# Patient Record
Sex: MALE | Race: WHITE | NOT HISPANIC OR LATINO | Employment: OTHER | URBAN - METROPOLITAN AREA
[De-identification: names, ages, dates, MRNs, and addresses within clinical notes are randomized per-mention and may not be internally consistent; named-entity substitution may affect disease eponyms.]

---

## 2018-01-01 ENCOUNTER — APPOINTMENT (OUTPATIENT)
Dept: RADIOLOGY | Facility: MEDICAL CENTER | Age: 73
End: 2018-01-01
Attending: NEUROLOGICAL SURGERY
Payer: OTHER MISCELLANEOUS

## 2018-01-01 ENCOUNTER — HOSPITAL ENCOUNTER (OUTPATIENT)
Facility: MEDICAL CENTER | Age: 73
End: 2018-11-27
Attending: NEUROLOGICAL SURGERY | Admitting: NEUROLOGICAL SURGERY
Payer: OTHER MISCELLANEOUS

## 2018-01-01 VITALS
DIASTOLIC BLOOD PRESSURE: 66 MMHG | RESPIRATION RATE: 17 BRPM | HEIGHT: 74 IN | TEMPERATURE: 97.6 F | WEIGHT: 223.33 LBS | SYSTOLIC BLOOD PRESSURE: 122 MMHG | OXYGEN SATURATION: 94 % | BODY MASS INDEX: 28.66 KG/M2 | HEART RATE: 81 BPM

## 2018-01-01 LAB
APTT PPP: 30.6 SEC (ref 24.7–36)
INR PPP: 1.02 (ref 0.87–1.13)
PROTHROMBIN TIME: 13.5 SEC (ref 12–14.6)

## 2018-01-01 PROCEDURE — 97165 OT EVAL LOW COMPLEX 30 MIN: CPT

## 2018-01-01 PROCEDURE — 700101 HCHG RX REV CODE 250: Performed by: NURSE PRACTITIONER

## 2018-01-01 PROCEDURE — 72020 X-RAY EXAM OF SPINE 1 VIEW: CPT

## 2018-01-01 PROCEDURE — A9270 NON-COVERED ITEM OR SERVICE: HCPCS | Performed by: NURSE PRACTITIONER

## 2018-01-01 PROCEDURE — G8978 MOBILITY CURRENT STATUS: HCPCS | Mod: CJ

## 2018-01-01 PROCEDURE — 160035 HCHG PACU - 1ST 60 MINS PHASE I: Performed by: NEUROLOGICAL SURGERY

## 2018-01-01 PROCEDURE — 85610 PROTHROMBIN TIME: CPT

## 2018-01-01 PROCEDURE — 160036 HCHG PACU - EA ADDL 30 MINS PHASE I: Performed by: NEUROLOGICAL SURGERY

## 2018-01-01 PROCEDURE — 160041 HCHG SURGERY MINUTES - EA ADDL 1 MIN LEVEL 4: Performed by: NEUROLOGICAL SURGERY

## 2018-01-01 PROCEDURE — G0378 HOSPITAL OBSERVATION PER HR: HCPCS

## 2018-01-01 PROCEDURE — 700102 HCHG RX REV CODE 250 W/ 637 OVERRIDE(OP): Performed by: NURSE PRACTITIONER

## 2018-01-01 PROCEDURE — 500002 HCHG ADHESIVE, DERMABOND: Performed by: NEUROLOGICAL SURGERY

## 2018-01-01 PROCEDURE — G8980 MOBILITY D/C STATUS: HCPCS | Mod: CJ

## 2018-01-01 PROCEDURE — 700112 HCHG RX REV CODE 229: Performed by: NURSE PRACTITIONER

## 2018-01-01 PROCEDURE — G8979 MOBILITY GOAL STATUS: HCPCS | Mod: CI

## 2018-01-01 PROCEDURE — G8988 SELF CARE GOAL STATUS: HCPCS | Mod: CI

## 2018-01-01 PROCEDURE — G8987 SELF CARE CURRENT STATUS: HCPCS | Mod: CI

## 2018-01-01 PROCEDURE — 700111 HCHG RX REV CODE 636 W/ 250 OVERRIDE (IP)

## 2018-01-01 PROCEDURE — A6402 STERILE GAUZE <= 16 SQ IN: HCPCS | Performed by: NEUROLOGICAL SURGERY

## 2018-01-01 PROCEDURE — 85730 THROMBOPLASTIN TIME PARTIAL: CPT

## 2018-01-01 PROCEDURE — 96366 THER/PROPH/DIAG IV INF ADDON: CPT

## 2018-01-01 PROCEDURE — G8989 SELF CARE D/C STATUS: HCPCS | Mod: CI

## 2018-01-01 PROCEDURE — 110454 HCHG SHELL REV 250: Performed by: NEUROLOGICAL SURGERY

## 2018-01-01 PROCEDURE — 95861 NEEDLE EMG 2 EXTREMITIES: CPT | Performed by: NEUROLOGICAL SURGERY

## 2018-01-01 PROCEDURE — 160002 HCHG RECOVERY MINUTES (STAT): Performed by: NEUROLOGICAL SURGERY

## 2018-01-01 PROCEDURE — 95937 NEUROMUSCULAR JUNCTION TEST: CPT | Performed by: NEUROLOGICAL SURGERY

## 2018-01-01 PROCEDURE — 501838 HCHG SUTURE GENERAL: Performed by: NEUROLOGICAL SURGERY

## 2018-01-01 PROCEDURE — 700101 HCHG RX REV CODE 250

## 2018-01-01 PROCEDURE — 160009 HCHG ANES TIME/MIN: Performed by: NEUROLOGICAL SURGERY

## 2018-01-01 PROCEDURE — 160048 HCHG OR STATISTICAL LEVEL 1-5: Performed by: NEUROLOGICAL SURGERY

## 2018-01-01 PROCEDURE — 500367 HCHG DRAIN KIT, HEMOVAC: Performed by: NEUROLOGICAL SURGERY

## 2018-01-01 PROCEDURE — 97161 PT EVAL LOW COMPLEX 20 MIN: CPT

## 2018-01-01 PROCEDURE — 95940 IONM IN OPERATNG ROOM 15 MIN: CPT | Performed by: NEUROLOGICAL SURGERY

## 2018-01-01 PROCEDURE — 96365 THER/PROPH/DIAG IV INF INIT: CPT

## 2018-01-01 PROCEDURE — 700111 HCHG RX REV CODE 636 W/ 250 OVERRIDE (IP): Performed by: NURSE PRACTITIONER

## 2018-01-01 PROCEDURE — 95938 SOMATOSENSORY TESTING: CPT | Performed by: NEUROLOGICAL SURGERY

## 2018-01-01 PROCEDURE — 95939 C MOTOR EVOKED UPR&LWR LIMBS: CPT | Performed by: NEUROLOGICAL SURGERY

## 2018-01-01 PROCEDURE — 160029 HCHG SURGERY MINUTES - 1ST 30 MINS LEVEL 4: Performed by: NEUROLOGICAL SURGERY

## 2018-01-01 RX ORDER — OXYCODONE HCL 5 MG/5 ML
5 SOLUTION, ORAL ORAL
Status: DISCONTINUED | OUTPATIENT
Start: 2018-01-01 | End: 2018-01-01 | Stop reason: HOSPADM

## 2018-01-01 RX ORDER — ONDANSETRON 4 MG/1
4 TABLET, ORALLY DISINTEGRATING ORAL EVERY 4 HOURS PRN
Status: DISCONTINUED | OUTPATIENT
Start: 2018-01-01 | End: 2018-01-01 | Stop reason: HOSPADM

## 2018-01-01 RX ORDER — CEFAZOLIN SODIUM 2 G/100ML
2 INJECTION, SOLUTION INTRAVENOUS EVERY 8 HOURS
Status: COMPLETED | OUTPATIENT
Start: 2018-01-01 | End: 2018-01-01

## 2018-01-01 RX ORDER — HYDROCODONE BITARTRATE AND ACETAMINOPHEN 5; 325 MG/1; MG/1
1-2 TABLET ORAL EVERY 6 HOURS PRN
Status: DISCONTINUED | OUTPATIENT
Start: 2018-01-01 | End: 2018-01-01 | Stop reason: HOSPADM

## 2018-01-01 RX ORDER — DIPHENHYDRAMINE HYDROCHLORIDE 50 MG/ML
25 INJECTION INTRAMUSCULAR; INTRAVENOUS EVERY 6 HOURS PRN
Status: DISCONTINUED | OUTPATIENT
Start: 2018-01-01 | End: 2018-01-01 | Stop reason: HOSPADM

## 2018-01-01 RX ORDER — METHYLPREDNISOLONE SODIUM SUCCINATE 125 MG/2ML
INJECTION, POWDER, LYOPHILIZED, FOR SOLUTION INTRAMUSCULAR; INTRAVENOUS
Status: DISCONTINUED | OUTPATIENT
Start: 2018-01-01 | End: 2018-01-01 | Stop reason: HOSPADM

## 2018-01-01 RX ORDER — SODIUM CHLORIDE, SODIUM LACTATE, POTASSIUM CHLORIDE, CALCIUM CHLORIDE 600; 310; 30; 20 MG/100ML; MG/100ML; MG/100ML; MG/100ML
INJECTION, SOLUTION INTRAVENOUS CONTINUOUS
Status: DISCONTINUED | OUTPATIENT
Start: 2018-01-01 | End: 2018-01-01 | Stop reason: HOSPADM

## 2018-01-01 RX ORDER — HYDRALAZINE HYDROCHLORIDE 20 MG/ML
10 INJECTION INTRAMUSCULAR; INTRAVENOUS
Status: DISCONTINUED | OUTPATIENT
Start: 2018-01-01 | End: 2018-01-01 | Stop reason: HOSPADM

## 2018-01-01 RX ORDER — DIPHENHYDRAMINE HYDROCHLORIDE 50 MG/ML
12.5 INJECTION INTRAMUSCULAR; INTRAVENOUS
Status: DISCONTINUED | OUTPATIENT
Start: 2018-01-01 | End: 2018-01-01 | Stop reason: HOSPADM

## 2018-01-01 RX ORDER — SODIUM CHLORIDE AND POTASSIUM CHLORIDE 150; 900 MG/100ML; MG/100ML
INJECTION, SOLUTION INTRAVENOUS CONTINUOUS
Status: DISCONTINUED | OUTPATIENT
Start: 2018-01-01 | End: 2018-01-01 | Stop reason: HOSPADM

## 2018-01-01 RX ORDER — OXYCODONE HCL 5 MG/5 ML
10 SOLUTION, ORAL ORAL
Status: DISCONTINUED | OUTPATIENT
Start: 2018-01-01 | End: 2018-01-01 | Stop reason: HOSPADM

## 2018-01-01 RX ORDER — MORPHINE SULFATE 10 MG/ML
4 INJECTION, SOLUTION INTRAMUSCULAR; INTRAVENOUS
Status: DISCONTINUED | OUTPATIENT
Start: 2018-01-01 | End: 2018-01-01 | Stop reason: HOSPADM

## 2018-01-01 RX ORDER — PSEUDOEPHEDRINE HCL 30 MG
100 TABLET ORAL 2 TIMES DAILY
Qty: 60 CAP | Status: ON HOLD | COMMUNITY
Start: 2018-01-01 | End: 2019-01-01

## 2018-01-01 RX ORDER — ENEMA 19; 7 G/133ML; G/133ML
1 ENEMA RECTAL
Status: DISCONTINUED | OUTPATIENT
Start: 2018-01-01 | End: 2018-01-01 | Stop reason: HOSPADM

## 2018-01-01 RX ORDER — LABETALOL HYDROCHLORIDE 5 MG/ML
10 INJECTION, SOLUTION INTRAVENOUS
Status: DISCONTINUED | OUTPATIENT
Start: 2018-01-01 | End: 2018-01-01 | Stop reason: HOSPADM

## 2018-01-01 RX ORDER — ACETAMINOPHEN 325 MG/1
650 TABLET ORAL EVERY 6 HOURS PRN
Status: DISCONTINUED | OUTPATIENT
Start: 2018-01-01 | End: 2018-01-01 | Stop reason: HOSPADM

## 2018-01-01 RX ORDER — POLYETHYLENE GLYCOL 3350 17 G/17G
1 POWDER, FOR SOLUTION ORAL 2 TIMES DAILY PRN
Status: DISCONTINUED | OUTPATIENT
Start: 2018-01-01 | End: 2018-01-01 | Stop reason: HOSPADM

## 2018-01-01 RX ORDER — BISACODYL 10 MG
10 SUPPOSITORY, RECTAL RECTAL
Status: DISCONTINUED | OUTPATIENT
Start: 2018-01-01 | End: 2018-01-01 | Stop reason: HOSPADM

## 2018-01-01 RX ORDER — AMOXICILLIN 250 MG
1 CAPSULE ORAL
Status: DISCONTINUED | OUTPATIENT
Start: 2018-01-01 | End: 2018-01-01 | Stop reason: HOSPADM

## 2018-01-01 RX ORDER — ATORVASTATIN CALCIUM 20 MG/1
20 TABLET, FILM COATED ORAL
Status: DISCONTINUED | OUTPATIENT
Start: 2018-01-01 | End: 2018-01-01 | Stop reason: HOSPADM

## 2018-01-01 RX ORDER — M-VIT,TX,IRON,MINS/CALC/FOLIC 27MG-0.4MG
1 TABLET ORAL DAILY
Status: DISCONTINUED | OUTPATIENT
Start: 2018-01-01 | End: 2018-01-01 | Stop reason: HOSPADM

## 2018-01-01 RX ORDER — DIPHENHYDRAMINE HCL 25 MG
25 TABLET ORAL EVERY 6 HOURS PRN
Status: DISCONTINUED | OUTPATIENT
Start: 2018-01-01 | End: 2018-01-01 | Stop reason: HOSPADM

## 2018-01-01 RX ORDER — ONDANSETRON 2 MG/ML
4 INJECTION INTRAMUSCULAR; INTRAVENOUS
Status: DISCONTINUED | OUTPATIENT
Start: 2018-01-01 | End: 2018-01-01 | Stop reason: HOSPADM

## 2018-01-01 RX ORDER — HYDROMORPHONE HYDROCHLORIDE 1 MG/ML
0.1 INJECTION, SOLUTION INTRAMUSCULAR; INTRAVENOUS; SUBCUTANEOUS
Status: DISCONTINUED | OUTPATIENT
Start: 2018-01-01 | End: 2018-01-01 | Stop reason: HOSPADM

## 2018-01-01 RX ORDER — HYDRALAZINE HYDROCHLORIDE 20 MG/ML
5 INJECTION INTRAMUSCULAR; INTRAVENOUS
Status: DISCONTINUED | OUTPATIENT
Start: 2018-01-01 | End: 2018-01-01 | Stop reason: HOSPADM

## 2018-01-01 RX ORDER — HYDROMORPHONE HYDROCHLORIDE 1 MG/ML
0.4 INJECTION, SOLUTION INTRAMUSCULAR; INTRAVENOUS; SUBCUTANEOUS
Status: DISCONTINUED | OUTPATIENT
Start: 2018-01-01 | End: 2018-01-01 | Stop reason: HOSPADM

## 2018-01-01 RX ORDER — LABETALOL HYDROCHLORIDE 5 MG/ML
5 INJECTION, SOLUTION INTRAVENOUS
Status: DISCONTINUED | OUTPATIENT
Start: 2018-01-01 | End: 2018-01-01 | Stop reason: HOSPADM

## 2018-01-01 RX ORDER — AMOXICILLIN 250 MG
1 CAPSULE ORAL NIGHTLY
Status: DISCONTINUED | OUTPATIENT
Start: 2018-01-01 | End: 2018-01-01 | Stop reason: HOSPADM

## 2018-01-01 RX ORDER — OXYCODONE HYDROCHLORIDE 5 MG/1
5 TABLET ORAL
Status: DISCONTINUED | OUTPATIENT
Start: 2018-01-01 | End: 2018-01-01 | Stop reason: HOSPADM

## 2018-01-01 RX ORDER — TIZANIDINE 4 MG/1
4 TABLET ORAL 3 TIMES DAILY PRN
Status: DISCONTINUED | OUTPATIENT
Start: 2018-01-01 | End: 2018-01-01 | Stop reason: HOSPADM

## 2018-01-01 RX ORDER — OXYCODONE HYDROCHLORIDE 5 MG/1
10 TABLET ORAL
Status: DISCONTINUED | OUTPATIENT
Start: 2018-01-01 | End: 2018-01-01 | Stop reason: HOSPADM

## 2018-01-01 RX ORDER — HALOPERIDOL 5 MG/ML
1 INJECTION INTRAMUSCULAR
Status: DISCONTINUED | OUTPATIENT
Start: 2018-01-01 | End: 2018-01-01 | Stop reason: HOSPADM

## 2018-01-01 RX ORDER — BACITRACIN 50000 [IU]/1
INJECTION, POWDER, FOR SOLUTION INTRAMUSCULAR
Status: DISCONTINUED | OUTPATIENT
Start: 2018-01-01 | End: 2018-01-01 | Stop reason: HOSPADM

## 2018-01-01 RX ORDER — SODIUM CHLORIDE, SODIUM LACTATE, POTASSIUM CHLORIDE, CALCIUM CHLORIDE 600; 310; 30; 20 MG/100ML; MG/100ML; MG/100ML; MG/100ML
INJECTION, SOLUTION INTRAVENOUS ONCE
Status: COMPLETED | OUTPATIENT
Start: 2018-01-01 | End: 2018-01-01

## 2018-01-01 RX ORDER — MIDAZOLAM HYDROCHLORIDE 1 MG/ML
1 INJECTION INTRAMUSCULAR; INTRAVENOUS
Status: DISCONTINUED | OUTPATIENT
Start: 2018-01-01 | End: 2018-01-01 | Stop reason: HOSPADM

## 2018-01-01 RX ORDER — ONDANSETRON 2 MG/ML
4 INJECTION INTRAMUSCULAR; INTRAVENOUS EVERY 4 HOURS PRN
Status: DISCONTINUED | OUTPATIENT
Start: 2018-01-01 | End: 2018-01-01 | Stop reason: HOSPADM

## 2018-01-01 RX ORDER — BUPIVACAINE HYDROCHLORIDE AND EPINEPHRINE 5; 5 MG/ML; UG/ML
INJECTION, SOLUTION EPIDURAL; INTRACAUDAL; PERINEURAL
Status: DISCONTINUED | OUTPATIENT
Start: 2018-01-01 | End: 2018-01-01 | Stop reason: HOSPADM

## 2018-01-01 RX ORDER — DOCUSATE SODIUM 100 MG/1
100 CAPSULE, LIQUID FILLED ORAL 2 TIMES DAILY
Status: DISCONTINUED | OUTPATIENT
Start: 2018-01-01 | End: 2018-01-01 | Stop reason: HOSPADM

## 2018-01-01 RX ORDER — HYDROMORPHONE HYDROCHLORIDE 1 MG/ML
0.2 INJECTION, SOLUTION INTRAMUSCULAR; INTRAVENOUS; SUBCUTANEOUS
Status: DISCONTINUED | OUTPATIENT
Start: 2018-01-01 | End: 2018-01-01 | Stop reason: HOSPADM

## 2018-01-01 RX ORDER — METOPROLOL TARTRATE 1 MG/ML
1 INJECTION, SOLUTION INTRAVENOUS
Status: DISCONTINUED | OUTPATIENT
Start: 2018-01-01 | End: 2018-01-01 | Stop reason: HOSPADM

## 2018-01-01 RX ADMIN — CEFAZOLIN SODIUM 2 G: 2 INJECTION, SOLUTION INTRAVENOUS at 01:05

## 2018-01-01 RX ADMIN — SODIUM CHLORIDE, SODIUM LACTATE, POTASSIUM CHLORIDE, CALCIUM CHLORIDE: 600; 310; 30; 20 INJECTION, SOLUTION INTRAVENOUS at 06:36

## 2018-01-01 RX ADMIN — MULTIPLE VITAMINS W/ MINERALS TAB 1 TABLET: TAB at 15:44

## 2018-01-01 RX ADMIN — ACETAMINOPHEN 650 MG: 325 TABLET, FILM COATED ORAL at 20:22

## 2018-01-01 RX ADMIN — METOPROLOL TARTRATE 25 MG: 25 TABLET, FILM COATED ORAL at 04:33

## 2018-01-01 RX ADMIN — DOCUSATE SODIUM 100 MG: 100 CAPSULE, LIQUID FILLED ORAL at 04:33

## 2018-01-01 RX ADMIN — CEFAZOLIN SODIUM 2 G: 2 INJECTION, SOLUTION INTRAVENOUS at 15:36

## 2018-01-01 RX ADMIN — MULTIPLE VITAMINS W/ MINERALS TAB 1 TABLET: TAB at 04:33

## 2018-01-01 RX ADMIN — ACETAMINOPHEN 650 MG: 325 TABLET, FILM COATED ORAL at 01:04

## 2018-01-01 RX ADMIN — STANDARDIZED SENNA CONCENTRATE AND DOCUSATE SODIUM 1 TABLET: 8.6; 5 TABLET, FILM COATED ORAL at 20:22

## 2018-01-01 RX ADMIN — POTASSIUM CHLORIDE AND SODIUM CHLORIDE: 900; 150 INJECTION, SOLUTION INTRAVENOUS at 01:57

## 2018-01-01 RX ADMIN — DOCUSATE SODIUM 100 MG: 100 CAPSULE, LIQUID FILLED ORAL at 16:36

## 2018-01-01 RX ADMIN — POTASSIUM CHLORIDE AND SODIUM CHLORIDE: 900; 150 INJECTION, SOLUTION INTRAVENOUS at 15:36

## 2018-01-01 RX ADMIN — ATORVASTATIN CALCIUM 20 MG: 20 TABLET, FILM COATED ORAL at 20:22

## 2018-01-01 RX ADMIN — ACETAMINOPHEN 650 MG: 325 TABLET, FILM COATED ORAL at 07:25

## 2018-01-01 ASSESSMENT — LIFESTYLE VARIABLES
EVER HAD A DRINK FIRST THING IN THE MORNING TO STEADY YOUR NERVES TO GET RID OF A HANGOVER: NO
CONSUMPTION TOTAL: NEGATIVE
TOTAL SCORE: 0
HOW MANY TIMES IN THE PAST YEAR HAVE YOU HAD 5 OR MORE DRINKS IN A DAY: 0
TOTAL SCORE: 0
AVERAGE NUMBER OF DAYS PER WEEK YOU HAVE A DRINK CONTAINING ALCOHOL: 2
TOTAL SCORE: 0
ALCOHOL_USE: YES
EVER FELT BAD OR GUILTY ABOUT YOUR DRINKING: NO
HAVE PEOPLE ANNOYED YOU BY CRITICIZING YOUR DRINKING: NO
HAVE PEOPLE ANNOYED YOU BY CRITICIZING YOUR DRINKING: NO
TOTAL SCORE: 0
TOTAL SCORE: 0
EVER_SMOKED: NEVER
HAVE YOU EVER FELT YOU SHOULD CUT DOWN ON YOUR DRINKING: NO
EVER_SMOKED: NEVER
EVER_SMOKED: NEVER
EVER FELT BAD OR GUILTY ABOUT YOUR DRINKING: NO
HAVE YOU EVER FELT YOU SHOULD CUT DOWN ON YOUR DRINKING: NO
ON A TYPICAL DAY WHEN YOU DRINK ALCOHOL HOW MANY DRINKS DO YOU HAVE: 1
CONSUMPTION TOTAL: INCOMPLETE
EVER HAD A DRINK FIRST THING IN THE MORNING TO STEADY YOUR NERVES TO GET RID OF A HANGOVER: NO
ALCOHOL_USE: YES
TOTAL SCORE: 0

## 2018-01-01 ASSESSMENT — COGNITIVE AND FUNCTIONAL STATUS - GENERAL
CLIMB 3 TO 5 STEPS WITH RAILING: A LITTLE
TURNING FROM BACK TO SIDE WHILE IN FLAT BAD: A LITTLE
SUGGESTED CMS G CODE MODIFIER MOBILITY: CJ
DAILY ACTIVITIY SCORE: 21
SUGGESTED CMS G CODE MODIFIER DAILY ACTIVITY: CI
MOBILITY SCORE: 21
HELP NEEDED FOR BATHING: A LITTLE
MOVING TO AND FROM BED TO CHAIR: A LITTLE
DAILY ACTIVITIY SCORE: 23
CLIMB 3 TO 5 STEPS WITH RAILING: A LITTLE
DRESSING REGULAR UPPER BODY CLOTHING: A LITTLE
SUGGESTED CMS G CODE MODIFIER DAILY ACTIVITY: CJ
MOBILITY SCORE: 23
SUGGESTED CMS G CODE MODIFIER MOBILITY: CI
HELP NEEDED FOR BATHING: A LITTLE
DRESSING REGULAR LOWER BODY CLOTHING: A LITTLE

## 2018-01-01 ASSESSMENT — ACTIVITIES OF DAILY LIVING (ADL): TOILETING: INDEPENDENT

## 2018-01-01 ASSESSMENT — COPD QUESTIONNAIRES
IN THE PAST 12 MONTHS DO YOU DO LESS THAN YOU USED TO BECAUSE OF YOUR BREATHING PROBLEMS: DISAGREE/UNSURE
DURING THE PAST 4 WEEKS HOW MUCH DID YOU FEEL SHORT OF BREATH: SOME OF THE TIME
DO YOU EVER COUGH UP ANY MUCUS OR PHLEGM?: YES, A FEW DAYS A WEEK OR MONTH
COPD SCREENING SCORE: 4
HAVE YOU SMOKED AT LEAST 100 CIGARETTES IN YOUR ENTIRE LIFE: NO/DON'T KNOW

## 2018-01-01 ASSESSMENT — PAIN SCALES - GENERAL
PAINLEVEL_OUTOF10: 3
PAINLEVEL_OUTOF10: 4
PAINLEVEL_OUTOF10: 3
PAINLEVEL_OUTOF10: 4
PAINLEVEL_OUTOF10: 3
PAINLEVEL_OUTOF10: 4
PAINLEVEL_OUTOF10: 3
PAINLEVEL_OUTOF10: 5
PAINLEVEL_OUTOF10: 3

## 2018-01-01 ASSESSMENT — PATIENT HEALTH QUESTIONNAIRE - PHQ9
1. LITTLE INTEREST OR PLEASURE IN DOING THINGS: NOT AT ALL
SUM OF ALL RESPONSES TO PHQ9 QUESTIONS 1 AND 2: 0
SUM OF ALL RESPONSES TO PHQ9 QUESTIONS 1 AND 2: 0
1. LITTLE INTEREST OR PLEASURE IN DOING THINGS: NOT AT ALL
2. FEELING DOWN, DEPRESSED, IRRITABLE, OR HOPELESS: NOT AT ALL
2. FEELING DOWN, DEPRESSED, IRRITABLE, OR HOPELESS: NOT AT ALL

## 2018-01-01 ASSESSMENT — GAIT ASSESSMENTS
GAIT LEVEL OF ASSIST: SUPERVISED
DEVIATION: TRENDELENBERG;DECREASED HEEL STRIKE;DECREASED TOE OFF
ASSISTIVE DEVICE: FRONT WHEEL WALKER;SINGLE POINT CANE
DISTANCE (FEET): 300

## 2018-02-12 ENCOUNTER — HOSPITAL ENCOUNTER (OUTPATIENT)
Dept: RADIOLOGY | Facility: REHABILITATION | Age: 73
End: 2018-02-12
Attending: ANESTHESIOLOGY
Payer: OTHER MISCELLANEOUS

## 2018-02-12 ENCOUNTER — HOSPITAL ENCOUNTER (OUTPATIENT)
Dept: PAIN MANAGEMENT | Facility: REHABILITATION | Age: 73
End: 2018-02-12
Attending: ANESTHESIOLOGY
Payer: OTHER MISCELLANEOUS

## 2018-02-12 VITALS
DIASTOLIC BLOOD PRESSURE: 75 MMHG | HEIGHT: 74 IN | RESPIRATION RATE: 16 BRPM | SYSTOLIC BLOOD PRESSURE: 128 MMHG | OXYGEN SATURATION: 96 % | WEIGHT: 225.75 LBS | BODY MASS INDEX: 28.97 KG/M2 | TEMPERATURE: 98.7 F | HEART RATE: 82 BPM

## 2018-02-12 PROCEDURE — 62323 NJX INTERLAMINAR LMBR/SAC: CPT

## 2018-02-12 PROCEDURE — 700111 HCHG RX REV CODE 636 W/ 250 OVERRIDE (IP)

## 2018-02-12 PROCEDURE — 99152 MOD SED SAME PHYS/QHP 5/>YRS: CPT

## 2018-02-12 PROCEDURE — 700117 HCHG RX CONTRAST REV CODE 255

## 2018-02-12 RX ORDER — BETAMETHASONE SODIUM PHOSPHATE AND BETAMETHASONE ACETATE 3; 3 MG/ML; MG/ML
INJECTION, SUSPENSION INTRA-ARTICULAR; INTRALESIONAL; INTRAMUSCULAR; SOFT TISSUE
Status: COMPLETED
Start: 2018-02-12 | End: 2018-02-12

## 2018-02-12 RX ORDER — MIDAZOLAM HYDROCHLORIDE 1 MG/ML
INJECTION INTRAMUSCULAR; INTRAVENOUS
Status: COMPLETED
Start: 2018-02-12 | End: 2018-02-12

## 2018-02-12 RX ADMIN — FENTANYL CITRATE 50 MCG: 50 INJECTION, SOLUTION INTRAMUSCULAR; INTRAVENOUS at 11:35

## 2018-02-12 RX ADMIN — IOHEXOL 8 ML: 240 INJECTION, SOLUTION INTRATHECAL; INTRAVASCULAR; INTRAVENOUS; ORAL at 11:32

## 2018-02-12 RX ADMIN — BETAMETHASONE SODIUM PHOSPHATE AND BETAMETHASONE ACETATE 6 MG: 3; 3 INJECTION, SUSPENSION INTRA-ARTICULAR; INTRALESIONAL; INTRAMUSCULAR at 11:33

## 2018-02-12 RX ADMIN — MIDAZOLAM HYDROCHLORIDE 1 MG: 1 INJECTION, SOLUTION INTRAMUSCULAR; INTRAVENOUS at 11:33

## 2018-02-12 ASSESSMENT — PAIN SCALES - GENERAL
PAINLEVEL_OUTOF10: 0
PAINLEVEL_OUTOF10: 2
PAINLEVEL_OUTOF10: 0

## 2018-02-12 NOTE — PROGRESS NOTES
Time Out completed including pertinent patient history, correct procedure, allergies and STOP BANG score.

## 2018-02-12 NOTE — PROGRESS NOTES
Current medications reviewed with pt, see medications reconciliation form. Pt karina taking ASA or other blood thinners or anti-inflammatories.  Pt has a ride post-procedure(wife to drive).  Printed and verbal discharge instructions given to pt who verbalized understanding.

## 2018-02-12 NOTE — PROGRESS NOTES
After STOP BANG assessment, pt score >5, education sheet on WALTER protocol given pre procedure.  Pt's responsible adult was given WALTER information and instructed pt not to be left along of 24 hours, due to sedation and WALTER assessment.Pt tolerated fluids, no nausea, able to ambulate. Discharged per MD orders.at 1202

## 2018-04-23 ENCOUNTER — HOSPITAL ENCOUNTER (OUTPATIENT)
Dept: RADIOLOGY | Facility: REHABILITATION | Age: 73
End: 2018-04-23
Attending: ANESTHESIOLOGY
Payer: OTHER MISCELLANEOUS

## 2018-04-23 ENCOUNTER — HOSPITAL ENCOUNTER (OUTPATIENT)
Dept: PAIN MANAGEMENT | Facility: REHABILITATION | Age: 73
End: 2018-04-23
Attending: ANESTHESIOLOGY
Payer: OTHER MISCELLANEOUS

## 2018-04-23 VITALS
HEIGHT: 74 IN | BODY MASS INDEX: 28.8 KG/M2 | DIASTOLIC BLOOD PRESSURE: 82 MMHG | OXYGEN SATURATION: 94 % | HEART RATE: 75 BPM | TEMPERATURE: 97.9 F | WEIGHT: 224.43 LBS | RESPIRATION RATE: 24 BRPM | SYSTOLIC BLOOD PRESSURE: 131 MMHG

## 2018-04-23 PROCEDURE — 700111 HCHG RX REV CODE 636 W/ 250 OVERRIDE (IP)

## 2018-04-23 PROCEDURE — 700117 HCHG RX CONTRAST REV CODE 255

## 2018-04-23 PROCEDURE — 62323 NJX INTERLAMINAR LMBR/SAC: CPT

## 2018-04-23 RX ORDER — MIDAZOLAM HYDROCHLORIDE 1 MG/ML
INJECTION INTRAMUSCULAR; INTRAVENOUS
Status: COMPLETED
Start: 2018-04-23 | End: 2018-04-23

## 2018-04-23 RX ORDER — BETAMETHASONE SODIUM PHOSPHATE AND BETAMETHASONE ACETATE 3; 3 MG/ML; MG/ML
INJECTION, SUSPENSION INTRA-ARTICULAR; INTRALESIONAL; INTRAMUSCULAR; SOFT TISSUE
Status: COMPLETED
Start: 2018-04-23 | End: 2018-04-23

## 2018-04-23 RX ADMIN — MIDAZOLAM HYDROCHLORIDE 1 MG: 1 INJECTION, SOLUTION INTRAMUSCULAR; INTRAVENOUS at 11:54

## 2018-04-23 RX ADMIN — IOHEXOL 1 ML: 240 INJECTION, SOLUTION INTRATHECAL; INTRAVASCULAR; INTRAVENOUS; ORAL at 11:58

## 2018-04-23 RX ADMIN — BETAMETHASONE SODIUM PHOSPHATE AND BETAMETHASONE ACETATE 12 MG: 3; 3 INJECTION, SUSPENSION INTRA-ARTICULAR; INTRALESIONAL; INTRAMUSCULAR at 11:58

## 2018-04-23 RX ADMIN — FENTANYL CITRATE 50 MCG: 50 INJECTION, SOLUTION INTRAMUSCULAR; INTRAVENOUS at 11:54

## 2018-04-23 ASSESSMENT — PAIN SCALES - GENERAL
PAINLEVEL_OUTOF10: 1
PAINLEVEL_OUTOF10: 1
PAINLEVEL_OUTOF10: 2

## 2018-04-23 NOTE — PROGRESS NOTES
Med reconciliation completed. Pt has . Patient denies taking any  antibiotics, &/or non steroidal anti inflammatories. Discharge instructions reviewed & copy given to patient. Pt on daily Pradaxa- last dose 5 days ago.

## 2018-11-16 ENCOUNTER — APPOINTMENT (OUTPATIENT)
Dept: ADMISSIONS | Facility: MEDICAL CENTER | Age: 73
End: 2018-11-16
Attending: NEUROLOGICAL SURGERY
Payer: OTHER MISCELLANEOUS

## 2018-11-16 RX ORDER — M-VIT,TX,IRON,MINS/CALC/FOLIC 27MG-0.4MG
1 TABLET ORAL
COMMUNITY

## 2018-11-16 RX ORDER — CHLORAL HYDRATE 500 MG
1000 CAPSULE ORAL 2 TIMES DAILY
Status: ON HOLD | COMMUNITY
End: 2018-01-01

## 2018-11-26 NOTE — OR NURSING
Patient A+Ox4. VSS. Hx of A_Fib with pvc's which continues in PACU.  Dr Samano aware. Denies pain or nausea.  ISAAC strong bilat exts equal strength.  Dressing to lower back with dermabond. Hemovac x1 intact with scant drainage. Declines meds. Po clarita well. Family able to come to bedside and see patient.  Updated with patient plan.

## 2018-11-26 NOTE — OR SURGEON
Immediate Post OP Note    PreOp Diagnosis: lumbar stenosis/ radiculopathy     PostOp Diagnosis: lumbar stenosis/ radiculopathy     Procedure(s):  LUMBAR LAMINECTOMY DISKECTOMY-  POSTERIOR L1-2 LAMI - Wound Class: Clean  LAMINOTOMY-  L4-S1 - Wound Class: Clean  FORAMINOTOMY - Wound Class: Clean    Surgeon(s):  Son Ruffin M.D.    Anesthesiologist/Type of Anesthesia:  Anesthesiologist: Jamie Samano M.D./General    Surgical Staff:  Assistant: MARGARITA Forte  Circulator: Angie Bradley R.N.; Tory Smallwood R.N.  Scrub Person: Christine Sandoval; Anny Shields  Radiology Technologist: Francisco London    Specimens removed if any:  * No specimens in log *    Estimated Blood Loss: min    Findings: good decompression     Complications: none         11/26/2018 9:56 AM MARGARITA Forte

## 2018-11-26 NOTE — OR NURSING
POC reviewed with pt and ride. Call light within reach, educated to call for assistance if needed.

## 2018-11-26 NOTE — OR NURSING
"Patient states the sensation in his hands and legs are greatly improved from pre-op. States\"I havent been able to feel this much in my hands and upper legs in years\"  VSS.  Back incision clean and dry. Awaiting room clean  "

## 2018-11-26 NOTE — OR NURSING
Patient A+OX4. Denies pain or nausea. Able to tolerate po well.  States he feels fine.  Decreased numbness bilateral hands.  Dressing to lower back clean and dry.  PONCHO strong.  Patient still has multifocal ectopy which is not sustained but frequent.  Dr Samano at bedside to reassess and states patient ectopy is as pre-op. No s/s of chest discomfort/ hypotension or dyspnea. Cont to monitor.  Family updated  With patient room assignment room s173. Room still needs clean.  Will update them when patient ready for transfer

## 2018-11-27 NOTE — CARE PLAN
Problem: Infection  Goal: Will remain free from infection  Outcome: PROGRESSING AS EXPECTED      Problem: Venous Thromboembolism (VTW)/Deep Vein Thrombosis (DVT) Prevention:  Goal: Patient will participate in Venous Thrombosis (VTE)/Deep Vein Thrombosis (DVT)Prevention Measures  Outcome: PROGRESSING AS EXPECTED      Problem: Bowel/Gastric:  Goal: Normal bowel function is maintained or improved  Outcome: PROGRESSING AS EXPECTED

## 2018-11-27 NOTE — PROGRESS NOTES
[facility], Pt ambulatory with steady gait, refused wheelchair, no scripts prescribed.

## 2018-11-27 NOTE — DISCHARGE INSTRUCTIONS
Discharge Instructions    Discharged to home by car with relative. Discharged via wheelchair, hospital escort: Yes.  Special equipment needed: Not Applicable    Be sure to schedule a follow-up appointment with your primary care doctor or any specialists as instructed.     Discharge Plan:   Pneumococcal Vaccine Administered/Refused: Not given - Patient refused pneumococcal vaccine  Influenza Vaccine Indication: Patient Refuses  Influenza Vaccine Given - only chart on this line when given: Influenza Vaccine Given (See MAR)    I understand that a diet low in cholesterol, fat, and sodium is recommended for good health. Unless I have been given specific instructions below for another diet, I accept this instruction as my diet prescription.   Other diet: Regular    Special Instructions: None    · Is patient discharged on Warfarin / Coumadin?   No     Follow up with APN at [facility] in 2 weeks [phone]  Follow up with Dr. Ruffin in 6 weeks  No pushing, pulling, lifting greater than 10 pounds  No repetitive bending, no twisting  Ok to shower, pat incision dry - 24 hours after surgery. No bath tubs, hot tubs, etc.   No non-steroidal anti-inflammatory medications or aspirin until cleared by Dr. Ruffin  Ambulate as much is comfortable  No driving for at least 2 weeks following surgery or until cleared  Obtain over the counter senekot take 1-2 tablets daily while taking narcotic pain medication  Do not return to work until cleared by physician      Depression / Suicide Risk    As you are discharged from this [facility] facility, it is important to learn how to keep safe from harming yourself.    Recognize the warning signs:  · Abrupt changes in personality, positive or negative- including increase in energy   · Giving away possessions  · Change in eating patterns- significant weight changes-  positive or negative  · Change in sleeping patterns- unable to sleep or sleeping all the time   · Unwillingness or  inability to communicate  · Depression  · Unusual sadness, discouragement and loneliness  · Talk of wanting to die  · Neglect of personal appearance   · Rebelliousness- reckless behavior  · Withdrawal from people/activities they love  · Confusion- inability to concentrate     If you or a loved one observes any of these behaviors or has concerns about self-harm, here's what you can do:  · Talk about it- your feelings and reasons for harming yourself  · Remove any means that you might use to hurt yourself (examples: pills, rope, extension cords, firearm)  · Get professional help from the community (Mental Health, Substance Abuse, psychological counseling)  · Do not be alone:Call your Safe Contact- someone whom you trust who will be there for you.  · Call your local CRISIS HOTLINE 706-5139 or 947-477-0994  · Call your local Children's Mobile Crisis Response Team [place] (329) 750-2253 or www.Kontest  · Call the toll free National Suicide Prevention Hotlines   · National Suicide Prevention Lifeline 364-035-BJNE (4966)  · National Hope Line Network 800-SUICIDE (718-3555)

## 2018-11-27 NOTE — PROGRESS NOTES
Two RN skin check. No concerns. Two lower back incisions approximated with dermabond, open to air. No drainage.

## 2018-11-27 NOTE — THERAPY
"Occupational Therapy Evaluation completed.   Functional Status:  Up in room up self w/use of SPC, demonstrated mod I LB dressing, txf to chair w/spv, reviewed spinal precautions needed frequent reinforcement to avoid bending. Remained up in chair w/no alarm RN aware   Plan of Care: Patient with no further skilled OT needs in the acute care setting at this time  Discharge Recommendations:  Equipment: No Equipment Needed. Post-acute therapy Currently anticipate no further skilled therapy needs once patient is discharged from the inpatient setting.    See \"Rehab Therapy-Acute\" Patient Summary Report for complete documentation.    "

## 2018-11-27 NOTE — PROGRESS NOTES
Neurosurgery Progress Note    Subjective:  Pt seen earlier by Dr. Ruffin  Ambulating in hallways   Pain controlled on Tylenol - does not want narcs on dc  Voiding    Exam:    A&O x3, GCS 15  PERRL  Incisions c/d/i flat        BP  Min: 116/75  Max: 154/76  Pulse  Av  Min: 57  Max: 98  Resp  Av.6  Min: 5  Max: 22  Temp  Av.8 °C (98.2 °F)  Min: 36.4 °C (97.5 °F)  Max: 37 °C (98.6 °F)  SpO2  Av %  Min: 91 %  Max: 100 %    No Data Recorded            Recent Labs      18   APTT  30.6   INR  1.02           Intake/Output       18 0700 - 18 0659 18 07 - 18 0659      [id number] 0092-8842 Total 9619-5426 1209-0165 Total       Intake    P.O.  350  -- 350  --  -- --    P.O. 350 -- 350 -- -- --    I.V.  1900  -- 1900  --  -- --    Crystalloid Intake 1900 -- 1900 -- -- --    Total Intake 2250 -- 2250 -- -- --       Output    Urine  1300  575 1875  --  -- --    Urine Void (mL) 5447 427 5692 -- -- --    Blood  75  -- 75  --  -- --    Est. Blood Loss (mL) 75 -- 75 -- -- --    Total Output 7496 610 2995 -- -- --       Net I/O     875 -575 300 -- -- --            Intake/Output Summary (Last 24 hours) at 18 0856  Last data filed at 18 0343   Gross per 24 hour   Intake             2250 ml   Output             1950 ml   Net              300 ml            • acetaminophen  650 mg Q6HRS PRN   • atorvastatin  20 mg QHS   • metoprolol  25 mg BID   • therapeutic multivitamin-minerals  1 Tab DAILY   • Pharmacy Consult Request  1 Each PRN   • MD ALERT...DO NOT ADMINISTER NSAIDS or ASPIRIN unless ORDERED By Neurosurgery  1 Each PRN   • docusate sodium  100 mg BID   • senna-docusate  1 Tab Nightly   • senna-docusate  1 Tab Q24HRS PRN   • polyethylene glycol/lytes  1 Packet BID PRN   • magnesium hydroxide  30 mL QDAY PRN   • bisacodyl  10 mg Q24HRS PRN   • fleet  1 Each Once PRN   • 0.9 % NaCl with KCl 20 mEq 1,000 mL   Continuous   • morphine injection  4 mg Q3HRS PRN   •  diphenhydrAMINE  25 mg Q6HRS PRN    Or   • diphenhydrAMINE  25 mg Q6HRS PRN   • ondansetron  4 mg Q4HRS PRN   • ondansetron  4 mg Q4HRS PRN   • tizanidine  4 mg TID PRN   • labetalol  10 mg Q HOUR PRN   • hydrALAZINE  10 mg Q HOUR PRN   • HYDROcodone-acetaminophen  1-2 Tab Q6HRS PRN       Assessment and Plan:  POD # 1 L1-2 lami, left L4-S1 hemilam/foraminotomy   Prophylactic anticoagulation: no           Dc home today

## 2018-11-27 NOTE — CARE PLAN
Problem: Venous Thromboembolism (VTW)/Deep Vein Thrombosis (DVT) Prevention:  Goal: Patient will participate in Venous Thrombosis (VTE)/Deep Vein Thrombosis (DVT)Prevention Measures  Outcome: PROGRESSING AS EXPECTED  Pt ambulating adequately.    Problem: Pain Management  Goal: Pain level will decrease to patient's comfort goal  Outcome: PROGRESSING AS EXPECTED  Pt's pain managed on Tylenol.

## 2018-11-27 NOTE — DISCHARGE SUMMARY
DATE OF ADMISSION:  11/26/2018    DATE OF DISCHARGE:  11/27/2018    ADMITTING DIAGNOSIS:  Lumbar stenosis and radiculopathy.    COURSE OF HOSPITALIZATION:  The patient was admitted to [facility].  On date of admission, he was brought to the operating room   where he underwent a posterior L1-L2 laminectomy with a left L4-S1   hemilaminectomy and foraminotomy with Dr. Son Ruffin.    Postoperatively, patient is doing very well.  He is ambulating in the   hallways.  His leg symptoms have subsided.  He is voiding.  His incisions are   clean, dry, and flat.  His vital signs are stable.  He has been having his   pain controlled with Tylenol only.  He does not want any narcotics or muscle   relaxers upon discharge.  He will be discharged home today.    DISCHARGE INSTRUCTIONS:  Patient will follow up with our office at 2 and 6   weeks postoperatively.  He was given standard postsurgical instructions.  If   he needs anything in the meantime, he was instructed not to hesitate to   contact our office.  He will be discharged home today in stable medical   condition.       ____________________________________     CAT ROSAS / KATIE    DD:  11/27/2018 09:09:09  DT:  11/27/2018 11:23:36    D#:  [id number]  Job#:  [id number]

## 2018-11-27 NOTE — THERAPY
"Physical Therapy Evaluation completed.   Bed Mobility:  Supine to Sit: Stand by Assist (HOB flat, no rail, verbal cueing for log roll)  Transfers: Sit to Stand: Supervised  Gait: Level Of Assist: Supervised with Front-Wheel Walker and Single Point Cane       Plan of Care: Patient with no further skilled PT needs in the acute care setting at this time  Discharge Recommendations: Equipment: No Equipment Needed. Post-acute therapy: Discharge to home with outpatient or home health for additional skilled therapy services.    Patient is a 73 YO male s/p L1-2 laminectomy and discectomy and L4-S1 laminotomy and foraminotomy with Dr. Ruffin on 11/26. Patient presented to PT with apparent impaired cognition, impaired safety awareness, and poor insight into deficits. Patient ambulated in unit with FWW then SPC with supervision. Provided patient education regarding use of AD and benefits of FWW over SPC but patient reported inability to use FWW in home environment. Provided education regarding spinal precautions, log roll, pain management techniques. Patient hyperverbose and tangential with no learning evidence. Patient will benefit from outpatient PT following medical DC. No further acute PT needs.    See \"Rehab Therapy-Acute\" Patient Summary Report for complete documentation.     "

## 2018-11-27 NOTE — OP REPORT
DATE OF SERVICE:  11/26/2018    PREOPERATIVE DIAGNOSES:  1.  Severe lumbar 1-2 central canal stenosis with bilateral lower extremity   weakness, numbness and tingling and bowel incontinence.  2.  Left-sided foraminal stenosis with radiculopathy at L4-L5 and L5-S1.    POSTOPERATIVE DIAGNOSES:  1.  Severe lumbar 1-2 central canal stenosis with bilateral lower extremity   weakness, numbness and tingling and bowel incontinence.  2.  Left-sided foraminal stenosis with radiculopathy at L4-L5 and L5-S1.    PROCEDURES:  1.  Lumbar 1-2 laminectomy and partial medial facetectomy.  2.  Use of operative microscope.  3.  Use of intraoperative neuro monitoring.  4.  Via separate incision, left-sided lumbar 4 laminectomy with decompression   of lumbar 4 root.  5.  Left-sided lumbar 5 laminectomy with decompression of lumbar 5 root.  6.  Left-sided lumbar 5 laminectomy with decompression of S1 root.    SURGEON:  Son Ruffin MD    ASSISTANT:  CAT Forte    ANESTHESIA:  General.    COMPLICATIONS:  None.    ESTIMATED BLOOD LOSS:  Minimal.    DESCRIPTION OF PROCEDURE:  Patient was brought to the operating room,   identified in the usual fashion.  General endotracheal anesthesia was induced   by the anesthesia team.  Patient was then placed prone on the Wilmer table   with bolsters.  All pressure points were meticulously padded.  We had   excellent bilateral lower extremity signals and good rectal sphincter signals   as well.  Fluoroscopic guidance was then used to cody out 2 separate   incisions.  Patient was then prepped and draped in usual sterile fashion.    Local anesthesia was infiltrated in the subcutaneous tissue for both   incisions.  We first started with lumbar 1-2.  Incision was made with a 10   blade.  We then performed a subperiosteal dissection bilaterally.  Retractors   were put in place.  Kocher was placed on the lamina of L1 at the level of   L1-L2 disk space.  Film was taken confirming that we  were at the correct   level.  This was then marked with a Leksell rongeur.  We then brought in the   operative microscope and performed a standard laminectomy of lumbar 1-2 and   partial medial facetectomy using a combination of Leksell rongeur, high-speed   air drill, Kerrison 2 and 3 punches.  Patient had very severe central and   lateral recess stenosis here.  We were able to relieve it completely.  At the   end, we could clearly see dura throughout.  Bilateral neural foramina were   free and clear using double ball probe.  Lateral recesses were free of   compression as well.  The laminectomy defect was taken laterally all the way   to the medial border of the pedicle.  Copious amounts of antibiotic irrigation   were used to wash out the wound.  FloSeal with gentle tamponade was used for   hemostasis.  Neuro monitoring, motor and sensory signals actually improved at   the end of the decompression.  After achieving meticulous hemostasis with   bipolar electrocautery on the muscle, we closed the incision in layers and   topped with Dermabond.    We then moved on to our second incision.  Midline incision was made with a 10   blade.  We then carried down the subperiosteal dissection on the left side   only using Bovie electrocautery.  Retractors were put in place.  An upgoing   curette was placed underneath the lamina of L5 at the level of the L5-S1 disk   space.  Film was taken confirming that we were at L5-S1.  This was then marked   with a marking pen.  We then adjusted the retractors and again brought in the   operative microscope.  We performed a standard hemilaminectomy of L5 and S1   using a combination of high speed air drill, upgoing curette to lift off the   ligamentum flavum, then Kerrison 2 and 3 punches to perform decompression.  We   performed foraminotomies of the L5 and S1 roots generously using Kerrison 2   and 3 punches.  Double ball probe confirmed we had excellent decompression.    FloSeal with  gentle tamponade was used for hemostasis.  We then adjusted the   retractors and performed the same procedure at L4-L5, decompressing the L4 and   L5 roots even more completely.  Copious amounts of antibiotic irrigation were   used to wash out the wound.  FloSeal with gentle tamponade was used for   hemostasis.  We left epidural Solu-Medrol and fentanyl.  After confirming   meticulous hemostasis, I then closed the incision in layers and topped with   Dermabond.  All sponge and needle counts were correct x2 at the end the case.    I was present and scrubbed for the entire procedure.    Patient awakened and was extubated and transferred to the recovery room in   stable condition where he was found to have 5/5 strength in all muscle groups   in the lower extremities.       ____________________________________     DIAN JANE MD    CPD / NTS    DD:  11/27/2018 10:10:42  DT:  11/27/2018 10:23:38    D#:  [id number]  Job#:  [id number]

## 2019-01-01 ENCOUNTER — HOSPITAL ENCOUNTER (OUTPATIENT)
Facility: MEDICAL CENTER | Age: 74
DRG: 820 | End: 2019-07-05
Admitting: HOSPITALIST
Payer: MEDICARE

## 2019-01-01 ENCOUNTER — HOSPITAL ENCOUNTER (OUTPATIENT)
Dept: RADIOLOGY | Facility: MEDICAL CENTER | Age: 74
End: 2019-07-06

## 2019-01-01 ENCOUNTER — HOME CARE VISIT (OUTPATIENT)
Dept: HOSPICE | Facility: HOSPICE | Age: 74
End: 2019-01-01
Payer: MEDICARE

## 2019-01-01 ENCOUNTER — HOSPITAL ENCOUNTER (INPATIENT)
Facility: MEDICAL CENTER | Age: 74
LOS: 11 days | DRG: 820 | End: 2019-07-16
Attending: HOSPITALIST | Admitting: HOSPITALIST
Payer: MEDICARE

## 2019-01-01 ENCOUNTER — APPOINTMENT (OUTPATIENT)
Dept: RADIOLOGY | Facility: MEDICAL CENTER | Age: 74
DRG: 100 | End: 2019-01-01
Attending: EMERGENCY MEDICINE
Payer: MEDICARE

## 2019-01-01 ENCOUNTER — PATIENT OUTREACH (OUTPATIENT)
Dept: HEALTH INFORMATION MANAGEMENT | Facility: OTHER | Age: 74
End: 2019-01-01

## 2019-01-01 ENCOUNTER — HOSPITAL ENCOUNTER (OUTPATIENT)
Dept: RADIOLOGY | Facility: MEDICAL CENTER | Age: 74
End: 2019-08-09
Attending: INTERNAL MEDICINE
Payer: MEDICARE

## 2019-01-01 ENCOUNTER — APPOINTMENT (OUTPATIENT)
Dept: RADIOLOGY | Facility: MEDICAL CENTER | Age: 74
DRG: 841 | End: 2019-01-01
Attending: PHYSICAL MEDICINE & REHABILITATION
Payer: MEDICARE

## 2019-01-01 ENCOUNTER — HOSPITAL ENCOUNTER (INPATIENT)
Facility: MEDICAL CENTER | Age: 74
LOS: 15 days | DRG: 840 | End: 2019-11-26
Attending: INTERNAL MEDICINE | Admitting: INTERNAL MEDICINE
Payer: COMMERCIAL

## 2019-01-01 ENCOUNTER — HOSPITAL ENCOUNTER (OUTPATIENT)
Dept: LAB | Facility: MEDICAL CENTER | Age: 74
DRG: 841 | End: 2019-10-01
Attending: INTERNAL MEDICINE
Payer: MEDICARE

## 2019-01-01 ENCOUNTER — APPOINTMENT (OUTPATIENT)
Dept: RADIOLOGY | Facility: MEDICAL CENTER | Age: 74
DRG: 820 | End: 2019-01-01
Attending: INTERNAL MEDICINE
Payer: MEDICARE

## 2019-01-01 ENCOUNTER — APPOINTMENT (OUTPATIENT)
Dept: RADIOLOGY | Facility: MEDICAL CENTER | Age: 74
DRG: 841 | End: 2019-01-01
Attending: EMERGENCY MEDICINE
Payer: MEDICARE

## 2019-01-01 ENCOUNTER — HOSPICE ADMISSION (OUTPATIENT)
Dept: HOSPICE | Facility: HOSPICE | Age: 74
End: 2019-01-01
Payer: MEDICARE

## 2019-01-01 ENCOUNTER — APPOINTMENT (OUTPATIENT)
Dept: RADIOLOGY | Facility: MEDICAL CENTER | Age: 74
DRG: 820 | End: 2019-01-01
Attending: ANESTHESIOLOGY
Payer: MEDICARE

## 2019-01-01 ENCOUNTER — APPOINTMENT (OUTPATIENT)
Dept: RADIOLOGY | Facility: MEDICAL CENTER | Age: 74
End: 2019-01-01
Attending: INTERNAL MEDICINE
Payer: MEDICARE

## 2019-01-01 ENCOUNTER — HOSPITAL ENCOUNTER (OUTPATIENT)
Facility: MEDICAL CENTER | Age: 74
End: 2019-09-11
Attending: INTERNAL MEDICINE | Admitting: INTERNAL MEDICINE
Payer: MEDICARE

## 2019-01-01 ENCOUNTER — APPOINTMENT (OUTPATIENT)
Dept: RADIOLOGY | Facility: MEDICAL CENTER | Age: 74
DRG: 841 | End: 2019-01-01
Attending: HOSPITALIST
Payer: MEDICARE

## 2019-01-01 ENCOUNTER — ANESTHESIA (OUTPATIENT)
Dept: SURGERY | Facility: MEDICAL CENTER | Age: 74
DRG: 820 | End: 2019-01-01
Payer: MEDICARE

## 2019-01-01 ENCOUNTER — HOSPITAL ENCOUNTER (INPATIENT)
Facility: MEDICAL CENTER | Age: 74
LOS: 9 days | DRG: 841 | End: 2019-10-10
Attending: EMERGENCY MEDICINE | Admitting: HOSPITALIST
Payer: MEDICARE

## 2019-01-01 ENCOUNTER — TELEPHONE (OUTPATIENT)
Dept: HOSPICE | Facility: HOSPICE | Age: 74
End: 2019-01-01
Payer: MEDICARE

## 2019-01-01 ENCOUNTER — APPOINTMENT (OUTPATIENT)
Dept: RADIOLOGY | Facility: MEDICAL CENTER | Age: 74
DRG: 820 | End: 2019-01-01
Attending: NURSE PRACTITIONER
Payer: MEDICARE

## 2019-01-01 ENCOUNTER — TELEPHONE (OUTPATIENT)
Dept: MEDICAL GROUP | Facility: MEDICAL CENTER | Age: 74
End: 2019-01-01

## 2019-01-01 ENCOUNTER — APPOINTMENT (OUTPATIENT)
Dept: RADIOLOGY | Facility: MEDICAL CENTER | Age: 74
DRG: 100 | End: 2019-01-01
Attending: INTERNAL MEDICINE
Payer: MEDICARE

## 2019-01-01 ENCOUNTER — OFFICE VISIT (OUTPATIENT)
Dept: MEDICAL GROUP | Facility: MEDICAL CENTER | Age: 74
End: 2019-01-01
Payer: MEDICARE

## 2019-01-01 ENCOUNTER — APPOINTMENT (OUTPATIENT)
Dept: RADIOLOGY | Facility: REHABILITATION | Age: 74
DRG: 071 | End: 2019-01-01
Attending: PHYSICAL MEDICINE & REHABILITATION
Payer: MEDICARE

## 2019-01-01 ENCOUNTER — HOSPITAL ENCOUNTER (OUTPATIENT)
Dept: LAB | Facility: MEDICAL CENTER | Age: 74
End: 2019-08-07
Attending: FAMILY MEDICINE
Payer: MEDICARE

## 2019-01-01 ENCOUNTER — APPOINTMENT (OUTPATIENT)
Dept: PAIN MANAGEMENT | Facility: REHABILITATION | Age: 74
DRG: 071 | End: 2019-01-01
Attending: PHYSICAL MEDICINE & REHABILITATION
Payer: MEDICARE

## 2019-01-01 ENCOUNTER — HOSPITAL ENCOUNTER (INPATIENT)
Facility: REHABILITATION | Age: 74
LOS: 11 days | DRG: 841 | End: 2019-07-27
Attending: PHYSICAL MEDICINE & REHABILITATION | Admitting: PHYSICAL MEDICINE & REHABILITATION
Payer: MEDICARE

## 2019-01-01 ENCOUNTER — APPOINTMENT (OUTPATIENT)
Dept: RADIOLOGY | Facility: MEDICAL CENTER | Age: 74
DRG: 820 | End: 2019-01-01
Attending: HOSPITALIST
Payer: MEDICARE

## 2019-01-01 ENCOUNTER — HOSPITAL ENCOUNTER (OUTPATIENT)
Dept: LAB | Facility: MEDICAL CENTER | Age: 74
End: 2019-08-07
Attending: PHYSICIAN ASSISTANT
Payer: MEDICARE

## 2019-01-01 ENCOUNTER — HOSPITAL ENCOUNTER (INPATIENT)
Facility: REHABILITATION | Age: 74
LOS: 22 days | DRG: 071 | End: 2019-11-01
Attending: PHYSICAL MEDICINE & REHABILITATION | Admitting: PHYSICAL MEDICINE & REHABILITATION
Payer: MEDICARE

## 2019-01-01 ENCOUNTER — APPOINTMENT (OUTPATIENT)
Dept: RADIOLOGY | Facility: REHABILITATION | Age: 74
DRG: 841 | End: 2019-01-01
Attending: PHYSICAL MEDICINE & REHABILITATION
Payer: MEDICARE

## 2019-01-01 ENCOUNTER — APPOINTMENT (OUTPATIENT)
Dept: CARDIOLOGY | Facility: MEDICAL CENTER | Age: 74
DRG: 820 | End: 2019-01-01
Attending: INTERNAL MEDICINE
Payer: MEDICARE

## 2019-01-01 ENCOUNTER — HOSPITAL ENCOUNTER (OUTPATIENT)
Dept: RADIOLOGY | Facility: MEDICAL CENTER | Age: 74
End: 2019-08-22
Attending: INTERNAL MEDICINE
Payer: MEDICARE

## 2019-01-01 ENCOUNTER — HOSPITAL ENCOUNTER (INPATIENT)
Facility: MEDICAL CENTER | Age: 74
LOS: 7 days | DRG: 100 | End: 2019-11-11
Attending: EMERGENCY MEDICINE | Admitting: HOSPITALIST
Payer: MEDICARE

## 2019-01-01 ENCOUNTER — APPOINTMENT (OUTPATIENT)
Dept: MEDICAL GROUP | Facility: MEDICAL CENTER | Age: 74
End: 2019-01-01
Payer: MEDICARE

## 2019-01-01 ENCOUNTER — ANESTHESIA EVENT (OUTPATIENT)
Dept: SURGERY | Facility: MEDICAL CENTER | Age: 74
DRG: 820 | End: 2019-01-01
Payer: MEDICARE

## 2019-01-01 ENCOUNTER — APPOINTMENT (OUTPATIENT)
Dept: RADIOLOGY | Facility: MEDICAL CENTER | Age: 74
DRG: 820 | End: 2019-01-01
Attending: NEUROLOGICAL SURGERY
Payer: MEDICARE

## 2019-01-01 ENCOUNTER — APPOINTMENT (OUTPATIENT)
Dept: PAIN MANAGEMENT | Facility: REHABILITATION | Age: 74
DRG: 841 | End: 2019-01-01
Attending: PHYSICAL MEDICINE & REHABILITATION
Payer: MEDICARE

## 2019-01-01 ENCOUNTER — DOCUMENTATION (OUTPATIENT)
Dept: MEDICAL GROUP | Facility: MEDICAL CENTER | Age: 74
End: 2019-01-01

## 2019-01-01 ENCOUNTER — HOSPITAL ENCOUNTER (OUTPATIENT)
Facility: MEDICAL CENTER | Age: 74
End: 2019-09-19
Attending: INTERNAL MEDICINE | Admitting: INTERNAL MEDICINE
Payer: MEDICARE

## 2019-01-01 ENCOUNTER — HOSPITAL ENCOUNTER (OUTPATIENT)
Facility: MEDICAL CENTER | Age: 74
End: 2019-08-01
Attending: INTERNAL MEDICINE | Admitting: INTERNAL MEDICINE
Payer: MEDICARE

## 2019-01-01 ENCOUNTER — HOSPITAL ENCOUNTER (OUTPATIENT)
Dept: RADIOLOGY | Facility: MEDICAL CENTER | Age: 74
End: 2019-08-06
Attending: INTERNAL MEDICINE
Payer: MEDICARE

## 2019-01-01 ENCOUNTER — HOSPITAL ENCOUNTER (OUTPATIENT)
Dept: LAB | Facility: MEDICAL CENTER | Age: 74
End: 2019-09-16
Attending: INTERNAL MEDICINE
Payer: MEDICARE

## 2019-01-01 ENCOUNTER — HOSPITAL ENCOUNTER (OUTPATIENT)
Facility: MEDICAL CENTER | Age: 74
End: 2019-09-09
Attending: INTERNAL MEDICINE | Admitting: INTERNAL MEDICINE
Payer: MEDICARE

## 2019-01-01 VITALS — RESPIRATION RATE: 17 BRPM | DIASTOLIC BLOOD PRESSURE: 103 MMHG | SYSTOLIC BLOOD PRESSURE: 147 MMHG | HEART RATE: 111 BPM

## 2019-01-01 VITALS
HEART RATE: 76 BPM | DIASTOLIC BLOOD PRESSURE: 69 MMHG | OXYGEN SATURATION: 93 % | HEIGHT: 72 IN | WEIGHT: 196.87 LBS | SYSTOLIC BLOOD PRESSURE: 113 MMHG | BODY MASS INDEX: 26.67 KG/M2 | RESPIRATION RATE: 16 BRPM | TEMPERATURE: 97.4 F

## 2019-01-01 VITALS
RESPIRATION RATE: 20 BRPM | OXYGEN SATURATION: 96 % | HEART RATE: 69 BPM | HEIGHT: 73 IN | TEMPERATURE: 98 F | BODY MASS INDEX: 26.49 KG/M2 | WEIGHT: 199.9 LBS | SYSTOLIC BLOOD PRESSURE: 120 MMHG | DIASTOLIC BLOOD PRESSURE: 79 MMHG

## 2019-01-01 VITALS
WEIGHT: 186.51 LBS | RESPIRATION RATE: 17 BRPM | DIASTOLIC BLOOD PRESSURE: 78 MMHG | TEMPERATURE: 97.6 F | HEIGHT: 72 IN | BODY MASS INDEX: 25.26 KG/M2 | SYSTOLIC BLOOD PRESSURE: 137 MMHG | OXYGEN SATURATION: 90 % | HEART RATE: 64 BPM

## 2019-01-01 VITALS
BODY MASS INDEX: 23.89 KG/M2 | RESPIRATION RATE: 18 BRPM | HEART RATE: 112 BPM | OXYGEN SATURATION: 92 % | SYSTOLIC BLOOD PRESSURE: 147 MMHG | HEIGHT: 72 IN | DIASTOLIC BLOOD PRESSURE: 104 MMHG | TEMPERATURE: 98.2 F | WEIGHT: 176.37 LBS

## 2019-01-01 VITALS
RESPIRATION RATE: 17 BRPM | SYSTOLIC BLOOD PRESSURE: 134 MMHG | HEIGHT: 74 IN | OXYGEN SATURATION: 95 % | HEART RATE: 88 BPM | DIASTOLIC BLOOD PRESSURE: 77 MMHG | BODY MASS INDEX: 27.59 KG/M2 | WEIGHT: 215 LBS | TEMPERATURE: 98.1 F

## 2019-01-01 VITALS
HEART RATE: 95 BPM | SYSTOLIC BLOOD PRESSURE: 131 MMHG | RESPIRATION RATE: 18 BRPM | DIASTOLIC BLOOD PRESSURE: 86 MMHG | HEIGHT: 71 IN | WEIGHT: 198.63 LBS | BODY MASS INDEX: 27.81 KG/M2 | OXYGEN SATURATION: 97 % | TEMPERATURE: 98.8 F

## 2019-01-01 VITALS
SYSTOLIC BLOOD PRESSURE: 100 MMHG | HEIGHT: 74 IN | HEART RATE: 73 BPM | RESPIRATION RATE: 18 BRPM | DIASTOLIC BLOOD PRESSURE: 52 MMHG | OXYGEN SATURATION: 96 % | BODY MASS INDEX: 24.33 KG/M2 | TEMPERATURE: 98.1 F | WEIGHT: 189.6 LBS

## 2019-01-01 VITALS
HEIGHT: 74 IN | DIASTOLIC BLOOD PRESSURE: 83 MMHG | TEMPERATURE: 98.2 F | HEART RATE: 111 BPM | BODY MASS INDEX: 25.46 KG/M2 | OXYGEN SATURATION: 94 % | WEIGHT: 198.41 LBS | SYSTOLIC BLOOD PRESSURE: 130 MMHG | RESPIRATION RATE: 8 BRPM

## 2019-01-01 VITALS — RESPIRATION RATE: 19 BRPM | HEART RATE: 85 BPM | SYSTOLIC BLOOD PRESSURE: 148 MMHG | DIASTOLIC BLOOD PRESSURE: 92 MMHG

## 2019-01-01 VITALS
TEMPERATURE: 97.2 F | DIASTOLIC BLOOD PRESSURE: 74 MMHG | SYSTOLIC BLOOD PRESSURE: 128 MMHG | WEIGHT: 196 LBS | HEART RATE: 78 BPM | RESPIRATION RATE: 16 BRPM | BODY MASS INDEX: 25.16 KG/M2 | OXYGEN SATURATION: 95 %

## 2019-01-01 VITALS
HEART RATE: 68 BPM | SYSTOLIC BLOOD PRESSURE: 137 MMHG | RESPIRATION RATE: 16 BRPM | DIASTOLIC BLOOD PRESSURE: 78 MMHG | OXYGEN SATURATION: 90 %

## 2019-01-01 VITALS — DIASTOLIC BLOOD PRESSURE: 97 MMHG | HEART RATE: 103 BPM | RESPIRATION RATE: 14 BRPM | SYSTOLIC BLOOD PRESSURE: 158 MMHG

## 2019-01-01 VITALS
OXYGEN SATURATION: 97 % | HEART RATE: 106 BPM | HEIGHT: 72 IN | RESPIRATION RATE: 15 BRPM | WEIGHT: 196 LBS | DIASTOLIC BLOOD PRESSURE: 72 MMHG | SYSTOLIC BLOOD PRESSURE: 125 MMHG | BODY MASS INDEX: 26.55 KG/M2

## 2019-01-01 VITALS
HEART RATE: 89 BPM | TEMPERATURE: 97.4 F | WEIGHT: 196.43 LBS | SYSTOLIC BLOOD PRESSURE: 151 MMHG | OXYGEN SATURATION: 95 % | HEIGHT: 72 IN | BODY MASS INDEX: 26.61 KG/M2 | RESPIRATION RATE: 17 BRPM | DIASTOLIC BLOOD PRESSURE: 90 MMHG

## 2019-01-01 DIAGNOSIS — C85.90 NON-HODGKIN'S LYMPHOMA, UNSPECIFIED BODY REGION, UNSPECIFIED NON-HODGKIN LYMPHOMA TYPE (HCC): ICD-10-CM

## 2019-01-01 DIAGNOSIS — R53.1 LEFT-SIDED WEAKNESS: ICD-10-CM

## 2019-01-01 DIAGNOSIS — E87.20 LACTIC ACIDOSIS: ICD-10-CM

## 2019-01-01 DIAGNOSIS — E11.9 TYPE 2 DIABETES MELLITUS WITHOUT COMPLICATION, WITHOUT LONG-TERM CURRENT USE OF INSULIN (HCC): ICD-10-CM

## 2019-01-01 DIAGNOSIS — E86.0 DEHYDRATION: ICD-10-CM

## 2019-01-01 DIAGNOSIS — C79.31 METASTASIS TO BRAIN (HCC): ICD-10-CM

## 2019-01-01 DIAGNOSIS — C85.89 PRIMARY CENTRAL NERVOUS SYSTEM LYMPHOMA (HCC): ICD-10-CM

## 2019-01-01 DIAGNOSIS — C85.99 PRIMARY CEREBRAL LYMPHOMA (HCC): ICD-10-CM

## 2019-01-01 DIAGNOSIS — Z85.46 HISTORY OF PROSTATE CANCER: ICD-10-CM

## 2019-01-01 DIAGNOSIS — Z09 HOSPITAL DISCHARGE FOLLOW-UP: ICD-10-CM

## 2019-01-01 DIAGNOSIS — C85.10 B-CELL LYMPHOMA, UNSPECIFIED B-CELL LYMPHOMA TYPE, UNSPECIFIED BODY REGION (HCC): ICD-10-CM

## 2019-01-01 DIAGNOSIS — R90.0 INTRACRANIAL MASS: ICD-10-CM

## 2019-01-01 DIAGNOSIS — R13.12 OROPHARYNGEAL DYSPHAGIA: ICD-10-CM

## 2019-01-01 DIAGNOSIS — R53.1 WEAKNESS: ICD-10-CM

## 2019-01-01 DIAGNOSIS — I48.20 CHRONIC ATRIAL FIBRILLATION (HCC): ICD-10-CM

## 2019-01-01 DIAGNOSIS — C85.81 VARIANTS LYMPHOSARCOMA/RETICULOSARCOMA, LYMPH NODES HEAD/FACE/NECK (HCC): ICD-10-CM

## 2019-01-01 DIAGNOSIS — C83.33 RETICULOSARCOMA OF INTRA-ABDOMINAL LYMPH NODES (HCC): ICD-10-CM

## 2019-01-01 DIAGNOSIS — C85.90 LEUCOSARCOMA (HCC): ICD-10-CM

## 2019-01-01 DIAGNOSIS — G40.901 STATUS EPILEPTICUS (HCC): ICD-10-CM

## 2019-01-01 DIAGNOSIS — J96.01 ACUTE RESPIRATORY FAILURE WITH HYPOXIA (HCC): ICD-10-CM

## 2019-01-01 DIAGNOSIS — R53.83 OTHER FATIGUE: ICD-10-CM

## 2019-01-01 DIAGNOSIS — C85.90 LYMPHOMA, UNSPECIFIED BODY REGION, UNSPECIFIED LYMPHOMA TYPE (HCC): ICD-10-CM

## 2019-01-01 DIAGNOSIS — G95.9 MYELOPATHY (HCC): ICD-10-CM

## 2019-01-01 DIAGNOSIS — E11.65 TYPE 2 DIABETES MELLITUS WITH HYPERGLYCEMIA, WITHOUT LONG-TERM CURRENT USE OF INSULIN (HCC): ICD-10-CM

## 2019-01-01 DIAGNOSIS — C72.9 MALIGNANT NEOPLASM OF NERVOUS SYSTEM (HCC): ICD-10-CM

## 2019-01-01 DIAGNOSIS — G93.40 ENCEPHALOPATHY ACUTE: ICD-10-CM

## 2019-01-01 DIAGNOSIS — I25.10 CORONARY ARTERY DISEASE INVOLVING NATIVE HEART WITHOUT ANGINA PECTORIS, UNSPECIFIED VESSEL OR LESION TYPE: ICD-10-CM

## 2019-01-01 DIAGNOSIS — I10 ESSENTIAL HYPERTENSION: ICD-10-CM

## 2019-01-01 LAB
25(OH)D3 SERPL-MCNC: 24 NG/ML (ref 30–100)
25(OH)D3 SERPL-MCNC: 27 NG/ML (ref 30–100)
ABO + RH BLD: NORMAL
ABO GROUP BLD: NORMAL
ACTION RANGE TRIGGERED IACRT: NO
ALBUMIN SERPL BCP-MCNC: 2.9 G/DL (ref 3.2–4.9)
ALBUMIN SERPL BCP-MCNC: 3.6 G/DL (ref 3.2–4.9)
ALBUMIN SERPL BCP-MCNC: 3.7 G/DL (ref 3.2–4.9)
ALBUMIN SERPL BCP-MCNC: 3.8 G/DL (ref 3.2–4.9)
ALBUMIN SERPL BCP-MCNC: 3.9 G/DL (ref 3.2–4.9)
ALBUMIN SERPL BCP-MCNC: 4.3 G/DL (ref 3.2–4.9)
ALBUMIN SERPL BCP-MCNC: 4.3 G/DL (ref 3.2–4.9)
ALBUMIN SERPL BCP-MCNC: 4.4 G/DL (ref 3.2–4.9)
ALBUMIN SERPL BCP-MCNC: 4.5 G/DL (ref 3.2–4.9)
ALBUMIN/GLOB SERPL: 1.1 G/DL
ALBUMIN/GLOB SERPL: 1.2 G/DL
ALBUMIN/GLOB SERPL: 1.3 G/DL
ALBUMIN/GLOB SERPL: 1.4 G/DL
ALBUMIN/GLOB SERPL: 1.5 G/DL
ALBUMIN/GLOB SERPL: 1.6 G/DL
ALBUMIN/GLOB SERPL: 1.6 G/DL
ALBUMIN/GLOB SERPL: 1.7 G/DL
ALBUMIN/GLOB SERPL: 1.8 G/DL
ALBUMIN/GLOB SERPL: 1.9 G/DL
ALP SERPL-CCNC: 102 U/L (ref 30–99)
ALP SERPL-CCNC: 108 U/L (ref 30–99)
ALP SERPL-CCNC: 68 U/L (ref 30–99)
ALP SERPL-CCNC: 73 U/L (ref 30–99)
ALP SERPL-CCNC: 74 U/L (ref 30–99)
ALP SERPL-CCNC: 84 U/L (ref 30–99)
ALP SERPL-CCNC: 85 U/L (ref 30–99)
ALP SERPL-CCNC: 88 U/L (ref 30–99)
ALP SERPL-CCNC: 93 U/L (ref 30–99)
ALP SERPL-CCNC: 93 U/L (ref 30–99)
ALP SERPL-CCNC: 94 U/L (ref 30–99)
ALP SERPL-CCNC: 95 U/L (ref 30–99)
ALP SERPL-CCNC: 97 U/L (ref 30–99)
ALP SERPL-CCNC: 97 U/L (ref 30–99)
ALP SERPL-CCNC: 98 U/L (ref 30–99)
ALT SERPL-CCNC: 14 U/L (ref 2–50)
ALT SERPL-CCNC: 19 U/L (ref 2–50)
ALT SERPL-CCNC: 21 U/L (ref 2–50)
ALT SERPL-CCNC: 21 U/L (ref 2–50)
ALT SERPL-CCNC: 22 U/L (ref 2–50)
ALT SERPL-CCNC: 24 U/L (ref 2–50)
ALT SERPL-CCNC: 28 U/L (ref 2–50)
ALT SERPL-CCNC: 28 U/L (ref 2–50)
ALT SERPL-CCNC: 30 U/L (ref 2–50)
ALT SERPL-CCNC: 31 U/L (ref 2–50)
ALT SERPL-CCNC: 36 U/L (ref 2–50)
ALT SERPL-CCNC: 36 U/L (ref 2–50)
ALT SERPL-CCNC: 39 U/L (ref 2–50)
ALT SERPL-CCNC: 39 U/L (ref 2–50)
ALT SERPL-CCNC: 41 U/L (ref 2–50)
AMORPH CRY #/AREA URNS HPF: PRESENT /HPF
ANION GAP SERPL CALC-SCNC: 10 MMOL/L (ref 0–11.9)
ANION GAP SERPL CALC-SCNC: 11 MMOL/L (ref 0–11.9)
ANION GAP SERPL CALC-SCNC: 12 MMOL/L (ref 0–11.9)
ANION GAP SERPL CALC-SCNC: 12 MMOL/L (ref 0–11.9)
ANION GAP SERPL CALC-SCNC: 15 MMOL/L (ref 0–11.9)
ANION GAP SERPL CALC-SCNC: 5 MMOL/L (ref 0–11.9)
ANION GAP SERPL CALC-SCNC: 6 MMOL/L (ref 0–11.9)
ANION GAP SERPL CALC-SCNC: 7 MMOL/L (ref 0–11.9)
ANION GAP SERPL CALC-SCNC: 8 MMOL/L (ref 0–11.9)
ANION GAP SERPL CALC-SCNC: 9 MMOL/L (ref 0–11.9)
ANISOCYTOSIS BLD QL SMEAR: ABNORMAL
ANISOCYTOSIS BLD QL SMEAR: ABNORMAL
APPEARANCE FLD: NORMAL
APPEARANCE UR: ABNORMAL
APPEARANCE UR: CLEAR
APTT PPP: 25.6 SEC (ref 24.7–36)
APTT PPP: 27.7 SEC (ref 24.7–36)
APTT PPP: 27.7 SEC (ref 24.7–36)
AST SERPL-CCNC: 15 U/L (ref 12–45)
AST SERPL-CCNC: 16 U/L (ref 12–45)
AST SERPL-CCNC: 16 U/L (ref 12–45)
AST SERPL-CCNC: 17 U/L (ref 12–45)
AST SERPL-CCNC: 19 U/L (ref 12–45)
AST SERPL-CCNC: 20 U/L (ref 12–45)
AST SERPL-CCNC: 21 U/L (ref 12–45)
AST SERPL-CCNC: 22 U/L (ref 12–45)
AST SERPL-CCNC: 22 U/L (ref 12–45)
AST SERPL-CCNC: 24 U/L (ref 12–45)
BACTERIA #/AREA URNS HPF: ABNORMAL /HPF
BACTERIA #/AREA URNS HPF: NEGATIVE /HPF
BACTERIA #/AREA URNS HPF: NEGATIVE /HPF
BACTERIA BLD CULT: NORMAL
BACTERIA BLD CULT: NORMAL
BACTERIA FLD AEROBE CULT: NORMAL
BACTERIA UR CULT: ABNORMAL
BACTERIA UR CULT: ABNORMAL
BASE EXCESS BLDA CALC-SCNC: 1 MMOL/L (ref -4–3)
BASOPHILS # BLD AUTO: 0 % (ref 0–1.8)
BASOPHILS # BLD AUTO: 0.1 % (ref 0–1.8)
BASOPHILS # BLD AUTO: 0.2 % (ref 0–1.8)
BASOPHILS # BLD AUTO: 0.2 % (ref 0–1.8)
BASOPHILS # BLD AUTO: 0.3 % (ref 0–1.8)
BASOPHILS # BLD AUTO: 0.6 % (ref 0–1.8)
BASOPHILS # BLD AUTO: 0.7 % (ref 0–1.8)
BASOPHILS # BLD AUTO: 0.8 % (ref 0–1.8)
BASOPHILS # BLD AUTO: 0.9 % (ref 0–1.8)
BASOPHILS # BLD AUTO: 1.1 % (ref 0–1.8)
BASOPHILS # BLD AUTO: 1.1 % (ref 0–1.8)
BASOPHILS # BLD AUTO: 1.2 % (ref 0–1.8)
BASOPHILS # BLD AUTO: 1.3 % (ref 0–1.8)
BASOPHILS # BLD AUTO: 1.4 % (ref 0–1.8)
BASOPHILS # BLD AUTO: 3.5 % (ref 0–1.8)
BASOPHILS # BLD AUTO: 3.5 % (ref 0–1.8)
BASOPHILS # BLD AUTO: 4.3 % (ref 0–1.8)
BASOPHILS # BLD: 0 K/UL (ref 0–0.12)
BASOPHILS # BLD: 0.02 K/UL (ref 0–0.12)
BASOPHILS # BLD: 0.04 K/UL (ref 0–0.12)
BASOPHILS # BLD: 0.04 K/UL (ref 0–0.12)
BASOPHILS # BLD: 0.05 K/UL (ref 0–0.12)
BASOPHILS # BLD: 0.05 K/UL (ref 0–0.12)
BASOPHILS # BLD: 0.06 K/UL (ref 0–0.12)
BASOPHILS # BLD: 0.09 K/UL (ref 0–0.12)
BASOPHILS # BLD: 0.1 K/UL (ref 0–0.12)
BASOPHILS # BLD: 0.13 K/UL (ref 0–0.12)
BASOPHILS # BLD: 0.15 K/UL (ref 0–0.12)
BASOPHILS # BLD: 0.15 K/UL (ref 0–0.12)
BASOPHILS # BLD: 0.18 K/UL (ref 0–0.12)
BASOPHILS # BLD: 0.18 K/UL (ref 0–0.12)
BASOPHILS # BLD: 0.69 K/UL (ref 0–0.12)
BILIRUB SERPL-MCNC: 0.4 MG/DL (ref 0.1–1.5)
BILIRUB SERPL-MCNC: 0.4 MG/DL (ref 0.1–1.5)
BILIRUB SERPL-MCNC: 0.5 MG/DL (ref 0.1–1.5)
BILIRUB SERPL-MCNC: 0.6 MG/DL (ref 0.1–1.5)
BILIRUB SERPL-MCNC: 0.6 MG/DL (ref 0.1–1.5)
BILIRUB SERPL-MCNC: 0.8 MG/DL (ref 0.1–1.5)
BILIRUB SERPL-MCNC: 0.9 MG/DL (ref 0.1–1.5)
BILIRUB SERPL-MCNC: 1 MG/DL (ref 0.1–1.5)
BILIRUB UR QL STRIP.AUTO: NEGATIVE
BLD GP AB SCN SERPL QL: NORMAL
BODY FLD TYPE: NORMAL
BODY TEMPERATURE: ABNORMAL DEGREES
BUN SERPL-MCNC: 10 MG/DL (ref 8–22)
BUN SERPL-MCNC: 11 MG/DL (ref 8–22)
BUN SERPL-MCNC: 11 MG/DL (ref 8–22)
BUN SERPL-MCNC: 12 MG/DL (ref 8–22)
BUN SERPL-MCNC: 13 MG/DL (ref 8–22)
BUN SERPL-MCNC: 15 MG/DL (ref 8–22)
BUN SERPL-MCNC: 15 MG/DL (ref 8–22)
BUN SERPL-MCNC: 16 MG/DL (ref 8–22)
BUN SERPL-MCNC: 18 MG/DL (ref 8–22)
BUN SERPL-MCNC: 18 MG/DL (ref 8–22)
BUN SERPL-MCNC: 19 MG/DL (ref 8–22)
BUN SERPL-MCNC: 20 MG/DL (ref 8–22)
BUN SERPL-MCNC: 20 MG/DL (ref 8–22)
BUN SERPL-MCNC: 21 MG/DL (ref 8–22)
BUN SERPL-MCNC: 21 MG/DL (ref 8–22)
BUN SERPL-MCNC: 23 MG/DL (ref 8–22)
BUN SERPL-MCNC: 23 MG/DL (ref 8–22)
BUN SERPL-MCNC: 25 MG/DL (ref 8–22)
BUN SERPL-MCNC: 25 MG/DL (ref 8–22)
BUN SERPL-MCNC: 26 MG/DL (ref 8–22)
BUN SERPL-MCNC: 29 MG/DL (ref 8–22)
BUN SERPL-MCNC: 31 MG/DL (ref 8–22)
BUN SERPL-MCNC: 33 MG/DL (ref 8–22)
BUN SERPL-MCNC: 35 MG/DL (ref 8–22)
BUN SERPL-MCNC: 35 MG/DL (ref 8–22)
BUN SERPL-MCNC: 37 MG/DL (ref 8–22)
BUN SERPL-MCNC: 39 MG/DL (ref 8–22)
CALCIUM SERPL-MCNC: 8.3 MG/DL (ref 8.5–10.5)
CALCIUM SERPL-MCNC: 8.4 MG/DL (ref 8.5–10.5)
CALCIUM SERPL-MCNC: 8.5 MG/DL (ref 8.5–10.5)
CALCIUM SERPL-MCNC: 8.6 MG/DL (ref 8.5–10.5)
CALCIUM SERPL-MCNC: 8.7 MG/DL (ref 8.5–10.5)
CALCIUM SERPL-MCNC: 8.7 MG/DL (ref 8.5–10.5)
CALCIUM SERPL-MCNC: 8.8 MG/DL (ref 8.5–10.5)
CALCIUM SERPL-MCNC: 9 MG/DL (ref 8.5–10.5)
CALCIUM SERPL-MCNC: 9 MG/DL (ref 8.5–10.5)
CALCIUM SERPL-MCNC: 9.1 MG/DL (ref 8.5–10.5)
CALCIUM SERPL-MCNC: 9.2 MG/DL (ref 8.5–10.5)
CALCIUM SERPL-MCNC: 9.3 MG/DL (ref 8.5–10.5)
CALCIUM SERPL-MCNC: 9.3 MG/DL (ref 8.5–10.5)
CALCIUM SERPL-MCNC: 9.4 MG/DL (ref 8.5–10.5)
CALCIUM SERPL-MCNC: 9.4 MG/DL (ref 8.5–10.5)
CALCIUM SERPL-MCNC: 9.5 MG/DL (ref 8.5–10.5)
CALCIUM SERPL-MCNC: 9.7 MG/DL (ref 8.5–10.5)
CALCIUM SERPL-MCNC: 9.8 MG/DL (ref 8.5–10.5)
CALCIUM SERPL-MCNC: 9.9 MG/DL (ref 8.5–10.5)
CALCIUM SERPL-MCNC: 9.9 MG/DL (ref 8.5–10.5)
CAOX CRY #/AREA URNS HPF: ABNORMAL /HPF
CHLORIDE SERPL-SCNC: 100 MMOL/L (ref 96–112)
CHLORIDE SERPL-SCNC: 101 MMOL/L (ref 96–112)
CHLORIDE SERPL-SCNC: 102 MMOL/L (ref 96–112)
CHLORIDE SERPL-SCNC: 103 MMOL/L (ref 96–112)
CHLORIDE SERPL-SCNC: 104 MMOL/L (ref 96–112)
CHLORIDE SERPL-SCNC: 105 MMOL/L (ref 96–112)
CHLORIDE SERPL-SCNC: 106 MMOL/L (ref 96–112)
CHLORIDE SERPL-SCNC: 107 MMOL/L (ref 96–112)
CHLORIDE SERPL-SCNC: 108 MMOL/L (ref 96–112)
CHLORIDE SERPL-SCNC: 109 MMOL/L (ref 96–112)
CHLORIDE SERPL-SCNC: 97 MMOL/L (ref 96–112)
CHLORIDE SERPL-SCNC: 97 MMOL/L (ref 96–112)
CHLORIDE SERPL-SCNC: 98 MMOL/L (ref 96–112)
CHLORIDE SERPL-SCNC: 99 MMOL/L (ref 96–112)
CO2 BLDA-SCNC: 25 MMOL/L (ref 20–33)
CO2 SERPL-SCNC: 19 MMOL/L (ref 20–33)
CO2 SERPL-SCNC: 22 MMOL/L (ref 20–33)
CO2 SERPL-SCNC: 22 MMOL/L (ref 20–33)
CO2 SERPL-SCNC: 23 MMOL/L (ref 20–33)
CO2 SERPL-SCNC: 23 MMOL/L (ref 20–33)
CO2 SERPL-SCNC: 24 MMOL/L (ref 20–33)
CO2 SERPL-SCNC: 25 MMOL/L (ref 20–33)
CO2 SERPL-SCNC: 26 MMOL/L (ref 20–33)
CO2 SERPL-SCNC: 27 MMOL/L (ref 20–33)
CO2 SERPL-SCNC: 28 MMOL/L (ref 20–33)
CO2 SERPL-SCNC: 28 MMOL/L (ref 20–33)
CO2 SERPL-SCNC: 29 MMOL/L (ref 20–33)
CO2 SERPL-SCNC: 30 MMOL/L (ref 20–33)
COLOR FLD: YELLOW
COLOR UR: YELLOW
CREAT SERPL-MCNC: 0.7 MG/DL (ref 0.5–1.4)
CREAT SERPL-MCNC: 0.73 MG/DL (ref 0.5–1.4)
CREAT SERPL-MCNC: 0.78 MG/DL (ref 0.5–1.4)
CREAT SERPL-MCNC: 0.81 MG/DL (ref 0.5–1.4)
CREAT SERPL-MCNC: 0.82 MG/DL (ref 0.5–1.4)
CREAT SERPL-MCNC: 0.82 MG/DL (ref 0.5–1.4)
CREAT SERPL-MCNC: 0.83 MG/DL (ref 0.5–1.4)
CREAT SERPL-MCNC: 0.83 MG/DL (ref 0.5–1.4)
CREAT SERPL-MCNC: 0.84 MG/DL (ref 0.5–1.4)
CREAT SERPL-MCNC: 0.85 MG/DL (ref 0.5–1.4)
CREAT SERPL-MCNC: 0.85 MG/DL (ref 0.5–1.4)
CREAT SERPL-MCNC: 0.86 MG/DL (ref 0.5–1.4)
CREAT SERPL-MCNC: 0.87 MG/DL (ref 0.5–1.4)
CREAT SERPL-MCNC: 0.87 MG/DL (ref 0.5–1.4)
CREAT SERPL-MCNC: 0.88 MG/DL (ref 0.5–1.4)
CREAT SERPL-MCNC: 0.88 MG/DL (ref 0.5–1.4)
CREAT SERPL-MCNC: 0.89 MG/DL (ref 0.5–1.4)
CREAT SERPL-MCNC: 0.9 MG/DL (ref 0.5–1.4)
CREAT SERPL-MCNC: 0.9 MG/DL (ref 0.5–1.4)
CREAT SERPL-MCNC: 0.91 MG/DL (ref 0.5–1.4)
CREAT SERPL-MCNC: 0.92 MG/DL (ref 0.5–1.4)
CREAT SERPL-MCNC: 0.92 MG/DL (ref 0.5–1.4)
CREAT SERPL-MCNC: 0.97 MG/DL (ref 0.5–1.4)
CREAT SERPL-MCNC: 0.98 MG/DL (ref 0.5–1.4)
CREAT SERPL-MCNC: 0.99 MG/DL (ref 0.5–1.4)
CREAT SERPL-MCNC: 1.04 MG/DL (ref 0.5–1.4)
CREAT SERPL-MCNC: 1.19 MG/DL (ref 0.5–1.4)
CREAT SERPL-MCNC: 1.21 MG/DL (ref 0.5–1.4)
CREAT SERPL-MCNC: 1.24 MG/DL (ref 0.5–1.4)
CSF COMMENTS 1658: NORMAL
EKG IMPRESSION: NORMAL
EOSINOPHIL # BLD AUTO: 0 K/UL (ref 0–0.51)
EOSINOPHIL # BLD AUTO: 0.01 K/UL (ref 0–0.51)
EOSINOPHIL # BLD AUTO: 0.02 K/UL (ref 0–0.51)
EOSINOPHIL # BLD AUTO: 0.03 K/UL (ref 0–0.51)
EOSINOPHIL # BLD AUTO: 0.04 K/UL (ref 0–0.51)
EOSINOPHIL # BLD AUTO: 0.1 K/UL (ref 0–0.51)
EOSINOPHIL # BLD AUTO: 0.13 K/UL (ref 0–0.51)
EOSINOPHIL # BLD AUTO: 0.13 K/UL (ref 0–0.51)
EOSINOPHIL # BLD AUTO: 0.15 K/UL (ref 0–0.51)
EOSINOPHIL # BLD AUTO: 0.22 K/UL (ref 0–0.51)
EOSINOPHIL # BLD AUTO: 0.26 K/UL (ref 0–0.51)
EOSINOPHIL # BLD AUTO: 0.26 K/UL (ref 0–0.51)
EOSINOPHIL # BLD AUTO: 0.29 K/UL (ref 0–0.51)
EOSINOPHIL # BLD AUTO: 0.36 K/UL (ref 0–0.51)
EOSINOPHIL # BLD AUTO: 0.43 K/UL (ref 0–0.51)
EOSINOPHIL NFR BLD: 0 % (ref 0–6.9)
EOSINOPHIL NFR BLD: 0.1 % (ref 0–6.9)
EOSINOPHIL NFR BLD: 0.2 % (ref 0–6.9)
EOSINOPHIL NFR BLD: 0.2 % (ref 0–6.9)
EOSINOPHIL NFR BLD: 0.3 % (ref 0–6.9)
EOSINOPHIL NFR BLD: 0.9 % (ref 0–6.9)
EOSINOPHIL NFR BLD: 1.7 % (ref 0–6.9)
EOSINOPHIL NFR BLD: 1.7 % (ref 0–6.9)
EOSINOPHIL NFR BLD: 2.4 % (ref 0–6.9)
EOSINOPHIL NFR BLD: 4 % (ref 0–6.9)
EOSINOPHIL NFR BLD: 4.2 % (ref 0–6.9)
EOSINOPHIL NFR BLD: 4.3 % (ref 0–6.9)
EOSINOPHIL NFR BLD: 4.5 % (ref 0–6.9)
EOSINOPHIL NFR BLD: 5.3 % (ref 0–6.9)
EOSINOPHIL NFR BLD: 5.5 % (ref 0–6.9)
EPI CELLS #/AREA URNS HPF: ABNORMAL /HPF
EPI CELLS #/AREA URNS HPF: NEGATIVE /HPF
EPI CELLS #/AREA URNS HPF: NEGATIVE /HPF
ERYTHROCYTE [DISTWIDTH] IN BLOOD BY AUTOMATED COUNT: 44.9 FL (ref 35.9–50)
ERYTHROCYTE [DISTWIDTH] IN BLOOD BY AUTOMATED COUNT: 44.9 FL (ref 35.9–50)
ERYTHROCYTE [DISTWIDTH] IN BLOOD BY AUTOMATED COUNT: 45.1 FL (ref 35.9–50)
ERYTHROCYTE [DISTWIDTH] IN BLOOD BY AUTOMATED COUNT: 45.3 FL (ref 35.9–50)
ERYTHROCYTE [DISTWIDTH] IN BLOOD BY AUTOMATED COUNT: 45.4 FL (ref 35.9–50)
ERYTHROCYTE [DISTWIDTH] IN BLOOD BY AUTOMATED COUNT: 45.8 FL (ref 35.9–50)
ERYTHROCYTE [DISTWIDTH] IN BLOOD BY AUTOMATED COUNT: 45.9 FL (ref 35.9–50)
ERYTHROCYTE [DISTWIDTH] IN BLOOD BY AUTOMATED COUNT: 46.2 FL (ref 35.9–50)
ERYTHROCYTE [DISTWIDTH] IN BLOOD BY AUTOMATED COUNT: 46.4 FL (ref 35.9–50)
ERYTHROCYTE [DISTWIDTH] IN BLOOD BY AUTOMATED COUNT: 46.6 FL (ref 35.9–50)
ERYTHROCYTE [DISTWIDTH] IN BLOOD BY AUTOMATED COUNT: 47 FL (ref 35.9–50)
ERYTHROCYTE [DISTWIDTH] IN BLOOD BY AUTOMATED COUNT: 47.1 FL (ref 35.9–50)
ERYTHROCYTE [DISTWIDTH] IN BLOOD BY AUTOMATED COUNT: 47.2 FL (ref 35.9–50)
ERYTHROCYTE [DISTWIDTH] IN BLOOD BY AUTOMATED COUNT: 47.4 FL (ref 35.9–50)
ERYTHROCYTE [DISTWIDTH] IN BLOOD BY AUTOMATED COUNT: 47.4 FL (ref 35.9–50)
ERYTHROCYTE [DISTWIDTH] IN BLOOD BY AUTOMATED COUNT: 47.8 FL (ref 35.9–50)
ERYTHROCYTE [DISTWIDTH] IN BLOOD BY AUTOMATED COUNT: 48.1 FL (ref 35.9–50)
ERYTHROCYTE [DISTWIDTH] IN BLOOD BY AUTOMATED COUNT: 48.3 FL (ref 35.9–50)
ERYTHROCYTE [DISTWIDTH] IN BLOOD BY AUTOMATED COUNT: 48.5 FL (ref 35.9–50)
ERYTHROCYTE [DISTWIDTH] IN BLOOD BY AUTOMATED COUNT: 48.5 FL (ref 35.9–50)
ERYTHROCYTE [DISTWIDTH] IN BLOOD BY AUTOMATED COUNT: 48.8 FL (ref 35.9–50)
ERYTHROCYTE [DISTWIDTH] IN BLOOD BY AUTOMATED COUNT: 49.1 FL (ref 35.9–50)
ERYTHROCYTE [DISTWIDTH] IN BLOOD BY AUTOMATED COUNT: 49.4 FL (ref 35.9–50)
ERYTHROCYTE [DISTWIDTH] IN BLOOD BY AUTOMATED COUNT: 49.5 FL (ref 35.9–50)
ERYTHROCYTE [DISTWIDTH] IN BLOOD BY AUTOMATED COUNT: 49.5 FL (ref 35.9–50)
ERYTHROCYTE [DISTWIDTH] IN BLOOD BY AUTOMATED COUNT: 49.6 FL (ref 35.9–50)
ERYTHROCYTE [DISTWIDTH] IN BLOOD BY AUTOMATED COUNT: 50.2 FL (ref 35.9–50)
ERYTHROCYTE [DISTWIDTH] IN BLOOD BY AUTOMATED COUNT: 50.5 FL (ref 35.9–50)
ERYTHROCYTE [DISTWIDTH] IN BLOOD BY AUTOMATED COUNT: 50.8 FL (ref 35.9–50)
ERYTHROCYTE [DISTWIDTH] IN BLOOD BY AUTOMATED COUNT: 51.8 FL (ref 35.9–50)
ERYTHROCYTE [DISTWIDTH] IN BLOOD BY AUTOMATED COUNT: 52.8 FL (ref 35.9–50)
EST. AVERAGE GLUCOSE BLD GHB EST-MCNC: 160 MG/DL
EST. AVERAGE GLUCOSE BLD GHB EST-MCNC: 226 MG/DL
EST. AVERAGE GLUCOSE BLD GHB EST-MCNC: 240 MG/DL
FASTING STATUS PATIENT QL REPORTED: NORMAL
GLOBULIN SER CALC-MCNC: 2.3 G/DL (ref 1.9–3.5)
GLOBULIN SER CALC-MCNC: 2.4 G/DL (ref 1.9–3.5)
GLOBULIN SER CALC-MCNC: 2.4 G/DL (ref 1.9–3.5)
GLOBULIN SER CALC-MCNC: 2.5 G/DL (ref 1.9–3.5)
GLOBULIN SER CALC-MCNC: 2.5 G/DL (ref 1.9–3.5)
GLOBULIN SER CALC-MCNC: 2.6 G/DL (ref 1.9–3.5)
GLOBULIN SER CALC-MCNC: 2.6 G/DL (ref 1.9–3.5)
GLOBULIN SER CALC-MCNC: 2.7 G/DL (ref 1.9–3.5)
GLOBULIN SER CALC-MCNC: 2.8 G/DL (ref 1.9–3.5)
GLOBULIN SER CALC-MCNC: 2.8 G/DL (ref 1.9–3.5)
GLOBULIN SER CALC-MCNC: 2.9 G/DL (ref 1.9–3.5)
GLOBULIN SER CALC-MCNC: 3 G/DL (ref 1.9–3.5)
GLOBULIN SER CALC-MCNC: 3.2 G/DL (ref 1.9–3.5)
GLUCOSE BLD-MCNC: 100 MG/DL (ref 65–99)
GLUCOSE BLD-MCNC: 101 MG/DL (ref 65–99)
GLUCOSE BLD-MCNC: 102 MG/DL (ref 65–99)
GLUCOSE BLD-MCNC: 103 MG/DL (ref 65–99)
GLUCOSE BLD-MCNC: 103 MG/DL (ref 65–99)
GLUCOSE BLD-MCNC: 104 MG/DL (ref 65–99)
GLUCOSE BLD-MCNC: 104 MG/DL (ref 65–99)
GLUCOSE BLD-MCNC: 105 MG/DL (ref 65–99)
GLUCOSE BLD-MCNC: 105 MG/DL (ref 65–99)
GLUCOSE BLD-MCNC: 109 MG/DL (ref 65–99)
GLUCOSE BLD-MCNC: 110 MG/DL (ref 65–99)
GLUCOSE BLD-MCNC: 110 MG/DL (ref 65–99)
GLUCOSE BLD-MCNC: 112 MG/DL (ref 65–99)
GLUCOSE BLD-MCNC: 114 MG/DL (ref 65–99)
GLUCOSE BLD-MCNC: 115 MG/DL (ref 65–99)
GLUCOSE BLD-MCNC: 115 MG/DL (ref 65–99)
GLUCOSE BLD-MCNC: 116 MG/DL (ref 65–99)
GLUCOSE BLD-MCNC: 116 MG/DL (ref 65–99)
GLUCOSE BLD-MCNC: 117 MG/DL (ref 65–99)
GLUCOSE BLD-MCNC: 118 MG/DL (ref 65–99)
GLUCOSE BLD-MCNC: 119 MG/DL (ref 65–99)
GLUCOSE BLD-MCNC: 120 MG/DL (ref 65–99)
GLUCOSE BLD-MCNC: 121 MG/DL (ref 65–99)
GLUCOSE BLD-MCNC: 121 MG/DL (ref 65–99)
GLUCOSE BLD-MCNC: 122 MG/DL (ref 65–99)
GLUCOSE BLD-MCNC: 123 MG/DL (ref 65–99)
GLUCOSE BLD-MCNC: 123 MG/DL (ref 65–99)
GLUCOSE BLD-MCNC: 127 MG/DL (ref 65–99)
GLUCOSE BLD-MCNC: 128 MG/DL (ref 65–99)
GLUCOSE BLD-MCNC: 128 MG/DL (ref 65–99)
GLUCOSE BLD-MCNC: 130 MG/DL (ref 65–99)
GLUCOSE BLD-MCNC: 131 MG/DL (ref 65–99)
GLUCOSE BLD-MCNC: 134 MG/DL (ref 65–99)
GLUCOSE BLD-MCNC: 134 MG/DL (ref 65–99)
GLUCOSE BLD-MCNC: 135 MG/DL (ref 65–99)
GLUCOSE BLD-MCNC: 135 MG/DL (ref 65–99)
GLUCOSE BLD-MCNC: 136 MG/DL (ref 65–99)
GLUCOSE BLD-MCNC: 136 MG/DL (ref 65–99)
GLUCOSE BLD-MCNC: 137 MG/DL (ref 65–99)
GLUCOSE BLD-MCNC: 137 MG/DL (ref 65–99)
GLUCOSE BLD-MCNC: 138 MG/DL (ref 65–99)
GLUCOSE BLD-MCNC: 139 MG/DL (ref 65–99)
GLUCOSE BLD-MCNC: 140 MG/DL (ref 65–99)
GLUCOSE BLD-MCNC: 140 MG/DL (ref 65–99)
GLUCOSE BLD-MCNC: 143 MG/DL (ref 65–99)
GLUCOSE BLD-MCNC: 146 MG/DL (ref 65–99)
GLUCOSE BLD-MCNC: 147 MG/DL (ref 65–99)
GLUCOSE BLD-MCNC: 147 MG/DL (ref 65–99)
GLUCOSE BLD-MCNC: 148 MG/DL (ref 65–99)
GLUCOSE BLD-MCNC: 150 MG/DL (ref 65–99)
GLUCOSE BLD-MCNC: 151 MG/DL (ref 65–99)
GLUCOSE BLD-MCNC: 152 MG/DL (ref 65–99)
GLUCOSE BLD-MCNC: 154 MG/DL (ref 65–99)
GLUCOSE BLD-MCNC: 154 MG/DL (ref 65–99)
GLUCOSE BLD-MCNC: 155 MG/DL (ref 65–99)
GLUCOSE BLD-MCNC: 155 MG/DL (ref 65–99)
GLUCOSE BLD-MCNC: 156 MG/DL (ref 65–99)
GLUCOSE BLD-MCNC: 157 MG/DL (ref 65–99)
GLUCOSE BLD-MCNC: 158 MG/DL (ref 65–99)
GLUCOSE BLD-MCNC: 159 MG/DL (ref 65–99)
GLUCOSE BLD-MCNC: 159 MG/DL (ref 65–99)
GLUCOSE BLD-MCNC: 160 MG/DL (ref 65–99)
GLUCOSE BLD-MCNC: 161 MG/DL (ref 65–99)
GLUCOSE BLD-MCNC: 161 MG/DL (ref 65–99)
GLUCOSE BLD-MCNC: 162 MG/DL (ref 65–99)
GLUCOSE BLD-MCNC: 164 MG/DL (ref 65–99)
GLUCOSE BLD-MCNC: 165 MG/DL (ref 65–99)
GLUCOSE BLD-MCNC: 166 MG/DL (ref 65–99)
GLUCOSE BLD-MCNC: 166 MG/DL (ref 65–99)
GLUCOSE BLD-MCNC: 167 MG/DL (ref 65–99)
GLUCOSE BLD-MCNC: 170 MG/DL (ref 65–99)
GLUCOSE BLD-MCNC: 170 MG/DL (ref 65–99)
GLUCOSE BLD-MCNC: 171 MG/DL (ref 65–99)
GLUCOSE BLD-MCNC: 172 MG/DL (ref 65–99)
GLUCOSE BLD-MCNC: 172 MG/DL (ref 65–99)
GLUCOSE BLD-MCNC: 173 MG/DL (ref 65–99)
GLUCOSE BLD-MCNC: 175 MG/DL (ref 65–99)
GLUCOSE BLD-MCNC: 176 MG/DL (ref 65–99)
GLUCOSE BLD-MCNC: 180 MG/DL (ref 65–99)
GLUCOSE BLD-MCNC: 182 MG/DL (ref 65–99)
GLUCOSE BLD-MCNC: 183 MG/DL (ref 65–99)
GLUCOSE BLD-MCNC: 187 MG/DL (ref 65–99)
GLUCOSE BLD-MCNC: 187 MG/DL (ref 65–99)
GLUCOSE BLD-MCNC: 190 MG/DL (ref 65–99)
GLUCOSE BLD-MCNC: 190 MG/DL (ref 65–99)
GLUCOSE BLD-MCNC: 191 MG/DL (ref 65–99)
GLUCOSE BLD-MCNC: 192 MG/DL (ref 65–99)
GLUCOSE BLD-MCNC: 192 MG/DL (ref 65–99)
GLUCOSE BLD-MCNC: 194 MG/DL (ref 65–99)
GLUCOSE BLD-MCNC: 201 MG/DL (ref 65–99)
GLUCOSE BLD-MCNC: 203 MG/DL (ref 65–99)
GLUCOSE BLD-MCNC: 205 MG/DL (ref 65–99)
GLUCOSE BLD-MCNC: 206 MG/DL (ref 65–99)
GLUCOSE BLD-MCNC: 207 MG/DL (ref 65–99)
GLUCOSE BLD-MCNC: 210 MG/DL (ref 65–99)
GLUCOSE BLD-MCNC: 214 MG/DL (ref 65–99)
GLUCOSE BLD-MCNC: 223 MG/DL (ref 65–99)
GLUCOSE BLD-MCNC: 225 MG/DL (ref 65–99)
GLUCOSE BLD-MCNC: 226 MG/DL (ref 65–99)
GLUCOSE BLD-MCNC: 229 MG/DL (ref 65–99)
GLUCOSE BLD-MCNC: 230 MG/DL (ref 65–99)
GLUCOSE BLD-MCNC: 230 MG/DL (ref 65–99)
GLUCOSE BLD-MCNC: 231 MG/DL (ref 65–99)
GLUCOSE BLD-MCNC: 240 MG/DL (ref 65–99)
GLUCOSE BLD-MCNC: 250 MG/DL (ref 65–99)
GLUCOSE BLD-MCNC: 251 MG/DL (ref 65–99)
GLUCOSE BLD-MCNC: 253 MG/DL (ref 65–99)
GLUCOSE BLD-MCNC: 257 MG/DL (ref 65–99)
GLUCOSE BLD-MCNC: 263 MG/DL (ref 65–99)
GLUCOSE BLD-MCNC: 264 MG/DL (ref 65–99)
GLUCOSE BLD-MCNC: 267 MG/DL (ref 65–99)
GLUCOSE BLD-MCNC: 273 MG/DL (ref 65–99)
GLUCOSE BLD-MCNC: 276 MG/DL (ref 65–99)
GLUCOSE BLD-MCNC: 286 MG/DL (ref 65–99)
GLUCOSE BLD-MCNC: 292 MG/DL (ref 65–99)
GLUCOSE BLD-MCNC: 294 MG/DL (ref 65–99)
GLUCOSE BLD-MCNC: 303 MG/DL (ref 65–99)
GLUCOSE BLD-MCNC: 304 MG/DL (ref 65–99)
GLUCOSE BLD-MCNC: 307 MG/DL (ref 65–99)
GLUCOSE BLD-MCNC: 309 MG/DL (ref 65–99)
GLUCOSE BLD-MCNC: 311 MG/DL (ref 65–99)
GLUCOSE BLD-MCNC: 315 MG/DL (ref 65–99)
GLUCOSE BLD-MCNC: 318 MG/DL (ref 65–99)
GLUCOSE BLD-MCNC: 320 MG/DL (ref 65–99)
GLUCOSE BLD-MCNC: 323 MG/DL (ref 65–99)
GLUCOSE BLD-MCNC: 324 MG/DL (ref 65–99)
GLUCOSE BLD-MCNC: 326 MG/DL (ref 65–99)
GLUCOSE BLD-MCNC: 328 MG/DL (ref 65–99)
GLUCOSE BLD-MCNC: 335 MG/DL (ref 65–99)
GLUCOSE BLD-MCNC: 337 MG/DL (ref 65–99)
GLUCOSE BLD-MCNC: 339 MG/DL (ref 65–99)
GLUCOSE BLD-MCNC: 340 MG/DL (ref 65–99)
GLUCOSE BLD-MCNC: 341 MG/DL (ref 65–99)
GLUCOSE BLD-MCNC: 346 MG/DL (ref 65–99)
GLUCOSE BLD-MCNC: 350 MG/DL (ref 65–99)
GLUCOSE BLD-MCNC: 351 MG/DL (ref 65–99)
GLUCOSE BLD-MCNC: 353 MG/DL (ref 65–99)
GLUCOSE BLD-MCNC: 355 MG/DL (ref 65–99)
GLUCOSE BLD-MCNC: 356 MG/DL (ref 65–99)
GLUCOSE BLD-MCNC: 367 MG/DL (ref 65–99)
GLUCOSE BLD-MCNC: 367 MG/DL (ref 65–99)
GLUCOSE BLD-MCNC: 383 MG/DL (ref 65–99)
GLUCOSE BLD-MCNC: 390 MG/DL (ref 65–99)
GLUCOSE BLD-MCNC: 78 MG/DL (ref 65–99)
GLUCOSE BLD-MCNC: 80 MG/DL (ref 65–99)
GLUCOSE BLD-MCNC: 80 MG/DL (ref 65–99)
GLUCOSE BLD-MCNC: 83 MG/DL (ref 65–99)
GLUCOSE BLD-MCNC: 84 MG/DL (ref 65–99)
GLUCOSE BLD-MCNC: 84 MG/DL (ref 65–99)
GLUCOSE BLD-MCNC: 86 MG/DL (ref 65–99)
GLUCOSE BLD-MCNC: 89 MG/DL (ref 65–99)
GLUCOSE BLD-MCNC: 91 MG/DL (ref 65–99)
GLUCOSE BLD-MCNC: 95 MG/DL (ref 65–99)
GLUCOSE BLD-MCNC: 96 MG/DL (ref 65–99)
GLUCOSE BLD-MCNC: 97 MG/DL (ref 65–99)
GLUCOSE BLD-MCNC: 97 MG/DL (ref 65–99)
GLUCOSE SERPL-MCNC: 100 MG/DL (ref 65–99)
GLUCOSE SERPL-MCNC: 101 MG/DL (ref 65–99)
GLUCOSE SERPL-MCNC: 102 MG/DL (ref 65–99)
GLUCOSE SERPL-MCNC: 105 MG/DL (ref 65–99)
GLUCOSE SERPL-MCNC: 109 MG/DL (ref 65–99)
GLUCOSE SERPL-MCNC: 110 MG/DL (ref 65–99)
GLUCOSE SERPL-MCNC: 115 MG/DL (ref 65–99)
GLUCOSE SERPL-MCNC: 117 MG/DL (ref 65–99)
GLUCOSE SERPL-MCNC: 120 MG/DL (ref 65–99)
GLUCOSE SERPL-MCNC: 122 MG/DL (ref 65–99)
GLUCOSE SERPL-MCNC: 122 MG/DL (ref 65–99)
GLUCOSE SERPL-MCNC: 123 MG/DL (ref 65–99)
GLUCOSE SERPL-MCNC: 126 MG/DL (ref 65–99)
GLUCOSE SERPL-MCNC: 127 MG/DL (ref 65–99)
GLUCOSE SERPL-MCNC: 128 MG/DL (ref 65–99)
GLUCOSE SERPL-MCNC: 129 MG/DL (ref 65–99)
GLUCOSE SERPL-MCNC: 136 MG/DL (ref 65–99)
GLUCOSE SERPL-MCNC: 143 MG/DL (ref 65–99)
GLUCOSE SERPL-MCNC: 148 MG/DL (ref 65–99)
GLUCOSE SERPL-MCNC: 152 MG/DL (ref 65–99)
GLUCOSE SERPL-MCNC: 158 MG/DL (ref 65–99)
GLUCOSE SERPL-MCNC: 183 MG/DL (ref 65–99)
GLUCOSE SERPL-MCNC: 186 MG/DL (ref 65–99)
GLUCOSE SERPL-MCNC: 219 MG/DL (ref 65–99)
GLUCOSE SERPL-MCNC: 228 MG/DL (ref 65–99)
GLUCOSE SERPL-MCNC: 229 MG/DL (ref 65–99)
GLUCOSE SERPL-MCNC: 237 MG/DL (ref 65–99)
GLUCOSE SERPL-MCNC: 237 MG/DL (ref 65–99)
GLUCOSE SERPL-MCNC: 240 MG/DL (ref 65–99)
GLUCOSE SERPL-MCNC: 257 MG/DL (ref 65–99)
GLUCOSE SERPL-MCNC: 264 MG/DL (ref 65–99)
GLUCOSE SERPL-MCNC: 271 MG/DL (ref 65–99)
GLUCOSE SERPL-MCNC: 375 MG/DL (ref 65–99)
GLUCOSE SERPL-MCNC: 83 MG/DL (ref 65–99)
GLUCOSE UR STRIP.AUTO-MCNC: >=1000 MG/DL
GLUCOSE UR STRIP.AUTO-MCNC: NEGATIVE MG/DL
GRAM STN SPEC: NORMAL
GRAM STN SPEC: NORMAL
HAV IGM SERPL QL IA: NEGATIVE
HAV IGM SERPL QL IA: NEGATIVE
HBA1C MFR BLD: 10 % (ref 0–5.6)
HBA1C MFR BLD: 7.2 % (ref 0–5.6)
HBA1C MFR BLD: 9.5 % (ref 0–5.6)
HBV CORE IGM SER QL: NEGATIVE
HBV CORE IGM SER QL: NEGATIVE
HBV SURFACE AG SER QL: NEGATIVE
HBV SURFACE AG SER QL: NEGATIVE
HCO3 BLDA-SCNC: 23.7 MMOL/L (ref 17–25)
HCT VFR BLD AUTO: 36.1 % (ref 42–52)
HCT VFR BLD AUTO: 36.7 % (ref 42–52)
HCT VFR BLD AUTO: 37.5 % (ref 42–52)
HCT VFR BLD AUTO: 38.2 % (ref 42–52)
HCT VFR BLD AUTO: 38.5 % (ref 42–52)
HCT VFR BLD AUTO: 39.2 % (ref 42–52)
HCT VFR BLD AUTO: 39.4 % (ref 42–52)
HCT VFR BLD AUTO: 39.5 % (ref 42–52)
HCT VFR BLD AUTO: 39.6 % (ref 42–52)
HCT VFR BLD AUTO: 39.7 % (ref 42–52)
HCT VFR BLD AUTO: 40 % (ref 42–52)
HCT VFR BLD AUTO: 40.7 % (ref 42–52)
HCT VFR BLD AUTO: 40.8 % (ref 42–52)
HCT VFR BLD AUTO: 40.9 % (ref 42–52)
HCT VFR BLD AUTO: 40.9 % (ref 42–52)
HCT VFR BLD AUTO: 41.1 % (ref 42–52)
HCT VFR BLD AUTO: 41.2 % (ref 42–52)
HCT VFR BLD AUTO: 41.3 % (ref 42–52)
HCT VFR BLD AUTO: 41.6 % (ref 42–52)
HCT VFR BLD AUTO: 41.9 % (ref 42–52)
HCT VFR BLD AUTO: 42.2 % (ref 42–52)
HCT VFR BLD AUTO: 42.7 % (ref 42–52)
HCT VFR BLD AUTO: 42.8 % (ref 42–52)
HCT VFR BLD AUTO: 43 % (ref 42–52)
HCT VFR BLD AUTO: 43.3 % (ref 42–52)
HCT VFR BLD AUTO: 43.4 % (ref 42–52)
HCT VFR BLD AUTO: 43.8 % (ref 42–52)
HCT VFR BLD AUTO: 44 % (ref 42–52)
HCT VFR BLD AUTO: 44.1 % (ref 42–52)
HCT VFR BLD AUTO: 44.4 % (ref 42–52)
HCT VFR BLD AUTO: 45 % (ref 42–52)
HCV AB SER QL: NEGATIVE
HCV AB SER QL: NEGATIVE
HGB BLD-MCNC: 11.8 G/DL (ref 14–18)
HGB BLD-MCNC: 12.1 G/DL (ref 14–18)
HGB BLD-MCNC: 12.3 G/DL (ref 14–18)
HGB BLD-MCNC: 12.6 G/DL (ref 14–18)
HGB BLD-MCNC: 12.7 G/DL (ref 14–18)
HGB BLD-MCNC: 12.8 G/DL (ref 14–18)
HGB BLD-MCNC: 12.9 G/DL (ref 14–18)
HGB BLD-MCNC: 13 G/DL (ref 14–18)
HGB BLD-MCNC: 13 G/DL (ref 14–18)
HGB BLD-MCNC: 13.2 G/DL (ref 14–18)
HGB BLD-MCNC: 13.2 G/DL (ref 14–18)
HGB BLD-MCNC: 13.3 G/DL (ref 14–18)
HGB BLD-MCNC: 13.3 G/DL (ref 14–18)
HGB BLD-MCNC: 13.4 G/DL (ref 14–18)
HGB BLD-MCNC: 13.5 G/DL (ref 14–18)
HGB BLD-MCNC: 13.6 G/DL (ref 14–18)
HGB BLD-MCNC: 13.7 G/DL (ref 14–18)
HGB BLD-MCNC: 13.8 G/DL (ref 14–18)
HGB BLD-MCNC: 13.9 G/DL (ref 14–18)
HGB BLD-MCNC: 13.9 G/DL (ref 14–18)
HGB BLD-MCNC: 14.1 G/DL (ref 14–18)
HGB BLD-MCNC: 14.2 G/DL (ref 14–18)
HGB BLD-MCNC: 14.2 G/DL (ref 14–18)
HGB BLD-MCNC: 14.3 G/DL (ref 14–18)
HGB BLD-MCNC: 14.4 G/DL (ref 14–18)
HGB BLD-MCNC: 14.5 G/DL (ref 14–18)
HGB BLD-MCNC: 14.8 G/DL (ref 14–18)
HGB BLD-MCNC: 14.8 G/DL (ref 14–18)
HGB BLD-MCNC: 15 G/DL (ref 14–18)
HGB RETIC QN AUTO: 33.4 PG/CELL (ref 29–35)
HIV 1+2 AB+HIV1 P24 AG SERPL QL IA: NON REACTIVE
HOROWITZ INDEX BLDA+IHG-RTO: 206 MM[HG]
HYALINE CASTS #/AREA URNS LPF: ABNORMAL /LPF
HYALINE CASTS #/AREA URNS LPF: ABNORMAL /LPF
IMM GRANULOCYTES # BLD AUTO: 0.03 K/UL (ref 0–0.11)
IMM GRANULOCYTES # BLD AUTO: 0.03 K/UL (ref 0–0.11)
IMM GRANULOCYTES # BLD AUTO: 0.04 K/UL (ref 0–0.11)
IMM GRANULOCYTES # BLD AUTO: 0.04 K/UL (ref 0–0.11)
IMM GRANULOCYTES # BLD AUTO: 0.05 K/UL (ref 0–0.11)
IMM GRANULOCYTES # BLD AUTO: 0.08 K/UL (ref 0–0.11)
IMM GRANULOCYTES # BLD AUTO: 0.09 K/UL (ref 0–0.11)
IMM GRANULOCYTES # BLD AUTO: 0.13 K/UL (ref 0–0.11)
IMM GRANULOCYTES # BLD AUTO: 0.18 K/UL (ref 0–0.11)
IMM GRANULOCYTES # BLD AUTO: 0.22 K/UL (ref 0–0.11)
IMM GRANULOCYTES # BLD AUTO: 0.25 K/UL (ref 0–0.11)
IMM GRANULOCYTES # BLD AUTO: 0.33 K/UL (ref 0–0.11)
IMM GRANULOCYTES # BLD AUTO: 0.37 K/UL (ref 0–0.11)
IMM GRANULOCYTES # BLD AUTO: 0.41 K/UL (ref 0–0.11)
IMM GRANULOCYTES # BLD AUTO: 0.62 K/UL (ref 0–0.11)
IMM GRANULOCYTES NFR BLD AUTO: 0.4 % (ref 0–0.9)
IMM GRANULOCYTES NFR BLD AUTO: 0.4 % (ref 0–0.9)
IMM GRANULOCYTES NFR BLD AUTO: 0.5 % (ref 0–0.9)
IMM GRANULOCYTES NFR BLD AUTO: 0.7 % (ref 0–0.9)
IMM GRANULOCYTES NFR BLD AUTO: 0.8 % (ref 0–0.9)
IMM GRANULOCYTES NFR BLD AUTO: 1.6 % (ref 0–0.9)
IMM GRANULOCYTES NFR BLD AUTO: 1.9 % (ref 0–0.9)
IMM GRANULOCYTES NFR BLD AUTO: 1.9 % (ref 0–0.9)
IMM GRANULOCYTES NFR BLD AUTO: 2 % (ref 0–0.9)
IMM GRANULOCYTES NFR BLD AUTO: 2.1 % (ref 0–0.9)
IMM GRANULOCYTES NFR BLD AUTO: 2.3 % (ref 0–0.9)
IMM GRANULOCYTES NFR BLD AUTO: 3.1 % (ref 0–0.9)
IMM GRANULOCYTES NFR BLD AUTO: 4.6 % (ref 0–0.9)
IMM RETICS NFR: 19.9 % (ref 9.3–17.4)
INR PPP: 1 (ref 0.87–1.13)
INR PPP: 1.01 (ref 0.87–1.13)
INR PPP: 1.15 (ref 0.87–1.13)
INR PPP: 1.15 (ref 0.87–1.13)
INST. QUALIFIED PATIENT IIQPT: YES
KETONES UR STRIP.AUTO-MCNC: 15 MG/DL
KETONES UR STRIP.AUTO-MCNC: 15 MG/DL
KETONES UR STRIP.AUTO-MCNC: NEGATIVE MG/DL
LACTATE BLD-SCNC: 1.9 MMOL/L (ref 0.5–2)
LACTATE BLD-SCNC: 2.2 MMOL/L (ref 0.5–2)
LACTATE BLD-SCNC: 2.6 MMOL/L (ref 0.5–2)
LACTATE BLD-SCNC: 7.3 MMOL/L (ref 0.5–2)
LDH SERPL L TO P-CCNC: 149 U/L (ref 107–266)
LDH SERPL L TO P-CCNC: 195 U/L (ref 107–266)
LDH SERPL L TO P-CCNC: 270 U/L (ref 107–266)
LEUKOCYTE ESTERASE UR QL STRIP.AUTO: ABNORMAL
LEUKOCYTE ESTERASE UR QL STRIP.AUTO: ABNORMAL
LEUKOCYTE ESTERASE UR QL STRIP.AUTO: NEGATIVE
LIPASE SERPL-CCNC: 18 U/L (ref 11–82)
LV EJECT FRACT  99904: 60
LV EJECT FRACT MOD 2C 99903: 74.72
LV EJECT FRACT MOD 4C 99902: 63.34
LV EJECT FRACT MOD BP 99901: 69.56
LYMPHOCYTES # BLD AUTO: 0.14 K/UL (ref 1–4.8)
LYMPHOCYTES # BLD AUTO: 0.29 K/UL (ref 1–4.8)
LYMPHOCYTES # BLD AUTO: 0.47 K/UL (ref 1–4.8)
LYMPHOCYTES # BLD AUTO: 0.59 K/UL (ref 1–4.8)
LYMPHOCYTES # BLD AUTO: 0.63 K/UL (ref 1–4.8)
LYMPHOCYTES # BLD AUTO: 0.66 K/UL (ref 1–4.8)
LYMPHOCYTES # BLD AUTO: 0.72 K/UL (ref 1–4.8)
LYMPHOCYTES # BLD AUTO: 0.74 K/UL (ref 1–4.8)
LYMPHOCYTES # BLD AUTO: 0.79 K/UL (ref 1–4.8)
LYMPHOCYTES # BLD AUTO: 0.81 K/UL (ref 1–4.8)
LYMPHOCYTES # BLD AUTO: 0.82 K/UL (ref 1–4.8)
LYMPHOCYTES # BLD AUTO: 0.83 K/UL (ref 1–4.8)
LYMPHOCYTES # BLD AUTO: 0.85 K/UL (ref 1–4.8)
LYMPHOCYTES # BLD AUTO: 0.87 K/UL (ref 1–4.8)
LYMPHOCYTES # BLD AUTO: 0.92 K/UL (ref 1–4.8)
LYMPHOCYTES # BLD AUTO: 0.97 K/UL (ref 1–4.8)
LYMPHOCYTES # BLD AUTO: 0.97 K/UL (ref 1–4.8)
LYMPHOCYTES # BLD AUTO: 1.04 K/UL (ref 1–4.8)
LYMPHOCYTES # BLD AUTO: 1.05 K/UL (ref 1–4.8)
LYMPHOCYTES # BLD AUTO: 1.11 K/UL (ref 1–4.8)
LYMPHOCYTES # BLD AUTO: 1.26 K/UL (ref 1–4.8)
LYMPHOCYTES NFR BLD: 0.9 % (ref 22–41)
LYMPHOCYTES NFR BLD: 1.8 % (ref 22–41)
LYMPHOCYTES NFR BLD: 11.5 % (ref 22–41)
LYMPHOCYTES NFR BLD: 11.7 % (ref 22–41)
LYMPHOCYTES NFR BLD: 13 % (ref 22–41)
LYMPHOCYTES NFR BLD: 13.9 % (ref 22–41)
LYMPHOCYTES NFR BLD: 14.9 % (ref 22–41)
LYMPHOCYTES NFR BLD: 15.2 % (ref 22–41)
LYMPHOCYTES NFR BLD: 33.3 % (ref 22–41)
LYMPHOCYTES NFR BLD: 4.3 % (ref 22–41)
LYMPHOCYTES NFR BLD: 4.9 % (ref 22–41)
LYMPHOCYTES NFR BLD: 5.7 % (ref 22–41)
LYMPHOCYTES NFR BLD: 5.9 % (ref 22–41)
LYMPHOCYTES NFR BLD: 6.8 % (ref 22–41)
LYMPHOCYTES NFR BLD: 6.9 % (ref 22–41)
LYMPHOCYTES NFR BLD: 7.1 % (ref 22–41)
LYMPHOCYTES NFR BLD: 7.5 % (ref 22–41)
LYMPHOCYTES NFR BLD: 8 % (ref 22–41)
LYMPHOCYTES NFR BLD: 8.1 % (ref 22–41)
LYMPHOCYTES NFR BLD: 8.1 % (ref 22–41)
LYMPHOCYTES NFR BLD: 9.6 % (ref 22–41)
LYMPHOCYTES NFR FLD: 1 %
MAGNESIUM SERPL-MCNC: 1.6 MG/DL (ref 1.5–2.5)
MAGNESIUM SERPL-MCNC: 1.6 MG/DL (ref 1.5–2.5)
MAGNESIUM SERPL-MCNC: 1.7 MG/DL (ref 1.5–2.5)
MAGNESIUM SERPL-MCNC: 1.7 MG/DL (ref 1.5–2.5)
MAGNESIUM SERPL-MCNC: 1.8 MG/DL (ref 1.5–2.5)
MAGNESIUM SERPL-MCNC: 1.9 MG/DL (ref 1.5–2.5)
MAGNESIUM SERPL-MCNC: 2.1 MG/DL (ref 1.5–2.5)
MAGNESIUM SERPL-MCNC: 2.2 MG/DL (ref 1.5–2.5)
MAGNESIUM SERPL-MCNC: 2.2 MG/DL (ref 1.5–2.5)
MANUAL DIFF BLD: ABNORMAL
MANUAL DIFF BLD: NORMAL
MCH RBC QN AUTO: 30.7 PG (ref 27–33)
MCH RBC QN AUTO: 30.9 PG (ref 27–33)
MCH RBC QN AUTO: 30.9 PG (ref 27–33)
MCH RBC QN AUTO: 31 PG (ref 27–33)
MCH RBC QN AUTO: 31.3 PG (ref 27–33)
MCH RBC QN AUTO: 31.3 PG (ref 27–33)
MCH RBC QN AUTO: 31.4 PG (ref 27–33)
MCH RBC QN AUTO: 31.5 PG (ref 27–33)
MCH RBC QN AUTO: 31.6 PG (ref 27–33)
MCH RBC QN AUTO: 31.7 PG (ref 27–33)
MCH RBC QN AUTO: 31.8 PG (ref 27–33)
MCH RBC QN AUTO: 31.9 PG (ref 27–33)
MCH RBC QN AUTO: 32.1 PG (ref 27–33)
MCH RBC QN AUTO: 32.1 PG (ref 27–33)
MCH RBC QN AUTO: 32.2 PG (ref 27–33)
MCH RBC QN AUTO: 32.2 PG (ref 27–33)
MCH RBC QN AUTO: 32.3 PG (ref 27–33)
MCH RBC QN AUTO: 32.3 PG (ref 27–33)
MCH RBC QN AUTO: 32.5 PG (ref 27–33)
MCH RBC QN AUTO: 32.6 PG (ref 27–33)
MCH RBC QN AUTO: 32.9 PG (ref 27–33)
MCHC RBC AUTO-ENTMCNC: 31.8 G/DL (ref 33.7–35.3)
MCHC RBC AUTO-ENTMCNC: 31.9 G/DL (ref 33.7–35.3)
MCHC RBC AUTO-ENTMCNC: 32 G/DL (ref 33.7–35.3)
MCHC RBC AUTO-ENTMCNC: 32.2 G/DL (ref 33.7–35.3)
MCHC RBC AUTO-ENTMCNC: 32.4 G/DL (ref 33.7–35.3)
MCHC RBC AUTO-ENTMCNC: 32.5 G/DL (ref 33.7–35.3)
MCHC RBC AUTO-ENTMCNC: 32.5 G/DL (ref 33.7–35.3)
MCHC RBC AUTO-ENTMCNC: 32.6 G/DL (ref 33.7–35.3)
MCHC RBC AUTO-ENTMCNC: 32.6 G/DL (ref 33.7–35.3)
MCHC RBC AUTO-ENTMCNC: 32.7 G/DL (ref 33.7–35.3)
MCHC RBC AUTO-ENTMCNC: 32.7 G/DL (ref 33.7–35.3)
MCHC RBC AUTO-ENTMCNC: 32.8 G/DL (ref 33.7–35.3)
MCHC RBC AUTO-ENTMCNC: 32.8 G/DL (ref 33.7–35.3)
MCHC RBC AUTO-ENTMCNC: 32.9 G/DL (ref 33.7–35.3)
MCHC RBC AUTO-ENTMCNC: 33 G/DL (ref 33.7–35.3)
MCHC RBC AUTO-ENTMCNC: 33.2 G/DL (ref 33.7–35.3)
MCHC RBC AUTO-ENTMCNC: 33.2 G/DL (ref 33.7–35.3)
MCHC RBC AUTO-ENTMCNC: 33.3 G/DL (ref 33.7–35.3)
MCHC RBC AUTO-ENTMCNC: 33.4 G/DL (ref 33.7–35.3)
MCHC RBC AUTO-ENTMCNC: 33.4 G/DL (ref 33.7–35.3)
MCHC RBC AUTO-ENTMCNC: 33.5 G/DL (ref 33.7–35.3)
MCHC RBC AUTO-ENTMCNC: 33.5 G/DL (ref 33.7–35.3)
MCHC RBC AUTO-ENTMCNC: 33.6 G/DL (ref 33.7–35.3)
MCHC RBC AUTO-ENTMCNC: 33.7 G/DL (ref 33.7–35.3)
MCHC RBC AUTO-ENTMCNC: 33.8 G/DL (ref 33.7–35.3)
MCHC RBC AUTO-ENTMCNC: 33.8 G/DL (ref 33.7–35.3)
MCHC RBC AUTO-ENTMCNC: 34.5 G/DL (ref 33.7–35.3)
MCHC RBC AUTO-ENTMCNC: 34.6 G/DL (ref 33.7–35.3)
MCV RBC AUTO: 101.2 FL (ref 81.4–97.8)
MCV RBC AUTO: 93 FL (ref 81.4–97.8)
MCV RBC AUTO: 93.6 FL (ref 81.4–97.8)
MCV RBC AUTO: 94.1 FL (ref 81.4–97.8)
MCV RBC AUTO: 94.5 FL (ref 81.4–97.8)
MCV RBC AUTO: 94.9 FL (ref 81.4–97.8)
MCV RBC AUTO: 95.1 FL (ref 81.4–97.8)
MCV RBC AUTO: 95.1 FL (ref 81.4–97.8)
MCV RBC AUTO: 95.2 FL (ref 81.4–97.8)
MCV RBC AUTO: 95.3 FL (ref 81.4–97.8)
MCV RBC AUTO: 95.4 FL (ref 81.4–97.8)
MCV RBC AUTO: 95.8 FL (ref 81.4–97.8)
MCV RBC AUTO: 95.8 FL (ref 81.4–97.8)
MCV RBC AUTO: 95.9 FL (ref 81.4–97.8)
MCV RBC AUTO: 96.1 FL (ref 81.4–97.8)
MCV RBC AUTO: 96.1 FL (ref 81.4–97.8)
MCV RBC AUTO: 96.2 FL (ref 81.4–97.8)
MCV RBC AUTO: 96.4 FL (ref 81.4–97.8)
MCV RBC AUTO: 96.5 FL (ref 81.4–97.8)
MCV RBC AUTO: 96.6 FL (ref 81.4–97.8)
MCV RBC AUTO: 96.7 FL (ref 81.4–97.8)
MCV RBC AUTO: 96.8 FL (ref 81.4–97.8)
MCV RBC AUTO: 97.1 FL (ref 81.4–97.8)
MCV RBC AUTO: 97.1 FL (ref 81.4–97.8)
MCV RBC AUTO: 97.4 FL (ref 81.4–97.8)
MCV RBC AUTO: 97.4 FL (ref 81.4–97.8)
MCV RBC AUTO: 97.7 FL (ref 81.4–97.8)
MCV RBC AUTO: 97.7 FL (ref 81.4–97.8)
METAMYELOCYTES NFR BLD MANUAL: 0.9 %
METAMYELOCYTES NFR BLD MANUAL: 0.9 %
METAMYELOCYTES NFR BLD MANUAL: 1.8 %
MICRO URNS: ABNORMAL
MICRO URNS: NORMAL
MICROCYTES BLD QL SMEAR: ABNORMAL
MICROCYTES BLD QL SMEAR: ABNORMAL
MONOCYTES # BLD AUTO: 0.37 K/UL (ref 0–0.85)
MONOCYTES # BLD AUTO: 0.5 K/UL (ref 0–0.85)
MONOCYTES # BLD AUTO: 0.75 K/UL (ref 0–0.85)
MONOCYTES # BLD AUTO: 0.88 K/UL (ref 0–0.85)
MONOCYTES # BLD AUTO: 0.92 K/UL (ref 0–0.85)
MONOCYTES # BLD AUTO: 0.94 K/UL (ref 0–0.85)
MONOCYTES # BLD AUTO: 0.99 K/UL (ref 0–0.85)
MONOCYTES # BLD AUTO: 1.1 K/UL (ref 0–0.85)
MONOCYTES # BLD AUTO: 1.1 K/UL (ref 0–0.85)
MONOCYTES # BLD AUTO: 1.17 K/UL (ref 0–0.85)
MONOCYTES # BLD AUTO: 1.18 K/UL (ref 0–0.85)
MONOCYTES # BLD AUTO: 1.27 K/UL (ref 0–0.85)
MONOCYTES # BLD AUTO: 1.43 K/UL (ref 0–0.85)
MONOCYTES # BLD AUTO: 1.53 K/UL (ref 0–0.85)
MONOCYTES # BLD AUTO: 1.65 K/UL (ref 0–0.85)
MONOCYTES # BLD AUTO: 1.7 K/UL (ref 0–0.85)
MONOCYTES # BLD AUTO: 1.71 K/UL (ref 0–0.85)
MONOCYTES # BLD AUTO: 1.76 K/UL (ref 0–0.85)
MONOCYTES # BLD AUTO: 1.79 K/UL (ref 0–0.85)
MONOCYTES # BLD AUTO: 2.06 K/UL (ref 0–0.85)
MONOCYTES # BLD AUTO: 2.27 K/UL (ref 0–0.85)
MONOCYTES NFR BLD AUTO: 10.3 % (ref 0–13.4)
MONOCYTES NFR BLD AUTO: 10.5 % (ref 0–13.4)
MONOCYTES NFR BLD AUTO: 12.5 % (ref 0–13.4)
MONOCYTES NFR BLD AUTO: 12.6 % (ref 0–13.4)
MONOCYTES NFR BLD AUTO: 12.7 % (ref 0–13.4)
MONOCYTES NFR BLD AUTO: 13.1 % (ref 0–13.4)
MONOCYTES NFR BLD AUTO: 13.6 % (ref 0–13.4)
MONOCYTES NFR BLD AUTO: 13.6 % (ref 0–13.4)
MONOCYTES NFR BLD AUTO: 13.7 % (ref 0–13.4)
MONOCYTES NFR BLD AUTO: 14 % (ref 0–13.4)
MONOCYTES NFR BLD AUTO: 14.3 % (ref 0–13.4)
MONOCYTES NFR BLD AUTO: 14.7 % (ref 0–13.4)
MONOCYTES NFR BLD AUTO: 14.8 % (ref 0–13.4)
MONOCYTES NFR BLD AUTO: 14.9 % (ref 0–13.4)
MONOCYTES NFR BLD AUTO: 15.2 % (ref 0–13.4)
MONOCYTES NFR BLD AUTO: 15.7 % (ref 0–13.4)
MONOCYTES NFR BLD AUTO: 18.5 % (ref 0–13.4)
MONOCYTES NFR BLD AUTO: 6.4 % (ref 0–13.4)
MONOCYTES NFR BLD AUTO: 6.8 % (ref 0–13.4)
MONOCYTES NFR BLD AUTO: 9.6 % (ref 0–13.4)
MONOCYTES NFR BLD AUTO: 9.8 % (ref 0–13.4)
MONONUC CELLS NFR FLD: 6 %
MORPHOLOGY BLD-IMP: NORMAL
MYELOCYTES NFR BLD MANUAL: 0.9 %
MYELOCYTES NFR BLD MANUAL: 1.8 %
NEUTROPHILS # BLD AUTO: 1.03 K/UL (ref 1.82–7.42)
NEUTROPHILS # BLD AUTO: 10.08 K/UL (ref 1.82–7.42)
NEUTROPHILS # BLD AUTO: 11.96 K/UL (ref 1.82–7.42)
NEUTROPHILS # BLD AUTO: 12.52 K/UL (ref 1.82–7.42)
NEUTROPHILS # BLD AUTO: 13.2 K/UL (ref 1.82–7.42)
NEUTROPHILS # BLD AUTO: 13.38 K/UL (ref 1.82–7.42)
NEUTROPHILS # BLD AUTO: 14.99 K/UL (ref 1.82–7.42)
NEUTROPHILS # BLD AUTO: 3.63 K/UL (ref 1.82–7.42)
NEUTROPHILS # BLD AUTO: 3.75 K/UL (ref 1.82–7.42)
NEUTROPHILS # BLD AUTO: 3.85 K/UL (ref 1.82–7.42)
NEUTROPHILS # BLD AUTO: 4.03 K/UL (ref 1.82–7.42)
NEUTROPHILS # BLD AUTO: 5.37 K/UL (ref 1.82–7.42)
NEUTROPHILS # BLD AUTO: 5.57 K/UL (ref 1.82–7.42)
NEUTROPHILS # BLD AUTO: 6.29 K/UL (ref 1.82–7.42)
NEUTROPHILS # BLD AUTO: 6.44 K/UL (ref 1.82–7.42)
NEUTROPHILS # BLD AUTO: 7.85 K/UL (ref 1.82–7.42)
NEUTROPHILS # BLD AUTO: 7.89 K/UL (ref 1.82–7.42)
NEUTROPHILS # BLD AUTO: 8.62 K/UL (ref 1.82–7.42)
NEUTROPHILS # BLD AUTO: 9.32 K/UL (ref 1.82–7.42)
NEUTROPHILS # BLD AUTO: 9.63 K/UL (ref 1.82–7.42)
NEUTROPHILS # BLD AUTO: 9.7 K/UL (ref 1.82–7.42)
NEUTROPHILS NFR BLD: 22.8 % (ref 44–72)
NEUTROPHILS NFR BLD: 58.2 % (ref 44–72)
NEUTROPHILS NFR BLD: 60.4 % (ref 44–72)
NEUTROPHILS NFR BLD: 64.6 % (ref 44–72)
NEUTROPHILS NFR BLD: 66.1 % (ref 44–72)
NEUTROPHILS NFR BLD: 68.7 % (ref 44–72)
NEUTROPHILS NFR BLD: 71 % (ref 44–72)
NEUTROPHILS NFR BLD: 72.2 % (ref 44–72)
NEUTROPHILS NFR BLD: 72.3 % (ref 44–72)
NEUTROPHILS NFR BLD: 72.4 % (ref 44–72)
NEUTROPHILS NFR BLD: 73.6 % (ref 44–72)
NEUTROPHILS NFR BLD: 74 % (ref 44–72)
NEUTROPHILS NFR BLD: 74.2 % (ref 44–72)
NEUTROPHILS NFR BLD: 75 % (ref 44–72)
NEUTROPHILS NFR BLD: 76.5 % (ref 44–72)
NEUTROPHILS NFR BLD: 77.2 % (ref 44–72)
NEUTROPHILS NFR BLD: 79.1 % (ref 44–72)
NEUTROPHILS NFR BLD: 80.2 % (ref 44–72)
NEUTROPHILS NFR BLD: 82.5 % (ref 44–72)
NEUTROPHILS NFR BLD: 86.5 % (ref 44–72)
NEUTROPHILS NFR BLD: 86.7 % (ref 44–72)
NEUTROPHILS NFR FLD: 93 %
NEUTS BAND NFR BLD MANUAL: 0.9 % (ref 0–10)
NEUTS BAND NFR BLD MANUAL: 10.5 % (ref 0–10)
NEUTS BAND NFR BLD MANUAL: 13.9 % (ref 0–10)
NEUTS BAND NFR BLD MANUAL: 18.4 % (ref 0–10)
NEUTS BAND NFR BLD MANUAL: 3.4 % (ref 0–10)
NITRITE UR QL STRIP.AUTO: NEGATIVE
NITRITE UR QL STRIP.AUTO: POSITIVE
NRBC # BLD AUTO: 0 K/UL
NRBC # BLD AUTO: 0.03 K/UL
NRBC BLD-RTO: 0 /100 WBC
NRBC BLD-RTO: 0.2 /100 WBC
O2/TOTAL GAS SETTING VFR VENT: 50 %
OVALOCYTES BLD QL SMEAR: NORMAL
PATHOLOGY CONSULT NOTE: NORMAL
PCO2 BLDA: 31 MMHG (ref 26–37)
PCO2 TEMP ADJ BLDA: 30.6 MMHG (ref 26–37)
PH BLDA: 7.49 [PH] (ref 7.4–7.5)
PH TEMP ADJ BLDA: 7.5 [PH] (ref 7.4–7.5)
PH UR STRIP.AUTO: 5 [PH] (ref 5–8)
PH UR STRIP.AUTO: 5.5 [PH]
PH UR STRIP.AUTO: 5.5 [PH] (ref 5–8)
PH UR STRIP.AUTO: 6.5 [PH] (ref 5–8)
PH UR STRIP.AUTO: 6.5 [PH] (ref 5–8)
PH UR STRIP.AUTO: 8 [PH] (ref 5–8)
PHOSPHATE SERPL-MCNC: 2.4 MG/DL (ref 2.5–4.5)
PHOSPHATE SERPL-MCNC: 2.7 MG/DL (ref 2.5–4.5)
PHOSPHATE SERPL-MCNC: 2.9 MG/DL (ref 2.5–4.5)
PHOSPHATE SERPL-MCNC: 3 MG/DL (ref 2.5–4.5)
PHOSPHATE SERPL-MCNC: 3.2 MG/DL (ref 2.5–4.5)
PLATELET # BLD AUTO: 122 K/UL (ref 164–446)
PLATELET # BLD AUTO: 127 K/UL (ref 164–446)
PLATELET # BLD AUTO: 138 K/UL (ref 164–446)
PLATELET # BLD AUTO: 138 K/UL (ref 164–446)
PLATELET # BLD AUTO: 145 K/UL (ref 164–446)
PLATELET # BLD AUTO: 147 K/UL (ref 164–446)
PLATELET # BLD AUTO: 150 K/UL (ref 164–446)
PLATELET # BLD AUTO: 154 K/UL (ref 164–446)
PLATELET # BLD AUTO: 154 K/UL (ref 164–446)
PLATELET # BLD AUTO: 156 K/UL (ref 164–446)
PLATELET # BLD AUTO: 156 K/UL (ref 164–446)
PLATELET # BLD AUTO: 157 K/UL (ref 164–446)
PLATELET # BLD AUTO: 157 K/UL (ref 164–446)
PLATELET # BLD AUTO: 160 K/UL (ref 164–446)
PLATELET # BLD AUTO: 164 K/UL (ref 164–446)
PLATELET # BLD AUTO: 169 K/UL (ref 164–446)
PLATELET # BLD AUTO: 171 K/UL (ref 164–446)
PLATELET # BLD AUTO: 174 K/UL (ref 164–446)
PLATELET # BLD AUTO: 177 K/UL (ref 164–446)
PLATELET # BLD AUTO: 179 K/UL (ref 164–446)
PLATELET # BLD AUTO: 190 K/UL (ref 164–446)
PLATELET # BLD AUTO: 216 K/UL (ref 164–446)
PLATELET # BLD AUTO: 217 K/UL (ref 164–446)
PLATELET # BLD AUTO: 242 K/UL (ref 164–446)
PLATELET # BLD AUTO: 242 K/UL (ref 164–446)
PLATELET # BLD AUTO: 245 K/UL (ref 164–446)
PLATELET # BLD AUTO: 260 K/UL (ref 164–446)
PLATELET # BLD AUTO: 288 K/UL (ref 164–446)
PLATELET # BLD AUTO: 316 K/UL (ref 164–446)
PLATELET # BLD AUTO: 347 K/UL (ref 164–446)
PLATELET # BLD AUTO: 357 K/UL (ref 164–446)
PLATELET # BLD AUTO: 390 K/UL (ref 164–446)
PLATELET BLD QL SMEAR: NORMAL
PMV BLD AUTO: 10.1 FL (ref 9–12.9)
PMV BLD AUTO: 10.1 FL (ref 9–12.9)
PMV BLD AUTO: 8.3 FL (ref 9–12.9)
PMV BLD AUTO: 8.3 FL (ref 9–12.9)
PMV BLD AUTO: 8.5 FL (ref 9–12.9)
PMV BLD AUTO: 8.5 FL (ref 9–12.9)
PMV BLD AUTO: 8.8 FL (ref 9–12.9)
PMV BLD AUTO: 9 FL (ref 9–12.9)
PMV BLD AUTO: 9.1 FL (ref 9–12.9)
PMV BLD AUTO: 9.2 FL (ref 9–12.9)
PMV BLD AUTO: 9.2 FL (ref 9–12.9)
PMV BLD AUTO: 9.3 FL (ref 9–12.9)
PMV BLD AUTO: 9.4 FL (ref 9–12.9)
PMV BLD AUTO: 9.5 FL (ref 9–12.9)
PMV BLD AUTO: 9.6 FL (ref 9–12.9)
PMV BLD AUTO: 9.7 FL (ref 9–12.9)
PMV BLD AUTO: 9.8 FL (ref 9–12.9)
PMV BLD AUTO: 9.8 FL (ref 9–12.9)
PMV BLD AUTO: 9.9 FL (ref 9–12.9)
PMV BLD AUTO: 9.9 FL (ref 9–12.9)
PO2 BLDA: 103 MMHG (ref 64–87)
PO2 TEMP ADJ BLDA: 102 MMHG (ref 64–87)
POIKILOCYTOSIS BLD QL SMEAR: NORMAL
POLYCHROMASIA BLD QL SMEAR: NORMAL
POTASSIUM SERPL-SCNC: 3.4 MMOL/L (ref 3.6–5.5)
POTASSIUM SERPL-SCNC: 3.6 MMOL/L (ref 3.6–5.5)
POTASSIUM SERPL-SCNC: 3.7 MMOL/L (ref 3.6–5.5)
POTASSIUM SERPL-SCNC: 3.8 MMOL/L (ref 3.6–5.5)
POTASSIUM SERPL-SCNC: 3.9 MMOL/L (ref 3.6–5.5)
POTASSIUM SERPL-SCNC: 3.9 MMOL/L (ref 3.6–5.5)
POTASSIUM SERPL-SCNC: 4 MMOL/L (ref 3.6–5.5)
POTASSIUM SERPL-SCNC: 4.1 MMOL/L (ref 3.6–5.5)
POTASSIUM SERPL-SCNC: 4.2 MMOL/L (ref 3.6–5.5)
POTASSIUM SERPL-SCNC: 4.2 MMOL/L (ref 3.6–5.5)
POTASSIUM SERPL-SCNC: 4.4 MMOL/L (ref 3.6–5.5)
POTASSIUM SERPL-SCNC: 4.5 MMOL/L (ref 3.6–5.5)
POTASSIUM SERPL-SCNC: 4.6 MMOL/L (ref 3.6–5.5)
POTASSIUM SERPL-SCNC: 4.7 MMOL/L (ref 3.6–5.5)
POTASSIUM SERPL-SCNC: 4.7 MMOL/L (ref 3.6–5.5)
POTASSIUM SERPL-SCNC: 4.8 MMOL/L (ref 3.6–5.5)
POTASSIUM SERPL-SCNC: 4.9 MMOL/L (ref 3.6–5.5)
PROMYELOCYTES NFR BLD MANUAL: 0.9 %
PROMYELOCYTES NFR BLD MANUAL: 1.7 %
PROT SERPL-MCNC: 5.5 G/DL (ref 6–8.2)
PROT SERPL-MCNC: 6 G/DL (ref 6–8.2)
PROT SERPL-MCNC: 6.3 G/DL (ref 6–8.2)
PROT SERPL-MCNC: 6.4 G/DL (ref 6–8.2)
PROT SERPL-MCNC: 6.4 G/DL (ref 6–8.2)
PROT SERPL-MCNC: 6.6 G/DL (ref 6–8.2)
PROT SERPL-MCNC: 6.7 G/DL (ref 6–8.2)
PROT SERPL-MCNC: 6.7 G/DL (ref 6–8.2)
PROT SERPL-MCNC: 6.8 G/DL (ref 6–8.2)
PROT SERPL-MCNC: 6.9 G/DL (ref 6–8.2)
PROT SERPL-MCNC: 7.3 G/DL (ref 6–8.2)
PROT UR QL STRIP: NEGATIVE MG/DL
PROTHROMBIN TIME: 13.4 SEC (ref 12–14.6)
PROTHROMBIN TIME: 13.6 SEC (ref 12–14.6)
PROTHROMBIN TIME: 15 SEC (ref 12–14.6)
PROTHROMBIN TIME: 15 SEC (ref 12–14.6)
PSA SERPL-MCNC: 5.04 NG/ML (ref 0–4)
RBC # BLD AUTO: 3.77 M/UL (ref 4.7–6.1)
RBC # BLD AUTO: 3.86 M/UL (ref 4.7–6.1)
RBC # BLD AUTO: 3.88 M/UL (ref 4.7–6.1)
RBC # BLD AUTO: 3.95 M/UL (ref 4.7–6.1)
RBC # BLD AUTO: 4.02 M/UL (ref 4.7–6.1)
RBC # BLD AUTO: 4.08 M/UL (ref 4.7–6.1)
RBC # BLD AUTO: 4.11 M/UL (ref 4.7–6.1)
RBC # BLD AUTO: 4.12 M/UL (ref 4.7–6.1)
RBC # BLD AUTO: 4.14 M/UL (ref 4.7–6.1)
RBC # BLD AUTO: 4.17 M/UL (ref 4.7–6.1)
RBC # BLD AUTO: 4.2 M/UL (ref 4.7–6.1)
RBC # BLD AUTO: 4.21 M/UL (ref 4.7–6.1)
RBC # BLD AUTO: 4.22 M/UL (ref 4.7–6.1)
RBC # BLD AUTO: 4.23 M/UL (ref 4.7–6.1)
RBC # BLD AUTO: 4.26 M/UL (ref 4.7–6.1)
RBC # BLD AUTO: 4.28 M/UL (ref 4.7–6.1)
RBC # BLD AUTO: 4.28 M/UL (ref 4.7–6.1)
RBC # BLD AUTO: 4.3 M/UL (ref 4.7–6.1)
RBC # BLD AUTO: 4.32 M/UL (ref 4.7–6.1)
RBC # BLD AUTO: 4.34 M/UL (ref 4.7–6.1)
RBC # BLD AUTO: 4.38 M/UL (ref 4.7–6.1)
RBC # BLD AUTO: 4.39 M/UL (ref 4.7–6.1)
RBC # BLD AUTO: 4.43 M/UL (ref 4.7–6.1)
RBC # BLD AUTO: 4.44 M/UL (ref 4.7–6.1)
RBC # BLD AUTO: 4.5 M/UL (ref 4.7–6.1)
RBC # BLD AUTO: 4.5 M/UL (ref 4.7–6.1)
RBC # BLD AUTO: 4.56 M/UL (ref 4.7–6.1)
RBC # BLD AUTO: 4.68 M/UL (ref 4.7–6.1)
RBC # BLD AUTO: 4.72 M/UL (ref 4.7–6.1)
RBC # FLD: 3000 CELLS/UL
RBC # URNS HPF: ABNORMAL /HPF
RBC BLD AUTO: NORMAL
RBC BLD AUTO: PRESENT
RBC UR QL AUTO: ABNORMAL
RBC UR QL AUTO: NEGATIVE
RETICS # AUTO: 0.1 M/UL (ref 0.04–0.06)
RETICS/RBC NFR: 2.1 % (ref 0.8–2.1)
RH BLD: NORMAL
SAO2 % BLDA: 99 % (ref 93–99)
SIGNIFICANT IND 70042: ABNORMAL
SIGNIFICANT IND 70042: NORMAL
SITE SITE: ABNORMAL
SITE SITE: NORMAL
SODIUM SERPL-SCNC: 129 MMOL/L (ref 135–145)
SODIUM SERPL-SCNC: 131 MMOL/L (ref 135–145)
SODIUM SERPL-SCNC: 131 MMOL/L (ref 135–145)
SODIUM SERPL-SCNC: 132 MMOL/L (ref 135–145)
SODIUM SERPL-SCNC: 132 MMOL/L (ref 135–145)
SODIUM SERPL-SCNC: 133 MMOL/L (ref 135–145)
SODIUM SERPL-SCNC: 134 MMOL/L (ref 135–145)
SODIUM SERPL-SCNC: 135 MMOL/L (ref 135–145)
SODIUM SERPL-SCNC: 136 MMOL/L (ref 135–145)
SODIUM SERPL-SCNC: 136 MMOL/L (ref 135–145)
SODIUM SERPL-SCNC: 137 MMOL/L (ref 135–145)
SODIUM SERPL-SCNC: 138 MMOL/L (ref 135–145)
SODIUM SERPL-SCNC: 139 MMOL/L (ref 135–145)
SODIUM SERPL-SCNC: 140 MMOL/L (ref 135–145)
SODIUM SERPL-SCNC: 140 MMOL/L (ref 135–145)
SODIUM SERPL-SCNC: 141 MMOL/L (ref 135–145)
SODIUM SERPL-SCNC: 141 MMOL/L (ref 135–145)
SODIUM SERPL-SCNC: 142 MMOL/L (ref 135–145)
SOURCE SOURCE: ABNORMAL
SOURCE SOURCE: NORMAL
SP GR UR STRIP.AUTO: 1.01
SP GR UR STRIP.AUTO: 1.01
SP GR UR STRIP.AUTO: 1.02
SP GR UR STRIP.AUTO: >=1.03
SPECIMEN DRAWN FROM PATIENT: ABNORMAL
TRIGL SERPL-MCNC: 130 MG/DL (ref 0–149)
TROPONIN T SERPL-MCNC: 19 NG/L (ref 6–19)
TROPONIN T SERPL-MCNC: 27 NG/L (ref 6–19)
TSH SERPL DL<=0.005 MIU/L-ACNC: 0.61 UIU/ML (ref 0.38–5.33)
TSH SERPL DL<=0.005 MIU/L-ACNC: 2.19 UIU/ML (ref 0.38–5.33)
URATE SERPL-MCNC: 4.1 MG/DL (ref 2.5–8.3)
URATE SERPL-MCNC: 5 MG/DL (ref 2.5–8.3)
URATE SERPL-MCNC: 5.9 MG/DL (ref 2.5–8.3)
UROBILINOGEN UR STRIP.AUTO-MCNC: 0.2 MG/DL
UROBILINOGEN UR STRIP.AUTO-MCNC: 1 MG/DL
WBC # BLD AUTO: 10.7 K/UL (ref 4.8–10.8)
WBC # BLD AUTO: 10.9 K/UL (ref 4.8–10.8)
WBC # BLD AUTO: 10.9 K/UL (ref 4.8–10.8)
WBC # BLD AUTO: 11.3 K/UL (ref 4.8–10.8)
WBC # BLD AUTO: 12.6 K/UL (ref 4.8–10.8)
WBC # BLD AUTO: 12.8 K/UL (ref 4.8–10.8)
WBC # BLD AUTO: 13.1 K/UL (ref 4.8–10.8)
WBC # BLD AUTO: 13.6 K/UL (ref 4.8–10.8)
WBC # BLD AUTO: 13.8 K/UL (ref 4.8–10.8)
WBC # BLD AUTO: 13.9 K/UL (ref 4.8–10.8)
WBC # BLD AUTO: 14.3 K/UL (ref 4.8–10.8)
WBC # BLD AUTO: 14.8 K/UL (ref 4.8–10.8)
WBC # BLD AUTO: 15.9 K/UL (ref 4.8–10.8)
WBC # BLD AUTO: 16 K/UL (ref 4.8–10.8)
WBC # BLD AUTO: 16.2 K/UL (ref 4.8–10.8)
WBC # BLD AUTO: 16.2 K/UL (ref 4.8–10.8)
WBC # BLD AUTO: 17.3 K/UL (ref 4.8–10.8)
WBC # BLD AUTO: 19.8 K/UL (ref 4.8–10.8)
WBC # BLD AUTO: 2.5 K/UL (ref 4.8–10.8)
WBC # BLD AUTO: 5.2 K/UL (ref 4.8–10.8)
WBC # BLD AUTO: 5.3 K/UL (ref 4.8–10.8)
WBC # BLD AUTO: 5.5 K/UL (ref 4.8–10.8)
WBC # BLD AUTO: 6.2 K/UL (ref 4.8–10.8)
WBC # BLD AUTO: 6.4 K/UL (ref 4.8–10.8)
WBC # BLD AUTO: 7.3 K/UL (ref 4.8–10.8)
WBC # BLD AUTO: 7.7 K/UL (ref 4.8–10.8)
WBC # BLD AUTO: 7.8 K/UL (ref 4.8–10.8)
WBC # BLD AUTO: 8.4 K/UL (ref 4.8–10.8)
WBC # BLD AUTO: 8.6 K/UL (ref 4.8–10.8)
WBC # BLD AUTO: 9.3 K/UL (ref 4.8–10.8)
WBC # BLD AUTO: 9.6 K/UL (ref 4.8–10.8)
WBC # FLD: NORMAL CELLS/UL
WBC #/AREA URNS HPF: ABNORMAL /HPF

## 2019-01-01 PROCEDURE — A9270 NON-COVERED ITEM OR SERVICE: HCPCS | Performed by: CLINICAL NURSE SPECIALIST

## 2019-01-01 PROCEDURE — 92526 ORAL FUNCTION THERAPY: CPT

## 2019-01-01 PROCEDURE — 700102 HCHG RX REV CODE 250 W/ 637 OVERRIDE(OP): Performed by: HOSPITALIST

## 2019-01-01 PROCEDURE — 93010 ELECTROCARDIOGRAM REPORT: CPT | Performed by: INTERNAL MEDICINE

## 2019-01-01 PROCEDURE — A9576 INJ PROHANCE MULTIPACK: HCPCS | Performed by: FAMILY MEDICINE

## 2019-01-01 PROCEDURE — 99232 SBSQ HOSP IP/OBS MODERATE 35: CPT | Performed by: HOSPITALIST

## 2019-01-01 PROCEDURE — C9254 INJECTION, LACOSAMIDE: HCPCS | Performed by: PSYCHIATRY & NEUROLOGY

## 2019-01-01 PROCEDURE — 83036 HEMOGLOBIN GLYCOSYLATED A1C: CPT

## 2019-01-01 PROCEDURE — 700111 HCHG RX REV CODE 636 W/ 250 OVERRIDE (IP): Performed by: HOSPITALIST

## 2019-01-01 PROCEDURE — A9270 NON-COVERED ITEM OR SERVICE: HCPCS | Mod: JG | Performed by: PHYSICAL MEDICINE & REHABILITATION

## 2019-01-01 PROCEDURE — 700105 HCHG RX REV CODE 258: Performed by: INTERNAL MEDICINE

## 2019-01-01 PROCEDURE — 700105 HCHG RX REV CODE 258: Performed by: PSYCHIATRY & NEUROLOGY

## 2019-01-01 PROCEDURE — 97167 OT EVAL HIGH COMPLEX 60 MIN: CPT

## 2019-01-01 PROCEDURE — G0515 COGNITIVE SKILLS DEVELOPMENT: HCPCS

## 2019-01-01 PROCEDURE — 83735 ASSAY OF MAGNESIUM: CPT

## 2019-01-01 PROCEDURE — A9270 NON-COVERED ITEM OR SERVICE: HCPCS | Performed by: HOSPITALIST

## 2019-01-01 PROCEDURE — 500438 HCHG DRESSING, SPANDAGE #10 12: Performed by: NEUROLOGICAL SURGERY

## 2019-01-01 PROCEDURE — 700102 HCHG RX REV CODE 250 W/ 637 OVERRIDE(OP): Performed by: INTERNAL MEDICINE

## 2019-01-01 PROCEDURE — T2045 HOSPICE GENERAL CARE: HCPCS

## 2019-01-01 PROCEDURE — 82962 GLUCOSE BLOOD TEST: CPT | Mod: 91

## 2019-01-01 PROCEDURE — 770010 HCHG ROOM/CARE - REHAB SEMI PRIVAT*

## 2019-01-01 PROCEDURE — 700111 HCHG RX REV CODE 636 W/ 250 OVERRIDE (IP): Performed by: INTERNAL MEDICINE

## 2019-01-01 PROCEDURE — A9270 NON-COVERED ITEM OR SERVICE: HCPCS | Performed by: INTERNAL MEDICINE

## 2019-01-01 PROCEDURE — G0299 HHS/HOSPICE OF RN EA 15 MIN: HCPCS

## 2019-01-01 PROCEDURE — 99233 SBSQ HOSP IP/OBS HIGH 50: CPT | Performed by: PHYSICAL MEDICINE & REHABILITATION

## 2019-01-01 PROCEDURE — 92523 SPEECH SOUND LANG COMPREHEN: CPT

## 2019-01-01 PROCEDURE — 700112 HCHG RX REV CODE 229: Performed by: NURSE PRACTITIONER

## 2019-01-01 PROCEDURE — 700101 HCHG RX REV CODE 250: Performed by: PHYSICAL MEDICINE & REHABILITATION

## 2019-01-01 PROCEDURE — A9270 NON-COVERED ITEM OR SERVICE: HCPCS | Performed by: NURSE PRACTITIONER

## 2019-01-01 PROCEDURE — 82962 GLUCOSE BLOOD TEST: CPT

## 2019-01-01 PROCEDURE — 97150 GROUP THERAPEUTIC PROCEDURES: CPT

## 2019-01-01 PROCEDURE — 700111 HCHG RX REV CODE 636 W/ 250 OVERRIDE (IP): Performed by: NURSE PRACTITIONER

## 2019-01-01 PROCEDURE — 80048 BASIC METABOLIC PNL TOTAL CA: CPT

## 2019-01-01 PROCEDURE — C9254 INJECTION, LACOSAMIDE: HCPCS | Performed by: INTERNAL MEDICINE

## 2019-01-01 PROCEDURE — 770001 HCHG ROOM/CARE - MED/SURG/GYN PRIV*

## 2019-01-01 PROCEDURE — 97530 THERAPEUTIC ACTIVITIES: CPT

## 2019-01-01 PROCEDURE — 770020 HCHG ROOM/CARE - TELE (206)

## 2019-01-01 PROCEDURE — 80053 COMPREHEN METABOLIC PANEL: CPT

## 2019-01-01 PROCEDURE — 97165 OT EVAL LOW COMPLEX 30 MIN: CPT

## 2019-01-01 PROCEDURE — 770004 HCHG ROOM/CARE - ONCOLOGY PRIVATE *

## 2019-01-01 PROCEDURE — 700102 HCHG RX REV CODE 250 W/ 637 OVERRIDE(OP): Performed by: FAMILY MEDICINE

## 2019-01-01 PROCEDURE — 99231 SBSQ HOSP IP/OBS SF/LOW 25: CPT | Performed by: INTERNAL MEDICINE

## 2019-01-01 PROCEDURE — 74230 X-RAY XM SWLNG FUNCJ C+: CPT

## 2019-01-01 PROCEDURE — 97535 SELF CARE MNGMENT TRAINING: CPT

## 2019-01-01 PROCEDURE — 72157 MRI CHEST SPINE W/O & W/DYE: CPT

## 2019-01-01 PROCEDURE — A9270 NON-COVERED ITEM OR SERVICE: HCPCS | Performed by: FAMILY MEDICINE

## 2019-01-01 PROCEDURE — 700112 HCHG RX REV CODE 229: Performed by: HOSPITALIST

## 2019-01-01 PROCEDURE — 85025 COMPLETE CBC W/AUTO DIFF WBC: CPT

## 2019-01-01 PROCEDURE — 770021 HCHG ROOM/CARE - ISO PRIVATE

## 2019-01-01 PROCEDURE — 97166 OT EVAL MOD COMPLEX 45 MIN: CPT

## 2019-01-01 PROCEDURE — 700102 HCHG RX REV CODE 250 W/ 637 OVERRIDE(OP): Performed by: PHYSICAL MEDICINE & REHABILITATION

## 2019-01-01 PROCEDURE — 700105 HCHG RX REV CODE 258: Performed by: HOSPITALIST

## 2019-01-01 PROCEDURE — 84100 ASSAY OF PHOSPHORUS: CPT

## 2019-01-01 PROCEDURE — 82306 VITAMIN D 25 HYDROXY: CPT

## 2019-01-01 PROCEDURE — A9270 NON-COVERED ITEM OR SERVICE: HCPCS | Performed by: PHYSICAL MEDICINE & REHABILITATION

## 2019-01-01 PROCEDURE — 99233 SBSQ HOSP IP/OBS HIGH 50: CPT | Performed by: FAMILY MEDICINE

## 2019-01-01 PROCEDURE — 99153 MOD SED SAME PHYS/QHP EA: CPT

## 2019-01-01 PROCEDURE — 700111 HCHG RX REV CODE 636 W/ 250 OVERRIDE (IP): Performed by: NEUROLOGICAL SURGERY

## 2019-01-01 PROCEDURE — 97162 PT EVAL MOD COMPLEX 30 MIN: CPT

## 2019-01-01 PROCEDURE — 770006 HCHG ROOM/CARE - MED/SURG/GYN SEMI*

## 2019-01-01 PROCEDURE — 160002 HCHG RECOVERY MINUTES (STAT)

## 2019-01-01 PROCEDURE — 85027 COMPLETE CBC AUTOMATED: CPT

## 2019-01-01 PROCEDURE — 500448 HCHG DRESSING, TELFA 3X4: Performed by: NEUROLOGICAL SURGERY

## 2019-01-01 PROCEDURE — 160035 HCHG PACU - 1ST 60 MINS PHASE I: Performed by: NEUROLOGICAL SURGERY

## 2019-01-01 PROCEDURE — 36415 COLL VENOUS BLD VENIPUNCTURE: CPT

## 2019-01-01 PROCEDURE — 99232 SBSQ HOSP IP/OBS MODERATE 35: CPT | Performed by: INTERNAL MEDICINE

## 2019-01-01 PROCEDURE — 85007 BL SMEAR W/DIFF WBC COUNT: CPT

## 2019-01-01 PROCEDURE — 302146: Performed by: INTERNAL MEDICINE

## 2019-01-01 PROCEDURE — 94760 N-INVAS EAR/PLS OXIMETRY 1: CPT

## 2019-01-01 PROCEDURE — 99239 HOSP IP/OBS DSCHRG MGMT >30: CPT | Performed by: INTERNAL MEDICINE

## 2019-01-01 PROCEDURE — 700101 HCHG RX REV CODE 250: Performed by: EMERGENCY MEDICINE

## 2019-01-01 PROCEDURE — 99153 MOD SED SAME PHYS/QHP EA: CPT | Performed by: HOSPITALIST

## 2019-01-01 PROCEDURE — 93306 TTE W/DOPPLER COMPLETE: CPT | Mod: 26 | Performed by: INTERNAL MEDICINE

## 2019-01-01 PROCEDURE — 00B70ZZ EXCISION OF CEREBRAL HEMISPHERE, OPEN APPROACH: ICD-10-PCS | Performed by: NEUROLOGICAL SURGERY

## 2019-01-01 PROCEDURE — 97110 THERAPEUTIC EXERCISES: CPT

## 2019-01-01 PROCEDURE — 97112 NEUROMUSCULAR REEDUCATION: CPT

## 2019-01-01 PROCEDURE — 700102 HCHG RX REV CODE 250 W/ 637 OVERRIDE(OP): Performed by: CLINICAL NURSE SPECIALIST

## 2019-01-01 PROCEDURE — 700101 HCHG RX REV CODE 250: Performed by: INTERNAL MEDICINE

## 2019-01-01 PROCEDURE — 93005 ELECTROCARDIOGRAM TRACING: CPT | Performed by: HOSPITALIST

## 2019-01-01 PROCEDURE — G0155 HHCP-SVS OF CSW,EA 15 MIN: HCPCS

## 2019-01-01 PROCEDURE — 500075 HCHG BLADE, CLIPPER NEURO: Performed by: NEUROLOGICAL SURGERY

## 2019-01-01 PROCEDURE — 770022 HCHG ROOM/CARE - ICU (200)

## 2019-01-01 PROCEDURE — 99232 SBSQ HOSP IP/OBS MODERATE 35: CPT | Performed by: PHYSICAL MEDICINE & REHABILITATION

## 2019-01-01 PROCEDURE — 84550 ASSAY OF BLOOD/URIC ACID: CPT

## 2019-01-01 PROCEDURE — 500889 HCHG PACK, NEURO: Performed by: NEUROLOGICAL SURGERY

## 2019-01-01 PROCEDURE — 160002 HCHG RECOVERY MINUTES (STAT): Performed by: HOSPITALIST

## 2019-01-01 PROCEDURE — 99231 SBSQ HOSP IP/OBS SF/LOW 25: CPT | Performed by: HOSPITALIST

## 2019-01-01 PROCEDURE — 88264 CHROMOSOME ANALYSIS 20-25: CPT

## 2019-01-01 PROCEDURE — 700102 HCHG RX REV CODE 250 W/ 637 OVERRIDE(OP)

## 2019-01-01 PROCEDURE — 700101 HCHG RX REV CODE 250

## 2019-01-01 PROCEDURE — 96375 TX/PRO/DX INJ NEW DRUG ADDON: CPT

## 2019-01-01 PROCEDURE — 72158 MRI LUMBAR SPINE W/O & W/DYE: CPT

## 2019-01-01 PROCEDURE — 700117 HCHG RX CONTRAST REV CODE 255: Performed by: FAMILY MEDICINE

## 2019-01-01 PROCEDURE — 83615 LACTATE (LD) (LDH) ENZYME: CPT

## 2019-01-01 PROCEDURE — 700105 HCHG RX REV CODE 258: Performed by: NURSE PRACTITIONER

## 2019-01-01 PROCEDURE — 99356 PR PROLONGED SVC I/P OR OBS SETTING 1ST HOUR: CPT | Performed by: PHYSICAL MEDICINE & REHABILITATION

## 2019-01-01 PROCEDURE — 88184 FLOWCYTOMETRY/ TC 1 MARKER: CPT

## 2019-01-01 PROCEDURE — 665036 HSPC NOTICE OF ELECTION NOE

## 2019-01-01 PROCEDURE — 85610 PROTHROMBIN TIME: CPT

## 2019-01-01 PROCEDURE — 700102 HCHG RX REV CODE 250 W/ 637 OVERRIDE(OP): Performed by: NURSE PRACTITIONER

## 2019-01-01 PROCEDURE — 88342 IMHCHEM/IMCYTCHM 1ST ANTB: CPT

## 2019-01-01 PROCEDURE — 70450 CT HEAD/BRAIN W/O DYE: CPT

## 2019-01-01 PROCEDURE — 500445 HCHG HEMOSTAT, SURGICEL 4X8: Performed by: NEUROLOGICAL SURGERY

## 2019-01-01 PROCEDURE — 700102 HCHG RX REV CODE 250 W/ 637 OVERRIDE(OP): Mod: JG | Performed by: PHYSICAL MEDICINE & REHABILITATION

## 2019-01-01 PROCEDURE — 88341 IMHCHEM/IMCYTCHM EA ADD ANTB: CPT

## 2019-01-01 PROCEDURE — 700117 HCHG RX CONTRAST REV CODE 255: Performed by: INTERNAL MEDICINE

## 2019-01-01 PROCEDURE — 80074 ACUTE HEPATITIS PANEL: CPT

## 2019-01-01 PROCEDURE — 88185 FLOWCYTOMETRY/TC ADD-ON: CPT | Mod: 91

## 2019-01-01 PROCEDURE — 0NR00JZ REPLACEMENT OF SKULL WITH SYNTHETIC SUBSTITUTE, OPEN APPROACH: ICD-10-PCS | Performed by: NEUROLOGICAL SURGERY

## 2019-01-01 PROCEDURE — 97116 GAIT TRAINING THERAPY: CPT

## 2019-01-01 PROCEDURE — 96376 TX/PRO/DX INJ SAME DRUG ADON: CPT

## 2019-01-01 PROCEDURE — 86901 BLOOD TYPING SEROLOGIC RH(D): CPT

## 2019-01-01 PROCEDURE — 80053 COMPREHEN METABOLIC PANEL: CPT | Mod: 91

## 2019-01-01 PROCEDURE — 160029 HCHG SURGERY MINUTES - 1ST 30 MINS LEVEL 4: Performed by: NEUROLOGICAL SURGERY

## 2019-01-01 PROCEDURE — 99358 PROLONG SERVICE W/O CONTACT: CPT | Performed by: PHYSICAL MEDICINE & REHABILITATION

## 2019-01-01 PROCEDURE — 99233 SBSQ HOSP IP/OBS HIGH 50: CPT | Performed by: INTERNAL MEDICINE

## 2019-01-01 PROCEDURE — 70553 MRI BRAIN STEM W/O & W/DYE: CPT

## 2019-01-01 PROCEDURE — 700111 HCHG RX REV CODE 636 W/ 250 OVERRIDE (IP): Performed by: PHYSICAL MEDICINE & REHABILITATION

## 2019-01-01 PROCEDURE — 88305 TISSUE EXAM BY PATHOLOGIST: CPT | Mod: 59

## 2019-01-01 PROCEDURE — 74018 RADEX ABDOMEN 1 VIEW: CPT

## 2019-01-01 PROCEDURE — 700105 HCHG RX REV CODE 258: Performed by: EMERGENCY MEDICINE

## 2019-01-01 PROCEDURE — 81003 URINALYSIS AUTO W/O SCOPE: CPT

## 2019-01-01 PROCEDURE — 99291 CRITICAL CARE FIRST HOUR: CPT | Performed by: INTERNAL MEDICINE

## 2019-01-01 PROCEDURE — 86850 RBC ANTIBODY SCREEN: CPT

## 2019-01-01 PROCEDURE — 72170 X-RAY EXAM OF PELVIS: CPT

## 2019-01-01 PROCEDURE — 700111 HCHG RX REV CODE 636 W/ 250 OVERRIDE (IP): Performed by: RADIOLOGY

## 2019-01-01 PROCEDURE — 94002 VENT MGMT INPAT INIT DAY: CPT

## 2019-01-01 PROCEDURE — 81001 URINALYSIS AUTO W/SCOPE: CPT

## 2019-01-01 PROCEDURE — 700101 HCHG RX REV CODE 250: Performed by: FAMILY MEDICINE

## 2019-01-01 PROCEDURE — 700111 HCHG RX REV CODE 636 W/ 250 OVERRIDE (IP): Performed by: PSYCHIATRY & NEUROLOGY

## 2019-01-01 PROCEDURE — 160027 HCHG SURGERY MINUTES - 1ST 30 MINS LEVEL 2: Performed by: HOSPITALIST

## 2019-01-01 PROCEDURE — 4A10X4Z MONITORING OF CENTRAL NERVOUS ELECTRICAL ACTIVITY, EXTERNAL APPROACH: ICD-10-PCS | Performed by: PSYCHIATRY & NEUROLOGY

## 2019-01-01 PROCEDURE — 700112 HCHG RX REV CODE 229: Performed by: PHYSICAL MEDICINE & REHABILITATION

## 2019-01-01 PROCEDURE — 95951 EEG: CPT | Mod: 26,52 | Performed by: PSYCHIATRY & NEUROLOGY

## 2019-01-01 PROCEDURE — 87077 CULTURE AEROBIC IDENTIFY: CPT

## 2019-01-01 PROCEDURE — 700117 HCHG RX CONTRAST REV CODE 255: Performed by: NEUROLOGICAL SURGERY

## 2019-01-01 PROCEDURE — 92610 EVALUATE SWALLOWING FUNCTION: CPT

## 2019-01-01 PROCEDURE — 88305 TISSUE EXAM BY PATHOLOGIST: CPT

## 2019-01-01 PROCEDURE — 99223 1ST HOSP IP/OBS HIGH 75: CPT | Performed by: PHYSICAL MEDICINE & REHABILITATION

## 2019-01-01 PROCEDURE — 88307 TISSUE EXAM BY PATHOLOGIST: CPT | Mod: 59

## 2019-01-01 PROCEDURE — 99232 SBSQ HOSP IP/OBS MODERATE 35: CPT | Performed by: FAMILY MEDICINE

## 2019-01-01 PROCEDURE — 85046 RETICYTE/HGB CONCENTRATE: CPT

## 2019-01-01 PROCEDURE — 99223 1ST HOSP IP/OBS HIGH 75: CPT | Mod: AI | Performed by: HOSPITALIST

## 2019-01-01 PROCEDURE — 700111 HCHG RX REV CODE 636 W/ 250 OVERRIDE (IP): Performed by: CLINICAL NURSE SPECIALIST

## 2019-01-01 PROCEDURE — 71045 X-RAY EXAM CHEST 1 VIEW: CPT

## 2019-01-01 PROCEDURE — 700101 HCHG RX REV CODE 250: Performed by: HOSPITALIST

## 2019-01-01 PROCEDURE — 99291 CRITICAL CARE FIRST HOUR: CPT

## 2019-01-01 PROCEDURE — 500331 HCHG COTTONOID, SURG PATTIE: Performed by: NEUROLOGICAL SURGERY

## 2019-01-01 PROCEDURE — 160048 HCHG OR STATISTICAL LEVEL 1-5: Performed by: NEUROLOGICAL SURGERY

## 2019-01-01 PROCEDURE — 36600 WITHDRAWAL OF ARTERIAL BLOOD: CPT

## 2019-01-01 PROCEDURE — 84484 ASSAY OF TROPONIN QUANT: CPT

## 2019-01-01 PROCEDURE — 700101 HCHG RX REV CODE 250: Performed by: NURSE PRACTITIONER

## 2019-01-01 PROCEDURE — 700102 HCHG RX REV CODE 250 W/ 637 OVERRIDE(OP): Performed by: NEUROLOGICAL SURGERY

## 2019-01-01 PROCEDURE — 160041 HCHG SURGERY MINUTES - EA ADDL 1 MIN LEVEL 4: Performed by: NEUROLOGICAL SURGERY

## 2019-01-01 PROCEDURE — 306578 KIT,PORT ACCESS 20G X 1.5INCH: Performed by: INTERNAL MEDICINE

## 2019-01-01 PROCEDURE — 85049 AUTOMATED PLATELET COUNT: CPT

## 2019-01-01 PROCEDURE — 99223 1ST HOSP IP/OBS HIGH 75: CPT | Mod: AI | Performed by: PHYSICAL MEDICINE & REHABILITATION

## 2019-01-01 PROCEDURE — 99223 1ST HOSP IP/OBS HIGH 75: CPT | Performed by: HOSPITALIST

## 2019-01-01 PROCEDURE — 700111 HCHG RX REV CODE 636 W/ 250 OVERRIDE (IP)

## 2019-01-01 PROCEDURE — A9585 GADOBUTROL INJECTION: HCPCS | Performed by: NEUROLOGICAL SURGERY

## 2019-01-01 PROCEDURE — 84153 ASSAY OF PSA TOTAL: CPT | Mod: GA

## 2019-01-01 PROCEDURE — 0S9D3ZZ DRAINAGE OF LEFT KNEE JOINT, PERCUTANEOUS APPROACH: ICD-10-PCS | Performed by: PHYSICAL MEDICINE & REHABILITATION

## 2019-01-01 PROCEDURE — 72156 MRI NECK SPINE W/O & W/DYE: CPT

## 2019-01-01 PROCEDURE — 88313 SPECIAL STAINS GROUP 2: CPT | Mod: 91

## 2019-01-01 PROCEDURE — 96365 THER/PROPH/DIAG IV INF INIT: CPT

## 2019-01-01 PROCEDURE — 83605 ASSAY OF LACTIC ACID: CPT

## 2019-01-01 PROCEDURE — 700101 HCHG RX REV CODE 250: Performed by: ANESTHESIOLOGY

## 2019-01-01 PROCEDURE — 99233 SBSQ HOSP IP/OBS HIGH 50: CPT | Performed by: HOSPITALIST

## 2019-01-01 PROCEDURE — 88311 DECALCIFY TISSUE: CPT

## 2019-01-01 PROCEDURE — 502240 HCHG MISC OR SUPPLY RC 0272: Performed by: NEUROLOGICAL SURGERY

## 2019-01-01 PROCEDURE — A9270 NON-COVERED ITEM OR SERVICE: HCPCS

## 2019-01-01 PROCEDURE — 84478 ASSAY OF TRIGLYCERIDES: CPT

## 2019-01-01 PROCEDURE — 99239 HOSP IP/OBS DSCHRG MGMT >30: CPT | Performed by: PHYSICAL MEDICINE & REHABILITATION

## 2019-01-01 PROCEDURE — 700101 HCHG RX REV CODE 250: Performed by: NEUROLOGICAL SURGERY

## 2019-01-01 PROCEDURE — 700111 HCHG RX REV CODE 636 W/ 250 OVERRIDE (IP): Performed by: ANESTHESIOLOGY

## 2019-01-01 PROCEDURE — 97163 PT EVAL HIGH COMPLEX 45 MIN: CPT

## 2019-01-01 PROCEDURE — A9576 INJ PROHANCE MULTIPACK: HCPCS | Performed by: INTERNAL MEDICINE

## 2019-01-01 PROCEDURE — 92611 MOTION FLUOROSCOPY/SWALLOW: CPT

## 2019-01-01 PROCEDURE — 700111 HCHG RX REV CODE 636 W/ 250 OVERRIDE (IP): Performed by: EMERGENCY MEDICINE

## 2019-01-01 PROCEDURE — 306580 SET INFUSION,POWERLOC 20G X.75: Performed by: INTERNAL MEDICINE

## 2019-01-01 PROCEDURE — 500440 HCHG DRESSING, STERILE ROLL (KERLIX): Performed by: NEUROLOGICAL SURGERY

## 2019-01-01 PROCEDURE — A6222 GAUZE <=16 IN NO W/SAL W/O B: HCPCS | Performed by: NEUROLOGICAL SURGERY

## 2019-01-01 PROCEDURE — 93005 ELECTROCARDIOGRAM TRACING: CPT | Performed by: NEUROLOGICAL SURGERY

## 2019-01-01 PROCEDURE — 160009 HCHG ANES TIME/MIN: Performed by: NEUROLOGICAL SURGERY

## 2019-01-01 PROCEDURE — 88341 IMHCHEM/IMCYTCHM EA ADD ANTB: CPT | Mod: 91

## 2019-01-01 PROCEDURE — 160048 HCHG OR STATISTICAL LEVEL 1-5: Performed by: HOSPITALIST

## 2019-01-01 PROCEDURE — 92508 TX SP LANG VOICE COMM GROUP: CPT

## 2019-01-01 PROCEDURE — 85730 THROMBOPLASTIN TIME PARTIAL: CPT

## 2019-01-01 PROCEDURE — 0HBRXZZ EXCISION OF TOE NAIL, EXTERNAL APPROACH: ICD-10-PCS | Performed by: PODIATRIST

## 2019-01-01 PROCEDURE — 160002 HCHG RECOVERY MINUTES (STAT): Performed by: NEUROLOGICAL SURGERY

## 2019-01-01 PROCEDURE — 87186 SC STD MICRODIL/AGAR DIL: CPT

## 2019-01-01 PROCEDURE — 99152 MOD SED SAME PHYS/QHP 5/>YRS: CPT | Performed by: HOSPITALIST

## 2019-01-01 PROCEDURE — 99232 SBSQ HOSP IP/OBS MODERATE 35: CPT | Mod: 25 | Performed by: PHYSICAL MEDICINE & REHABILITATION

## 2019-01-01 PROCEDURE — 87205 SMEAR GRAM STAIN: CPT

## 2019-01-01 PROCEDURE — G0475 HIV COMBINATION ASSAY: HCPCS

## 2019-01-01 PROCEDURE — 76870 US EXAM SCROTUM: CPT

## 2019-01-01 PROCEDURE — 88237 TISSUE CULTURE BONE MARROW: CPT | Mod: 91

## 2019-01-01 PROCEDURE — 501445 HCHG STAPLER, SKIN DISP: Performed by: NEUROLOGICAL SURGERY

## 2019-01-01 PROCEDURE — C1725 CATH, TRANSLUMIN NON-LASER: HCPCS | Performed by: NEUROLOGICAL SURGERY

## 2019-01-01 PROCEDURE — 87040 BLOOD CULTURE FOR BACTERIA: CPT

## 2019-01-01 PROCEDURE — 74181 MRI ABDOMEN W/O CONTRAST: CPT

## 2019-01-01 PROCEDURE — A9552 F18 FDG: HCPCS

## 2019-01-01 PROCEDURE — 160038 HCHG SURGERY MINUTES - EA ADDL 1 MIN LEVEL 2: Performed by: HOSPITALIST

## 2019-01-01 PROCEDURE — 20610 DRAIN/INJ JOINT/BURSA W/O US: CPT | Mod: LT | Performed by: PHYSICAL MEDICINE & REHABILITATION

## 2019-01-01 PROCEDURE — 89051 BODY FLUID CELL COUNT: CPT

## 2019-01-01 PROCEDURE — 93306 TTE W/DOPPLER COMPLETE: CPT

## 2019-01-01 PROCEDURE — 88331 PATH CONSLTJ SURG 1 BLK 1SPC: CPT

## 2019-01-01 PROCEDURE — 501838 HCHG SUTURE GENERAL: Performed by: NEUROLOGICAL SURGERY

## 2019-01-01 PROCEDURE — A9585 GADOBUTROL INJECTION: HCPCS | Performed by: INTERNAL MEDICINE

## 2019-01-01 PROCEDURE — 84443 ASSAY THYROID STIM HORMONE: CPT

## 2019-01-01 PROCEDURE — 88360 TUMOR IMMUNOHISTOCHEM/MANUAL: CPT | Mod: 91

## 2019-01-01 PROCEDURE — 99495 TRANSJ CARE MGMT MOD F2F 14D: CPT | Performed by: FAMILY MEDICINE

## 2019-01-01 PROCEDURE — 99233 SBSQ HOSP IP/OBS HIGH 50: CPT | Mod: 25 | Performed by: PSYCHIATRY & NEUROLOGY

## 2019-01-01 PROCEDURE — 302214 INTUBATION BOX: Performed by: EMERGENCY MEDICINE

## 2019-01-01 PROCEDURE — 93005 ELECTROCARDIOGRAM TRACING: CPT | Performed by: EMERGENCY MEDICINE

## 2019-01-01 PROCEDURE — 86900 BLOOD TYPING SEROLOGIC ABO: CPT

## 2019-01-01 PROCEDURE — A9270 NON-COVERED ITEM OR SERVICE: HCPCS | Performed by: NEUROLOGICAL SURGERY

## 2019-01-01 PROCEDURE — 38222 DX BONE MARROW BX & ASPIR: CPT | Performed by: HOSPITALIST

## 2019-01-01 PROCEDURE — C1713 ANCHOR/SCREW BN/BN,TIS/BN: HCPCS | Performed by: NEUROLOGICAL SURGERY

## 2019-01-01 PROCEDURE — 99239 HOSP IP/OBS DSCHRG MGMT >30: CPT | Performed by: HOSPITALIST

## 2019-01-01 PROCEDURE — 88360 TUMOR IMMUNOHISTOCHEM/MANUAL: CPT

## 2019-01-01 PROCEDURE — 99285 EMERGENCY DEPT VISIT HI MDM: CPT

## 2019-01-01 PROCEDURE — 87070 CULTURE OTHR SPECIMN AEROBIC: CPT

## 2019-01-01 PROCEDURE — 36415 COLL VENOUS BLD VENIPUNCTURE: CPT | Mod: GA

## 2019-01-01 PROCEDURE — 5A1935Z RESPIRATORY VENTILATION, LESS THAN 24 CONSECUTIVE HOURS: ICD-10-PCS | Performed by: EMERGENCY MEDICINE

## 2019-01-01 PROCEDURE — 31500 INSERT EMERGENCY AIRWAY: CPT

## 2019-01-01 PROCEDURE — 0BH17EZ INSERTION OF ENDOTRACHEAL AIRWAY INTO TRACHEA, VIA NATURAL OR ARTIFICIAL OPENING: ICD-10-PCS | Performed by: EMERGENCY MEDICINE

## 2019-01-01 PROCEDURE — 99292 CRITICAL CARE ADDL 30 MIN: CPT | Performed by: INTERNAL MEDICINE

## 2019-01-01 PROCEDURE — 95951 EEG: CPT | Mod: 52 | Performed by: PSYCHIATRY & NEUROLOGY

## 2019-01-01 PROCEDURE — 0HDRXZZ EXTRACTION OF TOE NAIL, EXTERNAL APPROACH: ICD-10-PCS | Performed by: PODIATRIST

## 2019-01-01 PROCEDURE — 99233 SBSQ HOSP IP/OBS HIGH 50: CPT | Mod: 25 | Performed by: PHYSICAL MEDICINE & REHABILITATION

## 2019-01-01 PROCEDURE — 99222 1ST HOSP IP/OBS MODERATE 55: CPT | Mod: AI | Performed by: HOSPITALIST

## 2019-01-01 PROCEDURE — 83690 ASSAY OF LIPASE: CPT

## 2019-01-01 PROCEDURE — 82803 BLOOD GASES ANY COMBINATION: CPT

## 2019-01-01 PROCEDURE — 87086 URINE CULTURE/COLONY COUNT: CPT

## 2019-01-01 PROCEDURE — 77012 CT SCAN FOR NEEDLE BIOPSY: CPT

## 2019-01-01 DEVICE — GRAFT DURAMATRIX ONLAY PLUS 3IN X 3IN OR 7.5CM X 7.5CM: Type: IMPLANTABLE DEVICE | Status: FUNCTIONAL

## 2019-01-01 DEVICE — PLATE NC BURR HOLE COVER FOR SHUNT 14MM (6NCX6=36): Type: IMPLANTABLE DEVICE | Status: FUNCTIONAL

## 2019-01-01 DEVICE — PLATE NC BURR HOLE COVER 10MM (6NCX6=36): Type: IMPLANTABLE DEVICE | Status: FUNCTIONAL

## 2019-01-01 DEVICE — SCREW STRYK NC 1.5X5MM (6NCX40=240) (80EA/PK) CONSIGNED QTY 240 PRE-LOAD: Type: IMPLANTABLE DEVICE | Status: FUNCTIONAL

## 2019-01-01 RX ORDER — ONDANSETRON 4 MG/1
4 TABLET, ORALLY DISINTEGRATING ORAL EVERY 6 HOURS PRN
Status: DISCONTINUED | OUTPATIENT
Start: 2019-01-01 | End: 2019-11-27 | Stop reason: HOSPADM

## 2019-01-01 RX ORDER — AMOXICILLIN 250 MG
2 CAPSULE ORAL 2 TIMES DAILY PRN
Status: DISCONTINUED | OUTPATIENT
Start: 2019-01-01 | End: 2019-01-01 | Stop reason: HOSPADM

## 2019-01-01 RX ORDER — ACETAMINOPHEN 500 MG
1000 TABLET ORAL EVERY 6 HOURS PRN
Status: ON HOLD | COMMUNITY
End: 2019-01-01

## 2019-01-01 RX ORDER — ONDANSETRON 2 MG/ML
4 INJECTION INTRAMUSCULAR; INTRAVENOUS EVERY 4 HOURS PRN
Status: DISCONTINUED | OUTPATIENT
Start: 2019-01-01 | End: 2019-01-01 | Stop reason: HOSPADM

## 2019-01-01 RX ORDER — DABIGATRAN ETEXILATE 150 MG/1
150 CAPSULE ORAL
Status: DISCONTINUED | OUTPATIENT
Start: 2019-01-01 | End: 2019-01-01

## 2019-01-01 RX ORDER — DEXAMETHASONE 4 MG/1
4 TABLET ORAL EVERY 8 HOURS
Status: DISCONTINUED | OUTPATIENT
Start: 2019-01-01 | End: 2019-01-01

## 2019-01-01 RX ORDER — POLYETHYLENE GLYCOL 3350 17 G/17G
1 POWDER, FOR SOLUTION ORAL 2 TIMES DAILY PRN
Status: DISCONTINUED | OUTPATIENT
Start: 2019-01-01 | End: 2019-01-01 | Stop reason: HOSPADM

## 2019-01-01 RX ORDER — BISACODYL 10 MG
10 SUPPOSITORY, RECTAL RECTAL
Status: DISCONTINUED | OUTPATIENT
Start: 2019-01-01 | End: 2019-01-01 | Stop reason: HOSPADM

## 2019-01-01 RX ORDER — DOCUSATE SODIUM 100 MG/1
100 CAPSULE, LIQUID FILLED ORAL 2 TIMES DAILY
Status: ON HOLD | COMMUNITY
End: 2019-01-01

## 2019-01-01 RX ORDER — VINCRISTINE SULFATE 1 MG/ML
2 INJECTION, SOLUTION INTRAVENOUS ONCE
Status: ON HOLD | COMMUNITY
End: 2019-01-01

## 2019-01-01 RX ORDER — POLYVINYL ALCOHOL 14 MG/ML
2 SOLUTION/ DROPS OPHTHALMIC PRN
Status: DISCONTINUED | OUTPATIENT
Start: 2019-01-01 | End: 2019-11-27 | Stop reason: HOSPADM

## 2019-01-01 RX ORDER — DILTIAZEM HYDROCHLORIDE 180 MG/1
360 CAPSULE, COATED, EXTENDED RELEASE ORAL EVERY EVENING
Status: CANCELLED | OUTPATIENT
Start: 2019-01-01

## 2019-01-01 RX ORDER — POLYETHYLENE GLYCOL 3350 17 G/17G
1 POWDER, FOR SOLUTION ORAL
Status: DISCONTINUED | OUTPATIENT
Start: 2019-01-01 | End: 2019-01-01

## 2019-01-01 RX ORDER — DEXTROSE MONOHYDRATE 25 G/50ML
50 INJECTION, SOLUTION INTRAVENOUS
Status: DISCONTINUED | OUTPATIENT
Start: 2019-01-01 | End: 2019-01-01

## 2019-01-01 RX ORDER — LEVETIRACETAM 500 MG/1
500 TABLET ORAL 2 TIMES DAILY
Qty: 4 TAB | Refills: 0 | Status: ON HOLD
Start: 2019-01-01 | End: 2019-01-01

## 2019-01-01 RX ORDER — DABIGATRAN ETEXILATE 150 MG/1
150 CAPSULE ORAL 2 TIMES DAILY
Status: DISCONTINUED | OUTPATIENT
Start: 2019-01-01 | End: 2019-01-01 | Stop reason: HOSPADM

## 2019-01-01 RX ORDER — ONDANSETRON 2 MG/ML
4 INJECTION INTRAMUSCULAR; INTRAVENOUS 4 TIMES DAILY PRN
Status: DISCONTINUED | OUTPATIENT
Start: 2019-01-01 | End: 2019-01-01 | Stop reason: HOSPADM

## 2019-01-01 RX ORDER — LORAZEPAM 2 MG/ML
1 INJECTION INTRAMUSCULAR ONCE
Status: COMPLETED | OUTPATIENT
Start: 2019-01-01 | End: 2019-01-01

## 2019-01-01 RX ORDER — ALLOPURINOL 300 MG/1
300 TABLET ORAL DAILY
Status: CANCELLED | OUTPATIENT
Start: 2019-01-01

## 2019-01-01 RX ORDER — LEVETIRACETAM 500 MG/1
500 TABLET ORAL 2 TIMES DAILY
Status: DISCONTINUED | OUTPATIENT
Start: 2019-01-01 | End: 2019-01-01 | Stop reason: HOSPADM

## 2019-01-01 RX ORDER — DEXAMETHASONE 4 MG/1
4 TABLET ORAL EVERY 12 HOURS
Status: DISCONTINUED | OUTPATIENT
Start: 2019-01-01 | End: 2019-01-01 | Stop reason: HOSPADM

## 2019-01-01 RX ORDER — ONDANSETRON 4 MG/1
4 TABLET, ORALLY DISINTEGRATING ORAL EVERY 4 HOURS PRN
Status: DISCONTINUED | OUTPATIENT
Start: 2019-01-01 | End: 2019-01-01 | Stop reason: HOSPADM

## 2019-01-01 RX ORDER — BACITRACIN 50000 [IU]/1
INJECTION, POWDER, FOR SOLUTION INTRAMUSCULAR
Status: DISCONTINUED | OUTPATIENT
Start: 2019-01-01 | End: 2019-01-01 | Stop reason: HOSPADM

## 2019-01-01 RX ORDER — M-VIT,TX,IRON,MINS/CALC/FOLIC 27MG-0.4MG
1 TABLET ORAL DAILY
Status: CANCELLED | OUTPATIENT
Start: 2019-01-01

## 2019-01-01 RX ORDER — DEXAMETHASONE SODIUM PHOSPHATE 4 MG/ML
6 INJECTION, SOLUTION INTRA-ARTICULAR; INTRALESIONAL; INTRAMUSCULAR; INTRAVENOUS; SOFT TISSUE 3 TIMES DAILY
Status: DISCONTINUED | OUTPATIENT
Start: 2019-01-01 | End: 2019-01-01

## 2019-01-01 RX ORDER — OXYBUTYNIN CHLORIDE 5 MG/1
10 TABLET, EXTENDED RELEASE ORAL EVERY EVENING
Status: DISCONTINUED | OUTPATIENT
Start: 2019-01-01 | End: 2019-01-01 | Stop reason: HOSPADM

## 2019-01-01 RX ORDER — ROCURONIUM BROMIDE 10 MG/ML
100 INJECTION, SOLUTION INTRAVENOUS ONCE
Status: COMPLETED | OUTPATIENT
Start: 2019-01-01 | End: 2019-01-01

## 2019-01-01 RX ORDER — MORPHINE SULFATE 10 MG/ML
10 INJECTION, SOLUTION INTRAMUSCULAR; INTRAVENOUS
Status: DISCONTINUED | OUTPATIENT
Start: 2019-01-01 | End: 2019-01-01 | Stop reason: HOSPADM

## 2019-01-01 RX ORDER — KETOROLAC TROMETHAMINE 30 MG/ML
15 INJECTION, SOLUTION INTRAMUSCULAR; INTRAVENOUS EVERY 8 HOURS PRN
Status: DISPENSED | OUTPATIENT
Start: 2019-01-01 | End: 2019-01-01

## 2019-01-01 RX ORDER — FAMOTIDINE 20 MG/1
20 TABLET, FILM COATED ORAL EVERY 12 HOURS
Status: DISCONTINUED | OUTPATIENT
Start: 2019-01-01 | End: 2019-01-01

## 2019-01-01 RX ORDER — LEVETIRACETAM 250 MG/1
500 TABLET ORAL 2 TIMES DAILY
Status: COMPLETED | OUTPATIENT
Start: 2019-01-01 | End: 2019-01-01

## 2019-01-01 RX ORDER — LORATADINE 10 MG/1
10 TABLET ORAL DAILY
Status: DISCONTINUED | OUTPATIENT
Start: 2019-01-01 | End: 2019-01-01

## 2019-01-01 RX ORDER — ACETAMINOPHEN 160 MG/5ML
325 SUSPENSION ORAL EVERY 4 HOURS PRN
Status: DISCONTINUED | OUTPATIENT
Start: 2019-01-01 | End: 2019-01-01 | Stop reason: HOSPADM

## 2019-01-01 RX ORDER — DEXAMETHASONE 4 MG/1
4 TABLET ORAL EVERY 8 HOURS
Status: CANCELLED | OUTPATIENT
Start: 2019-01-01 | End: 2019-01-01

## 2019-01-01 RX ORDER — SODIUM CHLORIDE 9 MG/ML
500 INJECTION, SOLUTION INTRAVENOUS
Status: ACTIVE | OUTPATIENT
Start: 2019-01-01 | End: 2019-01-01

## 2019-01-01 RX ORDER — ONDANSETRON 4 MG/1
4 TABLET, ORALLY DISINTEGRATING ORAL 4 TIMES DAILY PRN
Status: DISCONTINUED | OUTPATIENT
Start: 2019-01-01 | End: 2019-01-01 | Stop reason: HOSPADM

## 2019-01-01 RX ORDER — SODIUM CHLORIDE AND POTASSIUM CHLORIDE 150; 900 MG/100ML; MG/100ML
INJECTION, SOLUTION INTRAVENOUS CONTINUOUS
Status: DISCONTINUED | OUTPATIENT
Start: 2019-01-01 | End: 2019-01-01

## 2019-01-01 RX ORDER — INSULIN GLARGINE 100 [IU]/ML
10 INJECTION, SOLUTION SUBCUTANEOUS EVERY EVENING
Status: DISCONTINUED | OUTPATIENT
Start: 2019-01-01 | End: 2019-01-01

## 2019-01-01 RX ORDER — OXYCODONE HCL 5 MG/5 ML
10 SOLUTION, ORAL ORAL
Status: DISCONTINUED | OUTPATIENT
Start: 2019-01-01 | End: 2019-01-01 | Stop reason: HOSPADM

## 2019-01-01 RX ORDER — FERROUS SULFATE 325(65) MG
325 TABLET ORAL DAILY
Status: ON HOLD | COMMUNITY
End: 2019-01-01

## 2019-01-01 RX ORDER — DEXAMETHASONE 1 MG
1 TABLET ORAL EVERY 12 HOURS
Status: CANCELLED | OUTPATIENT
Start: 2019-01-01 | End: 2019-01-01

## 2019-01-01 RX ORDER — DOCUSATE SODIUM 100 MG/1
100 CAPSULE, LIQUID FILLED ORAL 2 TIMES DAILY
Status: CANCELLED | OUTPATIENT
Start: 2019-01-01

## 2019-01-01 RX ORDER — BACLOFEN 10 MG/1
10 TABLET ORAL 3 TIMES DAILY
Status: DISCONTINUED | OUTPATIENT
Start: 2019-01-01 | End: 2019-01-01

## 2019-01-01 RX ORDER — ENEMA 19; 7 G/133ML; G/133ML
1 ENEMA RECTAL
Status: DISCONTINUED | OUTPATIENT
Start: 2019-01-01 | End: 2019-01-01 | Stop reason: HOSPADM

## 2019-01-01 RX ORDER — LACOSAMIDE 100 MG/1
100 TABLET ORAL 2 TIMES DAILY
Status: DISCONTINUED | OUTPATIENT
Start: 2019-01-01 | End: 2019-01-01

## 2019-01-01 RX ORDER — DEXAMETHASONE 4 MG/1
4 TABLET ORAL EVERY 12 HOURS
Status: CANCELLED | OUTPATIENT
Start: 2019-01-01 | End: 2019-01-01

## 2019-01-01 RX ORDER — BISACODYL 10 MG
10 SUPPOSITORY, RECTAL RECTAL
Status: DISCONTINUED | OUTPATIENT
Start: 2019-01-01 | End: 2019-01-01

## 2019-01-01 RX ORDER — INSULIN GLARGINE 100 [IU]/ML
10 INJECTION, SOLUTION SUBCUTANEOUS
Status: DISCONTINUED | OUTPATIENT
Start: 2019-01-01 | End: 2019-01-01

## 2019-01-01 RX ORDER — OXYBUTYNIN CHLORIDE 10 MG/1
10 TABLET, EXTENDED RELEASE ORAL EVERY EVENING
Qty: 30 TAB | Refills: 2 | Status: ON HOLD | OUTPATIENT
Start: 2019-01-01 | End: 2019-01-01

## 2019-01-01 RX ORDER — DEXAMETHASONE SODIUM PHOSPHATE 4 MG/ML
4 INJECTION, SOLUTION INTRA-ARTICULAR; INTRALESIONAL; INTRAMUSCULAR; INTRAVENOUS; SOFT TISSUE EVERY 6 HOURS
Status: DISCONTINUED | OUTPATIENT
Start: 2019-01-01 | End: 2019-01-01

## 2019-01-01 RX ORDER — NALOXONE HYDROCHLORIDE 0.4 MG/ML
INJECTION, SOLUTION INTRAMUSCULAR; INTRAVENOUS; SUBCUTANEOUS
Status: COMPLETED
Start: 2019-01-01 | End: 2019-01-01

## 2019-01-01 RX ORDER — LEVETIRACETAM 500 MG/1
500 TABLET ORAL EVERY 12 HOURS
Status: DISCONTINUED | OUTPATIENT
Start: 2019-01-01 | End: 2019-01-01

## 2019-01-01 RX ORDER — DIPHENHYDRAMINE HYDROCHLORIDE 50 MG/ML
25 INJECTION INTRAMUSCULAR; INTRAVENOUS EVERY 6 HOURS PRN
Status: DISCONTINUED | OUTPATIENT
Start: 2019-01-01 | End: 2019-01-01 | Stop reason: HOSPADM

## 2019-01-01 RX ORDER — INSULIN GLARGINE 100 [IU]/ML
20 INJECTION, SOLUTION SUBCUTANEOUS ONCE
Status: COMPLETED | OUTPATIENT
Start: 2019-01-01 | End: 2019-01-01

## 2019-01-01 RX ORDER — ECHINACEA PURPUREA EXTRACT 125 MG
2 TABLET ORAL PRN
Status: DISCONTINUED | OUTPATIENT
Start: 2019-01-01 | End: 2019-01-01 | Stop reason: HOSPADM

## 2019-01-01 RX ORDER — SCOLOPAMINE TRANSDERMAL SYSTEM 1 MG/1
1 PATCH, EXTENDED RELEASE TRANSDERMAL
Status: DISCONTINUED | OUTPATIENT
Start: 2019-01-01 | End: 2019-01-01 | Stop reason: HOSPADM

## 2019-01-01 RX ORDER — ONDANSETRON 2 MG/ML
4 INJECTION INTRAMUSCULAR; INTRAVENOUS EVERY 8 HOURS PRN
Status: CANCELLED | OUTPATIENT
Start: 2019-01-01 | End: 2019-01-01

## 2019-01-01 RX ORDER — DEXAMETHASONE 1 MG
1 TABLET ORAL EVERY 12 HOURS
Qty: 2 TAB | Refills: 0 | Status: ON HOLD
Start: 2019-01-01 | End: 2019-01-01

## 2019-01-01 RX ORDER — LORAZEPAM 2 MG/ML
0.5 INJECTION INTRAMUSCULAR
Status: DISCONTINUED | OUTPATIENT
Start: 2019-01-01 | End: 2019-01-01

## 2019-01-01 RX ORDER — OXYCODONE HYDROCHLORIDE 5 MG/1
5 TABLET ORAL
Status: CANCELLED | OUTPATIENT
Start: 2019-01-01 | End: 2019-01-01

## 2019-01-01 RX ORDER — ACETAMINOPHEN 325 MG/1
650 TABLET ORAL EVERY 4 HOURS PRN
Status: DISCONTINUED | OUTPATIENT
Start: 2019-01-01 | End: 2019-01-01 | Stop reason: HOSPADM

## 2019-01-01 RX ORDER — BUPIVACAINE HYDROCHLORIDE AND EPINEPHRINE 5; 5 MG/ML; UG/ML
INJECTION, SOLUTION EPIDURAL; INTRACAUDAL; PERINEURAL
Status: DISCONTINUED | OUTPATIENT
Start: 2019-01-01 | End: 2019-01-01 | Stop reason: HOSPADM

## 2019-01-01 RX ORDER — OMEPRAZOLE 20 MG/1
20 CAPSULE, DELAYED RELEASE ORAL DAILY
Status: DISCONTINUED | OUTPATIENT
Start: 2019-01-01 | End: 2019-01-01

## 2019-01-01 RX ORDER — OXYCODONE HYDROCHLORIDE 10 MG/1
10 TABLET ORAL
Status: DISCONTINUED | OUTPATIENT
Start: 2019-01-01 | End: 2019-01-01 | Stop reason: HOSPADM

## 2019-01-01 RX ORDER — ONDANSETRON 2 MG/ML
4 INJECTION INTRAMUSCULAR; INTRAVENOUS
Status: COMPLETED | OUTPATIENT
Start: 2019-01-01 | End: 2019-01-01

## 2019-01-01 RX ORDER — DEXAMETHASONE SODIUM PHOSPHATE 10 MG/ML
10 INJECTION INTRAMUSCULAR; INTRAVENOUS ONCE
Status: ON HOLD | COMMUNITY
End: 2019-01-01

## 2019-01-01 RX ORDER — SODIUM CHLORIDE 9 MG/ML
500 INJECTION, SOLUTION INTRAVENOUS
Status: DISCONTINUED | OUTPATIENT
Start: 2019-01-01 | End: 2019-01-01 | Stop reason: HOSPADM

## 2019-01-01 RX ORDER — ATORVASTATIN CALCIUM 20 MG/1
20 TABLET, FILM COATED ORAL
Qty: 30 TAB | Refills: 2 | Status: ON HOLD | OUTPATIENT
Start: 2019-01-01 | End: 2019-01-01

## 2019-01-01 RX ORDER — LIDOCAINE AND PRILOCAINE 25; 25 MG/G; MG/G
CREAM TOPICAL ONCE
Status: CANCELLED | OUTPATIENT
Start: 2019-01-01 | End: 2019-01-01

## 2019-01-01 RX ORDER — MEPERIDINE HYDROCHLORIDE 25 MG/ML
12.5 INJECTION INTRAMUSCULAR; INTRAVENOUS; SUBCUTANEOUS
Status: DISCONTINUED | OUTPATIENT
Start: 2019-01-01 | End: 2019-01-01 | Stop reason: HOSPADM

## 2019-01-01 RX ORDER — SODIUM CHLORIDE, SODIUM LACTATE, POTASSIUM CHLORIDE, CALCIUM CHLORIDE 600; 310; 30; 20 MG/100ML; MG/100ML; MG/100ML; MG/100ML
INJECTION, SOLUTION INTRAVENOUS CONTINUOUS
Status: DISCONTINUED | OUTPATIENT
Start: 2019-01-01 | End: 2019-01-01 | Stop reason: HOSPADM

## 2019-01-01 RX ORDER — LABETALOL HYDROCHLORIDE 5 MG/ML
10 INJECTION, SOLUTION INTRAVENOUS
Status: DISCONTINUED | OUTPATIENT
Start: 2019-01-01 | End: 2019-01-01

## 2019-01-01 RX ORDER — METFORMIN HYDROCHLORIDE 500 MG/1
500 TABLET, EXTENDED RELEASE ORAL DAILY
Qty: 30 TAB | Refills: 2 | Status: SHIPPED | OUTPATIENT
Start: 2019-01-01

## 2019-01-01 RX ORDER — ONDANSETRON 2 MG/ML
4 INJECTION INTRAMUSCULAR; INTRAVENOUS PRN
Status: DISCONTINUED | OUTPATIENT
Start: 2019-01-01 | End: 2019-01-01 | Stop reason: HOSPADM

## 2019-01-01 RX ORDER — BACLOFEN 10 MG/1
5 TABLET ORAL 3 TIMES DAILY
Status: DISCONTINUED | OUTPATIENT
Start: 2019-01-01 | End: 2019-01-01

## 2019-01-01 RX ORDER — OXYCODONE HYDROCHLORIDE 5 MG/1
5 TABLET ORAL
Status: DISCONTINUED | OUTPATIENT
Start: 2019-01-01 | End: 2019-01-01 | Stop reason: HOSPADM

## 2019-01-01 RX ORDER — ALLOPURINOL 300 MG/1
300 TABLET ORAL DAILY
Qty: 30 TAB | Refills: 2 | Status: SHIPPED | OUTPATIENT
Start: 2019-01-01

## 2019-01-01 RX ORDER — ACETAMINOPHEN 325 MG/1
650 TABLET ORAL EVERY 6 HOURS PRN
Status: DISCONTINUED | OUTPATIENT
Start: 2019-01-01 | End: 2019-01-01 | Stop reason: HOSPADM

## 2019-01-01 RX ORDER — ALLOPURINOL 300 MG/1
300 TABLET ORAL DAILY
Status: ON HOLD | COMMUNITY
End: 2019-01-01 | Stop reason: SDUPTHER

## 2019-01-01 RX ORDER — DEXAMETHASONE 1 MG
1 TABLET ORAL EVERY 12 HOURS
Status: COMPLETED | OUTPATIENT
Start: 2019-01-01 | End: 2019-01-01

## 2019-01-01 RX ORDER — AMOXICILLIN 250 MG
1 CAPSULE ORAL
Status: DISCONTINUED | OUTPATIENT
Start: 2019-01-01 | End: 2019-01-01 | Stop reason: HOSPADM

## 2019-01-01 RX ORDER — GADOBUTROL 604.72 MG/ML
9 INJECTION INTRAVENOUS ONCE
Status: COMPLETED | OUTPATIENT
Start: 2019-01-01 | End: 2019-01-01

## 2019-01-01 RX ORDER — OMEPRAZOLE 20 MG/1
40 CAPSULE, DELAYED RELEASE ORAL DAILY
Status: CANCELLED | OUTPATIENT
Start: 2019-01-01

## 2019-01-01 RX ORDER — POTASSIUM CHLORIDE 20 MEQ/1
TABLET, EXTENDED RELEASE ORAL
Status: COMPLETED
Start: 2019-01-01 | End: 2019-01-01

## 2019-01-01 RX ORDER — SODIUM CHLORIDE, SODIUM LACTATE, POTASSIUM CHLORIDE, CALCIUM CHLORIDE 600; 310; 30; 20 MG/100ML; MG/100ML; MG/100ML; MG/100ML
INJECTION, SOLUTION INTRAVENOUS CONTINUOUS
Status: DISCONTINUED | OUTPATIENT
Start: 2019-01-01 | End: 2019-01-01

## 2019-01-01 RX ORDER — DILTIAZEM HYDROCHLORIDE 5 MG/ML
10 INJECTION INTRAVENOUS ONCE
Status: COMPLETED | OUTPATIENT
Start: 2019-01-01 | End: 2019-01-01

## 2019-01-01 RX ORDER — IPRATROPIUM BROMIDE AND ALBUTEROL SULFATE 2.5; .5 MG/3ML; MG/3ML
3 SOLUTION RESPIRATORY (INHALATION)
Status: DISCONTINUED | OUTPATIENT
Start: 2019-01-01 | End: 2019-01-01

## 2019-01-01 RX ORDER — DEXAMETHASONE 1 MG
2 TABLET ORAL EVERY 12 HOURS
Status: DISCONTINUED | OUTPATIENT
Start: 2019-01-01 | End: 2019-01-01 | Stop reason: HOSPADM

## 2019-01-01 RX ORDER — PREDNISONE 10 MG/1
10 TABLET ORAL DAILY
Status: DISCONTINUED | OUTPATIENT
Start: 2019-01-01 | End: 2019-01-01 | Stop reason: HOSPADM

## 2019-01-01 RX ORDER — OXYCODONE HCL 5 MG/5 ML
5 SOLUTION, ORAL ORAL
Status: DISCONTINUED | OUTPATIENT
Start: 2019-01-01 | End: 2019-01-01 | Stop reason: HOSPADM

## 2019-01-01 RX ORDER — LORATADINE 10 MG/1
10 TABLET ORAL DAILY
Status: CANCELLED | OUTPATIENT
Start: 2019-01-01

## 2019-01-01 RX ORDER — SODIUM CHLORIDE, SODIUM LACTATE, POTASSIUM CHLORIDE, CALCIUM CHLORIDE 600; 310; 30; 20 MG/100ML; MG/100ML; MG/100ML; MG/100ML
1000 INJECTION, SOLUTION INTRAVENOUS ONCE
Status: COMPLETED | OUTPATIENT
Start: 2019-01-01 | End: 2019-01-01

## 2019-01-01 RX ORDER — MIDAZOLAM HYDROCHLORIDE 1 MG/ML
1 INJECTION INTRAMUSCULAR; INTRAVENOUS
Status: DISCONTINUED | OUTPATIENT
Start: 2019-01-01 | End: 2019-01-01 | Stop reason: HOSPADM

## 2019-01-01 RX ORDER — METOPROLOL SUCCINATE 25 MG/1
25 TABLET, EXTENDED RELEASE ORAL DAILY
Qty: 30 TAB | Refills: 0 | Status: ON HOLD | OUTPATIENT
Start: 2019-01-01 | End: 2019-01-01

## 2019-01-01 RX ORDER — ALUMINA, MAGNESIA, AND SIMETHICONE 2400; 2400; 240 MG/30ML; MG/30ML; MG/30ML
20 SUSPENSION ORAL
Status: DISCONTINUED | OUTPATIENT
Start: 2019-01-01 | End: 2019-01-01 | Stop reason: HOSPADM

## 2019-01-01 RX ORDER — POTASSIUM CHLORIDE 7.45 MG/ML
10 INJECTION INTRAVENOUS
Status: COMPLETED | OUTPATIENT
Start: 2019-01-01 | End: 2019-01-01

## 2019-01-01 RX ORDER — GADOBUTROL 604.72 MG/ML
10 INJECTION INTRAVENOUS ONCE
Status: COMPLETED | OUTPATIENT
Start: 2019-01-01 | End: 2019-01-01

## 2019-01-01 RX ORDER — POLYETHYLENE GLYCOL 3350 17 G/17G
1 POWDER, FOR SOLUTION ORAL
Status: DISCONTINUED | OUTPATIENT
Start: 2019-01-01 | End: 2019-01-01 | Stop reason: HOSPADM

## 2019-01-01 RX ORDER — INSULIN GLARGINE 100 [IU]/ML
12 INJECTION, SOLUTION SUBCUTANEOUS EVERY EVENING
Status: DISCONTINUED | OUTPATIENT
Start: 2019-01-01 | End: 2019-01-01

## 2019-01-01 RX ORDER — DEXTROSE MONOHYDRATE 25 G/50ML
50 INJECTION, SOLUTION INTRAVENOUS
Status: DISCONTINUED | OUTPATIENT
Start: 2019-01-01 | End: 2019-01-01 | Stop reason: HOSPADM

## 2019-01-01 RX ORDER — PREDNISONE 50 MG/1
100 TABLET ORAL ONCE
Status: ON HOLD | COMMUNITY
End: 2019-01-01

## 2019-01-01 RX ORDER — BACLOFEN 5 MG/1
1 TABLET ORAL 3 TIMES DAILY PRN
Refills: 0 | Status: ON HOLD | COMMUNITY
Start: 2019-01-01 | End: 2019-01-01

## 2019-01-01 RX ORDER — POLYVINYL ALCOHOL 14 MG/ML
1 SOLUTION/ DROPS OPHTHALMIC PRN
Status: DISCONTINUED | OUTPATIENT
Start: 2019-01-01 | End: 2019-01-01 | Stop reason: HOSPADM

## 2019-01-01 RX ORDER — LIDOCAINE 50 MG/G
1 PATCH TOPICAL EVERY 24 HOURS
Status: DISCONTINUED | OUTPATIENT
Start: 2019-01-01 | End: 2019-01-01 | Stop reason: HOSPADM

## 2019-01-01 RX ORDER — TRAZODONE HYDROCHLORIDE 50 MG/1
50 TABLET ORAL
Status: DISCONTINUED | OUTPATIENT
Start: 2019-01-01 | End: 2019-01-01 | Stop reason: HOSPADM

## 2019-01-01 RX ORDER — LISINOPRIL 5 MG/1
5 TABLET ORAL
Status: DISCONTINUED | OUTPATIENT
Start: 2019-01-01 | End: 2019-01-01 | Stop reason: HOSPADM

## 2019-01-01 RX ORDER — SCOLOPAMINE TRANSDERMAL SYSTEM 1 MG/1
1 PATCH, EXTENDED RELEASE TRANSDERMAL
Status: DISCONTINUED | OUTPATIENT
Start: 2019-01-01 | End: 2019-11-27 | Stop reason: HOSPADM

## 2019-01-01 RX ORDER — MORPHINE SULFATE 4 MG/ML
4 INJECTION, SOLUTION INTRAMUSCULAR; INTRAVENOUS
Status: CANCELLED | OUTPATIENT
Start: 2019-01-01 | End: 2019-01-01

## 2019-01-01 RX ORDER — MIDAZOLAM HYDROCHLORIDE 1 MG/ML
.5-2 INJECTION INTRAMUSCULAR; INTRAVENOUS PRN
Status: DISCONTINUED | OUTPATIENT
Start: 2019-01-01 | End: 2019-01-01 | Stop reason: HOSPADM

## 2019-01-01 RX ORDER — HYDRALAZINE HYDROCHLORIDE 20 MG/ML
10 INJECTION INTRAMUSCULAR; INTRAVENOUS
Status: DISCONTINUED | OUTPATIENT
Start: 2019-01-01 | End: 2019-01-01 | Stop reason: HOSPADM

## 2019-01-01 RX ORDER — INSULIN GLARGINE 100 [IU]/ML
10 INJECTION, SOLUTION SUBCUTANEOUS EVERY EVENING
Status: DISCONTINUED | OUTPATIENT
Start: 2019-01-01 | End: 2019-01-01 | Stop reason: HOSPADM

## 2019-01-01 RX ORDER — HALOPERIDOL 5 MG/ML
1 INJECTION INTRAMUSCULAR
Status: DISCONTINUED | OUTPATIENT
Start: 2019-01-01 | End: 2019-01-01 | Stop reason: HOSPADM

## 2019-01-01 RX ORDER — LORAZEPAM 2 MG/ML
4 INJECTION INTRAMUSCULAR
Status: DISCONTINUED | OUTPATIENT
Start: 2019-01-01 | End: 2019-01-01

## 2019-01-01 RX ORDER — MIDAZOLAM HYDROCHLORIDE 1 MG/ML
INJECTION INTRAMUSCULAR; INTRAVENOUS
Status: COMPLETED
Start: 2019-01-01 | End: 2019-01-01

## 2019-01-01 RX ORDER — OMEPRAZOLE 40 MG/1
40 CAPSULE, DELAYED RELEASE ORAL DAILY
Status: ON HOLD | COMMUNITY
End: 2019-01-01

## 2019-01-01 RX ORDER — LEVETIRACETAM 500 MG/1
500 TABLET ORAL 2 TIMES DAILY
Status: COMPLETED | OUTPATIENT
Start: 2019-01-01 | End: 2019-01-01

## 2019-01-01 RX ORDER — PREDNISONE 5 MG/1
10 TABLET ORAL DAILY
Status: COMPLETED | OUTPATIENT
Start: 2019-01-01 | End: 2019-01-01

## 2019-01-01 RX ORDER — LEVETIRACETAM 500 MG/1
500 TABLET ORAL 2 TIMES DAILY
Status: CANCELLED | OUTPATIENT
Start: 2019-01-01 | End: 2019-01-01

## 2019-01-01 RX ORDER — MANNITOL 20 G/100ML
INJECTION, SOLUTION INTRAVENOUS PRN
Status: DISCONTINUED | OUTPATIENT
Start: 2019-01-01 | End: 2019-01-01 | Stop reason: SURG

## 2019-01-01 RX ORDER — DEXAMETHASONE 4 MG/1
4 TABLET ORAL EVERY 8 HOURS
Status: COMPLETED | OUTPATIENT
Start: 2019-01-01 | End: 2019-01-01

## 2019-01-01 RX ORDER — LIDOCAINE HYDROCHLORIDE AND EPINEPHRINE 10; 10 MG/ML; UG/ML
INJECTION, SOLUTION INFILTRATION; PERINEURAL
Status: COMPLETED
Start: 2019-01-01 | End: 2019-01-01

## 2019-01-01 RX ORDER — ONDANSETRON 2 MG/ML
INJECTION INTRAMUSCULAR; INTRAVENOUS PRN
Status: DISCONTINUED | OUTPATIENT
Start: 2019-01-01 | End: 2019-01-01 | Stop reason: SURG

## 2019-01-01 RX ORDER — FERROUS SULFATE 325(65) MG
325 TABLET ORAL DAILY
Status: DISCONTINUED | OUTPATIENT
Start: 2019-01-01 | End: 2019-01-01

## 2019-01-01 RX ORDER — ETOMIDATE 2 MG/ML
20 INJECTION INTRAVENOUS ONCE
Status: COMPLETED | OUTPATIENT
Start: 2019-01-01 | End: 2019-01-01

## 2019-01-01 RX ORDER — HYDROMORPHONE HYDROCHLORIDE 1 MG/ML
0.1 INJECTION, SOLUTION INTRAMUSCULAR; INTRAVENOUS; SUBCUTANEOUS
Status: DISCONTINUED | OUTPATIENT
Start: 2019-01-01 | End: 2019-01-01 | Stop reason: HOSPADM

## 2019-01-01 RX ORDER — MAGNESIUM SULFATE HEPTAHYDRATE 40 MG/ML
2 INJECTION, SOLUTION INTRAVENOUS ONCE
Status: COMPLETED | OUTPATIENT
Start: 2019-01-01 | End: 2019-01-01

## 2019-01-01 RX ORDER — DOCUSATE SODIUM 100 MG/1
100 CAPSULE, LIQUID FILLED ORAL 2 TIMES DAILY
Status: DISCONTINUED | OUTPATIENT
Start: 2019-01-01 | End: 2019-01-01

## 2019-01-01 RX ORDER — DEXAMETHASONE 2 MG/1
2 TABLET ORAL EVERY 12 HOURS
Qty: 2 TAB | Refills: 0 | Status: ON HOLD
Start: 2019-01-01 | End: 2019-01-01

## 2019-01-01 RX ORDER — OMEPRAZOLE 20 MG/1
40 CAPSULE, DELAYED RELEASE ORAL DAILY
Status: DISCONTINUED | OUTPATIENT
Start: 2019-01-01 | End: 2019-01-01 | Stop reason: HOSPADM

## 2019-01-01 RX ORDER — POLYVINYL ALCOHOL 14 MG/ML
2 SOLUTION/ DROPS OPHTHALMIC EVERY 8 HOURS
Status: DISCONTINUED | OUTPATIENT
Start: 2019-01-01 | End: 2019-01-01 | Stop reason: HOSPADM

## 2019-01-01 RX ORDER — ONDANSETRON 2 MG/ML
4 INJECTION INTRAMUSCULAR; INTRAVENOUS PRN
Status: ACTIVE | OUTPATIENT
Start: 2019-01-01 | End: 2019-01-01

## 2019-01-01 RX ORDER — DILTIAZEM HYDROCHLORIDE 360 MG/1
360 CAPSULE, EXTENDED RELEASE ORAL EVERY EVENING
Qty: 30 CAP | Status: ON HOLD
Start: 2019-01-01 | End: 2019-01-01

## 2019-01-01 RX ORDER — AMOXICILLIN 250 MG
2 CAPSULE ORAL 2 TIMES DAILY
Status: DISCONTINUED | OUTPATIENT
Start: 2019-01-01 | End: 2019-01-01 | Stop reason: HOSPADM

## 2019-01-01 RX ORDER — LISINOPRIL 5 MG/1
5 TABLET ORAL DAILY
Qty: 30 TAB | Refills: 2 | Status: SHIPPED | OUTPATIENT
Start: 2019-01-01

## 2019-01-01 RX ORDER — ALLOPURINOL 300 MG/1
300 TABLET ORAL DAILY
Status: DISCONTINUED | OUTPATIENT
Start: 2019-01-01 | End: 2019-01-01 | Stop reason: HOSPADM

## 2019-01-01 RX ORDER — ATORVASTATIN CALCIUM 20 MG/1
20 TABLET, FILM COATED ORAL NIGHTLY
Status: DISCONTINUED | OUTPATIENT
Start: 2019-01-01 | End: 2019-01-01 | Stop reason: HOSPADM

## 2019-01-01 RX ORDER — FERROUS SULFATE 325(65) MG
325 TABLET ORAL DAILY
Status: CANCELLED | OUTPATIENT
Start: 2019-01-01

## 2019-01-01 RX ORDER — DOCUSATE SODIUM 100 MG/1
100 CAPSULE, LIQUID FILLED ORAL 2 TIMES DAILY
Status: DISCONTINUED | OUTPATIENT
Start: 2019-01-01 | End: 2019-01-01 | Stop reason: HOSPADM

## 2019-01-01 RX ORDER — MORPHINE SULFATE 4 MG/ML
2 INJECTION, SOLUTION INTRAMUSCULAR; INTRAVENOUS
Status: DISCONTINUED | OUTPATIENT
Start: 2019-01-01 | End: 2019-01-01 | Stop reason: HOSPADM

## 2019-01-01 RX ORDER — DEXAMETHASONE SODIUM PHOSPHATE 4 MG/ML
10 INJECTION, SOLUTION INTRA-ARTICULAR; INTRALESIONAL; INTRAMUSCULAR; INTRAVENOUS; SOFT TISSUE EVERY 6 HOURS
Status: DISCONTINUED | OUTPATIENT
Start: 2019-01-01 | End: 2019-01-01

## 2019-01-01 RX ORDER — TRAMADOL HYDROCHLORIDE 50 MG/1
50 TABLET ORAL EVERY 4 HOURS PRN
Status: DISCONTINUED | OUTPATIENT
Start: 2019-01-01 | End: 2019-01-01 | Stop reason: HOSPADM

## 2019-01-01 RX ORDER — DILTIAZEM HYDROCHLORIDE 180 MG/1
360 CAPSULE, COATED, EXTENDED RELEASE ORAL EVERY EVENING
Status: DISCONTINUED | OUTPATIENT
Start: 2019-01-01 | End: 2019-01-01

## 2019-01-01 RX ORDER — DEXAMETHASONE SODIUM PHOSPHATE 4 MG/ML
6 INJECTION, SOLUTION INTRA-ARTICULAR; INTRALESIONAL; INTRAMUSCULAR; INTRAVENOUS; SOFT TISSUE EVERY 6 HOURS
Status: DISCONTINUED | OUTPATIENT
Start: 2019-01-01 | End: 2019-01-01

## 2019-01-01 RX ORDER — INSULIN GLARGINE 100 [IU]/ML
10 INJECTION, SOLUTION SUBCUTANEOUS EVERY EVENING
Status: CANCELLED | OUTPATIENT
Start: 2019-01-01

## 2019-01-01 RX ORDER — HYDRALAZINE HYDROCHLORIDE 25 MG/1
25 TABLET, FILM COATED ORAL EVERY 8 HOURS PRN
Status: DISCONTINUED | OUTPATIENT
Start: 2019-01-01 | End: 2019-01-01 | Stop reason: HOSPADM

## 2019-01-01 RX ORDER — DILTIAZEM HYDROCHLORIDE 180 MG/1
360 CAPSULE, COATED, EXTENDED RELEASE ORAL EVERY EVENING
Status: DISCONTINUED | OUTPATIENT
Start: 2019-01-01 | End: 2019-01-01 | Stop reason: HOSPADM

## 2019-01-01 RX ORDER — MORPHINE SULFATE 100 MG/5ML
10 SOLUTION ORAL
Status: DISCONTINUED | OUTPATIENT
Start: 2019-01-01 | End: 2019-01-01 | Stop reason: HOSPADM

## 2019-01-01 RX ORDER — SODIUM CHLORIDE 9 MG/ML
INJECTION, SOLUTION INTRAVENOUS CONTINUOUS
Status: DISCONTINUED | OUTPATIENT
Start: 2019-01-01 | End: 2019-01-01

## 2019-01-01 RX ORDER — MIDAZOLAM HYDROCHLORIDE 1 MG/ML
INJECTION INTRAMUSCULAR; INTRAVENOUS
Status: DISCONTINUED | OUTPATIENT
Start: 2019-01-01 | End: 2019-01-01 | Stop reason: HOSPADM

## 2019-01-01 RX ORDER — AMOXICILLIN 250 MG
2 CAPSULE ORAL 2 TIMES DAILY
Status: DISCONTINUED | OUTPATIENT
Start: 2019-01-01 | End: 2019-01-01

## 2019-01-01 RX ORDER — OXYCODONE HYDROCHLORIDE 10 MG/1
10 TABLET ORAL
Status: CANCELLED | OUTPATIENT
Start: 2019-01-01 | End: 2019-01-01

## 2019-01-01 RX ORDER — LORAZEPAM 2 MG/ML
4 INJECTION INTRAMUSCULAR
Status: DISCONTINUED | OUTPATIENT
Start: 2019-01-01 | End: 2019-01-01 | Stop reason: HOSPADM

## 2019-01-01 RX ORDER — INSULIN GLARGINE 100 [IU]/ML
40 INJECTION, SOLUTION SUBCUTANEOUS
Status: DISCONTINUED | OUTPATIENT
Start: 2019-01-01 | End: 2019-01-01

## 2019-01-01 RX ORDER — OXYCODONE HYDROCHLORIDE 5 MG/1
2.5 TABLET ORAL
Status: DISCONTINUED | OUTPATIENT
Start: 2019-01-01 | End: 2019-01-01 | Stop reason: HOSPADM

## 2019-01-01 RX ORDER — PREDNISONE 5 MG/1
10 TABLET ORAL DAILY
Status: DISCONTINUED | OUTPATIENT
Start: 2019-01-01 | End: 2019-01-01

## 2019-01-01 RX ORDER — DABIGATRAN ETEXILATE 150 MG/1
150 CAPSULE ORAL 2 TIMES DAILY
Status: CANCELLED | OUTPATIENT
Start: 2019-01-01

## 2019-01-01 RX ORDER — MIDAZOLAM HYDROCHLORIDE 1 MG/ML
.5-2 INJECTION INTRAMUSCULAR; INTRAVENOUS PRN
Status: ACTIVE | OUTPATIENT
Start: 2019-01-01 | End: 2019-01-01

## 2019-01-01 RX ORDER — ATROPINE SULFATE 10 MG/ML
2 SOLUTION/ DROPS OPHTHALMIC EVERY 4 HOURS PRN
Status: DISCONTINUED | OUTPATIENT
Start: 2019-01-01 | End: 2019-01-01 | Stop reason: HOSPADM

## 2019-01-01 RX ORDER — DEXAMETHASONE 4 MG/1
4 TABLET ORAL EVERY 12 HOURS
Status: COMPLETED | OUTPATIENT
Start: 2019-01-01 | End: 2019-01-01

## 2019-01-01 RX ORDER — ATORVASTATIN CALCIUM 10 MG/1
20 TABLET, FILM COATED ORAL NIGHTLY
Status: DISCONTINUED | OUTPATIENT
Start: 2019-01-01 | End: 2019-01-01 | Stop reason: HOSPADM

## 2019-01-01 RX ORDER — BACLOFEN 10 MG/1
10 TABLET ORAL 3 TIMES DAILY
Qty: 90 TAB | Status: ON HOLD
Start: 2019-01-01 | End: 2019-01-01

## 2019-01-01 RX ORDER — LORAZEPAM 2 MG/ML
INJECTION INTRAMUSCULAR
Status: COMPLETED
Start: 2019-01-01 | End: 2019-01-01

## 2019-01-01 RX ORDER — DABIGATRAN ETEXILATE 150 MG/1
150 CAPSULE ORAL
COMMUNITY

## 2019-01-01 RX ORDER — SODIUM CHLORIDE 9 MG/ML
INJECTION, SOLUTION INTRAVENOUS CONTINUOUS
Status: CANCELLED | OUTPATIENT
Start: 2019-01-01

## 2019-01-01 RX ORDER — CEFAZOLIN SODIUM 1 G/3ML
INJECTION, POWDER, FOR SOLUTION INTRAMUSCULAR; INTRAVENOUS PRN
Status: DISCONTINUED | OUTPATIENT
Start: 2019-01-01 | End: 2019-01-01 | Stop reason: SURG

## 2019-01-01 RX ORDER — BACLOFEN 10 MG/1
10 TABLET ORAL 3 TIMES DAILY
Status: CANCELLED | OUTPATIENT
Start: 2019-01-01

## 2019-01-01 RX ORDER — LORATADINE 10 MG/1
10 TABLET ORAL DAILY
Status: ON HOLD | COMMUNITY
End: 2019-01-01

## 2019-01-01 RX ORDER — CIPROFLOXACIN 500 MG/1
500 TABLET, FILM COATED ORAL EVERY 12 HOURS
Status: COMPLETED | OUTPATIENT
Start: 2019-01-01 | End: 2019-01-01

## 2019-01-01 RX ORDER — HYDROMORPHONE HYDROCHLORIDE 1 MG/ML
0.2 INJECTION, SOLUTION INTRAMUSCULAR; INTRAVENOUS; SUBCUTANEOUS
Status: DISCONTINUED | OUTPATIENT
Start: 2019-01-01 | End: 2019-01-01 | Stop reason: HOSPADM

## 2019-01-01 RX ORDER — INSULIN GLARGINE 100 [IU]/ML
20 INJECTION, SOLUTION SUBCUTANEOUS
Status: DISCONTINUED | OUTPATIENT
Start: 2019-01-01 | End: 2019-01-01

## 2019-01-01 RX ORDER — NEBIVOLOL 5 MG/1
5 TABLET ORAL DAILY
Status: DISCONTINUED | OUTPATIENT
Start: 2019-01-01 | End: 2019-01-01

## 2019-01-01 RX ORDER — ONDANSETRON 2 MG/ML
4 INJECTION INTRAMUSCULAR; INTRAVENOUS EVERY 6 HOURS PRN
Status: DISCONTINUED | OUTPATIENT
Start: 2019-01-01 | End: 2019-11-27 | Stop reason: HOSPADM

## 2019-01-01 RX ORDER — LIDOCAINE 50 MG/G
1 PATCH TOPICAL EVERY 24 HOURS
Status: DISCONTINUED | OUTPATIENT
Start: 2019-01-01 | End: 2019-01-01

## 2019-01-01 RX ORDER — M-VIT,TX,IRON,MINS/CALC/FOLIC 27MG-0.4MG
1 TABLET ORAL DAILY
Status: DISCONTINUED | OUTPATIENT
Start: 2019-01-01 | End: 2019-01-01 | Stop reason: HOSPADM

## 2019-01-01 RX ORDER — GLYCOPYRROLATE 0.2 MG/ML
0.3 INJECTION INTRAMUSCULAR; INTRAVENOUS EVERY 4 HOURS PRN
Status: DISCONTINUED | OUTPATIENT
Start: 2019-01-01 | End: 2019-11-27 | Stop reason: HOSPADM

## 2019-01-01 RX ORDER — DEXAMETHASONE 1 MG
2 TABLET ORAL EVERY 12 HOURS
Status: CANCELLED | OUTPATIENT
Start: 2019-01-01 | End: 2019-01-01

## 2019-01-01 RX ORDER — SCOLOPAMINE TRANSDERMAL SYSTEM 1 MG/1
1 PATCH, EXTENDED RELEASE TRANSDERMAL
Status: CANCELLED | OUTPATIENT
Start: 2019-01-01

## 2019-01-01 RX ORDER — CEFAZOLIN SODIUM 2 G/100ML
2 INJECTION, SOLUTION INTRAVENOUS EVERY 8 HOURS
Status: COMPLETED | OUTPATIENT
Start: 2019-01-01 | End: 2019-01-01

## 2019-01-01 RX ORDER — ATORVASTATIN CALCIUM 20 MG/1
20 TABLET, FILM COATED ORAL NIGHTLY
Status: CANCELLED | OUTPATIENT
Start: 2019-01-01

## 2019-01-01 RX ORDER — DABIGATRAN ETEXILATE 150 MG/1
150 CAPSULE ORAL 2 TIMES DAILY
Status: DISCONTINUED | OUTPATIENT
Start: 2019-01-01 | End: 2019-01-01

## 2019-01-01 RX ORDER — MORPHINE SULFATE 10 MG/ML
10 INJECTION, SOLUTION INTRAMUSCULAR; INTRAVENOUS
Status: DISCONTINUED | OUTPATIENT
Start: 2019-01-01 | End: 2019-01-01

## 2019-01-01 RX ORDER — POLYVINYL ALCOHOL 14 MG/ML
2 SOLUTION/ DROPS OPHTHALMIC EVERY 8 HOURS
Qty: 1 BOTTLE | Refills: 3 | Status: ON HOLD
Start: 2019-01-01 | End: 2019-01-01

## 2019-01-01 RX ORDER — DEXAMETHASONE SODIUM PHOSPHATE 4 MG/ML
INJECTION, SOLUTION INTRA-ARTICULAR; INTRALESIONAL; INTRAMUSCULAR; INTRAVENOUS; SOFT TISSUE PRN
Status: DISCONTINUED | OUTPATIENT
Start: 2019-01-01 | End: 2019-01-01 | Stop reason: SURG

## 2019-01-01 RX ORDER — PALONOSETRON 0.05 MG/ML
0.25 INJECTION, SOLUTION INTRAVENOUS ONCE
Status: ON HOLD | COMMUNITY
End: 2019-01-01

## 2019-01-01 RX ORDER — POTASSIUM CHLORIDE 20 MEQ/1
TABLET, EXTENDED RELEASE ORAL
Status: ACTIVE
Start: 2019-01-01 | End: 2019-01-01

## 2019-01-01 RX ORDER — LEVETIRACETAM 100 MG/ML
1000 SOLUTION ORAL EVERY 12 HOURS
Status: DISCONTINUED | OUTPATIENT
Start: 2019-01-01 | End: 2019-01-01

## 2019-01-01 RX ORDER — DEXAMETHASONE 1 MG
2 TABLET ORAL EVERY 12 HOURS
Status: COMPLETED | OUTPATIENT
Start: 2019-01-01 | End: 2019-01-01

## 2019-01-01 RX ORDER — SODIUM CHLORIDE 9 MG/ML
INJECTION, SOLUTION INTRAVENOUS CONTINUOUS
Status: DISCONTINUED | OUTPATIENT
Start: 2019-01-01 | End: 2019-01-01 | Stop reason: HOSPADM

## 2019-01-01 RX ORDER — BACLOFEN 10 MG/1
10 TABLET ORAL 3 TIMES DAILY
Status: DISCONTINUED | OUTPATIENT
Start: 2019-01-01 | End: 2019-01-01 | Stop reason: HOSPADM

## 2019-01-01 RX ORDER — DIPHENHYDRAMINE HYDROCHLORIDE 50 MG/ML
12.5 INJECTION INTRAMUSCULAR; INTRAVENOUS
Status: DISCONTINUED | OUTPATIENT
Start: 2019-01-01 | End: 2019-01-01 | Stop reason: HOSPADM

## 2019-01-01 RX ORDER — PREDNISONE 10 MG/1
10 TABLET ORAL DAILY
Status: CANCELLED | OUTPATIENT
Start: 2019-01-01

## 2019-01-01 RX ORDER — METOPROLOL SUCCINATE 25 MG/1
25 TABLET, EXTENDED RELEASE ORAL
Status: DISCONTINUED | OUTPATIENT
Start: 2019-01-01 | End: 2019-01-01 | Stop reason: HOSPADM

## 2019-01-01 RX ORDER — LIDOCAINE AND PRILOCAINE 25; 25 MG/G; MG/G
CREAM TOPICAL ONCE
Status: COMPLETED | OUTPATIENT
Start: 2019-01-01 | End: 2019-01-01

## 2019-01-01 RX ORDER — DEXAMETHASONE 4 MG/1
4 TABLET ORAL EVERY 12 HOURS
Qty: 2 TAB | Refills: 0 | Status: ON HOLD
Start: 2019-01-01 | End: 2019-01-01

## 2019-01-01 RX ORDER — POTASSIUM CHLORIDE 20 MEQ/1
40 TABLET, EXTENDED RELEASE ORAL ONCE
Status: COMPLETED | OUTPATIENT
Start: 2019-01-01 | End: 2019-01-01

## 2019-01-01 RX ORDER — GLYCOPYRROLATE 0.2 MG/ML
0.2 INJECTION INTRAMUSCULAR; INTRAVENOUS EVERY 4 HOURS PRN
Status: DISCONTINUED | OUTPATIENT
Start: 2019-01-01 | End: 2019-01-01

## 2019-01-01 RX ORDER — ACETAMINOPHEN 650 MG/1
650 SUPPOSITORY RECTAL EVERY 6 HOURS PRN
Status: DISCONTINUED | OUTPATIENT
Start: 2019-01-01 | End: 2019-11-27 | Stop reason: HOSPADM

## 2019-01-01 RX ORDER — METFORMIN HYDROCHLORIDE 500 MG/1
500 TABLET, EXTENDED RELEASE ORAL DAILY
Qty: 30 TAB | Refills: 0 | Status: ON HOLD | OUTPATIENT
Start: 2019-01-01 | End: 2019-01-01 | Stop reason: SDUPTHER

## 2019-01-01 RX ORDER — KETOROLAC TROMETHAMINE 30 MG/ML
15 INJECTION, SOLUTION INTRAMUSCULAR; INTRAVENOUS EVERY 8 HOURS PRN
Status: COMPLETED | OUTPATIENT
Start: 2019-01-01 | End: 2019-01-01

## 2019-01-01 RX ORDER — ATORVASTATIN CALCIUM 10 MG/1
20 TABLET, FILM COATED ORAL
Status: DISCONTINUED | OUTPATIENT
Start: 2019-01-01 | End: 2019-01-01 | Stop reason: HOSPADM

## 2019-01-01 RX ORDER — INSULIN GLARGINE 100 [IU]/ML
12 INJECTION, SOLUTION SUBCUTANEOUS EVERY EVENING
Status: DISCONTINUED | OUTPATIENT
Start: 2019-01-01 | End: 2019-01-01 | Stop reason: HOSPADM

## 2019-01-01 RX ORDER — OXYCODONE HYDROCHLORIDE 5 MG/1
2.5 TABLET ORAL
Status: CANCELLED | OUTPATIENT
Start: 2019-01-01 | End: 2019-01-01

## 2019-01-01 RX ORDER — HYDROMORPHONE HYDROCHLORIDE 2 MG/ML
0.25 INJECTION, SOLUTION INTRAMUSCULAR; INTRAVENOUS; SUBCUTANEOUS
Status: CANCELLED | OUTPATIENT
Start: 2019-01-01 | End: 2019-01-01

## 2019-01-01 RX ORDER — ATORVASTATIN CALCIUM 20 MG/1
20 TABLET, FILM COATED ORAL
Qty: 90 TAB | Refills: 2 | Status: SHIPPED | OUTPATIENT
Start: 2019-01-01

## 2019-01-01 RX ORDER — DEXAMETHASONE SODIUM PHOSPHATE 4 MG/ML
10 INJECTION, SOLUTION INTRA-ARTICULAR; INTRALESIONAL; INTRAMUSCULAR; INTRAVENOUS; SOFT TISSUE EVERY 8 HOURS
Status: DISCONTINUED | OUTPATIENT
Start: 2019-01-01 | End: 2019-01-01

## 2019-01-01 RX ORDER — AMOXICILLIN 250 MG
1 CAPSULE ORAL NIGHTLY
Status: DISCONTINUED | OUTPATIENT
Start: 2019-01-01 | End: 2019-01-01 | Stop reason: HOSPADM

## 2019-01-01 RX ORDER — CIPROFLOXACIN 500 MG/1
TABLET, FILM COATED ORAL
Status: COMPLETED
Start: 2019-01-01 | End: 2019-01-01

## 2019-01-01 RX ORDER — PHENYLEPHRINE HYDROCHLORIDE 10 MG/ML
INJECTION, SOLUTION INTRAMUSCULAR; INTRAVENOUS; SUBCUTANEOUS PRN
Status: DISCONTINUED | OUTPATIENT
Start: 2019-01-01 | End: 2019-01-01 | Stop reason: SURG

## 2019-01-01 RX ORDER — DABIGATRAN ETEXILATE 75 MG/1
75 CAPSULE ORAL 2 TIMES DAILY
Status: DISCONTINUED | OUTPATIENT
Start: 2019-01-01 | End: 2019-01-01

## 2019-01-01 RX ORDER — ATORVASTATIN CALCIUM 20 MG/1
20 TABLET, FILM COATED ORAL NIGHTLY
Status: ON HOLD | COMMUNITY
End: 2019-01-01 | Stop reason: SDUPTHER

## 2019-01-01 RX ORDER — OMEPRAZOLE 20 MG/1
40 CAPSULE, DELAYED RELEASE ORAL DAILY
Status: DISCONTINUED | OUTPATIENT
Start: 2019-01-01 | End: 2019-01-01

## 2019-01-01 RX ORDER — METOPROLOL SUCCINATE 25 MG/1
25 TABLET, EXTENDED RELEASE ORAL
Status: CANCELLED | OUTPATIENT
Start: 2019-01-01

## 2019-01-01 RX ORDER — ACETAMINOPHEN 325 MG/1
650 TABLET ORAL EVERY 6 HOURS PRN
Status: DISCONTINUED | OUTPATIENT
Start: 2019-01-01 | End: 2019-01-01

## 2019-01-01 RX ORDER — METOPROLOL TARTRATE 1 MG/ML
5 INJECTION, SOLUTION INTRAVENOUS
Status: DISCONTINUED | OUTPATIENT
Start: 2019-01-01 | End: 2019-01-01 | Stop reason: HOSPADM

## 2019-01-01 RX ORDER — HYDRALAZINE HYDROCHLORIDE 20 MG/ML
5 INJECTION INTRAMUSCULAR; INTRAVENOUS
Status: DISCONTINUED | OUTPATIENT
Start: 2019-01-01 | End: 2019-01-01 | Stop reason: HOSPADM

## 2019-01-01 RX ORDER — PREDNISONE 10 MG/1
10 TABLET ORAL DAILY
Status: ON HOLD | COMMUNITY
Start: 2019-01-01 | End: 2019-01-01

## 2019-01-01 RX ORDER — METOPROLOL SUCCINATE 25 MG/1
25 TABLET, EXTENDED RELEASE ORAL
Status: DISCONTINUED | OUTPATIENT
Start: 2019-01-01 | End: 2019-01-01

## 2019-01-01 RX ORDER — MAGNESIUM SULFATE HEPTAHYDRATE 40 MG/ML
4 INJECTION, SOLUTION INTRAVENOUS ONCE
Status: COMPLETED | OUTPATIENT
Start: 2019-01-01 | End: 2019-01-01

## 2019-01-01 RX ORDER — LORAZEPAM 2 MG/ML
1-4 INJECTION INTRAMUSCULAR
Status: DISCONTINUED | OUTPATIENT
Start: 2019-01-01 | End: 2019-01-01

## 2019-01-01 RX ORDER — SODIUM CHLORIDE 9 MG/ML
1000 INJECTION, SOLUTION INTRAVENOUS ONCE
Status: DISCONTINUED | OUTPATIENT
Start: 2019-01-01 | End: 2019-01-01 | Stop reason: HOSPADM

## 2019-01-01 RX ORDER — HYDROMORPHONE HYDROCHLORIDE 1 MG/ML
0.4 INJECTION, SOLUTION INTRAMUSCULAR; INTRAVENOUS; SUBCUTANEOUS
Status: DISCONTINUED | OUTPATIENT
Start: 2019-01-01 | End: 2019-01-01 | Stop reason: HOSPADM

## 2019-01-01 RX ORDER — LORAZEPAM 2 MG/ML
2-4 INJECTION INTRAMUSCULAR
Status: DISCONTINUED | OUTPATIENT
Start: 2019-01-01 | End: 2019-11-27 | Stop reason: HOSPADM

## 2019-01-01 RX ORDER — LISINOPRIL 5 MG/1
TABLET ORAL
Status: COMPLETED
Start: 2019-01-01 | End: 2019-01-01

## 2019-01-01 RX ORDER — ONDANSETRON 8 MG/1
8 TABLET, ORALLY DISINTEGRATING ORAL EVERY 8 HOURS PRN
Status: ON HOLD | COMMUNITY
End: 2019-01-01

## 2019-01-01 RX ORDER — ATORVASTATIN CALCIUM 20 MG/1
20 TABLET, FILM COATED ORAL
Status: DISCONTINUED | OUTPATIENT
Start: 2019-01-01 | End: 2019-01-01 | Stop reason: HOSPADM

## 2019-01-01 RX ORDER — OXYBUTYNIN CHLORIDE 5 MG/1
5 TABLET, EXTENDED RELEASE ORAL EVERY EVENING
Status: DISCONTINUED | OUTPATIENT
Start: 2019-01-01 | End: 2019-01-01

## 2019-01-01 RX ORDER — LORATADINE 10 MG/1
10 TABLET ORAL DAILY
Status: DISCONTINUED | OUTPATIENT
Start: 2019-01-01 | End: 2019-01-01 | Stop reason: HOSPADM

## 2019-01-01 RX ORDER — ATORVASTATIN CALCIUM 20 MG/1
20 TABLET, FILM COATED ORAL
Status: CANCELLED | OUTPATIENT
Start: 2019-01-01

## 2019-01-01 RX ORDER — DEXAMETHASONE 1 MG
1 TABLET ORAL EVERY 12 HOURS
Status: DISCONTINUED | OUTPATIENT
Start: 2019-01-01 | End: 2019-01-01 | Stop reason: HOSPADM

## 2019-01-01 RX ORDER — POLYVINYL ALCOHOL 14 MG/ML
2 SOLUTION/ DROPS OPHTHALMIC EVERY 6 HOURS PRN
Status: DISCONTINUED | OUTPATIENT
Start: 2019-01-01 | End: 2019-01-01 | Stop reason: HOSPADM

## 2019-01-01 RX ORDER — BACITRACIN ZINC 500 [USP'U]/G
OINTMENT TOPICAL
Status: DISCONTINUED | OUTPATIENT
Start: 2019-01-01 | End: 2019-01-01 | Stop reason: HOSPADM

## 2019-01-01 RX ORDER — INSULIN GLARGINE 100 [IU]/ML
12 INJECTION, SOLUTION SUBCUTANEOUS EVERY EVENING
Qty: 10 ML | Status: ON HOLD
Start: 2019-01-01 | End: 2019-01-01

## 2019-01-01 RX ORDER — FERROUS SULFATE 325(65) MG
325 TABLET ORAL DAILY
Status: DISCONTINUED | OUTPATIENT
Start: 2019-01-01 | End: 2019-01-01 | Stop reason: HOSPADM

## 2019-01-01 RX ORDER — LANOLIN ALCOHOL/MO/W.PET/CERES
6 CREAM (GRAM) TOPICAL
Status: DISCONTINUED | OUTPATIENT
Start: 2019-01-01 | End: 2019-01-01 | Stop reason: HOSPADM

## 2019-01-01 RX ADMIN — OXYCODONE HYDROCHLORIDE 5 MG: 5 TABLET ORAL at 08:57

## 2019-01-01 RX ADMIN — INSULIN HUMAN 2 UNITS: 100 INJECTION, SOLUTION PARENTERAL at 11:17

## 2019-01-01 RX ADMIN — SODIUM CHLORIDE 1000 MG: 9 INJECTION, SOLUTION INTRAVENOUS at 05:18

## 2019-01-01 RX ADMIN — TRAZODONE HYDROCHLORIDE 50 MG: 50 TABLET ORAL at 01:59

## 2019-01-01 RX ADMIN — BACLOFEN 5 MG: 10 TABLET ORAL at 05:58

## 2019-01-01 RX ADMIN — APIXABAN 5 MG: 5 TABLET, FILM COATED ORAL at 08:38

## 2019-01-01 RX ADMIN — LEVETIRACETAM 500 MG: 500 TABLET ORAL at 21:57

## 2019-01-01 RX ADMIN — INSULIN GLARGINE 10 UNITS: 100 INJECTION, SOLUTION SUBCUTANEOUS at 17:45

## 2019-01-01 RX ADMIN — DOCUSATE SODIUM 100 MG: 100 CAPSULE, LIQUID FILLED ORAL at 06:00

## 2019-01-01 RX ADMIN — BACLOFEN 5 MG: 10 TABLET ORAL at 08:47

## 2019-01-01 RX ADMIN — SODIUM CHLORIDE 100 MG: 9 INJECTION, SOLUTION INTRAVENOUS at 08:22

## 2019-01-01 RX ADMIN — PREDNISONE 10 MG: 5 TABLET ORAL at 08:04

## 2019-01-01 RX ADMIN — SENNOSIDES AND DOCUSATE SODIUM 2 TABLET: 8.6; 5 TABLET ORAL at 09:51

## 2019-01-01 RX ADMIN — VITAMIN D, TAB 1000IU (100/BT) 1000 UNITS: 25 TAB at 07:58

## 2019-01-01 RX ADMIN — ATORVASTATIN CALCIUM 20 MG: 10 TABLET, FILM COATED ORAL at 20:06

## 2019-01-01 RX ADMIN — FERROUS SULFATE TAB 325 MG (65 MG ELEMENTAL FE) 325 MG: 325 (65 FE) TAB at 06:15

## 2019-01-01 RX ADMIN — OXYCODONE HYDROCHLORIDE 2.5 MG: 5 TABLET ORAL at 17:14

## 2019-01-01 RX ADMIN — SODIUM CHLORIDE 500 MG: 9 INJECTION, SOLUTION INTRAVENOUS at 17:37

## 2019-01-01 RX ADMIN — OMEPRAZOLE 40 MG: KIT at 08:48

## 2019-01-01 RX ADMIN — GLYCOPYRROLATE 0.2 MG: 0.2 INJECTION INTRAMUSCULAR; INTRAVENOUS at 20:58

## 2019-01-01 RX ADMIN — DIBASIC SODIUM PHOSPHATE, MONOBASIC POTASSIUM PHOSPHATE AND MONOBASIC SODIUM PHOSPHATE 1 TABLET: 852; 155; 130 TABLET ORAL at 08:29

## 2019-01-01 RX ADMIN — MAGNESIUM HYDROXIDE 30 ML: 400 SUSPENSION ORAL at 05:49

## 2019-01-01 RX ADMIN — ALLOPURINOL 300 MG: 300 TABLET ORAL at 08:43

## 2019-01-01 RX ADMIN — OMEPRAZOLE 40 MG: 20 CAPSULE, DELAYED RELEASE ORAL at 05:59

## 2019-01-01 RX ADMIN — CEFAZOLIN 3 G: 330 INJECTION, POWDER, FOR SOLUTION INTRAMUSCULAR; INTRAVENOUS at 08:58

## 2019-01-01 RX ADMIN — INSULIN HUMAN 7 UNITS: 100 INJECTION, SOLUTION PARENTERAL at 17:25

## 2019-01-01 RX ADMIN — SENNOSIDES AND DOCUSATE SODIUM 2 TABLET: 8.6; 5 TABLET ORAL at 08:21

## 2019-01-01 RX ADMIN — DILTIAZEM HYDROCHLORIDE 90 MG: 30 TABLET, FILM COATED ORAL at 17:27

## 2019-01-01 RX ADMIN — ACETAMINOPHEN 650 MG: 325 TABLET, FILM COATED ORAL at 12:29

## 2019-01-01 RX ADMIN — SENNOSIDES AND DOCUSATE SODIUM 2 TABLET: 8.6; 5 TABLET ORAL at 08:22

## 2019-01-01 RX ADMIN — METFORMIN HYDROCHLORIDE 250 MG: 500 TABLET, FILM COATED ORAL at 17:37

## 2019-01-01 RX ADMIN — OMEPRAZOLE 20 MG: 20 CAPSULE, DELAYED RELEASE ORAL at 08:29

## 2019-01-01 RX ADMIN — LORAZEPAM 2 MG: 2 INJECTION INTRAMUSCULAR at 10:15

## 2019-01-01 RX ADMIN — LIDOCAINE 1 PATCH: 50 PATCH TOPICAL at 08:01

## 2019-01-01 RX ADMIN — METFORMIN HYDROCHLORIDE 1000 MG: 500 TABLET, FILM COATED ORAL at 17:28

## 2019-01-01 RX ADMIN — FERROUS SULFATE TAB 325 MG (65 MG ELEMENTAL FE) 325 MG: 325 (65 FE) TAB at 09:53

## 2019-01-01 RX ADMIN — METOPROLOL TARTRATE 25 MG: 25 TABLET, FILM COATED ORAL at 05:30

## 2019-01-01 RX ADMIN — SODIUM CHLORIDE 1000 MG: 9 INJECTION, SOLUTION INTRAVENOUS at 07:38

## 2019-01-01 RX ADMIN — Medication 2 SPRAY: at 06:28

## 2019-01-01 RX ADMIN — DILTIAZEM HYDROCHLORIDE 90 MG: 30 TABLET, FILM COATED ORAL at 00:20

## 2019-01-01 RX ADMIN — VITAMIN D, TAB 1000IU (100/BT) 1000 UNITS: 25 TAB at 09:51

## 2019-01-01 RX ADMIN — DIBASIC SODIUM PHOSPHATE, MONOBASIC POTASSIUM PHOSPHATE AND MONOBASIC SODIUM PHOSPHATE 1 TABLET: 852; 155; 130 TABLET ORAL at 20:29

## 2019-01-01 RX ADMIN — SODIUM CHLORIDE 1000 MG: 9 INJECTION, SOLUTION INTRAVENOUS at 16:49

## 2019-01-01 RX ADMIN — METOPROLOL TARTRATE 25 MG: 25 TABLET ORAL at 05:24

## 2019-01-01 RX ADMIN — FENTANYL CITRATE 50 MCG: 0.05 INJECTION, SOLUTION INTRAMUSCULAR; INTRAVENOUS at 14:24

## 2019-01-01 RX ADMIN — MAGNESIUM OXIDE TAB 400 MG (241.3 MG ELEMENTAL MG) 400 MG: 400 (241.3 MG) TAB at 08:33

## 2019-01-01 RX ADMIN — SENNOSIDES, DOCUSATE SODIUM 2 TABLET: 50; 8.6 TABLET, FILM COATED ORAL at 05:53

## 2019-01-01 RX ADMIN — SODIUM CHLORIDE 100 MG: 9 INJECTION, SOLUTION INTRAVENOUS at 05:03

## 2019-01-01 RX ADMIN — TRAZODONE HYDROCHLORIDE 50 MG: 50 TABLET ORAL at 02:01

## 2019-01-01 RX ADMIN — LISINOPRIL 5 MG: 5 TABLET ORAL at 05:33

## 2019-01-01 RX ADMIN — ATORVASTATIN CALCIUM 20 MG: 10 TABLET, FILM COATED ORAL at 20:29

## 2019-01-01 RX ADMIN — ATORVASTATIN CALCIUM 20 MG: 40 TABLET, FILM COATED ORAL at 21:27

## 2019-01-01 RX ADMIN — SODIUM CHLORIDE 100 MG: 9 INJECTION, SOLUTION INTRAVENOUS at 06:35

## 2019-01-01 RX ADMIN — INSULIN HUMAN 10 UNITS: 100 INJECTION, SOLUTION PARENTERAL at 17:23

## 2019-01-01 RX ADMIN — INSULIN LISPRO 6 UNITS: 100 INJECTION, SOLUTION INTRAVENOUS; SUBCUTANEOUS at 11:28

## 2019-01-01 RX ADMIN — METOPROLOL TARTRATE 25 MG: 25 TABLET ORAL at 17:10

## 2019-01-01 RX ADMIN — METFORMIN HYDROCHLORIDE 750 MG: 500 TABLET, FILM COATED ORAL at 08:15

## 2019-01-01 RX ADMIN — VITAMIN D, TAB 1000IU (100/BT) 1000 UNITS: 25 TAB at 08:26

## 2019-01-01 RX ADMIN — FERROUS SULFATE TAB 325 MG (65 MG ELEMENTAL FE) 325 MG: 325 (65 FE) TAB at 10:05

## 2019-01-01 RX ADMIN — ATORVASTATIN CALCIUM 20 MG: 20 TABLET, FILM COATED ORAL at 21:46

## 2019-01-01 RX ADMIN — METOPROLOL TARTRATE 25 MG: 25 TABLET ORAL at 05:02

## 2019-01-01 RX ADMIN — ACETAMINOPHEN 650 MG: 325 TABLET, FILM COATED ORAL at 21:08

## 2019-01-01 RX ADMIN — METFORMIN HYDROCHLORIDE 1000 MG: 500 TABLET, FILM COATED ORAL at 08:30

## 2019-01-01 RX ADMIN — LACOSAMIDE 100 MG: 100 TABLET, FILM COATED ORAL at 05:24

## 2019-01-01 RX ADMIN — ACETAMINOPHEN 650 MG: 325 TABLET, FILM COATED ORAL at 04:30

## 2019-01-01 RX ADMIN — SENNOSIDES, DOCUSATE SODIUM 1 TABLET: 50; 8.6 TABLET, FILM COATED ORAL at 20:46

## 2019-01-01 RX ADMIN — DOCUSATE SODIUM 100 MG: 100 CAPSULE, LIQUID FILLED ORAL at 05:21

## 2019-01-01 RX ADMIN — BACLOFEN 5 MG: 10 TABLET ORAL at 21:23

## 2019-01-01 RX ADMIN — SENNOSIDES, DOCUSATE SODIUM 2 TABLET: 50; 8.6 TABLET, FILM COATED ORAL at 17:40

## 2019-01-01 RX ADMIN — LORATADINE 10 MG: 10 TABLET ORAL at 06:15

## 2019-01-01 RX ADMIN — POLYVINYL ALCOHOL 2 DROP: 14 SOLUTION/ DROPS OPHTHALMIC at 06:00

## 2019-01-01 RX ADMIN — DABIGATRAN ETEXILATE MESYLATE 150 MG: 150 CAPSULE ORAL at 08:40

## 2019-01-01 RX ADMIN — INSULIN LISPRO 6 UNITS: 100 INJECTION, SOLUTION INTRAVENOUS; SUBCUTANEOUS at 21:07

## 2019-01-01 RX ADMIN — CEFAZOLIN SODIUM 2 G: 2 INJECTION, SOLUTION INTRAVENOUS at 17:37

## 2019-01-01 RX ADMIN — SENNOSIDES, DOCUSATE SODIUM 2 TABLET: 50; 8.6 TABLET, FILM COATED ORAL at 06:00

## 2019-01-01 RX ADMIN — BACLOFEN 5 MG: 10 TABLET ORAL at 12:29

## 2019-01-01 RX ADMIN — METOPROLOL SUCCINATE 25 MG: 25 TABLET, EXTENDED RELEASE ORAL at 05:21

## 2019-01-01 RX ADMIN — DILTIAZEM HYDROCHLORIDE 90 MG: 30 TABLET, FILM COATED ORAL at 23:33

## 2019-01-01 RX ADMIN — DEXAMETHASONE 4 MG: 4 TABLET ORAL at 08:33

## 2019-01-01 RX ADMIN — INSULIN HUMAN 2 UNITS: 100 INJECTION, SOLUTION PARENTERAL at 13:30

## 2019-01-01 RX ADMIN — FERROUS SULFATE TAB 325 MG (65 MG ELEMENTAL FE) 325 MG: 325 (65 FE) TAB at 10:00

## 2019-01-01 RX ADMIN — MULTIPLE VITAMINS W/ MINERALS TAB 1 TABLET: TAB at 08:57

## 2019-01-01 RX ADMIN — DOCUSATE SODIUM 100 MG: 100 CAPSULE, LIQUID FILLED ORAL at 18:00

## 2019-01-01 RX ADMIN — DILTIAZEM HYDROCHLORIDE 90 MG: 30 TABLET, FILM COATED ORAL at 05:15

## 2019-01-01 RX ADMIN — BENZALKONIUM CHLORIDE, SODIUM PHOSPHATE, DIBASIC, EDETATE DISODIUM,SODIUM PHOSPHATE, MONOBASIC, SODIUM CHLORIDE, WATER, PHOSPHORIC ACID, SODIUM HYDROXIDE 1 DROP: 14 SOLUTION INTRAOCULAR at 21:30

## 2019-01-01 RX ADMIN — METFORMIN HYDROCHLORIDE 1000 MG: 500 TABLET, FILM COATED ORAL at 08:15

## 2019-01-01 RX ADMIN — BACLOFEN 5 MG: 10 TABLET ORAL at 20:14

## 2019-01-01 RX ADMIN — SODIUM CHLORIDE 100 MG: 9 INJECTION, SOLUTION INTRAVENOUS at 05:51

## 2019-01-01 RX ADMIN — METOPROLOL SUCCINATE 25 MG: 25 TABLET, EXTENDED RELEASE ORAL at 04:42

## 2019-01-01 RX ADMIN — METFORMIN HYDROCHLORIDE 1000 MG: 500 TABLET, FILM COATED ORAL at 17:20

## 2019-01-01 RX ADMIN — FERROUS SULFATE TAB 325 MG (65 MG ELEMENTAL FE) 325 MG: 325 (65 FE) TAB at 08:03

## 2019-01-01 RX ADMIN — FAMOTIDINE 20 MG: 10 INJECTION INTRAVENOUS at 06:44

## 2019-01-01 RX ADMIN — SODIUM CHLORIDE 1000 MG: 9 INJECTION, SOLUTION INTRAVENOUS at 20:15

## 2019-01-01 RX ADMIN — DILTIAZEM HYDROCHLORIDE 60 MG: 30 TABLET, FILM COATED ORAL at 05:41

## 2019-01-01 RX ADMIN — INSULIN HUMAN 2 UNITS: 100 INJECTION, SOLUTION PARENTERAL at 20:45

## 2019-01-01 RX ADMIN — SODIUM CHLORIDE 100 MG: 9 INJECTION, SOLUTION INTRAVENOUS at 06:21

## 2019-01-01 RX ADMIN — FERROUS SULFATE TAB 325 MG (65 MG ELEMENTAL FE) 325 MG: 325 (65 FE) TAB at 07:47

## 2019-01-01 RX ADMIN — SODIUM CHLORIDE 1000 MG: 9 INJECTION, SOLUTION INTRAVENOUS at 18:28

## 2019-01-01 RX ADMIN — LORATADINE 10 MG: 10 TABLET ORAL at 09:10

## 2019-01-01 RX ADMIN — SENNOSIDES AND DOCUSATE SODIUM 2 TABLET: 8.6; 5 TABLET ORAL at 21:16

## 2019-01-01 RX ADMIN — ACETAMINOPHEN 650 MG: 325 TABLET, FILM COATED ORAL at 20:51

## 2019-01-01 RX ADMIN — POLYVINYL ALCOHOL 2 DROP: 14 SOLUTION/ DROPS OPHTHALMIC at 20:23

## 2019-01-01 RX ADMIN — OXYCODONE HYDROCHLORIDE 5 MG: 5 TABLET ORAL at 19:39

## 2019-01-01 RX ADMIN — METOPROLOL TARTRATE 50 MG: 25 TABLET ORAL at 05:34

## 2019-01-01 RX ADMIN — SENNOSIDES AND DOCUSATE SODIUM 2 TABLET: 8.6; 5 TABLET ORAL at 20:30

## 2019-01-01 RX ADMIN — METFORMIN HYDROCHLORIDE 250 MG: 500 TABLET, FILM COATED ORAL at 08:23

## 2019-01-01 RX ADMIN — MIDAZOLAM HYDROCHLORIDE 2 MG: 1 INJECTION, SOLUTION INTRAMUSCULAR; INTRAVENOUS at 14:28

## 2019-01-01 RX ADMIN — ACETAMINOPHEN 650 MG: 325 TABLET, FILM COATED ORAL at 01:20

## 2019-01-01 RX ADMIN — TRAZODONE HYDROCHLORIDE 50 MG: 50 TABLET ORAL at 20:09

## 2019-01-01 RX ADMIN — LIDOCAINE HYDROCHLORIDE 60 MG: 20 INJECTION, SOLUTION INTRAVENOUS at 08:30

## 2019-01-01 RX ADMIN — POTASSIUM CHLORIDE 10 MEQ: 7.46 INJECTION, SOLUTION INTRAVENOUS at 09:28

## 2019-01-01 RX ADMIN — TRAZODONE HYDROCHLORIDE 50 MG: 50 TABLET ORAL at 21:39

## 2019-01-01 RX ADMIN — BACLOFEN 5 MG: 10 TABLET ORAL at 15:10

## 2019-01-01 RX ADMIN — ONDANSETRON 4 MG: 2 INJECTION INTRAMUSCULAR; INTRAVENOUS at 11:48

## 2019-01-01 RX ADMIN — MULTIPLE VITAMINS W/ MINERALS TAB 1 TABLET: TAB at 08:30

## 2019-01-01 RX ADMIN — METOPROLOL TARTRATE 50 MG: 25 TABLET ORAL at 17:29

## 2019-01-01 RX ADMIN — DEXAMETHASONE SODIUM PHOSPHATE 4 MG: 4 INJECTION, SOLUTION INTRA-ARTICULAR; INTRALESIONAL; INTRAMUSCULAR; INTRAVENOUS; SOFT TISSUE at 12:15

## 2019-01-01 RX ADMIN — PROPOFOL 180 MG: 10 INJECTION, EMULSION INTRAVENOUS at 08:30

## 2019-01-01 RX ADMIN — FENTANYL CITRATE 25 MCG: 0.05 INJECTION, SOLUTION INTRAMUSCULAR; INTRAVENOUS at 20:57

## 2019-01-01 RX ADMIN — SODIUM CHLORIDE, POTASSIUM CHLORIDE, SODIUM LACTATE AND CALCIUM CHLORIDE: 600; 310; 30; 20 INJECTION, SOLUTION INTRAVENOUS at 09:02

## 2019-01-01 RX ADMIN — BACLOFEN 5 MG: 10 TABLET ORAL at 15:26

## 2019-01-01 RX ADMIN — MULTIPLE VITAMINS W/ MINERALS TAB 1 TABLET: TAB at 08:23

## 2019-01-01 RX ADMIN — METOPROLOL TARTRATE 25 MG: 25 TABLET ORAL at 06:28

## 2019-01-01 RX ADMIN — SODIUM CHLORIDE 100 MG: 9 INJECTION, SOLUTION INTRAVENOUS at 04:19

## 2019-01-01 RX ADMIN — DILTIAZEM HYDROCHLORIDE 60 MG: 30 TABLET, FILM COATED ORAL at 15:00

## 2019-01-01 RX ADMIN — INSULIN HUMAN 2 UNITS: 100 INJECTION, SOLUTION PARENTERAL at 13:23

## 2019-01-01 RX ADMIN — DILTIAZEM HYDROCHLORIDE 90 MG: 30 TABLET, FILM COATED ORAL at 12:40

## 2019-01-01 RX ADMIN — SODIUM CHLORIDE 100 MG: 9 INJECTION, SOLUTION INTRAVENOUS at 06:56

## 2019-01-01 RX ADMIN — INSULIN HUMAN 3 UNITS: 100 INJECTION, SOLUTION PARENTERAL at 13:00

## 2019-01-01 RX ADMIN — VITAMIN D, TAB 1000IU (100/BT) 1000 UNITS: 25 TAB at 09:30

## 2019-01-01 RX ADMIN — SODIUM CHLORIDE 1000 MG: 9 INJECTION, SOLUTION INTRAVENOUS at 06:22

## 2019-01-01 RX ADMIN — FENTANYL CITRATE 50 MCG: 50 INJECTION, SOLUTION INTRAMUSCULAR; INTRAVENOUS at 10:58

## 2019-01-01 RX ADMIN — LORATADINE 10 MG: 10 TABLET ORAL at 08:48

## 2019-01-01 RX ADMIN — DEXAMETHASONE SODIUM PHOSPHATE 4 MG: 4 INJECTION, SOLUTION INTRA-ARTICULAR; INTRALESIONAL; INTRAMUSCULAR; INTRAVENOUS; SOFT TISSUE at 05:49

## 2019-01-01 RX ADMIN — LORATADINE 10 MG: 10 TABLET ORAL at 05:21

## 2019-01-01 RX ADMIN — GLYCOPYRROLATE 0.3 MG: 0.2 INJECTION INTRAMUSCULAR; INTRAVENOUS at 12:26

## 2019-01-01 RX ADMIN — OXYBUTYNIN CHLORIDE 5 MG: 5 TABLET, EXTENDED RELEASE ORAL at 20:06

## 2019-01-01 RX ADMIN — POTASSIUM CHLORIDE 10 MEQ: 7.46 INJECTION, SOLUTION INTRAVENOUS at 10:32

## 2019-01-01 RX ADMIN — FENTANYL CITRATE 25 MCG: 0.05 INJECTION, SOLUTION INTRAMUSCULAR; INTRAVENOUS at 03:46

## 2019-01-01 RX ADMIN — METFORMIN HYDROCHLORIDE 850 MG: 850 TABLET, FILM COATED ORAL at 17:23

## 2019-01-01 RX ADMIN — DILTIAZEM HYDROCHLORIDE 360 MG: 180 CAPSULE, EXTENDED RELEASE ORAL at 21:39

## 2019-01-01 RX ADMIN — VITAMIN D, TAB 1000IU (100/BT) 1000 UNITS: 25 TAB at 08:57

## 2019-01-01 RX ADMIN — METOPROLOL TARTRATE 50 MG: 25 TABLET ORAL at 05:18

## 2019-01-01 RX ADMIN — ATORVASTATIN CALCIUM 20 MG: 10 TABLET, FILM COATED ORAL at 20:37

## 2019-01-01 RX ADMIN — PHENYLEPHRINE HYDROCHLORIDE 100 MCG: 10 INJECTION INTRAVENOUS at 09:23

## 2019-01-01 RX ADMIN — APIXABAN 5 MG: 5 TABLET, FILM COATED ORAL at 08:44

## 2019-01-01 RX ADMIN — KETOROLAC TROMETHAMINE 15 MG: 30 INJECTION, SOLUTION INTRAMUSCULAR at 12:49

## 2019-01-01 RX ADMIN — ACETAMINOPHEN 650 MG: 325 TABLET, FILM COATED ORAL at 18:15

## 2019-01-01 RX ADMIN — INSULIN HUMAN 2 UNITS: 100 INJECTION, SOLUTION PARENTERAL at 11:11

## 2019-01-01 RX ADMIN — SCOPALAMINE 1 PATCH: 1 PATCH, EXTENDED RELEASE TRANSDERMAL at 11:16

## 2019-01-01 RX ADMIN — LIDOCAINE 1 PATCH: 50 PATCH TOPICAL at 10:06

## 2019-01-01 RX ADMIN — FENTANYL CITRATE 100 MCG: 0.05 INJECTION, SOLUTION INTRAMUSCULAR; INTRAVENOUS at 14:43

## 2019-01-01 RX ADMIN — SODIUM CHLORIDE 1000 MG: 9 INJECTION, SOLUTION INTRAVENOUS at 04:12

## 2019-01-01 RX ADMIN — SODIUM CHLORIDE, POTASSIUM CHLORIDE, SODIUM LACTATE AND CALCIUM CHLORIDE 1000 ML: 600; 310; 30; 20 INJECTION, SOLUTION INTRAVENOUS at 01:16

## 2019-01-01 RX ADMIN — POLYVINYL ALCOHOL 2 DROP: 14 SOLUTION/ DROPS OPHTHALMIC at 13:41

## 2019-01-01 RX ADMIN — ALLOPURINOL 300 MG: 300 TABLET ORAL at 08:58

## 2019-01-01 RX ADMIN — POLYVINYL ALCOHOL 2 DROP: 14 SOLUTION/ DROPS OPHTHALMIC at 14:43

## 2019-01-01 RX ADMIN — ATORVASTATIN CALCIUM 20 MG: 10 TABLET, FILM COATED ORAL at 20:53

## 2019-01-01 RX ADMIN — SODIUM CHLORIDE 500 MG: 9 INJECTION, SOLUTION INTRAVENOUS at 04:48

## 2019-01-01 RX ADMIN — SENNOSIDES, DOCUSATE SODIUM 2 TABLET: 50; 8.6 TABLET, FILM COATED ORAL at 21:57

## 2019-01-01 RX ADMIN — FERROUS SULFATE TAB 325 MG (65 MG ELEMENTAL FE) 325 MG: 325 (65 FE) TAB at 08:48

## 2019-01-01 RX ADMIN — DILTIAZEM HYDROCHLORIDE 90 MG: 30 TABLET, FILM COATED ORAL at 17:56

## 2019-01-01 RX ADMIN — DABIGATRAN ETEXILATE MESYLATE 150 MG: 150 CAPSULE ORAL at 20:40

## 2019-01-01 RX ADMIN — INSULIN HUMAN 3 UNITS: 100 INJECTION, SOLUTION PARENTERAL at 20:30

## 2019-01-01 RX ADMIN — ALLOPURINOL 300 MG: 300 TABLET ORAL at 09:52

## 2019-01-01 RX ADMIN — SENNOSIDES AND DOCUSATE SODIUM 2 TABLET: 8.6; 5 TABLET ORAL at 20:09

## 2019-01-01 RX ADMIN — BACLOFEN 5 MG: 10 TABLET ORAL at 06:15

## 2019-01-01 RX ADMIN — METFORMIN HYDROCHLORIDE 250 MG: 500 TABLET, FILM COATED ORAL at 17:29

## 2019-01-01 RX ADMIN — ATORVASTATIN CALCIUM 20 MG: 10 TABLET, FILM COATED ORAL at 20:09

## 2019-01-01 RX ADMIN — METOPROLOL TARTRATE 25 MG: 25 TABLET, FILM COATED ORAL at 17:06

## 2019-01-01 RX ADMIN — DILTIAZEM HYDROCHLORIDE 60 MG: 30 TABLET, FILM COATED ORAL at 21:35

## 2019-01-01 RX ADMIN — SODIUM CHLORIDE 100 MG: 9 INJECTION, SOLUTION INTRAVENOUS at 04:56

## 2019-01-01 RX ADMIN — INSULIN HUMAN 2 UNITS: 100 INJECTION, SOLUTION PARENTERAL at 10:13

## 2019-01-01 RX ADMIN — ATORVASTATIN CALCIUM 20 MG: 10 TABLET, FILM COATED ORAL at 21:36

## 2019-01-01 RX ADMIN — SENNOSIDES, DOCUSATE SODIUM 2 TABLET: 50; 8.6 TABLET, FILM COATED ORAL at 17:01

## 2019-01-01 RX ADMIN — MELATONIN 6 MG: at 19:34

## 2019-01-01 RX ADMIN — DIBASIC SODIUM PHOSPHATE, MONOBASIC POTASSIUM PHOSPHATE AND MONOBASIC SODIUM PHOSPHATE 1 TABLET: 852; 155; 130 TABLET ORAL at 20:43

## 2019-01-01 RX ADMIN — LORAZEPAM 4 MG: 2 INJECTION INTRAMUSCULAR at 13:52

## 2019-01-01 RX ADMIN — VITAMIN D, TAB 1000IU (100/BT) 1000 UNITS: 25 TAB at 08:28

## 2019-01-01 RX ADMIN — INSULIN HUMAN 4 UNITS: 100 INJECTION, SOLUTION PARENTERAL at 08:16

## 2019-01-01 RX ADMIN — APIXABAN 5 MG: 5 TABLET, FILM COATED ORAL at 20:19

## 2019-01-01 RX ADMIN — INSULIN GLARGINE 20 UNITS: 100 INJECTION, SOLUTION SUBCUTANEOUS at 06:50

## 2019-01-01 RX ADMIN — CIPROFLOXACIN HYDROCHLORIDE 500 MG: 500 TABLET, FILM COATED ORAL at 21:32

## 2019-01-01 RX ADMIN — ACETAMINOPHEN 650 MG: 325 TABLET, FILM COATED ORAL at 04:49

## 2019-01-01 RX ADMIN — POLYVINYL ALCOHOL 2 DROP: 14 SOLUTION/ DROPS OPHTHALMIC at 07:50

## 2019-01-01 RX ADMIN — INSULIN GLARGINE 20 UNITS: 100 INJECTION, SOLUTION SUBCUTANEOUS at 05:25

## 2019-01-01 RX ADMIN — METFORMIN HYDROCHLORIDE 750 MG: 500 TABLET, FILM COATED ORAL at 17:19

## 2019-01-01 RX ADMIN — DEXAMETHASONE SODIUM PHOSPHATE 6 MG: 4 INJECTION, SOLUTION INTRA-ARTICULAR; INTRALESIONAL; INTRAMUSCULAR; INTRAVENOUS; SOFT TISSUE at 11:55

## 2019-01-01 RX ADMIN — SODIUM CHLORIDE 100 MG: 9 INJECTION, SOLUTION INTRAVENOUS at 05:01

## 2019-01-01 RX ADMIN — OMEPRAZOLE 40 MG: KIT at 08:57

## 2019-01-01 RX ADMIN — SENNOSIDES, DOCUSATE SODIUM 2 TABLET: 50; 8.6 TABLET, FILM COATED ORAL at 21:10

## 2019-01-01 RX ADMIN — AMANTADINE HYDROCHLORIDE 100 MG: 50 SOLUTION ORAL at 12:45

## 2019-01-01 RX ADMIN — SODIUM CHLORIDE 1000 MG: 9 INJECTION, SOLUTION INTRAVENOUS at 17:05

## 2019-01-01 RX ADMIN — FENTANYL CITRATE 25 MCG: 0.05 INJECTION, SOLUTION INTRAMUSCULAR; INTRAVENOUS at 16:40

## 2019-01-01 RX ADMIN — SODIUM CHLORIDE 100 MG: 9 INJECTION, SOLUTION INTRAVENOUS at 19:43

## 2019-01-01 RX ADMIN — PREDNISONE 10 MG: 5 TABLET ORAL at 08:57

## 2019-01-01 RX ADMIN — METFORMIN HYDROCHLORIDE 750 MG: 500 TABLET, FILM COATED ORAL at 08:16

## 2019-01-01 RX ADMIN — VITAMIN D, TAB 1000IU (100/BT) 1000 UNITS: 25 TAB at 08:23

## 2019-01-01 RX ADMIN — ALLOPURINOL 300 MG: 300 TABLET ORAL at 09:13

## 2019-01-01 RX ADMIN — METFORMIN HYDROCHLORIDE 1000 MG: 500 TABLET, FILM COATED ORAL at 17:10

## 2019-01-01 RX ADMIN — DABIGATRAN ETEXILATE MESYLATE 75 MG: 75 CAPSULE ORAL at 21:33

## 2019-01-01 RX ADMIN — MELATONIN 6 MG: at 20:19

## 2019-01-01 RX ADMIN — INSULIN HUMAN 12 UNITS: 100 INJECTION, SOLUTION PARENTERAL at 11:58

## 2019-01-01 RX ADMIN — SENNOSIDES AND DOCUSATE SODIUM 2 TABLET: 8.6; 5 TABLET ORAL at 20:14

## 2019-01-01 RX ADMIN — SODIUM CHLORIDE 1000 MG: 9 INJECTION, SOLUTION INTRAVENOUS at 18:43

## 2019-01-01 RX ADMIN — INSULIN HUMAN 10 UNITS: 100 INJECTION, SOLUTION PARENTERAL at 11:55

## 2019-01-01 RX ADMIN — DILTIAZEM HYDROCHLORIDE 60 MG: 30 TABLET, FILM COATED ORAL at 05:28

## 2019-01-01 RX ADMIN — CIPROFLOXACIN HYDROCHLORIDE 500 MG: 500 TABLET, FILM COATED ORAL at 09:51

## 2019-01-01 RX ADMIN — METOPROLOL TARTRATE 25 MG: 25 TABLET ORAL at 05:49

## 2019-01-01 RX ADMIN — MULTIPLE VITAMINS W/ MINERALS TAB 1 TABLET: TAB at 05:48

## 2019-01-01 RX ADMIN — DILTIAZEM HYDROCHLORIDE 60 MG: 30 TABLET, FILM COATED ORAL at 05:06

## 2019-01-01 RX ADMIN — FERROUS SULFATE TAB 325 MG (65 MG ELEMENTAL FE) 325 MG: 325 (65 FE) TAB at 09:13

## 2019-01-01 RX ADMIN — SODIUM CHLORIDE 500 MG: 9 INJECTION, SOLUTION INTRAVENOUS at 19:18

## 2019-01-01 RX ADMIN — ATORVASTATIN CALCIUM 20 MG: 40 TABLET, FILM COATED ORAL at 21:04

## 2019-01-01 RX ADMIN — DEXAMETHASONE SODIUM PHOSPHATE 10 MG: 4 INJECTION, SOLUTION INTRA-ARTICULAR; INTRALESIONAL; INTRAMUSCULAR; INTRAVENOUS; SOFT TISSUE at 05:34

## 2019-01-01 RX ADMIN — BACLOFEN 10 MG: 10 TABLET ORAL at 09:09

## 2019-01-01 RX ADMIN — SODIUM CHLORIDE, POTASSIUM CHLORIDE, SODIUM LACTATE AND CALCIUM CHLORIDE: 600; 310; 30; 20 INJECTION, SOLUTION INTRAVENOUS at 20:50

## 2019-01-01 RX ADMIN — MIDAZOLAM 1 MG: 1 INJECTION INTRAMUSCULAR; INTRAVENOUS at 14:48

## 2019-01-01 RX ADMIN — SENNOSIDES, DOCUSATE SODIUM 2 TABLET: 50; 8.6 TABLET, FILM COATED ORAL at 08:30

## 2019-01-01 RX ADMIN — METOPROLOL TARTRATE 25 MG: 25 TABLET ORAL at 17:47

## 2019-01-01 RX ADMIN — FENTANYL CITRATE 50 MCG: 0.05 INJECTION, SOLUTION INTRAMUSCULAR; INTRAVENOUS at 11:16

## 2019-01-01 RX ADMIN — SENNOSIDES AND DOCUSATE SODIUM 2 TABLET: 8.6; 5 TABLET ORAL at 19:41

## 2019-01-01 RX ADMIN — CIPROFLOXACIN HYDROCHLORIDE 500 MG: 500 TABLET, FILM COATED ORAL at 08:48

## 2019-01-01 RX ADMIN — GLYCOPYRROLATE 0.3 MG: 0.2 INJECTION INTRAMUSCULAR; INTRAVENOUS at 17:53

## 2019-01-01 RX ADMIN — VITAMIN D, TAB 1000IU (100/BT) 1000 UNITS: 25 TAB at 08:44

## 2019-01-01 RX ADMIN — LORAZEPAM 1 MG: 2 INJECTION INTRAMUSCULAR at 01:40

## 2019-01-01 RX ADMIN — METOPROLOL TARTRATE 25 MG: 25 TABLET, FILM COATED ORAL at 18:00

## 2019-01-01 RX ADMIN — APIXABAN 5 MG: 5 TABLET, FILM COATED ORAL at 08:22

## 2019-01-01 RX ADMIN — METOPROLOL TARTRATE 25 MG: 25 TABLET ORAL at 05:31

## 2019-01-01 RX ADMIN — DEXAMETHASONE 1 MG: 1 TABLET ORAL at 08:30

## 2019-01-01 RX ADMIN — FERROUS SULFATE TAB 325 MG (65 MG ELEMENTAL FE) 325 MG: 325 (65 FE) TAB at 08:19

## 2019-01-01 RX ADMIN — APIXABAN 5 MG: 5 TABLET, FILM COATED ORAL at 10:06

## 2019-01-01 RX ADMIN — DABIGATRAN ETEXILATE MESYLATE 150 MG: 150 CAPSULE ORAL at 20:08

## 2019-01-01 RX ADMIN — DOCUSATE SODIUM 100 MG: 100 CAPSULE, LIQUID FILLED ORAL at 18:21

## 2019-01-01 RX ADMIN — SODIUM CHLORIDE 100 MG: 9 INJECTION, SOLUTION INTRAVENOUS at 09:45

## 2019-01-01 RX ADMIN — METOPROLOL TARTRATE 25 MG: 25 TABLET ORAL at 17:56

## 2019-01-01 RX ADMIN — APIXABAN 5 MG: 5 TABLET, FILM COATED ORAL at 08:04

## 2019-01-01 RX ADMIN — GLYCOPYRROLATE 0.2 MG: 0.2 INJECTION INTRAMUSCULAR; INTRAVENOUS at 07:26

## 2019-01-01 RX ADMIN — OMEPRAZOLE 40 MG: KIT at 10:00

## 2019-01-01 RX ADMIN — INSULIN GLARGINE 12 UNITS: 100 INJECTION, SOLUTION SUBCUTANEOUS at 20:05

## 2019-01-01 RX ADMIN — DILTIAZEM HYDROCHLORIDE 60 MG: 30 TABLET, FILM COATED ORAL at 13:14

## 2019-01-01 RX ADMIN — BACLOFEN 5 MG: 10 TABLET ORAL at 09:13

## 2019-01-01 RX ADMIN — DABIGATRAN ETEXILATE MESYLATE 150 MG: 150 CAPSULE ORAL at 21:10

## 2019-01-01 RX ADMIN — SODIUM CHLORIDE 100 MG: 9 INJECTION, SOLUTION INTRAVENOUS at 20:40

## 2019-01-01 RX ADMIN — METFORMIN HYDROCHLORIDE 750 MG: 500 TABLET, FILM COATED ORAL at 17:43

## 2019-01-01 RX ADMIN — INSULIN GLARGINE 12 UNITS: 100 INJECTION, SOLUTION SUBCUTANEOUS at 20:29

## 2019-01-01 RX ADMIN — INSULIN LISPRO 6 UNITS: 100 INJECTION, SOLUTION INTRAVENOUS; SUBCUTANEOUS at 11:16

## 2019-01-01 RX ADMIN — DABIGATRAN ETEXILATE MESYLATE 150 MG: 150 CAPSULE ORAL at 09:15

## 2019-01-01 RX ADMIN — ATORVASTATIN CALCIUM 20 MG: 10 TABLET, FILM COATED ORAL at 20:36

## 2019-01-01 RX ADMIN — BACLOFEN 5 MG: 10 TABLET ORAL at 15:29

## 2019-01-01 RX ADMIN — INSULIN HUMAN 2 UNITS: 100 INJECTION, SOLUTION PARENTERAL at 17:40

## 2019-01-01 RX ADMIN — SODIUM CHLORIDE 1000 MG: 9 INJECTION, SOLUTION INTRAVENOUS at 18:18

## 2019-01-01 RX ADMIN — ATORVASTATIN CALCIUM 20 MG: 10 TABLET, FILM COATED ORAL at 19:33

## 2019-01-01 RX ADMIN — TRAMADOL HYDROCHLORIDE 50 MG: 50 TABLET, FILM COATED ORAL at 20:28

## 2019-01-01 RX ADMIN — SODIUM CHLORIDE 500 MG: 9 INJECTION, SOLUTION INTRAVENOUS at 05:34

## 2019-01-01 RX ADMIN — LIDOCAINE AND PRILOCAINE 1 APPLICATION: 25; 25 CREAM TOPICAL at 16:45

## 2019-01-01 RX ADMIN — METOPROLOL TARTRATE 25 MG: 25 TABLET ORAL at 05:35

## 2019-01-01 RX ADMIN — ATORVASTATIN CALCIUM 20 MG: 10 TABLET, FILM COATED ORAL at 21:20

## 2019-01-01 RX ADMIN — SENNOSIDES, DOCUSATE SODIUM 2 TABLET: 50; 8.6 TABLET, FILM COATED ORAL at 08:17

## 2019-01-01 RX ADMIN — SODIUM CHLORIDE 1000 MG: 9 INJECTION, SOLUTION INTRAVENOUS at 06:34

## 2019-01-01 RX ADMIN — INSULIN GLARGINE 12 UNITS: 100 INJECTION, SOLUTION SUBCUTANEOUS at 22:04

## 2019-01-01 RX ADMIN — SODIUM CHLORIDE 1000 MG: 9 INJECTION, SOLUTION INTRAVENOUS at 17:53

## 2019-01-01 RX ADMIN — INSULIN LISPRO 6 UNITS: 100 INJECTION, SOLUTION INTRAVENOUS; SUBCUTANEOUS at 06:50

## 2019-01-01 RX ADMIN — SODIUM CHLORIDE, POTASSIUM CHLORIDE, SODIUM LACTATE AND CALCIUM CHLORIDE: 600; 310; 30; 20 INJECTION, SOLUTION INTRAVENOUS at 18:18

## 2019-01-01 RX ADMIN — OXYBUTYNIN CHLORIDE 5 MG: 5 TABLET, EXTENDED RELEASE ORAL at 20:30

## 2019-01-01 RX ADMIN — SENNOSIDES AND DOCUSATE SODIUM 2 TABLET: 8.6; 5 TABLET ORAL at 10:32

## 2019-01-01 RX ADMIN — APIXABAN 5 MG: 5 TABLET, FILM COATED ORAL at 20:39

## 2019-01-01 RX ADMIN — LEVETIRACETAM 1000 MG: 100 SOLUTION ORAL at 18:11

## 2019-01-01 RX ADMIN — LIDOCAINE 1 PATCH: 50 PATCH TOPICAL at 08:20

## 2019-01-01 RX ADMIN — SODIUM CHLORIDE 1000 MG: 9 INJECTION, SOLUTION INTRAVENOUS at 05:10

## 2019-01-01 RX ADMIN — AMANTADINE HYDROCHLORIDE 100 MG: 50 SOLUTION ORAL at 12:52

## 2019-01-01 RX ADMIN — VITAMIN D, TAB 1000IU (100/BT) 1000 UNITS: 25 TAB at 10:32

## 2019-01-01 RX ADMIN — METFORMIN HYDROCHLORIDE 250 MG: 500 TABLET, FILM COATED ORAL at 09:30

## 2019-01-01 RX ADMIN — INSULIN HUMAN 12 UNITS: 100 INJECTION, SOLUTION PARENTERAL at 18:04

## 2019-01-01 RX ADMIN — METOPROLOL TARTRATE 25 MG: 25 TABLET ORAL at 05:56

## 2019-01-01 RX ADMIN — METOPROLOL TARTRATE 50 MG: 25 TABLET ORAL at 18:09

## 2019-01-01 RX ADMIN — AMANTADINE HYDROCHLORIDE 100 MG: 50 SOLUTION ORAL at 08:58

## 2019-01-01 RX ADMIN — CIPROFLOXACIN HYDROCHLORIDE 500 MG: 500 TABLET, FILM COATED ORAL at 20:00

## 2019-01-01 RX ADMIN — ATORVASTATIN CALCIUM 20 MG: 10 TABLET, FILM COATED ORAL at 21:23

## 2019-01-01 RX ADMIN — ACETAMINOPHEN 650 MG: 325 TABLET, FILM COATED ORAL at 13:45

## 2019-01-01 RX ADMIN — PREDNISONE 10 MG: 5 TABLET ORAL at 09:13

## 2019-01-01 RX ADMIN — CIPROFLOXACIN HYDROCHLORIDE 500 MG: 500 TABLET, FILM COATED ORAL at 22:37

## 2019-01-01 RX ADMIN — METOPROLOL TARTRATE 25 MG: 25 TABLET ORAL at 05:11

## 2019-01-01 RX ADMIN — METFORMIN HYDROCHLORIDE 850 MG: 850 TABLET, FILM COATED ORAL at 08:33

## 2019-01-01 RX ADMIN — INSULIN HUMAN 4 UNITS: 100 INJECTION, SOLUTION PARENTERAL at 06:11

## 2019-01-01 RX ADMIN — SENNOSIDES AND DOCUSATE SODIUM 2 TABLET: 8.6; 5 TABLET ORAL at 09:10

## 2019-01-01 RX ADMIN — SODIUM CHLORIDE 100 MG: 9 INJECTION, SOLUTION INTRAVENOUS at 17:05

## 2019-01-01 RX ADMIN — SODIUM CHLORIDE 1000 MG: 9 INJECTION, SOLUTION INTRAVENOUS at 17:39

## 2019-01-01 RX ADMIN — DEXAMETHASONE SODIUM PHOSPHATE 4 MG: 4 INJECTION, SOLUTION INTRA-ARTICULAR; INTRALESIONAL; INTRAMUSCULAR; INTRAVENOUS; SOFT TISSUE at 16:30

## 2019-01-01 RX ADMIN — SENNOSIDES, DOCUSATE SODIUM 2 TABLET: 50; 8.6 TABLET, FILM COATED ORAL at 18:21

## 2019-01-01 RX ADMIN — MAGNESIUM SULFATE IN WATER 4 G: 40 INJECTION, SOLUTION INTRAVENOUS at 08:24

## 2019-01-01 RX ADMIN — MULTIPLE VITAMINS W/ MINERALS TAB 1 TABLET: TAB at 04:47

## 2019-01-01 RX ADMIN — DEXAMETHASONE 4 MG: 4 TABLET ORAL at 06:00

## 2019-01-01 RX ADMIN — LORATADINE 10 MG: 10 TABLET ORAL at 04:42

## 2019-01-01 RX ADMIN — DILTIAZEM HYDROCHLORIDE 90 MG: 30 TABLET, FILM COATED ORAL at 05:00

## 2019-01-01 RX ADMIN — LEVETIRACETAM 1000 MG: 100 SOLUTION ORAL at 05:23

## 2019-01-01 RX ADMIN — OMEPRAZOLE 40 MG: KIT at 09:00

## 2019-01-01 RX ADMIN — INSULIN LISPRO 6 UNITS: 100 INJECTION, SOLUTION INTRAVENOUS; SUBCUTANEOUS at 10:32

## 2019-01-01 RX ADMIN — PREDNISONE 10 MG: 10 TABLET ORAL at 05:56

## 2019-01-01 RX ADMIN — APIXABAN 5 MG: 5 TABLET, FILM COATED ORAL at 10:33

## 2019-01-01 RX ADMIN — METOPROLOL SUCCINATE 25 MG: 25 TABLET, EXTENDED RELEASE ORAL at 05:58

## 2019-01-01 RX ADMIN — INSULIN HUMAN 12 UNITS: 100 INJECTION, SOLUTION PARENTERAL at 16:20

## 2019-01-01 RX ADMIN — AMANTADINE HYDROCHLORIDE 100 MG: 50 SOLUTION ORAL at 14:25

## 2019-01-01 RX ADMIN — ENOXAPARIN SODIUM 40 MG: 100 INJECTION SUBCUTANEOUS at 04:54

## 2019-01-01 RX ADMIN — DEXAMETHASONE SODIUM PHOSPHATE 6 MG: 4 INJECTION, SOLUTION INTRA-ARTICULAR; INTRALESIONAL; INTRAMUSCULAR; INTRAVENOUS; SOFT TISSUE at 12:01

## 2019-01-01 RX ADMIN — METOPROLOL TARTRATE 25 MG: 25 TABLET ORAL at 05:33

## 2019-01-01 RX ADMIN — METOPROLOL TARTRATE 25 MG: 25 TABLET ORAL at 17:28

## 2019-01-01 RX ADMIN — ACETAMINOPHEN 650 MG: 325 TABLET, FILM COATED ORAL at 13:34

## 2019-01-01 RX ADMIN — APIXABAN 5 MG: 5 TABLET, FILM COATED ORAL at 20:36

## 2019-01-01 RX ADMIN — METOPROLOL TARTRATE 25 MG: 25 TABLET, FILM COATED ORAL at 04:47

## 2019-01-01 RX ADMIN — INSULIN HUMAN 2 UNITS: 100 INJECTION, SOLUTION PARENTERAL at 17:57

## 2019-01-01 RX ADMIN — VITAMIN D, TAB 1000IU (100/BT) 1000 UNITS: 25 TAB at 08:58

## 2019-01-01 RX ADMIN — FERROUS SULFATE TAB 325 MG (65 MG ELEMENTAL FE) 325 MG: 325 (65 FE) TAB at 08:26

## 2019-01-01 RX ADMIN — INSULIN LISPRO 6 UNITS: 100 INJECTION, SOLUTION INTRAVENOUS; SUBCUTANEOUS at 08:15

## 2019-01-01 RX ADMIN — SENNOSIDES AND DOCUSATE SODIUM 2 TABLET: 8.6; 5 TABLET ORAL at 21:36

## 2019-01-01 RX ADMIN — OXYCODONE HYDROCHLORIDE 5 MG: 5 TABLET ORAL at 11:30

## 2019-01-01 RX ADMIN — OXYCODONE HYDROCHLORIDE 5 MG: 5 TABLET ORAL at 21:46

## 2019-01-01 RX ADMIN — INSULIN HUMAN 4 UNITS: 100 INJECTION, SOLUTION PARENTERAL at 11:29

## 2019-01-01 RX ADMIN — BACLOFEN 5 MG: 10 TABLET ORAL at 21:32

## 2019-01-01 RX ADMIN — METFORMIN HYDROCHLORIDE 250 MG: 500 TABLET, FILM COATED ORAL at 09:05

## 2019-01-01 RX ADMIN — INSULIN HUMAN 10 UNITS: 100 INJECTION, SOLUTION PARENTERAL at 21:03

## 2019-01-01 RX ADMIN — METOPROLOL TARTRATE 25 MG: 25 TABLET ORAL at 17:23

## 2019-01-01 RX ADMIN — DABIGATRAN ETEXILATE MESYLATE 150 MG: 150 CAPSULE ORAL at 21:16

## 2019-01-01 RX ADMIN — INSULIN HUMAN 12 UNITS: 100 INJECTION, SOLUTION PARENTERAL at 12:41

## 2019-01-01 RX ADMIN — AMANTADINE HYDROCHLORIDE 100 MG: 50 SOLUTION ORAL at 08:43

## 2019-01-01 RX ADMIN — POLYVINYL ALCOHOL 2 DROP: 14 SOLUTION/ DROPS OPHTHALMIC at 13:45

## 2019-01-01 RX ADMIN — FENTANYL CITRATE 100 MCG: 0.05 INJECTION, SOLUTION INTRAMUSCULAR; INTRAVENOUS at 05:51

## 2019-01-01 RX ADMIN — POLYETHYLENE GLYCOL 3350 1 PACKET: 17 POWDER, FOR SOLUTION ORAL at 05:49

## 2019-01-01 RX ADMIN — ATORVASTATIN CALCIUM 20 MG: 20 TABLET, FILM COATED ORAL at 22:10

## 2019-01-01 RX ADMIN — INSULIN HUMAN 12 UNITS: 100 INJECTION, SOLUTION PARENTERAL at 12:03

## 2019-01-01 RX ADMIN — APIXABAN 5 MG: 5 TABLET, FILM COATED ORAL at 20:00

## 2019-01-01 RX ADMIN — DEXAMETHASONE SODIUM PHOSPHATE 6 MG: 4 INJECTION, SOLUTION INTRA-ARTICULAR; INTRALESIONAL; INTRAMUSCULAR; INTRAVENOUS; SOFT TISSUE at 12:08

## 2019-01-01 RX ADMIN — INSULIN HUMAN 2 UNITS: 100 INJECTION, SOLUTION PARENTERAL at 08:38

## 2019-01-01 RX ADMIN — SODIUM CHLORIDE 1000 MG: 9 INJECTION, SOLUTION INTRAVENOUS at 04:48

## 2019-01-01 RX ADMIN — METOPROLOL TARTRATE 25 MG: 25 TABLET ORAL at 17:13

## 2019-01-01 RX ADMIN — SENNOSIDES AND DOCUSATE SODIUM 2 TABLET: 8.6; 5 TABLET ORAL at 20:36

## 2019-01-01 RX ADMIN — LIDOCAINE 1 PATCH: 50 PATCH TOPICAL at 08:25

## 2019-01-01 RX ADMIN — VITAMIN D, TAB 1000IU (100/BT) 1000 UNITS: 25 TAB at 07:47

## 2019-01-01 RX ADMIN — INSULIN LISPRO 5 UNITS: 100 INJECTION, SOLUTION INTRAVENOUS; SUBCUTANEOUS at 11:26

## 2019-01-01 RX ADMIN — METOPROLOL TARTRATE 25 MG: 25 TABLET ORAL at 05:27

## 2019-01-01 RX ADMIN — MULTIPLE VITAMINS W/ MINERALS TAB 1 TABLET: TAB at 08:48

## 2019-01-01 RX ADMIN — AMANTADINE HYDROCHLORIDE 100 MG: 50 SOLUTION ORAL at 08:02

## 2019-01-01 RX ADMIN — SODIUM CHLORIDE 100 MG: 9 INJECTION, SOLUTION INTRAVENOUS at 18:58

## 2019-01-01 RX ADMIN — BACLOFEN 5 MG: 10 TABLET ORAL at 20:35

## 2019-01-01 RX ADMIN — DILTIAZEM HYDROCHLORIDE 90 MG: 30 TABLET, FILM COATED ORAL at 11:46

## 2019-01-01 RX ADMIN — VITAMIN D, TAB 1000IU (100/BT) 1000 UNITS: 25 TAB at 08:22

## 2019-01-01 RX ADMIN — MELATONIN 6 MG: at 20:35

## 2019-01-01 RX ADMIN — AMANTADINE HYDROCHLORIDE 100 MG: 50 SOLUTION ORAL at 08:27

## 2019-01-01 RX ADMIN — OMEPRAZOLE 40 MG: KIT at 11:46

## 2019-01-01 RX ADMIN — SODIUM CHLORIDE 1000 MG: 9 INJECTION, SOLUTION INTRAVENOUS at 05:37

## 2019-01-01 RX ADMIN — DEXAMETHASONE SODIUM PHOSPHATE 6 MG: 4 INJECTION, SOLUTION INTRA-ARTICULAR; INTRALESIONAL; INTRAMUSCULAR; INTRAVENOUS; SOFT TISSUE at 18:00

## 2019-01-01 RX ADMIN — INSULIN HUMAN 4 UNITS: 100 INJECTION, SOLUTION PARENTERAL at 08:06

## 2019-01-01 RX ADMIN — DEXAMETHASONE SODIUM PHOSPHATE 4 MG: 4 INJECTION, SOLUTION INTRA-ARTICULAR; INTRALESIONAL; INTRAMUSCULAR; INTRAVENOUS; SOFT TISSUE at 23:45

## 2019-01-01 RX ADMIN — METFORMIN HYDROCHLORIDE 250 MG: 500 TABLET, FILM COATED ORAL at 09:52

## 2019-01-01 RX ADMIN — AMANTADINE HYDROCHLORIDE 100 MG: 50 SOLUTION ORAL at 12:33

## 2019-01-01 RX ADMIN — INSULIN HUMAN 4 UNITS: 100 INJECTION, SOLUTION PARENTERAL at 08:43

## 2019-01-01 RX ADMIN — SODIUM CHLORIDE 1000 MG: 9 INJECTION, SOLUTION INTRAVENOUS at 05:01

## 2019-01-01 RX ADMIN — LIDOCAINE 1 PATCH: 50 PATCH TOPICAL at 17:09

## 2019-01-01 RX ADMIN — OXYBUTYNIN CHLORIDE 10 MG: 5 TABLET, EXTENDED RELEASE ORAL at 20:43

## 2019-01-01 RX ADMIN — AMANTADINE HYDROCHLORIDE 100 MG: 50 SOLUTION ORAL at 13:14

## 2019-01-01 RX ADMIN — OMEPRAZOLE 40 MG: 20 CAPSULE, DELAYED RELEASE ORAL at 05:51

## 2019-01-01 RX ADMIN — SENNOSIDES, DOCUSATE SODIUM 2 TABLET: 50; 8.6 TABLET, FILM COATED ORAL at 05:31

## 2019-01-01 RX ADMIN — METFORMIN HYDROCHLORIDE 850 MG: 850 TABLET, FILM COATED ORAL at 11:30

## 2019-01-01 RX ADMIN — DEXAMETHASONE SODIUM PHOSPHATE 6 MG: 4 INJECTION, SOLUTION INTRA-ARTICULAR; INTRALESIONAL; INTRAMUSCULAR; INTRAVENOUS; SOFT TISSUE at 06:00

## 2019-01-01 RX ADMIN — BACLOFEN 5 MG: 10 TABLET ORAL at 15:20

## 2019-01-01 RX ADMIN — MORPHINE SULFATE 5 MG: 10 INJECTION INTRAVENOUS at 20:25

## 2019-01-01 RX ADMIN — BACLOFEN 5 MG: 10 TABLET ORAL at 15:14

## 2019-01-01 RX ADMIN — ACETAMINOPHEN 650 MG: 325 TABLET, FILM COATED ORAL at 20:48

## 2019-01-01 RX ADMIN — INSULIN LISPRO 6 UNITS: 100 INJECTION, SOLUTION INTRAVENOUS; SUBCUTANEOUS at 21:25

## 2019-01-01 RX ADMIN — OMEPRAZOLE 40 MG: 20 CAPSULE, DELAYED RELEASE ORAL at 06:01

## 2019-01-01 RX ADMIN — METOPROLOL TARTRATE 25 MG: 25 TABLET, FILM COATED ORAL at 05:07

## 2019-01-01 RX ADMIN — DEXAMETHASONE 4 MG: 4 TABLET ORAL at 22:00

## 2019-01-01 RX ADMIN — MULTIPLE VITAMINS W/ MINERALS TAB 1 TABLET: TAB at 08:29

## 2019-01-01 RX ADMIN — LIDOCAINE 1 PATCH: 50 PATCH TOPICAL at 15:20

## 2019-01-01 RX ADMIN — PREDNISONE 10 MG: 10 TABLET ORAL at 06:15

## 2019-01-01 RX ADMIN — DOCUSATE SODIUM 100 MG: 100 CAPSULE, LIQUID FILLED ORAL at 17:43

## 2019-01-01 RX ADMIN — METOPROLOL TARTRATE 25 MG: 25 TABLET ORAL at 17:43

## 2019-01-01 RX ADMIN — ACETAMINOPHEN 650 MG: 325 TABLET, FILM COATED ORAL at 20:53

## 2019-01-01 RX ADMIN — SODIUM CHLORIDE 1000 MG: 9 INJECTION, SOLUTION INTRAVENOUS at 04:46

## 2019-01-01 RX ADMIN — SENNOSIDES AND DOCUSATE SODIUM 2 TABLET: 8.6; 5 TABLET ORAL at 09:30

## 2019-01-01 RX ADMIN — DABIGATRAN ETEXILATE MESYLATE 150 MG: 150 CAPSULE ORAL at 21:46

## 2019-01-01 RX ADMIN — LACOSAMIDE 100 MG: 100 TABLET, FILM COATED ORAL at 18:07

## 2019-01-01 RX ADMIN — SODIUM CHLORIDE 1000 MG: 9 INJECTION, SOLUTION INTRAVENOUS at 06:13

## 2019-01-01 RX ADMIN — ATORVASTATIN CALCIUM 20 MG: 10 TABLET, FILM COATED ORAL at 20:33

## 2019-01-01 RX ADMIN — ROCURONIUM BROMIDE 100 MG: 10 INJECTION, SOLUTION INTRAVENOUS at 03:08

## 2019-01-01 RX ADMIN — ACETAMINOPHEN 650 MG: 325 TABLET, FILM COATED ORAL at 20:21

## 2019-01-01 RX ADMIN — OMEPRAZOLE 40 MG: KIT at 09:51

## 2019-01-01 RX ADMIN — SODIUM CHLORIDE 500 MG: 9 INJECTION, SOLUTION INTRAVENOUS at 17:05

## 2019-01-01 RX ADMIN — INSULIN HUMAN 3 UNITS: 100 INJECTION, SOLUTION PARENTERAL at 21:11

## 2019-01-01 RX ADMIN — INSULIN LISPRO 6 UNITS: 100 INJECTION, SOLUTION INTRAVENOUS; SUBCUTANEOUS at 18:13

## 2019-01-01 RX ADMIN — ALLOPURINOL 300 MG: 300 TABLET ORAL at 07:47

## 2019-01-01 RX ADMIN — METFORMIN HYDROCHLORIDE 250 MG: 500 TABLET, FILM COATED ORAL at 17:17

## 2019-01-01 RX ADMIN — INSULIN HUMAN 3 UNITS: 100 INJECTION, SOLUTION PARENTERAL at 12:02

## 2019-01-01 RX ADMIN — INSULIN HUMAN 3 UNITS: 100 INJECTION, SOLUTION PARENTERAL at 17:25

## 2019-01-01 RX ADMIN — SODIUM CHLORIDE 100 MG: 9 INJECTION, SOLUTION INTRAVENOUS at 17:36

## 2019-01-01 RX ADMIN — METFORMIN HYDROCHLORIDE 250 MG: 500 TABLET, FILM COATED ORAL at 17:46

## 2019-01-01 RX ADMIN — SODIUM CHLORIDE 1000 MG: 9 INJECTION, SOLUTION INTRAVENOUS at 17:42

## 2019-01-01 RX ADMIN — METOPROLOL TARTRATE 25 MG: 25 TABLET ORAL at 05:43

## 2019-01-01 RX ADMIN — APIXABAN 5 MG: 5 TABLET, FILM COATED ORAL at 19:34

## 2019-01-01 RX ADMIN — INSULIN HUMAN 2 UNITS: 100 INJECTION, SOLUTION PARENTERAL at 08:55

## 2019-01-01 RX ADMIN — METFORMIN HYDROCHLORIDE 250 MG: 500 TABLET, FILM COATED ORAL at 17:48

## 2019-01-01 RX ADMIN — ATORVASTATIN CALCIUM 20 MG: 20 TABLET, FILM COATED ORAL at 20:20

## 2019-01-01 RX ADMIN — KETOROLAC TROMETHAMINE 15 MG: 30 INJECTION, SOLUTION INTRAMUSCULAR; INTRAVENOUS at 17:39

## 2019-01-01 RX ADMIN — APIXABAN 5 MG: 5 TABLET, FILM COATED ORAL at 21:32

## 2019-01-01 RX ADMIN — INSULIN HUMAN 2 UNITS: 100 INJECTION, SOLUTION PARENTERAL at 20:49

## 2019-01-01 RX ADMIN — METFORMIN HYDROCHLORIDE 250 MG: 500 TABLET, FILM COATED ORAL at 08:27

## 2019-01-01 RX ADMIN — SODIUM CHLORIDE 1000 MG: 9 INJECTION, SOLUTION INTRAVENOUS at 20:06

## 2019-01-01 RX ADMIN — SODIUM CHLORIDE 500 MG: 9 INJECTION, SOLUTION INTRAVENOUS at 05:35

## 2019-01-01 RX ADMIN — ALLOPURINOL 300 MG: 300 TABLET ORAL at 08:38

## 2019-01-01 RX ADMIN — APIXABAN 5 MG: 5 TABLET, FILM COATED ORAL at 08:02

## 2019-01-01 RX ADMIN — LIDOCAINE 1 PATCH: 50 PATCH TOPICAL at 08:22

## 2019-01-01 RX ADMIN — SODIUM CHLORIDE 500 MG: 9 INJECTION, SOLUTION INTRAVENOUS at 16:38

## 2019-01-01 RX ADMIN — SODIUM CHLORIDE 100 MG: 9 INJECTION, SOLUTION INTRAVENOUS at 05:31

## 2019-01-01 RX ADMIN — SODIUM CHLORIDE 100 MG: 9 INJECTION, SOLUTION INTRAVENOUS at 05:18

## 2019-01-01 RX ADMIN — APIXABAN 5 MG: 5 TABLET, FILM COATED ORAL at 08:21

## 2019-01-01 RX ADMIN — SODIUM CHLORIDE 100 MG: 9 INJECTION, SOLUTION INTRAVENOUS at 06:05

## 2019-01-01 RX ADMIN — DABIGATRAN ETEXILATE MESYLATE 75 MG: 75 CAPSULE ORAL at 14:07

## 2019-01-01 RX ADMIN — AMANTADINE HYDROCHLORIDE 100 MG: 50 SOLUTION ORAL at 08:37

## 2019-01-01 RX ADMIN — DIBASIC SODIUM PHOSPHATE, MONOBASIC POTASSIUM PHOSPHATE AND MONOBASIC SODIUM PHOSPHATE 1 TABLET: 852; 155; 130 TABLET ORAL at 20:53

## 2019-01-01 RX ADMIN — FENTANYL CITRATE 100 MCG: 0.05 INJECTION, SOLUTION INTRAMUSCULAR; INTRAVENOUS at 18:11

## 2019-01-01 RX ADMIN — ALLOPURINOL 300 MG: 300 TABLET ORAL at 07:59

## 2019-01-01 RX ADMIN — OMEPRAZOLE 20 MG: 20 CAPSULE, DELAYED RELEASE ORAL at 08:28

## 2019-01-01 RX ADMIN — SODIUM CHLORIDE 500 MG: 9 INJECTION, SOLUTION INTRAVENOUS at 04:37

## 2019-01-01 RX ADMIN — DIBASIC SODIUM PHOSPHATE, MONOBASIC POTASSIUM PHOSPHATE AND MONOBASIC SODIUM PHOSPHATE 1 TABLET: 852; 155; 130 TABLET ORAL at 21:20

## 2019-01-01 RX ADMIN — DILTIAZEM HYDROCHLORIDE 360 MG: 180 CAPSULE, COATED, EXTENDED RELEASE ORAL at 21:16

## 2019-01-01 RX ADMIN — PREDNISONE 10 MG: 10 TABLET ORAL at 04:43

## 2019-01-01 RX ADMIN — SODIUM CHLORIDE 100 MG: 9 INJECTION, SOLUTION INTRAVENOUS at 18:31

## 2019-01-01 RX ADMIN — LEVETIRACETAM 500 MG: 500 TABLET ORAL at 05:48

## 2019-01-01 RX ADMIN — METOPROLOL TARTRATE 50 MG: 25 TABLET ORAL at 05:10

## 2019-01-01 RX ADMIN — SODIUM CHLORIDE 3000 MG: 9 INJECTION, SOLUTION INTRAVENOUS at 03:13

## 2019-01-01 RX ADMIN — APIXABAN 5 MG: 5 TABLET, FILM COATED ORAL at 19:33

## 2019-01-01 RX ADMIN — MULTIPLE VITAMINS W/ MINERALS TAB 1 TABLET: TAB at 04:52

## 2019-01-01 RX ADMIN — FENTANYL CITRATE 25 MCG: 0.05 INJECTION, SOLUTION INTRAMUSCULAR; INTRAVENOUS at 20:44

## 2019-01-01 RX ADMIN — BACLOFEN 5 MG: 10 TABLET ORAL at 20:15

## 2019-01-01 RX ADMIN — METOPROLOL TARTRATE 25 MG: 25 TABLET ORAL at 05:34

## 2019-01-01 RX ADMIN — FENTANYL CITRATE 25 MCG: 0.05 INJECTION, SOLUTION INTRAMUSCULAR; INTRAVENOUS at 15:55

## 2019-01-01 RX ADMIN — INSULIN HUMAN 2 UNITS: 100 INJECTION, SOLUTION PARENTERAL at 17:10

## 2019-01-01 RX ADMIN — METFORMIN HYDROCHLORIDE 250 MG: 500 TABLET, FILM COATED ORAL at 08:19

## 2019-01-01 RX ADMIN — APIXABAN 5 MG: 5 TABLET, FILM COATED ORAL at 08:58

## 2019-01-01 RX ADMIN — OMEPRAZOLE 40 MG: 20 CAPSULE, DELAYED RELEASE ORAL at 06:15

## 2019-01-01 RX ADMIN — FENTANYL CITRATE 100 MCG: 50 INJECTION, SOLUTION INTRAMUSCULAR; INTRAVENOUS at 08:27

## 2019-01-01 RX ADMIN — INSULIN HUMAN 7 UNITS: 100 INJECTION, SOLUTION PARENTERAL at 17:36

## 2019-01-01 RX ADMIN — SODIUM CHLORIDE 100 MG: 9 INJECTION, SOLUTION INTRAVENOUS at 17:45

## 2019-01-01 RX ADMIN — LEVETIRACETAM 500 MG: 500 TABLET, FILM COATED ORAL at 06:00

## 2019-01-01 RX ADMIN — INSULIN LISPRO 5 UNITS: 100 INJECTION, SOLUTION INTRAVENOUS; SUBCUTANEOUS at 18:11

## 2019-01-01 RX ADMIN — OXYCODONE HYDROCHLORIDE 5 MG: 5 TABLET ORAL at 09:39

## 2019-01-01 RX ADMIN — ONDANSETRON 4 MG: 2 INJECTION INTRAMUSCULAR; INTRAVENOUS at 03:48

## 2019-01-01 RX ADMIN — APIXABAN 5 MG: 5 TABLET, FILM COATED ORAL at 21:36

## 2019-01-01 RX ADMIN — INSULIN HUMAN 2 UNITS: 100 INJECTION, SOLUTION PARENTERAL at 13:43

## 2019-01-01 RX ADMIN — METOPROLOL TARTRATE 25 MG: 25 TABLET ORAL at 05:36

## 2019-01-01 RX ADMIN — HEPARIN 300 UNITS: 100 SYRINGE at 03:55

## 2019-01-01 RX ADMIN — ATORVASTATIN CALCIUM 20 MG: 20 TABLET, FILM COATED ORAL at 20:07

## 2019-01-01 RX ADMIN — ATORVASTATIN CALCIUM 20 MG: 10 TABLET, FILM COATED ORAL at 20:14

## 2019-01-01 RX ADMIN — APIXABAN 5 MG: 5 TABLET, FILM COATED ORAL at 20:37

## 2019-01-01 RX ADMIN — SENNOSIDES, DOCUSATE SODIUM 1 TABLET: 50; 8.6 TABLET, FILM COATED ORAL at 20:21

## 2019-01-01 RX ADMIN — ALLOPURINOL 300 MG: 300 TABLET ORAL at 06:15

## 2019-01-01 RX ADMIN — SODIUM CHLORIDE 500 MG: 9 INJECTION, SOLUTION INTRAVENOUS at 05:07

## 2019-01-01 RX ADMIN — ENOXAPARIN SODIUM 40 MG: 100 INJECTION SUBCUTANEOUS at 06:44

## 2019-01-01 RX ADMIN — SENNOSIDES, DOCUSATE SODIUM 2 TABLET: 50; 8.6 TABLET, FILM COATED ORAL at 05:48

## 2019-01-01 RX ADMIN — MAGNESIUM OXIDE TAB 400 MG (241.3 MG ELEMENTAL MG) 400 MG: 400 (241.3 MG) TAB at 20:42

## 2019-01-01 RX ADMIN — FENTANYL CITRATE 100 MCG: 0.05 INJECTION, SOLUTION INTRAMUSCULAR; INTRAVENOUS at 12:42

## 2019-01-01 RX ADMIN — INSULIN LISPRO 5 UNITS: 100 INJECTION, SOLUTION INTRAVENOUS; SUBCUTANEOUS at 17:02

## 2019-01-01 RX ADMIN — OMEPRAZOLE 20 MG: 20 CAPSULE, DELAYED RELEASE ORAL at 08:15

## 2019-01-01 RX ADMIN — METFORMIN HYDROCHLORIDE 250 MG: 500 TABLET, FILM COATED ORAL at 17:10

## 2019-01-01 RX ADMIN — INSULIN HUMAN 2 UNITS: 100 INJECTION, SOLUTION PARENTERAL at 10:14

## 2019-01-01 RX ADMIN — METOPROLOL TARTRATE 25 MG: 25 TABLET, FILM COATED ORAL at 17:44

## 2019-01-01 RX ADMIN — ACETAMINOPHEN 650 MG: 325 TABLET, FILM COATED ORAL at 05:33

## 2019-01-01 RX ADMIN — SENNOSIDES AND DOCUSATE SODIUM 2 TABLET: 8.6; 5 TABLET ORAL at 08:44

## 2019-01-01 RX ADMIN — FENTANYL CITRATE 25 MCG: 0.05 INJECTION, SOLUTION INTRAMUSCULAR; INTRAVENOUS at 09:17

## 2019-01-01 RX ADMIN — MELATONIN 6 MG: at 22:37

## 2019-01-01 RX ADMIN — OMEPRAZOLE 40 MG: KIT at 08:39

## 2019-01-01 RX ADMIN — GLYCOPYRROLATE 0.2 MG: 0.2 INJECTION INTRAMUSCULAR; INTRAVENOUS at 16:42

## 2019-01-01 RX ADMIN — ACETAMINOPHEN 650 MG: 325 TABLET, FILM COATED ORAL at 16:17

## 2019-01-01 RX ADMIN — SENNOSIDES, DOCUSATE SODIUM 2 TABLET: 50; 8.6 TABLET, FILM COATED ORAL at 05:56

## 2019-01-01 RX ADMIN — DILTIAZEM HYDROCHLORIDE 60 MG: 30 TABLET, FILM COATED ORAL at 05:17

## 2019-01-01 RX ADMIN — AMANTADINE HYDROCHLORIDE 100 MG: 50 SOLUTION ORAL at 13:23

## 2019-01-01 RX ADMIN — SODIUM CHLORIDE, POTASSIUM CHLORIDE, SODIUM LACTATE AND CALCIUM CHLORIDE: 600; 310; 30; 20 INJECTION, SOLUTION INTRAVENOUS at 04:39

## 2019-01-01 RX ADMIN — PROPOFOL 30 MCG/KG/MIN: 10 INJECTION, EMULSION INTRAVENOUS at 07:39

## 2019-01-01 RX ADMIN — LORATADINE 10 MG: 10 TABLET ORAL at 09:13

## 2019-01-01 RX ADMIN — METOPROLOL TARTRATE 25 MG: 25 TABLET ORAL at 17:34

## 2019-01-01 RX ADMIN — SODIUM CHLORIDE: 9 INJECTION, SOLUTION INTRAVENOUS at 12:15

## 2019-01-01 RX ADMIN — FENTANYL CITRATE 50 MCG: 0.05 INJECTION, SOLUTION INTRAMUSCULAR; INTRAVENOUS at 06:35

## 2019-01-01 RX ADMIN — SODIUM CHLORIDE 500 MG: 9 INJECTION, SOLUTION INTRAVENOUS at 05:17

## 2019-01-01 RX ADMIN — CIPROFLOXACIN HYDROCHLORIDE 500 MG: 500 TABLET, FILM COATED ORAL at 10:00

## 2019-01-01 RX ADMIN — APIXABAN 5 MG: 5 TABLET, FILM COATED ORAL at 09:34

## 2019-01-01 RX ADMIN — DILTIAZEM HYDROCHLORIDE 90 MG: 30 TABLET, FILM COATED ORAL at 23:58

## 2019-01-01 RX ADMIN — DEXAMETHASONE SODIUM PHOSPHATE 4 MG: 4 INJECTION, SOLUTION INTRA-ARTICULAR; INTRALESIONAL; INTRAMUSCULAR; INTRAVENOUS; SOFT TISSUE at 11:56

## 2019-01-01 RX ADMIN — SODIUM CHLORIDE, POTASSIUM CHLORIDE, SODIUM LACTATE AND CALCIUM CHLORIDE: 600; 310; 30; 20 INJECTION, SOLUTION INTRAVENOUS at 20:44

## 2019-01-01 RX ADMIN — INSULIN HUMAN 4 UNITS: 100 INJECTION, SOLUTION PARENTERAL at 08:09

## 2019-01-01 RX ADMIN — ATORVASTATIN CALCIUM 20 MG: 10 TABLET, FILM COATED ORAL at 20:00

## 2019-01-01 RX ADMIN — SODIUM CHLORIDE: 9 INJECTION, SOLUTION INTRAVENOUS at 00:35

## 2019-01-01 RX ADMIN — POTASSIUM CHLORIDE AND SODIUM CHLORIDE: 900; 150 INJECTION, SOLUTION INTRAVENOUS at 13:44

## 2019-01-01 RX ADMIN — DILTIAZEM HYDROCHLORIDE 360 MG: 180 CAPSULE, COATED, EXTENDED RELEASE ORAL at 17:55

## 2019-01-01 RX ADMIN — LORATADINE 10 MG: 10 TABLET ORAL at 08:03

## 2019-01-01 RX ADMIN — APIXABAN 5 MG: 5 TABLET, FILM COATED ORAL at 09:12

## 2019-01-01 RX ADMIN — SODIUM CHLORIDE 100 MG: 9 INJECTION, SOLUTION INTRAVENOUS at 18:04

## 2019-01-01 RX ADMIN — OXYCODONE HYDROCHLORIDE 2.5 MG: 5 TABLET ORAL at 20:00

## 2019-01-01 RX ADMIN — GLYCOPYRROLATE 0.2 MG: 0.2 INJECTION INTRAMUSCULAR; INTRAVENOUS at 12:16

## 2019-01-01 RX ADMIN — MULTIPLE VITAMINS W/ MINERALS TAB 1 TABLET: TAB at 05:35

## 2019-01-01 RX ADMIN — SODIUM CHLORIDE 1000 MG: 9 INJECTION, SOLUTION INTRAVENOUS at 17:46

## 2019-01-01 RX ADMIN — LORATADINE 10 MG: 10 TABLET ORAL at 08:22

## 2019-01-01 RX ADMIN — SENNOSIDES AND DOCUSATE SODIUM 2 TABLET: 8.6; 5 TABLET ORAL at 07:58

## 2019-01-01 RX ADMIN — BACLOFEN 5 MG: 10 TABLET ORAL at 18:03

## 2019-01-01 RX ADMIN — MELATONIN 6 MG: at 20:09

## 2019-01-01 RX ADMIN — INSULIN HUMAN 3 UNITS: 100 INJECTION, SOLUTION PARENTERAL at 11:00

## 2019-01-01 RX ADMIN — METOPROLOL TARTRATE 25 MG: 25 TABLET ORAL at 17:17

## 2019-01-01 RX ADMIN — ATORVASTATIN CALCIUM 20 MG: 40 TABLET, FILM COATED ORAL at 19:56

## 2019-01-01 RX ADMIN — DABIGATRAN ETEXILATE MESYLATE 150 MG: 150 CAPSULE ORAL at 23:03

## 2019-01-01 RX ADMIN — METOPROLOL SUCCINATE 25 MG: 25 TABLET, EXTENDED RELEASE ORAL at 12:56

## 2019-01-01 RX ADMIN — BACLOFEN 5 MG: 10 TABLET ORAL at 10:00

## 2019-01-01 RX ADMIN — FENTANYL CITRATE 50 MCG: 0.05 INJECTION, SOLUTION INTRAMUSCULAR; INTRAVENOUS at 10:15

## 2019-01-01 RX ADMIN — BACLOFEN 5 MG: 10 TABLET ORAL at 08:06

## 2019-01-01 RX ADMIN — SODIUM CHLORIDE 1000 MG: 9 INJECTION, SOLUTION INTRAVENOUS at 18:11

## 2019-01-01 RX ADMIN — METOPROLOL TARTRATE 50 MG: 25 TABLET ORAL at 17:46

## 2019-01-01 RX ADMIN — ALLOPURINOL 300 MG: 300 TABLET ORAL at 10:05

## 2019-01-01 RX ADMIN — DOCUSATE SODIUM 100 MG: 100 CAPSULE, LIQUID FILLED ORAL at 12:44

## 2019-01-01 RX ADMIN — METFORMIN HYDROCHLORIDE 250 MG: 500 TABLET, FILM COATED ORAL at 11:30

## 2019-01-01 RX ADMIN — MULTIPLE VITAMINS W/ MINERALS TAB 1 TABLET: TAB at 17:28

## 2019-01-01 RX ADMIN — ALLOPURINOL 300 MG: 300 TABLET ORAL at 08:19

## 2019-01-01 RX ADMIN — MELATONIN 6 MG: at 20:30

## 2019-01-01 RX ADMIN — FERROUS SULFATE TAB 325 MG (65 MG ELEMENTAL FE) 325 MG: 325 (65 FE) TAB at 04:42

## 2019-01-01 RX ADMIN — OMEPRAZOLE 40 MG: 20 CAPSULE, DELAYED RELEASE ORAL at 08:03

## 2019-01-01 RX ADMIN — POLYVINYL ALCOHOL 2 DROP: 14 SOLUTION/ DROPS OPHTHALMIC at 04:36

## 2019-01-01 RX ADMIN — OMEPRAZOLE 20 MG: 20 CAPSULE, DELAYED RELEASE ORAL at 08:23

## 2019-01-01 RX ADMIN — VITAMIN D, TAB 1000IU (100/BT) 1000 UNITS: 25 TAB at 08:02

## 2019-01-01 RX ADMIN — VITAMIN D, TAB 1000IU (100/BT) 1000 UNITS: 25 TAB at 08:15

## 2019-01-01 RX ADMIN — MELATONIN 6 MG: at 21:32

## 2019-01-01 RX ADMIN — FERROUS SULFATE TAB 325 MG (65 MG ELEMENTAL FE) 325 MG: 325 (65 FE) TAB at 08:45

## 2019-01-01 RX ADMIN — INSULIN LISPRO 6 UNITS: 100 INJECTION, SOLUTION INTRAVENOUS; SUBCUTANEOUS at 10:34

## 2019-01-01 RX ADMIN — DEXAMETHASONE SODIUM PHOSPHATE 6 MG: 4 INJECTION, SOLUTION INTRA-ARTICULAR; INTRALESIONAL; INTRAMUSCULAR; INTRAVENOUS; SOFT TISSUE at 08:39

## 2019-01-01 RX ADMIN — INSULIN HUMAN 12 UNITS: 100 INJECTION, SOLUTION PARENTERAL at 17:52

## 2019-01-01 RX ADMIN — MANNITOL 50 G: 20 INJECTION, SOLUTION INTRAVENOUS at 09:05

## 2019-01-01 RX ADMIN — LACOSAMIDE 100 MG: 100 TABLET, FILM COATED ORAL at 10:13

## 2019-01-01 RX ADMIN — DOCUSATE SODIUM 100 MG: 100 CAPSULE, LIQUID FILLED ORAL at 17:45

## 2019-01-01 RX ADMIN — INSULIN HUMAN 12 UNITS: 100 INJECTION, SOLUTION PARENTERAL at 20:27

## 2019-01-01 RX ADMIN — BACLOFEN 5 MG: 10 TABLET ORAL at 20:30

## 2019-01-01 RX ADMIN — FAMOTIDINE 20 MG: 10 INJECTION INTRAVENOUS at 05:40

## 2019-01-01 RX ADMIN — METOPROLOL TARTRATE 25 MG: 25 TABLET, FILM COATED ORAL at 06:00

## 2019-01-01 RX ADMIN — METOPROLOL TARTRATE 25 MG: 25 TABLET ORAL at 05:08

## 2019-01-01 RX ADMIN — ACETAMINOPHEN 650 MG: 325 TABLET, FILM COATED ORAL at 21:16

## 2019-01-01 RX ADMIN — DILTIAZEM HYDROCHLORIDE 300 MG: 180 CAPSULE, COATED, EXTENDED RELEASE ORAL at 18:12

## 2019-01-01 RX ADMIN — ALLOPURINOL 300 MG: 300 TABLET ORAL at 05:21

## 2019-01-01 RX ADMIN — ATORVASTATIN CALCIUM 20 MG: 40 TABLET, FILM COATED ORAL at 20:22

## 2019-01-01 RX ADMIN — DIBASIC SODIUM PHOSPHATE, MONOBASIC POTASSIUM PHOSPHATE AND MONOBASIC SODIUM PHOSPHATE 1 TABLET: 852; 155; 130 TABLET ORAL at 14:58

## 2019-01-01 RX ADMIN — AMANTADINE HYDROCHLORIDE 100 MG: 50 SOLUTION ORAL at 08:24

## 2019-01-01 RX ADMIN — APIXABAN 5 MG: 5 TABLET, FILM COATED ORAL at 20:15

## 2019-01-01 RX ADMIN — INSULIN HUMAN 12 UNITS: 100 INJECTION, SOLUTION PARENTERAL at 21:15

## 2019-01-01 RX ADMIN — SODIUM CHLORIDE 100 MG: 9 INJECTION, SOLUTION INTRAVENOUS at 18:11

## 2019-01-01 RX ADMIN — BACLOFEN 5 MG: 10 TABLET ORAL at 16:37

## 2019-01-01 RX ADMIN — FENTANYL CITRATE 100 MCG: 0.05 INJECTION, SOLUTION INTRAMUSCULAR; INTRAVENOUS at 07:45

## 2019-01-01 RX ADMIN — SODIUM CHLORIDE 100 MG: 9 INJECTION, SOLUTION INTRAVENOUS at 05:10

## 2019-01-01 RX ADMIN — ATORVASTATIN CALCIUM 20 MG: 40 TABLET, FILM COATED ORAL at 20:37

## 2019-01-01 RX ADMIN — SENNOSIDES AND DOCUSATE SODIUM 2 TABLET: 8.6; 5 TABLET ORAL at 07:47

## 2019-01-01 RX ADMIN — FENTANYL CITRATE 50 MCG: 50 INJECTION, SOLUTION INTRAMUSCULAR; INTRAVENOUS at 03:13

## 2019-01-01 RX ADMIN — DILTIAZEM HYDROCHLORIDE 90 MG: 30 TABLET, FILM COATED ORAL at 05:02

## 2019-01-01 RX ADMIN — ALLOPURINOL 300 MG: 300 TABLET ORAL at 05:53

## 2019-01-01 RX ADMIN — VITAMIN D, TAB 1000IU (100/BT) 1000 UNITS: 25 TAB at 08:29

## 2019-01-01 RX ADMIN — DEXAMETHASONE SODIUM PHOSPHATE 6 MG: 4 INJECTION, SOLUTION INTRA-ARTICULAR; INTRALESIONAL; INTRAMUSCULAR; INTRAVENOUS; SOFT TISSUE at 17:44

## 2019-01-01 RX ADMIN — BACLOFEN 5 MG: 10 TABLET ORAL at 13:57

## 2019-01-01 RX ADMIN — DEXAMETHASONE SODIUM PHOSPHATE 6 MG: 4 INJECTION, SOLUTION INTRA-ARTICULAR; INTRALESIONAL; INTRAMUSCULAR; INTRAVENOUS; SOFT TISSUE at 18:01

## 2019-01-01 RX ADMIN — DILTIAZEM HYDROCHLORIDE 60 MG: 30 TABLET, FILM COATED ORAL at 06:28

## 2019-01-01 RX ADMIN — ALLOPURINOL 300 MG: 300 TABLET ORAL at 08:27

## 2019-01-01 RX ADMIN — FERROUS SULFATE TAB 325 MG (65 MG ELEMENTAL FE) 325 MG: 325 (65 FE) TAB at 05:57

## 2019-01-01 RX ADMIN — SODIUM CHLORIDE 1000 MG: 9 INJECTION, SOLUTION INTRAVENOUS at 21:37

## 2019-01-01 RX ADMIN — MULTIPLE VITAMINS W/ MINERALS TAB 1 TABLET: TAB at 08:16

## 2019-01-01 RX ADMIN — GLYCOPYRROLATE 0.2 MG: 0.2 INJECTION INTRAMUSCULAR; INTRAVENOUS at 23:05

## 2019-01-01 RX ADMIN — INSULIN LISPRO 6 UNITS: 100 INJECTION, SOLUTION INTRAVENOUS; SUBCUTANEOUS at 11:17

## 2019-01-01 RX ADMIN — ATORVASTATIN CALCIUM 20 MG: 10 TABLET, FILM COATED ORAL at 21:16

## 2019-01-01 RX ADMIN — ALLOPURINOL 300 MG: 300 TABLET ORAL at 08:03

## 2019-01-01 RX ADMIN — ATORVASTATIN CALCIUM 20 MG: 10 TABLET, FILM COATED ORAL at 19:41

## 2019-01-01 RX ADMIN — METOPROLOL TARTRATE 25 MG: 25 TABLET ORAL at 17:35

## 2019-01-01 RX ADMIN — SODIUM CHLORIDE 1000 MG: 9 INJECTION, SOLUTION INTRAVENOUS at 20:17

## 2019-01-01 RX ADMIN — APIXABAN 5 MG: 5 TABLET, FILM COATED ORAL at 20:09

## 2019-01-01 RX ADMIN — INSULIN GLARGINE 20 UNITS: 100 INJECTION, SOLUTION SUBCUTANEOUS at 18:23

## 2019-01-01 RX ADMIN — DILTIAZEM HYDROCHLORIDE 360 MG: 180 CAPSULE, COATED, EXTENDED RELEASE ORAL at 18:29

## 2019-01-01 RX ADMIN — INSULIN HUMAN 3 UNITS: 100 INJECTION, SOLUTION PARENTERAL at 12:09

## 2019-01-01 RX ADMIN — POTASSIUM CHLORIDE AND SODIUM CHLORIDE: 900; 150 INJECTION, SOLUTION INTRAVENOUS at 00:31

## 2019-01-01 RX ADMIN — METFORMIN HYDROCHLORIDE 250 MG: 500 TABLET, FILM COATED ORAL at 17:47

## 2019-01-01 RX ADMIN — OMEPRAZOLE 40 MG: KIT at 10:32

## 2019-01-01 RX ADMIN — INSULIN LISPRO 6 UNITS: 100 INJECTION, SOLUTION INTRAVENOUS; SUBCUTANEOUS at 21:11

## 2019-01-01 RX ADMIN — METOPROLOL TARTRATE 25 MG: 25 TABLET ORAL at 18:05

## 2019-01-01 RX ADMIN — ALLOPURINOL 300 MG: 300 TABLET ORAL at 08:21

## 2019-01-01 RX ADMIN — LEVETIRACETAM 500 MG: 500 TABLET ORAL at 04:52

## 2019-01-01 RX ADMIN — SODIUM CHLORIDE, POTASSIUM CHLORIDE, SODIUM LACTATE AND CALCIUM CHLORIDE: 600; 310; 30; 20 INJECTION, SOLUTION INTRAVENOUS at 10:58

## 2019-01-01 RX ADMIN — INSULIN HUMAN 4 UNITS: 100 INJECTION, SOLUTION PARENTERAL at 02:32

## 2019-01-01 RX ADMIN — VITAMIN D, TAB 1000IU (100/BT) 1000 UNITS: 25 TAB at 08:04

## 2019-01-01 RX ADMIN — INSULIN HUMAN 2 UNITS: 100 INJECTION, SOLUTION PARENTERAL at 20:22

## 2019-01-01 RX ADMIN — SENNOSIDES AND DOCUSATE SODIUM 2 TABLET: 8.6; 5 TABLET ORAL at 20:00

## 2019-01-01 RX ADMIN — MELATONIN 6 MG: at 20:36

## 2019-01-01 RX ADMIN — SODIUM CHLORIDE 100 MG: 9 INJECTION, SOLUTION INTRAVENOUS at 23:24

## 2019-01-01 RX ADMIN — DILTIAZEM HYDROCHLORIDE 60 MG: 30 TABLET, FILM COATED ORAL at 21:00

## 2019-01-01 RX ADMIN — ROCURONIUM BROMIDE 50 MG: 10 INJECTION, SOLUTION INTRAVENOUS at 08:30

## 2019-01-01 RX ADMIN — OXYCODONE HYDROCHLORIDE 2.5 MG: 5 TABLET ORAL at 13:50

## 2019-01-01 RX ADMIN — MULTIPLE VITAMINS W/ MINERALS TAB 1 TABLET: TAB at 05:00

## 2019-01-01 RX ADMIN — OXYCODONE HYDROCHLORIDE 5 MG: 5 TABLET ORAL at 21:17

## 2019-01-01 RX ADMIN — SENNOSIDES AND DOCUSATE SODIUM 2 TABLET: 8.6; 5 TABLET ORAL at 08:57

## 2019-01-01 RX ADMIN — FENTANYL CITRATE 25 MCG: 0.05 INJECTION, SOLUTION INTRAMUSCULAR; INTRAVENOUS at 05:04

## 2019-01-01 RX ADMIN — METOPROLOL TARTRATE 25 MG: 25 TABLET ORAL at 05:16

## 2019-01-01 RX ADMIN — CIPROFLOXACIN HYDROCHLORIDE 500 MG: 500 TABLET, FILM COATED ORAL at 08:37

## 2019-01-01 RX ADMIN — METFORMIN HYDROCHLORIDE 850 MG: 850 TABLET, FILM COATED ORAL at 08:03

## 2019-01-01 RX ADMIN — METFORMIN HYDROCHLORIDE 250 MG: 500 TABLET, FILM COATED ORAL at 17:35

## 2019-01-01 RX ADMIN — GLYCOPYRROLATE 0.2 MG: 0.2 INJECTION INTRAMUSCULAR; INTRAVENOUS at 18:18

## 2019-01-01 RX ADMIN — MORPHINE SULFATE 10 MG: 100 SOLUTION ORAL at 06:15

## 2019-01-01 RX ADMIN — FENTANYL CITRATE 25 MCG: 0.05 INJECTION, SOLUTION INTRAMUSCULAR; INTRAVENOUS at 12:46

## 2019-01-01 RX ADMIN — FERROUS SULFATE TAB 325 MG (65 MG ELEMENTAL FE) 325 MG: 325 (65 FE) TAB at 09:30

## 2019-01-01 RX ADMIN — INSULIN HUMAN 7 UNITS: 100 INJECTION, SOLUTION PARENTERAL at 07:38

## 2019-01-01 RX ADMIN — METFORMIN HYDROCHLORIDE 850 MG: 850 TABLET, FILM COATED ORAL at 11:25

## 2019-01-01 RX ADMIN — ATORVASTATIN CALCIUM 20 MG: 10 TABLET, FILM COATED ORAL at 21:00

## 2019-01-01 RX ADMIN — TRAZODONE HYDROCHLORIDE 50 MG: 50 TABLET ORAL at 20:43

## 2019-01-01 RX ADMIN — DEXAMETHASONE SODIUM PHOSPHATE 4 MG: 4 INJECTION, SOLUTION INTRA-ARTICULAR; INTRALESIONAL; INTRAMUSCULAR; INTRAVENOUS; SOFT TISSUE at 11:32

## 2019-01-01 RX ADMIN — MAGNESIUM OXIDE TAB 400 MG (241.3 MG ELEMENTAL MG) 400 MG: 400 (241.3 MG) TAB at 08:15

## 2019-01-01 RX ADMIN — SENNOSIDES, DOCUSATE SODIUM 2 TABLET: 50; 8.6 TABLET, FILM COATED ORAL at 20:30

## 2019-01-01 RX ADMIN — OXYCODONE HYDROCHLORIDE 5 MG: 5 TABLET ORAL at 23:23

## 2019-01-01 RX ADMIN — DEXAMETHASONE SODIUM PHOSPHATE 4 MG: 4 INJECTION, SOLUTION INTRA-ARTICULAR; INTRALESIONAL; INTRAMUSCULAR; INTRAVENOUS; SOFT TISSUE at 05:31

## 2019-01-01 RX ADMIN — INSULIN HUMAN 12 UNITS: 100 INJECTION, SOLUTION PARENTERAL at 14:05

## 2019-01-01 RX ADMIN — LORATADINE 10 MG: 10 TABLET ORAL at 08:37

## 2019-01-01 RX ADMIN — SODIUM CHLORIDE 1000 MG: 9 INJECTION, SOLUTION INTRAVENOUS at 05:40

## 2019-01-01 RX ADMIN — POLYVINYL ALCOHOL 2 DROP: 14 SOLUTION/ DROPS OPHTHALMIC at 23:25

## 2019-01-01 RX ADMIN — ATORVASTATIN CALCIUM 20 MG: 10 TABLET, FILM COATED ORAL at 20:15

## 2019-01-01 RX ADMIN — POLYVINYL ALCOHOL 2 DROP: 14 SOLUTION/ DROPS OPHTHALMIC at 20:47

## 2019-01-01 RX ADMIN — MAGNESIUM SULFATE 2 G: 2 INJECTION INTRAVENOUS at 09:28

## 2019-01-01 RX ADMIN — ACETAMINOPHEN 325 MG: 160 SUSPENSION ORAL at 16:28

## 2019-01-01 RX ADMIN — FENTANYL CITRATE 50 MCG: 50 INJECTION, SOLUTION INTRAMUSCULAR; INTRAVENOUS at 14:28

## 2019-01-01 RX ADMIN — FENTANYL CITRATE 100 MCG: 0.05 INJECTION, SOLUTION INTRAMUSCULAR; INTRAVENOUS at 13:59

## 2019-01-01 RX ADMIN — LORAZEPAM 0.5 MG: 2 INJECTION INTRAMUSCULAR; INTRAVENOUS at 04:46

## 2019-01-01 RX ADMIN — OMEPRAZOLE 40 MG: 20 CAPSULE, DELAYED RELEASE ORAL at 05:21

## 2019-01-01 RX ADMIN — SODIUM CHLORIDE 100 MG: 9 INJECTION, SOLUTION INTRAVENOUS at 18:43

## 2019-01-01 RX ADMIN — INSULIN HUMAN 7 UNITS: 100 INJECTION, SOLUTION PARENTERAL at 22:04

## 2019-01-01 RX ADMIN — OMEPRAZOLE 40 MG: 20 CAPSULE, DELAYED RELEASE ORAL at 08:57

## 2019-01-01 RX ADMIN — LEVETIRACETAM 1000 MG: 100 SOLUTION ORAL at 10:13

## 2019-01-01 RX ADMIN — DILTIAZEM HYDROCHLORIDE 90 MG: 30 TABLET, FILM COATED ORAL at 12:17

## 2019-01-01 RX ADMIN — INSULIN GLARGINE 10 UNITS: 100 INJECTION, SOLUTION SUBCUTANEOUS at 17:35

## 2019-01-01 RX ADMIN — MELATONIN 6 MG: at 20:37

## 2019-01-01 RX ADMIN — OXYCODONE HYDROCHLORIDE 5 MG: 5 TABLET ORAL at 14:37

## 2019-01-01 RX ADMIN — AMANTADINE HYDROCHLORIDE 100 MG: 50 SOLUTION ORAL at 13:08

## 2019-01-01 RX ADMIN — LIDOCAINE 1 PATCH: 50 PATCH TOPICAL at 07:47

## 2019-01-01 RX ADMIN — DILTIAZEM HYDROCHLORIDE 90 MG: 30 TABLET, FILM COATED ORAL at 11:16

## 2019-01-01 RX ADMIN — DIBASIC SODIUM PHOSPHATE, MONOBASIC POTASSIUM PHOSPHATE AND MONOBASIC SODIUM PHOSPHATE 1 TABLET: 852; 155; 130 TABLET ORAL at 21:08

## 2019-01-01 RX ADMIN — DEXAMETHASONE SODIUM PHOSPHATE 4 MG: 4 INJECTION, SOLUTION INTRA-ARTICULAR; INTRALESIONAL; INTRAMUSCULAR; INTRAVENOUS; SOFT TISSUE at 17:30

## 2019-01-01 RX ADMIN — INSULIN HUMAN 10 UNITS: 100 INJECTION, SOLUTION PARENTERAL at 20:38

## 2019-01-01 RX ADMIN — INSULIN GLARGINE 12 UNITS: 100 INJECTION, SOLUTION SUBCUTANEOUS at 21:24

## 2019-01-01 RX ADMIN — ALLOPURINOL 300 MG: 300 TABLET ORAL at 06:00

## 2019-01-01 RX ADMIN — SENNOSIDES AND DOCUSATE SODIUM 2 TABLET: 8.6; 5 TABLET ORAL at 20:21

## 2019-01-01 RX ADMIN — LEVETIRACETAM 500 MG: 500 TABLET ORAL at 18:07

## 2019-01-01 RX ADMIN — BACLOFEN 5 MG: 10 TABLET ORAL at 12:55

## 2019-01-01 RX ADMIN — FENTANYL CITRATE 25 MCG: 0.05 INJECTION, SOLUTION INTRAMUSCULAR; INTRAVENOUS at 00:09

## 2019-01-01 RX ADMIN — KETOROLAC TROMETHAMINE 15 MG: 30 INJECTION, SOLUTION INTRAMUSCULAR; INTRAVENOUS at 02:43

## 2019-01-01 RX ADMIN — METFORMIN HYDROCHLORIDE 250 MG: 500 TABLET, FILM COATED ORAL at 18:04

## 2019-01-01 RX ADMIN — OXYCODONE HYDROCHLORIDE 5 MG: 5 TABLET ORAL at 08:29

## 2019-01-01 RX ADMIN — BACLOFEN 5 MG: 10 TABLET ORAL at 10:33

## 2019-01-01 RX ADMIN — BACLOFEN 5 MG: 10 TABLET ORAL at 08:26

## 2019-01-01 RX ADMIN — APIXABAN 5 MG: 5 TABLET, FILM COATED ORAL at 09:53

## 2019-01-01 RX ADMIN — INSULIN LISPRO 6 UNITS: 100 INJECTION, SOLUTION INTRAVENOUS; SUBCUTANEOUS at 11:54

## 2019-01-01 RX ADMIN — OXYCODONE HYDROCHLORIDE 5 MG: 5 TABLET ORAL at 12:54

## 2019-01-01 RX ADMIN — DEXAMETHASONE 4 MG: 4 TABLET ORAL at 08:47

## 2019-01-01 RX ADMIN — SODIUM CHLORIDE 100 MG: 9 INJECTION, SOLUTION INTRAVENOUS at 19:59

## 2019-01-01 RX ADMIN — VITAMIN D, TAB 1000IU (100/BT) 1000 UNITS: 25 TAB at 08:33

## 2019-01-01 RX ADMIN — GADOBUTROL 9 ML: 604.72 INJECTION INTRAVENOUS at 16:20

## 2019-01-01 RX ADMIN — BACLOFEN 10 MG: 10 TABLET ORAL at 21:00

## 2019-01-01 RX ADMIN — MULTIPLE VITAMINS W/ MINERALS TAB 1 TABLET: TAB at 08:04

## 2019-01-01 RX ADMIN — BACLOFEN 5 MG: 10 TABLET ORAL at 21:16

## 2019-01-01 RX ADMIN — BACLOFEN 5 MG: 10 TABLET ORAL at 14:44

## 2019-01-01 RX ADMIN — METFORMIN HYDROCHLORIDE 250 MG: 500 TABLET, FILM COATED ORAL at 09:41

## 2019-01-01 RX ADMIN — DEXAMETHASONE 1 MG: 1 TABLET ORAL at 21:08

## 2019-01-01 RX ADMIN — METFORMIN HYDROCHLORIDE 250 MG: 500 TABLET, FILM COATED ORAL at 09:14

## 2019-01-01 RX ADMIN — DIBASIC SODIUM PHOSPHATE, MONOBASIC POTASSIUM PHOSPHATE AND MONOBASIC SODIUM PHOSPHATE 1 TABLET: 852; 155; 130 TABLET ORAL at 11:14

## 2019-01-01 RX ADMIN — METOPROLOL TARTRATE 25 MG: 25 TABLET ORAL at 06:16

## 2019-01-01 RX ADMIN — DEXAMETHASONE SODIUM PHOSPHATE 4 MG: 4 INJECTION, SOLUTION INTRA-ARTICULAR; INTRALESIONAL; INTRAMUSCULAR; INTRAVENOUS; SOFT TISSUE at 05:57

## 2019-01-01 RX ADMIN — SODIUM CHLORIDE 1000 MG: 9 INJECTION, SOLUTION INTRAVENOUS at 18:33

## 2019-01-01 RX ADMIN — DEXAMETHASONE SODIUM PHOSPHATE 6 MG: 4 INJECTION, SOLUTION INTRA-ARTICULAR; INTRALESIONAL; INTRAMUSCULAR; INTRAVENOUS; SOFT TISSUE at 01:05

## 2019-01-01 RX ADMIN — APIXABAN 5 MG: 5 TABLET, FILM COATED ORAL at 08:26

## 2019-01-01 RX ADMIN — LEVETIRACETAM 1000 MG: 100 SOLUTION ORAL at 20:25

## 2019-01-01 RX ADMIN — SODIUM CHLORIDE 1000 MG: 9 INJECTION, SOLUTION INTRAVENOUS at 20:54

## 2019-01-01 RX ADMIN — DEXAMETHASONE SODIUM PHOSPHATE 4 MG: 4 INJECTION, SOLUTION INTRA-ARTICULAR; INTRALESIONAL; INTRAMUSCULAR; INTRAVENOUS; SOFT TISSUE at 18:07

## 2019-01-01 RX ADMIN — GLYCOPYRROLATE 0.2 MG: 0.2 INJECTION INTRAMUSCULAR; INTRAVENOUS at 11:15

## 2019-01-01 RX ADMIN — DEXAMETHASONE 4 MG: 4 TABLET ORAL at 14:00

## 2019-01-01 RX ADMIN — HEPARIN: 100 SYRINGE at 14:50

## 2019-01-01 RX ADMIN — INSULIN HUMAN 2 UNITS: 100 INJECTION, SOLUTION PARENTERAL at 09:15

## 2019-01-01 RX ADMIN — METOPROLOL TARTRATE 25 MG: 25 TABLET, FILM COATED ORAL at 16:56

## 2019-01-01 RX ADMIN — AMANTADINE HYDROCHLORIDE 100 MG: 50 SOLUTION ORAL at 12:55

## 2019-01-01 RX ADMIN — INSULIN HUMAN 3 UNITS: 100 INJECTION, SOLUTION PARENTERAL at 11:16

## 2019-01-01 RX ADMIN — OXYBUTYNIN CHLORIDE 10 MG: 5 TABLET, EXTENDED RELEASE ORAL at 20:53

## 2019-01-01 RX ADMIN — METOPROLOL SUCCINATE 25 MG: 25 TABLET, EXTENDED RELEASE ORAL at 06:22

## 2019-01-01 RX ADMIN — POLYVINYL ALCOHOL 2 DROP: 14 SOLUTION/ DROPS OPHTHALMIC at 17:55

## 2019-01-01 RX ADMIN — ATORVASTATIN CALCIUM 20 MG: 40 TABLET, FILM COATED ORAL at 21:11

## 2019-01-01 RX ADMIN — INSULIN LISPRO 6 UNITS: 100 INJECTION, SOLUTION INTRAVENOUS; SUBCUTANEOUS at 09:09

## 2019-01-01 RX ADMIN — BACLOFEN 5 MG: 10 TABLET ORAL at 08:37

## 2019-01-01 RX ADMIN — METOPROLOL TARTRATE 25 MG: 25 TABLET, FILM COATED ORAL at 17:59

## 2019-01-01 RX ADMIN — DOCUSATE SODIUM 100 MG: 100 CAPSULE, LIQUID FILLED ORAL at 04:38

## 2019-01-01 RX ADMIN — MORPHINE SULFATE 10 MG: 10 INJECTION INTRAVENOUS at 05:00

## 2019-01-01 RX ADMIN — ATORVASTATIN CALCIUM 20 MG: 40 TABLET, FILM COATED ORAL at 20:55

## 2019-01-01 RX ADMIN — DEXAMETHASONE 4 MG: 4 TABLET ORAL at 21:09

## 2019-01-01 RX ADMIN — DABIGATRAN ETEXILATE MESYLATE 150 MG: 150 CAPSULE ORAL at 10:13

## 2019-01-01 RX ADMIN — SENNOSIDES, DOCUSATE SODIUM 2 TABLET: 50; 8.6 TABLET, FILM COATED ORAL at 08:47

## 2019-01-01 RX ADMIN — ATORVASTATIN CALCIUM 20 MG: 10 TABLET, FILM COATED ORAL at 21:57

## 2019-01-01 RX ADMIN — SODIUM CHLORIDE 500 MG: 9 INJECTION, SOLUTION INTRAVENOUS at 17:35

## 2019-01-01 RX ADMIN — METOPROLOL TARTRATE 50 MG: 25 TABLET ORAL at 17:28

## 2019-01-01 RX ADMIN — METFORMIN HYDROCHLORIDE 250 MG: 500 TABLET, FILM COATED ORAL at 17:22

## 2019-01-01 RX ADMIN — METFORMIN HYDROCHLORIDE 250 MG: 500 TABLET, FILM COATED ORAL at 08:46

## 2019-01-01 RX ADMIN — FENTANYL CITRATE 25 MCG: 0.05 INJECTION, SOLUTION INTRAMUSCULAR; INTRAVENOUS at 02:23

## 2019-01-01 RX ADMIN — INSULIN GLARGINE 10 UNITS: 100 INJECTION, SOLUTION SUBCUTANEOUS at 21:45

## 2019-01-01 RX ADMIN — DILTIAZEM HYDROCHLORIDE 60 MG: 30 TABLET, FILM COATED ORAL at 05:20

## 2019-01-01 RX ADMIN — CIPROFLOXACIN HYDROCHLORIDE 500 MG: 500 TABLET, FILM COATED ORAL at 21:16

## 2019-01-01 RX ADMIN — POTASSIUM CHLORIDE AND SODIUM CHLORIDE: 900; 150 INJECTION, SOLUTION INTRAVENOUS at 18:17

## 2019-01-01 RX ADMIN — DEXAMETHASONE 2 MG: 1 TABLET ORAL at 08:17

## 2019-01-01 RX ADMIN — SODIUM CHLORIDE 100 MG: 9 INJECTION, SOLUTION INTRAVENOUS at 18:26

## 2019-01-01 RX ADMIN — METFORMIN HYDROCHLORIDE 250 MG: 500 TABLET, FILM COATED ORAL at 08:21

## 2019-01-01 RX ADMIN — APIXABAN 5 MG: 5 TABLET, FILM COATED ORAL at 19:41

## 2019-01-01 RX ADMIN — KETOROLAC TROMETHAMINE 15 MG: 30 INJECTION, SOLUTION INTRAMUSCULAR; INTRAVENOUS at 01:49

## 2019-01-01 RX ADMIN — METOPROLOL TARTRATE 5 MG: 5 INJECTION, SOLUTION INTRAVENOUS at 10:12

## 2019-01-01 RX ADMIN — BACLOFEN 5 MG: 10 TABLET ORAL at 14:06

## 2019-01-01 RX ADMIN — POTASSIUM CHLORIDE AND SODIUM CHLORIDE: 900; 150 INJECTION, SOLUTION INTRAVENOUS at 04:56

## 2019-01-01 RX ADMIN — SODIUM CHLORIDE 1000 MG: 9 INJECTION, SOLUTION INTRAVENOUS at 06:36

## 2019-01-01 RX ADMIN — LORAZEPAM 2 MG: 2 INJECTION INTRAMUSCULAR at 14:14

## 2019-01-01 RX ADMIN — SODIUM CHLORIDE, POTASSIUM CHLORIDE, SODIUM LACTATE AND CALCIUM CHLORIDE: 600; 310; 30; 20 INJECTION, SOLUTION INTRAVENOUS at 12:48

## 2019-01-01 RX ADMIN — DEXAMETHASONE SODIUM PHOSPHATE 4 MG: 4 INJECTION, SOLUTION INTRA-ARTICULAR; INTRALESIONAL; INTRAMUSCULAR; INTRAVENOUS; SOFT TISSUE at 00:24

## 2019-01-01 RX ADMIN — METFORMIN HYDROCHLORIDE 850 MG: 850 TABLET, FILM COATED ORAL at 11:39

## 2019-01-01 RX ADMIN — SENNOSIDES AND DOCUSATE SODIUM 2 TABLET: 8.6; 5 TABLET ORAL at 20:15

## 2019-01-01 RX ADMIN — CIPROFLOXACIN HYDROCHLORIDE 500 MG: 500 TABLET, FILM COATED ORAL at 21:23

## 2019-01-01 RX ADMIN — LIDOCAINE 1 PATCH: 50 PATCH TOPICAL at 08:59

## 2019-01-01 RX ADMIN — SODIUM CHLORIDE 1000 MG: 9 INJECTION, SOLUTION INTRAVENOUS at 05:42

## 2019-01-01 RX ADMIN — SODIUM CHLORIDE, POTASSIUM CHLORIDE, SODIUM LACTATE AND CALCIUM CHLORIDE: 600; 310; 30; 20 INJECTION, SOLUTION INTRAVENOUS at 21:47

## 2019-01-01 RX ADMIN — FENTANYL CITRATE 50 MCG: 0.05 INJECTION, SOLUTION INTRAMUSCULAR; INTRAVENOUS at 12:35

## 2019-01-01 RX ADMIN — METFORMIN HYDROCHLORIDE 850 MG: 850 TABLET, FILM COATED ORAL at 08:57

## 2019-01-01 RX ADMIN — CIPROFLOXACIN HYDROCHLORIDE 500 MG: 500 TABLET, FILM COATED ORAL at 19:36

## 2019-01-01 RX ADMIN — OMEPRAZOLE 40 MG: 20 CAPSULE, DELAYED RELEASE ORAL at 05:53

## 2019-01-01 RX ADMIN — INSULIN HUMAN 2 UNITS: 100 INJECTION, SOLUTION PARENTERAL at 20:10

## 2019-01-01 RX ADMIN — SENNOSIDES, DOCUSATE SODIUM 2 TABLET: 50; 8.6 TABLET, FILM COATED ORAL at 20:42

## 2019-01-01 RX ADMIN — SODIUM CHLORIDE 100 MG: 9 INJECTION, SOLUTION INTRAVENOUS at 05:57

## 2019-01-01 RX ADMIN — ENOXAPARIN SODIUM 40 MG: 100 INJECTION SUBCUTANEOUS at 05:39

## 2019-01-01 RX ADMIN — INSULIN HUMAN 4 UNITS: 100 INJECTION, SOLUTION PARENTERAL at 07:46

## 2019-01-01 RX ADMIN — AMANTADINE HYDROCHLORIDE 100 MG: 50 SOLUTION ORAL at 15:10

## 2019-01-01 RX ADMIN — INSULIN HUMAN 10 UNITS: 100 INJECTION, SOLUTION PARENTERAL at 17:36

## 2019-01-01 RX ADMIN — METFORMIN HYDROCHLORIDE 250 MG: 500 TABLET, FILM COATED ORAL at 07:47

## 2019-01-01 RX ADMIN — BACLOFEN 5 MG: 10 TABLET ORAL at 08:22

## 2019-01-01 RX ADMIN — PREDNISONE 10 MG: 10 TABLET ORAL at 05:53

## 2019-01-01 RX ADMIN — FENTANYL CITRATE 25 MCG: 0.05 INJECTION, SOLUTION INTRAMUSCULAR; INTRAVENOUS at 03:25

## 2019-01-01 RX ADMIN — VITAMIN D, TAB 1000IU (100/BT) 1000 UNITS: 25 TAB at 08:47

## 2019-01-01 RX ADMIN — INSULIN GLARGINE 12 UNITS: 100 INJECTION, SOLUTION SUBCUTANEOUS at 20:22

## 2019-01-01 RX ADMIN — METOPROLOL TARTRATE 25 MG: 25 TABLET ORAL at 18:18

## 2019-01-01 RX ADMIN — SODIUM CHLORIDE 100 MG: 9 INJECTION, SOLUTION INTRAVENOUS at 18:14

## 2019-01-01 RX ADMIN — DEXAMETHASONE SODIUM PHOSPHATE 4 MG: 4 INJECTION, SOLUTION INTRA-ARTICULAR; INTRALESIONAL; INTRAMUSCULAR; INTRAVENOUS; SOFT TISSUE at 05:06

## 2019-01-01 RX ADMIN — FENTANYL CITRATE 50 MCG: 0.05 INJECTION, SOLUTION INTRAMUSCULAR; INTRAVENOUS at 02:40

## 2019-01-01 RX ADMIN — DILTIAZEM HYDROCHLORIDE 60 MG: 30 TABLET, FILM COATED ORAL at 05:33

## 2019-01-01 RX ADMIN — AMANTADINE HYDROCHLORIDE 100 MG: 50 SOLUTION ORAL at 08:21

## 2019-01-01 RX ADMIN — SENNOSIDES AND DOCUSATE SODIUM 2 TABLET: 8.6; 5 TABLET ORAL at 21:31

## 2019-01-01 RX ADMIN — VITAMIN D, TAB 1000IU (100/BT) 1000 UNITS: 25 TAB at 08:37

## 2019-01-01 RX ADMIN — SODIUM CHLORIDE 1000 MG: 9 INJECTION, SOLUTION INTRAVENOUS at 09:23

## 2019-01-01 RX ADMIN — FERROUS SULFATE TAB 325 MG (65 MG ELEMENTAL FE) 325 MG: 325 (65 FE) TAB at 08:38

## 2019-01-01 RX ADMIN — OMEPRAZOLE 20 MG: 20 CAPSULE, DELAYED RELEASE ORAL at 08:47

## 2019-01-01 RX ADMIN — SODIUM CHLORIDE 500 MG: 9 INJECTION, SOLUTION INTRAVENOUS at 06:00

## 2019-01-01 RX ADMIN — ATORVASTATIN CALCIUM 20 MG: 10 TABLET, FILM COATED ORAL at 20:35

## 2019-01-01 RX ADMIN — POLYVINYL ALCOHOL 2 DROP: 14 SOLUTION/ DROPS OPHTHALMIC at 15:05

## 2019-01-01 RX ADMIN — METFORMIN HYDROCHLORIDE 250 MG: 500 TABLET, FILM COATED ORAL at 07:58

## 2019-01-01 RX ADMIN — INSULIN GLARGINE 12 UNITS: 100 INJECTION, SOLUTION SUBCUTANEOUS at 20:47

## 2019-01-01 RX ADMIN — METFORMIN HYDROCHLORIDE 1000 MG: 500 TABLET, FILM COATED ORAL at 17:19

## 2019-01-01 RX ADMIN — DILTIAZEM HYDROCHLORIDE 60 MG: 30 TABLET, FILM COATED ORAL at 20:30

## 2019-01-01 RX ADMIN — CEFAZOLIN SODIUM 2 G: 2 INJECTION, SOLUTION INTRAVENOUS at 01:20

## 2019-01-01 RX ADMIN — INSULIN HUMAN 7 UNITS: 100 INJECTION, SOLUTION PARENTERAL at 21:32

## 2019-01-01 RX ADMIN — FENTANYL CITRATE 25 MCG: 0.05 INJECTION, SOLUTION INTRAMUSCULAR; INTRAVENOUS at 06:37

## 2019-01-01 RX ADMIN — DIBASIC SODIUM PHOSPHATE, MONOBASIC POTASSIUM PHOSPHATE AND MONOBASIC SODIUM PHOSPHATE 1 TABLET: 852; 155; 130 TABLET ORAL at 20:14

## 2019-01-01 RX ADMIN — SENNOSIDES AND DOCUSATE SODIUM 2 TABLET: 8.6; 5 TABLET ORAL at 20:35

## 2019-01-01 RX ADMIN — PREDNISONE 10 MG: 10 TABLET ORAL at 06:00

## 2019-01-01 RX ADMIN — AMANTADINE HYDROCHLORIDE 100 MG: 50 SOLUTION ORAL at 08:46

## 2019-01-01 RX ADMIN — LIDOCAINE 1 PATCH: 50 PATCH TOPICAL at 09:34

## 2019-01-01 RX ADMIN — AMANTADINE HYDROCHLORIDE 100 MG: 50 SOLUTION ORAL at 14:30

## 2019-01-01 RX ADMIN — DEXAMETHASONE SODIUM PHOSPHATE 4 MG: 4 INJECTION, SOLUTION INTRA-ARTICULAR; INTRALESIONAL; INTRAMUSCULAR; INTRAVENOUS; SOFT TISSUE at 11:13

## 2019-01-01 RX ADMIN — ACETAMINOPHEN 650 MG: 325 TABLET, FILM COATED ORAL at 05:31

## 2019-01-01 RX ADMIN — SODIUM CHLORIDE, POTASSIUM CHLORIDE, SODIUM LACTATE AND CALCIUM CHLORIDE: 600; 310; 30; 20 INJECTION, SOLUTION INTRAVENOUS at 22:15

## 2019-01-01 RX ADMIN — FENTANYL CITRATE 50 MCG: 0.05 INJECTION, SOLUTION INTRAMUSCULAR; INTRAVENOUS at 17:47

## 2019-01-01 RX ADMIN — LORATADINE 10 MG: 10 TABLET ORAL at 08:57

## 2019-01-01 RX ADMIN — METOPROLOL TARTRATE 50 MG: 25 TABLET ORAL at 17:48

## 2019-01-01 RX ADMIN — DIBASIC SODIUM PHOSPHATE, MONOBASIC POTASSIUM PHOSPHATE AND MONOBASIC SODIUM PHOSPHATE 1 TABLET: 852; 155; 130 TABLET ORAL at 08:03

## 2019-01-01 RX ADMIN — METOPROLOL SUCCINATE 25 MG: 25 TABLET, FILM COATED, EXTENDED RELEASE ORAL at 05:33

## 2019-01-01 RX ADMIN — ACETAMINOPHEN 650 MG: 325 TABLET, FILM COATED ORAL at 20:41

## 2019-01-01 RX ADMIN — SODIUM CHLORIDE 1000 MG: 9 INJECTION, SOLUTION INTRAVENOUS at 04:38

## 2019-01-01 RX ADMIN — METOPROLOL TARTRATE 25 MG: 25 TABLET ORAL at 17:19

## 2019-01-01 RX ADMIN — OMEPRAZOLE 40 MG: KIT at 08:30

## 2019-01-01 RX ADMIN — SENNOSIDES AND DOCUSATE SODIUM 2 TABLET: 8.6; 5 TABLET ORAL at 09:13

## 2019-01-01 RX ADMIN — METFORMIN HYDROCHLORIDE 250 MG: 500 TABLET, FILM COATED ORAL at 08:24

## 2019-01-01 RX ADMIN — ALTEPLASE 2 MG: 2.2 INJECTION, POWDER, LYOPHILIZED, FOR SOLUTION INTRAVENOUS at 22:32

## 2019-01-01 RX ADMIN — DIBASIC SODIUM PHOSPHATE, MONOBASIC POTASSIUM PHOSPHATE AND MONOBASIC SODIUM PHOSPHATE 1 TABLET: 852; 155; 130 TABLET ORAL at 14:59

## 2019-01-01 RX ADMIN — SODIUM CHLORIDE 1500 MG: 9 INJECTION, SOLUTION INTRAVENOUS at 01:17

## 2019-01-01 RX ADMIN — ATORVASTATIN CALCIUM 20 MG: 20 TABLET, FILM COATED ORAL at 20:40

## 2019-01-01 RX ADMIN — INSULIN HUMAN 4 UNITS: 100 INJECTION, SOLUTION PARENTERAL at 05:11

## 2019-01-01 RX ADMIN — VITAMIN D, TAB 1000IU (100/BT) 1000 UNITS: 25 TAB at 08:20

## 2019-01-01 RX ADMIN — PREDNISONE 10 MG: 5 TABLET ORAL at 08:22

## 2019-01-01 RX ADMIN — MAGNESIUM OXIDE TAB 400 MG (241.3 MG ELEMENTAL MG) 400 MG: 400 (241.3 MG) TAB at 08:03

## 2019-01-01 RX ADMIN — MULTIPLE VITAMINS W/ MINERALS TAB 1 TABLET: TAB at 08:17

## 2019-01-01 RX ADMIN — MELATONIN 6 MG: at 19:41

## 2019-01-01 RX ADMIN — DILTIAZEM HYDROCHLORIDE 60 MG: 30 TABLET, FILM COATED ORAL at 09:03

## 2019-01-01 RX ADMIN — DILTIAZEM HYDROCHLORIDE 60 MG: 30 TABLET, FILM COATED ORAL at 14:06

## 2019-01-01 RX ADMIN — KETOROLAC TROMETHAMINE 15 MG: 30 INJECTION, SOLUTION INTRAMUSCULAR at 17:10

## 2019-01-01 RX ADMIN — DILTIAZEM HYDROCHLORIDE 60 MG: 30 TABLET, FILM COATED ORAL at 15:10

## 2019-01-01 RX ADMIN — DIBASIC SODIUM PHOSPHATE, MONOBASIC POTASSIUM PHOSPHATE AND MONOBASIC SODIUM PHOSPHATE 1 TABLET: 852; 155; 130 TABLET ORAL at 08:28

## 2019-01-01 RX ADMIN — LORATADINE 10 MG: 10 TABLET ORAL at 07:59

## 2019-01-01 RX ADMIN — METOPROLOL TARTRATE 25 MG: 25 TABLET ORAL at 06:15

## 2019-01-01 RX ADMIN — METOPROLOL TARTRATE 25 MG: 25 TABLET, FILM COATED ORAL at 04:30

## 2019-01-01 RX ADMIN — FERROUS SULFATE TAB 325 MG (65 MG ELEMENTAL FE) 325 MG: 325 (65 FE) TAB at 05:51

## 2019-01-01 RX ADMIN — GADOTERIDOL 18 ML: 279.3 INJECTION, SOLUTION INTRAVENOUS at 08:55

## 2019-01-01 RX ADMIN — FERROUS SULFATE TAB 325 MG (65 MG ELEMENTAL FE) 325 MG: 325 (65 FE) TAB at 08:22

## 2019-01-01 RX ADMIN — SODIUM CHLORIDE 100 MG: 9 INJECTION, SOLUTION INTRAVENOUS at 17:03

## 2019-01-01 RX ADMIN — ALLOPURINOL 300 MG: 300 TABLET ORAL at 10:00

## 2019-01-01 RX ADMIN — AMANTADINE HYDROCHLORIDE 100 MG: 50 SOLUTION ORAL at 09:05

## 2019-01-01 RX ADMIN — ENOXAPARIN SODIUM 40 MG: 100 INJECTION SUBCUTANEOUS at 05:01

## 2019-01-01 RX ADMIN — POLYVINYL ALCOHOL 2 DROP: 14 SOLUTION/ DROPS OPHTHALMIC at 10:23

## 2019-01-01 RX ADMIN — INSULIN HUMAN 3 UNITS: 100 INJECTION, SOLUTION PARENTERAL at 21:24

## 2019-01-01 RX ADMIN — OXYBUTYNIN CHLORIDE 10 MG: 5 TABLET, EXTENDED RELEASE ORAL at 21:20

## 2019-01-01 RX ADMIN — OXYCODONE HYDROCHLORIDE 2.5 MG: 5 TABLET ORAL at 05:05

## 2019-01-01 RX ADMIN — AMANTADINE HYDROCHLORIDE 100 MG: 50 SOLUTION ORAL at 08:30

## 2019-01-01 RX ADMIN — LEVETIRACETAM 500 MG: 500 TABLET ORAL at 17:07

## 2019-01-01 RX ADMIN — METOPROLOL TARTRATE 25 MG: 25 TABLET, FILM COATED ORAL at 17:37

## 2019-01-01 RX ADMIN — ATORVASTATIN CALCIUM 20 MG: 40 TABLET, FILM COATED ORAL at 20:47

## 2019-01-01 RX ADMIN — SODIUM CHLORIDE 1000 MG: 9 INJECTION, SOLUTION INTRAVENOUS at 05:56

## 2019-01-01 RX ADMIN — POLYVINYL ALCOHOL 2 DROP: 14 SOLUTION/ DROPS OPHTHALMIC at 21:35

## 2019-01-01 RX ADMIN — ATORVASTATIN CALCIUM 20 MG: 20 TABLET, FILM COATED ORAL at 21:16

## 2019-01-01 RX ADMIN — INSULIN HUMAN 3 UNITS: 100 INJECTION, SOLUTION PARENTERAL at 07:59

## 2019-01-01 RX ADMIN — FENTANYL CITRATE 25 MCG: 0.05 INJECTION, SOLUTION INTRAMUSCULAR; INTRAVENOUS at 18:42

## 2019-01-01 RX ADMIN — LORATADINE 10 MG: 10 TABLET ORAL at 08:45

## 2019-01-01 RX ADMIN — BACLOFEN 5 MG: 10 TABLET ORAL at 07:59

## 2019-01-01 RX ADMIN — APIXABAN 5 MG: 5 TABLET, FILM COATED ORAL at 10:00

## 2019-01-01 RX ADMIN — INSULIN HUMAN 2 UNITS: 100 INJECTION, SOLUTION PARENTERAL at 12:46

## 2019-01-01 RX ADMIN — SENNOSIDES AND DOCUSATE SODIUM 2 TABLET: 8.6; 5 TABLET ORAL at 08:47

## 2019-01-01 RX ADMIN — METOPROLOL TARTRATE 25 MG: 25 TABLET ORAL at 05:21

## 2019-01-01 RX ADMIN — ALLOPURINOL 300 MG: 300 TABLET ORAL at 09:30

## 2019-01-01 RX ADMIN — DEXAMETHASONE SODIUM PHOSPHATE 4 MG: 4 INJECTION, SOLUTION INTRA-ARTICULAR; INTRALESIONAL; INTRAMUSCULAR; INTRAVENOUS; SOFT TISSUE at 23:14

## 2019-01-01 RX ADMIN — METOPROLOL TARTRATE 25 MG: 25 TABLET ORAL at 17:22

## 2019-01-01 RX ADMIN — ALLOPURINOL 300 MG: 300 TABLET ORAL at 08:46

## 2019-01-01 RX ADMIN — ATORVASTATIN CALCIUM 20 MG: 10 TABLET, FILM COATED ORAL at 20:21

## 2019-01-01 RX ADMIN — DEXAMETHASONE SODIUM PHOSPHATE 10 MG: 4 INJECTION, SOLUTION INTRA-ARTICULAR; INTRALESIONAL; INTRAMUSCULAR; INTRAVENOUS; SOFT TISSUE at 01:20

## 2019-01-01 RX ADMIN — DEXAMETHASONE 2 MG: 1 TABLET ORAL at 20:06

## 2019-01-01 RX ADMIN — LORATADINE 10 MG: 10 TABLET ORAL at 05:51

## 2019-01-01 RX ADMIN — OMEPRAZOLE 40 MG: KIT at 10:06

## 2019-01-01 RX ADMIN — METOPROLOL TARTRATE 25 MG: 25 TABLET ORAL at 17:20

## 2019-01-01 RX ADMIN — MELATONIN 6 MG: at 20:00

## 2019-01-01 RX ADMIN — BACLOFEN 5 MG: 10 TABLET ORAL at 20:39

## 2019-01-01 RX ADMIN — DEXAMETHASONE SODIUM PHOSPHATE 6 MG: 4 INJECTION, SOLUTION INTRA-ARTICULAR; INTRALESIONAL; INTRAMUSCULAR; INTRAVENOUS; SOFT TISSUE at 04:48

## 2019-01-01 RX ADMIN — LORAZEPAM 1 MG: 2 INJECTION INTRAMUSCULAR at 04:37

## 2019-01-01 RX ADMIN — PREDNISONE 10 MG: 5 TABLET ORAL at 07:58

## 2019-01-01 RX ADMIN — PREDNISONE 10 MG: 5 TABLET ORAL at 08:48

## 2019-01-01 RX ADMIN — DILTIAZEM HYDROCHLORIDE 60 MG: 30 TABLET, FILM COATED ORAL at 10:42

## 2019-01-01 RX ADMIN — DABIGATRAN ETEXILATE MESYLATE 150 MG: 150 CAPSULE ORAL at 09:10

## 2019-01-01 RX ADMIN — DILTIAZEM HYDROCHLORIDE 360 MG: 180 CAPSULE, COATED, EXTENDED RELEASE ORAL at 18:21

## 2019-01-01 RX ADMIN — OXYCODONE HYDROCHLORIDE 5 MG: 5 TABLET ORAL at 05:23

## 2019-01-01 RX ADMIN — AMANTADINE HYDROCHLORIDE 100 MG: 50 SOLUTION ORAL at 11:59

## 2019-01-01 RX ADMIN — MULTIPLE VITAMINS W/ MINERALS TAB 1 TABLET: TAB at 04:37

## 2019-01-01 RX ADMIN — MIDAZOLAM 1 MG: 1 INJECTION INTRAMUSCULAR; INTRAVENOUS at 14:45

## 2019-01-01 RX ADMIN — DEXAMETHASONE SODIUM PHOSPHATE 4 MG: 4 INJECTION, SOLUTION INTRA-ARTICULAR; INTRALESIONAL; INTRAMUSCULAR; INTRAVENOUS; SOFT TISSUE at 16:59

## 2019-01-01 RX ADMIN — METOPROLOL TARTRATE 50 MG: 25 TABLET ORAL at 05:11

## 2019-01-01 RX ADMIN — DILTIAZEM HYDROCHLORIDE 90 MG: 30 TABLET, FILM COATED ORAL at 11:10

## 2019-01-01 RX ADMIN — VITAMIN D, TAB 1000IU (100/BT) 1000 UNITS: 25 TAB at 10:05

## 2019-01-01 RX ADMIN — POTASSIUM CHLORIDE 10 MEQ: 7.46 INJECTION, SOLUTION INTRAVENOUS at 15:29

## 2019-01-01 RX ADMIN — AMANTADINE HYDROCHLORIDE 100 MG: 50 SOLUTION ORAL at 09:51

## 2019-01-01 RX ADMIN — SODIUM CHLORIDE 1000 MG: 9 INJECTION, SOLUTION INTRAVENOUS at 06:30

## 2019-01-01 RX ADMIN — KETOROLAC TROMETHAMINE 15 MG: 30 INJECTION, SOLUTION INTRAMUSCULAR; INTRAVENOUS at 12:19

## 2019-01-01 RX ADMIN — DEXAMETHASONE SODIUM PHOSPHATE 4 MG: 4 INJECTION, SOLUTION INTRA-ARTICULAR; INTRALESIONAL; INTRAMUSCULAR; INTRAVENOUS; SOFT TISSUE at 01:45

## 2019-01-01 RX ADMIN — SENNOSIDES AND DOCUSATE SODIUM 2 TABLET: 8.6; 5 TABLET ORAL at 10:00

## 2019-01-01 RX ADMIN — DEXAMETHASONE 1 MG: 1 TABLET ORAL at 20:30

## 2019-01-01 RX ADMIN — FERROUS SULFATE TAB 325 MG (65 MG ELEMENTAL FE) 325 MG: 325 (65 FE) TAB at 05:53

## 2019-01-01 RX ADMIN — ATORVASTATIN CALCIUM 20 MG: 10 TABLET, FILM COATED ORAL at 21:32

## 2019-01-01 RX ADMIN — METOPROLOL TARTRATE 25 MG: 25 TABLET ORAL at 05:06

## 2019-01-01 RX ADMIN — METFORMIN HYDROCHLORIDE 250 MG: 500 TABLET, FILM COATED ORAL at 17:28

## 2019-01-01 RX ADMIN — LEVETIRACETAM 500 MG: 500 TABLET ORAL at 17:36

## 2019-01-01 RX ADMIN — MULTIPLE VITAMINS W/ MINERALS TAB 1 TABLET: TAB at 05:31

## 2019-01-01 RX ADMIN — BACLOFEN 5 MG: 10 TABLET ORAL at 22:38

## 2019-01-01 RX ADMIN — INSULIN LISPRO 6 UNITS: 100 INJECTION, SOLUTION INTRAVENOUS; SUBCUTANEOUS at 16:34

## 2019-01-01 RX ADMIN — LORATADINE 10 MG: 10 TABLET ORAL at 05:59

## 2019-01-01 RX ADMIN — INSULIN GLARGINE 10 UNITS: 100 INJECTION, SOLUTION SUBCUTANEOUS at 17:23

## 2019-01-01 RX ADMIN — OXYCODONE HYDROCHLORIDE 5 MG: 5 TABLET ORAL at 01:13

## 2019-01-01 RX ADMIN — SENNOSIDES, DOCUSATE SODIUM 1 TABLET: 50; 8.6 TABLET, FILM COATED ORAL at 20:36

## 2019-01-01 RX ADMIN — ATORVASTATIN CALCIUM 20 MG: 10 TABLET, FILM COATED ORAL at 21:08

## 2019-01-01 RX ADMIN — METOPROLOL TARTRATE 25 MG: 25 TABLET ORAL at 05:00

## 2019-01-01 RX ADMIN — ATORVASTATIN CALCIUM 20 MG: 40 TABLET, FILM COATED ORAL at 20:52

## 2019-01-01 RX ADMIN — METOPROLOL TARTRATE 25 MG: 25 TABLET, FILM COATED ORAL at 05:31

## 2019-01-01 RX ADMIN — SENNOSIDES AND DOCUSATE SODIUM 2 TABLET: 8.6; 5 TABLET ORAL at 19:33

## 2019-01-01 RX ADMIN — GLYCOPYRROLATE 0.3 MG: 0.2 INJECTION INTRAMUSCULAR; INTRAVENOUS at 14:44

## 2019-01-01 RX ADMIN — INSULIN HUMAN 2 UNITS: 100 INJECTION, SOLUTION PARENTERAL at 20:48

## 2019-01-01 RX ADMIN — MULTIPLE VITAMINS W/ MINERALS TAB 1 TABLET: TAB at 08:28

## 2019-01-01 RX ADMIN — INSULIN HUMAN 2 UNITS: 100 INJECTION, SOLUTION PARENTERAL at 09:09

## 2019-01-01 RX ADMIN — METFORMIN HYDROCHLORIDE 250 MG: 500 TABLET, FILM COATED ORAL at 08:02

## 2019-01-01 RX ADMIN — SODIUM CHLORIDE 500 MG: 9 INJECTION, SOLUTION INTRAVENOUS at 18:13

## 2019-01-01 RX ADMIN — DILTIAZEM HYDROCHLORIDE 60 MG: 30 TABLET, FILM COATED ORAL at 00:15

## 2019-01-01 RX ADMIN — METOPROLOL TARTRATE 25 MG: 25 TABLET, FILM COATED ORAL at 17:45

## 2019-01-01 RX ADMIN — ACETAMINOPHEN 650 MG: 325 TABLET, FILM COATED ORAL at 17:20

## 2019-01-01 RX ADMIN — DILTIAZEM HYDROCHLORIDE 60 MG: 30 TABLET, FILM COATED ORAL at 21:31

## 2019-01-01 RX ADMIN — DILTIAZEM HYDROCHLORIDE 90 MG: 30 TABLET, FILM COATED ORAL at 17:25

## 2019-01-01 RX ADMIN — ENOXAPARIN SODIUM 40 MG: 100 INJECTION SUBCUTANEOUS at 05:08

## 2019-01-01 RX ADMIN — ALUMINUM HYDROXIDE, MAGNESIUM HYDROXIDE, AND DIMETHICONE 20 ML: 400; 400; 40 SUSPENSION ORAL at 20:37

## 2019-01-01 RX ADMIN — METOPROLOL TARTRATE 25 MG: 25 TABLET, FILM COATED ORAL at 05:00

## 2019-01-01 RX ADMIN — MORPHINE SULFATE 10 MG: 10 INJECTION INTRAVENOUS at 17:38

## 2019-01-01 RX ADMIN — APIXABAN 5 MG: 5 TABLET, FILM COATED ORAL at 22:37

## 2019-01-01 RX ADMIN — BACLOFEN 10 MG: 10 TABLET ORAL at 13:48

## 2019-01-01 RX ADMIN — OXYBUTYNIN CHLORIDE 5 MG: 5 TABLET, EXTENDED RELEASE ORAL at 21:16

## 2019-01-01 RX ADMIN — APIXABAN 5 MG: 5 TABLET, FILM COATED ORAL at 21:23

## 2019-01-01 RX ADMIN — BACLOFEN 5 MG: 10 TABLET ORAL at 14:25

## 2019-01-01 RX ADMIN — DEXAMETHASONE 4 MG: 4 TABLET ORAL at 14:09

## 2019-01-01 RX ADMIN — SENNOSIDES, DOCUSATE SODIUM 2 TABLET: 50; 8.6 TABLET, FILM COATED ORAL at 04:59

## 2019-01-01 RX ADMIN — LORAZEPAM 2 MG: 2 INJECTION INTRAMUSCULAR at 11:12

## 2019-01-01 RX ADMIN — FERROUS SULFATE TAB 325 MG (65 MG ELEMENTAL FE) 325 MG: 325 (65 FE) TAB at 10:33

## 2019-01-01 RX ADMIN — FENTANYL CITRATE 25 MCG: 0.05 INJECTION, SOLUTION INTRAMUSCULAR; INTRAVENOUS at 11:45

## 2019-01-01 RX ADMIN — BACLOFEN 5 MG: 10 TABLET ORAL at 08:57

## 2019-01-01 RX ADMIN — PREDNISONE 10 MG: 10 TABLET ORAL at 05:51

## 2019-01-01 RX ADMIN — DABIGATRAN ETEXILATE MESYLATE 150 MG: 150 CAPSULE ORAL at 09:09

## 2019-01-01 RX ADMIN — FENTANYL CITRATE 25 MCG: 0.05 INJECTION, SOLUTION INTRAMUSCULAR; INTRAVENOUS at 01:46

## 2019-01-01 RX ADMIN — INSULIN HUMAN 3 UNITS: 100 INJECTION, SOLUTION PARENTERAL at 17:19

## 2019-01-01 RX ADMIN — INSULIN LISPRO 6 UNITS: 100 INJECTION, SOLUTION INTRAVENOUS; SUBCUTANEOUS at 19:59

## 2019-01-01 RX ADMIN — SODIUM CHLORIDE 100 MG: 9 INJECTION, SOLUTION INTRAVENOUS at 18:00

## 2019-01-01 RX ADMIN — DOCUSATE SODIUM 100 MG: 100 CAPSULE, LIQUID FILLED ORAL at 17:58

## 2019-01-01 RX ADMIN — DIBASIC SODIUM PHOSPHATE, MONOBASIC POTASSIUM PHOSPHATE AND MONOBASIC SODIUM PHOSPHATE 1 TABLET: 852; 155; 130 TABLET ORAL at 08:33

## 2019-01-01 RX ADMIN — FENTANYL CITRATE 25 MCG: 0.05 INJECTION, SOLUTION INTRAMUSCULAR; INTRAVENOUS at 14:30

## 2019-01-01 RX ADMIN — METFORMIN HYDROCHLORIDE 850 MG: 850 TABLET, FILM COATED ORAL at 17:08

## 2019-01-01 RX ADMIN — OXYCODONE HYDROCHLORIDE 5 MG: 5 TABLET ORAL at 08:05

## 2019-01-01 RX ADMIN — LORATADINE 10 MG: 10 TABLET ORAL at 10:00

## 2019-01-01 RX ADMIN — INSULIN HUMAN 4 UNITS: 100 INJECTION, SOLUTION PARENTERAL at 01:24

## 2019-01-01 RX ADMIN — OXYBUTYNIN CHLORIDE 10 MG: 5 TABLET, EXTENDED RELEASE ORAL at 21:45

## 2019-01-01 RX ADMIN — SENNOSIDES AND DOCUSATE SODIUM 2 TABLET: 8.6; 5 TABLET ORAL at 08:02

## 2019-01-01 RX ADMIN — SODIUM CHLORIDE 100 MG: 9 INJECTION, SOLUTION INTRAVENOUS at 06:50

## 2019-01-01 RX ADMIN — MORPHINE SULFATE 10 MG: 10 INJECTION INTRAVENOUS at 10:13

## 2019-01-01 RX ADMIN — APIXABAN 5 MG: 5 TABLET, FILM COATED ORAL at 20:33

## 2019-01-01 RX ADMIN — ATORVASTATIN CALCIUM 20 MG: 20 TABLET, FILM COATED ORAL at 21:33

## 2019-01-01 RX ADMIN — LEVETIRACETAM 500 MG: 500 TABLET ORAL at 08:48

## 2019-01-01 RX ADMIN — METOPROLOL TARTRATE 25 MG: 25 TABLET ORAL at 05:18

## 2019-01-01 RX ADMIN — METOPROLOL TARTRATE 25 MG: 25 TABLET ORAL at 17:27

## 2019-01-01 RX ADMIN — ATORVASTATIN CALCIUM 20 MG: 10 TABLET, FILM COATED ORAL at 20:39

## 2019-01-01 RX ADMIN — VITAMIN D, TAB 1000IU (100/BT) 1000 UNITS: 25 TAB at 08:03

## 2019-01-01 RX ADMIN — AMANTADINE HYDROCHLORIDE 100 MG: 50 SOLUTION ORAL at 13:06

## 2019-01-01 RX ADMIN — LEVETIRACETAM 500 MG: 500 TABLET ORAL at 08:30

## 2019-01-01 RX ADMIN — DILTIAZEM HYDROCHLORIDE 60 MG: 30 TABLET, FILM COATED ORAL at 22:37

## 2019-01-01 RX ADMIN — DEXAMETHASONE SODIUM PHOSPHATE 4 MG: 4 INJECTION, SOLUTION INTRA-ARTICULAR; INTRALESIONAL; INTRAMUSCULAR; INTRAVENOUS; SOFT TISSUE at 06:00

## 2019-01-01 RX ADMIN — MELATONIN 6 MG: at 20:15

## 2019-01-01 RX ADMIN — ATORVASTATIN CALCIUM 20 MG: 10 TABLET, FILM COATED ORAL at 21:45

## 2019-01-01 RX ADMIN — ACETAMINOPHEN 650 MG: 325 TABLET, FILM COATED ORAL at 21:45

## 2019-01-01 RX ADMIN — METFORMIN HYDROCHLORIDE 250 MG: 500 TABLET, FILM COATED ORAL at 08:37

## 2019-01-01 RX ADMIN — LIDOCAINE 1 PATCH: 50 PATCH TOPICAL at 15:39

## 2019-01-01 RX ADMIN — LIDOCAINE 1 PATCH: 50 PATCH TOPICAL at 08:31

## 2019-01-01 RX ADMIN — DILTIAZEM HYDROCHLORIDE 90 MG: 30 TABLET, FILM COATED ORAL at 06:13

## 2019-01-01 RX ADMIN — FENTANYL CITRATE 25 MCG: 0.05 INJECTION, SOLUTION INTRAMUSCULAR; INTRAVENOUS at 07:26

## 2019-01-01 RX ADMIN — DIBASIC SODIUM PHOSPHATE, MONOBASIC POTASSIUM PHOSPHATE AND MONOBASIC SODIUM PHOSPHATE 1 TABLET: 852; 155; 130 TABLET ORAL at 08:15

## 2019-01-01 RX ADMIN — VITAMIN D, TAB 1000IU (100/BT) 1000 UNITS: 25 TAB at 10:00

## 2019-01-01 RX ADMIN — BACLOFEN 5 MG: 10 TABLET ORAL at 08:23

## 2019-01-01 RX ADMIN — ATORVASTATIN CALCIUM 20 MG: 10 TABLET, FILM COATED ORAL at 21:11

## 2019-01-01 RX ADMIN — POLYVINYL ALCOHOL 2 DROP: 14 SOLUTION/ DROPS OPHTHALMIC at 05:12

## 2019-01-01 RX ADMIN — SENNOSIDES AND DOCUSATE SODIUM 2 TABLET: 8.6; 5 TABLET ORAL at 20:33

## 2019-01-01 RX ADMIN — POLYVINYL ALCOHOL 2 DROP: 14 SOLUTION/ DROPS OPHTHALMIC at 04:53

## 2019-01-01 RX ADMIN — INSULIN GLARGINE 20 UNITS: 100 INJECTION, SOLUTION SUBCUTANEOUS at 09:28

## 2019-01-01 RX ADMIN — INSULIN LISPRO 6 UNITS: 100 INJECTION, SOLUTION INTRAVENOUS; SUBCUTANEOUS at 06:32

## 2019-01-01 RX ADMIN — DILTIAZEM HYDROCHLORIDE 90 MG: 30 TABLET, FILM COATED ORAL at 11:30

## 2019-01-01 RX ADMIN — MELATONIN 6 MG: at 20:21

## 2019-01-01 RX ADMIN — ONDANSETRON 4 MG: 2 INJECTION INTRAMUSCULAR; INTRAVENOUS at 10:34

## 2019-01-01 RX ADMIN — SENNOSIDES AND DOCUSATE SODIUM 2 TABLET: 8.6; 5 TABLET ORAL at 10:05

## 2019-01-01 RX ADMIN — INSULIN GLARGINE 12 UNITS: 100 INJECTION, SOLUTION SUBCUTANEOUS at 21:20

## 2019-01-01 RX ADMIN — SODIUM CHLORIDE, POTASSIUM CHLORIDE, SODIUM LACTATE AND CALCIUM CHLORIDE: 600; 310; 30; 20 INJECTION, SOLUTION INTRAVENOUS at 16:00

## 2019-01-01 RX ADMIN — METOPROLOL TARTRATE 25 MG: 25 TABLET ORAL at 05:23

## 2019-01-01 RX ADMIN — FENTANYL CITRATE 25 MCG: 0.05 INJECTION, SOLUTION INTRAMUSCULAR; INTRAVENOUS at 20:10

## 2019-01-01 RX ADMIN — DEXAMETHASONE SODIUM PHOSPHATE 10 MG: 4 INJECTION, SOLUTION INTRA-ARTICULAR; INTRALESIONAL; INTRAMUSCULAR; INTRAVENOUS; SOFT TISSUE at 17:37

## 2019-01-01 RX ADMIN — SENNOSIDES AND DOCUSATE SODIUM 2 TABLET: 8.6; 5 TABLET ORAL at 21:32

## 2019-01-01 RX ADMIN — INSULIN HUMAN 10 UNITS: 100 INJECTION, SOLUTION PARENTERAL at 20:49

## 2019-01-01 RX ADMIN — SUGAMMADEX 200 MG: 100 INJECTION, SOLUTION INTRAVENOUS at 11:01

## 2019-01-01 RX ADMIN — SENNOSIDES, DOCUSATE SODIUM 1 TABLET: 50; 8.6 TABLET, FILM COATED ORAL at 21:10

## 2019-01-01 RX ADMIN — BACLOFEN 5 MG: 10 TABLET ORAL at 14:38

## 2019-01-01 RX ADMIN — OMEPRAZOLE 40 MG: 20 CAPSULE, DELAYED RELEASE ORAL at 04:42

## 2019-01-01 RX ADMIN — MELATONIN 6 MG: at 21:36

## 2019-01-01 RX ADMIN — METFORMIN HYDROCHLORIDE 1000 MG: 500 TABLET, FILM COATED ORAL at 17:34

## 2019-01-01 RX ADMIN — DEXAMETHASONE 2 MG: 1 TABLET ORAL at 08:15

## 2019-01-01 RX ADMIN — SODIUM CHLORIDE 1000 MG: 9 INJECTION, SOLUTION INTRAVENOUS at 06:10

## 2019-01-01 RX ADMIN — INSULIN HUMAN 2 UNITS: 100 INJECTION, SOLUTION PARENTERAL at 12:04

## 2019-01-01 RX ADMIN — PREDNISONE 10 MG: 5 TABLET ORAL at 09:10

## 2019-01-01 RX ADMIN — INSULIN HUMAN 2 UNITS: 100 INJECTION, SOLUTION PARENTERAL at 11:47

## 2019-01-01 RX ADMIN — INSULIN HUMAN 4 UNITS: 100 INJECTION, SOLUTION PARENTERAL at 08:30

## 2019-01-01 RX ADMIN — ALLOPURINOL 300 MG: 300 TABLET ORAL at 09:09

## 2019-01-01 RX ADMIN — INSULIN LISPRO 6 UNITS: 100 INJECTION, SOLUTION INTRAVENOUS; SUBCUTANEOUS at 17:05

## 2019-01-01 RX ADMIN — METOPROLOL TARTRATE 50 MG: 25 TABLET ORAL at 05:33

## 2019-01-01 RX ADMIN — MAGNESIUM SULFATE IN WATER 2 G: 40 INJECTION, SOLUTION INTRAVENOUS at 14:21

## 2019-01-01 RX ADMIN — METFORMIN HYDROCHLORIDE 250 MG: 500 TABLET, FILM COATED ORAL at 17:13

## 2019-01-01 RX ADMIN — OMEPRAZOLE 20 MG: 20 CAPSULE, DELAYED RELEASE ORAL at 08:30

## 2019-01-01 RX ADMIN — BACLOFEN 5 MG: 10 TABLET ORAL at 09:52

## 2019-01-01 RX ADMIN — POLYVINYL ALCOHOL 2 DROP: 14 SOLUTION/ DROPS OPHTHALMIC at 05:17

## 2019-01-01 RX ADMIN — AMANTADINE HYDROCHLORIDE 100 MG: 50 SOLUTION ORAL at 12:18

## 2019-01-01 RX ADMIN — SENNOSIDES AND DOCUSATE SODIUM 2 TABLET: 8.6; 5 TABLET ORAL at 08:37

## 2019-01-01 RX ADMIN — MAGNESIUM HYDROXIDE 30 ML: 400 SUSPENSION ORAL at 18:28

## 2019-01-01 RX ADMIN — INSULIN HUMAN 4 UNITS: 100 INJECTION, SOLUTION PARENTERAL at 12:26

## 2019-01-01 RX ADMIN — SODIUM CHLORIDE 500 MG: 9 INJECTION, SOLUTION INTRAVENOUS at 17:51

## 2019-01-01 RX ADMIN — OXYCODONE HYDROCHLORIDE 5 MG: 5 TABLET ORAL at 02:01

## 2019-01-01 RX ADMIN — DILTIAZEM HYDROCHLORIDE 90 MG: 30 TABLET, FILM COATED ORAL at 05:23

## 2019-01-01 RX ADMIN — AMANTADINE HYDROCHLORIDE 100 MG: 50 SOLUTION ORAL at 13:36

## 2019-01-01 RX ADMIN — ATORVASTATIN CALCIUM 20 MG: 10 TABLET, FILM COATED ORAL at 19:34

## 2019-01-01 RX ADMIN — ALTEPLASE 2 MG: 2.2 INJECTION, POWDER, LYOPHILIZED, FOR SOLUTION INTRAVENOUS at 01:50

## 2019-01-01 RX ADMIN — POLYVINYL ALCOHOL 2 DROP: 14 SOLUTION/ DROPS OPHTHALMIC at 20:37

## 2019-01-01 RX ADMIN — DILTIAZEM HYDROCHLORIDE 90 MG: 30 TABLET, FILM COATED ORAL at 17:42

## 2019-01-01 RX ADMIN — DILTIAZEM HYDROCHLORIDE 90 MG: 30 TABLET, FILM COATED ORAL at 01:43

## 2019-01-01 RX ADMIN — METFORMIN HYDROCHLORIDE 250 MG: 500 TABLET, FILM COATED ORAL at 18:09

## 2019-01-01 RX ADMIN — ALLOPURINOL 300 MG: 300 TABLET ORAL at 08:22

## 2019-01-01 RX ADMIN — LORATADINE 10 MG: 10 TABLET ORAL at 10:33

## 2019-01-01 RX ADMIN — INSULIN LISPRO 3 UNITS: 100 INJECTION, SOLUTION INTRAVENOUS; SUBCUTANEOUS at 09:07

## 2019-01-01 RX ADMIN — BACLOFEN 5 MG: 10 TABLET ORAL at 20:00

## 2019-01-01 RX ADMIN — METFORMIN HYDROCHLORIDE 250 MG: 500 TABLET, FILM COATED ORAL at 08:59

## 2019-01-01 RX ADMIN — METOPROLOL TARTRATE 25 MG: 25 TABLET, FILM COATED ORAL at 05:48

## 2019-01-01 RX ADMIN — SODIUM CHLORIDE 1000 MG: 9 INJECTION, SOLUTION INTRAVENOUS at 17:41

## 2019-01-01 RX ADMIN — METOPROLOL TARTRATE 25 MG: 25 TABLET ORAL at 05:41

## 2019-01-01 RX ADMIN — ALLOPURINOL 300 MG: 300 TABLET ORAL at 05:51

## 2019-01-01 RX ADMIN — LORATADINE 10 MG: 10 TABLET ORAL at 06:00

## 2019-01-01 RX ADMIN — APIXABAN 5 MG: 5 TABLET, FILM COATED ORAL at 20:21

## 2019-01-01 RX ADMIN — LIDOCAINE HYDROCHLORIDE,EPINEPHRINE BITARTRATE: 10; .01 INJECTION, SOLUTION INFILTRATION; PERINEURAL at 14:55

## 2019-01-01 RX ADMIN — LORAZEPAM 2 MG: 2 INJECTION INTRAMUSCULAR at 12:47

## 2019-01-01 RX ADMIN — MELATONIN 6 MG: at 00:15

## 2019-01-01 RX ADMIN — SENNOSIDES, DOCUSATE SODIUM 2 TABLET: 50; 8.6 TABLET, FILM COATED ORAL at 16:56

## 2019-01-01 RX ADMIN — SODIUM CHLORIDE, POTASSIUM CHLORIDE, SODIUM LACTATE AND CALCIUM CHLORIDE: 600; 310; 30; 20 INJECTION, SOLUTION INTRAVENOUS at 09:25

## 2019-01-01 RX ADMIN — DIBASIC SODIUM PHOSPHATE, MONOBASIC POTASSIUM PHOSPHATE AND MONOBASIC SODIUM PHOSPHATE 1 TABLET: 852; 155; 130 TABLET ORAL at 08:24

## 2019-01-01 RX ADMIN — DILTIAZEM HYDROCHLORIDE 360 MG: 180 CAPSULE, COATED, EXTENDED RELEASE ORAL at 18:03

## 2019-01-01 RX ADMIN — DABIGATRAN ETEXILATE MESYLATE 150 MG: 150 CAPSULE ORAL at 20:21

## 2019-01-01 RX ADMIN — SENNOSIDES AND DOCUSATE SODIUM 2 TABLET: 8.6; 5 TABLET ORAL at 22:37

## 2019-01-01 RX ADMIN — LIDOCAINE 1 PATCH: 50 PATCH TOPICAL at 14:54

## 2019-01-01 RX ADMIN — METFORMIN HYDROCHLORIDE 1000 MG: 500 TABLET, FILM COATED ORAL at 08:28

## 2019-01-01 RX ADMIN — TRAZODONE HYDROCHLORIDE 50 MG: 50 TABLET ORAL at 20:28

## 2019-01-01 RX ADMIN — OXYCODONE HYDROCHLORIDE 5 MG: 5 TABLET ORAL at 21:27

## 2019-01-01 RX ADMIN — ATORVASTATIN CALCIUM 20 MG: 10 TABLET, FILM COATED ORAL at 22:37

## 2019-01-01 RX ADMIN — LACOSAMIDE 100 MG: 100 TABLET, FILM COATED ORAL at 18:22

## 2019-01-01 RX ADMIN — MULTIPLE VITAMINS W/ MINERALS TAB 1 TABLET: TAB at 08:15

## 2019-01-01 RX ADMIN — DABIGATRAN ETEXILATE MESYLATE 150 MG: 150 CAPSULE ORAL at 21:39

## 2019-01-01 RX ADMIN — LORATADINE 10 MG: 10 TABLET ORAL at 05:53

## 2019-01-01 RX ADMIN — FERROUS SULFATE TAB 325 MG (65 MG ELEMENTAL FE) 325 MG: 325 (65 FE) TAB at 05:21

## 2019-01-01 RX ADMIN — DEXAMETHASONE SODIUM PHOSPHATE 10 MG: 4 INJECTION, SOLUTION INTRA-ARTICULAR; INTRALESIONAL; INTRAMUSCULAR; INTRAVENOUS; SOFT TISSUE at 09:19

## 2019-01-01 RX ADMIN — INSULIN HUMAN 2 UNITS: 100 INJECTION, SOLUTION PARENTERAL at 23:03

## 2019-01-01 RX ADMIN — VITAMIN D, TAB 1000IU (100/BT) 1000 UNITS: 25 TAB at 09:12

## 2019-01-01 RX ADMIN — DILTIAZEM HYDROCHLORIDE 60 MG: 30 TABLET, FILM COATED ORAL at 13:57

## 2019-01-01 RX ADMIN — FENTANYL CITRATE 100 MCG: 0.05 INJECTION, SOLUTION INTRAMUSCULAR; INTRAVENOUS at 11:15

## 2019-01-01 RX ADMIN — KETOROLAC TROMETHAMINE 15 MG: 30 INJECTION, SOLUTION INTRAMUSCULAR; INTRAVENOUS at 09:36

## 2019-01-01 RX ADMIN — SODIUM CHLORIDE 1000 MG: 9 INJECTION, SOLUTION INTRAVENOUS at 17:57

## 2019-01-01 RX ADMIN — ALLOPURINOL 300 MG: 300 TABLET ORAL at 08:02

## 2019-01-01 RX ADMIN — METFORMIN HYDROCHLORIDE 250 MG: 500 TABLET, FILM COATED ORAL at 18:48

## 2019-01-01 RX ADMIN — AMANTADINE HYDROCHLORIDE 100 MG: 50 SOLUTION ORAL at 07:47

## 2019-01-01 RX ADMIN — SODIUM CHLORIDE 1000 MG: 9 INJECTION, SOLUTION INTRAVENOUS at 17:54

## 2019-01-01 RX ADMIN — APIXABAN 5 MG: 5 TABLET, FILM COATED ORAL at 08:59

## 2019-01-01 RX ADMIN — LORAZEPAM 1 MG: 2 INJECTION INTRAMUSCULAR at 01:28

## 2019-01-01 RX ADMIN — APIXABAN 5 MG: 5 TABLET, FILM COATED ORAL at 07:47

## 2019-01-01 RX ADMIN — LORAZEPAM 4 MG: 2 INJECTION INTRAMUSCULAR; INTRAVENOUS at 16:07

## 2019-01-01 RX ADMIN — PREDNISONE 10 MG: 10 TABLET ORAL at 05:21

## 2019-01-01 RX ADMIN — AMANTADINE HYDROCHLORIDE 100 MG: 50 SOLUTION ORAL at 08:19

## 2019-01-01 RX ADMIN — FERROUS SULFATE TAB 325 MG (65 MG ELEMENTAL FE) 325 MG: 325 (65 FE) TAB at 06:00

## 2019-01-01 RX ADMIN — BACLOFEN 5 MG: 10 TABLET ORAL at 17:40

## 2019-01-01 RX ADMIN — ACETAMINOPHEN 650 MG: 325 TABLET, FILM COATED ORAL at 17:51

## 2019-01-01 RX ADMIN — INSULIN HUMAN 2 UNITS: 100 INJECTION, SOLUTION PARENTERAL at 17:27

## 2019-01-01 RX ADMIN — ALLOPURINOL 300 MG: 300 TABLET ORAL at 04:42

## 2019-01-01 RX ADMIN — GLYCOPYRROLATE 0.2 MG: 0.2 INJECTION INTRAMUSCULAR; INTRAVENOUS at 09:57

## 2019-01-01 RX ADMIN — DILTIAZEM HYDROCHLORIDE 90 MG: 30 TABLET, FILM COATED ORAL at 17:10

## 2019-01-01 RX ADMIN — OXYCODONE HYDROCHLORIDE 5 MG: 5 TABLET ORAL at 20:41

## 2019-01-01 RX ADMIN — APIXABAN 5 MG: 5 TABLET, FILM COATED ORAL at 21:16

## 2019-01-01 RX ADMIN — INSULIN LISPRO 2 UNITS: 100 INJECTION, SOLUTION INTRAVENOUS; SUBCUTANEOUS at 08:16

## 2019-01-01 RX ADMIN — GADOTERIDOL 20 ML: 279.3 INJECTION, SOLUTION INTRAVENOUS at 13:47

## 2019-01-01 RX ADMIN — LORATADINE 10 MG: 10 TABLET ORAL at 08:23

## 2019-01-01 RX ADMIN — ALLOPURINOL 300 MG: 300 TABLET ORAL at 08:57

## 2019-01-01 RX ADMIN — DILTIAZEM HYDROCHLORIDE 60 MG: 30 TABLET, FILM COATED ORAL at 20:35

## 2019-01-01 RX ADMIN — SENNOSIDES AND DOCUSATE SODIUM 2 TABLET: 8.6; 5 TABLET ORAL at 08:26

## 2019-01-01 RX ADMIN — SENNOSIDES AND DOCUSATE SODIUM 2 TABLET: 8.6; 5 TABLET ORAL at 08:20

## 2019-01-01 RX ADMIN — DILTIAZEM HYDROCHLORIDE 360 MG: 180 CAPSULE, COATED, EXTENDED RELEASE ORAL at 17:41

## 2019-01-01 RX ADMIN — SODIUM CHLORIDE 100 MG: 9 INJECTION, SOLUTION INTRAVENOUS at 05:06

## 2019-01-01 RX ADMIN — APIXABAN 5 MG: 5 TABLET, FILM COATED ORAL at 08:19

## 2019-01-01 RX ADMIN — FERROUS SULFATE TAB 325 MG (65 MG ELEMENTAL FE) 325 MG: 325 (65 FE) TAB at 07:58

## 2019-01-01 RX ADMIN — AMANTADINE HYDROCHLORIDE 100 MG: 50 SOLUTION ORAL at 13:55

## 2019-01-01 RX ADMIN — ATORVASTATIN CALCIUM 20 MG: 10 TABLET, FILM COATED ORAL at 20:43

## 2019-01-01 RX ADMIN — GLYCOPYRROLATE 0.2 MG: 0.2 INJECTION INTRAMUSCULAR; INTRAVENOUS at 06:45

## 2019-01-01 RX ADMIN — CIPROFLOXACIN HYDROCHLORIDE 500 MG: 500 TABLET, FILM COATED ORAL at 08:44

## 2019-01-01 RX ADMIN — INSULIN HUMAN 4 UNITS: 100 INJECTION, SOLUTION PARENTERAL at 18:33

## 2019-01-01 RX ADMIN — MULTIPLE VITAMINS W/ MINERALS TAB 1 TABLET: TAB at 04:46

## 2019-01-01 RX ADMIN — GLYCOPYRROLATE 0.3 MG: 0.2 INJECTION INTRAMUSCULAR; INTRAVENOUS at 07:46

## 2019-01-01 RX ADMIN — APIXABAN 5 MG: 5 TABLET, FILM COATED ORAL at 20:35

## 2019-01-01 RX ADMIN — SODIUM CHLORIDE 100 MG: 9 INJECTION, SOLUTION INTRAVENOUS at 19:30

## 2019-01-01 RX ADMIN — BACLOFEN 5 MG: 10 TABLET ORAL at 08:45

## 2019-01-01 RX ADMIN — METOPROLOL TARTRATE 25 MG: 25 TABLET ORAL at 17:08

## 2019-01-01 RX ADMIN — BACLOFEN 10 MG: 10 TABLET ORAL at 04:42

## 2019-01-01 RX ADMIN — DOCUSATE SODIUM 100 MG: 100 CAPSULE, LIQUID FILLED ORAL at 04:47

## 2019-01-01 RX ADMIN — OMEPRAZOLE 20 MG: 20 CAPSULE, DELAYED RELEASE ORAL at 08:17

## 2019-01-01 RX ADMIN — DEXAMETHASONE SODIUM PHOSPHATE 6 MG: 4 INJECTION, SOLUTION INTRA-ARTICULAR; INTRALESIONAL; INTRAMUSCULAR; INTRAVENOUS; SOFT TISSUE at 17:40

## 2019-01-01 RX ADMIN — INSULIN HUMAN 2 UNITS: 100 INJECTION, SOLUTION PARENTERAL at 20:24

## 2019-01-01 RX ADMIN — CIPROFLOXACIN HYDROCHLORIDE 500 MG: 500 TABLET, FILM COATED ORAL at 10:33

## 2019-01-01 RX ADMIN — METOPROLOL SUCCINATE 25 MG: 25 TABLET, EXTENDED RELEASE ORAL at 05:51

## 2019-01-01 RX ADMIN — FENTANYL CITRATE 100 MCG: 0.05 INJECTION, SOLUTION INTRAMUSCULAR; INTRAVENOUS at 10:15

## 2019-01-01 RX ADMIN — INSULIN GLARGINE 40 UNITS: 100 INJECTION, SOLUTION SUBCUTANEOUS at 06:09

## 2019-01-01 RX ADMIN — SODIUM CHLORIDE, POTASSIUM CHLORIDE, SODIUM LACTATE AND CALCIUM CHLORIDE: 600; 310; 30; 20 INJECTION, SOLUTION INTRAVENOUS at 06:00

## 2019-01-01 RX ADMIN — DEXAMETHASONE 2 MG: 1 TABLET ORAL at 21:16

## 2019-01-01 RX ADMIN — POTASSIUM CHLORIDE 10 MEQ: 7.46 INJECTION, SOLUTION INTRAVENOUS at 14:22

## 2019-01-01 RX ADMIN — DABIGATRAN ETEXILATE MESYLATE 150 MG: 150 CAPSULE ORAL at 10:17

## 2019-01-01 RX ADMIN — BACLOFEN 5 MG: 10 TABLET ORAL at 20:32

## 2019-01-01 RX ADMIN — FERROUS SULFATE TAB 325 MG (65 MG ELEMENTAL FE) 325 MG: 325 (65 FE) TAB at 08:02

## 2019-01-01 RX ADMIN — OXYBUTYNIN CHLORIDE 5 MG: 5 TABLET, EXTENDED RELEASE ORAL at 21:08

## 2019-01-01 RX ADMIN — PROPOFOL 20 MCG/KG/MIN: 10 INJECTION, EMULSION INTRAVENOUS at 03:06

## 2019-01-01 RX ADMIN — INSULIN LISPRO 8 UNITS: 100 INJECTION, SOLUTION INTRAVENOUS; SUBCUTANEOUS at 16:34

## 2019-01-01 RX ADMIN — GADOBUTROL 10 ML: 604.72 INJECTION INTRAVENOUS at 12:07

## 2019-01-01 RX ADMIN — LORAZEPAM 1 MG: 2 INJECTION INTRAMUSCULAR; INTRAVENOUS at 01:28

## 2019-01-01 RX ADMIN — OXYCODONE HYDROCHLORIDE 2.5 MG: 5 TABLET ORAL at 14:34

## 2019-01-01 RX ADMIN — SODIUM CHLORIDE 500 MG: 9 INJECTION, SOLUTION INTRAVENOUS at 05:56

## 2019-01-01 RX ADMIN — DILTIAZEM HYDROCHLORIDE 90 MG: 30 TABLET, FILM COATED ORAL at 23:36

## 2019-01-01 RX ADMIN — PHENYLEPHRINE HYDROCHLORIDE 100 MCG: 10 INJECTION INTRAVENOUS at 09:33

## 2019-01-01 RX ADMIN — DILTIAZEM HYDROCHLORIDE 60 MG: 30 TABLET, FILM COATED ORAL at 14:44

## 2019-01-01 RX ADMIN — OMEPRAZOLE 40 MG: 20 CAPSULE, DELAYED RELEASE ORAL at 09:12

## 2019-01-01 RX ADMIN — FERROUS SULFATE TAB 325 MG (65 MG ELEMENTAL FE) 325 MG: 325 (65 FE) TAB at 08:57

## 2019-01-01 RX ADMIN — METFORMIN HYDROCHLORIDE 250 MG: 500 TABLET, FILM COATED ORAL at 02:50

## 2019-01-01 RX ADMIN — ATORVASTATIN CALCIUM 20 MG: 20 TABLET, FILM COATED ORAL at 21:10

## 2019-01-01 RX ADMIN — APIXABAN 5 MG: 5 TABLET, FILM COATED ORAL at 07:59

## 2019-01-01 RX ADMIN — AMANTADINE HYDROCHLORIDE 100 MG: 50 SOLUTION ORAL at 08:39

## 2019-01-01 RX ADMIN — INSULIN HUMAN 2 UNITS: 100 INJECTION, SOLUTION PARENTERAL at 21:40

## 2019-01-01 RX ADMIN — INSULIN HUMAN 5 UNITS: 100 INJECTION, SOLUTION PARENTERAL at 12:35

## 2019-01-01 RX ADMIN — METFORMIN HYDROCHLORIDE 250 MG: 500 TABLET, FILM COATED ORAL at 08:38

## 2019-01-01 RX ADMIN — INSULIN HUMAN 2 UNITS: 100 INJECTION, SOLUTION PARENTERAL at 11:32

## 2019-01-01 RX ADMIN — SENNOSIDES AND DOCUSATE SODIUM 2 TABLET: 8.6; 5 TABLET ORAL at 20:39

## 2019-01-01 RX ADMIN — LORATADINE 10 MG: 10 TABLET ORAL at 08:26

## 2019-01-01 RX ADMIN — LACOSAMIDE 100 MG: 100 TABLET, FILM COATED ORAL at 18:23

## 2019-01-01 RX ADMIN — SENNOSIDES, DOCUSATE SODIUM 2 TABLET: 50; 8.6 TABLET, FILM COATED ORAL at 05:21

## 2019-01-01 RX ADMIN — LORATADINE 10 MG: 10 TABLET ORAL at 09:51

## 2019-01-01 RX ADMIN — ACETAMINOPHEN 650 MG: 325 TABLET, FILM COATED ORAL at 06:15

## 2019-01-01 RX ADMIN — SODIUM CHLORIDE 100 MG: 9 INJECTION, SOLUTION INTRAVENOUS at 18:23

## 2019-01-01 RX ADMIN — OXYBUTYNIN CHLORIDE 5 MG: 5 TABLET, EXTENDED RELEASE ORAL at 20:15

## 2019-01-01 RX ADMIN — SODIUM CHLORIDE 1000 MG: 9 INJECTION, SOLUTION INTRAVENOUS at 17:11

## 2019-01-01 RX ADMIN — INSULIN GLARGINE 12 UNITS: 100 INJECTION, SOLUTION SUBCUTANEOUS at 20:53

## 2019-01-01 RX ADMIN — LORAZEPAM 4 MG: 2 INJECTION INTRAMUSCULAR; INTRAVENOUS at 14:56

## 2019-01-01 RX ADMIN — DEXAMETHASONE 4 MG: 4 TABLET ORAL at 21:56

## 2019-01-01 RX ADMIN — ACETAMINOPHEN 650 MG: 325 TABLET, FILM COATED ORAL at 12:33

## 2019-01-01 RX ADMIN — DEXAMETHASONE SODIUM PHOSPHATE 6 MG: 4 INJECTION, SOLUTION INTRA-ARTICULAR; INTRALESIONAL; INTRAMUSCULAR; INTRAVENOUS; SOFT TISSUE at 04:31

## 2019-01-01 RX ADMIN — METOPROLOL TARTRATE 25 MG: 25 TABLET ORAL at 05:44

## 2019-01-01 RX ADMIN — BACLOFEN 10 MG: 10 TABLET ORAL at 17:18

## 2019-01-01 RX ADMIN — METOPROLOL TARTRATE 25 MG: 25 TABLET, FILM COATED ORAL at 16:30

## 2019-01-01 RX ADMIN — INSULIN HUMAN 4 UNITS: 100 INJECTION, SOLUTION PARENTERAL at 08:51

## 2019-01-01 RX ADMIN — BACLOFEN 5 MG: 10 TABLET ORAL at 19:33

## 2019-01-01 RX ADMIN — SODIUM CHLORIDE 100 MG: 9 INJECTION, SOLUTION INTRAVENOUS at 05:14

## 2019-01-01 RX ADMIN — GLYCOPYRROLATE 0.2 MG: 0.2 INJECTION INTRAMUSCULAR; INTRAVENOUS at 12:44

## 2019-01-01 RX ADMIN — LISINOPRIL 5 MG: 5 TABLET ORAL at 05:50

## 2019-01-01 RX ADMIN — METFORMIN HYDROCHLORIDE 250 MG: 500 TABLET, FILM COATED ORAL at 08:20

## 2019-01-01 RX ADMIN — METFORMIN HYDROCHLORIDE 1000 MG: 500 TABLET, FILM COATED ORAL at 08:23

## 2019-01-01 RX ADMIN — LISINOPRIL 5 MG: 5 TABLET ORAL at 05:32

## 2019-01-01 RX ADMIN — VITAMIN D, TAB 1000IU (100/BT) 1000 UNITS: 25 TAB at 17:43

## 2019-01-01 RX ADMIN — SODIUM CHLORIDE, POTASSIUM CHLORIDE, SODIUM LACTATE AND CALCIUM CHLORIDE: 600; 310; 30; 20 INJECTION, SOLUTION INTRAVENOUS at 01:18

## 2019-01-01 RX ADMIN — ALLOPURINOL 300 MG: 300 TABLET ORAL at 08:24

## 2019-01-01 RX ADMIN — INSULIN HUMAN 4 UNITS: 100 INJECTION, SOLUTION PARENTERAL at 17:10

## 2019-01-01 RX ADMIN — CIPROFLOXACIN HYDROCHLORIDE 500 MG: 500 TABLET, FILM COATED ORAL at 20:15

## 2019-01-01 RX ADMIN — FERROUS SULFATE TAB 325 MG (65 MG ELEMENTAL FE) 325 MG: 325 (65 FE) TAB at 09:10

## 2019-01-01 RX ADMIN — FENTANYL CITRATE 50 MCG: 50 INJECTION, SOLUTION INTRAMUSCULAR; INTRAVENOUS at 10:46

## 2019-01-01 RX ADMIN — SODIUM CHLORIDE 100 MG: 9 INJECTION, SOLUTION INTRAVENOUS at 05:04

## 2019-01-01 RX ADMIN — DILTIAZEM HYDROCHLORIDE 90 MG: 30 TABLET, FILM COATED ORAL at 05:55

## 2019-01-01 RX ADMIN — ETOMIDATE 20 MG: 2 INJECTION INTRAVENOUS at 03:07

## 2019-01-01 RX ADMIN — MULTIPLE VITAMINS W/ MINERALS TAB 1 TABLET: TAB at 08:33

## 2019-01-01 RX ADMIN — FENTANYL CITRATE 50 MCG: 50 INJECTION, SOLUTION INTRAMUSCULAR; INTRAVENOUS at 14:45

## 2019-01-01 RX ADMIN — LIDOCAINE 1 PATCH: 50 PATCH TOPICAL at 15:14

## 2019-01-01 RX ADMIN — AMANTADINE HYDROCHLORIDE 100 MG: 50 SOLUTION ORAL at 09:30

## 2019-01-01 RX ADMIN — FERROUS SULFATE TAB 325 MG (65 MG ELEMENTAL FE) 325 MG: 325 (65 FE) TAB at 08:21

## 2019-01-01 RX ADMIN — DILTIAZEM HYDROCHLORIDE 60 MG: 30 TABLET, FILM COATED ORAL at 14:25

## 2019-01-01 RX ADMIN — INSULIN LISPRO 3 UNITS: 100 INJECTION, SOLUTION INTRAVENOUS; SUBCUTANEOUS at 08:11

## 2019-01-01 RX ADMIN — FAMOTIDINE 20 MG: 10 INJECTION INTRAVENOUS at 17:53

## 2019-01-01 RX ADMIN — CIPROFLOXACIN HYDROCHLORIDE 500 MG: 500 TABLET, FILM COATED ORAL at 08:23

## 2019-01-01 RX ADMIN — OMEPRAZOLE 40 MG: KIT at 08:27

## 2019-01-01 RX ADMIN — APIXABAN 5 MG: 5 TABLET, FILM COATED ORAL at 08:48

## 2019-01-01 RX ADMIN — DILTIAZEM HYDROCHLORIDE 90 MG: 30 TABLET, FILM COATED ORAL at 18:19

## 2019-01-01 RX ADMIN — LEVETIRACETAM 500 MG: 500 TABLET ORAL at 21:10

## 2019-01-01 RX ADMIN — VITAMIN D, TAB 1000IU (100/BT) 1000 UNITS: 25 TAB at 08:19

## 2019-01-01 RX ADMIN — DOCUSATE SODIUM 100 MG: 100 CAPSULE, LIQUID FILLED ORAL at 18:29

## 2019-01-01 RX ADMIN — FENTANYL CITRATE 50 MCG: 0.05 INJECTION, SOLUTION INTRAMUSCULAR; INTRAVENOUS at 17:46

## 2019-01-01 RX ADMIN — VITAMIN D, TAB 1000IU (100/BT) 1000 UNITS: 25 TAB at 08:17

## 2019-01-01 RX ADMIN — METOPROLOL TARTRATE 25 MG: 25 TABLET ORAL at 17:37

## 2019-01-01 RX ADMIN — SENNOSIDES, DOCUSATE SODIUM 1 TABLET: 50; 8.6 TABLET, FILM COATED ORAL at 21:27

## 2019-01-01 RX ADMIN — SODIUM CHLORIDE 200 MG: 9 INJECTION, SOLUTION INTRAVENOUS at 12:09

## 2019-01-01 RX ADMIN — SODIUM CHLORIDE, POTASSIUM CHLORIDE, SODIUM LACTATE AND CALCIUM CHLORIDE: 600; 310; 30; 20 INJECTION, SOLUTION INTRAVENOUS at 01:00

## 2019-01-01 RX ADMIN — DEXAMETHASONE SODIUM PHOSPHATE 4 MG: 4 INJECTION, SOLUTION INTRA-ARTICULAR; INTRALESIONAL; INTRAMUSCULAR; INTRAVENOUS; SOFT TISSUE at 01:17

## 2019-01-01 RX ADMIN — APIXABAN 5 MG: 5 TABLET, FILM COATED ORAL at 20:29

## 2019-01-01 RX ADMIN — DILTIAZEM HYDROCHLORIDE 60 MG: 30 TABLET, FILM COATED ORAL at 18:44

## 2019-01-01 RX ADMIN — ALLOPURINOL 300 MG: 300 TABLET ORAL at 10:33

## 2019-01-01 RX ADMIN — GADOBUTROL 9 ML: 604.72 INJECTION INTRAVENOUS at 11:53

## 2019-01-01 RX ADMIN — METOPROLOL TARTRATE 25 MG: 25 TABLET ORAL at 18:47

## 2019-01-01 RX ADMIN — DILTIAZEM HYDROCHLORIDE 10 MG: 5 INJECTION INTRAVENOUS at 08:46

## 2019-01-01 RX ADMIN — AMANTADINE HYDROCHLORIDE 100 MG: 50 SOLUTION ORAL at 12:41

## 2019-01-01 RX ADMIN — DABIGATRAN ETEXILATE MESYLATE 150 MG: 150 CAPSULE ORAL at 08:58

## 2019-01-01 RX ADMIN — SODIUM CHLORIDE: 9 INJECTION, SOLUTION INTRAVENOUS at 05:14

## 2019-01-01 RX ADMIN — PREDNISONE 10 MG: 5 TABLET ORAL at 08:37

## 2019-01-01 RX ADMIN — INSULIN GLARGINE 10 UNITS: 100 INJECTION, SOLUTION SUBCUTANEOUS at 12:08

## 2019-01-01 RX ADMIN — METOPROLOL TARTRATE 25 MG: 25 TABLET ORAL at 06:13

## 2019-01-01 RX ADMIN — ATORVASTATIN CALCIUM 20 MG: 10 TABLET, FILM COATED ORAL at 20:19

## 2019-01-01 RX ADMIN — INSULIN HUMAN 10 UNITS: 100 INJECTION, SOLUTION PARENTERAL at 11:44

## 2019-01-01 RX ADMIN — INSULIN HUMAN 10 UNITS: 100 INJECTION, SOLUTION PARENTERAL at 20:04

## 2019-01-01 RX ADMIN — INSULIN GLARGINE 12 UNITS: 100 INJECTION, SOLUTION SUBCUTANEOUS at 21:11

## 2019-01-01 RX ADMIN — ATORVASTATIN CALCIUM 20 MG: 40 TABLET, FILM COATED ORAL at 21:22

## 2019-01-01 RX ADMIN — MULTIPLE VITAMINS W/ MINERALS TAB 1 TABLET: TAB at 05:56

## 2019-01-01 RX ADMIN — DOCUSATE SODIUM 100 MG: 100 CAPSULE, LIQUID FILLED ORAL at 17:41

## 2019-01-01 SDOH — ECONOMIC STABILITY: GENERAL

## 2019-01-01 SDOH — ECONOMIC STABILITY: HOUSING INSECURITY: HOME SAFETY: PATIENT IN THE HOSPITAL CURRENTLY

## 2019-01-01 ASSESSMENT — ENCOUNTER SYMPTOMS
STRIDOR: 0
HEARTBURN: 0
FOCAL WEAKNESS: 0
FEVER: 0
NERVOUS/ANXIOUS: 0
NAUSEA: 0
CONSTIPATION: 1
ABDOMINAL PAIN: 0
DIAPHORESIS: 0
EYES NEGATIVE: 1
CHILLS: 0
CHILLS: 0
DIARRHEA: 0
STRIDOR: 0
DEPRESSION: 0
VOMITING: 0
COUGH: 0
SHORTNESS OF BREATH: 0
NAUSEA: 0
LAST BOWEL MOVEMENT: 65335
STOOL FREQUENCY: LESS THAN DAILY
DOUBLE VISION: 0
WEIGHT LOSS: 0
CARDIOVASCULAR NEGATIVE: 1
ABDOMINAL PAIN: 0
NAUSEA: 0
HEADACHES: 1
SENSORY CHANGE: 0
NERVOUS/ANXIOUS: 0
DIARRHEA: 0
MYALGIAS: 0
CONSTIPATION: 0
CHILLS: 0
HALLUCINATIONS: 0
HEADACHES: 0
ABDOMINAL PAIN: 0
NAUSEA: 0
WEAKNESS: 0
NAUSEA: 0
FEVER: 0
VOMITING: 0
LAST BOWEL MOVEMENT: 65339
DIARRHEA: 0
CONSTIPATION: 1
WEAKNESS: 1
SHORTNESS OF BREATH: 0
NECK PAIN: 0
BLURRED VISION: 0
VOMITING: 0
ABDOMINAL PAIN: 0
NERVOUS/ANXIOUS: 0
SORE THROAT: 0
PALPITATIONS: 0
WEAKNESS: 1
CONSTIPATION: 0
SPUTUM CONSISTENCY: FROTHY
COUGH: 0
SPEECH CHANGE: 0
CHILLS: 0
CHILLS: 0
HEARTBURN: 0
BLOOD IN STOOL: 0
ORTHOPNEA: 0
CHILLS: 0
STOOL FREQUENCY: LESS THAN DAILY
HALLUCINATIONS: 0
FEVER: 0
DOUBLE VISION: 0
EYES NEGATIVE: 1
SPEECH CHANGE: 0
SHORTNESS OF BREATH: 0
FOCAL WEAKNESS: 0
CHILLS: 0
CHILLS: 0
DIZZINESS: 0
MEMORY LOSS: 1
CHILLS: 0
CHILLS: 0
SHORTNESS OF BREATH: 0
COUGH: 0
BLURRED VISION: 0
EYES NEGATIVE: 1
VOMITING: 0
DIARRHEA: 0
ABDOMINAL PAIN: 0
NAUSEA: 0
DIAPHORESIS: 0
SHORTNESS OF BREATH: 0
FEVER: 0
FEVER: 0
PSYCHIATRIC NEGATIVE: 1
CONSTIPATION: 1
PALPITATIONS: 0
CHILLS: 0
WEAKNESS: 1
EYE DISCHARGE: 0
SPUTUM PRODUCTION: 0
ABDOMINAL PAIN: 0
HALLUCINATIONS: 0
STOOL FREQUENCY: LESS THAN DAILY
COUGH: 0
SHORTNESS OF BREATH: 0
CONSTIPATION: 0
CHILLS: 0
PALPITATIONS: 0
FALLS: 0
VOMITING: 0
VOMITING: 0
ABDOMINAL PAIN: 0
WEAKNESS: 1
COUGH: 0
SHORTNESS OF BREATH: 0
SHORTNESS OF BREATH: 0
NECK PAIN: 0
FEVER: 0
EYE PAIN: 0
FEVER: 0
EYES NEGATIVE: 1
NAUSEA: 0
NAUSEA: 1
WEIGHT LOSS: 0
VOMITING: 0
SLEEP QUALITY: ADEQUATE
FEVER: 0
VOMITING: 0
NERVOUS/ANXIOUS: 0
HEADACHES: 1
COUGH: 0
CLAUDICATION: 0
NAUSEA: 0
NAUSEA: 0
FEVER: 0
TINGLING: 0
NAUSEA: 0
FEVER: 0
LOSS OF CONSCIOUSNESS: 0
DIARRHEA: 0
VOMITING: 0
FEVER: 0
ORTHOPNEA: 0
FEVER: 0
FEVER: 0
EYE DISCHARGE: 0
TREMORS: 0
CHILLS: 0
PALPITATIONS: 0
STRIDOR: 0
FEVER: 0
DIZZINESS: 0
CHILLS: 0
VOMITING: 0
CONSTITUTIONAL NEGATIVE: 1
SLEEP QUALITY: FAIR
FOCAL WEAKNESS: 0
SHORTNESS OF BREATH: 0
SHORTNESS OF BREATH: 0
VOMITING: 0
FOCAL WEAKNESS: 0
DIARRHEA: 0
CHANGE IN LEVEL OF CONSCIOUSNESS: 1
NERVOUS/ANXIOUS: 0
FEVER: 0
CHILLS: 0
FOCAL WEAKNESS: 0
COUGH: 0
MEMORY LOSS: 1
ABDOMINAL PAIN: 0
FEVER: 0
CHANGE IN LEVEL OF CONSCIOUSNESS: 1
BACK PAIN: 0
ABDOMINAL PAIN: 0
EYES NEGATIVE: 1
BLURRED VISION: 0
SPEECH CHANGE: 0
NERVOUS/ANXIOUS: 0
NECK PAIN: 0
STRIDOR: 0
ABDOMINAL PAIN: 0
HALLUCINATIONS: 0
SHORTNESS OF BREATH: 0
ABDOMINAL PAIN: 0
ABDOMINAL PAIN: 0
HEARTBURN: 0
SHORTNESS OF BREATH: 0
SPEECH CHANGE: 0
FOCAL WEAKNESS: 0
PHOTOPHOBIA: 0
COUGH: 0
DEPRESSION: 0
LAST BOWEL MOVEMENT: 65325
EYES NEGATIVE: 1
DIARRHEA: 0
DIARRHEA: 0
SENSORY CHANGE: 0
CONSTIPATION: 0
BLURRED VISION: 0
FEVER: 0
DOUBLE VISION: 0
DIZZINESS: 0
NAUSEA: 0
MYALGIAS: 0
CLAUDICATION: 0
NECK PAIN: 0
WEAKNESS: 0
FOCAL WEAKNESS: 0
ABDOMINAL PAIN: 0
SEIZURES: 0
NAUSEA: 1
EYE REDNESS: 0
VOMITING: 0
BLURRED VISION: 0
NAUSEA: 0
FOCAL WEAKNESS: 0
COUGH: 0
ABDOMINAL PAIN: 0
ORTHOPNEA: 0
MYALGIAS: 0
PALPITATIONS: 0
VOMITING: 0
BACK PAIN: 0
HEMOPTYSIS: 0
EYE REDNESS: 0
SHORTNESS OF BREATH: 0
HALLUCINATIONS: 0
FEVER: 0
NAUSEA: 0
FOCAL WEAKNESS: 0
BLURRED VISION: 0
SHORTNESS OF BREATH: 0
VOMITING: 0
VOMITING: 0
MYALGIAS: 0
HEMOPTYSIS: 0
SPEECH CHANGE: 1
SHORTNESS OF BREATH: 0
VOMITING: 0
ABDOMINAL PAIN: 0
CHILLS: 0
BLURRED VISION: 0
FALLS: 0
COUGH: 0
WEAKNESS: 1
HALLUCINATIONS: 0
ABDOMINAL PAIN: 0
ORTHOPNEA: 0
DOUBLE VISION: 0
INSOMNIA: 0
HALLUCINATIONS: 0
NAUSEA: 0
SPEECH CHANGE: 0
FEVER: 0
CLAUDICATION: 0
SPEECH CHANGE: 0
PALPITATIONS: 0
FOCAL WEAKNESS: 0
NAUSEA: 0
SPUTUM PRODUCTION: 0
CHILLS: 0
COUGH: 0
NAUSEA: 0
DIZZINESS: 0
TREMORS: 0
BACK PAIN: 0
DIARRHEA: 0
COUGH: 0
CONSTIPATION: 0
HEADACHES: 1
FEVER: 0
SHORTNESS OF BREATH: 0
BLURRED VISION: 0
PALPITATIONS: 0
STRIDOR: 0
CONSTIPATION: 1
VOMITING: 0
NAUSEA: 0
TREMORS: 0
EYE DISCHARGE: 0
BLURRED VISION: 0
SPEECH CHANGE: 0
DEPRESSION: 0
COUGH: 0
PALPITATIONS: 0
COUGH: 0
SHORTNESS OF BREATH: 0
DIARRHEA: 0
NECK PAIN: 0
NERVOUS/ANXIOUS: 0
VOMITING: 0
CONSTIPATION: 0
WEIGHT LOSS: 0
VOMITING: 0
VOMITING: 0
NERVOUS/ANXIOUS: 0
LAST BOWEL MOVEMENT: 65335
DEPRESSION: 0
DIZZINESS: 0
INSOMNIA: 0
COUGH: 0
CHILLS: 0
PALPITATIONS: 0
HEADACHES: 1
VOMITING: 0
NAUSEA: 0
COUGH: 0
BLURRED VISION: 0
SPUTUM PRODUCTION: 0
VOMITING: 0
BLURRED VISION: 0
EYE DISCHARGE: 0
HEARTBURN: 0
PALPITATIONS: 0
ABDOMINAL PAIN: 0
PALPITATIONS: 0
FOCAL WEAKNESS: 0
CONSTIPATION: 1
LAST BOWEL MOVEMENT: 65329
HALLUCINATIONS: 0
WEAKNESS: 1
STOOL FREQUENCY: LESS THAN DAILY
SHORTNESS OF BREATH: 0
PALPITATIONS: 0
HEMOPTYSIS: 0
PALPITATIONS: 0
STRIDOR: 0
HEADACHES: 0
COUGH: 0
WEIGHT LOSS: 0
DIZZINESS: 0
ABDOMINAL PAIN: 0
SPEECH CHANGE: 0
DIAPHORESIS: 0
GASTROINTESTINAL NEGATIVE: 1
CHILLS: 0
COUGH: 0
SENSORY CHANGE: 0
CHILLS: 0
HEADACHES: 0
ABDOMINAL PAIN: 0
SENSORY CHANGE: 1
SHORTNESS OF BREATH: 0
SHORTNESS OF BREATH: 0
FEVER: 0
CHILLS: 0
FEVER: 0
NERVOUS/ANXIOUS: 0
RESPIRATORY NEGATIVE: 1
CHILLS: 0
MYALGIAS: 0
PHOTOPHOBIA: 0
ABDOMINAL PAIN: 0
COUGH: 0
BLURRED VISION: 0
BACK PAIN: 0
DIZZINESS: 0
WHEEZING: 0
FEVER: 0
SHORTNESS OF BREATH: 0
WHEEZING: 0
ABDOMINAL PAIN: 0
BLURRED VISION: 0
FEVER: 0
FEVER: 0
WEAKNESS: 1
TREMORS: 0
DIZZINESS: 0
CHILLS: 0
FOCAL WEAKNESS: 0
DIZZINESS: 0
MYALGIAS: 1
PALPITATIONS: 0
FOCAL WEAKNESS: 0
ABDOMINAL PAIN: 0
COUGH: 0
CHILLS: 0
NERVOUS/ANXIOUS: 0
DOUBLE VISION: 0
NERVOUS/ANXIOUS: 0
NAUSEA: 0
ABDOMINAL PAIN: 0
NAUSEA: 0
VOMITING: 0
EYE PAIN: 0
SHORTNESS OF BREATH: 0
SHORTNESS OF BREATH: 0
NERVOUS/ANXIOUS: 0
HALLUCINATIONS: 0
DIARRHEA: 0
ABDOMINAL PAIN: 0
EYES NEGATIVE: 1
DIARRHEA: 0
PALPITATIONS: 0
BLURRED VISION: 0
FEVER: 0
SHORTNESS OF BREATH: 0
LAST BOWEL MOVEMENT: 65339
BACK PAIN: 0
ABDOMINAL PAIN: 0
TINGLING: 0
FEVER: 0
FEVER: 0
DIARRHEA: 0
FEVER: 0
BACK PAIN: 0
FEVER: 0
PALPITATIONS: 0
BLURRED VISION: 0
CONSTIPATION: 0
DIZZINESS: 0
FOCAL WEAKNESS: 0
DIAPHORESIS: 0
MEMORY LOSS: 1
NAUSEA: 0
SHORTNESS OF BREATH: 0
FEVER: 0
NAUSEA: 0
BOWEL INCONTINENCE: 1
CHILLS: 0
ORTHOPNEA: 0
SENSORY CHANGE: 0
BLURRED VISION: 0
SPEECH CHANGE: 0
STOOL FREQUENCY: LESS THAN DAILY
DIARRHEA: 0
CONSTIPATION: 0
MYALGIAS: 0
SPEECH CHANGE: 0
STOOL FREQUENCY: LESS THAN DAILY
FOCAL WEAKNESS: 0
VOMITING: 0
COUGH: 0
CHILLS: 0
FEVER: 0
SPUTUM PRODUCTION: 0
SPUTUM PRODUCTION: 0
NAUSEA: 0
COUGH: 0
BACK PAIN: 1
ABDOMINAL PAIN: 0
ORTHOPNEA: 0
DIARRHEA: 0
ABDOMINAL PAIN: 0
FLANK PAIN: 0
EYES NEGATIVE: 1
DOUBLE VISION: 0
HEADACHES: 1
VOMITING: 0
FATIGUES EASILY: 1
CHILLS: 0
FOCAL WEAKNESS: 0
COUGH: 0
NERVOUS/ANXIOUS: 1
COUGH: 0
DIARRHEA: 0
PALPITATIONS: 0
HEMOPTYSIS: 0
SHORTNESS OF BREATH: 0
STRIDOR: 0
MYALGIAS: 0
EYES NEGATIVE: 1
SPUTUM PRODUCTION: 0
NAUSEA: 0
SHORTNESS OF BREATH: 0
PALPITATIONS: 0
FEVER: 0
HEARTBURN: 0
SPEECH CHANGE: 0
PALPITATIONS: 0
EYES NEGATIVE: 1
VOMITING: 0
FEVER: 0
DEPRESSION: 0
VOMITING: 0
MEMORY LOSS: 0
CHILLS: 0
NAUSEA: 0
ABDOMINAL PAIN: 0
COUGH: 0
FLANK PAIN: 0
COUGH: 0
MYALGIAS: 0
TREMORS: 0
SHORTNESS OF BREATH: 0
MYALGIAS: 0
EYES NEGATIVE: 1
WEAKNESS: 1
DIAPHORESIS: 0
WEIGHT LOSS: 0
CHILLS: 0
MYALGIAS: 0
CHILLS: 0
VOMITING: 0
HALLUCINATIONS: 0
PALPITATIONS: 0
HEADACHES: 0
WEAKNESS: 1
BLURRED VISION: 0
NAUSEA: 0
DIAPHORESIS: 0
DEPRESSION: 0
NECK PAIN: 0
DIZZINESS: 0
HEMOPTYSIS: 0
NAUSEA: 0
COUGH: 0
DOUBLE VISION: 0
VOMITING: 0
NECK PAIN: 0
PALPITATIONS: 0
ABDOMINAL PAIN: 0
DIARRHEA: 0
CHILLS: 0
SENSORY CHANGE: 0
VOMITING: 0
VOMITING: 0
SHORTNESS OF BREATH: 0
NAUSEA: 0
BLOOD IN STOOL: 0
FOCAL WEAKNESS: 1
VOMITING: 0
BLURRED VISION: 0
NAUSEA: 0
DIARRHEA: 0
COUGH: 0
NAUSEA: 0
PALPITATIONS: 0
VOMITING: 0
WEAKNESS: 1
DEPRESSION: 0
DYSPNEA ON EXERTION: 1
SPEECH CHANGE: 0
BLURRED VISION: 0
FATIGUES EASILY: 1
SHORTNESS OF BREATH: 0
BOWEL INCONTINENCE: 1
MEMORY LOSS: 1
SHORTNESS OF BREATH: 0
DOUBLE VISION: 0
ABDOMINAL PAIN: 0
PHOTOPHOBIA: 0
NAUSEA: 0
PALPITATIONS: 0
NAUSEA: 0
MEMORY LOSS: 1
NAUSEA: 0
ABDOMINAL PAIN: 0
PALPITATIONS: 0
FEVER: 0
EYES NEGATIVE: 1
CHILLS: 0
MYALGIAS: 0
NAUSEA: 0
HEADACHES: 0
WEAKNESS: 0
SPUTUM CONSISTENCY: THIN
PALPITATIONS: 0
FEVER: 0
COUGH: 0
NECK PAIN: 0
HEMOPTYSIS: 0
SENSORY CHANGE: 0
DEPRESSION: 0
DIARRHEA: 0
NERVOUS/ANXIOUS: 0
DEPRESSION: 0
STOOL FREQUENCY: LESS THAN DAILY
CHILLS: 0
VOMITING: 0
PALPITATIONS: 0
EYE PAIN: 0
NERVOUS/ANXIOUS: 0
ABDOMINAL PAIN: 0
BLOOD IN STOOL: 0
MYALGIAS: 0
DIARRHEA: 0
HALLUCINATIONS: 0
FEVER: 0
HEMOPTYSIS: 0
EYE PAIN: 0
HEADACHES: 1
DIZZINESS: 0
WEIGHT LOSS: 0
BLURRED VISION: 0
EYES NEGATIVE: 1
SHORTNESS OF BREATH: 0
BLURRED VISION: 0
ABDOMINAL PAIN: 0
SHORTNESS OF BREATH: 0
DOUBLE VISION: 0
STRIDOR: 0
FEVER: 0
TREMORS: 0
WEIGHT LOSS: 0
SHORTNESS OF BREATH: 0
VOMITING: 0
VOMITING: 0
TINGLING: 0
MEMORY LOSS: 1
PALPITATIONS: 0
FEVER: 0
PALPITATIONS: 0
PALPITATIONS: 0
DIAPHORESIS: 0
NAUSEA: 0
PALPITATIONS: 0
VOMITING: 0
CONSTIPATION: 1
SHORTNESS OF BREATH: 0
CHILLS: 0
DOUBLE VISION: 0
SPUTUM AMOUNT: MODERATE
CHILLS: 0
NAUSEA: 0
SHORTNESS OF BREATH: 0
BLURRED VISION: 0
FATIGUES EASILY: 1
NAUSEA: 0
ABDOMINAL PAIN: 0
DOUBLE VISION: 0
SLEEP QUALITY: FAIR
ABDOMINAL PAIN: 0
HEADACHES: 1
CHILLS: 0
SEIZURES: 0
DEPRESSION: 0
FEVER: 0
ORTHOPNEA: 0
SPUTUM PRODUCTION: 1
EYE DISCHARGE: 0
COUGH: 0
CONSTIPATION: 1
EYE PAIN: 0
HEMOPTYSIS: 0
CONSTIPATION: 0
EYES NEGATIVE: 1
PALPITATIONS: 0
SHORTNESS OF BREATH: 0
FEVER: 0
FEVER: 0
ABDOMINAL PAIN: 0
NAUSEA: 0
PHOTOPHOBIA: 0
MYALGIAS: 0
SLEEP QUALITY: ADEQUATE
NECK PAIN: 0
HALLUCINATIONS: 0
VOMITING: 0
NAUSEA: 0
COUGH: 0
FOCAL WEAKNESS: 0
PALPITATIONS: 0
DIZZINESS: 0
DIARRHEA: 0
SHORTNESS OF BREATH: 0
CHILLS: 0
MYALGIAS: 0
HALLUCINATIONS: 0
VOMITING: 0
ABDOMINAL PAIN: 0
VOMITING: 0
EYES NEGATIVE: 1
NECK PAIN: 0
FOCAL WEAKNESS: 0
WEIGHT LOSS: 0
EYE PAIN: 0
PHOTOPHOBIA: 0

## 2019-01-01 ASSESSMENT — LIFESTYLE VARIABLES
ON A TYPICAL DAY WHEN YOU DRINK ALCOHOL HOW MANY DRINKS DO YOU HAVE: 1
TOTAL SCORE: 0
ALCOHOL_USE: NO
EVER FELT BAD OR GUILTY ABOUT YOUR DRINKING: NO
EVER HAD A DRINK FIRST THING IN THE MORNING TO STEADY YOUR NERVES TO GET RID OF A HANGOVER: NO
HOW MANY TIMES IN THE PAST YEAR HAVE YOU HAD 5 OR MORE DRINKS IN A DAY: 0
ON A TYPICAL DAY WHEN YOU DRINK ALCOHOL HOW MANY DRINKS DO YOU HAVE: 0
SUBSTANCE_ABUSE: 0
TOTAL SCORE: 0
TOTAL SCORE: 0
EVER_SMOKED: NEVER
ALCOHOL_USE: YES
ON A TYPICAL DAY WHEN YOU DRINK ALCOHOL HOW MANY DRINKS DO YOU HAVE: 1
EVER FELT BAD OR GUILTY ABOUT YOUR DRINKING: NO
HAVE PEOPLE ANNOYED YOU BY CRITICIZING YOUR DRINKING: NO
HAVE YOU EVER FELT YOU SHOULD CUT DOWN ON YOUR DRINKING: NO
EVER FELT BAD OR GUILTY ABOUT YOUR DRINKING: NO
ON A TYPICAL DAY WHEN YOU DRINK ALCOHOL HOW MANY DRINKS DO YOU HAVE: 2
TOTAL SCORE: 0
CONSUMPTION TOTAL: NEGATIVE
CONSUMPTION TOTAL: NEGATIVE
ALCOHOL_USE: YES
CONSUMPTION TOTAL: NEGATIVE
AVERAGE NUMBER OF DAYS PER WEEK YOU HAVE A DRINK CONTAINING ALCOHOL: 0
SUBSTANCE_ABUSE: 0
TOTAL SCORE: 0
HOW MANY TIMES IN THE PAST YEAR HAVE YOU HAD 5 OR MORE DRINKS IN A DAY: 0
SUBSTANCE_ABUSE: 0
HOW MANY TIMES IN THE PAST YEAR HAVE YOU HAD 5 OR MORE DRINKS IN A DAY: 1
DOES PATIENT WANT TO STOP DRINKING: NO
TOTAL SCORE: 0
CONSUMPTION TOTAL: NEGATIVE
AVERAGE NUMBER OF DAYS PER WEEK YOU HAVE A DRINK CONTAINING ALCOHOL: .25
TOTAL SCORE: 0
DOES PATIENT WANT TO STOP DRINKING: NO
EVER FELT BAD OR GUILTY ABOUT YOUR DRINKING: NO
TOTAL SCORE: 0
SUBSTANCE_ABUSE: 0
EVER_SMOKED: NEVER
SUBSTANCE_ABUSE: 0
EVER HAD A DRINK FIRST THING IN THE MORNING TO STEADY YOUR NERVES TO GET RID OF A HANGOVER: NO
EVER FELT BAD OR GUILTY ABOUT YOUR DRINKING: NO
EVER HAD A DRINK FIRST THING IN THE MORNING TO STEADY YOUR NERVES TO GET RID OF A HANGOVER: NO
TOTAL SCORE: 0
TOTAL SCORE: 0
HAVE YOU EVER FELT YOU SHOULD CUT DOWN ON YOUR DRINKING: NO
HAVE PEOPLE ANNOYED YOU BY CRITICIZING YOUR DRINKING: NO
HAVE YOU EVER FELT YOU SHOULD CUT DOWN ON YOUR DRINKING: NO
HAVE PEOPLE ANNOYED YOU BY CRITICIZING YOUR DRINKING: NO
SUBSTANCE_ABUSE: 0
AVERAGE NUMBER OF DAYS PER WEEK YOU HAVE A DRINK CONTAINING ALCOHOL: 1
EVER HAD A DRINK FIRST THING IN THE MORNING TO STEADY YOUR NERVES TO GET RID OF A HANGOVER: NO
EVER HAD A DRINK FIRST THING IN THE MORNING TO STEADY YOUR NERVES TO GET RID OF A HANGOVER: NO
AVERAGE NUMBER OF DAYS PER WEEK YOU HAVE A DRINK CONTAINING ALCOHOL: 0
HAVE PEOPLE ANNOYED YOU BY CRITICIZING YOUR DRINKING: NO
TOTAL SCORE: 0
DOES PATIENT WANT TO STOP DRINKING: NO
EVER_SMOKED: NEVER
TOTAL SCORE: 0
TOTAL SCORE: 0
EVER_SMOKED: NEVER
AVERAGE NUMBER OF DAYS PER WEEK YOU HAVE A DRINK CONTAINING ALCOHOL: 0
EVER FELT BAD OR GUILTY ABOUT YOUR DRINKING: NO
HOW MANY TIMES IN THE PAST YEAR HAVE YOU HAD 5 OR MORE DRINKS IN A DAY: 0
SUBSTANCE_ABUSE: 0
HAVE PEOPLE ANNOYED YOU BY CRITICIZING YOUR DRINKING: NO
TOTAL SCORE: 0
ALCOHOL_USE: YES
HAVE YOU EVER FELT YOU SHOULD CUT DOWN ON YOUR DRINKING: NO
HAVE YOU EVER FELT YOU SHOULD CUT DOWN ON YOUR DRINKING: NO
TOTAL SCORE: 0
CONSUMPTION TOTAL: NEGATIVE
ALCOHOL_USE: NO
ON A TYPICAL DAY WHEN YOU DRINK ALCOHOL HOW MANY DRINKS DO YOU HAVE: 0
ON A TYPICAL DAY WHEN YOU DRINK ALCOHOL HOW MANY DRINKS DO YOU HAVE: 1
TOTAL SCORE: 0
HOW MANY TIMES IN THE PAST YEAR HAVE YOU HAD 5 OR MORE DRINKS IN A DAY: 0
HAVE YOU EVER FELT YOU SHOULD CUT DOWN ON YOUR DRINKING: NO
SUBSTANCE_ABUSE: 0
HOW MANY TIMES IN THE PAST YEAR HAVE YOU HAD 5 OR MORE DRINKS IN A DAY: 0
TOTAL SCORE: 0
AVERAGE NUMBER OF DAYS PER WEEK YOU HAVE A DRINK CONTAINING ALCOHOL: 1
ALCOHOL_USE: NO
TOTAL SCORE: 0
EVER_SMOKED: NEVER
CONSUMPTION TOTAL: POSITIVE
HAVE PEOPLE ANNOYED YOU BY CRITICIZING YOUR DRINKING: NO
EVER HAD A DRINK FIRST THING IN THE MORNING TO STEADY YOUR NERVES TO GET RID OF A HANGOVER: NO
ALCOHOL_USE: YES

## 2019-01-01 ASSESSMENT — PATIENT HEALTH QUESTIONNAIRE - PHQ9
SUM OF ALL RESPONSES TO PHQ9 QUESTIONS 1 AND 2: 0
SUM OF ALL RESPONSES TO PHQ9 QUESTIONS 1 AND 2: 0
2. FEELING DOWN, DEPRESSED, IRRITABLE, OR HOPELESS: NOT AT ALL
1. LITTLE INTEREST OR PLEASURE IN DOING THINGS: NOT AT ALL
5. POOR APPETITE OR OVEREATING: MORE THAN HALF THE DAYS
1. LITTLE INTEREST OR PLEASURE IN DOING THINGS: NOT AT ALL
2. FEELING DOWN, DEPRESSED, IRRITABLE, OR HOPELESS: NOT AT ALL
1. LITTLE INTEREST OR PLEASURE IN DOING THINGS: NOT AT ALL
2. FEELING DOWN, DEPRESSED, IRRITABLE, OR HOPELESS: NOT AT ALL
SUM OF ALL RESPONSES TO PHQ9 QUESTIONS 1 AND 2: 0
1. LITTLE INTEREST OR PLEASURE IN DOING THINGS: NOT AT ALL
SUM OF ALL RESPONSES TO PHQ9 QUESTIONS 1 AND 2: 0
1. LITTLE INTEREST OR PLEASURE IN DOING THINGS: NOT AT ALL
1. LITTLE INTEREST OR PLEASURE IN DOING THINGS: NOT AT ALL
SUM OF ALL RESPONSES TO PHQ9 QUESTIONS 1 AND 2: 0
2. FEELING DOWN, DEPRESSED, IRRITABLE, OR HOPELESS: NOT AT ALL
9. THOUGHTS THAT YOU WOULD BE BETTER OFF DEAD, OR OF HURTING YOURSELF: NOT AT ALL
8. MOVING OR SPEAKING SO SLOWLY THAT OTHER PEOPLE COULD HAVE NOTICED. OR THE OPPOSITE, BEING SO FIGETY OR RESTLESS THAT YOU HAVE BEEN MOVING AROUND A LOT MORE THAN USUAL: NOT AT ALL
SUM OF ALL RESPONSES TO PHQ9 QUESTIONS 1 AND 2: 0
2. FEELING DOWN, DEPRESSED, IRRITABLE, OR HOPELESS: NOT AT ALL
1. LITTLE INTEREST OR PLEASURE IN DOING THINGS: NOT AT ALL
2. FEELING DOWN, DEPRESSED, IRRITABLE, OR HOPELESS: NOT AT ALL
1. LITTLE INTEREST OR PLEASURE IN DOING THINGS: NOT AT ALL
1. LITTLE INTEREST OR PLEASURE IN DOING THINGS: NOT AT ALL
3. TROUBLE FALLING OR STAYING ASLEEP OR SLEEPING TOO MUCH: NOT AT ALL
SUM OF ALL RESPONSES TO PHQ9 QUESTIONS 1 AND 2: 0
2. FEELING DOWN, DEPRESSED, IRRITABLE, OR HOPELESS: NOT AT ALL
1. LITTLE INTEREST OR PLEASURE IN DOING THINGS: NOT AT ALL
2. FEELING DOWN, DEPRESSED, IRRITABLE, OR HOPELESS: NOT AT ALL
1. LITTLE INTEREST OR PLEASURE IN DOING THINGS: NOT AT ALL
SUM OF ALL RESPONSES TO PHQ9 QUESTIONS 1 AND 2: 0
7. TROUBLE CONCENTRATING ON THINGS, SUCH AS READING THE NEWSPAPER OR WATCHING TELEVISION: NOT AT ALL
SUM OF ALL RESPONSES TO PHQ9 QUESTIONS 1 AND 2: 0
2. FEELING DOWN, DEPRESSED, IRRITABLE, OR HOPELESS: NOT AT ALL
1. LITTLE INTEREST OR PLEASURE IN DOING THINGS: NOT AT ALL
SUM OF ALL RESPONSES TO PHQ9 QUESTIONS 1 AND 2: 0
1. LITTLE INTEREST OR PLEASURE IN DOING THINGS: NOT AT ALL
SUM OF ALL RESPONSES TO PHQ9 QUESTIONS 1 AND 2: 0
SUM OF ALL RESPONSES TO PHQ9 QUESTIONS 1 AND 2: 0
1. LITTLE INTEREST OR PLEASURE IN DOING THINGS: NOT AT ALL
SUM OF ALL RESPONSES TO PHQ QUESTIONS 1-9: 3
2. FEELING DOWN, DEPRESSED, IRRITABLE, OR HOPELESS: NOT AT ALL
2. FEELING DOWN, DEPRESSED, IRRITABLE, OR HOPELESS: SEVERAL DAYS
2. FEELING DOWN, DEPRESSED, IRRITABLE, OR HOPELESS: NOT AT ALL
SUM OF ALL RESPONSES TO PHQ9 QUESTIONS 1 AND 2: 0
2. FEELING DOWN, DEPRESSED, IRRITABLE, OR HOPELESS: NOT AT ALL
2. FEELING DOWN, DEPRESSED, IRRITABLE, OR HOPELESS: NOT AT ALL
4. FEELING TIRED OR HAVING LITTLE ENERGY: NOT AT ALL
1. LITTLE INTEREST OR PLEASURE IN DOING THINGS: NOT AT ALL
2. FEELING DOWN, DEPRESSED, IRRITABLE, OR HOPELESS: NOT AT ALL
2. FEELING DOWN, DEPRESSED, IRRITABLE, OR HOPELESS: NOT AT ALL
1. LITTLE INTEREST OR PLEASURE IN DOING THINGS: NOT AT ALL
2. FEELING DOWN, DEPRESSED, IRRITABLE, OR HOPELESS: NOT AT ALL
SUM OF ALL RESPONSES TO PHQ9 QUESTIONS 1 AND 2: 0
1. LITTLE INTEREST OR PLEASURE IN DOING THINGS: NOT AT ALL
2. FEELING DOWN, DEPRESSED, IRRITABLE, OR HOPELESS: NOT AT ALL
1. LITTLE INTEREST OR PLEASURE IN DOING THINGS: NOT AT ALL
2. FEELING DOWN, DEPRESSED, IRRITABLE, OR HOPELESS: NOT AT ALL
SUM OF ALL RESPONSES TO PHQ9 QUESTIONS 1 AND 2: 0
SUM OF ALL RESPONSES TO PHQ9 QUESTIONS 1 AND 2: 0
2. FEELING DOWN, DEPRESSED, IRRITABLE, OR HOPELESS: NOT AT ALL
CLINICAL INTERPRETATION OF PHQ2 SCORE: 0
2. FEELING DOWN, DEPRESSED, IRRITABLE, OR HOPELESS: NOT AT ALL
1. LITTLE INTEREST OR PLEASURE IN DOING THINGS: NOT AT ALL
SUM OF ALL RESPONSES TO PHQ9 QUESTIONS 1 AND 2: 0
SUM OF ALL RESPONSES TO PHQ9 QUESTIONS 1 AND 2: 0
6. FEELING BAD ABOUT YOURSELF - OR THAT YOU ARE A FAILURE OR HAVE LET YOURSELF OR YOUR FAMILY DOWN: NOT AL ALL
2. FEELING DOWN, DEPRESSED, IRRITABLE, OR HOPELESS: NOT AT ALL
SUM OF ALL RESPONSES TO PHQ9 QUESTIONS 1 AND 2: 0
2. FEELING DOWN, DEPRESSED, IRRITABLE, OR HOPELESS: NOT AT ALL
1. LITTLE INTEREST OR PLEASURE IN DOING THINGS: NOT AT ALL
SUM OF ALL RESPONSES TO PHQ9 QUESTIONS 1 AND 2: 1
SUM OF ALL RESPONSES TO PHQ9 QUESTIONS 1 AND 2: 0

## 2019-01-01 ASSESSMENT — GAIT ASSESSMENTS
GAIT LEVEL OF ASSIST: SUPERVISED
GAIT LEVEL OF ASSIST: UNABLE TO PARTICIPATE
ASSISTIVE DEVICE: FRONT WHEEL WALKER
DISTANCE (FEET): 5
DEVIATION: SHUFFLED GAIT
DEVIATION: BRADYKINETIC;SHUFFLED GAIT
GAIT LEVEL OF ASSIST: UNABLE TO PARTICIPATE
GAIT LEVEL OF ASSIST: UNABLE TO PARTICIPATE
ASSISTIVE DEVICE: FRONT WHEEL WALKER
GAIT LEVEL OF ASSIST: UNABLE TO PARTICIPATE
ASSISTIVE DEVICE: HAND HELD ASSIST
GAIT LEVEL OF ASSIST: MODERATE ASSIST

## 2019-01-01 ASSESSMENT — COGNITIVE AND FUNCTIONAL STATUS - GENERAL
STANDING UP FROM CHAIR USING ARMS: A LOT
DRESSING REGULAR UPPER BODY CLOTHING: A LOT
MOVING TO AND FROM BED TO CHAIR: A LOT
DRESSING REGULAR LOWER BODY CLOTHING: A LOT
MOBILITY SCORE: 10
DAILY ACTIVITIY SCORE: 8
DAILY ACTIVITIY SCORE: 7
TURNING FROM BACK TO SIDE WHILE IN FLAT BAD: UNABLE
TOILETING: TOTAL
MOVING FROM LYING ON BACK TO SITTING ON SIDE OF FLAT BED: UNABLE
DRESSING REGULAR UPPER BODY CLOTHING: TOTAL
PERSONAL GROOMING: A LITTLE
TOILETING: TOTAL
EATING MEALS: A LITTLE
SUGGESTED CMS G CODE MODIFIER MOBILITY: CL
DAILY ACTIVITIY SCORE: 6
CLIMB 3 TO 5 STEPS WITH RAILING: TOTAL
MOBILITY SCORE: 6
CLIMB 3 TO 5 STEPS WITH RAILING: TOTAL
DRESSING REGULAR UPPER BODY CLOTHING: A LITTLE
EATING MEALS: TOTAL
MOVING FROM LYING ON BACK TO SITTING ON SIDE OF FLAT BED: UNABLE
TURNING FROM BACK TO SIDE WHILE IN FLAT BAD: A LITTLE
WALKING IN HOSPITAL ROOM: TOTAL
HELP NEEDED FOR BATHING: A LOT
TURNING FROM BACK TO SIDE WHILE IN FLAT BAD: UNABLE
DRESSING REGULAR UPPER BODY CLOTHING: TOTAL
DRESSING REGULAR UPPER BODY CLOTHING: A LOT
STANDING UP FROM CHAIR USING ARMS: TOTAL
DAILY ACTIVITIY SCORE: 20
WALKING IN HOSPITAL ROOM: A LITTLE
TURNING FROM BACK TO SIDE WHILE IN FLAT BAD: UNABLE
DRESSING REGULAR LOWER BODY CLOTHING: TOTAL
TOILETING: A LITTLE
WALKING IN HOSPITAL ROOM: A LOT
EATING MEALS: TOTAL
TOILETING: A LOT
SUGGESTED CMS G CODE MODIFIER DAILY ACTIVITY: CL
MOVING FROM LYING ON BACK TO SITTING ON SIDE OF FLAT BED: A LOT
HELP NEEDED FOR BATHING: TOTAL
SUGGESTED CMS G CODE MODIFIER MOBILITY: CK
DAILY ACTIVITIY SCORE: 8
MOVING TO AND FROM BED TO CHAIR: A LOT
SUGGESTED CMS G CODE MODIFIER DAILY ACTIVITY: CM
STANDING UP FROM CHAIR USING ARMS: A LOT
SUGGESTED CMS G CODE MODIFIER MOBILITY: CN
WALKING IN HOSPITAL ROOM: TOTAL
DRESSING REGULAR LOWER BODY CLOTHING: A LOT
PERSONAL GROOMING: A LITTLE
EATING MEALS: A LITTLE
SUGGESTED CMS G CODE MODIFIER MOBILITY: CM
CLIMB 3 TO 5 STEPS WITH RAILING: TOTAL
STANDING UP FROM CHAIR USING ARMS: A LOT
SUGGESTED CMS G CODE MODIFIER MOBILITY: CL
WALKING IN HOSPITAL ROOM: A LOT
CLIMB 3 TO 5 STEPS WITH RAILING: A LITTLE
CLIMB 3 TO 5 STEPS WITH RAILING: A LOT
DAILY ACTIVITIY SCORE: 15
DAILY ACTIVITIY SCORE: 8
DAILY ACTIVITIY SCORE: 18
WALKING IN HOSPITAL ROOM: TOTAL
EATING MEALS: TOTAL
PERSONAL GROOMING: TOTAL
HELP NEEDED FOR BATHING: A LITTLE
DRESSING REGULAR UPPER BODY CLOTHING: A LITTLE
DRESSING REGULAR LOWER BODY CLOTHING: TOTAL
MOVING FROM LYING ON BACK TO SITTING ON SIDE OF FLAT BED: UNABLE
TURNING FROM BACK TO SIDE WHILE IN FLAT BAD: A LOT
WALKING IN HOSPITAL ROOM: TOTAL
WALKING IN HOSPITAL ROOM: TOTAL
MOVING TO AND FROM BED TO CHAIR: A LITTLE
TOILETING: TOTAL
MOVING FROM LYING ON BACK TO SITTING ON SIDE OF FLAT BED: A LOT
DRESSING REGULAR LOWER BODY CLOTHING: A LOT
CLIMB 3 TO 5 STEPS WITH RAILING: TOTAL
MOBILITY SCORE: 7
DRESSING REGULAR LOWER BODY CLOTHING: TOTAL
DRESSING REGULAR LOWER BODY CLOTHING: A LITTLE
HELP NEEDED FOR BATHING: A LOT
PERSONAL GROOMING: A LOT
SUGGESTED CMS G CODE MODIFIER DAILY ACTIVITY: CM
SUGGESTED CMS G CODE MODIFIER DAILY ACTIVITY: CM
STANDING UP FROM CHAIR USING ARMS: A LOT
MOBILITY SCORE: 8
DRESSING REGULAR UPPER BODY CLOTHING: TOTAL
DRESSING REGULAR LOWER BODY CLOTHING: TOTAL
SUGGESTED CMS G CODE MODIFIER MOBILITY: CN
STANDING UP FROM CHAIR USING ARMS: TOTAL
MOVING TO AND FROM BED TO CHAIR: UNABLE
HELP NEEDED FOR BATHING: TOTAL
DRESSING REGULAR LOWER BODY CLOTHING: A LITTLE
STANDING UP FROM CHAIR USING ARMS: TOTAL
HELP NEEDED FOR BATHING: TOTAL
TOILETING: TOTAL
TOILETING: A LITTLE
CLIMB 3 TO 5 STEPS WITH RAILING: A LITTLE
SUGGESTED CMS G CODE MODIFIER MOBILITY: CI
MOBILITY SCORE: 10
MOBILITY SCORE: 6
MOBILITY SCORE: 23
SUGGESTED CMS G CODE MODIFIER DAILY ACTIVITY: CJ
TURNING FROM BACK TO SIDE WHILE IN FLAT BAD: A LITTLE
SUGGESTED CMS G CODE MODIFIER DAILY ACTIVITY: CK
SUGGESTED CMS G CODE MODIFIER DAILY ACTIVITY: CK
DRESSING REGULAR UPPER BODY CLOTHING: A LOT
EATING MEALS: TOTAL
MOBILITY SCORE: 18
HELP NEEDED FOR BATHING: TOTAL
MOVING FROM LYING ON BACK TO SITTING ON SIDE OF FLAT BED: A LITTLE
STANDING UP FROM CHAIR USING ARMS: A LOT
DAILY ACTIVITIY SCORE: 12
DRESSING REGULAR LOWER BODY CLOTHING: TOTAL
MOVING FROM LYING ON BACK TO SITTING ON SIDE OF FLAT BED: UNABLE
SUGGESTED CMS G CODE MODIFIER MOBILITY: CN
DAILY ACTIVITIY SCORE: 14
PERSONAL GROOMING: TOTAL
MOBILITY SCORE: 6
PERSONAL GROOMING: A LITTLE
TOILETING: A LOT
MOVING TO AND FROM BED TO CHAIR: UNABLE
SUGGESTED CMS G CODE MODIFIER MOBILITY: CL
PERSONAL GROOMING: TOTAL
TOILETING: TOTAL
STANDING UP FROM CHAIR USING ARMS: A LITTLE
PERSONAL GROOMING: A LOT
SUGGESTED CMS G CODE MODIFIER DAILY ACTIVITY: CN
CLIMB 3 TO 5 STEPS WITH RAILING: TOTAL
CLIMB 3 TO 5 STEPS WITH RAILING: A LOT
PERSONAL GROOMING: TOTAL
DRESSING REGULAR UPPER BODY CLOTHING: A LITTLE
MOVING TO AND FROM BED TO CHAIR: UNABLE
MOVING TO AND FROM BED TO CHAIR: A LOT
MOBILITY SCORE: 11
EATING MEALS: A LITTLE
MOVING FROM LYING ON BACK TO SITTING ON SIDE OF FLAT BED: UNABLE
EATING MEALS: A LOT
HELP NEEDED FOR BATHING: TOTAL
TOILETING: TOTAL
STANDING UP FROM CHAIR USING ARMS: TOTAL
MOVING TO AND FROM BED TO CHAIR: UNABLE
DRESSING REGULAR LOWER BODY CLOTHING: TOTAL
EATING MEALS: A LITTLE
MOVING TO AND FROM BED TO CHAIR: UNABLE
TURNING FROM BACK TO SIDE WHILE IN FLAT BAD: A LOT
SUGGESTED CMS G CODE MODIFIER MOBILITY: CM
SUGGESTED CMS G CODE MODIFIER DAILY ACTIVITY: CK
HELP NEEDED FOR BATHING: A LOT
PERSONAL GROOMING: A LOT
HELP NEEDED FOR BATHING: A LITTLE
TURNING FROM BACK TO SIDE WHILE IN FLAT BAD: UNABLE
DAILY ACTIVITIY SCORE: 6
SUGGESTED CMS G CODE MODIFIER DAILY ACTIVITY: CM
TURNING FROM BACK TO SIDE WHILE IN FLAT BAD: A LOT
CLIMB 3 TO 5 STEPS WITH RAILING: TOTAL
SUGGESTED CMS G CODE MODIFIER DAILY ACTIVITY: CN
TOILETING: A LOT
MOVING TO AND FROM BED TO CHAIR: UNABLE
EATING MEALS: A LOT
MOVING FROM LYING ON BACK TO SITTING ON SIDE OF FLAT BED: UNABLE
CLIMB 3 TO 5 STEPS WITH RAILING: TOTAL
MOVING FROM LYING ON BACK TO SITTING ON SIDE OF FLAT BED: UNABLE
DRESSING REGULAR UPPER BODY CLOTHING: TOTAL
WALKING IN HOSPITAL ROOM: TOTAL
DRESSING REGULAR UPPER BODY CLOTHING: A LOT
HELP NEEDED FOR BATHING: TOTAL
TURNING FROM BACK TO SIDE WHILE IN FLAT BAD: A LITTLE

## 2019-01-01 ASSESSMENT — PAIN SCALES - GENERAL: PAIN_LEVEL: 3

## 2019-01-01 ASSESSMENT — 6 MINUTE WALK TEST (6MWT)
NUMBER OF RESTS: 0
TOTAL DISTANCE WALKED (FT): 422
GAIT SPEED - METERS PER SECOND: .36
LEVEL OF ASSISTANCE: MINIMUM ASSISTANCE

## 2019-01-01 ASSESSMENT — BRIEF INTERVIEW FOR MENTAL STATUS (BIMS)
INITIAL REPETITION OF BED BLUE SOCK - FIRST ATTEMPT: 3
ASKED TO RECALL SOCK: YES, NO CUE REQUIRED
WHAT YEAR IS IT: CORRECT
ASKED TO RECALL BED: NO, COULD NOT RECALL
GENERAL MEMORY AND RECALL ABILITY: CURRENT SEASON;LOCATION OF OWN ROOM;RECOGNIZES APPROPRIATE HEALTHCARE SETTING
BIMS SUMMARY SCORE: 12
ASKED TO RECALL SOCK: YES, NO CUE REQUIRED
INITIAL REPETITION OF BED BLUE SOCK - FIRST ATTEMPT: 3
ASKED TO RECALL BLUE: YES, NO CUE REQUIRED
WHAT MONTH IS IT: ACCURATE WITHIN 5 DAYS
WHAT YEAR IS IT: CORRECT
ASKED TO RECALL BED: NO, COULD NOT RECALL
ASKED TO RECALL BLUE: YES, AFTER CUEING (A COLOR")"
WHAT DAY OF THE WEEK IS IT: CORRECT
WHAT MONTH IS IT: ACCURATE WITHIN 5 DAYS
BIMS SUMMARY SCORE: 13
WHAT DAY OF THE WEEK IS IT: CORRECT

## 2019-01-01 ASSESSMENT — PAIN SCALES - WONG BAKER
WONGBAKER_NUMERICALRESPONSE: DOESN'T HURT AT ALL
WONGBAKER_NUMERICALRESPONSE: HURTS JUST A LITTLE BIT
WONGBAKER_NUMERICALRESPONSE: DOESN'T HURT AT ALL
WONGBAKER_NUMERICALRESPONSE: DOESN'T HURT AT ALL

## 2019-01-01 ASSESSMENT — ACTIVITIES OF DAILY LIVING (ADL)
PHYSICAL_TRANSFER_REQUIRES_ASSISTANCE: 1
AMBULATION_REQUIRES_ASSISTANCE: 1
BATHING_REQUIRES_ASSISTANCE: 1
CONTINENCE_REQUIRES_ASSISTANCE: 1
EATING_REQUIRES_ASSISTANCE: 1
TOILETING: INDEPENDENT
EATING_REQUIRES_ASSISTANCE: 1
AMBULATION_REQUIRES_ASSISTANCE: 1
TOILET_TRANSFER_LEVEL_OF_ASSIST: REQUIRES SUPERVISION WITH TOILET TRANSFER
TOILETING_LEVEL_OF_ASSIST: REQUIRES SUPERVISION WITH TOILETING
DRESSING_REQUIRES_ASSISTANCE: 1
MONEY MANAGEMENT (EXPENSES/BILLS): TOTALLY DEPENDENT
SHOWER_TRANSFER_LEVEL_OF_ASSIST: REQUIRES SUPERVISION WITH SHOWER TRANSFER
CONTINENCE_REQUIRES_ASSISTANCE: 1
TOILET_TRANSFER_LEVEL_OF_ASSIST: REQUIRES SUPERVISION WITH TOILET TRANSFER
SHOWER_TRANSFER_LEVEL_OF_ASSIST: REQUIRES SUPERVISION WITH SHOWER TRANSFER
TOILETING: INDEPENDENT
TOILETING_LEVEL_OF_ASSIST: REQUIRES SUPERVISION WITH TOILETING
DRESSING_REQUIRES_ASSISTANCE: 1
TOILETING: UNABLE TO DETERMINE AT THIS TIME
TOILETING: UNABLE TO DETERMINE AT THIS TIME
BATHING_REQUIRES_ASSISTANCE: 1
PHYSICAL_TRANSFER_REQUIRES_ASSISTANCE: 1

## 2019-01-01 ASSESSMENT — CHA2DS2 SCORE
PRIOR STROKE OR TIA OR THROMBOEMBOLISM: YES
CHF OR LEFT VENTRICULAR DYSFUNCTION: NO
AGE 65 TO 74: NO
DIABETES: YES
VASCULAR DISEASE: NO
SEX: MALE
CHA2DS2 VASC SCORE: 6
CHF OR LEFT VENTRICULAR DYSFUNCTION: NO
PRIOR STROKE OR TIA OR THROMBOEMBOLISM: YES
HYPERTENSION: YES
AGE 75 OR GREATER: YES
SEX: MALE
DIABETES: YES
AGE 75 OR GREATER: YES
VASCULAR DISEASE: NO
CHA2DS2 VASC SCORE: 6
HYPERTENSION: YES
AGE 65 TO 74: NO

## 2019-01-01 ASSESSMENT — COPD QUESTIONNAIRES
DURING THE PAST 4 WEEKS HOW MUCH DID YOU FEEL SHORT OF BREATH: NONE/LITTLE OF THE TIME
COPD SCREENING SCORE: 2
DO YOU EVER COUGH UP ANY MUCUS OR PHLEGM?: NO/ONLY WITH OCCASIONAL COLDS OR INFECTIONS
HAVE YOU SMOKED AT LEAST 100 CIGARETTES IN YOUR ENTIRE LIFE: NO/DON'T KNOW
COPD SCREENING SCORE: 2
DO YOU EVER COUGH UP ANY MUCUS OR PHLEGM?: NO/ONLY WITH OCCASIONAL COLDS OR INFECTIONS
HAVE YOU SMOKED AT LEAST 100 CIGARETTES IN YOUR ENTIRE LIFE: NO/DON'T KNOW
DURING THE PAST 4 WEEKS HOW MUCH DID YOU FEEL SHORT OF BREATH: NONE/LITTLE OF THE TIME

## 2019-01-01 ASSESSMENT — SOCIAL DETERMINANTS OF HEALTH (SDOH)
ACTIVE STRESSOR - LOSS OF CONTROL: 1
ACTIVE STRESSOR - LOSS OF CONTROL: 1

## 2019-07-06 PROBLEM — R90.0 INTRACRANIAL MASS: Status: ACTIVE | Noted: 2019-01-01

## 2019-07-06 PROBLEM — Z85.46 HISTORY OF PROSTATE CANCER: Status: ACTIVE | Noted: 2019-01-01

## 2019-07-06 NOTE — PROGRESS NOTES
Neurosurgery Progress Note    Subjective:  No acute events since admission  Pt c/o right eye aching   MRI pending    Exam:  Alert, oriented to self, place, not to time  Speech fluent & appropriate  EOM intact, PERRL  Facial movements symmetric  SCM intact, strength 5/5 throughout UE & LE bilaterally  Sensation grossly intact    BP  Min: 121/78  Max: 140/89  Pulse  Av.7  Min: 76  Max: 90  Resp  Av  Min: 17  Max: 17  Temp  Av.3 °C (97.3 °F)  Min: 36.2 °C (97.1 °F)  Max: 36.4 °C (97.5 °F)  SpO2  Av %  Min: 92 %  Max: 94 %    No Data Recorded    Recent Labs      19   0323   WBC  5.3   RBC  4.68*   HEMOGLOBIN  14.8   HEMATOCRIT  45.0   MCV  96.2   MCH  31.6   MCHC  32.9*   RDW  49.5   PLATELETCT  154*   MPV  9.5     Recent Labs      19   0323   SODIUM  135   POTASSIUM  4.7   CHLORIDE  103   CO2  23   GLUCOSE  375*   BUN  25*   CREATININE  1.24   CALCIUM  9.5               Intake/Output       19 07 - 19 0659 19 07 - 19 0659       Total  Total       Intake    Total Intake -- -- -- -- -- --       Output    Urine  --  200 200  --  -- --    Number of Times Voided -- 1 x 1 x -- -- --    Urine Void (mL) -- 200 200 -- -- --    Total Output -- 200 200 -- -- --       Net I/O     -- -200 -200 -- -- --            Intake/Output Summary (Last 24 hours) at 19 1018  Last data filed at 19 0643   Gross per 24 hour   Intake                0 ml   Output              200 ml   Net             -200 ml            • atorvastatin  20 mg QHS   • therapeutic multivitamin-minerals  1 Tab DAILY   • senna-docusate  2 Tab BID    And   • polyethylene glycol/lytes  1 Packet QDAY PRN    And   • magnesium hydroxide  30 mL QDAY PRN    And   • bisacodyl  10 mg QDAY PRN   • NS   Continuous   • acetaminophen  650 mg Q6HRS PRN   • insulin regular  1-6 Units Q6HRS    And   • glucose  16 g Q15 MIN PRN    And   • dextrose 10% bolus  250 mL Q15 MIN PRN   •  metoprolol  25 mg TWICE DAILY   • dexamethasone  4 mg Q6HRS   • levETIRAcetam  500 mg Q12HRS   • insulin glargine  10 Units QAM INSULIN       Assessment and Plan:  Hospital day #2 right frontal mass  Previous surgical pt of Dr Ruffin, Dr Orozco was contacted last night  MRI pending  Continue Keppra, decadron until further eval by Dr Ruffin

## 2019-07-06 NOTE — PROGRESS NOTES
1553 Page sent to Dr. Farooq in regards to EKG results. Pt currently off Pradaxa for likely surgery on Monday. Platelets 154  Afib, V-RATE  63- 92   ANTERIOR INFARCT, OLD   No previous ECG available for comparison     3831 Dr. Farooq notified. No tele monitoring or anticoagulation at this time. SCDs for DVT prophylaxis.

## 2019-07-06 NOTE — ASSESSMENT & PLAN NOTE
Large right frontal 4.5cm mass  Patient is status post craniotomy and resection on July 10, 2090.  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 Ferrer evaluated this patient and made recommendations.  Steroid taper as recommended by neurosurgery.  Neurosurgery is following this patient.  Every 4 hours neuro check.

## 2019-07-06 NOTE — ASSESSMENT & PLAN NOTE
Uncontrolled with hyperglycemia, his hemoglobin A1c is 9.5  Added Lantus 10 units and a further increase to 12 units today.  Insulin sliding scale with hypoglycemia protocol.  Currently he is on Decadron that could cause increased blood glucose.

## 2019-07-06 NOTE — PROGRESS NOTES
A&Ox4  N&T BLE's  No neuro deficits  Stby assist with walker, can ambulate without assistive device but with somewhat unsteady gait  Urgency, frequency, sometimes stress incontinence (since prior back surgery 2 years ago)  History of bowel incontinence since prior back surgery but has improved over last 2 years  Wears readers, no hearing aids, no dentures, teeth intact  Per hospitalist Dr. Starr, pt ok to eat one meal, then NPO (pt ate a Le[employer]isine, apple juice, and some water). FSBG 311, insulin given per sliding scale.

## 2019-07-06 NOTE — CONSULTS
Neurosurgery Consult Note    Patient: Marcio More    MRN: [id number]    Date of Consultation: 7/6/2019    Reason for Consultation: Right frontal brain tumor    Referring Physician: Dr. Hina Starr    History of Present Illness:  Marcio More is 73 y.o. male with h/o prostate cancer s/p resection, HTN, and afib on pradaxa who presented 7/5/2019 with confusion, generalized weakness and increased fatigue x3 weeks. He was working on his 10 acre property, when he almost fell off his tractor and his family forced him to come in for evaluation. He was found to have a large 4.5cm right frontal lobe mass.  Transferred from [facility].     Review of Systems:  Constitutional: Denies fevers, chills, night sweats, or weight changes  Eyes: Denies changes in vision; complains of right eye pain  Ears/Nose/Throat/Mouth: Denies nasal congestion or sore throat   Cardiovascular: Denies chest pain or palpitations   Respiratory: Denies shortness of breath, or cough  Gastrointestinal/Hepatic: Denies abdominal pain, nausea, vomiting, diarrhea, or constipation  Genitourinary: Denies dysuria, frequency, or incontinence  Musculoskeletal/Rheum: Denies joint pain or swelling   Skin: Denies rash  Neurological: Denies headache, focal weakness, or parasthesias  Psychiatric: Denies mood disorder   Endocrine: Denies hx of diabetes or thyroid dysfunction  Heme/Oncology/Lymph Nodes: Denies enlarged lymph nodes, bruising, or known bleeding disorder  Allergic/Immunologic: Denies hx of autoimmune disorder    Medications:  Current Facility-Administered Medications   Medication Dose Route Frequency Provider Last Rate Last Dose   • atorvastatin (LIPITOR) tablet 20 mg  20 mg Oral QHS Hina Starr M.D.       • therapeutic multivitamin-minerals (THERAGRAN-M) tablet 1 Tab  1 Tab Oral DAILY Hina Starr M.D.   1 Tab at 07/06/19 0500   • senna-docusate (PERICOLACE or SENOKOT S) 8.6-50 MG per tablet 2 Tab  2 Tab Oral BID Hina Starr  "M.D.   2 Tab at 07/06/19 0459    And   • polyethylene glycol/lytes (MIRALAX) PACKET 1 Packet  1 Packet Oral QDAY PRN Hina Starr M.D.        And   • magnesium hydroxide (MILK OF MAGNESIA) suspension 30 mL  30 mL Oral QDAY PRN Hina Starr M.D.        And   • bisacodyl (DULCOLAX) suppository 10 mg  10 mg Rectal QDAY PRN Hina Starr M.D.       • NS infusion   Intravenous Continuous Hina Starr M.D. 75 mL/hr at 07/06/19 0514     • acetaminophen (TYLENOL) tablet 650 mg  650 mg Oral Q6HRS PRN Hina Starr M.D.       • insulin regular (HUMULIN R) injection 1-6 Units  1-6 Units Subcutaneous Q6HRS Hina Starr M.D.   3 Units at 07/06/19 1209    And   • glucose 4 g chewable tablet 16 g  16 g Oral Q15 MIN PRN Hina Starr M.D.        And   • DEXTROSE 10% BOLUS 250 mL  250 mL Intravenous Q15 MIN PRN Hina Starr M.D.       • metoprolol (LOPRESSOR) tablet 25 mg  25 mg Oral TWICE DAILY Hina Starr M.D.   25 mg at 07/06/19 0500   • dexamethasone (DECADRON) injection 4 mg  4 mg Intravenous Q6HRS Hina Starr M.D.   4 mg at 07/06/19 1215   • levETIRAcetam (KEPPRA) tablet 500 mg  500 mg Oral Q12HRS Hina Starr M.D.   500 mg at 07/06/19 0600   • insulin glargine (LANTUS) injection 10 Units  10 Units Subcutaneous QAM INSULIN Obie Farooq M.D.   10 Units at 07/06/19 1208       Allergies:  Allergies   Allergen Reactions   • Gabapentin Rash     Broke out in a rash on R bicep       Past Medical History:  Past Medical History:   Diagnosis Date   • Anesthesia     \"to much anesthesia kidneys stop woking and intestines slowed\"   • Arthritis    • Atrial fibrillation (HCC)    • Back pain    • Blood clotting disorder (HCC) 1999    blood clot leg Left   • Breath shortness    • CAD (coronary artery disease)    • Cancer (HCC) 2016    prostate cancer   • Cataract     no surgery   • Dyslipidemia    • High cholesterol    • Hypertension    • Ileus (HCC)    • Myocardial infarct " ([facility]) 1999,2006    x3   • Renal disorder     cycst left kidney   • Sleep apnea     no cpap   • Snoring    • Urinary incontinence        Past Surgical History:  Past Surgical History:   Procedure Laterality Date   • LUMBAR LAMINECTOMY DISKECTOMY N/A 11/26/2018    Procedure: LUMBAR LAMINECTOMY DISKECTOMY-  POSTERIOR L1-2 LAMI;  Surgeon: Son Ruffin M.D.;  Location: SURGERY [facility];  Service: Neurosurgery   • LAMINOTOMY Left 11/26/2018    Procedure: LAMINOTOMY-  L4-S1;  Surgeon: Son Ruffin M.D.;  Location: SURGERY [facility];  Service: Neurosurgery   • FORAMINOTOMY Left 11/26/2018    Procedure: FORAMINOTOMY;  Surgeon: Son Ruffin M.D.;  Location: SURGERY [facility];  Service: Neurosurgery   • OTHER NEUROLOGICAL SURG  2016    thoracsic laminectomy   • APPENDECTOMY  2002   • OTHER CARDIAC SURGERY  1999,2002,2006    stents cardiac       Family History:  No family history on file.    Social History:  Social History     Social History   • Marital status:      Spouse name: N/A   • Number of children: N/A   • Years of education: N/A     Occupational History   • Not on file.     Social History Main Topics   • Smoking status: Never Smoker   • Smokeless tobacco: Never Used   • Alcohol use No      Comment: 1 drink a month   • Drug use: No   • Sexual activity: Not on file     Other Topics Concern   • Not on file     Social History Narrative   • No narrative on file       Physical Examination:  Alert and pleasant and cooperative  Mildly confused  Pupils 3 mm and reactive  Extraocular eye movements intact  Face symmetric  No pronator drift  Power 5/5 in all 4 extremities  Sensation intact; no sensory extinction  No dysmetria    Labs:  Recent Labs      07/06/19   0323   WBC  5.3   RBC  4.68*   HEMOGLOBIN  14.8   HEMATOCRIT  45.0   MCV  96.2   MCH  31.6   MCHC  32.9*   RDW  49.5   PLATELETCT  154*   MPV  9.5     Recent Labs      07/06/19   0323   SODIUM  135   POTASSIUM  4.7    CHLORIDE  103   CO2  23   GLUCOSE  375*   BUN  25*   CREATININE  1.24   CALCIUM  9.5               Imaging:  Large posterior inferior right frontal mass measuring approximately 4.5 cm in diameter with extensive surrounding vasogenic edema.  There are small projections arising from the main mass.  This could represent a meningioma with malignant transformation.    Assessment and Plan:  Marcio More is a 73-year-old gentleman who is a previous patient of Dr. Ruffin.  He now presents with primarily confusion and is found to have a large frontal tumor with extensive vasogenic edema.  He had does have a history of prostate cancer, so metastatic disease is a possibility, but the tumor could also represent meningioma with malignant transformation.  The patient has been started on Decadron and Keppra.  Antiplatelets and anticoagulants have been held in anticipation of surgery.  MRI of the brain including Stealth MRI has been done.  We will keep the patient n.p.o. after midnight tomorrow night in case there is time or surgery on Monday.  Dr. Ruffin will assume care of the patient on Monday.      Richard Maldonado M.D.

## 2019-07-06 NOTE — ASSESSMENT & PLAN NOTE
Continue metoprolol, holding anticoagulation due to surgery  Currently rate controlled.  Continue to monitor.

## 2019-07-06 NOTE — H&P
Hospital Medicine History & Physical Note    Date of Service  7/6/2019    Primary Care Physician  Jeni Armando N.P.    Consultants  Neurosurgery- Ernesto    Code Status  Full    Chief Complaint  Weakness and malaise.    History of Presenting Illness  73 y.o. Male with h/o prostate cancer s/p resection, HTN, and afib on pradaxa who presented 7/5/2019 with weakness and increased fatigue x3 weeks. He was working on his [place] acre property, when he almost fell off his tracker and his family forced him to come in for evaluation. He was found to have a large 4.5cm right frontal lobe mass. ERP at [facility] discussed the case with Dr. Orozco who agreed to consult.     Review of Systems  Review of Systems   Constitutional: Positive for malaise/fatigue. Negative for chills, fever and weight loss.   Respiratory: Negative for shortness of breath.    Cardiovascular: Negative for chest pain, palpitations and leg swelling.   Gastrointestinal: Negative for abdominal pain, nausea and vomiting.   Genitourinary: Negative for dysuria.   Musculoskeletal: Positive for back pain (chronic). Negative for falls.   Neurological: Positive for sensory change (baseline neuropathy) and weakness. Negative for loss of consciousness and headaches.   Psychiatric/Behavioral: Negative.  Negative for memory loss.   All other systems reviewed and are negative.      Past Medical History   has a past medical history of Anesthesia; Arthritis; Atrial fibrillation (HCC); Back pain; Blood clotting disorder ([facility]) (1999); Breath shortness; CAD (coronary artery disease); Cancer ([facility]) (2016); Cataract; Dyslipidemia; High cholesterol; Hypertension; Ileus (HCC); Myocardial infarct ([facility]) (1999,2006); Renal disorder; Sleep apnea; Snoring; and Urinary incontinence.    Surgical History   has a past surgical history that includes appendectomy (2002); other cardiac surgery (1999,2002,2006); other neurological surg (2016); lumbar laminectomy diskectomy (N/A,  11/26/2018); laminotomy (Left, 11/26/2018); and foraminotomy (Left, 11/26/2018).     Family History  family history is not on file.   Family h/o DM2. Father with HD. No known history of cancer.    Social History   reports that he has never smoked. He has never used smokeless tobacco. He reports that he does not drink alcohol or use drugs.  Denies tobacco, illicit drug use. Social, rare alcohol. 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    Allergies  No Known Allergies    Medications  Prior to Admission Medications   Prescriptions Last Dose Informant Patient Reported? Taking?   Azilsartan Medoxomil 40 MG Tab  Patient No No   Sig: Take 40 mg by mouth every day.   Diclofenac Sodium (VOLTAREN) 1 % Gel   No No   Sig: Apply 4 g to skin as directed 2 times a day as needed.   acetaminophen 650 MG Tab  Patient Yes No   Sig: Take 650-1,300 mg by mouth every four hours as needed for Mild Pain (Pain or temp = or > 101 F).   atorvastatin (LIPITOR) 20 MG Tab  Patient No No   Sig: Take 1 Tab by mouth every bedtime.   docusate sodium 100 MG Cap   Yes No   Sig: Take 100 mg by mouth 2 Times a Day.   nebivolol (BYSTOLIC) 5 MG Tab tablet  Patient No No   Sig: Take 1 Tab by mouth every day.   therapeutic multivitamin-minerals (THERAGRAN-M) Tab  Patient Yes No   Sig: Take 1 Tab by mouth every day.      Facility-Administered Medications: None       Physical Exam  Temp:  [36.4 °C (97.5 °F)] 36.4 °C (97.5 °F)  Pulse:  [76] 76  Resp:  [17] 17  BP: (140)/(89) 140/89  SpO2:  [93 %] 93 %    Physical Exam   Constitutional: He is oriented to person, place, and time. He appears well-developed. No distress.   HENT:   Head: Normocephalic.   Mouth/Throat: Oropharynx is clear and moist.   Eyes: Pupils are equal, round, and reactive to light. EOM are normal. Right eye exhibits no discharge. Left eye exhibits no discharge. No scleral icterus.   Neck: Normal range of motion. Neck supple. No tracheal deviation present.   Cardiovascular: Normal  "rate and intact distal pulses.  An irregularly irregular rhythm present.   Pulmonary/Chest: Effort normal and breath sounds normal. No stridor. No respiratory distress. He has no rales.   Abdominal: Soft. He exhibits no distension. There is no tenderness. There is no guarding.   Musculoskeletal: He exhibits no edema.   Neurological: He is alert and oriented to person, place, and time. He has normal strength. No cranial nerve deficit.   Skin: Skin is warm and dry. He is not diaphoretic. No erythema.   Psychiatric: He has a normal mood and affect. His behavior is normal.   Vitals reviewed.      Laboratory:          No results for input(s): ALTSGPT, ASTSGOT, ALKPHOSPHAT, TBILIRUBIN, DBILIRUBIN, GAMMAGT, AMYLASE, LIPASE, ALB, PREALBUMIN, GLUCOSE in the last 72 hours.              No results for input(s): TROPONINI in the last 72 hours.    Urinalysis:    No results found     Imaging:  OUTSIDE IMAGES-CT HEAD   Final Result            Assessment/Plan:  I anticipate this patient will require at least two midnights for appropriate medical management, necessitating inpatient admission.    * Intracranial mass- (present on admission)   Assessment & Plan    Large right frontal 4.5cm mass. No focal deficits on examination.   - decadron 4mg IV q6 hrs  - ERP at [facility] discussed case with Ernesto (neurosurg), will notify him in AM of patients arrival  - keppra 500mg BID for seizure prophylaxis  - MRI brain pending     Chronic back pain- (present on admission)   Assessment & Plan    Chronic, follows with Dr. Ruffin. Last procedure in November 2018. Complicated by spinal stenosis and peripheral neuropathy. Denies urinary or bowel incontinence.   - takes alleve, tylenol for his pain at home  - supportive care, continue tylenol PRN     Hyperglycemia- (present on admission)   Assessment & Plan    History of pre-DM per patient. Not on medications, states he has a hard time staying away from \"sweets\".   - A1C pending  - insulin " sliding scale as we suspect increase blood sugars in the setting of decadron use     Hypertension- (present on admission)   Assessment & Plan    Normotensive now.  - continue home regimen, will need to bring azilsartan from home     Atrial fibrillation (HCC)- (present on admission)   Assessment & Plan    Chronic A fib, rate controlled. On pradaxa and bystolic  - holding pradaxa   - continue with metoprolol     History of prostate cancer- (present on admission)   Assessment & Plan    States he had surgery to remove it roughly 2 years ago     Dyslipidemia- (present on admission)   Assessment & Plan    Continue atorvastatin         VTE prophylaxis: SCDs

## 2019-07-06 NOTE — PROGRESS NOTES
HOSPITAL MEDICINE PROGRESS NOTE    Date of service  7/6/2019    Chief Complaint  Confusion      Hospital Course:   73 years old man who was in his previous state of health until family found him to be confused, brought him to the emergency department for evaluation, found to have a right frontal brain mass of unknown etiology.          Interval History Updates:  Admitted to the neurosurgical floor awaiting consultation form neurosurgery.  Wife and children at bedside so clinical status updated.  His CT head and MRI images were pulled up on the computer to review with the patient and family.  They have many questions, all of them answer to the satisfaction.  Apparently, according to his family, the patient mentation has drastically changed in the last 3 months.  He has become more confused, with decreased decision-making capacity.  He might had a fall also but this is unconfirmed by his children's and wife.  Dr. Maldonado also was present for consultation as well.    Consultants/Specialty  Neurosurgery    Review of Systems   Review of Systems   Constitutional: Negative for chills and fever.   HENT: Negative for ear pain and sore throat.    Eyes: Negative for blurred vision.   Respiratory: Negative for shortness of breath.    Cardiovascular: Negative for chest pain, palpitations and leg swelling.   Gastrointestinal: Negative for abdominal pain, blood in stool, constipation, diarrhea, heartburn, melena, nausea and vomiting.   Genitourinary: Negative for dysuria, hematuria and urgency.   Musculoskeletal: Negative for myalgias.   Skin: Negative for rash.   Neurological: Positive for sensory change and headaches. Negative for dizziness, speech change, focal weakness, seizures, loss of consciousness and weakness.   Endo/Heme/Allergies: Negative.    Psychiatric/Behavioral: Negative for depression and hallucinations.   All other systems reviewed and are negative.       Medications:  • atorvastatin  20 mg QHS   • therapeutic  "multivitamin-minerals  1 Tab DAILY   • senna-docusate  2 Tab BID    And   • polyethylene glycol/lytes  1 Packet QDAY PRN    And   • magnesium hydroxide  30 mL QDAY PRN    And   • bisacodyl  10 mg QDAY PRN   • NS   Continuous   • acetaminophen  650 mg Q6HRS PRN   • insulin regular  1-6 Units Q6HRS    And   • glucose  16 g Q15 MIN PRN    And   • dextrose 10% bolus  250 mL Q15 MIN PRN   • metoprolol  25 mg TWICE DAILY   • dexamethasone  4 mg Q6HRS   • levETIRAcetam  500 mg Q12HRS   • insulin glargine  10 Units QAM INSULIN       Physical Exam   Vitals:    07/06/19 0240 07/06/19 0310 07/06/19 0453 07/06/19 0740   BP:  121/78 125/82 147/85   Pulse:  85 90 88   Resp:  17  16   Temp:  36.2 °C (97.1 °F)  36.4 °C (97.5 °F)   TempSrc:  Temporal  Temporal   SpO2:  92% 94% 96%   Weight:       Height: 1.88 m (6' 2\")          Physical Exam   Constitutional: He is oriented to person, place, and time and well-developed, well-nourished, and in no distress. No distress.   HENT:   Head: Normocephalic and atraumatic.   Mouth/Throat: No oropharyngeal exudate.   Eyes: Pupils are equal, round, and reactive to light. Conjunctivae and EOM are normal. No scleral icterus.   Neck: Normal range of motion. Neck supple. No JVD present. No tracheal deviation present. No thyromegaly present.   Cardiovascular: Normal rate, regular rhythm, normal heart sounds and intact distal pulses.  Exam reveals no gallop and no friction rub.    No murmur heard.  Pulmonary/Chest: Effort normal and breath sounds normal. No respiratory distress. He has no wheezes. He has no rales. He exhibits no tenderness.   Abdominal: Soft. Bowel sounds are normal. He exhibits no distension and no mass. There is no tenderness. There is no rebound and no guarding.   Musculoskeletal: Normal range of motion. He exhibits no edema, tenderness or deformity.   Lymphadenopathy:     He has no cervical adenopathy.   Neurological: He is alert and oriented to person, place, and time.   Skin: " Skin is warm and dry. No rash noted. He is not diaphoretic. No erythema.   Psychiatric: Mood and affect normal.   Nursing note and vitals reviewed.       Laboratory   Lab Results   Component Value Date/Time    WBC 5.3 07/06/2019 03:23 AM    HEMOGLOBIN 14.8 07/06/2019 03:23 AM    HEMATOCRIT 45.0 07/06/2019 03:23 AM    PLATELETCT 154 (L) 07/06/2019 03:23 AM        Lab Results   Component Value Date/Time    SODIUM 135 07/06/2019 03:23 AM    POTASSIUM 4.7 07/06/2019 03:23 AM    CHLORIDE 103 07/06/2019 03:23 AM    CO2 23 07/06/2019 03:23 AM    GLUCOSE 375 (H) 07/06/2019 03:23 AM    BUN 25 (H) 07/06/2019 03:23 AM    CREATININE 1.24 07/06/2019 03:23 AM        No results found for: BLOODCULTU, BLDCULT, BCHOLD          Radiology  CT of the head from outside hospital personally review and per my interpretation, large right frontal brain tumor with surrounding edema and a slight midline shift.    Brain MRI done today with and without contrast personally reviewed.  Confirmed the image of the head CT.  Large right frontal mass with surrounding edema with midline shift.  Radiology feels that this could be primary brain tumor, possibly glioblastoma multiform.     EKG performed 07/06 was personally reviewed and per my interpretation shows atrial fibrillation, rate 80.  Old Q waves in the anterior lead.  No acute ischemic changes.         Assessment and Plan      Intracranial mass POA: Yes.  Based on the MRI and head imaging, suspect primary brain tumor.  History of prostate cancer, makes metastatic disease possibility.  Patient primary cancer screenings up-to-date.  Colonoscopy last year negative.  Non-smoker.  Patient seen and evaluated by Dr. Maldonado.  Plan is possible surgery in 2 days.  N.p.o. after midnight tomorrow.  Continue prophylactic Keppra for possible seizure.  Continue steroid to treat edema.  Every 4 hours neurochecks.  Fall and aspiration precautions.      Chronic back pain POA: Yes.  Supportive care and pain control  with narcotic as needed.      Atrial fibrillation (HCC) POA: Yes.  Rate controlled on metoprolol 25 mg p.o. twice daily.  Holding Pradaxa for possible surgery.  Besides, hemorrhage is seen on his brain MRI therefore, anticoagulation is contraindicated at this point.      Hypertension POA: Yes.  Blood pressure control on beta-blocker.      CAD (coronary artery disease) POA: Yes.  History of PCI.  No chest pain.  No previous echocardiogram in chart.  Order echo to assess LV function, preop eval.  Continue Lipitor.      Hyperglycemia POA: Yes.  Hypoglycemic due to steroid.  Continue insulin sliding scale.  Start Lantus 10 units subcu every morning, first dose now.    Dyslipidemia POA: Yes.  Continue on Lipitor.      History of prostate cancer POA: Yes.  Please see primary problem above.    DVT prophylaxis: SCD    CODE STATUS:  Full Code    Disposition: To be determined    Total 40 minutes face-to-face time spent with patient, with greater than 50% of the total time discussing patient's issues and symptoms as listed above in assessment and plan, as well as managing coordination of care for future evaluation and treatment. Most of the time was spent discussing diagnostic, surgery intervention, review of brain images, and answering all question relating to recovery, cancer treatment.     I have performed a physical exam and reviewed and updated ROS as of today, 7/6/2019.  In review of yesterday's note dated, 7/5/2019, there are no changes except as documented above.

## 2019-07-06 NOTE — PROGRESS NOTES
Patient is a direct admit from [facility], patient of Dr. Martin's for a brain mass and altered mental status.  Dr. Starr is accepting the patient, Dr. Orozco with neurosurgery will consult.  Telephone ADT order received and entered into epic on 7/5.  Held orders to be released upon patients arrival to unit.

## 2019-07-07 NOTE — PROGRESS NOTES
HOSPITAL MEDICINE PROGRESS NOTE    Date of service  7/7/2019    Chief Complaint  Confusion      Hospital Course:   73 years old man who was in his previous state of health until family found him to be confused, brought him to the emergency department for evaluation, found to have a right frontal brain mass of unknown etiology.         Interval History Updates:  Admitted to the neurosurgical floor awaiting consultation form neurosurgery.  Wife and children at bedside so clinical status updated.  His CT head and MRI images were pulled up on the computer to review with the patient and family.  They have many questions, all of them answer to the satisfaction.  Apparently, according to his family, the patient mentation has drastically changed in the last 3 months.  He has become more confused, with decreased decision-making capacity.  He might had a fall also but this is unconfirmed by his children's and wife.  Dr. Maldonado also was present for consultation as well.    Consultants/Specialty  Neurosurgery    Review of Systems   Review of Systems   Constitutional: Negative for chills and fever.   Respiratory: Negative for shortness of breath.    Cardiovascular: Negative for chest pain, palpitations and leg swelling.   Gastrointestinal: Negative for abdominal pain, constipation, diarrhea, nausea and vomiting.   Skin: Negative for rash.   Neurological: Negative for focal weakness.   Endo/Heme/Allergies: Negative.    All other systems reviewed and are negative.       Medications:  • insulin glargine  20 Units QAM INSULIN   • insulin lispro  0.2 Units/kg/day TID AC    And   • insulin lispro  2-9 Units 4X/DAY ACHS    And   • glucose  16 g Q15 MIN PRN    And   • dextrose 10% bolus  250 mL Q15 MIN PRN   • [START ON 7/8/2019] levETIRAcetam (KEPPRA) IV  500 mg Q12HRS   • NS   Continuous   • atorvastatin  20 mg QHS   • therapeutic multivitamin-minerals  1 Tab DAILY   • senna-docusate  2 Tab BID    And   • polyethylene glycol/lytes  1  Packet QDAY PRN    And   • magnesium hydroxide  30 mL QDAY PRN    And   • bisacodyl  10 mg QDAY PRN   • acetaminophen  650 mg Q6HRS PRN   • metoprolol  25 mg TWICE DAILY   • dexamethasone  4 mg Q6HRS   • levETIRAcetam  500 mg BID       Physical Exam   Vitals:    07/06/19 1703 07/06/19 1932 07/07/19 0353 07/07/19 0808   BP: 122/98 111/69 114/69 104/50   Pulse: 80 83 94 71   Resp: 16 16 16 16   Temp: 37 °C (98.6 °F) 36.8 °C (98.3 °F) 36.3 °C (97.3 °F) 36.4 °C (97.5 °F)   TempSrc: Temporal Temporal Temporal Temporal   SpO2: 96% 96% 96% 91%   Weight:       Height:           Physical Exam   Constitutional: He is oriented to person, place, and time and well-developed, well-nourished, and in no distress. No distress.   HENT:   Head: Normocephalic and atraumatic.   Mouth/Throat: No oropharyngeal exudate.   Eyes: Pupils are equal, round, and reactive to light. Conjunctivae and EOM are normal. No scleral icterus.   Neck: Normal range of motion. Neck supple. No JVD present. No tracheal deviation present. No thyromegaly present.   Cardiovascular: Normal rate, regular rhythm, normal heart sounds and intact distal pulses.  Exam reveals no gallop and no friction rub.    No murmur heard.  Pulmonary/Chest: Effort normal and breath sounds normal. No respiratory distress. He has no wheezes. He has no rales. He exhibits no tenderness.   Abdominal: Soft. Bowel sounds are normal. He exhibits no distension and no mass. There is no tenderness. There is no rebound and no guarding.   Musculoskeletal: Normal range of motion. He exhibits no edema, tenderness or deformity.   Lymphadenopathy:     He has no cervical adenopathy.   Neurological: He is alert and oriented to person, place, and time.   Skin: Skin is warm and dry. No rash noted. He is not diaphoretic. No erythema.   Psychiatric: Mood and affect normal.   Nursing note and vitals reviewed.       Laboratory   Lab Results   Component Value Date/Time    WBC 5.3 07/06/2019 03:23 AM     HEMOGLOBIN 14.8 07/06/2019 03:23 AM    HEMATOCRIT 45.0 07/06/2019 03:23 AM    PLATELETCT 154 (L) 07/06/2019 03:23 AM        Lab Results   Component Value Date/Time    SODIUM 135 07/06/2019 03:23 AM    POTASSIUM 4.7 07/06/2019 03:23 AM    CHLORIDE 103 07/06/2019 03:23 AM    CO2 23 07/06/2019 03:23 AM    GLUCOSE 375 (H) 07/06/2019 03:23 AM    BUN 25 (H) 07/06/2019 03:23 AM    CREATININE 1.24 07/06/2019 03:23 AM        No results found for: BLOODCULTU, BLDCULT, BCHOLD          Radiology  CT of the head from outside hospital personally review and per my interpretation, large right frontal brain tumor with surrounding edema and a slight midline shift.    Brain MRI done today with and without contrast personally reviewed.  Confirmed the image of the head CT.  Large right frontal mass with surrounding edema with midline shift.  Radiology feels that this could be primary brain tumor, possibly glioblastoma multiform.     EKG performed 07/06 was personally reviewed and per my interpretation shows atrial fibrillation, rate 80.  Old Q waves in the anterior lead.  No acute ischemic changes.    TTE 7/6/19:    No prior study is available for comparison.   Normal left ventricular chamber size.  Normal left ventricular wall thickness.  Normal left ventricular systolic function.  Trace tricuspid regurgitation.  Estimated right ventricular systolic pressure is  20 mmHg.  Ascending aorta is dilated with a diameter of 4.0 cm.       Assessment and Plan      Intracranial mass POA: Yes.  Based on the MRI and head imaging, suspect primary brain tumor.  Glioblastoma multiform?  History of prostate cancer, makes metastatic disease possibility.  Patient primary cancer screenings up-to-date.  Colonoscopy last year negative.  Non-smoker.  Patient seen and evaluated by Dr. Maldonado.  Plan is surgery tomorrow.  N.p.o. after midnight.  Continue prophylactic Keppra for possible seizure.  Continue IV steroid to treat edema.  Every 4 hours neurochecks.   Fall and aspiration precautions. IVF start at [facility] [place].      Chronic back pain POA: Yes.  Supportive care and pain control with narcotic as needed.      Atrial fibrillation (HCC) POA: Yes.  Rate controlled on metoprolol 25 mg p.o. twice daily.  Holding Pradaxa (last dose 7/4/19 PTA) for surgery.  Besides, hemorrhage is seen on his brain MRI therefore, anticoagulation is contraindicated at this point.      Hypertension POA: Yes.  Blood pressure control on beta-blocker.      CAD (coronary artery disease) POA: Yes.  History of PCI.  No chest pain.  TTE normal LV EF, no significant valvular abnormality.  Continue Lipitor.      Hyperglycemia POA: Yes.  Hyperglycemic due to steroid.  Continue insulin sliding scale.  Increase Lantus 20 units subcu daily.      Dyslipidemia POA: Yes.  Continue on Lipitor.      History of prostate cancer POA: Yes.  Please see primary problem above.    DVT prophylaxis: SCD    CODE STATUS:  Full Code    Disposition: To be determined      I have performed a physical exam and reviewed and updated ROS as of today, 7/7/2019.  In review of yesterday's note dated, 7/6/2019, there are no changes except as documented above.

## 2019-07-07 NOTE — PROGRESS NOTES
RN MOBILITY NOTE     Surgery patient?: No  Date of surgery: n/a  Ambulated 50 ft on day of surgery? (N/A if today is not date of surgery): n/a  Number of times ambulated 50 feet or greater today: 3  Patient has been up to chair, edge of bed or HOB 90 degrees for all meals?: yes  Goal met? (goal is ambulating at least 50 feet 2 times on day shift, one time on night shift): yes  If patient did not meet mobility goal, why?: n/a

## 2019-07-07 NOTE — PROGRESS NOTES
Neurosurgery Progress Note    Subjective:  No acute events. Sleeping, rouses easily to voice. No complaints, Denies headache.     Exam:  A&O x3. Fluent Speech.   4 PERRL, EOMI. Denies blurry or double vision.   Tongue midline. No facial droop. No drift.   Unremarkable BRYAN, finger-nose testing.   Hearing intact.   Strength: Bilateral  bicep, tricep,  5/5.                   Bilateral IP, DF, PF 5/5.   Sensation: decreased BLE  Eating, drinking. Denies n/v.  Voiding.   Pain controlled.   Ambulatory.       BP  Min: 104/50  Max: 122/98  Pulse  Av  Min: 71  Max: 94  Resp  Av  Min: 16  Max: 16  Temp  Av.6 °C (97.9 °F)  Min: 36.3 °C (97.3 °F)  Max: 37 °C (98.6 °F)  SpO2  Av.8 %  Min: 91 %  Max: 96 %    No Data Recorded    Recent Labs      19   0323   WBC  5.3   RBC  4.68*   HEMOGLOBIN  14.8   HEMATOCRIT  45.0   MCV  96.2   MCH  31.6   MCHC  32.9*   RDW  49.5   PLATELETCT  154*   MPV  9.5     Recent Labs      19   0323   SODIUM  135   POTASSIUM  4.7   CHLORIDE  103   CO2  23   GLUCOSE  375*   BUN  25*   CREATININE  1.24   CALCIUM  9.5               Intake/Output       19 07 - 19 0659 19 07 - 19 0659       Total  Total       Intake    P.O.  720  -- 720  --  -- --    P.O. 720 -- 720 -- -- --    Total Intake 720 -- 720 -- -- --       Output    Urine  --  -- --  --  -- --    Number of Times Voided 4 x -- 4 x -- -- --    Total Output -- -- -- -- -- --       Net I/O     720 -- 720 -- -- --            Intake/Output Summary (Last 24 hours) at 19 1001  Last data filed at 19 1741   Gross per 24 hour   Intake              720 ml   Output                0 ml   Net              720 ml            • insulin glargine  20 Units QAM INSULIN   • insulin lispro  0.2 Units/kg/day TID AC    And   • insulin lispro  2-9 Units 4X/DAY ACHS    And   • glucose  16 g Q15 MIN PRN    And   • dextrose 10% bolus  250 mL Q15 MIN PRN   • [START  ON 7/8/2019] levETIRAcetam (KEPPRA) IV  500 mg Q12HRS   • NS   Continuous   • atorvastatin  20 mg QHS   • therapeutic multivitamin-minerals  1 Tab DAILY   • senna-docusate  2 Tab BID    And   • polyethylene glycol/lytes  1 Packet QDAY PRN    And   • magnesium hydroxide  30 mL QDAY PRN    And   • bisacodyl  10 mg QDAY PRN   • acetaminophen  650 mg Q6HRS PRN   • metoprolol  25 mg TWICE DAILY   • dexamethasone  4 mg Q6HRS   • levETIRAcetam  500 mg BID       Assessment and Plan:  Hospital day #3 right frontal mass    Plan:  Stable neuro exam  No ASA or anticoagulants in anticipation of surgery  NPO midnight for possible surgery tomorrow   Continue Keppra, decadron until further eval by Dr Ruffin

## 2019-07-07 NOTE — CARE PLAN
Problem: Safety  Goal: Will remain free from falls    Intervention: Assess risk factors for falls  Bed and chair alarm. Frequent rounding. Bed in lowest and locked position. Seizure precautions in place. Pt instructed on how to use the call light. The patient will attempt to get out of bed without assistance. Frequent rounding and reorientation.       Problem: Venous Thromboembolism (VTW)/Deep Vein Thrombosis (DVT) Prevention:  Goal: Patient will participate in Venous Thrombosis (VTE)/Deep Vein Thrombosis (DVT)Prevention Measures    Intervention: Ensure patient wears graduated elastic stockings (SHAMA hose) and/or SCDs, if ordered, when in bed or chair (Remove at least once per shift for skin check)  SCDs on.       Problem: Urinary Elimination:  Goal: Ability to reestablish a normal urinary elimination pattern will improve    Intervention: Encourage scheduled voiding  Pt has urgency and stress incontinence. 2 episodes of incontinence. Timed voiding q2hrs initiated. Patient agreeable.

## 2019-07-07 NOTE — PROGRESS NOTES
RN MOBILITY NOTE     Surgery patient?: TBD  Date of surgery: na  Ambulated 50 ft on day of surgery? (N/A if today is not date of surgery): na   Number of times ambulated 50 feet or greater today: 1  Patient has been up to chair, edge of bed or HOB 90 degrees for all meals?: na (Pt arrived to unit just before midnight)  Goal met? (goal is ambulating at least 50 feet 2 times on day shift, one time on night shift): y  If patient did not meet mobility goal, why?: na

## 2019-07-07 NOTE — PROGRESS NOTES
Bedside report received from night shift RN, care assumed. Pt assessed, A&Ox4. Pt denies any pain/needs at this time.Bed locked in lowest position, bed alarm on. Call light and personal belongings within reach.

## 2019-07-08 NOTE — PROGRESS NOTES
HOSPITAL MEDICINE PROGRESS NOTE    Date of service  7/8/2019    Chief Complaint  Confusion      Hospital Course:   73 years old man who was in his previous state of health until family found him to be confused, brought him to the emergency department for evaluation, found to have a right frontal brain mass with surrounding edema with midline shift.  Radiology feels that this could be primary brain tumor, possibly glioblastoma multiform.  He was placed on steroid, which seems to have some positive effects on his mentation, likely due to improvement of the CNS edema.  On 7/8, he was seen by Dr. Martinez, who has planned for surgical resection on Wednesday, 7/10.         Interval History Updates:  No event overnight.  No fall.  Wife and daughter at bedside, feel that he is mentating better and more clear.    Consultants/Specialty  Neurosurgery    Review of Systems   Review of Systems   Constitutional: Negative for chills and fever.   Respiratory: Negative for shortness of breath.    Cardiovascular: Negative for chest pain, palpitations and leg swelling.   Gastrointestinal: Negative for abdominal pain, constipation, diarrhea, nausea and vomiting.   Skin: Negative for rash.   Neurological: Negative for focal weakness.   Endo/Heme/Allergies: Negative.    All other systems reviewed and are negative.       Medications:  • insulin glargine  20 Units QAM INSULIN   • insulin lispro  0.2 Units/kg/day TID AC    And   • insulin lispro  2-9 Units 4X/DAY ACHS    And   • glucose  16 g Q15 MIN PRN    And   • dextrose 10% bolus  250 mL Q15 MIN PRN   • levETIRAcetam (KEPPRA) IV  500 mg Q12HRS   • NS   Continuous   • atorvastatin  20 mg QHS   • therapeutic multivitamin-minerals  1 Tab DAILY   • senna-docusate  2 Tab BID    And   • polyethylene glycol/lytes  1 Packet QDAY PRN    And   • magnesium hydroxide  30 mL QDAY PRN    And   • bisacodyl  10 mg QDAY PRN   • acetaminophen  650 mg Q6HRS PRN   • metoprolol  25 mg TWICE DAILY   •  dexamethasone  4 mg Q6HRS       Physical Exam   Vitals:    07/07/19 1938 07/07/19 2321 07/08/19 0400 07/08/19 0757   BP: 114/69  126/83 135/76   Pulse: 75  76 88   Resp: 18  18 18   Temp: 36.2 °C (97.2 °F)  36.1 °C (97 °F) 36.4 °C (97.5 °F)   TempSrc: Temporal  Temporal Temporal   SpO2: 99%  92% 95%   Weight:  96.6 kg (212 lb 15.4 oz)     Height:           Physical Exam   Constitutional: He is oriented to person, place, and time and well-developed, well-nourished, and in no distress. No distress.   HENT:   Head: Normocephalic and atraumatic.   Mouth/Throat: No oropharyngeal exudate.   Eyes: Pupils are equal, round, and reactive to light. Conjunctivae and EOM are normal. No scleral icterus.   Neck: Normal range of motion. Neck supple. No JVD present. No tracheal deviation present. No thyromegaly present.   Cardiovascular: Normal rate, regular rhythm, normal heart sounds and intact distal pulses.  Exam reveals no gallop and no friction rub.    No murmur heard.  Pulmonary/Chest: Effort normal and breath sounds normal. No respiratory distress. He has no wheezes. He has no rales. He exhibits no tenderness.   Abdominal: Soft. Bowel sounds are normal. He exhibits no distension and no mass. There is no tenderness. There is no rebound and no guarding.   Musculoskeletal: Normal range of motion. He exhibits no edema, tenderness or deformity.   Lymphadenopathy:     He has no cervical adenopathy.   Neurological: He is alert and oriented to person, place, and time.   Skin: Skin is warm and dry. No rash noted. He is not diaphoretic. No erythema.   Psychiatric: Mood and affect normal.   Nursing note and vitals reviewed.       Laboratory   Lab Results   Component Value Date/Time    WBC 13.8 (H) 07/08/2019 02:36 AM    HEMOGLOBIN 13.9 (L) 07/08/2019 02:36 AM    HEMATOCRIT 42.8 07/08/2019 02:36 AM    PLATELETCT 171 07/08/2019 02:36 AM        Lab Results   Component Value Date/Time    SODIUM 138 07/08/2019 02:36 AM    POTASSIUM 4.1  07/08/2019 02:36 AM    CHLORIDE 106 07/08/2019 02:36 AM    CO2 23 07/08/2019 02:36 AM    GLUCOSE 271 (H) 07/08/2019 02:36 AM    BUN 35 (H) 07/08/2019 02:36 AM    CREATININE 0.99 07/08/2019 02:36 AM        No results found for: BLOODCULTU, BLDCULT, BCHOLD          Radiology  CT of the head from outside hospital personally review and per my interpretation, large right frontal brain tumor with surrounding edema and a slight midline shift.    Brain MRI done 7/6 with and without contrast personally reviewed.  Confirmed the image of the head CT.  Large right frontal mass with surrounding edema with midline shift.  Radiology feels that this could be primary brain tumor, possibly glioblastoma multiform.     EKG performed 07/06 was personally reviewed and per my interpretation shows atrial fibrillation, rate 80.  Old Q waves in the anterior lead.  No acute ischemic changes.    TTE 7/6/19:    No prior study is available for comparison.   Normal left ventricular chamber size.  Normal left ventricular wall thickness.  Normal left ventricular systolic function.  Trace tricuspid regurgitation.  Estimated right ventricular systolic pressure is  20 mmHg.  Ascending aorta is dilated with a diameter of 4.0 cm.       Assessment and Plan      Intracranial mass POA: Yes.  Based on the MRI and head imaging, suspect primary brain tumor.  Glioblastoma multiform?  History of prostate cancer, makes metastatic disease possibility.  Patient primary cancer screenings up-to-date.  Colonoscopy last year negative.  Non-smoker.  Patient seen and evaluated by Dr. Martinez today.  Plan is surgery in 2 days, 7/10, Wednesday.  Continue prophylactic Keppra for possible seizure.  Continue IV steroid to treat edema.  Every 4 hours neurochecks.  Fall and aspiration precautions.  DC IVF and can restart MN of the day of surgery when he will be NPO.      Chronic back pain POA: Yes.  Supportive care and pain control with narcotic as needed.      Atrial  fibrillation (HCC) POA: Yes.  Rate controlled on metoprolol 25 mg p.o. twice daily.  Holding Pradaxa (last dose 7/4/19 PTA) for surgery.  Besides, hemorrhage is seen on his brain MRI therefore, anticoagulation is contraindicated at this point.      Hypertension POA: Yes.  Blood pressure control on beta-blocker.      CAD (coronary artery disease) POA: Yes.  History of PCI.  No chest pain.  TTE normal LV EF, no significant valvular abnormality.  Continue Lipitor.      Hyperglycemia POA: Yes.  Hyperglycemic due to steroid.  Continue insulin sliding scale.  Increase Lantus 20 units subcu daily.  FSG even higher later during the day, so added 20 units of Lantus x1.  Increase lantus to 40 units for tomorrow.  Need to de-escalate when NPO for surgery.      Dyslipidemia POA: Yes.  Continue on Lipitor.      History of prostate cancer POA: Yes.  Please see primary problem above.    DVT prophylaxis: SCD    CODE STATUS:  Full Code    Disposition: To be determined      I have performed a physical exam and reviewed and updated ROS as of today, 7/8/2019.  In review of yesterday's note dated, 7/7/2019, there are no changes except as documented above.

## 2019-07-08 NOTE — DISCHARGE PLANNING
Care Transition Team Assessment    Information Source  Orientation : Oriented x 4  Information Given By: Spouse  Informant's Name: Mell         Elopement Risk  Legal Hold: No  Ambulatory or Self Mobile in Wheelchair: Yes  Disoriented: No  Psychiatric Symptoms: None  History of Wandering: No  Elopement this Admit: No  Vocalizing Wanting to Leave: No  Displays Behaviors, Body Language Wanting to Leave: No-Not at Risk for Elopement  Elopement Risk: Not at Risk for Elopement    Interdisciplinary Discharge Planning  Does Admitting Nurse Feel This Could be a Complex Discharge?: No  Primary Care Physician: Tr NP  Lives with - Patient's Self Care Capacity: Spouse  Patient or legal guardian wants to designate a caregiver (see row info): No  Support Systems: Children, Spouse / Significant Other  Housing / Facility: [address]  Do You Take your Prescribed Medications Regularly: Yes  Able to Return to Previous ADL's: Future Time w/Therapy  Mobility Issues: Yes  Prior Services: Home-Independent  Patient Expects to be Discharged to:: prefers IRF vs HHC  Assistance Needed: Yes  Durable Medical Equipment: Other - Specify (cane)    Discharge Preparedness  What is your plan after discharge?: Home with help, Home health care (prefers HHC)  What are your discharge supports?: Child, Spouse  Prior Functional Level: Ambulatory, Drives Self, Independent with Activities of Daily Living, Independent with Medication Management  Difficulity with ADLs: Walking  Difficulity with IADLs: Driving    Functional Assesment  Prior Functional Level: Ambulatory, Drives Self, Independent with Activities of Daily Living, Independent with Medication Management    Finances  Financial Barriers to Discharge: No  Prescription Coverage: Yes    Vision / Hearing Impairment  Right Eye Vision: Wears Glasses  Left Eye Vision: Wears Glasses              Domestic Abuse  Have you ever been the victim of abuse or violence?: No              Anticipated Discharge  Information  Anticipated discharge disposition: HH, Home

## 2019-07-08 NOTE — DISCHARGE PLANNING
Anticipated Discharge Disposition:   IRF vs HHC    Action:   Rounding done. Pt scheduled for OR on Wednesday.   Discussed discharge planning with family and they notified CM that pt was at IRF on 2016. They would like pt to return there if he qualifies or if he can would also prefer HHC.  Explained to pt and family that after surgery on Wednesday then he would stay in ICU for a couple days and possibly comeback to neurosurgery.   Assessment completed.     Barriers to Discharge:   Pending surgery on Wednesday.     Plan:   Follow up after pt returns to unit after surgery.

## 2019-07-09 NOTE — CARE PLAN
Problem: Safety  Goal: Will remain free from injury  Outcome: PROGRESSING AS EXPECTED  Safety precaution in effect. Non skid socks in use.  Call light within reach. Reminded patient to call for assist. Hourly round in effect. Verbalized understanding.    Problem: Infection  Goal: Will remain free from infection  Outcome: PROGRESSING AS EXPECTED  Implement standard precaution. Hand washing every encounter & before & after patient care. Verbalized understanding.    Problem: Knowledge Deficit  Goal: Knowledge of disease process/condition, treatment plan, diagnostic tests, and medications will improve  Outcome: PROGRESSING AS EXPECTED  Discussed Plan of care. Questions answered. Verbalized understanding.

## 2019-07-09 NOTE — PROGRESS NOTES
Bedside report given by day RN. Assumed care at 1900. Pt A&Ox4. Reporting no pain. IV patent. Educated on fall precautions and use of call light. POC discussed. Pt resting comfortably, treaded socks in place, bed locked and in lowest position, bed alarm on, call light in reach, hourly rounding in place.

## 2019-07-09 NOTE — PROGRESS NOTES
"HOSPITAL MEDICINE PROGRESS NOTE    Date of service  7/9/2019    Chief Complaint  Confusion      Hospital Course:   73 years old man who was in his previous state of health until family found him to be confused, brought him to the emergency department for evaluation, found to have a right frontal brain mass with surrounding edema with midline shift.  Radiology feels that this could be primary brain tumor, possibly glioblastoma multiform.  He was placed on steroid, which seems to have some positive effects on his mentation, likely due to improvement of the CNS edema.  On 7/8, he was seen by Dr. Martinez, who has planned for surgical resection on Wednesday, 7/10.     Interval History Updates:  No event overnight.  No fall.  Eating breakfast well this morning. No complaints of HA or vision changes. \"I'm ready to get this over with\". Pain well controlled.     UA with >1000 glucose. Sugars still uncontrolled with hyperglycemia > 300.     Consultants/Specialty  Neurosurgery    Review of Systems   Review of Systems   Constitutional: Negative for chills and fever.   Eyes: Negative for blurred vision and pain.   Respiratory: Negative for shortness of breath.    Cardiovascular: Negative for chest pain, palpitations and leg swelling.   Gastrointestinal: Negative for abdominal pain, constipation, diarrhea, nausea and vomiting.   Musculoskeletal: Negative for myalgias and neck pain.   Skin: Negative for rash.   Neurological: Negative for focal weakness.   Endo/Heme/Allergies: Negative.    All other systems reviewed and are negative.       Medications:  • insulin glargine  40 Units QAM INSULIN   • insulin lispro  0.2 Units/kg/day TID AC    And   • insulin lispro  2-9 Units 4X/DAY ACHS    And   • glucose  16 g Q15 MIN PRN    And   • dextrose 10% bolus  250 mL Q15 MIN PRN   • levETIRAcetam (KEPPRA) IV  500 mg Q12HRS   • atorvastatin  20 mg QHS   • therapeutic multivitamin-minerals  1 Tab DAILY   • senna-docusate  2 Tab BID    And   • " polyethylene glycol/lytes  1 Packet QDAY PRN    And   • magnesium hydroxide  30 mL QDAY PRN    And   • bisacodyl  10 mg QDAY PRN   • acetaminophen  650 mg Q6HRS PRN   • metoprolol  25 mg TWICE DAILY   • dexamethasone  4 mg Q6HRS       Physical Exam   Vitals:    07/08/19 1557 07/08/19 2000 07/09/19 0400 07/09/19 0720   BP: 126/101 123/71 123/80 110/71   Pulse: (!) 105 86 (!) 57 78   Resp: 18 18 18 16   Temp: 37.2 °C (98.9 °F) 36.1 °C (97 °F) 35.8 °C (96.5 °F) 36.2 °C (97.1 °F)   TempSrc:  Temporal Temporal Temporal   SpO2: 98% 95% 95% 94%   Weight:       Height:           Physical Exam   Constitutional: He is oriented to person, place, and time and well-developed, well-nourished, and in no distress. No distress.   HENT:   Head: Normocephalic and atraumatic.   Mouth/Throat: No oropharyngeal exudate.   Eyes: Conjunctivae and EOM are normal. No scleral icterus.   Neck: Normal range of motion. Neck supple.   Cardiovascular: Normal rate, regular rhythm, normal heart sounds and intact distal pulses.  Exam reveals no gallop and no friction rub.    No murmur heard.  Pulmonary/Chest: Effort normal and breath sounds normal. No stridor. No respiratory distress. He has no wheezes.   Abdominal: Soft. Bowel sounds are normal. He exhibits no distension. There is no tenderness. There is no rebound.   Musculoskeletal: Normal range of motion. He exhibits no edema or tenderness.   Neurological: He is alert and oriented to person, place, and time. No cranial nerve deficit.   Skin: Skin is warm and dry. No rash noted. He is not diaphoretic. No erythema.   Psychiatric: Mood and affect normal.   Nursing note and vitals reviewed.       Laboratory   Lab Results   Component Value Date/Time    WBC 13.8 (H) 07/08/2019 02:36 AM    HEMOGLOBIN 13.9 (L) 07/08/2019 02:36 AM    HEMATOCRIT 42.8 07/08/2019 02:36 AM    PLATELETCT 171 07/08/2019 02:36 AM        Lab Results   Component Value Date/Time    SODIUM 138 07/08/2019 02:36 AM    POTASSIUM 4.1  07/08/2019 02:36 AM    CHLORIDE 106 07/08/2019 02:36 AM    CO2 23 07/08/2019 02:36 AM    GLUCOSE 271 (H) 07/08/2019 02:36 AM    BUN 35 (H) 07/08/2019 02:36 AM    CREATININE 0.99 07/08/2019 02:36 AM        No results found for: BLOODCULTU, BLDCULT, BCHOLD          Radiology  CT of the head from outside hospital personally review and per my interpretation, large right frontal brain tumor with surrounding edema and a slight midline shift.    Brain MRI done 7/6 with and without contrast personally reviewed.  Confirmed the image of the head CT.  Large right frontal mass with surrounding edema with midline shift.  Radiology feels that this could be primary brain tumor, possibly glioblastoma multiform.     EKG performed 07/06 was personally reviewed and per my interpretation shows atrial fibrillation, rate 80.  Old Q waves in the anterior lead.  No acute ischemic changes.    TTE 7/6/19:    No prior study is available for comparison.   Normal left ventricular chamber size.  Normal left ventricular wall thickness.  Normal left ventricular systolic function.  Trace tricuspid regurgitation.  Estimated right ventricular systolic pressure is  20 mmHg.  Ascending aorta is dilated with a diameter of 4.0 cm.     Assessment and Plan    Intracranial mass POA: Yes.  Based on the MRI and head imaging, suspect primary brain tumor.  Glioblastoma multiform?  History of prostate cancer, makes metastatic disease possibility.  Patient primary cancer screenings up-to-date.  Colonoscopy last year negative.  Non-smoker.  Patient seen and evaluated by Dr. Martinez.      Plan is surgery 7/10, Wednesday.    Continue prophylactic Keppra for possible seizure.    Continue IV steroid to treat edema.    Every 4 hours neurochecks.    Fall and aspiration precautions.    DC IVF and can restart MN of the day of surgery when he will be NPO.      Chronic back pain POA: Yes.  Supportive care and pain control with narcotic as needed.      Atrial fibrillation  (HCC) POA: Yes.  Rate controlled on metoprolol 25 mg p.o. twice daily.  Holding Pradaxa (last dose 7/4/19 PTA) for surgery.  Besides, hemorrhage is seen on his brain MRI therefore, anticoagulation is contraindicated at this point.      Hypertension POA: Yes.  Blood pressure control on beta-blocker.      CAD (coronary artery disease) POA: Yes.  History of PCI.  No chest pain.  TTE normal LV EF, no significant valvular abnormality.  Continue Lipitor.      Hyperglycemia POA: Yes.  Hyperglycemic due to steroid. Poorly controlled. Continue insulin sliding scale. Need to de-escalate when NPO for surgery. Will continue with current regimen in expectation of improvement when NPO.       Dyslipidemia POA: Yes.  Continue on Lipitor.      History of prostate cancer POA: Yes.  Please see primary problem above.    DVT prophylaxis: SCD    CODE STATUS:  Full Code    Disposition: To be determined    I have performed a physical exam and reviewed and updated ROS as of today, 7/9/2019.  In review of yesterday's note dated, 7/8/2019, there are no changes except as documented above.

## 2019-07-09 NOTE — PROGRESS NOTES
Neurosurgery Progress Note    Subjective:  No events     Exam:    A&O x3, GCS 15  PERRL, EOMI  Face symm, tongue midline  ISAAC with FS, no drift        BP  Min: 110/71  Max: 135/76  Pulse  Av.8  Min: 57  Max: 105  Resp  Av.6  Min: 16  Max: 18  Temp  Av.3 °C (97.4 °F)  Min: 35.8 °C (96.5 °F)  Max: 37.2 °C (98.9 °F)  SpO2  Av.4 %  Min: 94 %  Max: 98 %    No Data Recorded    Recent Labs      19   0236   WBC  13.8*   RBC  4.38*   HEMOGLOBIN  13.9*   HEMATOCRIT  42.8   MCV  97.7   MCH  31.7   MCHC  32.5*   RDW  51.8*   PLATELETCT  171   MPV  9.6     Recent Labs      19   0236   SODIUM  138   POTASSIUM  4.1   CHLORIDE  106   CO2  23   GLUCOSE  271*   BUN  35*   CREATININE  0.99   CALCIUM  9.4               Intake/Output       19 - 19 - 07/10/19 0659       6640-3320 Total  Total       Intake    Total Intake -- -- -- -- -- --       Output    Urine  --  -- --  --  -- --    Number of Times Voided 3 x -- 3 x -- -- --    Stool  --  -- --  --  -- --    Number of Times Stooled 3 x -- 3 x -- -- --    Total Output -- -- -- -- -- --       Net I/O     -- -- -- -- -- --          No intake or output data in the 24 hours ending 19 0748         • insulin glargine  40 Units QAM INSULIN   • insulin lispro  0.2 Units/kg/day TID AC    And   • insulin lispro  2-9 Units 4X/DAY ACHS    And   • glucose  16 g Q15 MIN PRN    And   • dextrose 10% bolus  250 mL Q15 MIN PRN   • levETIRAcetam (KEPPRA) IV  500 mg Q12HRS   • atorvastatin  20 mg QHS   • therapeutic multivitamin-minerals  1 Tab DAILY   • senna-docusate  2 Tab BID    And   • polyethylene glycol/lytes  1 Packet QDAY PRN    And   • magnesium hydroxide  30 mL QDAY PRN    And   • bisacodyl  10 mg QDAY PRN   • acetaminophen  650 mg Q6HRS PRN   • metoprolol  25 mg TWICE DAILY   • dexamethasone  4 mg Q6HRS       Assessment and Plan:  Hospital day #5 right frontal mass  No ASA or anticoagulants    Keppra, decadron   Plan for OR tomorrow: right craniotomy for tumor

## 2019-07-09 NOTE — PROGRESS NOTES
0800 Patient's sitting up in bed. Dr Chandler visited. POC discussed with the patient.    0805 Patient's sitting up in edge of bed, having breakfast. No c/o's pain at this time. Fall Protocol in effect. Call light within reach. Reminded patient to call for assist. Assessment completed. No distress noted. Plan of care reviewed with the patient. Notified patient about NPO after midnight and surgery is scheduled tomorrow @0745. Verbalized understanding.    0817 New order received & acknowledged from [facility].    1030 Patient's sitting up in bed. UA was sent to the lab as per order. Spouse & daughter visiting.    1210 Patient's sitting up in bed, having lunch. No distress noted.    1335 Notified Dr Chandler of the glucose result from UA   >=1000.     1535 Patient's in bed. No distress noted. Family visiting.    1730 Patient's sitting up in bed,having Dinner. Family visiting.

## 2019-07-09 NOTE — CARE PLAN
Problem: Knowledge Deficit  Goal: Knowledge of disease process/condition, treatment plan, diagnostic tests, and medications will improve  Outcome: PROGRESSING AS EXPECTED  Pt educated on plan of care and disease process. All questions answered. Pt demonstrates understanding.     Problem: Pain Management  Goal: Pain level will decrease to patient's comfort goal  Outcome: PROGRESSING AS EXPECTED  Pt has no complaints of pain at this time

## 2019-07-10 NOTE — PROGRESS NOTES
1855  Patient's in bed. No changes in status. Bedside report given to Oncoming PRASANTH RN (Cheri).

## 2019-07-10 NOTE — PROGRESS NOTES
Patient off the unit for surgery. Patient to have a crani, more then likely will be going to the ICU after surgery.

## 2019-07-10 NOTE — PROGRESS NOTES
Bedside report given by sarah Adams. Assumed care at 1915. Pt A&Ox4. Reporting no pain. IV patent. Pt ambulatory, voiding. POC discussed. Pt resting comfortably, treaded socks in place, bed locked and in lowest position, bed alarm on, call light in reach, hourly rounding in place.

## 2019-07-10 NOTE — PROGRESS NOTES
Neurosurgery Progress Note    Subjective:  Seen by Dr Ruffin, No events     Exam:    A&O x3, GCS 15  PERRL, EOMI  Face symm, tongue midline  ISAAC with FS, no drift        BP  Min: 118/84  Max: 144/78  Pulse  Av.8  Min: 71  Max: 114  Resp  Av  Min: 16  Max: 16  Temp  Av.8 °C (98.3 °F)  Min: 36.7 °C (98 °F)  Max: 37.1 °C (98.8 °F)  SpO2  Av %  Min: 93 %  Max: 99 %    No Data Recorded    Recent Labs      19   0236  07/10/19   0213   WBC  13.8*  11.3*   RBC  4.38*  4.56*   HEMOGLOBIN  13.9*  14.8   HEMATOCRIT  42.8  43.8   MCV  97.7  96.1   MCH  31.7  32.5   MCHC  32.5*  33.8   RDW  51.8*  49.6   PLATELETCT  171  169   MPV  9.6  9.9     Recent Labs      19   0236  07/10/19   0212   SODIUM  138  137   POTASSIUM  4.1  4.2   CHLORIDE  106  106   CO2  23  24   GLUCOSE  271*  229*   BUN  35*  39*   CREATININE  0.99  0.84   CALCIUM  9.4  9.5     Recent Labs      07/10/19   021   APTT  25.6   INR  1.15*           Intake/Output       19 07 - 07/10/19 0659 07/10/19 0700 - 19 0659       Total  Total       Intake    P.O.  360  -- 360  --  -- --    P.O. 360 -- 360 -- -- --    IV Piggyback  100  -- 100  --  -- --    Volume (mL) (levETIRAcetam (KEPPRA) 500 mg in  mL IVPB) 100 -- 100 -- -- --    Total Intake 460 -- 460 -- -- --       Output    Urine  200  -- 200  --  -- --    Number of Times Voided 7 x 1 x 8 x -- -- --    Urine Void (mL) 200 -- 200 -- -- --    Stool  --  -- --  --  -- --    Number of Times Stooled 1 x -- 1 x -- -- --    Total Output 200 -- 200 -- -- --       Net I/O     260 -- 260 -- -- --            Intake/Output Summary (Last 24 hours) at 07/10/19 0740  Last data filed at 19 1830   Gross per 24 hour   Intake              460 ml   Output              200 ml   Net              260 ml            • [MAR Hold] insulin lispro  0.2 Units/kg/day TID AC    And   • [MAR Hold] insulin lispro  2-9 Units 4X/DAY ACHS    And   • [MAR  Hold] glucose  16 g Q15 MIN PRN    And   • [MAR Hold] dextrose 10% bolus  250 mL Q15 MIN PRN   • [MAR Hold] levETIRAcetam (KEPPRA) IV  500 mg Q12HRS   • [MAR Hold] atorvastatin  20 mg QHS   • [MAR Hold] therapeutic multivitamin-minerals  1 Tab DAILY   • [MAR Hold] senna-docusate  2 Tab BID    And   • [MAR Hold] polyethylene glycol/lytes  1 Packet QDAY PRN    And   • [MAR Hold] magnesium hydroxide  30 mL QDAY PRN    And   • [MAR Hold] bisacodyl  10 mg QDAY PRN   • [MAR Hold] acetaminophen  650 mg Q6HRS PRN   • [MAR Hold] metoprolol  25 mg TWICE DAILY   • [MAR Hold] dexamethasone  4 mg Q6HRS       Assessment and Plan:  Hospital day #6 right frontal mass  No ASA or anticoagulants   Keppra, decadron   OR today for right craniotomy for tumor

## 2019-07-10 NOTE — OR SURGEON
Immediate Post OP Note    PreOp Diagnosis: right frontal brain mass     PostOp Diagnosis: right frontal brain mass     Procedure(s):  RIGHT-SIDED CRANIOTOMY FOR TUMOR - Wound Class: Clean    Surgeon(s):  Son Ruffin M.D.    Anesthesiologist/Type of Anesthesia:  Anesthesiologist: Zak Hubbard M.D.  Anesthesia Technician: Peter Cannon/General    Surgical Staff:  Assistant: MARGARITA Forte  Circulator: Radha Cooper R.N.; Yessenia Modi R.N.  Scrub Person: Christine Sandoval    Specimens removed if any:  ID Type Source Tests Collected by Time Destination   A : RIGHT frontal mass Mass Brain PATHOLOGY SPECIMEN Son Ruffin M.D. 7/10/2019  9:32 AM        Estimated Blood Loss: 100 cc    Findings: good debulking    Complications: none         7/10/2019 11:07 AM MARGARITA Forte

## 2019-07-10 NOTE — ANESTHESIA PROCEDURE NOTES
Airway  Date/Time: 7/10/2019 8:31 AM  Performed by: SON BARRY  Authorized by: SON BARRY     Location:  OR  Urgency:  Elective  Difficult Airway: No    Indications for Airway Management:  Anesthesia  Spontaneous Ventilation: absent    Sedation Level:  Deep  Preoxygenated: Yes    Patient Position:  Sniffing  Final Airway Type:  Endotracheal airway  Final Endotracheal Airway:  ETT  Cuffed: Yes    Technique Used for Successful ETT Placement:  Direct laryngoscopy  Insertion Site:  Oral  Blade Type:  [place]  Laryngoscope Blade/Videolaryngoscope Blade Size:  3  ETT Size (mm):  8.5  Measured from:  Lips  ETT to Lips (cm):  23  Placement Verified by: auscultation and capnometry    Cormack-Lehane Classification:  Grade IIb - view of arytenoids or posterior of glottis only  Number of Attempts at Approach:  1

## 2019-07-10 NOTE — CARE PLAN
Problem: Bowel/Gastric:  Goal: Will not experience complications related to bowel motility  Outcome: PROGRESSING AS EXPECTED  Pt having regular BMs    Problem: Knowledge Deficit  Goal: Knowledge of disease process/condition, treatment plan, diagnostic tests, and medications will improve  Outcome: PROGRESSING AS EXPECTED  Pt educated on plan of care and disease process. All questions answered. Pt demonstrates understanding.

## 2019-07-10 NOTE — PROGRESS NOTES
HOSPITAL MEDICINE PROGRESS NOTE    Date of service  7/10/2019    Chief Complaint  Confusion      Hospital Course:   73 years old man who was in his previous state of health until family found him to be confused, brought him to the emergency department for evaluation, found to have a right frontal brain mass with surrounding edema with midline shift.  Radiology feels that this could be primary brain tumor, possibly glioblastoma multiform.  He was placed on steroid, which seems to have some positive effects on his mentation, likely due to improvement of the CNS edema.     On 7/8, he was seen by Dr. Martinez, who has planned for surgical resection on Wednesday, 7/10.     Interval History Updates:  No reported events overnight, sugars still remain over 200, complicated by steroids. To surgery this AM for crani resection, anticipate higher level to ICU after procedure.     Will need aggressive insulin therapy after procedure.     Consultants/Specialty  Neurosurgery    Review of Systems   Review of Systems   Constitutional: Negative for chills and fever.   Eyes: Negative for blurred vision and pain.   Respiratory: Negative for shortness of breath.    Cardiovascular: Negative for chest pain, palpitations and leg swelling.   Gastrointestinal: Negative for abdominal pain, constipation, diarrhea, nausea and vomiting.   Musculoskeletal: Negative for myalgias and neck pain.   Skin: Negative for rash.   Neurological: Negative for focal weakness.   Endo/Heme/Allergies: Negative.    All other systems reviewed and are negative.       Medications:  • [MAR Hold] insulin lispro  0.2 Units/kg/day TID AC    And   • [MAR Hold] insulin lispro  2-9 Units 4X/DAY ACHS    And   • [MAR Hold] glucose  16 g Q15 MIN PRN    And   • [MAR Hold] dextrose 10% bolus  250 mL Q15 MIN PRN   • [MAR Hold] levETIRAcetam (KEPPRA) IV  500 mg Q12HRS   • [MAR Hold] atorvastatin  20 mg QHS   • [MAR Hold] therapeutic multivitamin-minerals  1 Tab DAILY   • [MAR  Hold] senna-docusate  2 Tab BID    And   • [MAR Hold] polyethylene glycol/lytes  1 Packet QDAY PRN    And   • [MAR Hold] magnesium hydroxide  30 mL QDAY PRN    And   • [MAR Hold] bisacodyl  10 mg QDAY PRN   • [MAR Hold] acetaminophen  650 mg Q6HRS PRN   • [MAR Hold] metoprolol  25 mg TWICE DAILY   • [MAR Hold] dexamethasone  4 mg Q6HRS       Physical Exam   Vitals:    07/09/19 1539 07/09/19 1650 07/09/19 2000 07/10/19 0400   BP: 131/101 127/75 118/84 144/78   Pulse: (!) 114 84 71 82   Resp: 16  16 16   Temp: 37.1 °C (98.8 °F)  36.7 °C (98 °F) 36.7 °C (98 °F)   TempSrc:   Temporal Temporal   SpO2: 99%  93% 93%   Weight:       Height:           Physical Exam   Constitutional: He is oriented to person, place, and time and well-developed, well-nourished, and in no distress. No distress.   HENT:   Head: Normocephalic and atraumatic.   Mouth/Throat: No oropharyngeal exudate.   Eyes: Conjunctivae and EOM are normal. No scleral icterus.   Neck: Normal range of motion. Neck supple.   Cardiovascular: Normal rate, regular rhythm, normal heart sounds and intact distal pulses.  Exam reveals no gallop and no friction rub.    No murmur heard.  Pulmonary/Chest: Effort normal and breath sounds normal. No stridor. No respiratory distress. He has no wheezes.   Abdominal: Soft. Bowel sounds are normal. He exhibits no distension. There is no tenderness. There is no rebound.   Musculoskeletal: Normal range of motion. He exhibits no edema or tenderness.   Neurological: He is alert and oriented to person, place, and time. No cranial nerve deficit.   Skin: Skin is warm and dry. No rash noted. He is not diaphoretic. No erythema.   Psychiatric: Mood and affect normal.   Nursing note and vitals reviewed.       Laboratory   Lab Results   Component Value Date/Time    WBC 11.3 (H) 07/10/2019 02:13 AM    HEMOGLOBIN 14.8 07/10/2019 02:13 AM    HEMATOCRIT 43.8 07/10/2019 02:13 AM    PLATELETCT 169 07/10/2019 02:13 AM        Lab Results   Component  Value Date/Time    SODIUM 137 07/10/2019 02:12 AM    POTASSIUM 4.2 07/10/2019 02:12 AM    CHLORIDE 106 07/10/2019 02:12 AM    CO2 24 07/10/2019 02:12 AM    GLUCOSE 229 (H) 07/10/2019 02:12 AM    BUN 39 (H) 07/10/2019 02:12 AM    CREATININE 0.84 07/10/2019 02:12 AM        No results found for: BLOODCULTU, BLDCULT, BCHOLD          Radiology  CT of the head from outside hospital personally review and per my interpretation, large right frontal brain tumor with surrounding edema and a slight midline shift.    Brain MRI done 7/6 with and without contrast personally reviewed.  Confirmed the image of the head CT.  Large right frontal mass with surrounding edema with midline shift.  Radiology feels that this could be primary brain tumor, possibly glioblastoma multiform.     EKG performed 07/06 was personally reviewed and per my interpretation shows atrial fibrillation, rate 80.  Old Q waves in the anterior lead.  No acute ischemic changes.    TTE 7/6/19:    No prior study is available for comparison.   Normal left ventricular chamber size.  Normal left ventricular wall thickness.  Normal left ventricular systolic function.  Trace tricuspid regurgitation.  Estimated right ventricular systolic pressure is  20 mmHg.  Ascending aorta is dilated with a diameter of 4.0 cm.     Assessment and Plan    Intracranial mass POA: Yes.  Based on the MRI and head imaging, suspect primary brain tumor.  Glioblastoma multiform?  History of prostate cancer, makes metastatic disease possibility.  Patient primary cancer screenings up-to-date.  Colonoscopy last year negative.  Non-smoker.  Patient seen and evaluated by Dr. Martinez.      Plan is surgery today, 7/10, Wednesday.    Continue prophylactic Keppra for possible seizure.    Continue IV steroid to treat edema.    Every 4 hours neurochecks.    Fall and aspiration precautions.      Anticipate ICU after.       Chronic back pain POA: Yes.  Supportive care and pain control with narcotic as  needed.      Atrial fibrillation (HCC) POA: Yes.  Rate controlled on metoprolol 25 mg p.o. twice daily.  Holding Pradaxa (last dose 7/4/19 PTA) for surgery.  Besides, hemorrhage is seen on his brain MRI therefore, anticoagulation is contraindicated at this point.      Hypertension POA: Yes.  Blood pressure control on beta-blocker.      CAD (coronary artery disease) POA: Yes.  History of PCI.  No chest pain.  TTE normal LV EF, no significant valvular abnormality.  Continue Lipitor.      Hyperglycemia POA: Yes.  Hyperglycemic due to steroid. Poorly controlled. Continue insulin sliding scale. Need to de-escalate when NPO for surgery. Will continue with current regimen in expectation of improvement when NPO.       Dyslipidemia POA: Yes.  Continue on Lipitor.      History of prostate cancer POA: Yes.  Please see primary problem above.    DVT prophylaxis: SCD    CODE STATUS:  Full Code    Disposition: To be determined

## 2019-07-10 NOTE — ANESTHESIA PROCEDURE NOTES
Arterial Line  Performed by: SON BARRY  Authorized by: SON BARRY     Start Time:  7/10/2019 8:39 AM  End Time:  7/10/2019 8:52 AM  Localization: surface landmarks    Patient Location:  OR  Indication: continuous blood pressure monitoring    Catheter Size:  20 G  Seldinger Technique?: Yes    Laterality:  Left  Site:  Radial artery  Line Secured:  Antimicrobial disc, tape and transparent dressing  Events: patient tolerated procedure well with no complications

## 2019-07-10 NOTE — ANESTHESIA QCDR
2019 [facility] Clinical Data Registry (for Quality Improvement)     Postoperative nausea/vomiting risk protocol (Adult = 18 yrs and Pediatric 3-17 yrs)- (430 and 463)  General inhalation anesthetic (NOT TIVA) with PONV risk factors: Yes  Provision of anti-emetic therapy with at least 2 different classes of agents: Yes   Patient DID NOT receive anti-emetic therapy and reason is documented in Medical Record:  N/A    Multimodal Pain Management- (AQI59)  Patient undergoing Elective Surgery (i.e. Outpatient, or ASC, or Prescheduled Surgery prior to Hospital Admission): Yes  Use of Multimodal Pain Management, two or more drugs and/or interventions, NOT including systemic opioids: Yes   Exception: Documented allergy to multiple classes of analgesics:  N/A    PACU assessment of acute postoperative pain prior to Anesthesia Care End- Applies to Patients Age = 18- (ABG7)  Initial PACU pain score is which of the following: < 7/10  Patient unable to report pain score: N/A    Post-anesthetic transfer of care checklist/protocol to PACU/ICU- (426 and 427)  Upon conclusion of case, patient transferred to which of the following locations: PACU/Non-ICU  Use of transfer checklist/protocol: Yes  Exclusion: Service Performed in Patient Hospital Room (and thus did not require transfer): N/A    PACU Reintubation- (AQI31)  General anesthesia requiring endotracheal intubation (ETT) along with subsequent extubation in OR or PACU: Yes  Required reintubation in the PACU: No   Extubation was a planned trial documented in the medical record prior to removal of the original airway device:  N/A    Unplanned admission to ICU related to anesthesia service up through end of PACU care- (MD51)  Unplanned admission to ICU (not initially anticipated at anesthesia start time): No

## 2019-07-10 NOTE — PROGRESS NOTES
Pre-Op RNYessenia called to receive an update on the patient. Transport is on it's way to transport.

## 2019-07-10 NOTE — ANESTHESIA PREPROCEDURE EVALUATION
Relevant Problems   (+) Atrial fibrillation (HCC)   (+) CAD (coronary artery disease)   (+) Hypertension       Physical Exam    Airway   Mallampati: II  TM distance: >3 FB  Neck ROM: full       Cardiovascular - normal exam  Rhythm: regular  Rate: normal  (-) murmur     Dental - normal exam         Pulmonary - normal exam  Breath sounds clear to auscultation     Abdominal    Neurological - normal exam                 Anesthesia Plan    ASA 3   ASA physical status 3 criteria: CAD/stents (> 3 months), hypertension - poorly controlled and morbid obesity - BMI greater than or equal to 40    Plan - general       Airway plan will be ETT        Induction: intravenous    Postoperative Plan: Postoperative administration of opioids is intended.    Pertinent diagnostic labs and testing reviewed    Informed Consent:    Anesthetic plan and risks discussed with patient.    Use of blood products discussed with: patient whom consented to blood products.

## 2019-07-10 NOTE — PROGRESS NOTES
Pt admitted from Surgery s/p right craniotomy. Pt presents AOx2 to person and time only. Head dressing in place with dressing that has small old blood noted and otherwise clean, dry, intact. Pt is hemodynamically stable with family at bedside. Dr. Jorge has been made aware of patient's arrival.    Two RN skin check with BILLIE Ragland.    Skin assessed under:   - Blood pressure cuff   - SCD's   - Lam Hose   - Nasal Cannula   - Pulse oximetery   - Ortiz Catheter  Skin intact and no injuries or skin break down noted.    Sacrum intact with mild redness and blanching. Mepilex in place.

## 2019-07-10 NOTE — ASSESSMENT & PLAN NOTE
Status post resection 7/10, pathology consistent with large cell lymphoma.    Final diagnosis awaiting immunohistochemistry, flow cytometry and FISH studies  Significant persistent vasogenic edema   Continue serial neurologic exams  Stat head CT with any decline in neurologic exam  No antiplatelet or anticoagulant therapy  Aspiration/seizure precautions  Follow-up pathology  Continue Decadron  Keppra 500 mg twice daily  Strict BP parameters to maintain SBP less than 160 with Cardene infusion and active titration  PT/OT/speech evaluations

## 2019-07-10 NOTE — ANESTHESIA PROCEDURE NOTES
Central Venous Line  Performed by: SON BARRY  Authorized by: SON BARRY     Start Time:  7/10/2019 8:46 AM  End Time:  7/10/2019 8:57 AM  Patient Location:  OR  Indication: central venous access    provider hand hygiene performed prior to central venous catheter insertion, all 5 sterile barriers used (gloves, gown, cap, mask, large sterile drape) during central venous catheter insertion and skin prep agent completely dried prior to procedure    Patient Position:  Trendelenburg  Laterality:  Left  Site:  Subclavian  Prep:  Chlorhexidine  Catheter Size:  7 Fr  Catheter Length (cm):  20  Number of Lumens:  Triple lumen  target vein identified, needle advanced into vein and blood aspirated and guidewire advanced into vein    Seldinger Technique?: Yes    Ultrasound-Guided: surface landmarks    Intravenous Verification: venous blood return and chest x-ray pending    all ports aspirated, all ports flushed easily, guidewire was removed intact, biopatch was applied, line was sutured in place and dressing was applied    Events: patient tolerated procedure well with no complications

## 2019-07-10 NOTE — CONSULTS
Critical Care Consultation    Date of consult: 7/10/2019    Referring Physician  MOHINI Lee*    Reason for Consultation  Right frontal brain mass    History of Presenting Illness  73 y.o. male who presented 7/5/2019 with confusion, generalized weakness x1 week.  Patient with history of HTN, HLD, prostate CA status post resection, chronic A. fib on Pradaxa.  Patient initially presented to OSH when found to have right frontal lesion and subsequently transferred to [facility] for higher level of care.  Upon arrival patient noted to have large hemorrhagic and enhancing lobulated right anterior frontal intra-axial mass with severe vasogenic edema suspicious for high-grade GBM.  Patient was initiated on Decadron for the vasogenic edema and subsequently taken today 7/10 for mass resection.  At this time he is fairly confused but does follow commands most information taken from family at bedside.    Code Status  Full Code    Review of Systems  Review of Systems   Unable to perform ROS: Acuity of condition       Past Medical History   has a past medical history of Anesthesia; Arthritis; Atrial fibrillation (HCC); Back pain; Blood clotting disorder ([facility]) (1999); Breath shortness; CAD (coronary artery disease); Cancer (HCC) (2016); Cataract; Dyslipidemia; High cholesterol; Hypertension; Ileus (HCC); Myocardial infarct (HCC) (1999,2006); Renal disorder; Sleep apnea; Snoring; and Urinary incontinence.    Surgical History   has a past surgical history that includes appendectomy (2002); other cardiac surgery (1999,2002,2006); lumbar laminectomy diskectomy (N/A, 11/26/2018); laminotomy (Left, 11/26/2018); foraminotomy (Left, 11/26/2018); other neurological surg (2016); and other neurological surg.    Family History  family history is not on file.  Unable to obtain    Social History   reports that he has never smoked. He has never used smokeless tobacco. He reports that he does not drink alcohol or use  drugs.    Medications  Home Medications     Reviewed by Zak Hubbard M.D. (Physician) on 07/10/19 at 0755  Med List Status: <None>   Medication Last Dose Status   acetaminophen 650 MG Tab  Active   atorvastatin (LIPITOR) 20 MG Tab  Active   Azilsartan Medoxomil 40 MG Tab  Active   Diclofenac Sodium (VOLTAREN) 1 % Gel  Active   docusate sodium 100 MG Cap  Active   nebivolol (BYSTOLIC) 5 MG Tab tablet  Active   therapeutic multivitamin-minerals (THERAGRAN-M) Tab  Active              Current Facility-Administered Medications   Medication Dose Route Frequency Provider Last Rate Last Dose   • dexamethasone (DECADRON) injection 10 mg  10 mg Intravenous Q6HRS Angelita Landrum, A.P.N.       • levETIRAcetam (KEPPRA) 500 mg in  mL IVPB  500 mg Intravenous Q12HRS Angelita Landrum A.P.N.       • Pharmacy Consult Request ...Pain Management Review 1 Each  1 Each Other PHARMACY TO DOSE MAYLIN Forte.P.RAFFY.       • MD ALERT...DO NOT ADMINISTER NSAIDS or ASPIRIN unless ORDERED By Neurosurgery 1 Each  1 Each Other PRN Angelita Landrum, A.P.N.       • ondansetron (ZOFRAN) syringe/vial injection 4 mg  4 mg Intravenous Q4HRS PRN Angelita Landrum, A.P.N.       • diphenhydrAMINE (BENADRYL) injection 25 mg  25 mg Intravenous Q6HRS PRN Angelita Landrum, A.P.N.       • scopolamine (TRANSDERM-SCOP) patch 1 Patch  1 Patch Transdermal Q72HRS PRN Angelita Landrum, A.P.N.       • labetalol (NORMODYNE,TRANDATE) injection 10 mg  10 mg Intravenous Q HOUR PRN Angelita Landrum, A.P.N.       • hydrALAZINE (APRESOLINE) injection 10 mg  10 mg Intravenous Q HOUR PRN Angelita Landrum, A.P.N.       • niCARdipine (CARDENE) 25 mg in  mL Infusion  0-15 mg/hr Intravenous Continuous Angelita Landrum A.P.N.       • docusate sodium (COLACE) capsule 100 mg  100 mg Oral BID MAYLIN Forte.P.N.       • senna-docusate (PERICOLACE or SENOKOT S) 8.6-50 MG per tablet 1 Tab  1 Tab Oral Nightly MAYLIN Forte.P.N.        • senna-docusate (PERICOLACE or SENOKOT S) 8.6-50 MG per tablet 1 Tab  1 Tab Oral Q24HRS PRN Angelita Landrum, A.P.N.       • polyethylene glycol/lytes (MIRALAX) PACKET 1 Packet  1 Packet Oral BID PRN Angelita Landrum, A.P.N.       • magnesium hydroxide (MILK OF MAGNESIA) suspension 30 mL  30 mL Oral QDAY PRN Angelita Landrum, A.P.N.       • bisacodyl (DULCOLAX) suppository 10 mg  10 mg Rectal Q24HRS PRN Angelita Landrum, A.P.N.       • fleet enema 133 mL  1 Each Rectal Once PRN Angelita Landrum, A.P.N.       • 0.9 % NaCl with KCl 20 mEq infusion   Intravenous Continuous Angelita Landrum, A.P.N.       • oxyCODONE immediate release (ROXICODONE) tablet 10 mg  10 mg Oral Q3HRS PRN Angelita Landrum, A.P.N.       • oxyCODONE immediate-release (ROXICODONE) tablet 5 mg  5 mg Oral Q3HRS PRN Angelita Landrum, A.P.N.       • ceFAZolin in dextrose (ANCEF) IVPB premix 2 g  2 g Intravenous Q8HR Angelita Landrum, A.P.N.       • artificial tears 1.4 % ophthalmic solution 2 Drop  2 Drop Both Eyes Q8HRS Angelita Landrum, A.P.N.       • insulin lispro (HUMALOG) injection 6 Units  0.2 Units/kg/day Subcutaneous TID AC Obie Farooq M.D.   6 Units at 07/10/19 0650    And   • insulin lispro (HUMALOG) injection 2-9 Units  2-9 [facility]   6 Units at 07/09/19 1959    And   • glucose 4 g chewable tablet 16 g  16 g Oral Q15 MIN PRN Obie Farooq M.D.        And   • DEXTROSE 10% BOLUS 250 mL  250 mL Intravenous Q15 MIN PRN Obie Farooq M.D.       • atorvastatin (LIPITOR) tablet 20 mg  20 mg Oral QHS Hina Starr M.D.   20 mg at 07/09/19 1956   • therapeutic multivitamin-minerals (THERAGRAN-M) tablet 1 Tab  1 Tab Oral DAILY Hina Starr M.D.   1 Tab at 07/10/19 0531   • acetaminophen (TYLENOL) tablet 650 mg  650 mg Oral Q6HRS PRN Hina Starr M.D.   650 mg at 07/06/19 2051   • metoprolol (LOPRESSOR) tablet 25 mg  25 mg Oral TWICE DAILY Hina Starr M.D.   25  mg at 07/10/19 0531     Facility-Administered Medications Ordered in Other Encounters   Medication Dose Route Frequency Provider Last Rate Last Dose   • ceFAZolin (ANCEF) injection    PRN Zak Hubbard M.D.   3 g at 07/10/19 0858   • propofol (DIPRIVAN) 10 mg/mL infusion    PRN Zak Hubbard M.D.   180 mg at 07/10/19 0830   • lidocaine (XYLOCAINE) injection    PRN Zak Hubbard M.D.   60 mg at 07/10/19 0830   • rocuronium (ZEMURON) injection    PRN Zak Hubbard M.D.   50 mg at 07/10/19 0830   • fentaNYL (SUBLIMAZE) injection    PRN Zak Hubbard M.D.   50 mcg at 07/10/19 1058   • phenylephrine (CLAUDINE-SYNEPHRINE) injection    PRN Zak Hubbard M.D.   100 mcg at 07/10/19 0933   • mannitol 20% infusion    PRN Zak Hubbard M.D.   50 g at 07/10/19 0905   • dexamethasone (DECADRON) injection    PRN Zak Hubbard M.D.   10 mg at 07/10/19 0919   • ondansetron (ZOFRAN) syringe/vial injection    PRRAFFY Hubbard M.D.   4 mg at 07/10/19 1034   • sugammadex (BRIDION) injection   Intravenous PRN Zak Hubbard M.D.   200 mg at 07/10/19 1101       Allergies  Allergies   Allergen Reactions   • Gabapentin Rash     Broke out in a rash on R bicep       Vital Signs last 24 hours  Temp:  [36.3 °C (97.4 °F)-37.1 °C (98.8 °F)] 36.6 °C (97.8 °F)  Pulse:  [] 86  Resp:  [12-19] 12  BP: (118-144)/() 142/91  SpO2:  [92 %-99 %] 98 %    Physical Exam  Physical Exam   Constitutional:   Acutely ill-appearing, postop dressing to skull   HENT:   Nose: Nose normal.   Mouth/Throat: No oropharyngeal exudate.   Eyes: Pupils are equal, round, and reactive to light. Conjunctivae are normal.   Neck: No tracheal deviation present.   Cardiovascular: Normal rate and intact distal pulses.    Pulmonary/Chest: He has no wheezes. He has no rales.   Abdominal: Soft. He exhibits no distension. There is no tenderness.   Musculoskeletal: He exhibits no edema.   Neurological: No cranial nerve deficit.   Grossly appears to  have 5 out of 5 motor strength bilateral upper and lower extremities, is disoriented otherwise alert and follows.   Skin: Skin is warm and dry.   Psychiatric: He has a normal mood and affect.   Nursing note and vitals reviewed.      Fluids    Intake/Output Summary (Last 24 hours) at 07/10/19 1311  Last data filed at 07/10/19 1200   Gross per 24 hour   Intake          1918.02 ml   Output              425 ml   Net          1493.02 ml       Laboratory  Recent Results (from the past 48 hour(s))   ACCU-CHEK GLUCOSE    Collection Time: 07/08/19  4:30 PM   Result Value Ref Range    Glucose - Accu-Ck 355 (H) 65 - 99 mg/dL   ACCU-CHEK GLUCOSE    Collection Time: 07/08/19  9:00 PM   Result Value Ref Range    Glucose - Accu-Ck 323 (H) 65 - 99 mg/dL   ACCU-CHEK GLUCOSE    Collection Time: 07/09/19  6:09 AM   Result Value Ref Range    Glucose - Accu-Ck 214 (H) 65 - 99 mg/dL   ACCU-CHEK GLUCOSE    Collection Time: 07/09/19  8:13 AM   Result Value Ref Range    Glucose - Accu-Ck 192 (H) 65 - 99 mg/dL   URINALYSIS    Collection Time: 07/09/19 10:30 AM   Result Value Ref Range    Color Yellow     Character Clear     Specific Gravity 1.025 <1.035    Ph 5.5 5.0 - 8.0    Glucose >=1000 (A) Negative mg/dL    Ketones Negative Negative mg/dL    Protein Negative Negative mg/dL    Bilirubin Negative Negative    Urobilinogen, Urine 0.2 Negative    Nitrite Negative Negative    Leukocyte Esterase Negative Negative    Occult Blood Negative Negative    Micro Urine Req see below    ACCU-CHEK GLUCOSE    Collection Time: 07/09/19 11:11 AM   Result Value Ref Range    Glucose - Accu-Ck 307 (H) 65 - 99 mg/dL   ACCU-CHEK GLUCOSE    Collection Time: 07/09/19  4:58 PM   Result Value Ref Range    Glucose - Accu-Ck 253 (H) 65 - 99 mg/dL   ACCU-CHEK GLUCOSE    Collection Time: 07/09/19  7:59 PM   Result Value Ref Range    Glucose - Accu-Ck 320 (H) 65 - 99 mg/dL   Basic Metabolic Panel    Collection Time: 07/10/19  2:12 AM   Result Value Ref Range    Sodium  137 135 - 145 mmol/L    Potassium 4.2 3.6 - 5.5 mmol/L    Chloride 106 96 - 112 mmol/L    Co2 24 20 - 33 mmol/L    Glucose 229 (H) 65 - 99 mg/dL    Bun 39 (H) 8 - 22 mg/dL    Creatinine 0.84 0.50 - 1.40 mg/dL    Calcium 9.5 8.5 - 10.5 mg/dL    Anion Gap 7.0 0.0 - 11.9   Prothrombin Time    Collection Time: 07/10/19  2:12 AM   Result Value Ref Range    PT 15.0 (H) 12.0 - 14.6 sec    INR 1.15 (H) 0.87 - 1.13   APTT    Collection Time: 07/10/19  2:12 AM   Result Value Ref Range    APTT 25.6 24.7 - 36.0 sec   ESTIMATED GFR    Collection Time: 07/10/19  2:12 AM   Result Value Ref Range    GFR If African American >60 >60 mL/min/1.73 m 2    GFR If Non African American >60 >60 mL/min/1.73 m 2   CBC WITH DIFFERENTIAL    Collection Time: 07/10/19  2:13 AM   Result Value Ref Range    WBC 11.3 (H) 4.8 - 10.8 K/uL    RBC 4.56 (L) 4.70 - 6.10 M/uL    Hemoglobin 14.8 14.0 - 18.0 g/dL    Hematocrit 43.8 42.0 - 52.0 %    MCV 96.1 81.4 - 97.8 fL    MCH 32.5 27.0 - 33.0 pg    MCHC 33.8 33.7 - 35.3 g/dL    RDW 49.6 35.9 - 50.0 fL    Platelet Count 169 164 - 446 K/uL    MPV 9.9 9.0 - 12.9 fL    Neutrophils-Polys 82.50 (H) 44.00 - 72.00 %    Lymphocytes 5.90 (L) 22.00 - 41.00 %    Monocytes 9.80 0.00 - 13.40 %    Eosinophils 0.00 0.00 - 6.90 %    Basophils 0.20 0.00 - 1.80 %    Immature Granulocytes 1.60 (H) 0.00 - 0.90 %    Nucleated RBC 0.00 /100 WBC    Neutrophils (Absolute) 9.32 (H) 1.82 - 7.42 K/uL    Lymphs (Absolute) 0.66 (L) 1.00 - 4.80 K/uL    Monos (Absolute) 1.10 (H) 0.00 - 0.85 K/uL    Eos (Absolute) 0.00 0.00 - 0.51 K/uL    Baso (Absolute) 0.02 0.00 - 0.12 K/uL    Immature Granulocytes (abs) 0.18 (H) 0.00 - 0.11 K/uL    NRBC (Absolute) 0.00 K/uL   COD - Adult (Type and Screen)    Collection Time: 07/10/19  2:14 AM   Result Value Ref Range    ABO Grouping Only B     Rh Grouping Only POS     Antibody Screen-Cod NEG    ACCU-CHEK GLUCOSE    Collection Time: 07/10/19  5:49 AM   Result Value Ref Range    Glucose - Accu-Ck 231 (H)  65 - 99 mg/dL   ACCU-CHEK GLUCOSE    Collection Time: 07/10/19 11:30 AM   Result Value Ref Range    Glucose - Accu-Ck 151 (H) 65 - 99 mg/dL   Histology Request    Collection Time: 07/10/19 12:34 PM   Result Value Ref Range    Pathology Request Sent to Histo        Imaging  DX-CHEST-PORTABLE (1 VIEW)   Final Result      Placement left subclavian central line with tip at the origin of the SVC.   No pneumothorax.   Bilateral atelectasis with edema not excluded.      EC-ECHOCARDIOGRAM COMPLETE W/O CONT   Final Result      MR-STEALTH BRAIN WITH & W/O   Final Result      1.  Stealth localization for a large enhancing and hemorrhagic right anterior frontal intra-axial mass lesion most consistent with glioblastoma.   2.  See MRI brain full report also from today for further discussion.      MR-BRAIN-WITH & W/O   Final Result      1.  Large hemorrhagic and enhancing lobulated right anterior frontal intra-axial mass lesion associated with severe vasogenic edema. Findings are most consistent with high-grade primary brain tumor such as glioblastoma multiforme or less likely an    unusually large hemorrhagic solitary brain metastasis.   2.  Right to left shift of midline structures.   3.  Mild supratentorial white matter disease in the left cerebral hemisphere.   4.  Indeterminate tiny focus of mildly bright diffusion signal in the right posterior frontal deep white matter which could represent an acute or recent subacute lacunar infarct.      OUTSIDE IMAGES-CT HEAD   Final Result          Assessment/Plan  * Intracranial mass- (present on admission)   Assessment & Plan    Status post resection 7/10  Continue every hour neuro checks  Stat head CT with any decline in neurologic exam  No antiplatelet or anticoagulant therapy  Aspiration/seizure precautions  Follow-up pathology  Continue Decadron  Keppra 500 mg twice daily  Strict BP parameters to maintain SBP less than 160 with Cardene infusion and active titration  PT/OT/speech  evaluations     Hyperglycemia- (present on admission)   Assessment & Plan    Related to steroids  SSI     Hypertension- (present on admission)   Assessment & Plan    Continue metoprolol  Cardene infusion to maintain SBP less than 160     Atrial fibrillation (HCC)- (present on admission)   Assessment & Plan    Continue metoprolol  Keep K greater than 4 mag greater than 2  Continue rate control  Restart any coagulation when okay with surgery     History of prostate cancer- (present on admission)   Assessment & Plan    Status post resection     Dyslipidemia- (present on admission)   Assessment & Plan    Continue statin         Discussed patient condition and risk of morbidity and/or mortality with Hospitalist, Family, RN, RT, Patient and neurosurgery.    The patient remains critically ill.  Critical care time = 32 minutes in directly providing and coordinating critical care and extensive data review.  No time overlap and excludes procedures.

## 2019-07-10 NOTE — DISCHARGE PLANNING
Pt new to unit. Admitted from surgery. LSW met with pt's wife, Mell, and introduced myself. Mell was grateful. No needs/questions from pt's family at this time.    Plan: LSW to assist as needed

## 2019-07-10 NOTE — ASSESSMENT & PLAN NOTE
Continue metoprolol  Keep K greater than 4 mag greater than 2  Continue rate control  Restart any coagulation when okay with surgery

## 2019-07-10 NOTE — PROGRESS NOTES
Surgery patient?: no  Date of surgery: n/a  Ambulated 50 ft on day of surgery? (N/A if today is not date of surgery): n/a  Number of times ambulated 50 feet or greater today: 4  Patient has been up to chair, edge of bed or HOB 90 degrees for all meals?: yes  Goal met? (goal is ambulating at least 50 feet 2 times on day shift, one time on night shift): yes  If patient did not meet mobility goal, why?:

## 2019-07-10 NOTE — CARE PLAN
Problem: Communication  Goal: The ability to communicate needs accurately and effectively will improve  Outcome: PROGRESSING AS EXPECTED  The pt's orientation continues to improve and is able to communicate his needs effectively. Family is at bedside for support and to help interpret the patient's desires.     Problem: Safety  Goal: Will remain free from injury  Outcome: PROGRESSING AS EXPECTED  Pt understands fall risk and has family at the bedside for support and safety.

## 2019-07-11 NOTE — PROGRESS NOTES
Dr. Jorge notified about patient coughing today after drinking liquids.  Order placed for swallow eval.

## 2019-07-11 NOTE — PROGRESS NOTES
Critical Care Progress Note    Date of admission  7/5/2019    Chief Complaint  73 y.o. male admitted 7/5/2019 with Right frontal mass    Hospital Course    73 y.o. male who presented 7/5/2019 with confusion, generalized weakness x1 week.  Patient with history of HTN, HLD, prostate CA status post resection, chronic A. fib on Pradaxa.  Patient initially presented to OSH when found to have right frontal lesion and subsequently transferred to [facility] for higher level of care.  Upon arrival patient noted to have large hemorrhagic and enhancing lobulated right anterior frontal intra-axial mass with severe vasogenic edema suspicious for high-grade GBM.  Patient was initiated on Decadron for the vasogenic edema and subsequently taken today 7/10 for mass resection.  At this time he is fairly confused but does follow commands most information taken from family at bedside.      Interval Problem Update  Reviewed last 24 hour events:  Tm 100.2  +1.8L over last 24hr  No cxr this am  Hb 13    NS 20K @ 100  Decadron 10 q6  keppra 500 bid    95% on 3L  Last bm 7/9    Review of Systems  Review of Systems   Constitutional: Negative for chills and fever.   HENT: Negative for congestion.    Eyes: Negative for blurred vision and double vision.   Respiratory: Negative for cough, sputum production and shortness of breath.    Cardiovascular: Negative for chest pain and palpitations.   Gastrointestinal: Negative for abdominal pain, nausea and vomiting.   Neurological: Positive for headaches (Postoperative). Negative for focal weakness and weakness.   Psychiatric/Behavioral: Negative for depression.   All other systems reviewed and are negative.       Vital Signs for last 24 hours   Temp:  [36.3 °C (97.4 °F)-36.6 °C (97.9 °F)] 36.6 °C (97.8 °F)  Pulse:  [] 95  Resp:  [12-28] 19  BP: (142)/(91) 142/91  SpO2:  [88 %-99 %] 95 %    Hemodynamic parameters for last 24 hours       Respiratory Information for the last 24 hours       Physical  Exam   Physical Exam   Constitutional: He appears well-developed and well-nourished.   HENT:   Nose: Nose normal.   Mouth/Throat: No oropharyngeal exudate.   Eyes: Pupils are equal, round, and reactive to light. Conjunctivae are normal.   Neck: No tracheal deviation present.   Cardiovascular: Normal rate and intact distal pulses.    Pulmonary/Chest: He has no wheezes. He has no rales.   Abdominal: Soft. He exhibits no distension. There is no tenderness.   Musculoskeletal: He exhibits no edema.   Neurological: No cranial nerve deficit.   Skin: Skin is warm and dry.   Psychiatric:   Pleasant   Nursing note and vitals reviewed.      Medications  Current Facility-Administered Medications   Medication Dose Route Frequency Provider Last Rate Last Dose   • dexamethasone (DECADRON) injection 10 mg  10 mg Intravenous Q6HRS Angelita Landrum, A.P.N.   10 mg at 07/11/19 0534   • levETIRAcetam (KEPPRA) 500 mg in  mL IVPB  500 mg Intravenous Q12HRS Angelita Landrum A.P.N.   Stopped at 07/11/19 0532   • Pharmacy Consult Request ...Pain Management Review 1 Each  1 Each Other PHARMACY TO DOSE MAYLIN Forte.P.RAFFY.       • MD ALERT...DO NOT ADMINISTER NSAIDS or ASPIRIN unless ORDERED By Neurosurgery 1 Each  1 Each Other PRN Angelita Landrum, A.P.N.       • ondansetron (ZOFRAN) syringe/vial injection 4 mg  4 mg Intravenous Q4HRS PRN Angelita Landrum, A.P.N.       • diphenhydrAMINE (BENADRYL) injection 25 mg  25 mg Intravenous Q6HRS PRN Angelita Landrum, A.P.N.       • scopolamine (TRANSDERM-SCOP) patch 1 Patch  1 Patch Transdermal Q72HRS PRN Angelita Landrum, A.P.N.       • labetalol (NORMODYNE,TRANDATE) injection 10 mg  10 mg Intravenous Q HOUR PRN Angelita Landrum, A.P.N.       • hydrALAZINE (APRESOLINE) injection 10 mg  10 mg Intravenous Q HOUR PRN Angelita Landrum, A.P.N.       • niCARdipine (CARDENE) 25 mg in  mL Infusion  0-15 mg/hr Intravenous Continuous Angelita Landrum, A.P.N.   Stopped at  07/10/19 1230   • docusate sodium (COLACE) capsule 100 mg  100 mg Oral BID Angelita Landrum, A.P.N.   Stopped at 07/10/19 1737   • senna-docusate (PERICOLACE or SENOKOT S) 8.6-50 MG per tablet 1 Tab  1 Tab Oral Nightly Angelita Landrum, A.P.N.       • senna-docusate (PERICOLACE or SENOKOT S) 8.6-50 MG per tablet 1 Tab  1 Tab Oral Q24HRS PRN Angelita Landrum, A.P.N.       • polyethylene glycol/lytes (MIRALAX) PACKET 1 Packet  1 Packet Oral BID PRN Angelita Landrum, A.P.N.       • magnesium hydroxide (MILK OF MAGNESIA) suspension 30 mL  30 mL Oral QDAY PRN Angelita Landrum, A.P.N.       • bisacodyl (DULCOLAX) suppository 10 mg  10 mg Rectal Q24HRS PRN Angelita Landrum, A.P.N.       • fleet enema 133 mL  1 Each Rectal Once PRN Angelita Landrum, A.P.N.       • 0.9 % NaCl with KCl 20 mEq infusion   Intravenous Continuous Angelita Landrum A.P.N. 100 mL/hr at 07/11/19 0031     • oxyCODONE immediate release (ROXICODONE) tablet 10 mg  10 mg Oral Q3HRS PRN Angelita Landrum, A.P.N.       • oxyCODONE immediate-release (ROXICODONE) tablet 5 mg  5 mg Oral Q3HRS PRN Angelita Landrum, A.P.N.   5 mg at 07/10/19 2323   • artificial tears 1.4 % ophthalmic solution 2 Drop  2 Drop Both Eyes Q8HRS Angelita Landrum, A.P.N.   2 Drop at 07/11/19 0517   • acetaminophen (TYLENOL) tablet 650 mg  650 mg Oral Q4HRS PRN Fahad Jorge M.D.   650 mg at 07/11/19 0120   • insulin lispro (HUMALOG) injection 6 Units  0.2 Units/kg/day Subcutaneous TID NIESHA Farooq M.D.   6 Units at 07/11/19 0632    And   • insulin lispro (HUMALOG) injection 2-9 Units  2-9 Units Subcutaneous 4X/DAY ASIM Farooq M.D.   6 Units at 07/10/19 2125    And   • glucose 4 g chewable tablet 16 g  16 g Oral Q15 MIN PRN Obie Farooq M.D.        And   • DEXTROSE 10% BOLUS 250 mL  250 mL Intravenous Q15 MIN PRN Obie Farooq M.D.       • atorvastatin (LIPITOR) tablet 20 mg  20 mg Oral QHS Hina Starr M.D.   20 mg at 07/10/19 2122   •  therapeutic multivitamin-minerals (THERAGRAN-M) tablet 1 Tab  1 Tab Oral DAILY Hina Starr M.D.   1 Tab at 07/10/19 0531   • metoprolol (LOPRESSOR) tablet 25 mg  25 mg Oral TWICE DAILY Hina Starr M.D.   25 mg at 07/11/19 0530       Fluids    Intake/Output Summary (Last 24 hours) at 07/11/19 0712  Last data filed at 07/11/19 0600   Gross per 24 hour   Intake          4429.69 ml   Output             2575 ml   Net          1854.69 ml       Laboratory          Recent Labs      07/10/19   0212  07/11/19   0450   SODIUM  137  137   POTASSIUM  4.2  4.6   CHLORIDE  106  107   CO2  24  22   BUN  39*  33*   CREATININE  0.84  0.98   MAGNESIUM   --   2.1   CALCIUM  9.5  8.7     Recent Labs      07/10/19   0212  07/11/19   0450   GLUCOSE  229*  228*     Recent Labs      07/10/19   0213  07/11/19   0450   WBC  11.3*  15.9*   NEUTSPOLYS  82.50*   --    LYMPHOCYTES  5.90*   --    MONOCYTES  9.80   --    EOSINOPHILS  0.00   --    BASOPHILS  0.20   --      Recent Labs      07/10/19   0212  07/10/19   0213  07/11/19   0450   RBC   --   4.56*  4.21*   HEMOGLOBIN   --   14.8  13.2*   HEMATOCRIT   --   43.8  40.7*   PLATELETCT   --   169  150*   PROTHROMBTM  15.0*   --    --    APTT  25.6   --    --    INR  1.15*   --    --        Imaging  X-Ray:  No film today    Assessment/Plan  * Intracranial mass- (present on admission)   Assessment & Plan    Status post resection 7/10  Continue every hour neuro checks  Stat head CT with any decline in neurologic exam  No antiplatelet or anticoagulant therapy  Aspiration/seizure precautions  Follow-up pathology  Continue Decadron  Keppra 500 mg twice daily  Strict BP parameters to maintain SBP less than 160 with Cardene infusion and active titration  PT/OT/speech evaluations  Repeat MRI this week     Hyperglycemia- (present on admission)   Assessment & Plan    Related to steroids  SSI     Hypertension- (present on admission)   Assessment & Plan    Continue metoprolol  Cardene infusion  to maintain SBP less than 160     Atrial fibrillation (HCC)- (present on admission)   Assessment & Plan    Continue metoprolol  Keep K greater than 4 mag greater than 2  Continue rate control  Restart any coagulation when okay with surgery     History of prostate cancer- (present on admission)   Assessment & Plan    Status post resection     Dyslipidemia- (present on admission)   Assessment & Plan    Continue statin          VTE:  Contraindicated  Ulcer: Not Indicated  Lines: dc central/guy/a-line    I have performed a physical exam and reviewed and updated ROS and Plan today (7/11/2019). In review of yesterday's note (7/10/2019), there are no changes except as documented above.     Discussed patient condition and risk of morbidity and/or mortality with Hospitalist, Family, RN, RT, Pharmacy, Patient and neurosurgery

## 2019-07-11 NOTE — PROGRESS NOTES
Neurosurgery Progress Note    Subjective:  No acute events, afib- rate controlled, bp 120's by cuff, art line not reading well, denies HA, no OOB yet, on clear liq- advancing, guy    Exam:  Sleepy, awakens easily  A&O x3, GCS 15  PERRL, EOMI  Face symm, tongue midline  ISAAC with FS, no drift  Slight right eye swelling  Inc c/d/i        Pulse  Av.8  Min: 76  Max: 114  Resp  Av.7  Min: 12  Max: 28  Temp  Av.6 °C (97.9 °F)  Min: 36.6 °C (97.8 °F)  Max: 36.6 °C (97.9 °F)  Monitored Temp 2  Av.3 °C (99.1 °F)  Min: 36.4 °C (97.5 °F)  Max: 37.9 °C (100.2 °F)  SpO2  Av.4 %  Min: 88 %  Max: 99 %    No Data Recorded    Recent Labs      07/10/19   0213  07/11/19   0450   WBC  11.3*  15.9*   RBC  4.56*  4.21*   HEMOGLOBIN  14.8  13.2*   HEMATOCRIT  43.8  40.7*   MCV  96.1  96.7   MCH  32.5  31.4   MCHC  33.8  32.4*   RDW  49.6  49.5   PLATELETCT  169  150*   MPV  9.9  9.7     Recent Labs      07/10/19   0212  07/11/19   0450   SODIUM  137  137   POTASSIUM  4.2  4.6   CHLORIDE  106  107   CO2  24  22   GLUCOSE  229*  228*   BUN  39*  33*   CREATININE  0.84  0.98   CALCIUM  9.5  8.7     Recent Labs      07/10/19   0212   APTT  25.6   INR  1.15*           Intake/Output       07/10/19 0700 - 19 -  Total  Total       Intake    P.O.  300  300 600  --  -- --    P.O. 300 300 600 -- -- --    I.V.  1169.7  700 1869.7  --  -- --    Phenylephrine Volume 0 -- 0 -- -- --    Propofol Volume 18 -- 18 -- -- --    Volume (mL) (lactated ringers infusion) 725 0 725 -- -- --    Volume (mL) (0.9 % NaCl with KCl 20 mEq infusion) 426.7 700 1126.7 -- -- --    Other  120  -- 120  --  -- --    Medications (PO/Enteral Liquids) 120 -- 120 -- -- --    IV Piggyback  1430  410 1840  --  -- --    Volume (mL) (levETIRAcetam (KEPPRA) 500 mg in  mL IVPB) 1200 0 1200 -- -- --    Volume (mL) (ceFAZolin in dextrose (ANCEF) IVPB premix 2 g) 76.7 123.3 200  -- -- --    Volume (mL) (levETIRAcetam (KEPPRA) 500 mg in  mL IVPB) 153.3 286.7 440 -- -- --    Total Intake 3019.7 1410 4429.7 -- -- --       Output    Urine  900  1600 2500  --  -- --    Urine 200 -- 200 -- -- --    Output (mL) (Urethral Catheter Latex 16 Fr.) 700 1600 2300 -- -- --    Blood  75  -- 75  --  -- --    Est. Blood Loss 75 -- 75 -- -- --    Total Output 975 1600 2575 -- -- --       Net I/O     2044.7 -190 1854.7 -- -- --            Intake/Output Summary (Last 24 hours) at 07/11/19 0751  Last data filed at 07/11/19 0600   Gross per 24 hour   Intake          4429.69 ml   Output             2575 ml   Net          1854.69 ml            • dexamethasone  6 mg Q6HRS   • levETIRAcetam (KEPPRA) IV  500 mg Q12HRS   • Pharmacy Consult Request  1 Each PHARMACY TO DOSE   • MD ALERT...DO NOT ADMINISTER NSAIDS or ASPIRIN unless ORDERED By Neurosurgery  1 Each PRN   • ondansetron  4 mg Q4HRS PRN   • diphenhydrAMINE  25 mg Q6HRS PRN   • scopolamine  1 Patch Q72HRS PRN   • hydrALAZINE  10 mg Q HOUR PRN   • docusate sodium  100 mg BID   • senna-docusate  1 Tab Nightly   • senna-docusate  1 Tab Q24HRS PRN   • polyethylene glycol/lytes  1 Packet BID PRN   • magnesium hydroxide  30 mL QDAY PRN   • bisacodyl  10 mg Q24HRS PRN   • fleet  1 Each Once PRN   • 0.9 % NaCl with KCl 20 mEq 1,000 mL   Continuous   • oxyCODONE immediate release  10 mg Q3HRS PRN   • oxyCODONE immediate-release  5 mg Q3HRS PRN   • artificial tears  2 Drop Q8HRS   • acetaminophen  650 mg Q4HRS PRN   • insulin lispro  0.2 Units/kg/day TID AC    And   • insulin lispro  2-9 Units 4X/DAY ACHS    And   • glucose  16 g Q15 MIN PRN    And   • dextrose 10% bolus  250 mL Q15 MIN PRN   • atorvastatin  20 mg QHS   • therapeutic multivitamin-minerals  1 Tab DAILY   • metoprolol  25 mg TWICE DAILY       Assessment and Plan:  Hospital day #7 right frontal mass  POD #1 right frontal crani for tumor    Doing well  Path pending  No ASA or anticoagulants   Keppra    decadron - wean  D/c art line  Pt/ot/mobilize  NADINE  Q 4 hour neuro checks  MRI this am  Transfer out this afternoon if doing well

## 2019-07-11 NOTE — ANESTHESIA POSTPROCEDURE EVALUATION
Patient: Marcio More    Procedure Summary     Date:  07/10/19 Room / Location:  [facility] 05 / SURGERY [facility]    Anesthesia Start:  0824 Anesthesia Stop:  1115    Procedure:  RIGHT-SIDED CRANIOTOMY FOR TUMOR (Right Head) Diagnosis:  (RIGHT FRONTAL BRAIN MASS)    Surgeon:  Son Ruffin M.D. Responsible Provider:  Zak Hubbard M.D.    Anesthesia Type:  general ASA Status:  3          Final Anesthesia Type: general  Last vitals  BP   Blood Pressure : 142/91, NIBP: 123/84, Arterial BP: (!) 55/52    Temp   36.6 °C (97.8 °F)    Pulse   Pulse: 79, Heart Rate (Monitored): 79   Resp   13    SpO2   99 %      Anesthesia Post Evaluation    Patient location during evaluation: PACU  Patient participation: complete - patient participated  Level of consciousness: awake and alert  Pain score: 3    Airway patency: patent  Anesthetic complications: no  Cardiovascular status: hemodynamically stable  Respiratory status: acceptable  Hydration status: euvolemic    PONV: none           Nurse Pain Score: 7 (NPRS)

## 2019-07-11 NOTE — THERAPY
"Physical Therapy Evaluation completed.   Bed Mobility:  Supine to Sit: Moderate Assist  Transfers: Sit to Stand: Moderate Assist (x2)  Gait: Level Of Assist: Unable to Participate with Will Continue to Assess for Equipment Needs       Plan of Care: Will benefit from Physical Therapy 4 times per week  Discharge Recommendations: Equipment: Will Continue to Assess for Equipment Needs. Post-acute therapy Discharge to a transitional care facility for continued skilled therapy services.    See \"Rehab Therapy-Acute\" Patient Summary Report for complete documentation.     Pt is a 74 y/o male that presents to acute care w/ brain mass in R frontal lobe. S/p craniotomy. Pt shows impairments in strength, bed mobility, balance, and transfers. Pt required mod A to perform bed mobility. Posterior lean noted w/ sitting balance as pt require vc and tactile cues to maintain midline and lean forward. Posterior lean noted while standing, require vc shift weight anteriorly to toes as all pt's weight was in his heels. Pt attempted to march in place, but had difficulty w/ foot clearance. Will continue to follow pt while in house. Currently recommend post acute placement to address impairments.   "

## 2019-07-11 NOTE — ANESTHESIA TIME REPORT
Anesthesia Start and Stop Event Times     Date Time Event    7/10/2019 0824 Anesthesia Start     1115 Anesthesia Stop        Responsible Staff  07/10/19    Name Role Begin End    Zak Hubbard M.D. Anesth 0824 1115        Preop Diagnosis (Free Text):  Pre-op Diagnosis     RIGHT FRONTAL BRAIN MASS        Preop Diagnosis (Codes):  Diagnosis Information     Diagnosis Code(s):         Post op Diagnosis  Neoplasm of brain causing mass effect on adjacent structures (HCC)      Premium Reason  Non-Premium    Comments:

## 2019-07-11 NOTE — THERAPY
"Speech Language Therapy Clinical Swallow Evaluation completed.  Functional Status: Pt was alert, cooperative, family at bedside. Oral mech exam notable for L facial droop, reduced labiofacial ROM. Voice was significantly hypophonic and mildly hoarse. Pt was unable to modulate his vocal intensity to produce a louder voice, despite mod cues. Oral care completed prior to administration of PO. Pt was then presented with ice chips, NTL via tsp/cup, thins via tsp/cup, purees (thin/thick). Pharyngeal swallows were timely. Hyolaryngeal excursion palpated as complete. Pt occasionally swallowed 2x per bolus, concerning for pharyngeal retention. Wet/gurgly voice and throat clearing noted with thins via cup and pudding. No overt s/sx of aspiration noted with applesauce, NTL, ice chips. Recommend modifying diet to a Nectar thick full liquid with direct supervision for meals and single ice chips b/w meals after oral care.    Recommendations - Diet:  Nectar Thick Full Liquid - no pudding (ok for single ice chips b/w meals with 1:1 nsg supervision)                          Strategies: Direct supervision during meals, Assistance needed for meal tray set-up, No Straws and Head of Bed at 90 Degrees; cough/clear wet vocal quality after the swallow                          Medication Administration: Medication Administration : Crush all Medications in Puree  Plan of Care: Will benefit from Speech Therapy 3 times per week  Post-Acute Therapy: Recommend inpatient transitional care services for continued speech therapy services.        See \"Rehab Therapy-Acute\" Patient Summary Report for complete documentation.   "

## 2019-07-11 NOTE — THERAPY
"Occupational Therapy Evaluation completed.   Functional Status:  Max A LB dressing, Mod A UB dressing, Max A x2 supine<>sit, Mod A x2 sit<>stand  Plan of Care: Will benefit from Occupational Therapy 4 times per week  Discharge Recommendations:  Equipment: Will Continue to Assess for Equipment Needs. Post-acute therapy Recommend post-acute placement for additional occupational therapy services prior to discharge home. Patient can tolerate post-acute therapies at a 5x/week frequency.    See \"Rehab Therapy-Acute\" Patient Summary Report for complete documentation.      Pt is a 72 y/o male who presents to acute s/p right frontal crani for tumor. Pt eager to mobilize and participate w/ therapies.  Pt sat at EOB, required increased assist for UB dressing due to line management. Pt demo'd impaired balance, functional mobility, activity tolerance, andappears to have visual perceptual deficits on L side, unclear if it due to decreased attention of physical deficit impacting functional independence. Will continue to follow for OT services while in house.  Recommend post acute placement.   "

## 2019-07-11 NOTE — CARE PLAN
Problem: Bowel/Gastric:  Goal: Normal bowel function is maintained or improved  Outcome: PROGRESSING SLOWER THAN EXPECTED  Pt has refused bowel medications the last two doses. Last BM was two days ago. Education will be provided and the pt will be encouraged to have a BM today.    Problem: Knowledge Deficit  Goal: Knowledge of the prescribed therapeutic regimen will improve  Outcome: PROGRESSING AS EXPECTED  Patient and family are very involved in care. Pt's wife clarifies when not understanding.

## 2019-07-11 NOTE — PROGRESS NOTES
Skin check performed and skin checked under all of the following equipment:    - SCD's  - Ortiz catheter and tubing  - SpO2 probe  - Lam Hose  - Blood pressure cuff  - Nasal cannula  - Sacrum mepilex  - Unable to assess under head wrap per MD order to leave dressing in place. Scant amount of drainage noted, dressing clean dry and intact otherwise.    No skin breakdown or redness noted. Pt interventions include:  - Turning the patient q 2hours  - Using pillows to pad under extremities  - Mobilizing patient to sit in chair

## 2019-07-11 NOTE — PROGRESS NOTES
Hospital Medicine Daily Progress Note    Date of Service  7/11/2019    Chief Complaint  73 y.o. male admitted 7/5/2019 with confusion.    Hospital Course    Mr. More has a history of prostate cancer status post resection, hypertension, atrial fibrillation.  Patient presented to the emergency room on 7/5/2019 with weakness and increased fatigue.  Evaluation at the outside hospital emergency room demonstrated a 4.5 cm right frontal lobe mass.  Patient was transferred to [facility] for higher level of care and neurosurgery coverage.  On 7/10/2019 he underwent right-sided craniotomy.  He was transferred to the ICU postoperatively.        Interval Problem Update  Surgery yesterday, transferred to the ICU afterwards  Patient is a little lethargic, he can answer orientation questions appropriately but then gets confused and has to be reoriented  In AFIB with PVC's, rate 80's-115's  On 3L, doesn't feel short of breath  FSBG's 205-315     Consultants/Specialty  Critical Care, I discussed the patient's condition with Dr. Jorge this morning on ICU Rounds  Neurosurgeryt    Code Status  Full Code    Disposition  ICU for now, may be able to transfer to neurosurgical floor later today or tomorrow    Review of Systems  Review of Systems   Constitutional: Positive for malaise/fatigue. Negative for chills.   Respiratory: Negative for cough, hemoptysis and sputum production.    Cardiovascular: Negative for chest pain, palpitations and orthopnea.   Gastrointestinal: Negative for nausea and vomiting.   Skin: Negative for itching and rash.   Neurological: Positive for weakness. Negative for dizziness.   All other systems reviewed and are negative.       Physical Exam  Temp:  [36.3 °C (97.4 °F)-36.6 °C (97.9 °F)] 36.6 °C (97.8 °F)  Pulse:  [] 95  Resp:  [12-28] 19  BP: (142)/(91) 142/91  SpO2:  [88 %-99 %] 95 %    Physical Exam   Constitutional: He is oriented to person, place, and time. He appears  well-developed and well-nourished.   HENT:   Head: Normocephalic and atraumatic.   Eyes: Conjunctivae and EOM are normal. Right eye exhibits no discharge. Left eye exhibits no discharge.   Cardiovascular: Normal rate, regular rhythm and intact distal pulses.    No murmur heard.  2+ Radial Pulses  Brisk Capillary Refill   Pulmonary/Chest: Effort normal and breath sounds normal. No respiratory distress. He has no wheezes.   Abdominal: Soft. Bowel sounds are normal. He exhibits no distension. There is no tenderness. There is no rebound.   Musculoskeletal: Normal range of motion. He exhibits no edema.   Neurological: He is oriented to person, place, and time. No cranial nerve deficit.   Sleepy but oriented, recall of recent events not entirely intact, he has objectively 5 out of 5 strength in upper and lower extremities bilaterally   Skin: Skin is warm and dry. He is not diaphoretic. No erythema.   Skin is warm and well perfused   Psychiatric: He has a normal mood and affect.       Fluids    Intake/Output Summary (Last 24 hours) at 07/11/19 0745  Last data filed at 07/11/19 0600   Gross per 24 hour   Intake          4429.69 ml   Output             2575 ml   Net          1854.69 ml       Laboratory  Recent Labs      07/10/19   0213  07/11/19   0450   WBC  11.3*  15.9*   RBC  4.56*  4.21*   HEMOGLOBIN  14.8  13.2*   HEMATOCRIT  43.8  40.7*   MCV  96.1  96.7   MCH  32.5  31.4   MCHC  33.8  32.4*   RDW  49.6  49.5   PLATELETCT  169  150*   MPV  9.9  9.7     Recent Labs      07/10/19   0212  07/11/19   0450   SODIUM  137  137   POTASSIUM  4.2  4.6   CHLORIDE  106  107   CO2  24  22   GLUCOSE  229*  228*   BUN  39*  33*   CREATININE  0.84  0.98   CALCIUM  9.5  8.7     Recent Labs      07/10/19   0212   APTT  25.6   INR  1.15*               Imaging  MR-BRAIN-WITH & W/O   Final Result         1.  Recent right frontal craniotomy for resection of intra-axial brain neoplasm.      2.  Large postsurgical defect in the right anterior  frontal lobe which contains a small amount of hemorrhagic debris. Further there is evidence of small irregular areas of peripheral enhancement adjacent to the anterior and medial margins of the    resection site which may represent postoperative change or residual neoplasm.      3.  There is a marked amount of increased T2 signal intensity surrounding the postoperative site involving the right frontal and right temporal white matter and also the left frontal periventricular white matter. This likely represents residual neoplasm.      4.  Marked bifrontal pneumocephalus. There are small postoperative subdural seroma deep to the craniotomy site.      5.  Persistent effacement of the right frontal horn and body the right lateral ventricle with 8 mm of right to left midline shift the ventricular system.      DX-CHEST-PORTABLE (1 VIEW)   Final Result      Placement left subclavian central line with tip at the origin of the SVC.   No pneumothorax.   Bilateral atelectasis with edema not excluded.      EC-ECHOCARDIOGRAM COMPLETE W/O CONT   Final Result      MR-STEALTH BRAIN WITH & W/O   Final Result      1.  Stealth localization for a large enhancing and hemorrhagic right anterior frontal intra-axial mass lesion most consistent with glioblastoma.   2.  See MRI brain full report also from today for further discussion.      MR-BRAIN-WITH & W/O   Final Result      1.  Large hemorrhagic and enhancing lobulated right anterior frontal intra-axial mass lesion associated with severe vasogenic edema. Findings are most consistent with high-grade primary brain tumor such as glioblastoma multiforme or less likely an    unusually large hemorrhagic solitary brain metastasis.   2.  Right to left shift of midline structures.   3.  Mild supratentorial white matter disease in the left cerebral hemisphere.   4.  Indeterminate tiny focus of mildly bright diffusion signal in the right posterior frontal deep white matter which could represent an  acute or recent subacute lacunar infarct.      OUTSIDE IMAGES-CT HEAD   Final Result           Assessment/Plan  * Intracranial mass- (present on admission)   Assessment & Plan    Large right frontal 4.5cm mass  Patient is status post craniotomy and resection  Pathology pending  Continue steroids continue keppra       Hyperglycemia- (present on admission)   Assessment & Plan    Uncontrolled with hyperglycemia, his hemoglobin A1c is at 9.5  Add Lantus, intensify sliding scale       CAD (coronary artery disease)- (present on admission)   Assessment & Plan    Continue atorvastatin  No antiplatelet therapy until cleared by neurosurgery     Hypertension- (present on admission)   Assessment & Plan    Currently normotensive, continued on metoprolol  He may need an additional agent, continue to monitor     Atrial fibrillation (HCC)- (present on admission)   Assessment & Plan    Continue metoprolol, holding anticoagulation due to neurosurgical procedure     History of prostate cancer- (present on admission)   Assessment & Plan    Status post surgery approximately 2 years     Dyslipidemia- (present on admission)   Assessment & Plan    Atorvastatin     Chronic back pain- (present on admission)   Assessment & Plan    Chronic  Complicated by spinal stenosis and peripheral neuropathy.           VTE prophylaxis: SCD's

## 2019-07-11 NOTE — OP REPORT
DATE OF SERVICE:  07/10/2019    PREOPERATIVE DIAGNOSIS:  Large right-sided intraaxial frontal mass.    POSTOPERATIVE DIAGNOSIS:  Large right-sided intraaxial frontal mass.    PROCEDURE:    1.  Right-sided pterional craniotomy for resection of brain tumor.  2.  Use of operative microscope for microdissection.    SURGEON:  Son Ruffin MD    ASSISTANT:  CAT oFrte    ANESTHESIA:  General.    COMPLICATIONS:  None.    ESTIMATED BLOOD LOSS:  100 mL.    DESCRIPTION OF PROCEDURE:  Patient was brought to the operating room,   identified in the usual fashion.  General endotracheal anesthesia was induced   by the anesthesia team.  Patient was then placed supine on the operating room   table.  All pressure points meticulously padded.  A bump was placed under the   patient's right shoulder.  Head was turned toward the left, exposing the right   frontotemporal area.  He was then placed in Sugita 4-point pin fixation to   the appropriate tightness.  We then used surgical clippers to clip the   patient's hair.  We marked a curvilinear pterional incision on the right side.    We marked midline as well.  He was then prepped and draped in the usual   sterile fashion.  Local anesthesia was infiltrated in the subcutaneous tissue.    A #10 blade was used to incise the skin.  Dissection was carried down to   bone using Bovie electrocautery.  Retractors were put in place.  Naif clips   were placed on the scalp.  We then used Bovie electrocautery to open up the   temporalis muscle and fascia and reflected them anteriorly with the bone flap.    Sugita fishhooks were then placed on the skin and temporalis muscles   reflected anteriorly.  There was a rolled up sponge placed underneath the skin   flap to prevent excessive kinking.  We then placed 3 bur holes, 2 just   underneath the superior temporal line and then 1 frontally.  The dura was   densely adherent to the bone and we were unable to separate it with a  Penfield   1 and 3 as we typically do.  We then turned a standard craniotomy flap.  As   expected, the dura was lacerated in multiple locations and actually came off   with the flap itself.  There was no injury to the underlying parenchyma.    Copious amounts of antibiotic irrigation were used to wash out the wound.    FloSeal with gentle tamponade was used for hemostasis.  We then identified the   inferior frontal gyrus and identified the floor of the anterior fossa.  A   corticectomy was made in the inferior frontal gyrus anteriorly and then at   about depth of approximately 1 cm, we encountered tumor.  This appeared to be   intraaxial.  There was not a good plane.  It was many locations purplish and   with some necrosis.  We did not see any thrombosed vessels.  We   circumferentially went around the tumor cavity and multiple pieces were sent   off for both frozen and permanent pathology.  Frozen did confirm that it was   tumor, but the differential diagnosis was metastatic lesion versus lymphoma   versus GBM.  We had an excellent resection anteriorly, superiorly, inferiorly   and posteriorly, medially; however, we had a small opening into the ventricle   and at this point, we elected to stop any more medially than dislocation given   that we had resected virtually all the tumor and we were at midline.  Copious   amounts of antibiotic irrigation were used to wash out the wound.  FloSeal   with gentle tamponade and thrombin-soaked Gelfoam with gentle tamponade were   used for hemostasis.  Once we had meticulous hemostasis, these were all   removed.  We then lined the cavity with Surgicel.  Duragen was placed over the   dural defect.  The bone flap was then replaced with titanium plates and   screws.  Galea and temporalis muscle and fascia were closed with 0 Vicryl.    Skin was closed with staples.  Then, we used a compressive head wrap after we   took him out of pins.  There were no complications.  He awakened and  was   transferred to the recovery room in stable condition where he was found to   have a nonfocal exam.       ____________________________________     DIAN JANE MD    CPD / NTS    DD:  07/11/2019 10:42:06  DT:  07/11/2019 13:37:56    D#:  [id number]  Job#:  [id number]

## 2019-07-11 NOTE — CARE PLAN
Problem: Safety  Goal: Will remain free from injury  Bed in low locked position, room near nurses station, bed alarm on.     Problem: Pain Management  Goal: Pain level will decrease to patient's comfort goal  Pt medicated per MAR and active MD order

## 2019-07-12 PROBLEM — E87.1 HYPONATREMIA: Status: ACTIVE | Noted: 2019-01-01

## 2019-07-12 NOTE — PROGRESS NOTES
Hospital Medicine Daily Progress Note    Date of Service  7/12/2019    Chief Complaint  73 y.o. male admitted 7/5/2019 with confusion.    Hospital Course    Mr. More has a history of prostate cancer status post resection, hypertension, atrial fibrillation.  Patient presented to the emergency room on 7/5/2019 with weakness and increased fatigue.  Evaluation at the outside hospital emergency room demonstrated a 4.5 cm right frontal lobe mass.  Patient was transferred to [facility] for higher level of care and neurosurgery coverage.  On 7/10/2019 he underwent right-sided craniotomy.  He was transferred to the ICU postoperatively.        Interval Problem Update  7/11MRI 90% tumor resection  A&O x4  ISAAC but weak  +numbness tingling  Pain around eye/temple region  Afib rate controlled  BP good, <160  Tmax 100.2  Speech NTFL diet yesterday  Last bm 7/9  Condom cath 1400 out  PIV  Sugars in 200s, on decadron, tapering down  WBC up from steroids    Consultants/Specialty  Intensivist  Neurosurgery  Oncology    Code Status  Full Code    Disposition  Transfer to neuro floor today    D/W tx team on rounds      Review of Systems  Review of Systems   Constitutional: Negative for chills and fever.        Hungry for regular diet   Eyes: Negative for blurred vision.   Respiratory: Negative for cough and shortness of breath.    Cardiovascular: Negative for chest pain and palpitations.   Gastrointestinal: Negative for abdominal pain, nausea and vomiting.   Genitourinary: Negative for dysuria.   Musculoskeletal: Negative for myalgias.   Neurological: Positive for headaches (with standing). Negative for dizziness and focal weakness.   All other systems reviewed and are negative.       Physical Exam  Temp:  [37 °C (98.6 °F)-37.9 °C (100.2 °F)] 37.5 °C (99.5 °F)  Pulse:  [] 86  Resp:  [10-22] 16  SpO2:  [91 %-99 %] 97 %    Physical Exam   Constitutional: He is oriented to person, place, and time. He appears  well-developed and well-nourished.   HENT:   Long set of staples right frontal area, c/d/i   Cardiovascular: Normal rate, regular rhythm and normal heart sounds.    No murmur heard.  Pulmonary/Chest: Effort normal and breath sounds normal. No respiratory distress. He has no wheezes. He has no rales.   Abdominal: Soft. Bowel sounds are normal. He exhibits no distension. There is no tenderness.   Musculoskeletal: Normal range of motion. He exhibits no edema.   Neurological: He is alert and oriented to person, place, and time.   Skin: Skin is warm and dry. No erythema.   Nursing note and vitals reviewed.      Fluids    Intake/Output Summary (Last 24 hours) at 07/12/19 0748  Last data filed at 07/12/19 0600   Gross per 24 hour   Intake          1541.67 ml   Output             2950 ml   Net         -1408.33 ml       Laboratory  Recent Labs      07/10/19   0213  07/11/19   0450  07/12/19   0430   WBC  11.3*  15.9*  19.8*   RBC  4.56*  4.21*  4.56*   HEMOGLOBIN  14.8  13.2*  14.4   HEMATOCRIT  43.8  40.7*  44.0   MCV  96.1  96.7  96.5   MCH  32.5  31.4  31.6   MCHC  33.8  32.4*  32.7*   RDW  49.6  49.5  49.4   PLATELETCT  169  150*  160*   MPV  9.9  9.7  10.1     Recent Labs      07/10/19   0212  07/11/19   0450  07/12/19   0430   SODIUM  137  137  132*   POTASSIUM  4.2  4.6  4.7   CHLORIDE  106  107  102   CO2  24  22  24   GLUCOSE  229*  228*  257*   BUN  39*  33*  31*   CREATININE  0.84  0.98  0.82   CALCIUM  9.5  8.7  9.5     Recent Labs      07/10/19   0212   APTT  25.6   INR  1.15*               Imaging  MR-BRAIN-WITH & W/O   Final Result         1.  Recent right frontal craniotomy for resection of intra-axial brain neoplasm.      2.  Large postsurgical defect in the right anterior frontal lobe which contains a small amount of hemorrhagic debris. Further there is evidence of small irregular areas of peripheral enhancement adjacent to the anterior and medial margins of the    resection site which may represent  postoperative change or residual neoplasm.      3.  There is a marked amount of increased T2 signal intensity surrounding the postoperative site involving the right frontal and right temporal white matter and also the left frontal periventricular white matter. This likely represents residual neoplasm.      4.  Marked bifrontal pneumocephalus. There are small postoperative subdural seroma deep to the craniotomy site.      5.  Persistent effacement of the right frontal horn and body the right lateral ventricle with 8 mm of right to left midline shift the ventricular system.      DX-CHEST-PORTABLE (1 VIEW)   Final Result      Placement left subclavian central line with tip at the origin of the SVC.   No pneumothorax.   Bilateral atelectasis with edema not excluded.      EC-ECHOCARDIOGRAM COMPLETE W/O CONT   Final Result      MR-STEALTH BRAIN WITH & W/O   Final Result      1.  Stealth localization for a large enhancing and hemorrhagic right anterior frontal intra-axial mass lesion most consistent with glioblastoma.   2.  See MRI brain full report also from today for further discussion.      MR-BRAIN-WITH & W/O   Final Result      1.  Large hemorrhagic and enhancing lobulated right anterior frontal intra-axial mass lesion associated with severe vasogenic edema. Findings are most consistent with high-grade primary brain tumor such as glioblastoma multiforme or less likely an    unusually large hemorrhagic solitary brain metastasis.   2.  Right to left shift of midline structures.   3.  Mild supratentorial white matter disease in the left cerebral hemisphere.   4.  Indeterminate tiny focus of mildly bright diffusion signal in the right posterior frontal deep white matter which could represent an acute or recent subacute lacunar infarct.      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 Ferrer was consulted today  Continuing steroids and keppra  NSG following       Hyperglycemia- (present on admission)   Assessment & Plan    Uncontrolled with hyperglycemia, his hemoglobin A1c is 9.5  Added Lantus yesterday, sliding scale  On decadron; watch sugars with tapering down steroids       CAD (coronary artery disease)- (present on admission)   Assessment & Plan    Continue lipitor  No antiplatelet therapy until cleared by neurosurgery     Hypertension- (present on admission)   Assessment & Plan    Normotensive, continued on metoprolol; staying under 160       Atrial fibrillation (HCC)- (present on admission)   Assessment & Plan    Continue metoprolol, holding anticoagulation due to surgery  Rate controlled     History of prostate cancer- (present on admission)   Assessment & Plan    Status post surgery approximately 2 years ago     Dyslipidemia- (present on admission)   Assessment & Plan    Atorvastatin     Chronic back pain- (present on admission)   Assessment & Plan    Chronic but controlled  Complicated by spinal stenosis history and peripheral neuropathy.           VTE prophylaxis: SCDs

## 2019-07-12 NOTE — PROGRESS NOTES
Skin assessment completed:    Devices in use:  -SHAMA Hose- assessed and repositioned- indentations blanching  -SCD  -BP cuff   -PIV x2  -Surgical incision JERAD per Dr. Ruffin. Area cleansed with soap/water.   -Sacrum red but blanches- Mepilex in place  -Heels red but blanching- Floated on pillows   -Elbows red but blanching- Floated on pillows  -Posterior head red but blanching- Waffle pillow in use      q2 hr repositioning of patient and devices. Hips shifted when in chair q2. Waffle on seat when OOB. Patient educated to weight shift in between q2 hr turns.

## 2019-07-12 NOTE — THERAPY
"Speech Language Therapy dysphagia treatment completed.   Functional Status:  Pt and family eager for diet upgrade. Pt able to move up in bed. Pt feeding self 1/2-1 tsp amounts of applesauce, pudding, and taking sips of NTL. Possible pharyngeal residue is cervically auscultated. Pt with throat clearing and mild muffled sounding voice post pudding. Pt requiring max tactile and verbal cues for a 2nd swallow. Pt demonstrating probable right brain cognitive deficits with decreased awareness, attention to task, and mild verbosity/tangental speech. Pt given single ice chips per family/pt request. Educ provided to pt, his family, CNA, and the nurs regarding pt not at the level for diet upgrade. Cont NTL full with superv and use of posted and recommended swallow strategies. Single and small ice chips with staff only with limit of 5 per hour. Next session, trial mashed potatoes as approp  Recommendations: see above.  Plan of Care: Will benefit from Speech Therapy 5 times per week  Post-Acute Therapy: dysphagia and cognition. Thanks, Mauricio    See \"Rehab Therapy-Acute\" Patient Summary Report for complete documentation.     "

## 2019-07-12 NOTE — DIETARY
Nutrition Services: SLP called regarding supplements    SLP called RD office stating that pt is very hungry, however not yet ready for diet advancement beyond full liquid / nectar thick. Family requesting Boost as snacks to help fulfill appetite. SLP states blood glucose has been high secondary to steroids, no hx of diabetes on records and hyperglycemia attributed to steroids per MD notes. Reviewed labs, and decided to provide thick Boost Glucose Control TID in between meals to avoid contributing to high blood glucose further.     Plan/Recommend:   1. Boost Glucose Control TID thickened in between meals to help with hunger on full liquid diet  2. Pt with hyperglycemia secondary to steroids, SSI in MAR, may also benefit from diabetic diet restriction to further encourage glycemic control    RD available prn

## 2019-07-12 NOTE — PROGRESS NOTES
2 RN skin check complete with BILLIE Watson.  Devices in place jennifer hose, SCD, O2 tubing, condom cath   Skin assessed under the following devices listed above.   Preventative measures in place including Device reposition, Q2 hour turns,mepilex .  Following areas of concern:   Surgical dressing noted to head with scant drainage.    Wound consult placedYES/NO: no    Wound reported YES/NO: na  Appropriate LDAs opened YES/NO: yes

## 2019-07-12 NOTE — CARE PLAN
Problem: Safety  Goal: Will remain free from injury  Bed in low locked position room near nurses station, call light and personal belongings within reach.     Problem: Pain Management  Goal: Pain level will decrease to patient's comfort goal  Pt medicated per MAR and active MD order

## 2019-07-12 NOTE — PROGRESS NOTES
"Family at bedside. Requested RN call Onc MD to discuss new diagnosis of \"Lymphoma.\" RN paged MD. Per MD she is at [facility] and will round with patient and family tomorrow at 8 AM. Family is disappointed but understands.   "

## 2019-07-12 NOTE — CARE PLAN
Problem: Discharge Barriers/Planning  Goal: Patient's continuum of care needs will be met  Outcome: PROGRESSING SLOWER THAN EXPECTED    Intervention: Collaborate with Transitional Care Team and Interdisciplinary Team to meet discharge needs  Per PT/OT patient requires post acute rehabilitation. RN updated MD that patient will require a rehabilitation consult.       Problem: Pain Management  Goal: Pain level will decrease to patient's comfort goal  Outcome: PROGRESSING AS EXPECTED    Intervention: Follow pain managment plan developed in collaboration with patient and Interdisciplinary Team  Assessed for pain and medicated per the MAR

## 2019-07-12 NOTE — THERAPY
"Pt demonstrated gradual improvement in functional mobilty, balance, and tranfers. Demonstrated a shuffled gait with continued difficulty with foot clearance. Provided pt with FWW to assist mobility with nursing staff as pt has used one in the past. pt will continue to benefit from acute PT interventions.    Physical Therapy Treatment completed.   Bed Mobility:  Supine to Sit: Moderate Assist  Transfers: Sit to Stand: Minimal Assist (x2)  Gait: Level Of Assist: Moderate Assist with Hand held assist EOB -> chair       Plan of Care: Will benefit from Physical Therapy 4 times per week  Discharge Recommendations: Equipment: Will Continue to Assess for Equipment Needs. Post-acute therapy:  Continue to recommend post acute placement prior to DC home. Appears pt would be able to tolerate intensive, post acute therapy      See \"Rehab Therapy-Acute\" Patient Summary Report for complete documentation.       "

## 2019-07-12 NOTE — CONSULTS
DATE OF SERVICE:  07/12/2019    HEMATOLOGY AND ONCOLOGY CONSULTATION    ADMITTING PHYSICIAN:  Hina Starr MD    REQUESTING PHYSICIAN:  Son Ruffin MD    CONSULTING PHYSICIAN:  Patt Ferrer MD    REASON FOR CONSULTATION:  Newly diagnosed high grade CNS lymphoma.    HISTORY OF PRESENT ILLNESS:  The patient is a 73-year-old male who has a   history of coronary artery disease, atrial fibrillation, prostate cancer   status post resection in the past, presented to the hospital with complaints   of weakness and fatigue progressively getting worse for the last 3-4 weeks.    According to the patient, while he was working on his [place]-acre property, he had   weakness that resulted in almost a fall from his tractor and his family   brought him to the ER.  On presentation to the ER at [facility],   he had a CT scan that showed a 4.5 cm right lower lobe mass and he was   transferred to [facility] for further evaluation.  He had an MRI of the brain that   showed a large hemorrhagic and enhancing lobulated right anterior frontal   intraaxial mass associated with severe vasogenic edema.  Findings mainly   consistent with high grade primary brain tumor such as glioblastoma.  Right to   left shift of midline structures.  Indeterminate tiny focus of mildly bright   diffuse signal in the right posterior frontal deep white matter, which could   be from the lacunar infarcts.  The patient was seen by neurosurgical team and   was taken to the OR and had a complete resection of the mass on 07/11/2019.    Pathology came back on 07/10/2019.  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  Oncology consult is obtained for further evaluation.  The patient   is postop day #1.  He is very alert and oriented x3.  He denied of any seizure   activity.  No focal weakness prior to his hospitalization.  He just felt   fatigued all over.  No fevers, chills or night sweats.  No abdominal pain,   nausea, vomiting or  diarrhea.  No vision changes.    PAST MEDICAL HISTORY:  Atrial fibrillation, coronary artery disease, high   cholesterol, hypertension, myocardial infarction, sleep apnea, history of   prostate cancer, status post resection.    PAST SURGICAL HISTORY:  Appendectomy, cardiac surgery, lumbar laminectomy,   diskectomy, prostate resection.    ALLERGIES:  GABAPENTIN.    MEDICATIONS:  Currently, he is on dexamethasone, insulin, dextrose, Keppra,   Colace, Tylenol, oxycodone, Benadryl, MiraLax, Loreta-Colace, Dulcolax, Lipitor.    SOCIAL HISTORY:  The patient denies any history of smoking.  No heavy alcohol   intake.  No drugs.  He is  and lives with his wife.    FAMILY HISTORY:  Noncontributory.  Father was diabetic.  No family history of   any cancer.    REVIEW OF SYSTEMS:  I did do a 10-point system review, which is negative   except as mentioned above.  CONSTITUTIONAL:  Positive fatigue, positive generalized weakness.  No fevers,   chills or night sweats.  RESPIRATORY:  No cough or shortness of breath.  CARDIOVASCULAR:  No chest pain or palpitations.  GASTROINTESTINAL:  No abdominal pain, nausea, vomiting or diarrhea.  NEUROLOGY:  No headaches, no seizure activity, no focal weakness.  PSYCHIATRIC:  No depression or anxiety.    PHYSICAL EXAMINATION:  GENERAL:  He is alert, oriented x3.  VITAL SIGNS:  Temperature was 37.5, pulse was 86, respiratory rate was 16,   blood pressure 130/80, on room air saturating about 97%.  HEENT:  Pupils are equal, reactive to light.  Extraocular muscles are intact.    Anicteric.  Craniotomy scar healing well, staples in place.  CHEST:  Clear to auscultation bilaterally symmetrical.  CARDIOVASCULAR:  S1, S2 heard.  Regular rate and rhythm.  ABDOMEN:  Soft, nontender, nondistended.  Positive bowel sounds.  EXTREMITIES:  No edema bilaterally, no cyanosis or clubbing.  PSYCHIATRIC:  Mood appropriate and normal affect.  NEUROLOGY:  Moving all 4 extremities, cranial nerves intact, normal  reflexes.    LABORATORY DATA:  WBC count is 19.8, hemoglobin 14.4, platelet count is 160.    Sodium 132, potassium 4.7, BUN was 31, creatinine was 0.8, calcium was 9.5.    Normal LFTs.  Pathology on 07/10/2019 showed high-grade B cell lymphoma, CD20   positive, BCL2 positive, BCL6 positive, Ki-67 89-90%, additional studies FISH   are still pending.    IMAGING STUDIES:  Echocardiogram showed ejection fraction 60%.    On 07/11/2019, MRI of the brain showed recent right frontal craniotomy for   resection of the intraaxial brain neoplasm.  Large postsurgical defect.    Further, there is evidence of small irregular areas of peripheral enhancement   adjacent to the anterior and the medial margins.  There is a marked amount of   increased T2 signal intensity surrounding the postoperative site.    ASSESSMENT AND PLAN:  The patient is a 73-year-old male who has been in   excellent performance status, found to have right frontal mass on a CT scan   and MRI, underwent a resection on 07/10/2019, showed high grade B cell   lymphoma.  I had a long discussion with the patient regarding the diagnosis   and explained to him that once he recovers from his surgery, he will need an   additional workup with a PET CT scan, bone marrow biopsy, lumbar puncture for   evaluation of the CSF fluid, ultrasound of the testicle and spinal MRI   including the blood work, CBC, CMP, hepatitis B and HIV panel.  The standard   approach would be treating with systemic therapy, high-dose methotrexate plus   alkylating agent Temodar in combination with Rituxan since CD 20 + , have shown increased response rates and high   complete remission.  I did explain to him this is a treatable condition not curable.  I   have placed a call to speak to his wife, Mell, but could not reach her today   and I will get hold of family to discuss plan.  Our team will follow him   peripherally, and once he recovers from a surgical point of view, we will order  the staging  workup and plan to start treatment 3-4 weeks from his surgical date.   - He will see me in my office for follow up and finalize on plan    Thank you for allowing me to participate in his care.  Please call if any   questions arise.       ____________________________________     Patt Ferrer MD SR / KATIE    DD:  07/12/2019 09:28:37  DT:  07/12/2019 13:44:28    D#:  [id number]  Job#:  [id number]

## 2019-07-12 NOTE — THERAPY
"Occupational Therapy Evaluation completed.   Functional Status:  Mod A Supine>sit, Min A x2 (for safety and lines) STS and SPT txf to chair, Min A seated grooming, Mod A LB dressing  Plan of Care: Will benefit from Occupational Therapy 4 times per week  Discharge Recommendations:  Equipment: Will Continue to Assess for Equipment Needs. Post-acute therapy Recommend post-acute placement for additional occupational therapy services prior to discharge home. Patient can tolerate post-acute therapies at a 5x/week frequency.    See \"Rehab Therapy-Acute\" Patient Summary Report for complete documentation.      Pt appeared more alert on this date than initial eval date. Pt continue to be eager to mobilize and return to PLOF. Pt primarily limited by headache that impacts him more when he is moving. Pt demo'd impaired sequencing, balance, functional mobility and activity tolerance impacting functional independence. Will continue to follow for Acute OT services.   "

## 2019-07-12 NOTE — ASSESSMENT & PLAN NOTE
Likely SIADH versus cerebral salt wasting, both will contribute to CNS edema.  Check urine sodium and urine osmolality  Fluid restriction versus sodium repletion as clinically indicated

## 2019-07-12 NOTE — PROGRESS NOTES
0800: Patient presented in IDT rounds. Discussed NOC events, patient presentation, vital signs, labs, medications and plan of care. Transfer orders to be placed.    1050: RN phoned wife Mell to update on POC and transfer orders.     1120: Transfer orders released.

## 2019-07-12 NOTE — PROGRESS NOTES
Report called to RN Nguyễn receiving 169.  Wife and daughter state that all patient belongings are present and being transported with patient. Patient transported off unit with this RN.

## 2019-07-12 NOTE — DISCHARGE PLANNING
Pt was discussed in ITD rounds. Dr. Viera stated pt would benefit from IRF. Referral needs to be placed. Pt has transfer orders.

## 2019-07-12 NOTE — PROGRESS NOTES
Critical Care Progress Note    Date of admission  7/5/2019    Chief Complaint  73 y.o. male admitted 7/5/2019 with Right frontal mass    Hospital Course    73 y.o. male who presented 7/5/2019 with confusion, generalized weakness x1 week.  Patient with history of HTN, HLD, prostate CA status post resection, chronic A. fib on Pradaxa.  Patient initially presented to OSH when found to have right frontal lesion and subsequently transferred to [facility] for higher level of care.  Upon arrival patient noted to have large hemorrhagic and enhancing lobulated right anterior frontal intra-axial mass with severe vasogenic edema suspicious for high-grade GBM.  Patient was initiated on Decadron for the vasogenic edema and subsequently taken today 7/10 for mass resection.  At this time he is fairly confused but does follow commands most information taken from family at bedside.      Interval Problem Update  Reviewed last 24 hour events:              - acute events overnight              - Tm: Afebrile              - HR: Afib 80-100s              - SBP: 120-130s Goal SBP<160              - Neuro: Awake alert slight dysarthria, weak MAEW 3/5 in all.              - GI: Nectar thick              - UOP: adequate              - Ortiz: condom cath              - Lines: PIV              - PPx: no VTE no GI              - CXR (personally reviewed):               - Antibiotic Day    WBC 19.8 (increased)  Sodium 132 potassium 4.7 glucose 257    Tm 100.2  +1.8L over last 24hr  No cxr this am  Hb 13    NS 20K @ 100  Decadron 10 q6  keppra 500 bid    95% on 3L  Last bm 7/9    Review of Systems  Review of Systems   Constitutional: Negative for chills and fever.   HENT: Negative for congestion.    Eyes: Negative for blurred vision and double vision.   Respiratory: Negative for cough, sputum production and shortness of breath.    Cardiovascular: Negative for chest pain and palpitations.   Gastrointestinal: Negative for abdominal pain, nausea and  vomiting.   Neurological: Positive for headaches (Postoperative). Negative for focal weakness and weakness.   Psychiatric/Behavioral: Negative for depression.   All other systems reviewed and are negative.       Vital Signs for last 24 hours   Temp:  [37 °C (98.6 °F)-37.9 °C (100.2 °F)] 37.8 °C (100 °F)  Pulse:  [] 118  Resp:  [10-22] 16  SpO2:  [91 %-98 %] 94 %    Hemodynamic parameters for last 24 hours       Respiratory Information for the last 24 hours       Physical Exam   Physical Exam   Constitutional: He appears well-developed and well-nourished.   HENT:   Nose: Nose normal.   Mouth/Throat: Oropharynx is clear and moist. No oropharyngeal exudate.   Eyes: Pupils are equal, round, and reactive to light. Conjunctivae are normal.   Neck: Neck supple.   Cardiovascular: Normal rate and intact distal pulses.  Exam reveals no gallop and no friction rub.    No murmur heard.  Pulmonary/Chest: He has no wheezes. He has no rales.   Abdominal: Soft. He exhibits no distension. There is no tenderness.   Musculoskeletal: He exhibits no edema.   Neurological: No cranial nerve deficit.   Spontaneously awake, word finding difficulties with some dysarthria.  Extraocular muscles are intact.  No facial asymmetry tongue protrudes along the midline palate elevates symmetrically.  Left upper extremity strength 4/5 remaining extremities are 5/5.  Sensation is intact to light touch   Skin: Skin is warm and dry.   Surgical incisions clean and well approximated no drainage or erythema.  Staples remain in place   Psychiatric: He has a normal mood and affect. His behavior is normal.   Pleasant   Nursing note and vitals reviewed.      Medications  Current Facility-Administered Medications   Medication Dose Route Frequency Provider Last Rate Last Dose   • dexamethasone (DECADRON) injection 6 mg  6 mg Intravenous TID Son Ruffin M.D.   6 mg at 07/12/19 1208   • insulin regular (HUMULIN R) injection 3-14 Units  3-14 Units  Subcutaneous 4X/DAY ACHS Stephan Barajas M.D.   12 Units at 07/12/19 1203    And   • glucose 4 g chewable tablet 16 g  16 g Oral Q15 MIN PRN Stephan Barajas M.D.        And   • DEXTROSE 10% BOLUS 250 mL  250 mL Intravenous Q15 MIN PRN Stephan Barajas M.D.       • levETIRAcetam (KEPPRA) 500 mg in  mL IVPB  500 mg Intravenous Q12HRS Angelita Landrum A.P.N.   Stopped at 07/12/19 0452   • Pharmacy Consult Request ...Pain Management Review 1 Each  1 Each Other PHARMACY TO DOSE MAYLIN Forte.P.N.       • MD ALERT...DO NOT ADMINISTER NSAIDS or ASPIRIN unless ORDERED By Neurosurgery 1 Each  1 Each Other PRN MAYLIN Forte.P.N.       • ondansetron (ZOFRAN) syringe/vial injection 4 mg  4 mg Intravenous Q4HRS PRN Angelita Landrum, A.P.N.       • diphenhydrAMINE (BENADRYL) injection 25 mg  25 mg Intravenous Q6HRS PRN Angelita Landrum A.P.N.       • scopolamine (TRANSDERM-SCOP) patch 1 Patch  1 Patch Transdermal Q72HRS PRN Angelita Landrum, A.P.N.       • hydrALAZINE (APRESOLINE) injection 10 mg  10 mg Intravenous Q HOUR PRN Angelita Landrum, A.P.N.       • docusate sodium (COLACE) capsule 100 mg  100 mg Oral BID Angelita Landrum A.P.N.   100 mg at 07/12/19 0438   • senna-docusate (PERICOLACE or SENOKOT S) 8.6-50 MG per tablet 1 Tab  1 Tab Oral Nightly Angelita Landrum A.P.N.   1 Tab at 07/11/19 2127   • senna-docusate (PERICOLACE or SENOKOT S) 8.6-50 MG per tablet 1 Tab  1 Tab Oral Q24HRS PRN Angelita Landrum A.P.N.       • polyethylene glycol/lytes (MIRALAX) PACKET 1 Packet  1 Packet Oral BID PRN Angelita Landrum, A.P.N.       • magnesium hydroxide (MILK OF MAGNESIA) suspension 30 mL  30 mL Oral QDAY PRN Angelita Landrum, A.P.N.       • bisacodyl (DULCOLAX) suppository 10 mg  10 mg Rectal Q24HRS PRN Angelita Landrum, A.P.N.       • fleet enema 133 mL  1 Each Rectal Once PRN Angelita Landrum, A.P.N.       • 0.9 % NaCl with KCl 20 mEq infusion   Intravenous Continuous Angelita RAJAN  Diallo, A.P.N.   Stopped at 07/11/19 0843   • oxyCODONE immediate release (ROXICODONE) tablet 10 mg  10 mg Oral Q3HRS PRN Angelita Landrum A.P.N.       • oxyCODONE immediate-release (ROXICODONE) tablet 5 mg  5 mg Oral Q3HRS PRN Angelita Landrum A.P.N.   5 mg at 07/11/19 1254   • artificial tears 1.4 % ophthalmic solution 2 Drop  2 Drop Both Eyes Q8HRS Angelita Landrum A.P.N.   2 Drop at 07/12/19 1341   • acetaminophen (TYLENOL) tablet 650 mg  650 mg Oral Q4HRS PRN Fahad Jorge M.D.   650 mg at 07/12/19 0430   • atorvastatin (LIPITOR) tablet 20 mg  20 mg Oral QHS Hina Starr M.D.   20 mg at 07/11/19 2127   • therapeutic multivitamin-minerals (THERAGRAN-M) tablet 1 Tab  1 Tab Oral DAILY Hina Starr M.D.   1 Tab at 07/12/19 0437   • metoprolol (LOPRESSOR) tablet 25 mg  25 mg Oral TWICE DAILY Hina Starr M.D.   25 mg at 07/12/19 0430       Fluids    Intake/Output Summary (Last 24 hours) at 07/12/19 1414  Last data filed at 07/12/19 1300   Gross per 24 hour   Intake             2490 ml   Output             2850 ml   Net             -360 ml       Laboratory          Recent Labs      07/10/19   0212  07/11/19   0450  07/12/19   0430   SODIUM  137  137  132*   POTASSIUM  4.2  4.6  4.7   CHLORIDE  106  107  102   CO2  24  22  24   BUN  39*  33*  31*   CREATININE  0.84  0.98  0.82   MAGNESIUM   --   2.1  2.1   CALCIUM  9.5  8.7  9.5     Recent Labs      07/10/19   0212  07/11/19   0450  07/12/19   0430   GLUCOSE  229*  228*  257*     Recent Labs      07/10/19   0213  07/11/19   0450  07/12/19   0430   WBC  11.3*  15.9*  19.8*   NEUTSPOLYS  82.50*   --    --    LYMPHOCYTES  5.90*   --    --    MONOCYTES  9.80   --    --    EOSINOPHILS  0.00   --    --    BASOPHILS  0.20   --    --      Recent Labs      07/10/19   0212  07/10/19   0213  07/11/19   0450  07/12/19   0430   RBC   --   4.56*  4.21*  4.56*   HEMOGLOBIN   --   14.8  13.2*  14.4   HEMATOCRIT   --   43.8  40.7*  44.0   PLATELETCT   --    169  150*  160*   PROTHROMBTM  15.0*   --    --    --    APTT  25.6   --    --    --    INR  1.15*   --    --    --        Imaging  MRI:   Reviewed   PRELIMINARY DIAGNOSIS:    A. Right frontal mass:         High-grade B cell lymphoma.  B. Right frontal mass for flow cytometry:         High-grade B cell lymphoma.  C. More right frontal mass:         High-grade B cell lymphoma.    Comment: This large cell lymphoma shows sheets of malignant B-cells  which are CD10- and BCL6+. The differential includes primary CNS  diffuse large B-cell lymphoma (DLBCL), and CNS involvement by a  peripheral DLBCL or double hit lymphoma. A final diagnosis will be  issued following additional immunohistochemical stains, flow cytometry,  and FISH studies.    Assessment/Plan  * Intracranial mass- (present on admission)   Assessment & Plan    Status post resection 7/10, pathology consistent with large cell lymphoma.    Final diagnosis awaiting immunohistochemistry, flow cytometry and FISH studies  Significant persistent vasogenic edema   Continue serial neurologic exams  Stat head CT with any decline in neurologic exam  No antiplatelet or anticoagulant therapy  Aspiration/seizure precautions  Follow-up pathology  Continue Decadron  Keppra 500 mg twice daily  Strict BP parameters to maintain SBP less than 160 with Cardene infusion and active titration  PT/OT/speech evaluations       Hyponatremia   Assessment & Plan    Likely SIADH versus cerebral salt wasting, both will contribute to CNS edema.  Check urine sodium and urine osmolality  Fluid restriction versus sodium repletion as clinically indicated     Hyperglycemia- (present on admission)   Assessment & Plan    Attributed to steroids  SSI     Hypertension- (present on admission)   Assessment & Plan    Continue metoprolol  Cardene infusion to maintain SBP less than 160     Atrial fibrillation (HCC)- (present on admission)   Assessment & Plan    Continue metoprolol  Keep K greater than 4 mag  greater than 2  Continue rate control  Restart any coagulation when okay with surgery     History of prostate cancer- (present on admission)   Assessment & Plan    Status post resection     Dyslipidemia- (present on admission)   Assessment & Plan    Continue statin          VTE:  Contraindicated  Ulcer: Not Indicated  Lines: None    I have performed a physical exam and reviewed and updated ROS and Plan today (7/12/2019). In review of yesterday's note (7/11/2019), there are no changes except as documented above.     Discussed patient condition and risk of morbidity and/or mortality with Hospitalist, Family, RN, RT, Pharmacy and Patient     This patient is medically complex with newly diagnosed brain tumor and metabolic derangements.  I have assessed and reassessed his neurologic status and hemodynamics.This patient remains at high risk for worsening CNS dysfunction from cerebral edema.

## 2019-07-13 NOTE — PROGRESS NOTES
Hospital Medicine Daily Progress Note    Date of Service  7/13/2019    Chief Complaint  73 y.o. male admitted 7/5/2019 with confusion.    Hospital Course    Mr. oMre has a history of prostate cancer status post resection, hypertension, atrial fibrillation.  Patient presented to the emergency room on 7/5/2019 with weakness and increased fatigue.  Evaluation at the outside hospital emergency room demonstrated a 4.5 cm right frontal lobe mass.  Patient was transferred to [facility] for higher level of care and neurosurgery coverage.  On 7/10/2019 he underwent right-sided craniotomy.  He was transferred to the ICU postoperatively.  Patient transferred to neuro floor on July 13, 2019 for further management.        Interval Problem Update  I evaluated and examined this patient at bedside.  He expressed that he has some mild headache.  Heart rate is under control.  Found to have blood glucose of 237  White personal count is trending down.  Hemoglobin remained stable.  Oncologist Dr. Ferrer evaluated him and made recommendations.  He found to have abdominal distention on physical exam.  I ordered x-ray abdomen to evaluate it further.  Consultants/Specialty  Intensivist  Neurosurgery  Oncology    Code Status  Full Code    Disposition  Transfer to neuro floor today    D/W tx team on rounds      Review of Systems  Review of Systems   Constitutional: Negative for chills and fever.        Hungry for regular diet   HENT: Negative for hearing loss.    Eyes: Negative for blurred vision and double vision.   Respiratory: Negative for cough and shortness of breath.    Cardiovascular: Negative for chest pain, palpitations and leg swelling.   Gastrointestinal: Negative for abdominal pain, heartburn, nausea and vomiting.   Genitourinary: Negative for dysuria and urgency.   Musculoskeletal: Negative for myalgias.   Neurological: Positive for headaches. Negative for dizziness, speech change and focal weakness.    Psychiatric/Behavioral: Negative for hallucinations.   All other systems reviewed and are negative.       Physical Exam  Temp:  [36.3 °C (97.3 °F)-37.8 °C (100 °F)] 36.3 °C (97.3 °F)  Pulse:  [] 80  Resp:  [14-22] 17  BP: (120-136)/(59-83) 120/59  SpO2:  [92 %-98 %] 93 %    Physical Exam   Constitutional: He is oriented to person, place, and time.   He sitting in bed without any acute distress.   HENT:   Head: Normocephalic and atraumatic.   Eyes: Pupils are equal, round, and reactive to light. Right eye exhibits no discharge. Left eye exhibits no discharge.   Neck: Normal range of motion. Neck supple.   Cardiovascular: Normal rate and regular rhythm.  Exam reveals no friction rub.    No murmur heard.  Pulmonary/Chest: Effort normal and breath sounds normal. No respiratory distress. He has no wheezes.   Abdominal: Soft. Bowel sounds are normal. He exhibits no distension. There is no tenderness. There is no rebound.   Musculoskeletal: Normal range of motion. He exhibits no edema or deformity.   Neurological: He is alert and oriented to person, place, and time. Coordination normal.   Staples present on the right side of the neck.     Skin: Skin is warm and dry. No rash noted. He is not diaphoretic. No erythema.   Psychiatric: He has a normal mood and affect. His behavior is normal.       Fluids    Intake/Output Summary (Last 24 hours) at 07/13/19 0807  Last data filed at 07/12/19 1300   Gross per 24 hour   Intake             1200 ml   Output              850 ml   Net              350 ml       Laboratory  Recent Labs      07/11/19   0450  07/12/19   0430  07/13/19   0404   WBC  15.9*  19.8*  14.3*   RBC  4.21*  4.56*  4.26*   HEMOGLOBIN  13.2*  14.4  13.9*   HEMATOCRIT  40.7*  44.0  41.6*   MCV  96.7  96.5  97.7   MCH  31.4  31.6  32.6   MCHC  32.4*  32.7*  33.4*   RDW  49.5  49.4  49.1   PLATELETCT  150*  160*  147*   MPV  9.7  10.1  10.1     Recent Labs      07/11/19   0450  07/12/19   0430  07/13/19   0409    SODIUM  137  132*  136   POTASSIUM  4.6  4.7  4.8   CHLORIDE  107  102  102   CO2  22  24  28   GLUCOSE  228*  257*  237*   BUN  33*  31*  31*   CREATININE  0.98  0.82  0.88   CALCIUM  8.7  9.5  8.5                   Imaging  MR-BRAIN-WITH & W/O   Final Result         1.  Recent right frontal craniotomy for resection of intra-axial brain neoplasm.      2.  Large postsurgical defect in the right anterior frontal lobe which contains a small amount of hemorrhagic debris. Further there is evidence of small irregular areas of peripheral enhancement adjacent to the anterior and medial margins of the    resection site which may represent postoperative change or residual neoplasm.      3.  There is a marked amount of increased T2 signal intensity surrounding the postoperative site involving the right frontal and right temporal white matter and also the left frontal periventricular white matter. This likely represents residual neoplasm.      4.  Marked bifrontal pneumocephalus. There are small postoperative subdural seroma deep to the craniotomy site.      5.  Persistent effacement of the right frontal horn and body the right lateral ventricle with 8 mm of right to left midline shift the ventricular system.      DX-CHEST-PORTABLE (1 VIEW)   Final Result      Placement left subclavian central line with tip at the origin of the SVC.   No pneumothorax.   Bilateral atelectasis with edema not excluded.      EC-ECHOCARDIOGRAM COMPLETE W/O CONT   Final Result      MR-STEALTH BRAIN WITH & W/O   Final Result      1.  Stealth localization for a large enhancing and hemorrhagic right anterior frontal intra-axial mass lesion most consistent with glioblastoma.   2.  See MRI brain full report also from today for further discussion.      MR-BRAIN-WITH & W/O   Final Result      1.  Large hemorrhagic and enhancing lobulated right anterior frontal intra-axial mass lesion associated with severe vasogenic edema. Findings are most consistent  with high-grade primary brain tumor such as glioblastoma multiforme or less likely an    unusually large hemorrhagic solitary brain metastasis.   2.  Right to left shift of midline structures.   3.  Mild supratentorial white matter disease in the left cerebral hemisphere.   4.  Indeterminate tiny focus of mildly bright diffusion signal in the right posterior frontal deep white matter which could represent an acute or recent subacute lacunar infarct.      OUTSIDE IMAGES-CT HEAD   Final Result           Assessment/Plan  * Intracranial mass- (present on admission)   Assessment & Plan    Large right frontal 4.5cm mass  Patient is status post craniotomy and resection on July 10, 2090.  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 Ferrer evaluated this patient and made recommendations.  Continuing steroids and keppra  Neurosurgery is following this patient.       Hyperglycemia- (present on admission)   Assessment & Plan    Uncontrolled with hyperglycemia, his hemoglobin A1c is 9.5  Added Lantus  Insulin sliding scale with hypoglycemia protocol.  Currently he is on Decadron that could cause increased blood glucose.         CAD (coronary artery disease)- (present on admission)   Assessment & Plan    Continue lipitor   No antiplatelet therapy until cleared by neurosurgery  No active chest pain.     Hypertension- (present on admission)   Assessment & Plan    Currently well controlled.  Continue to monitor         Atrial fibrillation (HCC)- (present on admission)   Assessment & Plan    Continue metoprolol, holding anticoagulation due to surgery  Currently rate controlled.  Continue to monitor.     History of prostate cancer- (present on admission)   Assessment & Plan    Status post surgery approximately 2 years ago     Dyslipidemia- (present on admission)   Assessment & Plan    Continue atorvastatin     Chronic back pain- (present on admission)   Assessment & Plan    Chronic but controlled  Complicated by spinal  stenosis history and peripheral neuropathy.           VTE prophylaxis: SCDs

## 2019-07-13 NOTE — THERAPY
"Speech Language Therapy dysphagia treatment completed.     Functional Status: Pt seen this date for dysphagia therapy. Pt sitting upright in bed, eager for PO. Continues with decreased attention to task and tangential/unrelated comments throughout session as well as poor insight into deficits. Pt consumed tsp trials of applesauce and required maximum verbal cueing in order to consistently execute double swallow strategy however hyolaryngeal elevation was palpated as complete. Also required hand over hand in order to refrain from impulsive eating and taking large bites. 1/2 tsp of mashed potatoes resulted in suspected laryngeal pumping upon palpation and subtle increase in wet vocal quality, which is concerning for possible penetration/aspiration. Pt required verbal cue in order to execute second swallow. Attempted instruction of laryngeal elevation exercise high pitch 'e', however pt completed with \"poor\" accuracy despite model and tactile cueing. At this time, recommend pt continue with NTFL diet with direct 1:1 supervision and strict adherence to posted swallow strategies. Hold PO with any difficulty. SLP to continue following.    Recommendations: 1. NTFL with direct 1:1 supervision and strict adherence to posted swallow strategies.     Plan of Care: Will benefit from Speech Therapy 5 times per week  Post-Acute Therapy: Recommend inpatient transitional care services for continued speech therapy services.      See \"Rehab Therapy-Acute\" Patient Summary Report for complete documentation.     "

## 2019-07-13 NOTE — PROGRESS NOTES
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 Family bedside.   Requested meeting this AM.     Objective:  Medications reviewed and notable for:  Current Facility-Administered Medications   Medication Dose   • dexamethasone (DECADRON) injection 6 mg  6 mg   • insulin regular (HUMULIN R) injection 3-14 Units  3-14 Units    And   • glucose 4 g chewable tablet 16 g  16 g    And   • DEXTROSE 10% BOLUS 250 mL  250 mL   • levETIRAcetam (KEPPRA) 500 mg in  mL IVPB  500 mg   • Pharmacy Consult Request ...Pain Management Review 1 Each  1 Each   • MD ALERT...DO NOT ADMINISTER NSAIDS or ASPIRIN unless ORDERED By Neurosurgery 1 Each  1 Each   • ondansetron (ZOFRAN) syringe/vial injection 4 mg  4 mg   • diphenhydrAMINE (BENADRYL) injection 25 mg  25 mg   • scopolamine (TRANSDERM-SCOP) patch 1 Patch  1 Patch   • hydrALAZINE (APRESOLINE) injection 10 mg  10 mg   • docusate sodium (COLACE) capsule 100 mg  100 mg   • senna-docusate (PERICOLACE or SENOKOT S) 8.6-50 MG per tablet 1 Tab  1 Tab   • senna-docusate (PERICOLACE or SENOKOT S) 8.6-50 MG per tablet 1 Tab  1 Tab   • polyethylene glycol/lytes (MIRALAX) PACKET 1 Packet  1 Packet   • magnesium hydroxide (MILK OF MAGNESIA) suspension 30 mL  30 mL   • bisacodyl (DULCOLAX) suppository 10 mg  10 mg   • fleet enema 133 mL  1 Each   • 0.9 % NaCl with KCl 20 mEq infusion     • oxyCODONE immediate release (ROXICODONE) tablet 10 mg  10 mg   • oxyCODONE immediate-release (ROXICODONE) tablet 5 mg  5 mg   • artificial tears 1.4 % ophthalmic solution 2 Drop  2 Drop   • acetaminophen (TYLENOL) tablet 650 mg  650 mg   • atorvastatin (LIPITOR) tablet 20 mg  20 mg   • therapeutic multivitamin-minerals (THERAGRAN-M) tablet 1 Tab  1 Tab   • metoprolol (LOPRESSOR) tablet 25 mg  25 mg       ROS:   Constitutional: +  fatigue, no fevers or chills, no  "night sweats  Resp: No cough or SOB  Cardio:No chest pain or palpitations  Pschy: No depression or anxiety   Neuro: No headaches, no seizure, no vision changes  GI: no abdominal pain, nausea or vomiting. No diarrhea or constipation   All other ROS negative    /59   Pulse 80   Temp 36.3 °C (97.3 °F) (Temporal)   Resp 17   Ht 1.88 m (6' 2\")   Wt 96.6 kg (212 lb 15.4 oz)   SpO2 93%     General:  comfortable, NAD  HEENT:  PERRLA, EOMI , craniotomy site healing well.   Neck:   Supple, Palpable adenopathy bilateral axilla.   Cor:   regular rate and rhythm  Pulm:   clear to auscultation bilaterally  Abd:   bowel sounds present, soft, nontender, nondistended,   Extremities:  warm, no lower extremity edema  Neurologic:  A&O x 3  Pyschiatric:  Appropriate mood and affect    Labs reviewed and notable for:  Recent Labs      07/11/19   0450  07/12/19   0430  07/13/19   0404   WBC  15.9*  19.8*  14.3*   RBC  4.21*  4.56*  4.26*   HEMOGLOBIN  13.2*  14.4  13.9*   HEMATOCRIT  40.7*  44.0  41.6*   MCV  96.7  96.5  97.7   MCH  31.4  31.6  32.6   MCHC  32.4*  32.7*  33.4*   RDW  49.5  49.4  49.1   PLATELETCT  150*  160*  147*   MPV  9.7  10.1  10.1         .@[facility]  Recent Results (from the past 24 hour(s))   ACCU-CHEK GLUCOSE    Collection Time: 07/12/19 11:58 AM   Result Value Ref Range    Glucose - Accu-Ck 383 (H) 65 - 99 mg/dL   ACCU-CHEK GLUCOSE    Collection Time: 07/12/19  5:57 PM   Result Value Ref Range    Glucose - Accu-Ck 383 (H) 65 - 99 mg/dL   ACCU-CHEK GLUCOSE    Collection Time: 07/12/19  8:26 PM   Result Value Ref Range    Glucose - Accu-Ck 351 (H) 65 - 99 mg/dL   CBC WITHOUT DIFFERENTIAL    Collection Time: 07/13/19  4:04 AM   Result Value Ref Range    WBC 14.3 (H) 4.8 - 10.8 K/uL    RBC 4.26 (L) 4.70 - 6.10 M/uL    Hemoglobin 13.9 (L) 14.0 - 18.0 g/dL    Hematocrit 41.6 (L) 42.0 - 52.0 %    MCV 97.7 81.4 - 97.8 fL    MCH 32.6 27.0 - 33.0 pg    MCHC 33.4 (L) 33.7 - 35.3 g/dL    RDW 49.1 35.9 - 50.0 fL    " Platelet Count 147 (L) 164 - 446 K/uL    MPV 10.1 9.0 - 12.9 fL   Basic Metabolic Panel    Collection Time: 07/13/19  4:04 AM   Result Value Ref Range    Sodium 136 135 - 145 mmol/L    Potassium 4.8 3.6 - 5.5 mmol/L    Chloride 102 96 - 112 mmol/L    Co2 28 20 - 33 mmol/L    Glucose 237 (H) 65 - 99 mg/dL    Bun 31 (H) 8 - 22 mg/dL    Creatinine 0.88 0.50 - 1.40 mg/dL    Calcium 8.5 8.5 - 10.5 mg/dL    Anion Gap 6.0 0.0 - 11.9   ESTIMATED GFR    Collection Time: 07/13/19  4:04 AM   Result Value Ref Range    GFR If African American >60 >60 mL/min/1.73 m 2    GFR If Non African American >60 >60 mL/min/1.73 m 2   ACCU-CHEK GLUCOSE    Collection Time: 07/13/19  8:03 AM   Result Value Ref Range    Glucose - Accu-Ck 230 (H) 65 - 99 mg/dL       Diagnostic imaging:  MRI:     1.  Recent right frontal craniotomy for resection of intra-axial brain neoplasm.    2.  Large postsurgical defect in the right anterior frontal lobe which contains a small amount of hemorrhagic debris. Further there is evidence of small irregular areas of peripheral enhancement adjacent to the anterior and medial margins of the   resection site which may represent postoperative change or residual neoplasm.    3.  There is a marked amount of increased T2 signal intensity surrounding the postoperative site involving the right frontal and right temporal white matter and also the left frontal periventricular white matter. This likely represents residual neoplasm.    4.  Marked bifrontal pneumocephalus. There are small postoperative subdural seroma deep to the craniotomy site.    5.  Persistent effacement of the right frontal horn and body the right lateral ventricle with 8 mm of right to left midline shift the ventricular system.      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 Awaiting FISH studies to finalize on plan.   - We went over side effects of the treatment plan   - According to family his functional status was excellent prior to admission at [facility] and recovering well now and they would like to take an aggressive approach treatment.   - Once he recovers at 2-3 weeks from surgery will do staging work up PET scan, BMBX , LP with evaluation of CSF fluid, U/S testicle and spinal MRI. Ordered lab work CBC, CMP, hepatitis panel, HIV .   - Plan to start treatment after above work up in complete at 3-4 weeks   - I have given my contact information to make an wilbert with me as outpatient in 2 weeks   - If he is still in hospital than my team will check on him next week     -  please call with any questions, [phone].      Patt Ferrer MD  [facility]   [phone]

## 2019-07-13 NOTE — CARE PLAN
Problem: Communication  Goal: The ability to communicate needs accurately and effectively will improve    Intervention: [place] patient and significant other/support system to call light to alert staff of needs  Educated patients on use of call light      Problem: Fluid Volume:  Goal: Will maintain balanced intake and output    Intervention: Monitor, educate, and encourage compliance with therapeutic intake of liquids  Encourage intake of fluids

## 2019-07-13 NOTE — CARE PLAN
Problem: Safety  Goal: Will remain free from injury  Outcome: PROGRESSING AS EXPECTED  Hourly rounding.  Non-skid socks. Bed locked & in low position. Personal belongings and call light  within reach. .      Problem: Venous Thromboembolism (VTW)/Deep Vein Thrombosis (DVT) Prevention:  Goal: Patient will participate in Venous Thrombosis (VTE)/Deep Vein Thrombosis (DVT)Prevention Measures  Outcome: PROGRESSING AS EXPECTED  SCDs in use for DVT prevention

## 2019-07-13 NOTE — PROGRESS NOTES
Nursing Mobility Note    Surgery patient?: YES  Date of surgery: 7/10/2019  Ambulated 50 ft on day 2 of surgery  Number of times ambulated 50 feet or greater today: 3  Patient has been up to chair, edge of bed or HOB 90 degrees for all meals?:Yes   Goal met? Yes

## 2019-07-13 NOTE — PROGRESS NOTES
Neurosurgery Progress Note    Subjective:  No events    Exam:    A&O x3, GCS 15  PERRL, EOMI  Face symm, tongue midline  ISAAC with FS, no drift  Inc c/d/i        BP  Min: 120/59  Max: 136/83  Pulse  Av.3  Min: 80  Max: 118  Resp  Av  Min: 14  Max: 22  Temp  Av.1 °C (98.8 °F)  Min: 36.3 °C (97.3 °F)  Max: 37.8 °C (100 °F)  SpO2  Av.4 %  Min: 92 %  Max: 95 %    No Data Recorded    Recent Labs      19   04319   0404   WBC  15.9*  19.8*  14.3*   RBC  4.21*  4.56*  4.26*   HEMOGLOBIN  13.2*  14.4  13.9*   HEMATOCRIT  40.7*  44.0  41.6*   MCV  96.7  96.5  97.7   MCH  31.4  31.6  32.6   MCHC  32.4*  32.7*  33.4*   RDW  49.5  49.4  49.1   PLATELETCT  150*  160*  147*   MPV  9.7  10.1  10.1     Recent Labs      19   0404   SODIUM  137  132*  136   POTASSIUM  4.6  4.7  4.8   CHLORIDE  107  102  102   CO2  22  24  28   GLUCOSE  228*  257*  237*   BUN  33*  31*  31*   CREATININE  0.98  0.82  0.88   CALCIUM  8.7  9.5  8.5               Intake/Output       19 - 19 - 19 0659       Total  Total       Intake    P.O.  1200  -- 1200  --  -- --    P.O. 1200 -- 1200 -- -- --    IV Piggyback  100  -- 100  --  -- --    Volume (mL) (levETIRAcetam (KEPPRA) 500 mg in  mL IVPB) 100 -- 100 -- -- --    Total Intake 1300 -- 1300 -- -- --       Output    Urine  850  -- 850  --  -- --    Urine Void (mL) 850 -- 850 -- -- --    Stool  --  -- --  --  -- --    Number of Times Stooled 1 x -- 1 x -- -- --    Total Output 850 -- 850 -- -- --       Net I/O     450 -- 450 -- -- --            Intake/Output Summary (Last 24 hours) at 19  Last data filed at 19 1300   Gross per 24 hour   Intake             1200 ml   Output              850 ml   Net              350 ml            • dexamethasone  6 mg TID   • insulin regular  3-14 Units 4X/DAY ACHS    And   • glucose   16 g Q15 MIN PRN    And   • dextrose 10% bolus  250 mL Q15 MIN PRN   • levETIRAcetam (KEPPRA) IV  500 mg Q12HRS   • Pharmacy Consult Request  1 Each PHARMACY TO DOSE   • MD ALERT...DO NOT ADMINISTER NSAIDS or ASPIRIN unless ORDERED By Neurosurgery  1 Each PRN   • ondansetron  4 mg Q4HRS PRN   • diphenhydrAMINE  25 mg Q6HRS PRN   • scopolamine  1 Patch Q72HRS PRN   • hydrALAZINE  10 mg Q HOUR PRN   • docusate sodium  100 mg BID   • senna-docusate  1 Tab Nightly   • senna-docusate  1 Tab Q24HRS PRN   • polyethylene glycol/lytes  1 Packet BID PRN   • magnesium hydroxide  30 mL QDAY PRN   • bisacodyl  10 mg Q24HRS PRN   • fleet  1 Each Once PRN   • 0.9 % NaCl with KCl 20 mEq 1,000 mL   Continuous   • oxyCODONE immediate release  10 mg Q3HRS PRN   • oxyCODONE immediate-release  5 mg Q3HRS PRN   • artificial tears  2 Drop Q8HRS   • acetaminophen  650 mg Q4HRS PRN   • atorvastatin  20 mg QHS   • therapeutic multivitamin-minerals  1 Tab DAILY   • metoprolol  25 mg TWICE DAILY       Assessment and Plan:  Hospital day #9 right frontal mass, POD #3 right frontal crani for tumor    Path high grade b-cell lymphoma- onc has seenNo ASA or anticoagulants   Keppra  decadron taper  Pt/ot/mobilize  Q 4 hour neuro checks

## 2019-07-14 NOTE — PROGRESS NOTES
Hospital Medicine Daily Progress Note    Date of Service  7/14/2019    Chief Complaint  73 y.o. male admitted 7/5/2019 with confusion.    Hospital Course    Mr. More has a history of prostate cancer status post resection, hypertension, atrial fibrillation.  Patient presented to the emergency room on 7/5/2019 with weakness and increased fatigue.  Evaluation at the outside hospital emergency room demonstrated a 4.5 cm right frontal lobe mass.  Patient was transferred to [facility] for higher level of care and neurosurgery coverage.  On 7/10/2019 he underwent right-sided craniotomy.  He was transferred to the ICU postoperatively.  Patient transferred to neuro floor on July 13, 2019 for further management.  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 Summers evaluated this patient and recommended that he needs further work-up and outpatient follow-up for treatment options.  He is clinically stable and physiatry eval for this patient and plans to transfer this patient to rehab.  He will also need appointment with neurosurgery for removal of his stable.  Neurosurgery recommended Decadron taper 1 week.  Also, recommended that patient should not be on aspirin or anticoagulation.      Interval Problem Update  I evaluated and examined this patient at bedside.  He reported that he has some mild headache in the center of his head.  He was requesting for regular diet.  I discussed the speech therapy recommendation with him.  Found to have mild hyponatremia could be due to hyperglycemia.  BMP showed blood glucose of 219.  Added Lantus 10 units.  White blood cell count is trending down.  Hemoglobin remained stable  I discussed plan of care with him.  Consultants/Specialty  Intensivist  Neurosurgery  Oncology    Code Status  Full Code    Disposition  Transfer to neuro floor today    D/W tx team on rounds      Review of Systems  Review of Systems    Constitutional: Negative for chills and fever.   HENT: Negative for hearing loss.    Eyes: Negative for blurred vision and double vision.   Respiratory: Negative for cough and shortness of breath.    Cardiovascular: Negative for chest pain, palpitations and leg swelling.   Gastrointestinal: Negative for abdominal pain, heartburn, nausea and vomiting.   Genitourinary: Negative for dysuria and urgency.   Musculoskeletal: Negative for myalgias.   Neurological: Positive for headaches. Negative for dizziness, speech change and focal weakness.   Psychiatric/Behavioral: Negative for hallucinations.   All other systems reviewed and are negative.       Physical Exam  Temp:  [36.7 °C (98 °F)-37.5 °C (99.5 °F)] 37.5 °C (99.5 °F)  Pulse:  [] 63  Resp:  [16-18] 16  BP: (135-152)/(78-94) 139/78  SpO2:  [91 %-92 %] 92 %    Physical Exam   Constitutional: He is oriented to person, place, and time.   He sitting in bed without any acute distress.   HENT:   Head: Normocephalic and atraumatic.   Eyes: Pupils are equal, round, and reactive to light. Right eye exhibits no discharge. Left eye exhibits no discharge.   Neck: Normal range of motion. Neck supple.   Cardiovascular: Normal rate and regular rhythm.  Exam reveals no friction rub.    No murmur heard.  Pulmonary/Chest: Effort normal and breath sounds normal. No respiratory distress. He has no wheezes.   Abdominal: Soft. Bowel sounds are normal. He exhibits no distension. There is no tenderness. There is no rebound.   Musculoskeletal: Normal range of motion. He exhibits no edema or deformity.   Neurological: He is alert and oriented to person, place, and time. Coordination normal.   Staples present on the right side of the neck.     Skin: Skin is warm and dry. No rash noted. He is not diaphoretic. No erythema.   Psychiatric: He has a normal mood and affect. His behavior is normal.   I performed physical exam on July 14, 2019 it remains similar without any acute  changes.    Fluids    Intake/Output Summary (Last 24 hours) at 07/14/19 0730  Last data filed at 07/14/19 0400   Gross per 24 hour   Intake                0 ml   Output              650 ml   Net             -650 ml       Laboratory  Recent Labs      07/12/19 0430 07/13/19 0404 07/14/19   0304   WBC  19.8*  14.3*  13.9*   RBC  4.56*  4.26*  4.30*   HEMOGLOBIN  14.4  13.9*  13.6*   HEMATOCRIT  44.0  41.6*  41.9*   MCV  96.5  97.7  97.4   MCH  31.6  32.6  31.6   MCHC  32.7*  33.4*  32.5*   RDW  49.4  49.1  48.1   PLATELETCT  160*  147*  174   MPV  10.1  10.1  9.8     Recent Labs      07/12/19 0430 07/13/19 0404  07/14/19   0304   SODIUM  132*  136  131*   POTASSIUM  4.7  4.8  4.9   CHLORIDE  102  102  101   CO2  24  28  25   GLUCOSE  257*  237*  219*   BUN  31*  31*  33*   CREATININE  0.82  0.88  0.86   CALCIUM  9.5  8.5  8.5                   Imaging  [id number] VIEW   Final Result      No dilated bowel identified      [id number]   Final Result      1.  No intratesticular mass or evidence of torsion.      2.  Small bilateral hydroceles.      3.  Limited bilateral microlithiasis.      MR-BRAIN-WITH & W/O   Final Result         1.  Recent right frontal craniotomy for resection of intra-axial brain neoplasm.      2.  Large postsurgical defect in the right anterior frontal lobe which contains a small amount of hemorrhagic debris. Further there is evidence of small irregular areas of peripheral enhancement adjacent to the anterior and medial margins of the    resection site which may represent postoperative change or residual neoplasm.      3.  There is a marked amount of increased T2 signal intensity surrounding the postoperative site involving the right frontal and right temporal white matter and also the left frontal periventricular white matter. This likely represents residual neoplasm.      4.  Marked bifrontal pneumocephalus. There are small postoperative subdural seroma deep to the craniotomy  site.      5.  Persistent effacement of the right frontal horn and body the right lateral ventricle with 8 mm of right to left midline shift the ventricular system.      DX-CHEST-PORTABLE (1 VIEW)   Final Result      Placement left subclavian central line with tip at the origin of the SVC.   No pneumothorax.   Bilateral atelectasis with edema not excluded.      EC-ECHOCARDIOGRAM COMPLETE W/O CONT   Final Result      MR-STEALTH BRAIN WITH & W/O   Final Result      1.  Stealth localization for a large enhancing and hemorrhagic right anterior frontal intra-axial mass lesion most consistent with glioblastoma.   2.  See MRI brain full report also from today for further discussion.      MR-BRAIN-WITH & W/O   Final Result      1.  Large hemorrhagic and enhancing lobulated right anterior frontal intra-axial mass lesion associated with severe vasogenic edema. Findings are most consistent with high-grade primary brain tumor such as glioblastoma multiforme or less likely an    unusually large hemorrhagic solitary brain metastasis.   2.  Right to left shift of midline structures.   3.  Mild supratentorial white matter disease in the left cerebral hemisphere.   4.  Indeterminate tiny focus of mildly bright diffusion signal in the right posterior frontal deep white matter which could represent an acute or recent subacute lacunar infarct.      OUTSIDE IMAGES-CT HEAD   Final Result           Assessment/Plan  * Intracranial mass- (present on admission)   Assessment & Plan    Large right frontal 4.5cm mass  Patient is status post craniotomy and resection on July 10, 2090.  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 Ferrer evaluated this patient and made recommendations.  Steroid taper as recommended by neurosurgery.  Neurosurgery is following this patient.  Every 4 hours neuro check.     Hyponatremia   Assessment & Plan    He found to have mild hyponatremia.  Could be related to hyperglycemia.  Continue to monitor      Hyperglycemia- (present on admission)   Assessment & Plan    Uncontrolled with hyperglycemia, his hemoglobin A1c is 9.5  Added Lantus 10 units  Insulin sliding scale with hypoglycemia protocol.  Currently he is on Decadron that could cause increased blood glucose.         CAD (coronary artery disease)- (present on admission)   Assessment & Plan    Continue lipitor   No antiplatelet therapy until cleared by neurosurgery  No active chest pain.     Hypertension- (present on admission)   Assessment & Plan    Currently well controlled.  Continue to monitor         Atrial fibrillation (HCC)- (present on admission)   Assessment & Plan    Continue metoprolol, holding anticoagulation due to surgery  Currently rate controlled.  Continue to monitor.     History of prostate cancer- (present on admission)   Assessment & Plan    Status post surgery approximately 2 years ago     Dyslipidemia- (present on admission)   Assessment & Plan    Continue atorvastatin     Chronic back pain- (present on admission)   Assessment & Plan    Chronic but controlled  Complicated by spinal stenosis history and peripheral neuropathy.           VTE prophylaxis: SCDs

## 2019-07-14 NOTE — PROGRESS NOTES
Neurosurgery Progress Note    Subjective:  No events, mild h/a, Tylenol helpful, +bm last noc, wants to eat solid food    Exam:    A&O x3, GCS 15  PERRL, EOMI  Face symm, tongue midline  ISAAC with FS, no drift  Inc c/d/i        BP  Min: 137/83  Max: 152/94  Pulse  Av.3  Min: 63  Max: 102  Resp  Av  Min: 16  Max: 18  Temp  Av.3 °C (99.1 °F)  Min: 36.8 °C (98.2 °F)  Max: 37.5 °C (99.5 °F)  SpO2  Av.5 %  Min: 91 %  Max: 92 %    No Data Recorded    Recent Labs      19   04019   0304   WBC  19.8*  14.3*  13.9*   RBC  4.56*  4.26*  4.30*   HEMOGLOBIN  14.4  13.9*  13.6*   HEMATOCRIT  44.0  41.6*  41.9*   MCV  96.5  97.7  97.4   MCH  31.6  32.6  31.6   MCHC  32.7*  33.4*  32.5*   RDW  49.4  49.1  48.1   PLATELETCT  160*  147*  174   MPV  10.1  10.1  9.8     Recent Labs      19   0404  19   0304   SODIUM  132*  136  131*   POTASSIUM  4.7  4.8  4.9   CHLORIDE  102  102  101   CO2  24  28  25   GLUCOSE  257*  237*  219*   BUN  31*  31*  33*   CREATININE  0.82  0.88  0.86   CALCIUM  9.5  8.5  8.5               Intake/Output       19 - 1959 19 - 07/15/19 0659       Total  Total       Intake    Total Intake -- -- -- -- -- --       Output    Urine  --  650 650  --  -- --    Urine Void (mL) -- 650 650 -- -- --    Stool  --  -- --  --  -- --    Number of Times Stooled -- 2 x 2 x 1 x -- 1 x    Total Output -- 650 650 -- -- --       Net I/O     -- -650 -650 -- -- --            Intake/Output Summary (Last 24 hours) at 19 1330  Last data filed at 19 0400   Gross per 24 hour   Intake                0 ml   Output              650 ml   Net             -650 ml            • insulin glargine  10 Units Q EVENING   • dexamethasone  6 mg TID   • insulin regular  3-14 Units 4X/DAY ACHS    And   • glucose  16 g Q15 MIN PRN    And   • dextrose 10% bolus  250 mL Q15 MIN PRN   •  levETIRAcetam (KEPPRA) IV  500 mg Q12HRS   • Pharmacy Consult Request  1 Each PHARMACY TO DOSE   • MD ALERT...DO NOT ADMINISTER NSAIDS or ASPIRIN unless ORDERED By Neurosurgery  1 Each PRN   • ondansetron  4 mg Q4HRS PRN   • diphenhydrAMINE  25 mg Q6HRS PRN   • scopolamine  1 Patch Q72HRS PRN   • hydrALAZINE  10 mg Q HOUR PRN   • docusate sodium  100 mg BID   • senna-docusate  1 Tab Nightly   • senna-docusate  1 Tab Q24HRS PRN   • polyethylene glycol/lytes  1 Packet BID PRN   • magnesium hydroxide  30 mL QDAY PRN   • bisacodyl  10 mg Q24HRS PRN   • fleet  1 Each Once PRN   • 0.9 % NaCl with KCl 20 mEq 1,000 mL   Continuous   • oxyCODONE immediate release  10 mg Q3HRS PRN   • oxyCODONE immediate-release  5 mg Q3HRS PRN   • artificial tears  2 Drop Q8HRS   • acetaminophen  650 mg Q4HRS PRN   • atorvastatin  20 mg QHS   • therapeutic multivitamin-minerals  1 Tab DAILY   • metoprolol  25 mg TWICE DAILY       Assessment and Plan:  Hospital day #10 right frontal mass, POD #4 right frontal crani for tumor    Path high grade b-cell lymphoma- onc has seen  No ASA or anticoagulants   Keppra  decadron taper  Pt/ot/mobilize  Q 4 hour neuro checks  Ok to shower, eat from Nsx perspective

## 2019-07-15 NOTE — PROGRESS NOTES
Neurosurgery Progress Note    Subjective:  No events, denies pain,  +bm , wants to eat solid food    Exam:    A&O x3, GCS 15  PERRL, EOMI  Face symm, tongue midline  ISAAC with FS, no drift  Inc c/d/i        BP  Min: 131/79  Max: 148/82  Pulse  Av.8  Min: 64  Max: 100  Resp  Av.5  Min: 16  Max: 18  Temp  Av °C (98.6 °F)  Min: 36.5 °C (97.7 °F)  Max: 37.4 °C (99.3 °F)  SpO2  Av.8 %  Min: 92 %  Max: 97 %    No Data Recorded    Recent Labs      19   0404  19   0304   WBC  14.3*  13.9*   RBC  4.26*  4.30*   HEMOGLOBIN  13.9*  13.6*   HEMATOCRIT  41.6*  41.9*   MCV  97.7  97.4   MCH  32.6  31.6   MCHC  33.4*  32.5*   RDW  49.1  48.1   PLATELETCT  147*  174   MPV  10.1  9.8     Recent Labs      19   0404  19   0304  07/15/19   0343   SODIUM  136  131*  132*   POTASSIUM  4.8  4.9  4.6   CHLORIDE  102  101  99   CO2  28  25  26   GLUCOSE  237*  219*  240*   BUN  31*  33*  33*   CREATININE  0.88  0.86  0.87   CALCIUM  8.5  8.5  8.4*               Intake/Output       19 - 07/15/19 0659 07/15/19 0700 - 19 0659       Total 1900-0659 Total       Intake    P.O.  1440  -- 1440  --  -- --    P.O. 1440 -- 1440 -- -- --    Total Intake 1440 -- 1440 -- -- --       Output    Urine  --  400 400  --  -- --    Number of Times Voided 2 x 4 x 6 x -- -- --    Urine Void (mL) -- 400 400 -- -- --    Stool  --  -- --  --  -- --    Number of Times Stooled 1 x -- 1 x -- -- --    Total Output -- 400 400 -- -- --       Net I/O     1440 -400 1040 -- -- --            Intake/Output Summary (Last 24 hours) at 07/15/19 0814  Last data filed at 19 2232   Gross per 24 hour   Intake             1440 ml   Output              400 ml   Net             1040 ml            • dexamethasone  4 mg Q8HRS    Followed by   • [START ON 2019] dexamethasone  4 mg Q12HRS    Followed by   • [START ON 2019] dexamethasone  2 mg Q12HRS    Followed by   • [START ON  7/20/2019] dexamethasone  1 mg Q12HRS   • levETIRAcetam  500 mg BID   • insulin glargine  10 Units Q EVENING   • insulin regular  3-14 Units 4X/DAY ACHS    And   • glucose  16 g Q15 MIN PRN    And   • dextrose 10% bolus  250 mL Q15 MIN PRN   • Pharmacy Consult Request  1 Each PHARMACY TO DOSE   • MD ALERT...DO NOT ADMINISTER NSAIDS or ASPIRIN unless ORDERED By Neurosurgery  1 Each PRN   • ondansetron  4 mg Q4HRS PRN   • diphenhydrAMINE  25 mg Q6HRS PRN   • scopolamine  1 Patch Q72HRS PRN   • hydrALAZINE  10 mg Q HOUR PRN   • docusate sodium  100 mg BID   • senna-docusate  1 Tab Nightly   • senna-docusate  1 Tab Q24HRS PRN   • polyethylene glycol/lytes  1 Packet BID PRN   • magnesium hydroxide  30 mL QDAY PRN   • bisacodyl  10 mg Q24HRS PRN   • fleet  1 Each Once PRN   • 0.9 % NaCl with KCl 20 mEq 1,000 mL   Continuous   • oxyCODONE immediate release  10 mg Q3HRS PRN   • oxyCODONE immediate-release  5 mg Q3HRS PRN   • artificial tears  2 Drop Q8HRS   • acetaminophen  650 mg Q4HRS PRN   • atorvastatin  20 mg QHS   • therapeutic multivitamin-minerals  1 Tab DAILY   • metoprolol  25 mg TWICE DAILY       Assessment and Plan:  Hospital day #11 right frontal mass, POD #5 right frontal crani for tumor    Path high grade b-cell lymphoma- onc has seen  No ASA or anticoagulants   Keppra  decadron taper-one week taper off order placed  Pt/ot/mobilize  Q 4 hour neuro checks  Ok to shower, eat from Nsx perspective  D/c dispo ? Home vs rehab-- pt wants to go home, will place order for PT/cog  eval for home safety    ATTENDING ADDENDUM:  Patient seen independently and agree with above note

## 2019-07-15 NOTE — THERAPY
"Occupational Therapy Treatment completed with focus on ADLs, ADL transfers and cognition.  Functional Status:  Pt pleasant, highly motivated to regain his independence.  Wife & daughter present during tx.  Pt sitting up in chair on arrival.  Pt required Min A for LB dressing to don hospital pants & socks.  Pt was CGA for sit to stand.  Pt amb with FWW & CGA to bathroom.  Pt required constant V/Q's to maintain upright neck & thoracic posture.  Pt able to groom standing at the sink with CGA & extra time.  Pt has poor insight into his deficits.  Pt encouraged to be OOB for all meals.  Plan of Care: Will benefit from Occupational Therapy 3 times per week  Discharge Recommendations:  Equipment Will Continue to Assess for Equipment Needs. Post-acute therapy Discharge to a transitional care facility for continued skilled therapy services.    Pt would be an excellent Rehab candidate.  Pt is highly motivated & has great, supportive family who can assist if needed upon D/C.    See \"Rehab Therapy-Acute\" Patient Summary Report for complete documentation.   "

## 2019-07-15 NOTE — THERAPY
"Physical Therapy Treatment completed.   Bed Mobility:  Supine to Sit: Minimal Assist (HOB flat and no railing)  Transfers: Sit to Stand: Supervised (from EOB->FWW)  Gait: Level Of Assist: Supervised with Front-Wheel Walker       Plan of Care: Will benefit from Physical Therapy 5 times per week  Discharge Recommendations: Equipment: No Equipment Needed. Post-acute therapy Discharge to home with outpatient or home health for additional skilled therapy services.     See \"Rehab Therapy-Acute\" Patient Summary Report for complete documentation.       "

## 2019-07-15 NOTE — DISCHARGE PLANNING
IP consult for physiatry from Dr. Esparza     [facility] ongoing medicl management as well as therapy need. Anticipate post acute services to facilitate a successful transition to community home with limited outpatient therapy, and family support. Fernandez Masters.  Per Hematology Oncology they will follow up in about 3 weeks after he heals from surgery. Therapy to update OT note in AM. Will follow up for therapy need. No physiatry consult ordered at this time.

## 2019-07-15 NOTE — CONSULTS
"Medical chart review completed.     Patient is a 73 y.o. year-old male with a past medical history significant for A fib on AC, CAD, Hx of prostate cancer, HTN, HLD, and WALTER admitted to [facility] on 7/5/2019 11:59 PM with worsening weakness, fatigued and [place]. Patient found to have large 4.5 cm right fontal lobe mass. NSG was consulted and recommended starting Keppra and steroids as well as possible intervention. Patient underwent right sided resection on 7/10/19 with Dr. Ruffin without complication.  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 Hematology and oncology were consulted for high grade B cell lymphoma and recommended systemic chemotherapy. Per oncology would recommend starting 3-4 weeks post-operatively.        Patient was last evaluated by PT on 7/15/19 and was Sofie for bed mobility and supervised for mobility.  Patient was last evaluated by SLP on 7/13/19 and was recommended for NTFL diet with 1:1 supervision due to dysphagia.  Patient was last evaluated by OT on 7/12/19 and was min-modA for ADLs. Patient previously living in a 1 story home with 2 steps to enter; living with spouse.      PMH:  Past Medical History:   Diagnosis Date   • Anesthesia     \"too much anesthesia kidneys stop woking and intestines slowed\"   • Arthritis    • Atrial fibrillation (HCC)    • Back pain    • Blood clotting disorder (HCC) 1999    blood clot leg Left   • Breath shortness    • CAD (coronary artery disease)    • Cancer (HCC) 2016    prostate cancer   • Cataract     no surgery   • Dyslipidemia    • High cholesterol    • Hypertension    • Ileus (HCC)    • Myocardial infarct (HCC) 1999,2006    x3   • Renal disorder     cycst left kidney   • Sleep apnea     no cpap   • Snoring    • Urinary incontinence        PSH:  Past Surgical History:   Procedure Laterality Date   • CRANIOTOMY Right 7/10/2019    Procedure: RIGHT-SIDED CRANIOTOMY FOR TUMOR;  Surgeon: Son MAGAÑA" SHERON Ruffin;  Location: SURGERY [facility];  Service: Neurosurgery   • LUMBAR LAMINECTOMY DISKECTOMY N/A 11/26/2018    Procedure: LUMBAR LAMINECTOMY DISKECTOMY-  POSTERIOR L1-2 LAMI;  Surgeon: Son Ruffin M.D.;  Location: SURGERY [facility];  Service: Neurosurgery   • LAMINOTOMY Left 11/26/2018    Procedure: LAMINOTOMY-  L4-S1;  Surgeon: Son Ruffin M.D.;  Location: SURGERY [facility];  Service: Neurosurgery   • FORAMINOTOMY Left 11/26/2018    Procedure: FORAMINOTOMY;  Surgeon: Son Ruffin M.D.;  Location: SURGERY [facility];  Service: Neurosurgery   • OTHER NEUROLOGICAL SURG  2016    Thoracic 2-3 fusion    • APPENDECTOMY  2002   • OTHER CARDIAC SURGERY  1999,2002,2006    stents cardiac   • OTHER NEUROLOGICAL SURG      Laminectomy       FAMILY HISTORY:  History reviewed. No pertinent family history.    MEDICATIONS:  Current Facility-Administered Medications   Medication Dose   • dexamethasone (DECADRON) tablet 4 mg  4 mg    Followed by   • [START ON 7/16/2019] dexamethasone (DECADRON) tablet 4 mg  4 mg    Followed by   • [START ON 7/18/2019] dexamethasone (DECADRON) tablet 2 mg  2 mg    Followed by   • [START ON 7/20/2019] dexamethasone (DECADRON) tablet 1 mg  1 mg   • levETIRAcetam (KEPPRA) tablet 500 mg  500 mg   • insulin glargine (LANTUS) injection 10 Units  10 Units   • insulin regular (HUMULIN R) injection 3-14 Units  3-14 Units    And   • glucose 4 g chewable tablet 16 g  16 g    And   • DEXTROSE 10% BOLUS 250 mL  250 mL   • Pharmacy Consult Request ...Pain Management Review 1 Each  1 Each   • MD ALERT...DO NOT ADMINISTER NSAIDS or ASPIRIN unless ORDERED By Neurosurgery 1 Each  1 Each   • ondansetron (ZOFRAN) syringe/vial injection 4 mg  4 mg   • diphenhydrAMINE (BENADRYL) injection 25 mg  25 mg   • scopolamine (TRANSDERM-SCOP) patch 1 Patch  1 Patch   • hydrALAZINE (APRESOLINE) injection 10 mg  10 mg   • docusate sodium (COLACE) capsule 100 mg  100 mg   •  senna-docusate (PERICOLACE or SENOKOT S) 8.6-50 MG per tablet 1 Tab  1 Tab   • senna-docusate (PERICOLACE or SENOKOT S) 8.6-50 MG per tablet 1 Tab  1 Tab   • polyethylene glycol/lytes (MIRALAX) PACKET 1 Packet  1 Packet   • magnesium hydroxide (MILK OF MAGNESIA) suspension 30 mL  30 mL   • bisacodyl (DULCOLAX) suppository 10 mg  10 mg   • fleet enema 133 mL  1 Each   • oxyCODONE immediate release (ROXICODONE) tablet 10 mg  10 mg   • oxyCODONE immediate-release (ROXICODONE) tablet 5 mg  5 mg   • artificial tears 1.4 % ophthalmic solution 2 Drop  2 Drop   • acetaminophen (TYLENOL) tablet 650 mg  650 mg   • atorvastatin (LIPITOR) tablet 20 mg  20 mg   • therapeutic multivitamin-minerals (THERAGRAN-M) tablet 1 Tab  1 Tab   • metoprolol (LOPRESSOR) tablet 25 mg  25 mg       ALLERGIES:  Gabapentin    PSYCHOSOCIAL HISTORY:  Living Site:  Home  Living With:  spouse  Caregiver's availability:  Not Applicable  Number of stairs:  2  Substance use history:  Unknown      The patient presents functional deficits in mobility and self-care as well as cognitive deficits and swallowing deficits, and Minimal  de-conditioning. Pre-morbidly, this patient lived in a single level home with Two steps to enter,with spouse  The patient was evaluated by acute care Physical Therapy, Occupational Therapy and Speech Language Pathology; currently requiring SB assistance for mobility and moderate assistance for ADLs, also with ongoing cognitive and swallowing deficits. The patient's current diet is NFTL diet.     Patient s/p resection of intracranial B Cell lymphoma with OT and SLP needs.  Patient will need new evaluation from OT as most recent is > 72 old.  If patient still requires at least Sofie for ADLs then the patient is   a Very Good candidate for an acute inpatient rehabilitation program with a coordinated program of care at an intensity and frequency not available at a lower level of care.     Note: This recommendation requires that  patient has at least CGA/Minimal Assistance needs in at least two therapy disciplines.  If patient progresses to no longer need CGA/Mona with at least two therapy disciplines they may be more appropriate for Skilled nursing facility versus home with home health.      This recommendation is substantiated by the patient's current medical condition with intervention and assessment of medical issues requiring an acute level of care for patient's safety and maximum outcome. A coordinated program of care will be provided by an interdisciplinary team including physical therapy, occupational therapy, speech language pathology, hospitalist, physiatry, rehab nursing and rehab psychology. Rehab goals include improved cognition and swallowing, mobility, self-care management, strength and conditioning/endurance, pain management, bowel and bladder management, mood and affect, and safety with independent home management including caregiver training. Estimated length of stay is approximately 10-14 days. Rehab potential: Excellent. Disposition: to pre-morbid independent living setting with supportive care of patient's spouse. We will continue to follow with you in anticipation of discharge to acute inpatient rehabilitation when medically stable to do so at the discretion of the attending physician. Thank you for allowing us to participate in this patient's care. Please call with any questions regarding this recommendation.    Leticia Bella M.D.

## 2019-07-15 NOTE — PREADMISSION SCREENING NOTE
"  Pre-Admission Screening Form    Patient Information:   Name: Marcio More     MRN: [id number]       : 1945      Age: 73 y.o.   Gender: male      Race: White [7]       Marital Status:  [2]  Family Contact: Rigoberto More,Mell TaoAissatou        Relationship: Daughter [2]  Spouse [17]  Daughter [2]  Home Phone: [phone]             Cell Phone:   [phone]    Advanced Directives: None  Code Status:  FULL  Current Attending Provider: KEVIN Molina*  Referring Physician: Dr. Mooney     Physiatrist Consult: Dr. Bella        Referral Date: 07/15/2019  Primary Payor Source:  MEDICARE  Secondary Payor Source:  [facility]    Medical Information:   Date of Admission to Acute Care [place]2019  Room Number: S169/00  Rehabilitation Diagnosis: 02.1 Non-Traumatic  There is no immunization history for the selected administration types on file for this patient.  Allergies   Allergen Reactions   • Gabapentin Rash     Broke out in a rash on R bicep     Past Medical History:   Diagnosis Date   • Anesthesia     \"too much anesthesia kidneys stop woking and intestines slowed\"   • Arthritis    • Atrial fibrillation (HCC)    • Back pain    • Blood clotting disorder (HCC)     blood clot leg Left   • Breath shortness    • CAD (coronary artery disease)    • Cancer (HCC)     prostate cancer   • Cataract     no surgery   • Dyslipidemia    • High cholesterol    • Hypertension    • Ileus (HCC)    • Myocardial infarct (HCC) ,2006    x3   • Renal disorder     cycst left kidney   • Sleep apnea     no cpap   • Snoring    • Urinary incontinence      Past Surgical History:   Procedure Laterality Date   • CRANIOTOMY Right 7/10/2019    Procedure: RIGHT-SIDED CRANIOTOMY FOR TUMOR;  Surgeon: Son Ruffin M.D.;  Location: SURGERY [facility];  Service: Neurosurgery   • LUMBAR LAMINECTOMY DISKECTOMY N/A 2018    Procedure: LUMBAR LAMINECTOMY DISKECTOMY-  POSTERIOR " L1-2 LAMI;  Surgeon: Son Ruffin M.D.;  Location: SURGERY [facility];  Service: Neurosurgery   • LAMINOTOMY Left 11/26/2018    Procedure: LAMINOTOMY-  L4-S1;  Surgeon: Son Ruffin M.D.;  Location: SURGERY [facility];  Service: Neurosurgery   • FORAMINOTOMY Left 11/26/2018    Procedure: FORAMINOTOMY;  Surgeon: Son Ruffin M.D.;  Location: SURGERY [facility];  Service: Neurosurgery   • OTHER NEUROLOGICAL SURG  2016    Thoracic 2-3 fusion    • APPENDECTOMY  2002   • OTHER CARDIAC SURGERY  1999,2002,2006    stents cardiac   • OTHER NEUROLOGICAL SURG      Laminectomy       History Leading to Admission, Conditions that Caused the Need for Rehab (CMS):     Hina Starr M.D. Physician Signed Hospital Medicine  H&P Date of Service: 7/5/2019 11:55 PM           Hospital Medicine History & Physical Note     Date of Service  7/6/2019     Primary Care Physician  Jeni Armando N.P.     Consultants  Neurosurgery- Ernesto     Code Status  Full     Chief Complaint  Weakness and malaise.     History of Presenting Illness  73 y.o. Male with h/o prostate cancer s/p resection, HTN, and afib on pradaxa who presented 7/5/2019 with weakness and increased fatigue x3 weeks. He was working [place] his [place] property, when he almost fell off his tracker and his family forced him to come in for evaluation. He was found to have a large 4.5cm right frontal lobe mass. ERP at [facility] discussed the case with Dr. Orozco who agreed to consult.            Assessment/Plan:  I anticipate this patient will require at least two midnights for appropriate medical management, necessitating inpatient admission.         * Intracranial mass- (present on admission)   Assessment & Plan     Large right frontal 4.5cm mass. No focal deficits on examination.   - decadron 4mg IV q6 hrs  - ERP at [facility] discussed case with Ernesto (neurosurg), will notify him in AM of patients arrival  - keppra 500mg  "BID for seizure prophylaxis  - MRI brain pending      Chronic back pain- (present on admission)   Assessment & Plan     Chronic, follows with Dr. Ruffin. Last procedure in November 2018. Complicated by spinal stenosis and peripheral neuropathy. Denies urinary or bowel incontinence.   - takes alleve, tylenol for his pain at home  - supportive care, continue tylenol PRN      Hyperglycemia- (present on admission)   Assessment & Plan     History of pre-DM per patient. Not on medications, states he has a hard time staying away from \"sweets\".   - A1C pending  - insulin sliding scale as we suspect increase blood sugars in the setting of decadron use      Hypertension- (present on admission)   Assessment & Plan     Normotensive now.  - continue home regimen, will need to bring azilsartan from home      Atrial fibrillation (HCC)- (present on admission)   Assessment & Plan     Chronic A fib, rate controlled. On pradaxa and bystolic  - holding pradaxa   - continue with metoprolol      History of prostate cancer- (present on admission)   Assessment & Plan     States he had surgery to remove it roughly 2 years ago      Dyslipidemia- (present on admission)   Assessment & Plan     Continue atorvastatin            VTE prophylaxis: SCDs    Richard Maldonado M.D. Physician Signed Surgery Neurosurgery Consults Date of Service: 7/6/2019  1:20 PM      Expand All Collapse All      Neurosurgery Consult Note     Patient: Marcio More     MRN: [id number]     Date of Consultation: 7/6/2019     Reason for Consultation: Right frontal brain tumor     Referring Physician: Dr. Hina Starr        Marcio More is a 73-year-old gentleman who is a previous patient of Dr. Ruffin.  He now presents with primarily confusion and is found to have a large frontal tumor with extensive vasogenic edema.  He had does have a history of prostate cancer, so metastatic disease is a possibility, but the tumor could also represent meningioma with malignant " transformation.  The patient has been started on Decadron and Keppra.  Antiplatelets and anticoagulants have been held in anticipation of surgery.  MRI of the brain including Stealth MRI has been done.  We will keep the patient n.p.o. after midnight tomorrow night in case there is time or surgery on Monday.  Dr. Ruffin will assume care of the patient on Monday.        Richard Maldonado M.D.    Fahad Jorge M.D. Physician Signed Pulmonary  Consults Date of Service: 7/10/2019  1:11 PM      Expand All Collapse All    Critical Care Consultation     Date of consult: 7/10/2019     Referring Physician  MOHINI Lee*     Reason for Consultation  Right frontal brain mass           Assessment and Plan:  Marcio More is a 73-year-old gentleman who is a previous patient of Dr. Ruffin.  He now presents with primarily confusion and is found to have a large frontal tumor with extensive vasogenic edema.  He had does have a history of prostate cancer, so metastatic disease is a possibility, but the tumor could also represent meningioma with malignant transformation.  The patient has been started on Decadron and Keppra.  Antiplatelets and anticoagulants have been held in anticipation of surgery.  MRI of the brain including Stealth MRI has been done.  We will keep the patient n.p.o. after midnight tomorrow night in case there is time or surgery on Monday.  Dr. Ruffin will assume care of the patient on Monday.        SHERON Osborn M.D. Physician Signed Surgery Neurosurgery OP Report Date of Service: 7/10/2019 12:00 AM         []Hide copied text  []Hover for attribution information  DATE OF SERVICE:  07/10/2019     PREOPERATIVE DIAGNOSIS:  Large right-sided intraaxial frontal mass.     POSTOPERATIVE DIAGNOSIS:  Large right-sided intraaxial frontal mass.     PROCEDURE:    1.  Right-sided pterional craniotomy for resection of brain tumor.  2.  Use of operative microscope for  microdissection.     SURGEON:  MD Patt Aguila M.D. Physician Signed Hematology & Oncology Consults Date of Service: 7/12/2019  9:28 AM           DATE OF SERVICE:  07/12/2019     HEMATOLOGY AND ONCOLOGY CONSULTATION     ADMITTING PHYSICIAN:  Hina Starr MD     REQUESTING PHYSICIAN:  Son Ruffin MD     CONSULTING PHYSICIAN:  Patt Ferrer MD     REASON FOR CONSULTATION:  Newly diagnosed high grade CNS lymphoma.           ASSESSMENT AND PLAN:  The patient is a 73-year-old male who has been in   excellent performance status, found to have right frontal mass on a CT scan   and MRI, underwent a resection on 07/10/2019, showed high grade B cell   lymphoma.  I had a long discussion with the patient regarding the diagnosis   and explained to him that once he recovers from his surgery, he will need an   additional workup with a PET CT scan, bone marrow biopsy, lumbar puncture for   evaluation of the CSF fluid, ultrasound of the testicle and spinal MRI   including the blood work, CBC, CMP, hepatitis B and HIV panel.  The standard   approach would be treating with systemic therapy, high-dose methotrexate plus   alkylating agent Temodar in combination with Rituxan since CD 20 + , have shown increased response rates and high   complete remission.  I did explain to him this is a treatable condition not curable.  I   have placed a call to speak to his wife, Mell, but could not reach her today   and I will get hold of family to discuss plan.  Our team will follow him   peripherally, and once he recovers from a surgical point of view, we will order  the staging workup and plan to start treatment 3-4 weeks from his surgical date.   - He will see me in my office for follow up and finalize on plan     Thank you for allowing me to participate in his care.  Please call if any   questions arise.        ____________________________________     Patt Ferrer MD     SR /  "NTS     DD:  07/12/2019 09:28:37      Co-morbidities: as listed above and below   Potential Risk - Complications: Cognitive Impairment, Deep Vein Thrombosis, Dysphagia, Incontinence, Malnutrition, Pain, Perceptual Impairment, Pneumonia and Seizures  Level of Risk: High    Ongoing Medical Management Needed (Medical/Nursing Needs):   Patient Active Problem List    Diagnosis Date Noted   • Intracranial mass 07/06/2019     Priority: High   • Myelopathy (HCC) 05/15/2016     Priority: High   • Thoracic stenosis 05/15/2016     Priority: High     Class: Acute   • Ileus (HCC) 05/15/2016     Priority: High     Class: Acute   • Hyponatremia 07/12/2019     Priority: Medium   • Atrial fibrillation (HCC) 05/15/2016     Priority: Medium     Class: Chronic   • Hypertension 05/15/2016     Priority: Medium     Class: Chronic   • CAD (coronary artery disease) 05/15/2016     Priority: Medium     Class: Chronic   • Hyperglycemia 05/15/2016     Priority: Medium     Class: Acute   • Azotemia 05/15/2016     Priority: Medium     Class: Acute   • Chronic back pain 05/15/2016     Priority: Low     Class: Chronic   • Dyslipidemia 05/15/2016     Priority: Low     Class: Chronic   • History of prostate cancer 07/06/2019     Current Vital Signs:   Temperature: 36.6 °C (97.9 °F) Pulse: 83 Respiration: 16 Blood Pressure : 127/89  Weight: 97.5 kg (215 lb) Height: 188 cm (6' 2\")  Pulse Oximetry: 96 % O2 (LPM): 0      Completed Laboratory Reports:  Recent Labs      07/12/19   1757  07/12/19   2026  07/13/19   0404  07/13/19   0803  07/13/19   1152  07/13/19   1618  07/13/19   2048  07/14/19   0304  07/14/19   0744  07/14/19   1154  07/14/19   1734  07/14/19   2114  07/15/19   0343  07/15/19   0837  07/15/19   1316   WBC   --    --   14.3*   --    --    --    --   13.9*   --    --    --    --    --    --    --    HEMOGLOBIN   --    --   13.9*   --    --    --    --   13.6*   --    --    --    --    --    --    --    HEMATOCRIT   --    --   41.6*   --   "  --    --    --   41.9*   --    --    --    --    --    --    --    PLATELETCT   --    --   147*   --    --    --    --   174   --    --    --    --    --    --    --    SODIUM   --    --   136   --    --    --    --   131*   --    --    --    --   132*   --    --    POTASSIUM   --    --   4.8   --    --    --    --   4.9   --    --    --    --   4.6   --    --    BUN   --    --   31*   --    --    --    --   33*   --    --    --    --   33*   --    --    CREATININE   --    --   0.88   --    --    --    --   0.86   --    --    --    --   0.87   --    --    GLUCOSE   --    --   237*   --    --    --    --   219*   --    --    --    --   240*   --    --    POCGLUCOSE  383*  351*   --   230*  390*  356*  326*   --   223*  340*  335*  353*   --   250*  367*   See epic for pathology   Additional Labs: Not Applicable    Prior Living Situation:   Housing / Facility: 1 Story House  Steps Into Home: 2  Lives with - Patient's Self Care Capacity: Spouse  Equipment Owned: None    Prior Level of Function / Living Situation:   Physical Therapy: Prior Services: None  Housing / Facility: 1 Story House  Steps Into Home: 2  Bathroom Set up: Walk In Shower, Grab Bars  Equipment Owned: None  Lives with - Patient's Self Care Capacity: Spouse  Bed Mobility: Independent  Transfer Status: Independent  Ambulation: Independent  Assistive Devices Used: None  Stairs: Independent  Current Level of Function:   Level Of Assist: Supervised  Assistive Device: Front Wheel Walker  Distance (Feet):  (hallway distances w/FWW)  Deviation: Shuffled Gait  # of Stairs Climbed: 2 (w/2 rails to ascend. Single rail going sideway to descend.)  Level of Assist with Stairs: Minimal Assist  Weight Bearing Status: No restrictions  Skilled Intervention: Verbal Cuing, Postural Facilitation, Compensatory Strategies  Comments: Improvement w/his amb efforts from previous PT session. Pt tolerating hallway distances w/FWW, slow gait bernie. Will need to re-try steps,  "today needing min assist.   Supine to Sit: Minimal Assist (HOB flat and no railing)  Sit to Supine: Supervised (HOB flat and no railing)  Scooting: Supervised (seated)  Rolling: Minimum Assist to Lt. (no rail)  Skilled Intervention: Verbal Cuing, Postural Facilitation, Compensatory Strategies  Comments: Wife present during his bed mobility. She stated she helped him to get seated PTA. No assist needed to get LE's onto the bed.   Sit to Stand: Contact Guard Assist  Bed, Chair, Wheelchair Transfer: Contact Guard Assist  Toilet Transfers: Contact Guard Assist  Transfer Method: Stand Pivot  Skilled Intervention: Compensatory Strategies, Postural Facilitation, Sequencing, Tactile Cuing, Verbal Cuing  Sitting in Chair: 55  Sitting Edge of Bed: 10 min   Standin  Occupational Therapy:   Self Feeding: Independent  Grooming / Hygiene: Independent  Bathing: Independent  Dressing: Independent  Toileting: Independent  Medication Management: Independent  Laundry: Independent  Kitchen Mobility: Independent  Finances: Independent  Home Management: Independent  Shopping: Independent  Prior Level Of Mobility: Independent Without Device in Community, Independent Without Device in Home  Driving / Transportation: Driving Independent  Prior Services: None  Housing / Facility: [address] [place] [address]  Occupation (Pre-Hospital Vocational): 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 Oral mech exam notable for L facial droop, reduced labiofacial ROM. Voice was significantly hypophonic and mildly hoarse. Pt was unable to modulate his vocal intensity to produce a louder voice, despite mod " "cues. Oral care completed prior to administration of PO. Pt was then presented with ice chips, NTL via tsp/cup, thins via tsp/cup, purees (thin/thick). Pharyngeal swallows were timely. Hyolaryngeal excursion palpated as complete. Pt occasionally swallowed 2x per bolus, concerning for pharyngeal retention. Wet/gurgly voice and throat clearing noted with thins via cup and pudding. No overt s/sx of aspiration noted with applesauce, NTL, ice chips. Recommend modifying diet to a Nectar thick full liquid with direct supervision for meals and single ice chips b/w meals after oral care.  Problem List: Dysphagia, Voice Quality Deficit  Diet / Liquid Recommendation: Nectar Thick Liquid  Comments: Pt seen this date for dysphagia therapy. Pt sitting upright in bed, eager for PO. Continues with decreased attention to task and tangential/unrelated comments throughout session as well as poor insight into deficits. Pt consumed tsp trials of applesauce and required maximum verbal cueing in order to consistently execute double swallow strategy however hyolaryngeal elevation was palpated as complete. Also required hand over hand in order to refrain from impulsive eating and taking large bites. 1/2 tsp of mashed potatoes resulted in suspected laryngeal pumping upon palpation and subtle increase in wet vocal quality, which is concerning for possible penetration/aspiration. Pt required verbal cue in order to execute second swallow. Attempted instruction of laryngeal elevation exercise high pitch 'e', however pt completed with \"poor\" accuracy despite model and tactile cueing. At this time, recommend pt continue with NTFL diet with direct 1:1 supervision and strict adherence to posted swallow strategies. Hold PO with any difficulty. SLP to continue following.  Rehabilitation Prognosis/Potential: Good  Estimated Length of Stay: 10-14 days    Nursing:   Orientation : Oriented x 4  Continent    Scope/Intensity of Services " Recommended:  Physical Therapy: 1 hr / day  5 days / week. Therapeutic Interventions Required: Maximize Endurance, Mobility, Strength, Safety and balance   Occupational Therapy: 1 hr / day 5 days / week. Therapeutic Interventions Required: Maximize Self Care, ADLs, IADLs and Energy Conservation  Speech & Language Pathology: 1 hr / day 5 days / week. Therapeutic Interventions Required: Maximize Cognition, Swallowing and Safety  Rehabilitation Nursin/7. Therapeutic Interventions Required: Monitor Pain, Skin, Wound(s), Vital Signs, Intake and Output, Labs, Safety, Aspiration Risk and Family Training  Rehabilitation Physician: 3 - 5 days / week. Therapeutic Interventions Required: Medical Management  Respiratory Care: evaluate . Therapeutic Interventions Required: Pulmonary Toileting  Dietician: consult . Therapeutic Interventions Required: nutritional need.     Rehabilitation Goals and Plan (Expected frequency & duration of treatment in the IRF):   Return to the Community and Modified Independent Level of Care  Anticipated Date of Rehabilitation Admission: 2019  Patient/Family oriented IRF level of care/facility/plan: Yes  Patient/Family willing to participate in IRF care/facility/plan: Yes  Patient able to tolerate IRF level of care proposed: Yes  Patient has potential to benefit IRF level of care proposed: Yes  Comments: Not Applicable    Special Needs or Precautions - Medical Necessity:  Safety Concerns/Precautions:  Fall Risk / High Risk for Falls, Balance and Cognition  Complex Wound Care: post surgical   Pain Management  Cardiac Precautions  Current Medications:    Current Facility-Administered Medications Ordered in Epic   Medication Dose Route Frequency Provider Last Rate Last Dose   • dexamethasone (DECADRON) tablet 4 mg  4 mg Oral Q8HRS Kevyn Archer, A.P.N.   4 mg at 07/15/19 1400    Followed by   • [START ON 2019] dexamethasone (DECADRON) tablet 4 mg  4 mg Oral Q12HRS Kevyn Archer  A.P.N.        Followed by   • [START ON 7/18/2019] dexamethasone (DECADRON) tablet 2 mg  2 mg Oral Q12HRS SHAHRAM ChoudharyP.N.        Followed by   • [START ON 7/20/2019] dexamethasone (DECADRON) tablet 1 mg  1 mg Oral Q12HRS MAYLIN Choudhary.P.N.       • levETIRAcetam (KEPPRA) tablet 500 mg  500 mg Oral BID Izaiah Esparza M.D.       • insulin glargine (LANTUS) injection 10 Units  10 Units Subcutaneous Q EVENING Izaiah Esparza M.D.   10 Units at 07/14/19 1735   • insulin regular (HUMULIN R) injection 3-14 Units  3-14 Units Subcutaneous 4X/DAY ASIM Barajas M.D.   12 Units at 07/15/19 1405    And   • glucose 4 g chewable tablet 16 g  16 g Oral Q15 MIN PRN Stephan Barajas M.D.        And   • DEXTROSE 10% BOLUS 250 mL  250 mL Intravenous Q15 MIN PRN Stephan Barajas M.D.       • Pharmacy Consult Request ...Pain Management Review 1 Each  1 Each Other PHARMACY TO DOSE MAYLIN Forte.P.RAFFY.       • MD ALERT...DO NOT ADMINISTER NSAIDS or ASPIRIN unless ORDERED By Neurosurgery 1 Each  1 Each Other PRN MAYLIN Forte.P.N.       • ondansetron (ZOFRAN) syringe/vial injection 4 mg  4 mg Intravenous Q4HRS PRN Angelita Landrum A.P.N.       • diphenhydrAMINE (BENADRYL) injection 25 mg  25 mg Intravenous Q6HRS PRN Angelita Landrum A.P.N.       • scopolamine (TRANSDERM-SCOP) patch 1 Patch  1 Patch Transdermal Q72HRS PRN MAYLIN Forte.P.N.       • hydrALAZINE (APRESOLINE) injection 10 mg  10 mg Intravenous Q HOUR PRN Angelita Landrum A.P.N.       • docusate sodium (COLACE) capsule 100 mg  100 mg Oral BID Angelita Landrum A.P.N.   100 mg at 07/15/19 0600   • senna-docusate (PERICOLACE or SENOKOT S) 8.6-50 MG per tablet 1 Tab  1 Tab Oral Nightly MAYLIN Forte.P.N.   1 Tab at 07/14/19 2110   • senna-docusate (PERICOLACE or SENOKOT S) 8.6-50 MG per tablet 1 Tab  1 Tab Oral Q24HRS PRN Angelita Landrum, MAYLIN.P.N.       • polyethylene glycol/lytes (MIRALAX) PACKET 1 Packet  1  Packet Oral BID PRN Angelita Landrum, A.P.N.   1 Packet at 07/13/19 0549   • magnesium hydroxide (MILK OF MAGNESIA) suspension 30 mL  30 mL Oral QDAY PRN Angelita Landrum, A.P.N.   30 mL at 07/13/19 0549   • bisacodyl (DULCOLAX) suppository 10 mg  10 mg Rectal Q24HRS PRN Angelita Landrum, A.P.N.       • fleet enema 133 mL  1 Each Rectal Once PRN Angelita Landrum, A.P.N.       • oxyCODONE immediate release (ROXICODONE) tablet 10 mg  10 mg Oral Q3HRS PRN Angelita Landrum, A.P.N.       • oxyCODONE immediate-release (ROXICODONE) tablet 5 mg  5 mg Oral Q3HRS PRN Angelita Landrum, A.P.N.   5 mg at 07/11/19 1254   • artificial tears 1.4 % ophthalmic solution 2 Drop  2 Drop Both Eyes Q8HRS Angelita Landrum, A.P.N.   2 Drop at 07/15/19 0600   • acetaminophen (TYLENOL) tablet 650 mg  650 mg Oral Q4HRS PRN Fahad Jorge M.D.   650 mg at 07/14/19 0449   • atorvastatin (LIPITOR) tablet 20 mg  20 mg Oral QHS Hina Starr M.D.   20 mg at 07/14/19 2111   • therapeutic multivitamin-minerals (THERAGRAN-M) tablet 1 Tab  1 Tab Oral DAILY Hina Starr M.D.   1 Tab at 07/15/19 0535   • metoprolol (LOPRESSOR) tablet 25 mg  25 mg Oral TWICE DAILY Hina Starr M.D.   25 mg at 07/15/19 0600     No current Epic-ordered outpatient prescriptions on file.     Diet:   DIET ORDERS (Through next 24h)    Start     Ordered    07/11/19 1634  Diet Order Full Liquid (no pudding)  START AT DINNER     Question Answer Comment   Diet: Full Liquid no pudding   Consistency/Fluid modifications: Nectar Thick    Miscellaneous modifications: SLP - 1:1 Supervision by Nursing    Miscellaneous modifications: SLP - Deliver to Nursing Station        07/11/19 1633    07/06/19 1338  Supplements  ONCE     Question:  Which Supplement  Answer:  Per RD    07/06/19 2915          Anticipated Discharge Destination / Patient/Family Goal:  Destination: Home with Assistance Support System: Spouse and Family   Anticipated home health services:  OT, PT, SLP, Nursing and Aide  Previously used HH service/ provider: Not Applicable  Anticipated DME Needs: Walker  Outpatient Services: OT, PT and SLP  Alternative resources to address additional identified needs:     Pre-Screen Completed: 7/15/2019 4:32 PM Michele Campbell

## 2019-07-15 NOTE — DISCHARGE PLANNING
Anticipated Discharge Disposition:   TBD , pt prefers [facility]    Action:   Met with family. They prefer not to go to SNF instead they prefer [facility].   Requested PMR order from Dr. Orozco. Notified IRF [facility] staff about order for PMR.   Choice list for SNF given to wife as a secondary choice.     Barriers to Discharge:   Pending PMR recommendation    Plan:   CM following.   Follow up with PMR re: recommendation.

## 2019-07-15 NOTE — PROGRESS NOTES
Hospital Medicine Daily Progress Note    Date of Service  7/15/2019    Chief Complaint  73 y.o. male admitted 7/5/2019 with confusion.    Hospital Course    Mr. More has a history of prostate cancer status post resection, hypertension, atrial fibrillation.  Patient presented to the emergency room on 7/5/2019 with weakness and increased fatigue.  Evaluation at the outside hospital emergency room demonstrated a 4.5 cm right frontal lobe mass.  Patient was transferred to [facility] for higher level of care and neurosurgery coverage.  On 7/10/2019 he underwent right-sided craniotomy.  He was transferred to the ICU postoperatively.  Patient transferred to neuro floor on July 13, 2019 for further management.        Interval Problem Update    I evaluated and examined this patient at bedside.  He reported he does not feel headache this morning.  He again complains about the food and I discussed about speech therapy recommendations.  White blood cell count is trending down.  Sodium is slightly improved from yesterday and current sodium level is 132.  I discussed plan of care with him   I further increase the dose of Lantus to 12 units.    Consultants/Specialty  Intensivist  Neurosurgery  Oncology  Physiatry  Code Status  Full Code    Disposition  Transfer to neuro floor today    D/W tx team on rounds      Review of Systems  Review of Systems   Constitutional: Negative for chills and fever.   HENT: Negative for hearing loss.    Eyes: Negative for blurred vision and double vision.   Respiratory: Negative for cough and shortness of breath.    Cardiovascular: Negative for chest pain, palpitations and leg swelling.   Gastrointestinal: Negative for abdominal pain, heartburn, nausea and vomiting.   Genitourinary: Negative for dysuria and urgency.   Musculoskeletal: Negative for myalgias.   Neurological: Positive for headaches. Negative for dizziness, speech change and focal weakness.   Psychiatric/Behavioral:  Negative for hallucinations.   All other systems reviewed and are negative.       Physical Exam  Temp:  [36.5 °C (97.7 °F)-37.4 °C (99.3 °F)] 36.7 °C (98.1 °F)  Pulse:  [] 64  Resp:  [16-18] 16  BP: (131-148)/(79-97) 131/79  SpO2:  [92 %-97 %] 97 %    Physical Exam   Constitutional: He is oriented to person, place, and time.   He sitting in bed without any acute distress.   HENT:   Head: Normocephalic and atraumatic.   Eyes: Pupils are equal, round, and reactive to light. Right eye exhibits no discharge. Left eye exhibits no discharge.   Neck: Normal range of motion. Neck supple.   Cardiovascular: Normal rate and regular rhythm.  Exam reveals no friction rub.    No murmur heard.  Pulmonary/Chest: Effort normal and breath sounds normal. No respiratory distress. He has no wheezes.   Abdominal: Soft. Bowel sounds are normal. He exhibits no distension. There is no tenderness. There is no rebound.   Musculoskeletal: Normal range of motion. He exhibits no edema or deformity.   Neurological: He is alert and oriented to person, place, and time. Coordination normal.   Staples present on the right side of the neck.     Skin: Skin is warm and dry. No rash noted. He is not diaphoretic. No erythema.   Psychiatric: He has a normal mood and affect. His behavior is normal.   I performed physical exam on July 15, 2019 it remains similar without any acute changes.    Fluids    Intake/Output Summary (Last 24 hours) at 07/15/19 0658  Last data filed at 07/14/19 2232   Gross per 24 hour   Intake             1440 ml   Output              400 ml   Net             1040 ml       Laboratory  Recent Labs      07/13/19   0404  07/14/19   0304   WBC  14.3*  13.9*   RBC  4.26*  4.30*   HEMOGLOBIN  13.9*  13.6*   HEMATOCRIT  41.6*  41.9*   MCV  97.7  97.4   MCH  32.6  31.6   MCHC  33.4*  32.5*   RDW  49.1  48.1   PLATELETCT  147*  174   MPV  10.1  9.8     Recent Labs      07/13/19   0404  07/14/19   0304  07/15/19   0343   SODIUM  136  131*   132*   POTASSIUM  4.8  4.9  4.6   CHLORIDE  102  101  99   CO2  28  25  26   GLUCOSE  237*  219*  240*   BUN  31*  33*  33*   CREATININE  0.88  0.86  0.87   CALCIUM  8.5  8.5  8.4*                   Imaging  [id number] VIEW   Final Result      No dilated bowel identified      [id number]   Final Result      1.  No intratesticular mass or evidence of torsion.      2.  Small bilateral hydroceles.      3.  Limited bilateral microlithiasis.      MR-BRAIN-WITH & W/O   Final Result         1.  Recent right frontal craniotomy for resection of intra-axial brain neoplasm.      2.  Large postsurgical defect in the right anterior frontal lobe which contains a small amount of hemorrhagic debris. Further there is evidence of small irregular areas of peripheral enhancement adjacent to the anterior and medial margins of the    resection site which may represent postoperative change or residual neoplasm.      3.  There is a marked amount of increased T2 signal intensity surrounding the postoperative site involving the right frontal and right temporal white matter and also the left frontal periventricular white matter. This likely represents residual neoplasm.      4.  Marked bifrontal pneumocephalus. There are small postoperative subdural seroma deep to the craniotomy site.      5.  Persistent effacement of the right frontal horn and body the right lateral ventricle with 8 mm of right to left midline shift the ventricular system.      DX-CHEST-PORTABLE (1 VIEW)   Final Result      Placement left subclavian central line with tip at the origin of the SVC.   No pneumothorax.   Bilateral atelectasis with edema not excluded.      EC-ECHOCARDIOGRAM COMPLETE W/O CONT   Final Result      MR-STEALTH BRAIN WITH & W/O   Final Result      1.  Stealth localization for a large enhancing and hemorrhagic right anterior frontal intra-axial mass lesion most consistent with glioblastoma.   2.  See MRI brain full report also from today for  further discussion.      MR-BRAIN-WITH & W/O   Final Result      1.  Large hemorrhagic and enhancing lobulated right anterior frontal intra-axial mass lesion associated with severe vasogenic edema. Findings are most consistent with high-grade primary brain tumor such as glioblastoma multiforme or less likely an    unusually large hemorrhagic solitary brain metastasis.   2.  Right to left shift of midline structures.   3.  Mild supratentorial white matter disease in the left cerebral hemisphere.   4.  Indeterminate tiny focus of mildly bright diffusion signal in the right posterior frontal deep white matter which could represent an acute or recent subacute lacunar infarct.      OUTSIDE IMAGES-CT HEAD   Final Result           Assessment/Plan  * Intracranial mass- (present on admission)   Assessment & Plan    Large right frontal 4.5cm mass  Patient is status post craniotomy and resection on July 10, 2090.  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 Ferrer evaluated this patient and made recommendations.  Steroid taper as recommended by neurosurgery.  Neurosurgery is following this patient.  Every 4 hours neuro check.     Hyponatremia   Assessment & Plan    He found to have mild hyponatremia.  Could be related to hyperglycemia.  Continue to monitor     Hyperglycemia- (present on admission)   Assessment & Plan    Uncontrolled with hyperglycemia, his hemoglobin A1c is 9.5  Added Lantus 10 units and a further increase to 12 units today.  Insulin sliding scale with hypoglycemia protocol.  Currently he is on Decadron that could cause increased blood glucose.         CAD (coronary artery disease)- (present on admission)   Assessment & Plan    Continue lipitor   No antiplatelet therapy until cleared by neurosurgery  No active chest pain.     Hypertension- (present on admission)   Assessment & Plan    Currently well controlled.  Continue to monitor         Atrial fibrillation (HCC)- (present on admission)    Assessment & Plan    Continue metoprolol, holding anticoagulation due to surgery  Currently rate controlled.  Continue to monitor.     History of prostate cancer- (present on admission)   Assessment & Plan    Status post surgery approximately 2 years ago     Dyslipidemia- (present on admission)   Assessment & Plan    Continue atorvastatin     Chronic back pain- (present on admission)   Assessment & Plan    Chronic but controlled  Complicated by spinal stenosis history and peripheral neuropathy.         I further increase the dose of Lantus to 12 units.    I discussed plan of care during multidisciplinary rounds.  I requested physiatry consult.    I have seen and examined patient on 7/15/2019. I have reviewed vitals, new labs and imaging. I have discussed POC with RN. There are no changes from (7/14/2019) except for what is mentioned above.     VTE prophylaxis: SCDs

## 2019-07-15 NOTE — DISCHARGE PLANNING
note:  Paged for OT for recent note per Dr. Garner's request.     Addendum:  Talked to OT and she has seen pt today.   Updated daughter   Updated Michele at [facility]

## 2019-07-16 PROBLEM — C85.99: Status: ACTIVE | Noted: 2019-01-01

## 2019-07-16 NOTE — H&P
REHABILITATION HISTORY AND PHYSICAL/POST ADMISSION PHYSICAL EVALUATION    Date of Admission: 7/16/2019  2:28 PM  Marcio More  [id number]    [facility] Code / Diagnosis to Support: 02.1 Non-Traumatic  Etiologic Diagnosis: Primary cerebral lymphoma (HCC)    CC: Decreased function, decreased gait    HPI:  Patient is a 73 y.o. year-old male with a past medical history significant for A fib on AC, CAD, Hx of prostate cancer, HTN, HLD, and WALTER admitted to [facility] on 7/5/2019 11:59 PM with worsening weakness, fatigued and [place]. Patient found to have large 4.5 cm right fontal lobe mass. NSG was consulted and recommended starting Keppra and steroids as well as possible intervention. Patient underwent right sided resection on 7/10/19 with Dr. Ruffin without complication.  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 Hematology and oncology were consulted for high grade B cell lymphoma and recommended systemic chemotherapy. Per oncology would recommend starting 3-4 weeks post-operatively.          Patient was last evaluated by PT on 7/15/19 and was Sofie for bed mobility and supervised for mobility.  Patient was last evaluated by SLP on 7/13/19 and was recommended for NTFL diet with 1:1 supervision due to dysphagia.  Patient was last evaluated by OT on 7/12/19 and was min-modA for ADLs. Patient previously living in a 1 story home with 2 steps to enter; living with spouse.      Patient transported to [facility] on 7/16/19. He reports he is doing well. He reports he was in this facility a few years ago and was very impressed with the therapy. He reports he is currently having some problems with gait and getting out of bed. He reports they upgraded his diet to dysphagia 2 with NTL this morning prior to transfer. Reports some occasional dizziness. Denies NVD. Denies SOB.     REVIEW OF SYSTEMS:     Comprehensive 14 point ROS was reviewed and all were negative except as noted elsewhere in  "this document.     PMH:  Past Medical History:   Diagnosis Date   • Anesthesia     \"too much anesthesia kidneys stop woking and intestines slowed\"   • Arthritis    • Atrial fibrillation (HCC)    • Back pain    • Blood clotting disorder (HCC) 1999    blood clot leg Left   • Breath shortness    • CAD (coronary artery disease)    • Cancer (HCC) 2016    prostate cancer   • Cataract     no surgery   • Dyslipidemia    • High cholesterol    • Hypertension    • Ileus (HCC)    • Myocardial infarct ([facility]) 1999,2006    x3   • Renal disorder     cycst left kidney   • Sleep apnea     no cpap   • Snoring    • Urinary incontinence        PSH:  Past Surgical History:   Procedure Laterality Date   • CRANIOTOMY Right 7/10/2019    Procedure: RIGHT-SIDED CRANIOTOMY FOR TUMOR;  Surgeon: Son Ruffin M.D.;  Location: SURGERY [facility];  Service: Neurosurgery   • LUMBAR LAMINECTOMY DISKECTOMY N/A 11/26/2018    Procedure: LUMBAR LAMINECTOMY DISKECTOMY-  POSTERIOR L1-2 LAMI;  Surgeon: Son Ruffin M.D.;  Location: SURGERY [facility];  Service: Neurosurgery   • LAMINOTOMY Left 11/26/2018    Procedure: LAMINOTOMY-  L4-S1;  Surgeon: Son Ruffin M.D.;  Location: SURGERY [facility];  Service: Neurosurgery   • FORAMINOTOMY Left 11/26/2018    Procedure: FORAMINOTOMY;  Surgeon: Son Ruffin M.D.;  Location: SURGERY [facility];  Service: Neurosurgery   • OTHER NEUROLOGICAL SURG  2016    Thoracic 2-3 fusion    • APPENDECTOMY  2002   • OTHER CARDIAC SURGERY  1999,2002,2006    stents cardiac   • OTHER NEUROLOGICAL SURG      Laminectomy       FAMILY HISTORY:  History reviewed. No pertinent family history.    No family history of lymphoma    MEDICATIONS:  Current Facility-Administered Medications   Medication Dose   • Respiratory Care per Protocol     • Pharmacy Consult Request ...Pain Management Review 1 Each  1 Each   • oxyCODONE immediate-release (ROXICODONE) tablet 5 mg  5 mg   • oxyCODONE " immediate release (ROXICODONE) tablet 10 mg  10 mg   • hydrALAZINE (APRESOLINE) tablet 25 mg  25 mg   • acetaminophen (TYLENOL) tablet 650 mg  650 mg   • senna-docusate (PERICOLACE or SENOKOT S) 8.6-50 MG per tablet 2 Tab  2 Tab    And   • polyethylene glycol/lytes (MIRALAX) PACKET 1 Packet  1 Packet    And   • magnesium hydroxide (MILK OF MAGNESIA) suspension 30 mL  30 mL    And   • bisacodyl (DULCOLAX) suppository 10 mg  10 mg   • artificial tears 1.4 % ophthalmic solution 1 Drop  1 Drop   • benzocaine-menthol (CEPACOL) lozenge 1 Lozenge  1 Lozenge   • mag hydrox-al hydrox-simeth (MAALOX PLUS ES or MYLANTA DS) suspension 20 mL  20 mL   • traZODone (DESYREL) tablet 50 mg  50 mg   • ondansetron (ZOFRAN ODT) dispertab 4 mg  4 mg    Or   • ondansetron (ZOFRAN) syringe/vial injection 4 mg  4 mg   • sodium chloride (OCEAN) 0.65 % nasal spray 2 Spray  2 Spray   • docusate sodium (COLACE) capsule 100 mg  100 mg   • scopolamine (TRANSDERM-SCOP) patch 1 Patch  1 Patch   • insulin regular (HUMULIN R) injection 3-14 Units  3-14 Units    And   • glucose 4 g chewable tablet 16 g  16 g    And   • dextrose 50% (D50W) injection 50 mL  50 mL   • atorvastatin (LIPITOR) tablet 20 mg  20 mg   • dexamethasone (DECADRON) tablet 4 mg  4 mg    Followed by   • dexamethasone (DECADRON) tablet 4 mg  4 mg    Followed by   • [START ON 7/18/2019] dexamethasone (DECADRON) tablet 2 mg  2 mg    Followed by   • [START ON 7/20/2019] dexamethasone (DECADRON) tablet 1 mg  1 mg   • levETIRAcetam (KEPPRA) tablet 500 mg  500 mg   • metoprolol (LOPRESSOR) tablet 25 mg  25 mg   • therapeutic multivitamin-minerals (THERAGRAN-M) tablet 1 Tab  1 Tab       ALLERGIES:  Gabapentin    PSYCHOSOCIAL HISTORY:  Housing / Facility: 1 Story House  Steps Into Home: 2  Lives with - Patient's Self Care Capacity: Spouse  Equipment Owned: None     Prior Level of Function / Living Situation:   Physical Therapy: Prior Services: None  Housing / Facility: 1 Story House  Steps  Into Home: 2  Bathroom Set up: Walk In Shower, Grab Bars  Equipment Owned: None  Lives with - Patient's Self Care Capacity: Spouse  Bed Mobility: Independent  Transfer Status: Independent  Ambulation: Independent  Assistive Devices Used: None  Stairs: Independent  Current Level of Function:   Level Of Assist: Supervised  Assistive Device: Front Wheel Walker  Distance (Feet):  (hallway distances w/FWW)  Deviation: Shuffled Gait  # of Stairs Climbed: 2 (w/2 rails to ascend. Single rail going sideway to descend.)  Level of Assist with Stairs: Minimal Assist  Weight Bearing Status: No restrictions  Skilled Intervention: Verbal Cuing, Postural Facilitation, Compensatory Strategies  Comments: Improvement w/his amb efforts from previous PT session. Pt tolerating hallway distances w/FWW, slow gait bernie. Will need to re-try steps, today needing min assist.   Supine to Sit: Minimal Assist (HOB flat and no railing)  Sit to Supine: Supervised (HOB flat and no railing)  Scooting: Supervised (seated)  Rolling: Minimum Assist to Lt. (no rail)  Skilled Intervention: Verbal Cuing, Postural Facilitation, Compensatory Strategies  Comments: Wife present during his bed mobility. She stated she helped him to get seated PTA. No assist needed to get LE's onto the bed.   Sit to Stand: Contact Guard Assist  Bed, Chair, Wheelchair Transfer: Contact Guard Assist  Toilet Transfers: Contact Guard Assist  Transfer Method: Stand Pivot  Skilled Intervention: Compensatory Strategies, Postural Facilitation, Sequencing, Tactile Cuing, Verbal Cuing  Sitting in Chair: 55  Sitting Edge of Bed: 10 min   Standin  Occupational Therapy:   Self Feeding: Independent  Grooming / Hygiene: Independent  Bathing: Independent  Dressing: Independent  Toileting: Independent  Medication Management: Independent  Laundry: Independent  Kitchen Mobility: Independent  Finances: Independent  Home Management: Independent  Shopping: Independent  Prior Level Of  "Mobility: Independent Without Device in Community, Independent Without Device in Home  Driving / Transportation: Driving Independent  Prior Services: None  Housing / Facility: 1 Story House  Occupation (Pre-Hospital Vocational): 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 Oral [facility] exam notable for L facial droop, reduced labiofacial ROM. Voice was significantly hypophonic and mildly hoarse. Pt was unable to modulate his vocal intensity to produce a louder voice, despite mod cues. Oral care completed prior to administration of PO. Pt was then presented with ice chips, NTL via tsp/cup, thins via tsp/cup, purees (thin/thick). Pharyngeal swallows were timely. Hyolaryngeal excursion palpated as complete. Pt occasionally swallowed 2x per bolus, concerning for pharyngeal retention. Wet/gurgly voice and throat clearing noted with thins via cup and pudding. No overt s/sx of aspiration noted with applesauce, NTL, ice chips. Recommend modifying diet to a Nectar thick full liquid with direct supervision for meals and single ice chips b/w meals after oral care.  Problem List: Dysphagia, Voice Quality Deficit  Diet / Liquid Recommendation: Nectar Thick Liquid  Comments: Pt seen this date for dysphagia therapy. Pt sitting upright in bed, eager for PO. Continues with decreased attention to task and tangential/unrelated comments throughout session as well as poor insight into deficits. Pt consumed tsp trials of applesauce and required maximum verbal cueing in order to consistently execute double swallow strategy however hyolaryngeal elevation was palpated as complete. Also " "required hand over hand in order to refrain from impulsive eating and taking large bites. 1/2 tsp of mashed potatoes resulted in suspected laryngeal pumping upon palpation and subtle increase in wet vocal quality, which is concerning for possible penetration/aspiration. Pt required verbal cue in order to execute second swallow. Attempted instruction of laryngeal elevation exercise high pitch 'e', however pt completed with \"poor\" accuracy despite model and tactile cueing. At this time, recommend pt continue with NTFL diet with direct 1:1 supervision and strict adherence to posted swallow strategies. Hold PO with any difficulty. SLP to continue following.  Rehabilitation Prognosis/Potential: Good  Estimated Length of Stay: 10-14 days    CURRENT LEVEL OF FUNCTION:   Same as level of function prior to admission to [facility]    PHYSICAL EXAM:     VITAL SIGNS:   height is 1.854 m (6' 1\") and weight is 94.1 kg (207 lb 8 oz). His temporal temperature is 36.6 °C (97.9 °F). His blood pressure is 98/64 (abnormal) and his pulse is 74. His respiration is 18.     GENERAL: No apparent distress  HEENT: EOMI and PERRL; surgical incision well healing right sided across midline with staples  CARDIAC: Regular rate and rhythm, normal S1, S2   LUNGS: Clear to auscultation   ABDOMINAL: bowel sounds present, soft, nontender and nondistended    EXTREMITIES: no contractures, spasticity, or edema.  No calf tenderness bilaterally.  NEURO:  Mental status:  A&Ox4 (person, place, date, situation) answers questions appropriately  Speech: fluent, no aphasia, very mild dysarthria, tangential at times  CRANIAL NERVES: CN 2-12  Motor:  4+/5 BUE, mild ataxia on left  4/5 LLE, 4+/5 RLE, gait shuffling at times but improves with longer ambulation  Sensory: intact to light touch through out  DTRs: 3+ on left biceps, 1+ on right    RADIOLOGY:                          7/11/19 post-op  Results for orders placed during the hospital " encounter of 07/05/19   MR-BRAIN-WITH & W/O    Impression 1.  Recent right frontal craniotomy for resection of intra-axial brain neoplasm.    2.  Large postsurgical defect in the right anterior frontal lobe which contains a small amount of hemorrhagic debris. Further there is evidence of small irregular areas of peripheral enhancement adjacent to the anterior and medial margins of the   resection site which may represent postoperative change or residual neoplasm.    3.  There is a marked amount of increased T2 signal intensity surrounding the postoperative site involving the right frontal and right temporal white matter and also the left frontal periventricular white matter. This likely represents residual neoplasm.    4.  Marked bifrontal pneumocephalus. There are small postoperative subdural seroma deep to the craniotomy site.    5.  Persistent effacement of the right frontal horn and body the right lateral ventricle with 8 mm of right to left midline shift the ventricular system.                                                                                                  MRI 7/6/19  Impression       1.  Large hemorrhagic and enhancing lobulated right anterior frontal intra-axial mass lesion associated with severe vasogenic edema. Findings are most consistent with high-grade primary brain tumor such as glioblastoma multiforme or less likely an   unusually large hemorrhagic solitary brain metastasis.  2.  Right to left shift of midline structures.  3.  Mild supratentorial white matter disease in the left cerebral hemisphere.  4.  Indeterminate tiny focus of mildly bright diffusion signal in the right posterior frontal deep white matter which could represent an acute or recent subacute lacunar infarct.               LABS:  Recent Labs      07/14/19   0304  07/15/19   0343  07/16/19   0254   SODIUM  131*  132*  131*   POTASSIUM  4.9  4.6  4.6   CHLORIDE  101  99  98   CO2  25  26  27   GLUCOSE  219*  240*  237*    BUN  33*  33*  37*   CREATININE  0.86  0.87  0.92   CALCIUM  8.5  8.4*  8.5     Recent Labs      07/14/19   0304  07/16/19   0254   WBC  13.9*  14.8*   RBC  4.30*  4.17*   HEMOGLOBIN  13.6*  13.2*   HEMATOCRIT  41.9*  39.4*   MCV  97.4  94.5   MCH  31.6  31.7   MCHC  32.5*  33.5*   RDW  48.1  45.1   PLATELETCT  174  217   MPV  9.8  9.6             PRIMARY REHAB DIAGNOSIS:    This patient is a 73 y.o. male admitted for acute inpatient rehabilitation with Primary cerebral lymphoma (HCC).    IMPAIRMENTS:   Cognitive  ADLs/IADLs  Mobility  Swallow    SECONDARY DIAGNOSIS/MEDICAL CO-MORBIDITIES AFFECTING FUNCTION:   A fib on AC, CAD, Hx of prostate cancer, HTN, HLD, and WALTER     RELEVANT CHANGES SINCE PREADMISSION EVALUATION:    Status unchanged    The patient's rehabilitation potential is Very Good  The patient's medical prognosis is fair    PLAN:   Discussion and Recommendations:   1. The patient requires an acute inpatient rehabilitation program with a coordinated program of care at an intensity and frequency not available at a lower level of care. This recommendation is substantiated by the patient's medical physicians who recommend that the patient's intervention and assessment of medical issues needs to be done at an acute level of care for patient's safety and maximum outcome.   2. A coordinated program of care will be supplied by an interdisciplinary team of physical therapy, occupational therapy, rehab physician, rehab nursing, and, if needed, speech therapy and rehab psychology. Rehab team presents a patient-specific rehabilitation and education program concentrating on prevention of future problems related to accessibility, mobility, skin, bowel, bladder, sexuality, and psychosocial and medical/surgical problems.   3. Need for Rehabilitation Physician: The rehab physician will be evaluating the patient on a multi-weekly basis to help coordinate the program of care. The rehab physician communicates between  medical physicians, therapists, and nurses to maximize the patient's potential outcome. Specific areas in which the rehab physician will be providing daily assessment include the following:   A. Assessing the patient's heart rate and blood pressure response (vitals monitoring) to activity and making adjustments in medications or conservative measures as needed.   B. The rehab physician will be assessing the frequency at which the program can be increased to allow the patient to reach optimal functional outcome.   C. The rehab physician will also provide assessments in daily skin care, especially in light of patient's impairments in mobility.   D. The rehab physician will provide special expertise in understanding how to work with functional impairment and recommend appropriate interventions, compensatory techniques, and education that will facilitate the patient's outcome.   4. Rehab R.N.   The rehab RN will be working with patient to carry over in room mobility and activities of daily living when the patient is not in 3 hours of skilled therapy. Rehab nursing will be working in conjunction with rehab physician to address all the medical issues above and continue to assess laboratory work and discuss abnormalities with the treating physicians, assess vitals, and response to activity, and discuss and report abnormalities with the rehab physician. Rehab RN will also continue daily skin care, supervise bladder/bowel program, instruct in medication administration, and ensure patient safety.   5. Rehab Therapy: Therapies to treat at intensity and frequency of (may change after completion of evaluation by all therapeutic disciplines):       PT:  Physical therapy to address mobility, transfer, gait training and evaluation for adaptive equipment needs 1 hour/day at least 5 days/week for the duration of the ELOS (see below)       OT:  Occupational therapy to address ADLs, self-care, home management training, functional  mobility/transfers and assistive device evaluation, and community re-integration 1  hour/day at least 5 days/week for the duration of the ELOS (see below).        ST/Dysphagia:  Speech therapy to address speech, language, and cognitive deficits as well as swallowing difficulties with retraining/dysphagia management and community re-integration with comprehension, expression, cognitive training 1  hour/day at least 5 days/week for the duration of the ELOS (see below).     Medical management / Rehabilitation Issues/ Adverse Potential as part of rehabilitation plan     REHABILITATION ISSUES/ADVERSE POTENTIAL:  1. nonTraumatic Brain injury - 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 Ruffin on 7/10/19. Patient demonstrates functional deficits in cognition, behavior, strength, balance, coordination, and ADL's. The patient requires therapy to correct these deficits prior to discharge. Patient is admitted to [facility] for comprehensive rehabilitation therapy, including physical, occupational and speech therapy.     Rehabilitation nursing monitors bowel and bladder control, educates on medication administration, co-morbidities and monitors patient safety.    2.  Neurostimulants: None at this time but continue to assess daily for need to initiate should status change.    3.  DVT prophylaxis:  Patient is high risk for bleeding, hold on AC until signed off by NSG. Encourage OOB. Monitor daily for signs and symptoms of DVT including but not limited to swelling and pain to prevent the development of DVT that may interfere with therapies.    4.  GI prophylaxis:  On prilosec to prevent gastritis/dyspepsia which may interfere with therapies.    5.  Pain: No issues with pain currently / Controlled with APAP/Tramadol    6.  Nutrition/Dysphagia: Dietician monitors nutrient intake, recommend supplements prn and provide nutrition education to pt/family to promote optimal nutrition for  wound healing/recovery.     7.  Bladder/bowel:  Start bowel and bladder program as described below, to prevent constipation, urinary retention (which may lead to UTI), and urinary incontinence (which will impact upon pt's functional independence).   - Post void bladder scans, I&O cath for PVRs >400  - up to commode after meal     8.  Skin/dermal ulcer prophylaxis: Monitor for new skin conditions with q.2 h. turns as required to prevent the development of skin breakdown.     9.  Cognition/Behavior:  Psychologist Dr. Mace provides adjustment counseling to illness and psychosocial barriers that may be potential barriers to rehabilitation.     10. Respiratory therapy: RT performs O2 management prn, breathing retraining, pulmonary hygiene and bronchospasm management prn to optimize participation in therapies.     MEDICAL CO-MORBIDITIES/ADVERSE POTENTIAL AFFECTING FUNCTION:   nonTraumatic Brain injury - 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 Ruffin on 7/10/19  -Staples out 10-14 days post-op. Follow-up with Dr. Ruffin in 1-2 weeks. Follow-up with Oncology  -Steroid taper until 7/21/19. Keppra prophylaxis until 7/18/19  -PT and OT for mobility and ADLs  -SLP for cognition/speech    Dysphagia - Upgraded to dysphagia 2 with NTL prior to transfer.   -SLP for swallow evaluation. May need MBSS    DM - Patient on SSI on transfer.  Previously on Lantus 12 U qEvening     A fib/HTN - Patient on metoprolol 25 mg BID. Previously on anticoagulation. No ASA or AC per NSG at discharge. Previously on Azilsartan 40 mg daily    HLD - Patient on Atorvastatin on transfer.    GI Ppx - Patient on stool softeners while on pain medications and PPI while on steroids.     DVT Ppx - Patient is high risk for bleed s/p removal of hemorrhagic tumor. Continue to monitor ambulation.     I personally performed a complete drug regimen review and no potential clinically significant medication issues were identified.     Pt  was seen today for 80 min, and entire time spent in face-to-face contact was >50% in counseling and coordination of care as detailed in A/P above.        GOALS/EXPECTED LEVEL OF FUNCTION BASED ON CURRENT MEDICAL AND FUNCTIONAL STATUS (may change based on patient's medical status and rate of impairment recovery):  Transfers:   Modified Independent  Mobility/Gait:   Modified Independent  ADL's:   Modified Independent  Cognition:  Marco Antonio  Swallowing:  Regular diet    DISPOSITION: Discharge to pre-morbid independent living setting with the supportive care of patient's family.    ELOS: 7-14 days

## 2019-07-16 NOTE — DISCHARGE INSTRUCTIONS
Discharge Instructions    Discharged to other by medical transportation with escort. Discharged via wheelchair, hospital escort: Yes.  Special equipment needed: Walker    Be sure to schedule a follow-up appointment with your primary care doctor or any specialists as instructed.     Discharge Plan:   Pneumococcal Vaccine Administered/Refused: Not given - Patient refused pneumococcal vaccine  Influenza Vaccine Indication: Patient Refuses    I understand that a diet low in cholesterol, fat, and sodium is recommended for good health. Unless I have been given specific instructions below for another diet, I accept this instruction as my diet prescription.   Other diet: Full Liquid    Special Instructions: None    · Is patient discharged on Warfarin / Coumadin?   No     Depression / Suicide Risk    As you are discharged from this [facility] facility, it is important to learn how to keep safe from harming yourself.    Recognize the warning signs:  · Abrupt changes in personality, positive or negative- including increase in energy   · Giving away possessions  · Change in eating patterns- significant weight changes-  positive or negative  · Change in sleeping patterns- unable to sleep or sleeping all the time   · Unwillingness or inability to communicate  · Depression  · Unusual sadness, discouragement and loneliness  · Talk of wanting to die  · Neglect of personal appearance   · Rebelliousness- reckless behavior  · Withdrawal from people/activities they love  · Confusion- inability to concentrate     If you or a loved one observes any of these behaviors or has concerns about self-harm, here's what you can do:  · Talk about it- your feelings and reasons for harming yourself  · Remove any means that you might use to hurt yourself (examples: pills, rope, extension cords, firearm)  · Get professional help from the community (Mental Health, Substance Abuse, psychological counseling)  · Do not be alone:Call your Safe Contact-  someone whom you trust who will be there for you.  · Call your local CRISIS HOTLINE 003-6408 or 251-865-0567  · Call your local Children's Mobile Crisis Response Team [place] (190) 288-2205 or www.Upside  · Call the toll free National Suicide Prevention Hotlines   · National Suicide Prevention Lifeline 921-214-XXUL (1598)  · [facility] 800-SUICIDE (269-3237)

## 2019-07-16 NOTE — PROGRESS NOTES
-----------Non-Face-to-Face------------  Prolonged non-face-to-face time was spent on 7/15/19 from 1400 to 1425 and 1540 to 1551 by this reviewer.  This time included medical chart review, medication review, and decision making for the appropriateness of transfer to inpatient rehabilitation.  Please see PM&R Chart Review Consult from 7/15/19 by this provider for detailed summation of the medical chart and the reasoning for current recommendations. In addition to the above, time was spent coordinating care with case management as well as transitional care coordination team in the acceptance and transfer of the patient to the inpatient rehabilitation facility setting.  Patient approved by insurance on 7/15/19 with bed available on 7/16/19

## 2019-07-16 NOTE — THERAPY
"Speech Language Therapy dysphagia treatment completed.   Functional Status:  The patient was seen for dysphagia therapy this date. The patient was awake, alert and oriented to self, location and medical POC. The patient was assisted with transfer to bedside chair with RN in room. The patient was assisted with tray set up of NTFL meal items and required MOD A initially to follow swallow strategies. Patient verbalizes understanding but did not follow swallow strategies despite stating he did. New swallow strategy implemented with improved accuracy in follow through of double swallow. Pudding trials continue to result intermittent throat clear primarily when swallow strategies were not followed. Soft solids and thin liquid trials were completed. Patient consumed soft solids with no overt difficulty or s/s of aspiration however, thin liquids resulted in immediate cough response and persistent throat clearing. At this time, recommend patient upgrade to Dysphagia 2 soft solids with continuation of nectar thick liquids and strict 1:1 supervision during meals.     Recommendations: 1) upgrade to Dysphagia 2 soft solids with continuation of nectar thick liquids and strict 1:1 supervision during meals.     Plan of Care: Will benefit from Speech Therapy 5 times per week    Post-Acute Therapy: Recommend inpatient transitional care services for continued speech therapy services.        See \"Rehab Therapy-Acute\" Patient Summary Report for complete documentation.     "

## 2019-07-16 NOTE — DISCHARGE PLANNING
Anticipated Discharge Disposition:   [facility]     Action:   Received a message from Michele at [facility] that they have accepted pt.    tomorrow at 0930. Dr Esparza aware. Pt, daughter and wife aware and they are very happy.    Barriers to Discharge:   none    Plan:   Dc tomorrow at 0930.

## 2019-07-16 NOTE — PROGRESS NOTES
Patient admitted to facility at 10:15 via wheel chair,  accompanied by hospital transport.  Patient assisted to room and positioned in bed for comfort and safety; call light within reach.  Patient assisted with stowing belongings and oriented to room and facility.  Admission assessment performed, skin checked by 2 RN and documented in computer.  Admission paperwork completed; signed copies placed in chart.  Will continue to monitor.

## 2019-07-16 NOTE — THERAPY
"Speech Language Therapy Evaluation completed to address cognition  Functional Status:  The patient was seen for cognitive-linguistic evaluation this date. The patient was awake, alert and had just finished dysphagia therapy with SLP. The patient was continues to exhibit tangential speech during tasks but is easily re-oriented. The patient was given a variety of standardized and non-standardized cognitive-linguistic assessments. Testing revealed moderate deficits in Attention, topic maintanance, complex information processing, verbal sequencing, written expression and reading comprehension. Education was provided to patient and family regarding results and SLP recommendations. With cues patient was able to identify errors made during cognitive evaluation. At this time, recommend continued cognitive-linguistic therapy to address stated deficits.     Recommendations: continued cognitive-linguistic therapy to address stated deficits.     Plan of Care: Will benefit from Speech Therapy 5 times per week    Post-Acute Therapy: Recommend inpatient transitional care services for continued speech therapy services.        See \"Rehab Therapy-Acute\" Patient Summary Report for complete documentation.   "

## 2019-07-16 NOTE — FLOWSHEET NOTE
07/16/19 1450   Events/Summary/Plan   Events/Summary/Plan Pt has been out of his room since RN assessment.

## 2019-07-16 NOTE — CARE PLAN
Problem: Communication  Goal: The ability to communicate needs accurately and effectively will improve  Outcome: PROGRESSING AS EXPECTED      Problem: Pain Management  Goal: Pain level will decrease to patient's comfort goal  Outcome: PROGRESSING AS EXPECTED      Problem: Psychosocial Needs:  Goal: Level of anxiety will decrease  Outcome: PROGRESSING AS EXPECTED

## 2019-07-16 NOTE — CARE PLAN
Problem: Safety  Goal: Will remain free from injury  Pt uses call light consistently and appropriately. Waits for assistance does not attempt self transfer this shift. Able to verbalize needs.

## 2019-07-16 NOTE — THERAPY
Occupational Therapy   Initial Evaluation     Patient Name: Marcio More  Age:  73 y.o., Sex:  male  Medical Record #: [id number]  Today's Date: 7/16/2019     Subjective    Pt received in room and agreeable to participate in OT evaluation. Pt very pleasant, cooperative and motivated. Pt reports that he was a pt at [facility] 2 years ago after a back surgery.      Objective     07/16/19 1231   Prior Living Situation   Prior Services None;Intermittent Physical Support for ADL Per Family   Housing / Facility 1 Story House  (in [place], [place])   Steps Into Home 3   Steps In Home 0   Rail Right Rail (Steps into Home);Left Rail  (Steps into Home)   Elevator No   Bathroom Set up Walk In Shower;Grab Bars;Shower Chair   Equipment Owned Single Point Cane;Grab Bar(s) In Tub / Shower;Grab Bar(s) By Toilet;Tub / Shower Seat   Lives with - Patient's Self Care Capacity Spouse   Comments pt reports that he has 3 daughters who live close by and can help at d/c.    Prior Level of ADL Function   Self Feeding Independent   Grooming / Hygiene Independent   Bathing Independent   Dressing Requires Assist  (some assist required for lower body dressing d/t LBP)   Toileting Independent   Prior Level of IADL Function   Medication Management Unable To Determine At This Time   Laundry Independent   Kitchen Mobility Independent   Finances Unable To Determine At This Time   Home Management Independent  (shares housework duties with SO/family)   Shopping Unable To Determine At This Time   Prior Level Of Mobility Independent With Device in Community;Independent With Device in Home  (used SPC)   Driving / Transportation Driving Independent   Occupation (Pre-Hospital Vocational) Retired Due To Age  (retired [place] )   Leisure Interests Pets;Other (Comments)  (cars, has a dog, ranching)   IRF-EDEL:  Prior Functioning: Everyday Activities   Self Care Needed some help  (with lower body dressing)   Indoor Mobility (Ambulation) Independent   Stairs  Independent   Functional Cognition Independent   Prior Device Use None of the given options  (SPC)   Cognition    Cognition / Consciousness X   Orientation Level Oriented x 4   Level of Consciousness Alert   Ability To Follow Commands 2 Step   Safety Awareness Impaired   IRF-EDEL:  Cognitive Pattern Assessment   Cognitive Pattern Assessment Used Staff Assessment   IRF-EDEL:  Staff Assessment for Mental Status   Memory/Recall Ability (3-Day Assessment Period) Current season;Location of own room;Recognizes appropriate healthcare setting   Vision Screen   Vision Not tested  (reports difficulty with peripheral vision; to be assessed)   Passive ROM Upper Body   Passive ROM Upper Body WDL   Active ROM Upper Body   Active ROM Upper Body  WDL   Dominant Hand Right   Strength Upper Body   Upper Body Strength  X   Comments generalized weakness, equal bilaterally   Sensation Upper Body   Upper Extremity Sensation  X   Comments reports numbness in thumb, pointer and middle fingers of R hand.   Upper Body Muscle Tone   Upper Body Muscle Tone  WDL   Balance Assessment   Sitting Balance (Static) Fair +   Sitting Balance (Dynamic) Fair   Standing Balance (Static) Fair -   Standing Balance (Dynamic) Poor +   Weight Shift Sitting Fair   Weight Shift Standing Fair   Comments during ADLs and bathroom transfers with FWW.   Coordination Upper Body   Coordination WDL   IRF-EDEL:  Eating   Assistance Needed Set-up / clean-up;Supervision   [place] Physical Assistance Level No physical assistance or only touching/steadying assist   CARE Score 4   Discharge Goal:  Assistance Needed Independent   Discharge Goal:  Physical Assistance Level No physical assistance or only touching/steadying assist   Discharge Goal:  Score 6   IRF-EDEL:  Oral Hygiene   Assistance Needed Supervision;Set-up / clean-up;Incidental touching   Physical Assistance Level No physical assistance   CARE Score 4   Discharge Goal:  Assistance Needed Independent   Discharge Goal:   Physical Assistance Level No physical assistance or only touching/steadying assist   Discharge Goal:  Score 6   IRF-EDEL:  Shower/Bathe Self   Assistance Needed Set-up / clean-up;Supervision;Verbal cues;Physical assistance   Physical Assistance Level Less than 25%   CARE Score 3   Discharge Goal:  Assistance Needed Independent;Adaptive equipment   Discharge Goal:  Physical Assistance Level No physical assistance or only touching/steadying assist   Discharge Goal Score 6   IRF-EDEL:  Upper Body Dressing   Assistance Needed Set-up / clean-up;Supervision   Physical Assistance Level No physical assistance or only touching/steadying assist   CARE Score 4   Discharge Goal:  Assistance Needed Independent   Discharge Goal:  Physical Assistance Level No physical assistance or only touching/steadying assist   Dischage Goal:  Score 6   IRF-EDEL:  Lower Body Dressing   Assistance Needed Set-up / clean-up;Supervision;Verbal cues;Physical assistance   Physical Assistance Level 50%-74%   CARE Score 2   Discharge Goal:  Assistance Needed Adaptive equipment;Incidental touching   Discharge Goal:  Physical Assistance Level No physical assistance or only touching/steadying assist   Discharge Goal:  Score 4   IRF EDEL:  Putting On/Taking Off Footwear   Assistance Needed Physical assistance   Physical Assistance Level 75% or more   CARE Score 2   Discharge Goal:  Assistance Needed Incidental touching;Adaptive equipment   Discharge Goal:  Physical Assistance Level No physical assistance or only touching/steadying assist   Discharge Goal:  Score 4   IRF-EDEL:  Toileting Hygiene   Assistance Needed Set-up / clean-up;Supervision;Verbal cues;Incidental touching   Physical Assistance Level No physical assistance or only touching/steadying assist   CARE Score 4   Discharge Goal:  Assistance Needed Independent   Discharge Goal:  Physical Assistance Level No physical assistance or only touching/steadying assist   Discharge Goal:  Score 6   IRF-EDEL:   Toilet Transfer   Assistance Needed Set-up / clean-up;Supervision;Verbal cues;Physical assistance   Physical Assistance Level Less than 25%   CARE Score 3   Discharge Goal:  Assistance Needed Independent;Adaptive equipment   Discharge Goal:  Physical Assistance Level No physical assistance or only touching/steadying assist   Discahrge Goal:  Score 6   IRF-EDEL:  Hearing, Speech, and Vision   Expression of Ideas and Wants 4   Understanding Verbal and Non-Verbal Content 4   Problem List   Problem List Decreased Active Daily Living Skills;Decreased Homemaking Skills;Decreased Upper Extremity Strength Right;Decreased Upper Extremity Strength Left;Decreased Functional Mobility;Decreased Activity Tolerance;Safety Awareness Deficits / Cognition;Impaired Sensation Right Upper Extremity;Impaired Postural Control / Balance;Impaired Vision;Other (Comments)  (possible visual impairment-further assessment required)   Precautions   Precautions Fall Risk;Swallow Precautions ( See Comments)   Comments goes by BOUBACAR Quiroz   Current Discharge Plan   Current Discharge Plan Return to Prior Living Situation   Benefit    Therapy Benefit Patient Would Benefit from Inpatient Rehab Occupational Therapy to Maximize [place] with ADLs, IADLs and Functional Mobility.   Interdisciplinary Plan of Care Collaboration   IDT Collaboration with  Physician   Patient Position at End of Therapy Seated;Call Light within Reach;Phone within Reach;Tray Table within Reach   Collaboration Comments CLOF, POC   Equipment Needs   Assistive Device / DME Front-Wheel Walker;Shower Chair;Grab Bars In Shower / Tub;Grab Bars By Toilet   Adaptive Equipment Reacher;Sock Aide;Dressing Stick;Long Handled Shoe Horn;Long Handled Sponge;Elastic Shoe Laces   OT Total Time Spent   OT Individual Total Time Spent (Mins) 60   OT Charge Group   Charges Yes   OT Self Care / ADL 1   OT Evaluation OT Evaluation Mod       FIM Eating Score:  5 - Standby Prompting/Supervision or  Set-up  Eating Description:  Increased time, Supervision for safety, Set-up of equipment or meal/tube feeding    FIM Grooming Score:  4 - Minimal Assistance  Grooming Description:  Increased time, Supervision for safety, Verbal cueing, Set-up of equipment (intermittent CGA while standing at sink for G&H tasks.)    FIM Bathing Score:  4 - Minimal Assistance  Bathing Description:  Grab bar, Tub bench, Hand held shower, Increased time, Supervision for safety, Verbal cueing, Set-up of equipment (assist to wash distal BLEs/feet d/t LBP; CGA/GB for standing balance.)    FIM Upper Body Dressin - Standby Prompting/Supervision or Set-up  Upper Body Dressing Description:  Supervision for safety, Set-up of equipment, Increased time (SBA/set up to don/doff pullover shirt.)    FIM Lower Body Dressing Score:  2 - Max Assistance  Lower Body Dressing Description:  Increased time, Supervision for safety, Verbal cueing, Set-up of equipment (assist to thread RLE into brief/pants and CGA/FWW while standing to pull up over B hips; max A to don/doff socks and shoes. )    FIM Toiletin - Minimal Assistance  Toileting Description:  Grab bar, Increased time, Supervision for safety, Verbal cueing, Set-up of equipment    FIM Toilet Transfer Score:  4 - Minimal Assistance  Toilet Transfer Description:  Grab bar, Increased time, Supervision for safety, Verbal cueing, Set-up of equipment (FWW<>toilet with min A, GB and cues for safety.)    FIM Tub/Shower Transfers Score:  4 - Minimal Assistance  Tub/Shower Transfers Description:  Grab bar, Increased time, Supervision for safety, Verbal cueing, Set-up of equipment (FWW<>Tub bench with min A, GB and cues for safety.)    Assessment  Patient is 73 y.o. 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 Pt with worsening weakness, fatigued and fall while on ranch. Per H&P, pt has PMH significant for A fib on AC, CAD, Hx of prostate cancer, HTN, HLD, and WALTER. Pt lives  in [place], [place] in a SLH with 3 RUBEN with his spouse, and pt reports independent PLOF for most ADLs, IADLs, functional mobility with SPC and driving. Pt reports that he required help with donning socks/shoes at baseline d/t low back pain, and that he and his SO share responsibilities for home mgt. During OT evaluation pt required max A to supervision for ADLs, and min A for bathroom transfers with FWW. Barriers include: impaired balance, generalized weakness, decreased safety awareness, limited endurance, and low back pain. Pt also reports difficulty with peripheral vision, which should further be assessed.     Plan  Recommend Occupational Therapy  minutes per day 5-7 days per week for 1-2 weeks for the following treatments:  OT Group Therapy, OT Self Care/ADL, OT Community Reintegration, OT Manual Ther Technique, OT Neuro Re-Ed/Balance, OT Therapeutic Activity, OT Evaluation and OT Therapeutic Exercise.    Goals:  Long term and short term goals have been discussed with patient and they are in agreement.         Occupational Therapy Goals           Problem: Bathing     Dates: Start: 07/16/19       Goal: STG-Within one week, patient will bathe     Dates: Start: 07/16/19       Description: 1) Individualized Goal:  Supervision with AE/DME prn.  2) Interventions:  OT Group Therapy, OT Self Care/ADL, OT Community Reintegration, OT Manual Ther Technique, OT Neuro Re-Ed/Balance, OT Therapeutic Activity, OT Evaluation and OT Therapeutic Exercise             Problem: Dressing     Dates: Start: 07/16/19       Goal: STG-Within one week, patient will dress LB     Dates: Start: 07/16/19       Description: 1) Individualized Goal:  Min A with adaptive equipment.  2) Interventions:  OT Group Therapy, OT Self Care/ADL, OT Community Reintegration, OT Manual Ther Technique, OT Neuro Re-Ed/Balance, OT Therapeutic Activity, OT Evaluation and OT Therapeutic Exercise             Problem: Functional Transfers     Dates: Start: 07/16/19        Goal: STG-Within one week, patient will     Dates: Start: 07/16/19       Description: 1) Individualized Goal: transfer to toilet and shower at supervision level with AD/DME prn.  2) Interventions:  OT Group Therapy, OT Self Care/ADL, OT Community Reintegration, OT Manual Ther Technique, OT Neuro Re-Ed/Balance, OT Therapeutic Activity, OT Evaluation and OT Therapeutic Exercise             Problem: OT Long Term Goals     Dates: Start: 07/16/19       Goal: LTG-By discharge, patient will complete basic self care tasks     Dates: Start: 07/16/19       Description: 1) Individualized Goal:  Mod I with AE/AD/DME prn.  2) Interventions:  OT Group Therapy, OT Self Care/ADL, OT Community Reintegration, OT Manual Ther Technique, OT Neuro Re-Ed/Balance, OT Therapeutic Activity, OT Evaluation and OT Therapeutic Exercise           Goal: LTG-By discharge, patient will perform bathroom transfers     Dates: Start: 07/16/19       Description: 1) Individualized Goal:  Mod I with AD/DME prn.  2) Interventions:  OT Group Therapy, OT Self Care/ADL, OT Community Reintegration, OT Manual Ther Technique, OT Neuro Re-Ed/Balance, OT Therapeutic Activity, OT Evaluation and OT Therapeutic Exercise             Goal: LTG-By discharge, patient will complete basic home management     Dates: Start: 07/16/19       Description: 1) Individualized Goal:  For tasks necessary at d/c at supervision to Mod I level wiith AE/AD/DME prn.  2) Interventions:  OT Group Therapy, OT Self Care/ADL, OT Community Reintegration, OT Manual Ther Technique, OT Neuro Re-Ed/Balance, OT Therapeutic Activity, OT Evaluation and OT Therapeutic Exercise             Problem: Toileting     Dates: Start: 07/16/19       Goal: STG-Within one week, patient will complete toileting tasks     Dates: Start: 07/16/19       Description: 1) Individualized Goal:  supervision with AE/DME prn.  2) Interventions:  OT Group Therapy, OT Self Care/ADL, OT Community Reintegration, OT Manual Ther  Technique, OT Neuro Re-Ed/Balance, OT Therapeutic Activity, OT Evaluation and OT Therapeutic Exercise

## 2019-07-16 NOTE — DISCHARGE SUMMARY
Discharge Summary    CHIEF COMPLAINT ON ADMISSION  No chief complaint on file.      Reason for Admission  Brain Mass     CODE STATUS  Full Code    HPI & HOSPITAL COURSE  Please see original H&P and consult notes for specific information, Mr. More has a history of prostate cancer status post resection, hypertension, atrial fibrillation.  Patient presented to the emergency room on 7/5/2019 with weakness and increased fatigue.  Evaluation at the outside hospital emergency room demonstrated a 4.5 cm right frontal lobe mass.  Patient was transferred to [facility] for higher level of care and neurosurgery coverage.  On 7/10/2019 he underwent right-sided craniotomy.  He was transferred to the ICU postoperatively. 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 Patient has h/o afib at this time no a/c until cleared by surgery.        Therefore, he is discharged in fair and stable condition to an inpatient rehabilitation hospital.    The patient met 2-midnight criteria for an inpatient stay at the time of discharge.      FOLLOW UP ITEMS POST DISCHARGE  Onco  Neurosurgery  pcp     DISCHARGE DIAGNOSES  Principal Problem:    Intracranial mass POA: Yes  Active Problems:    Atrial fibrillation (HCC) POA: Yes    Hypertension POA: Yes    CAD (coronary artery disease) POA: Yes    Hyperglycemia POA: Yes    Hyponatremia POA: Unknown    Chronic back pain POA: Yes    Dyslipidemia POA: Yes    History of prostate cancer POA: Yes  Resolved Problems:    *  No resolved hospital problems. *      FOLLOW UP  No future appointments.  Jeni Armando N.P.  [address] [facility] [address]  [phone]    Schedule an appointment as soon as possible for a visit in 1 week  Hospital follow-up appointment with PCP      MEDICATIONS ON DISCHARGE     Medication List      START taking these medications      Instructions   artificial tears 1.4 % Soln   Place 2 Drops in both eyes every 8 hours.  Dose:  2 Drop     * dexamethasone 4 MG Tabs  Commonly known as:  DECADRON   Take 1 Tab by mouth every 12 hours for 1 day.  Dose:  4 mg     * dexamethasone 2 MG tablet  Start taking on:  7/18/2019  Commonly known as:  DECADRON   Take 1 Tab by mouth every 12 hours for 1 day.  Dose:  2 mg     * dexamethasone 1 MG Tabs  Start taking on:  7/20/2019  Commonly known as:  DECADRON   Take 1 Tab by mouth every 12 hours for 1 day.  Dose:  1 mg     insulin glargine 100 UNIT/ML Soln  Commonly known as:  LANTUS   Inject 12 Units as instructed every evening.  Dose:  12 Units     insulin regular 100 Unit/mL Soln  Commonly known as:  HUMULIN R   Inject 3-14 Units as instructed 4 Times a Day,Before Meals and at Bedtime.  Dose:  3-14 Units     levETIRAcetam 500 MG Tabs  Commonly known as:  KEPPRA   Take 1 Tab by mouth 2 Times a Day for 2 days.  Dose:  500 mg        * This list has 3 medication(s) that are the same as other medications prescribed for you. Read the directions carefully, and ask your doctor or other care provider to review them with you.            CONTINUE taking these medications      Instructions   Acetaminophen 650 MG Tabs   Take 650-1,300 mg by mouth every four hours as needed for Mild Pain (Pain or temp = or > 101 F).  Dose:  650-1300 mg     atorvastatin 20 MG Tabs  Commonly known as:  LIPITOR   Take 1 Tab by mouth every bedtime.  Dose:  20 mg     Azilsartan Medoxomil 40 MG Tabs   Take 40 mg by mouth every day.  Dose:  40 mg     docusate sodium 100 MG Caps   Take 100 mg by mouth 2 Times  a Day.  Dose:  100 mg     nebivolol 5 MG Tabs tablet  Commonly known as:  BYSTOLIC   Take 1 Tab by mouth every day.  Dose:  5 mg     therapeutic multivitamin-minerals Tabs   Take 1 Tab by mouth every day.  Dose:  1 Tab        STOP taking these medications    Diclofenac Sodium 1 % Gel  Commonly known as:  VOLTAREN            Allergies  Allergies   Allergen Reactions   • Gabapentin Rash     Broke out in a rash on R bicep       DIET  Orders Placed This Encounter   Procedures   • Diet Order Full Liquid (no pudding)     Standing Status:   Standing     Number of Occurrences:   1     Order Specific Question:   Diet:     Answer:   Full Liquid [11]     Comments:   no pudding     Order Specific Question:   Consistency/Fluid modifications:     Answer:   Nectar Thick [2]     Order Specific Question:   Miscellaneous modifications:     Answer:   SLP - 1:1 Supervision by Nursing [21]     Order Specific Question:   Miscellaneous modifications:     Answer:   SLP - Deliver to Nursing Station [22]       ACTIVITY  As tolerated.  Weight bearing as tolerated    LINES, DRAINS, AND WOUNDS  This is an automated list. Peripheral IVs will be removed prior to discharge.  Peripheral IV 07/11/19 20 G Right Forearm (Active)   Site Assessment Clean;Dry;Intact 7/15/2019 11:00 PM   Dressing Type Transparent 7/15/2019 11:00 PM   Line Status Flushed;Scrubbed the hub prior to access;Saline locked 7/15/2019 11:00 PM   Dressing Status Clean;Intact;Dry 7/15/2019 11:00 PM   Dressing Intervention N/A 7/15/2019  8:15 AM   Infiltration Grading ([facility] [place] 0 7/15/2019 11:00 PM   Phlebitis Scale ([facility] Only) 0 7/15/2019 11:00 PM       Peripheral IV 07/11/19 20 G Left Forearm (Active)   Site Assessment Clean;Intact;Dry 7/15/2019 11:00 PM   Dressing Type Transparent 7/15/2019 11:00 PM   Line Status Flushed;Scrubbed the hub prior to access;Saline locked 7/15/2019 11:00 PM   Dressing Status Clean;Dry;Intact 7/15/2019 11:00 PM   Dressing Intervention N/A  7/15/2019  8:15 AM   Infiltration Grading ([facility], [facility]) 0 7/15/2019 11:00 PM   Phlebitis Scale ([facility] Only) 0 7/15/2019 11:00 PM          Peripheral IV 07/11/19 20 G Right Forearm (Active)   Site Assessment Clean;Dry;Intact 7/15/2019 11:00 PM   Dressing Type Transparent 7/15/2019 11:00 PM   Line Status Flushed;Scrubbed the hub prior to access;Saline locked 7/15/2019 11:00 PM   Dressing Status Clean;Intact;Dry 7/15/2019 11:00 PM   Dressing Intervention N/A 7/15/2019  8:15 AM   Infiltration Grading ([facility], [facility]) 0 7/15/2019 11:00 PM   Phlebitis Scale ([facility] Only) 0 7/15/2019 11:00 PM       Peripheral IV 07/11/19 20 G Left Forearm (Active)   Site Assessment Clean;Intact;Dry 7/15/2019 11:00 PM   Dressing Type Transparent 7/15/2019 11:00 PM   Line Status Flushed;Scrubbed the hub prior to access;Saline locked 7/15/2019 11:00 PM   Dressing Status Clean;Dry;Intact 7/15/2019 11:00 PM   Dressing Intervention N/A 7/15/2019  8:15 AM   Infiltration Grading ([facility], [facility]) 0 7/15/2019 11:00 PM   Phlebitis Scale ([facility] Only) 0 7/15/2019 11:00 PM               MENTAL STATUS ON TRANSFER  Level of Consciousness: Alert  Orientation : Oriented x 4  Speech: Speech Clear    CONSULTATIONS  Onco  neurosurgery    PROCEDURES  craniotomy     LABORATORY  Lab Results   Component Value Date    SODIUM 131 (L) 07/16/2019    POTASSIUM 4.6 07/16/2019    CHLORIDE 98 07/16/2019    CO2 27 07/16/2019    GLUCOSE 237 (H) 07/16/2019    BUN 37 (H) 07/16/2019    CREATININE 0.92 07/16/2019        Lab Results   Component Value Date    WBC 14.8 (H) 07/16/2019    HEMOGLOBIN 13.2 (L) 07/16/2019    HEMATOCRIT 39.4 (L) 07/16/2019    PLATELETCT 217 07/16/2019        Total time of the discharge process exceeds 35 minutes.

## 2019-07-16 NOTE — THERAPY
Speech Language Pathology   Initial Assessment     Patient Name: Marcio More  AGE:  73 y.o., SEX:  male  Medical Record #: [id number]  Today's Date: 7/16/2019     Subjective    Patient is a 73 y.o. year-old male with a past medical history significant for A fib on AC, CAD, Hx of prostate cancer, HTN, HLD, and WALTER [place] [facility] [place] [facility] [place]/2019 11:59 PM with worsening weakness, fatig[place]. Patient found to have large 4.5 cm right fontal lobe mass. NSG was consulted and recommended starting Keppra and steroids as well as possible intervention. Patient underwent right sided resection on 7/10/19 with Dr. Ruffin without complication.  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     Objective     07/16/19 1401   Prior Level Of Function   Communication Within Functional Limits   Swallow Within Functional Limits   Dentition Intact   Dentures None   Hearing Within Functional Limits for Evaluation   Hearing Aid None   Vision Within Functional Limits for Evaluation   Patient's Primary Language English   Occupation (Pre-Hospital Vocational) Retired Due To Age   Receptive Language / Auditory Comprehension   Receptive Language / Auditory Comprehension WDL   Expressive Language   Expressive Language (WDL) WDL   Reading Comprehension    Reading Comprehension (WDL) WDL   Written Language Expression   Written Language Expression (WDL) X   Dominant Hand Right   Spontaneous Word Level Within Functional Limits (6-7)   Spontaneous Sentence Level Supervision (5)   Cognition   Cognitive-Linguistic (WDL) X   Moderate Attention Minimal (4)   Complex Attention Minimal (4)   Functional Memory Activities Moderate (3)   Complex Reasoning  / Problem Solving Minimal (4)   Executive Functioning / Organization Minimal (4)   IRF-EDEL:  Cognitive Pattern Assessment   Cognitive Pattern Assessment Used BIMS   IRF-EDEL:  Brief Interview for Mental Status (BIMS)   Repetition of Three Words  "(First Attempt) 3   Temporal Orientation: Able to Report the Correct Year Correct   Temporal Orientation: Able to Report the Correct Month Accurate within 5 days   Temporal Orientation: Able to Report the Correct Day of the Week Correct   Able to Recall \"Sock\" Yes, no cue required   Able to Recall \"Blue\" Yes, no cue required   Able to Recall \"Bed\" No, could not recall   BIMS Summary Score 13   Social / Pragmatic Communication   Social / Pragmatic Communication WDL   Tracheostomy   Tracheostomy No   Speech Mechanisms / Voice Production   Speech Mechanisms / Voice Production (WDL) X   Voice Quality Hoarse;Breathy;Reduced Intensity   Facial Weakness Right Within Functional Limits (6-7)   Facial Weakness Left Within Functional Limits (6-7)   Conversational Intelligibility Within Functional Limits (6-7)   Loudness: Soft   Labial Function   Labial Function (WDL) WDL   Lingual Function   Lingual Function (WDL) WDL   Jaw Function   Jaw Function (WDL) WDL   Velar Function   Velar Function (WDL) WDL   Laryngeal Function   Laryngeal Function (WDL) WDL   Swallowing   Swallowing (WDL) X   Thick Nectar / Honey / Liquid Thick Nectar;Within Functional Limits   Single Swallow Thin / Nectar (Cup) Nectar;Within Functional Limits   Masticated Foods Minimal   Dysphagia Strategies / Recommendations   Strategies / Interventions Recommended (Yes / No) Yes   Compensatory Strategies Monitor During Meals;No Straws;Liquids Via Cup;Reduce Bolus Size to 1 Teaspoon;Single Sips / Bites;No Talking During Eating / Drinking   Diet / Liquid Recommendation Dysphagia II;Nectar Thick Liquid   Medication Administration  Float Whole with Puree   Therapy Interventions Dysphagia Therapy By Speech Language Pathologist;Therapeutic Dining For Meals;MBSS Evaluation;Pharyngeal / Laryngeal Exercises;Oral Motor Exercises;Neuromuscular Electric Stimulation   Barriers To Oral Feeding   Barriers To Oral Feeding Generalized Weakness   Cervical Ausculatation Not Tested "   Pre-Feeding Oral Stimulation Trial Not Tested   IRF-EDEL:  Swallowing/Nutritional Status   Swallowing/Nutritional Status Modified food consistency;Supervision during eating   Outcome Measures   Outcome Measures Utilized SCCAN   SCCAN (Scales of Cognitive and Communicative Ability for Neurorehabilitation)   Oral Expression - Raw Score 18   Oral Expression - Scale Performance Score 95   Orientation - Raw Score 12   Orientation - Scale Performance Score 100   Memory - Raw Score 12   Memory - Scale Performance Score 63   Speech Comprehension - Raw Score 12   Speech Comprehension - Scale Performance Score 92   Reading Comprehension - Raw Score 10   Reading Comprehension - Scale Performance Score 83   Writing - Raw Score 5   Writing - Scale Performance Score 71   Attention - Raw Score 14   Attention - Scale Performance Score 88   Problem Solving - Raw Score 19   Problem Solving - Scale Performance Score 83   SCCAN Total Raw Score 80   SCCAN Degree of Severity Mild Impairment   Problem List   Problem List Cognitive-Linguistic Deficits;Attention Deficit;Memory Deficit;Executive Function Deficit;Dysphagia   Current Discharge Plan   Current Discharge Plan Return to Prior Living Situation   Benefit   Therapy Benefit Patient would benefit from Inpatient Rehab Speech-Language Pathology to address above identified deficits.   Speech Language Pathologist Assigned   Assigned SLP / Pager # [phone], [phone]   SLP Total Time Spent   SLP Individual Total Time Spent (Mins) 90   SLP Charge Group   Charges Yes   SLP Speech Language Evaluation Speech Sound Language Comprehension   SLP Oral Pharyngeal Evaluation Oral Pharyngeal Evaluation       FIM Comprehension Score:  6 - Modified Independent  Comprehension Description:  Verbal cues    FIM Expression Score:  7 - Independent  Expression Description:       FIM Social Interaction Score:  7 - Independent  Social Interaction Description:       FIM Problem Solving Score:  4 - Minimal Assistance  Problem  Solving Description:  Verbal cueing, Therapy schedule, Increased time    FIM Memory Score:  3 - Moderate Assistance  Memory Description:  Verbal cueing, Therapy schedule    Assessment    Patient is 73 y.o. male with a diagnosis of 4.5 cm right fontal lobe mass.  Additional factors influencing patient status/progress (ie: cognitive factors, co-morbidities, social support, etc): Mild cognitive deficits, dysphagia, motivated, high PLOF.      Clinical swallow evaluation completed.  Pt tolerated dysphagia 2 textures and NTL's with occasional throat clearing noted on dysphagia 2 textures.  No coughing or wet vocal quality noted.  Oral mech found all structures to be WFL's in terms of strength and ROM.  MBSS to be completed tomorrow.  Cognitive-Linguistic evaluation completed using the SCCAN, pt scored overall 80/94 indicating mild cognitive deficits.  Pt noted to have significant memory difficulty scoring 63%.  Pt reported that previously he was completing all financial tasks, medication management tasks and drove independently.  Pt reported that his wife (Mell) has been diagnosed with dementia and although she still drives and manages her medications, he completes all finances for the household.  Recommend ST to address dysphagia, memory and complex problem solving tasks.      Plan  Recommend Speech Therapy  minutes per day 5-7 days per week for 10 days for the following treatments:  SLP Speech Language Treatment, SLP Swallowing Dysfunction Treatment, SLP Oral Pharyngeal Evaluation, SLP Video Swallow Evaluation, SLP Self Care / ADL Training , SLP Cognitive Skill Development and SLP Group Treatment.    Goals:  Long term and short term goals have been discussed with patient and they are in agreement.         Speech Therapy Problems           Problem: Memory STGs     Dates: Start: 07/16/19       Goal: STG-Within one week, patient will     Dates: Start: 07/16/19       Description: 1) Individualized goal:  Recall daily  therapy sessions using external memory aids (memory notebook) given min A in 3/3 opportunities.    2) Interventions:  SLP Speech Language Treatment, SLP Self Care / ADL Training , SLP Cognitive Skill Development and SLP Group Treatment               Problem: Problem Solving STGs     Dates: Start: 07/16/19       Goal: STG-Within one week, patient will     Dates: Start: 07/16/19       Description: 1) Individualized goal:  Complete a functional medication sorting task with spv to achieve 100% accuracy in 2/2 attempts   2) Interventions:  SLP Speech Language Treatment, SLP Self Care / ADL Training , SLP Cognitive Skill Development and SLP Group Treatment               Problem: Speech/Swallowing LTGs     Dates: Start: 07/16/19       Goal: LTG-By discharge, patient will safely swallow     Dates: Start: 07/16/19       Description: 1) Individualized goal:  The least restrictive diet for safe intake of daily meals   2) Interventions:  SLP Swallowing Dysfunction Treatment, SLP Oral Pharyngeal Evaluation, SLP Video Swallow Evaluation and SLP Group Treatment             Goal: LTG-By discharge, patient will solve complex problem     Dates: Start: 07/16/19       Description: 1) Individualized goal:  With modified independence for a safe discharge home   2) Interventions:  SLP Speech Language Treatment, SLP Self Care / ADL Training , SLP Cognitive Skill Development and SLP Group Treatment               Problem: Swallowing STGs     Dates: Start: 07/16/19       Goal: STG-Within one week, patient will     Dates: Start: 07/16/19       Description: 1) Individualized goal:  Participate in a MBSS  2) Interventions:  SLP Swallowing Dysfunction Treatment, SLP Oral Pharyngeal Evaluation and SLP Video Swallow Evaluation             Goal: STG-Within one week, patient will     Dates: Start: 07/16/19       Description: 1) Individualized goal:  Tolerate 8/10 trials of thin liquids (cup sips) outside of meals without any overt s/sx of  aspiration/penetration   2) Interventions:  SLP Swallowing Dysfunction Treatment, SLP Oral Pharyngeal Evaluation, SLP Video Swallow Evaluation and SLP Group Treatment

## 2019-07-16 NOTE — PROGRESS NOTES
"Report called to [facility] RN. Questions and concerns denied. Vitals stable. IVs removed with no complications. Patient discharged via transport with all personal belongings.    /77   Pulse 88   Temp 36.7 °C (98.1 °F) (Temporal)   Resp 17   Ht 1.88 m (6' 2\")   Wt 97.5 kg (215 lb)   SpO2 95%   BMI 27.60 kg/m² '  "

## 2019-07-16 NOTE — PROGRESS NOTES
Neurosurgery Progress Note    Subjective:  No events, denies pain,  +bm , up in chair eating    Exam:    A&O x3, GCS 15  PERRL, EOMI  Face symm, tongue midline  ISAAC with FS, no drift  Inc c/d/i        BP  Min: 119/79  Max: 127/89  Pulse  Av.7  Min: 59  Max: 103  Resp  Av.3  Min: 16  Max: 17  Temp  Av.5 °C (97.7 °F)  Min: 35.9 °C (96.7 °F)  Max: 36.9 °C (98.4 °F)  SpO2  Av.3 %  Min: 92 %  Max: 96 %    No Data Recorded    Recent Labs      19   0304  19   0254   WBC  13.9*  14.8*   RBC  4.30*  4.17*   HEMOGLOBIN  13.6*  13.2*   HEMATOCRIT  41.9*  39.4*   MCV  97.4  94.5   MCH  31.6  31.7   MCHC  32.5*  33.5*   RDW  48.1  45.1   PLATELETCT  174  217   MPV  9.8  9.6     Recent Labs      19   0304  07/15/19   0343  19   0254   SODIUM  131*  132*  131*   POTASSIUM  4.9  4.6  4.6   CHLORIDE  101  99  98   CO2  25  26  27   GLUCOSE  219*  240*  237*   BUN  33*  33*  37*   CREATININE  0.86  0.87  0.92   CALCIUM  8.5  8.4*  8.5               Intake/Output       07/15/19 0700 - 19 - 19 0659       Total  Total       Intake    Total Intake -- -- -- -- -- --       Output    Urine  800  200 1000  --  -- --    Number of Times Voided 1 x 6 x 7 x -- -- --    Urine Void (mL)  -- -- --    Stool  --  -- --  --  -- --    Number of Times Stooled 0 x 1 x 1 x -- -- --    Total Output  -- -- --       Net I/O     -800 -200 -1000 -- -- --            Intake/Output Summary (Last 24 hours) at 19 0887  Last data filed at 07/15/19 2300   Gross per 24 hour   Intake                0 ml   Output             1000 ml   Net            -1000 ml            • insulin glargine  12 Units Q EVENING   • dexamethasone  4 mg Q12HRS    Followed by   • [START ON 2019] dexamethasone  2 mg Q12HRS    Followed by   • [START ON 2019] dexamethasone  1 mg Q12HRS   • levETIRAcetam  500 mg BID   • insulin regular  3-14 Units  4X/DAY ACHS    And   • glucose  16 g Q15 MIN PRN    And   • dextrose 10% bolus  250 mL Q15 MIN PRN   • Pharmacy Consult Request  1 Each PHARMACY TO DOSE   • MD ALERT...DO NOT ADMINISTER NSAIDS or ASPIRIN unless ORDERED By Neurosurgery  1 Each PRN   • ondansetron  4 mg Q4HRS PRN   • diphenhydrAMINE  25 mg Q6HRS PRN   • scopolamine  1 Patch Q72HRS PRN   • hydrALAZINE  10 mg Q HOUR PRN   • docusate sodium  100 mg BID   • senna-docusate  1 Tab Nightly   • senna-docusate  1 Tab Q24HRS PRN   • polyethylene glycol/lytes  1 Packet BID PRN   • magnesium hydroxide  30 mL QDAY PRN   • bisacodyl  10 mg Q24HRS PRN   • fleet  1 Each Once PRN   • oxyCODONE immediate release  10 mg Q3HRS PRN   • oxyCODONE immediate-release  5 mg Q3HRS PRN   • artificial tears  2 Drop Q8HRS   • acetaminophen  650 mg Q4HRS PRN   • atorvastatin  20 mg QHS   • therapeutic multivitamin-minerals  1 Tab DAILY   • metoprolol  25 mg TWICE DAILY       Assessment and Plan:  Hospital day #12 right frontal mass, POD #6 right frontal crani for tumor    Path high grade b-cell lymphoma- onc has seen  No ASA or anticoagulants   Keppra  decadron taper-one week taper off order placed  Pt/ot/mobilize  Q 4 hour neuro checks  Ok to shower  D/c to rehab today  F/u 1 week at our office

## 2019-07-16 NOTE — FLOWSHEET NOTE
07/16/19 1511   Type of Assessment   Assessment Yes   Patient History   Pulmonary Diagnosis no   Surgical Procedures craniotomy   Home O2 No   Home Treatments/Frequency No   COPD Risk Screening   Do you have a history of COPD? No   Smoking History   Have you ever smoked Never   Level Of Consciousness   Level of Consciousness Alert   Respiratory WDL   Respiratory (WDL) X   Chest Exam   Respiration 18   Pulse 80   Breath Sounds   RLL Breath Sounds Diminished   LLL Breath Sounds Diminished   Oximetry   #Pulse Oximetry (Single Determination) Yes   Oxygen   Home O2 Use Prior To Admission? No   Pulse Oximetry 95 %   O2 Daily Delivery Respiratory  Room Air with O2 Available

## 2019-07-17 NOTE — THERAPY
Occupational Therapy  Daily Treatment     Patient Name: Marcio More  Age:  73 y.o., Sex:  male  Medical Record #: [id number]  Today's Date: 2019     Precautions  Precautions: Fall Risk, Other (See Comments), Swallow Precautions ( See Comments)  Comments: goes by charlie Quiroz         Subjective    Pt lying in bed upon arrival, agreeable to OT session.      Objective       19 1401   Precautions   Precautions Fall Risk;Other (See Comments);Swallow Precautions ( See Comments)   Comments goes by charlie Quiroz   Vitals   O2 (LPM) 0   O2 Delivery None (Room Air)   Pain   Intervention Declines   Pain 0 - 10 Group   Pain Rating Scale (NPRS) 0   Non Verbal Descriptors   Non Verbal Scale  Calm   Cognition    Level of Consciousness Alert   Vision Screen   Vision Tested   Tracking Able to track stimulus in all quads without difficulty   Saccades Decreased speed of pursuit between targets   Visual Fields Intact   Visual Screen Results (Pt demonstrated functional vision)   Sitting Upper Body Exercises   Upper Extremity Bike Level 4 Resistance  (Motomed - 20 mins, 0 RBs, 2.10 miles, avg speed 46)   Bed Mobility    Supine to Sit Stand by Assist   Sit to Supine Stand by Assist   Scooting Stand by Assist   Rolling Supervised   Interdisciplinary Plan of Care Collaboration   Patient Position at End of Therapy In Bed;Bed Alarm On;Call Light within Reach;Tray Table within Reach;Phone within Reach     FIM Toiletin - Standby Prompting/Supervision or Set-up  Toileting Description:  Adaptive equipment, Increased time, Supervision for safety, Verbal cueing, Set-up of equipment, Initial preparation for task (pt able to doff pants/brief in standing with SBA w/ FWW in front of pt; clean mary-region in standing with bath wipes and don pants/brief around waist )    FIM Lower Body Dressing Score:  3 - Moderate Assistance  Lower Body Dressing Description:  Assist device/equipment, Increased time, Supervision for safety, Verbal cueing,  Set-up of equipment, Initial preparation for task, Requires minimal contact (FWW in front of pt for stability; able to doff pants/brief below waist in standing- doff over feet w/ increased time seated EOB; assist to thread brief/pants over B feet and SBA to pull over hips in standing )    FIM Bed/Chair/Wheelchair Transfers Score: 4 - Minimal Assistance  Bed/Chair/Wheelchair Transfers Description:  Adaptive equipment, Increased time, Supervision for safety, Verbal cueing, Set-up of equipment, Initial preparation for task (HOB raised for STS from EOB and use of bed rail for supine to sit; FWW for walking xfer and CGA during STS)    FIM Walking Score:  4 - Minimal Assistance  Walking Description:  Assist device/equipment, Safety concerns, Verbal cueing, Supervision for safety (ambulated to/from therapy to front gym with FWW ~150 ft x 2 at start/end of session with CGA )      Assessment    Pt engaged in ADLs of LB dressing and toilet hygiene at the bed secondary to dirty briefs and pants. Pt demonstrated ability to doff pants and briefs individually, but required assistance to thread legs through pants/briefs. Pt at SBA for managing pants/briefs at waistline. A vision screen was conducted to assess vision - pt demonstrated functional pursuits, saccades and convergence. No c/o visual changes and visual fields were intact. Pt used Motomed for UB exercise to assist with increased endurance and UB strength for future transfers.     Plan    Cont to address endurance, balance, strength, functional transfers/mobility for ADLs/IADLs

## 2019-07-17 NOTE — THERAPY
Physical Therapy   Initial Evaluation     Patient Name: Marcio More  Age:  73 y.o., Sex:  male  Medical Record #: [id number]  Today's Date: 7/17/2019     Subjective    Pt received in bed, agreeable to PT evaluation. Reports he lives outside of [place], [place] with spouse. Pt does states his wife has early signs of dementia although she is still very mobile.     Objective     07/17/19 1031   Prior Living Situation   Prior Services None   Housing / Facility 1 Story House   Steps Into Home 3   Steps In Home 0   Rail Both Rail (Steps into Home)   Elevator No   Bathroom Set up Walk In Shower;Grab Bars  (pt uses a plastic seat in shower)   Equipment Owned Front-Wheel Walker;Single Point Cane;Wheelchair;Grab Bar(s) In Tub / Shower;Grab Bar(s) By Toilet   Lives with - Patient's Self Care Capacity Spouse  (pt reports his spouse has dementia)   Comments pt uses a SPC for ambulation    Prior Level of Functional Mobility   Bed Mobility Required Assist   Transfer Status Required Assist   Ambulation Required Assist   Distance Ambulation (Feet) (community distances)   Assistive Devices Used Single Point Cane   Wheelchair Other (Comments)   Stairs Unable To Determine At This Time  (pt states his wife supervises him on stairs)   IRF-EDEL:  Prior Functioning: Everyday Activities   Self Care Independent   Indoor Mobility (Ambulation) Independent   Stairs Independent   Functional Cognition Independent   Prior Device Use (cane)   Vitals   Pulse 64  ((irregular, L raidal pulse))   Patient BP Position Sitting   Blood Pressure  122/67   Room Air Oximetry 98   Pain 0 - 10 Group   Therapist Pain Assessment 0;Prior to Activity;During Activity;Post Activity   Cognition    Orientation Level Oriented x 4   Level of Consciousness Alert   Ability To Follow Commands 1 Step   Safety Awareness Impaired   Passive ROM Lower Body   Passive ROM Lower Body WDL   Active ROM Lower Body    Active ROM Lower Body  WDL   Strength Lower Body   Rt Hip Flexion  Strength 4- (G-)   Rt Knee Flexion Strength 3+ (F+)   Rt Knee Extension Strength 4 (G)   Rt Ankle Dorsiflexion Strength 4 (G)   Lt Hip Flexion Strength 4 (G)   Lt Knee Flexion Strength 4- (G-)   Lt Knee Extension Strength 4- (G-)   Lt Ankle Dorsiflexion Strength 5 (N)   Comments R peroneals 4-/5, L peroneals 4+/5   Sensation Lower Body   Comments proprioception intact R ankle   Lower Body Muscle Tone   Lower Body Muscle Tone  WDL   Balance Assessment   Sitting Balance (Static) Good   Sitting Balance (Dynamic) Good   Standing Balance (Static) Fair   Standing Balance (Dynamic) Fair -   Weight Shift Sitting Good   Weight Shift Standing Fair   Comments heavy reliance on UE support (FWW) during stance, brief trials of unsupported stance pt demos posterior lean reqiuring Min A   Bed Mobility    Supine to Sit Stand by Assist   Sit to Stand Minimal Assist   IRF-EDEL:  Roll Left and Right   Assistance Needed Supervision   CARE Score 4   Discharge Goal:  Assistance Needed Independent   Discharge Goal:  Score 6   IRF-EDEL:  Sit to Lying   Assistance Needed Supervision   CARE Score 4   Discharge Goal:  Assistance Needed Independent   Discharge Goal:  Score 6   IRF-EDEL:  Lying to Sitting on Side of Bed   Assistance Needed Supervision   CARE Score 4   Discharge Goal:  Assistance Needed Independent   Discharge Goal:  Score 6   IRF-EDEL:  Sit to Stand   Assistance Needed Physical assistance   Physical Assistance Level Less than 25%   CARE Score 3   Discharge Goal:  Assistance Needed Independent;Adaptive equipment   Discahrge Goal:  Score 6   IRF-EDEL:  Chair/Bed-to-Chair Transfer   Assistance Needed Physical assistance   Physical Assistance Level Less than 25%   CARE Score 3   Discharge Goal:  Assistance Needed Independent;Adaptive equipment   Discharge Goal:  Score 6   IRF-EDEL:  Toilet Transfer   Assistance Needed Physical assistance;Adaptive equipment   Physical Assistance Level Less than 25%   CARE Score 3   Discharge Goal:   Assistance Needed Independent;Adaptive equipment   Discahrge Goal:  Score 6   IRF-EDEL:  Car Transfer   Reason if not Attempted Safety concerns   CARE Score 88   Discharge Goal:  Assistance Needed Adaptive equipment;Supervision   Discharge Goal:  Score 4   IRF EDEL:  Walking   Does the Patient Walk? Yes   IRF EDEL:  Walk 10 Feet   Assistance Needed Adaptive equipment;Incidental touching   Physical Assistance Level No physical assistance or only touching/steadying assist   CARE Score 4   Discharge Goal:  Assistance Needed Adaptive equipment;Supervision   Discharge Goal:  Score 4   IRF-EDEL:  Walk 50 Feet with Two Turns   Assistance Needed Physical assistance;Adaptive equipment   Physical Assistance Level Less than 25%   CARE Score 3   Discharge Goal:  Assistance Needed Adaptive equipment;Supervision   Discharge Goal:  Score 4   IRF-EDEL:  Walk 150 Feet   Assistance Needed Physical assistance;Adaptive equipment   Physical Assistance Level Less than 25%   CARE Score 3   Discharge Goal:  Assistance Needed Adaptive equipment;Supervision   Discharge Goal:  Score 4   IRF EDEL:  Walking 10 Feet on Uneven Surfaces   Reason if not Attempted Safety concerns   CARE Score 88   Discharge Goal:  Assistance Needed Adaptive equipment;Supervision   Discharge Goal:  Score 4   IRF EDEL:  1 Step (Curb)   Reason if not Attempted Safety concerns   CARE Score 88   Discharge Goal:  Assistance Needed Adaptive equipment;Incidental touching   Discharge Goal:  Score 4   IRF-EDEL:  4 Steps   Assistance Needed Adaptive equipment;Incidental touching   CARE Score 4   Discharge Goal:  Assistance Needed Adaptive equipment;Supervision   Discharge Goal:  Score 4   IRF EDEL:  12 Steps   Reason if not Attempted Safety concerns   CARE Score 88   Discharge Goal:  Assistance Needed Adaptive equipment;Supervision   Discharge Goal:  Score 4   IRF EDEL:  Picking Up Object   Reason if not Attempted Safety concerns   CARE Score 88   Discharge Goal:  Assistance Needed  Adaptive equipment;Incidental touching   Discharge Goal:  Score 4   IRF-EDEL:  Wheel 50 Feet with Two Turns   Reason if not Attempted Activity not applicable   CARE Score 9   IRF-EDEL:  Wheel 150 Feet   Reason if not Attempted Activity not applicable   CARE Score 9   Problem List    Problems Impaired Bed Mobility;Impaired Transfers;Impaired Ambulation;Impaired Balance;Functional Strength Deficit;Decreased Activity Tolerance;Safety Awareness Deficits / Cognition;Motor Planning / Sequencing   Precautions   Precautions Fall Risk;Other (See Comments);Swallow Precautions ( See Comments)   Comments goes by charlie Quiroz   Current Discharge Plan   Current Discharge Plan Return to Prior Living Situation   Interdisciplinary Plan of Care Collaboration   IDT Collaboration with  Nursing  (re: incontinent BM)   Patient Position at End of Therapy Seated;Other (Comments)   Collaboration Comments pt in t-dine at end of session for lunch   Benefit   Therapy Benefit Patient Would Benefit from Inpatient Rehabilitation Physical Therapy to Maximize Functional [place] with ADLs, IADLs and Mobility.   PT Total Time Spent   PT Individual Total Time Spent (Mins) 60   PT Charge Group   PT Therapeutic Activities 1   PT Evaluation PT Evaluation Mod       FIM Bed/Chair/Wheelchair Transfers Score: 4 - Minimal Assistance  Bed/Chair/Wheelchair Transfers Description:   (supine->sit SBA, STS Min A)    FIM Walking Score:  4 - Minimal Assistance  Walking Description:   (Min A with FWW x200 and 2x100')    FIM Stairs Score:  2 - Max Assistance  Stairs Description:   (pt ascended/descended 4 std height steps with B HRs, step-to pattern, min A)    Assessment  Patient is 73 y.o. 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  Additional factors influencing patient status / progress (ie: cognitive factors, co-morbidities, social support, etc): PMH significant for a-fib on anticoagulant, CAD, HTN,  "HLD, WALTER, hx of prostate ca, hx of lumbar surgery. Pt previously ambulating with SPC vs no AD. Hx of falls. Pt currently presents with gait abnormality, impaired standing balance, decreased activity tolerance, LE weakness, impaired safety awareness, resulting in significantly impaired functional mobility and increased risk of falls. Pt to benefit from skilled PT.      Plan  Recommend Physical Therapy  minutes per day 5-7 days per week for 2 weeks for the following treatments:  PT Group Therapy, PT Gait Training, PT Self Care/Home Eval, PT Therapeutic Exercises, PT Neuro Re-Ed/Balance, PT Therapeutic Activity and PT Evaluation.    Goals:  Long term and short term goals have been discussed with patient and he is in agreement.         Physical Therapy Problems           Problem: Balance     Dates: Start: 07/17/19       Goal: STG-Within one week, patient will     Dates: Start: 07/17/19   Expected End: 07/24/19       Description: 1) Individualized goal:  Score >35/56 on the Payan balance assessment  2) Interventions:  PT Group Therapy, PT Gait Training, PT Self Care/Home Eval, PT Therapeutic Exercises, PT Neuro Re-Ed/Balance, PT Therapeutic Activity and PT Evaluation               Problem: Mobility     Dates: Start: 07/17/19       Goal: STG-Within one week, patient will ambulate household distance     Dates: Start: 07/17/19   Expected End: 07/24/19       Description: 1) Individualized goal:  >300' with SPC and CGA  2) Interventions:  PT Group Therapy, PT Gait Training, PT Self Care/Home Eval, PT Therapeutic Exercises, PT Neuro Re-Ed/Balance, PT Therapeutic Activity and PT Evaluation                 Problem: Mobility Transfers     Dates: Start: 07/17/19       Goal: STG-Within one week, patient will sit to stand     Dates: Start: 07/17/19   Expected End: 07/24/19       Description: 1) Individualized goal:  SBA with use of UEs from 21\" apple seat  2) Interventions: PT Group Therapy, PT Gait Training, PT Self Care/Home " Eval, PT Therapeutic Exercises, PT Neuro Re-Ed/Balance, PT Therapeutic Activity and PT Evaluation                 Problem: PT-Long Term Goals     Dates: Start: 07/17/19       Goal: LTG-By discharge, patient will maintain balance     Dates: Start: 07/17/19   Expected End: 07/31/19       Description: 1) Individualized goal:  >45/56 on the Payan balance assessment indicating low fall risk  2) Interventions:  PT Group Therapy, PT Gait Training, PT Self Care/Home Eval, PT Therapeutic Exercises, PT Neuro Re-Ed/Balance, PT Therapeutic Activity and PT Evaluation               Goal: LTG-By discharge, patient will ambulate     Dates: Start: 07/17/19   Expected End: 07/31/19       Description: 1) Individualized goal:  >1000' with SPC during 6 Minute walk test and SPV to return to PLOF  2) Interventions:  PT Group Therapy, PT Gait Training, PT Self Care/Home Eval, PT Therapeutic Exercises, PT Neuro Re-Ed/Balance, PT Therapeutic Activity and PT Evaluation               Goal: LTG-By discharge, patient will transfer one surface to another     Dates: Start: 07/17/19   Expected End: 07/31/19       Description: 1) Individualized goal:  Mod I  2) Interventions:  PT Group Therapy, PT Gait Training, PT Self Care/Home Eval, PT Therapeutic Exercises, PT Neuro Re-Ed/Balance, PT Therapeutic Activity and PT Evaluation               Goal: LTG-By discharge, patient will transfer in/out of a car     Dates: Start: 07/17/19   Expected End: 07/31/19       Description: 1) Individualized goal:  SPV in/out of PT cruiser to return home safely with assist of family  2) Interventions:  PT Group Therapy, PT Gait Training, PT Self Care/Home Eval, PT Therapeutic Exercises, PT Neuro Re-Ed/Balance, PT Therapeutic Activity and PT Evaluation               Goal: LTG-By discharge, patient will     Dates: Start: 07/17/19   Expected End: 07/31/19       Description: 1) Individualized goal:  Ascend/descend 3 stairs with B HRs and SPV to safely enter/exit his home  2)  Interventions:PT Group Therapy, PT Gait Training, PT Self Care/Home Eval, PT Therapeutic Exercises, PT Neuro Re-Ed/Balance, PT Therapeutic Activity and PT Evaluation

## 2019-07-17 NOTE — PROGRESS NOTES
"Rehab Progress Note     Encounter Date: 7/17/2019    CC: intracranial tumor, decreased balance     Interval Events (Subjective)  Patient sitting up in room. He reports he is doing well. Denies NVD. Denies SOB. Reviewed labs with patient including Hyponatremia, elevated blood glucose, leukocytosis on steroids, and low vitamin D. Discussed hospitalist consult.     Objective:  VITAL SIGNS: /67   Pulse 64 Comment: (irregular, L raidal pulse)  Temp 36.7 °C (98.1 °F) (Temporal)   Resp 18   Ht 1.854 m (6' 1\")   Wt 94.1 kg (207 lb 8 oz)   SpO2 93%   BMI 27.38 kg/m²   Gen: NAD  Psych: Mood and affect appropriate  CV: Irregularly irregular, no edema  Resp: CTAB, no upper airway sounds  Abd: NTND  Neuro: AOx3, following simple commands    Recent Results (from the past 72 hour(s))   ACCU-CHEK GLUCOSE    Collection Time: 07/14/19  5:34 PM   Result Value Ref Range    Glucose - Accu-Ck 335 (H) 65 - 99 mg/dL   ACCU-CHEK GLUCOSE    Collection Time: 07/14/19  9:14 PM   Result Value Ref Range    Glucose - Accu-Ck 353 (H) 65 - 99 mg/dL   Basic Metabolic Panel    Collection Time: 07/15/19  3:43 AM   Result Value Ref Range    Sodium 132 (L) 135 - 145 mmol/L    Potassium 4.6 3.6 - 5.5 mmol/L    Chloride 99 96 - 112 mmol/L    Co2 26 20 - 33 mmol/L    Glucose 240 (H) 65 - 99 mg/dL    Bun 33 (H) 8 - 22 mg/dL    Creatinine 0.87 0.50 - 1.40 mg/dL    Calcium 8.4 (L) 8.5 - 10.5 mg/dL    Anion Gap 7.0 0.0 - 11.9   ESTIMATED GFR    Collection Time: 07/15/19  3:43 AM   Result Value Ref Range    GFR If African American >60 >60 mL/min/1.73 m 2    GFR If Non African American >60 >60 mL/min/1.73 m 2   ACCU-CHEK GLUCOSE    Collection Time: 07/15/19  8:37 AM   Result Value Ref Range    Glucose - Accu-Ck 250 (H) 65 - 99 mg/dL   ACCU-CHEK GLUCOSE    Collection Time: 07/15/19  1:16 PM   Result Value Ref Range    Glucose - Accu-Ck 367 (H) 65 - 99 mg/dL   ACCU-CHEK GLUCOSE    Collection Time: 07/15/19  5:19 PM   Result Value Ref Range    Glucose " - Accu-Ck 350 (H) 65 - 99 mg/dL   ACCU-CHEK GLUCOSE    Collection Time: 07/15/19  8:40 PM   Result Value Ref Range    Glucose - Accu-Ck 318 (H) 65 - 99 mg/dL   Basic Metabolic Panel    Collection Time: 07/16/19  2:54 AM   Result Value Ref Range    Sodium 131 (L) 135 - 145 mmol/L    Potassium 4.6 3.6 - 5.5 mmol/L    Chloride 98 96 - 112 mmol/L    Co2 27 20 - 33 mmol/L    Glucose 237 (H) 65 - 99 mg/dL    Bun 37 (H) 8 - 22 mg/dL    Creatinine 0.92 0.50 - 1.40 mg/dL    Calcium 8.5 8.5 - 10.5 mg/dL    Anion Gap 6.0 0.0 - 11.9   CBC WITHOUT DIFFERENTIAL    Collection Time: 07/16/19  2:54 AM   Result Value Ref Range    WBC 14.8 (H) 4.8 - 10.8 K/uL    RBC 4.17 (L) 4.70 - 6.10 M/uL    Hemoglobin 13.2 (L) 14.0 - 18.0 g/dL    Hematocrit 39.4 (L) 42.0 - 52.0 %    MCV 94.5 81.4 - 97.8 fL    MCH 31.7 27.0 - 33.0 pg    MCHC 33.5 (L) 33.7 - 35.3 g/dL    RDW 45.1 35.9 - 50.0 fL    Platelet Count 217 164 - 446 K/uL    MPV 9.6 9.0 - 12.9 fL   MAGNESIUM    Collection Time: 07/16/19  2:54 AM   Result Value Ref Range    Magnesium 1.9 1.5 - 2.5 mg/dL   ESTIMATED GFR    Collection Time: 07/16/19  2:54 AM   Result Value Ref Range    GFR If African American >60 >60 mL/min/1.73 m 2    GFR If Non African American >60 >60 mL/min/1.73 m 2   ACCU-CHEK GLUCOSE    Collection Time: 07/16/19  7:34 AM   Result Value Ref Range    Glucose - Accu-Ck 257 (H) 65 - 99 mg/dL   ACCU-CHEK GLUCOSE    Collection Time: 07/16/19 12:29 PM   Result Value Ref Range    Glucose - Accu-Ck 383 (H) 65 - 99 mg/dL   ACCU-CHEK GLUCOSE    Collection Time: 07/16/19  5:24 PM   Result Value Ref Range    Glucose - Accu-Ck 286 (H) 65 - 99 mg/dL   ACCU-CHEK GLUCOSE    Collection Time: 07/16/19  9:02 PM   Result Value Ref Range    Glucose - Accu-Ck 328 (H) 65 - 99 mg/dL   CBC with Differential    Collection Time: 07/17/19  5:12 AM   Result Value Ref Range    WBC 17.3 (H) 4.8 - 10.8 K/uL    RBC 4.28 (L) 4.70 - 6.10 M/uL    Hemoglobin 14.1 14.0 - 18.0 g/dL    Hematocrit 40.8 (L) 42.0  - 52.0 %    MCV 95.3 81.4 - 97.8 fL    MCH 32.9 27.0 - 33.0 pg    MCHC 34.6 33.7 - 35.3 g/dL    RDW 47.0 35.9 - 50.0 fL    Platelet Count 179 164 - 446 K/uL    MPV 9.6 9.0 - 12.9 fL    Neutrophils-Polys 86.50 (H) 44.00 - 72.00 %    Lymphocytes 4.90 (L) 22.00 - 41.00 %    Monocytes 6.40 0.00 - 13.40 %    Eosinophils 0.00 0.00 - 6.90 %    Basophils 0.30 0.00 - 1.80 %    Immature Granulocytes 1.90 (H) 0.00 - 0.90 %    Nucleated RBC 0.00 /100 WBC    Neutrophils (Absolute) 14.99 (H) 1.82 - 7.42 K/uL    Lymphs (Absolute) 0.85 (L) 1.00 - 4.80 K/uL    Monos (Absolute) 1.10 (H) 0.00 - 0.85 K/uL    Eos (Absolute) 0.00 0.00 - 0.51 K/uL    Baso (Absolute) 0.05 0.00 - 0.12 K/uL    Immature Granulocytes (abs) 0.33 (H) 0.00 - 0.11 K/uL    NRBC (Absolute) 0.00 K/uL   Comp Metabolic Panel (CMP)    Collection Time: 07/17/19  5:12 AM   Result Value Ref Range    Sodium 129 (L) 135 - 145 mmol/L    Potassium 4.6 3.6 - 5.5 mmol/L    Chloride 97 96 - 112 mmol/L    Co2 26 20 - 33 mmol/L    Anion Gap 6.0 0.0 - 11.9    Glucose 264 (H) 65 - 99 mg/dL    Bun 31 (H) 8 - 22 mg/dL    Creatinine 0.78 0.50 - 1.40 mg/dL    Calcium 8.4 (L) 8.5 - 10.5 mg/dL    AST(SGOT) 16 12 - 45 U/L    ALT(SGPT) 41 2 - 50 U/L    Alkaline Phosphatase 73 30 - 99 U/L    Total Bilirubin 0.8 0.1 - 1.5 mg/dL    Albumin 2.9 (L) 3.2 - 4.9 g/dL    Total Protein 5.5 (L) 6.0 - 8.2 g/dL    Globulin 2.6 1.9 - 3.5 g/dL    A-G Ratio 1.1 g/dL   TSH with Reflex to FT4    Collection Time: 07/17/19  5:12 AM   Result Value Ref Range    TSH 0.610 0.380 - 5.330 uIU/mL   Vitamin D, 25-hydroxy (blood)    Collection Time: 07/17/19  5:12 AM   Result Value Ref Range    25-Hydroxy   Vitamin D 25 27 (L) 30 - 100 ng/mL   ESTIMATED GFR    Collection Time: 07/17/19  5:12 AM   Result Value Ref Range    GFR If African American >60 >60 mL/min/1.73 m 2    GFR If Non African American >60 >60 mL/min/1.73 m 2   ACCU-CHEK GLUCOSE    Collection Time: 07/17/19  7:38 AM   Result Value Ref Range    Glucose -  Accu-Ck 225 (H) 65 - 99 mg/dL   ACCU-CHEK GLUCOSE    Collection Time: 07/17/19 11:27 AM   Result Value Ref Range    Glucose - Accu-Ck 339 (H) 65 - 99 mg/dL       Current Facility-Administered Medications   Medication Frequency   • Respiratory Care per Protocol Continuous RT   • oxyCODONE immediate-release (ROXICODONE) tablet 5 mg Q3HRS PRN   • oxyCODONE immediate release (ROXICODONE) tablet 10 mg Q3HRS PRN   • hydrALAZINE (APRESOLINE) tablet 25 mg Q8HRS PRN   • acetaminophen (TYLENOL) tablet 650 mg Q4HRS PRN   • senna-docusate (PERICOLACE or SENOKOT S) 8.6-50 MG per tablet 2 Tab BID    And   • polyethylene glycol/lytes (MIRALAX) PACKET 1 Packet QDAY PRN    And   • magnesium hydroxide (MILK OF MAGNESIA) suspension 30 mL QDAY PRN    And   • bisacodyl (DULCOLAX) suppository 10 mg QDAY PRN   • artificial tears 1.4 % ophthalmic solution 1 Drop PRN   • benzocaine-menthol (CEPACOL) lozenge 1 Lozenge Q2HRS PRN   • mag hydrox-al hydrox-simeth (MAALOX PLUS ES or MYLANTA DS) suspension 20 mL Q2HRS PRN   • traZODone (DESYREL) tablet 50 mg QHS PRN   • ondansetron (ZOFRAN ODT) dispertab 4 mg 4X/DAY PRN    Or   • ondansetron (ZOFRAN) syringe/vial injection 4 mg 4X/DAY PRN   • sodium chloride (OCEAN) 0.65 % nasal spray 2 Spray PRN   • scopolamine (TRANSDERM-SCOP) patch 1 Patch Q72HRS PRN   • insulin regular (HUMULIN R) injection 3-14 Units 4X/DAY ACHS    And   • glucose 4 g chewable tablet 16 g Q15 MIN PRN    And   • dextrose 50% (D50W) injection 50 mL Q15 MIN PRN   • atorvastatin (LIPITOR) tablet 20 mg QHS   • dexamethasone (DECADRON) tablet 4 mg Q12HRS    Followed by   • [START ON 7/18/2019] dexamethasone (DECADRON) tablet 2 mg Q12HRS    Followed by   • [START ON 7/20/2019] dexamethasone (DECADRON) tablet 1 mg Q12HRS   • levETIRAcetam (KEPPRA) tablet 500 mg BID   • metoprolol (LOPRESSOR) tablet 25 mg TWICE DAILY   • therapeutic multivitamin-minerals (THERAGRAN-M) tablet 1 Tab DAILY   • omeprazole (PRILOSEC) capsule 20 mg DAILY        Orders Placed This Encounter   Procedures   • Diet Order Regular     Standing Status:   Standing     Number of Occurrences:   1     Order Specific Question:   Diet:     Answer:   Regular [1]     Order Specific Question:   Consistency/Fluid modifications:     Answer:   Thin Liquids [3]       Assessment:  Active Hospital Problems    Diagnosis   • *Primary cerebral lymphoma (HCC)   • Intracranial mass   • Hyponatremia   • Atrial fibrillation (HCC)   • CAD (coronary artery disease)   • Hyperglycemia   • Hypertension   • Chronic back pain   • Dyslipidemia   • History of prostate cancer       Medical Decision Making and Plan:  nonTraumatic Brain injury - 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 Ruffin on 7/10/19  -Staples out 10-14 days post-op. Follow-up with Dr. uRffin in 1-2 weeks. Follow-up with Oncology  -Steroid taper until 7/21/19. Keppra prophylaxis until 7/18/19  -PT and OT for mobility and ADLs  -SLP for cognition/speech     Dysphagia - Upgraded to dysphagia 2 with NTL prior to transfer.   -SLP for swallow evaluation.     DM - Patient on SSI on transfer.  Previously on Lantus 12 U qEvening  -Restart Lantus 12 U. Consult hospitalist     A fib/HTN - Patient on metoprolol 25 mg BID. Previously on anticoagulation. No ASA or AC per NSG at discharge. Previously on Azilsartan 40 mg daily. Continue to monitor  -Patient with A Fib HR into 100s at time. Consult hospitalist     Hyponatremia - 129 on transfer. Continue to monitor     HLD - Patient on Atorvastatin on transfer.     GI Ppx - Patient on stool softeners while on pain medications and PPI while on steroids.      DVT Ppx - Patient is high risk for bleed s/p removal of hemorrhagic tumor. Continue to monitor ambulation.     Total time:  35 minutes.  I spent greater than 50% of the time for patient care, counseling, and coordination on this date, including unit/floor time, and face-to-face time with the patient as per interval events  and assessment and plan above. Topics discussed included admission labs, hyponatremia, labile HR, restart Lantus and consulted hospitalist. Discussed case with hospitalist face to face on floor.     Leticia Bella M.D.

## 2019-07-17 NOTE — CARE PLAN
Problem: Communication  Goal: The ability to communicate needs accurately and effectively will improve  Richie is oriented x 4.  He is able to communicate his needs accurately and effectively.  He uses his call light and waits for assistance.     Problem: Safety  Goal: Will remain free from falls  Pt uses call light consistently and appropriately. Waits for assistance does not attempt self transfer this shift. Able to verbalize needs.    Problem: Urinary Elimination:  Goal: Ability to reestablish a normal urinary elimination pattern will improve  Patient calls often to go to the bathroom.  He is usually having incontinence prior to going to the bathroom.

## 2019-07-17 NOTE — THERAPY
Speech Language Pathology  Daily Treatment     Patient Name: Marcio More  Age:  73 y.o., Sex:  male  Medical Record #: [id number]  Today's Date: 7/17/2019     Subjective    Patient is a 73 y.o. year-old male with a past medical history significant for A fib on AC, CAD, Hx of prostate cancer, HTN, HLD, and WALTER admitted to [facility]  with worsening weakness, fatigued and [place]. Patient found to have large 4.5 cm right fontal lobe mass. NSG was consulted and recommended starting Keppra and steroids as well as possible intervention. Patient underwent right sided resection on 7/10/19. 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   Patient participated in clinical swallow evaluation on 7/16/19 which revealed intermittent throat clearing with current dysphagia II/NTL diet. MBSS was recommended to further assess swallow function.        Objective       07/17/19 0932   History / Background Information   Prior Level of Function for Eating / Swallowing dysphagia II/NTL   Diagnosis Primary cerebral lymphoma   Dysphagia Symptoms Warranting Video Swallow Intermittent throat clearing during clinical swallow evaluation   General Anatomy / Physiology Possible cervical osteophytes C-4, 5, & 6 altering contour of PPW. Unable to visualize below the top of C-6   Dentition Intact   Procedure   Patient Seated in  wheelchair   Seated at (Degrees) 90   Views Completed Lateral   Fluoroscopy Time 2 min 1 sec   Consistencies / Presentation Method   Puree Teaspoon   Nectar Thick Liquids Cup   Thin Liquids Cup;Straw   Mechanical Soft / Mixed Teaspoon   Solid (bite)   Barium Tablet Cup   Oral Phase   Puree Oral Residue After the Swallow;Premature Spillage Into Valleculae   Nectar Thick Liquids Oral Residue After the Swallow;Premature Spillage Into Valleculae   Thin Liquids Premature Spillage Into Valleculea   Mechanical Soft / Mixed Oral Residue After the Swallow;Premature Spillage Into  Valleculea   Solid Oral Residue After the Swallow;Premature Spillage Into Valleculea   Barium Tablet Impaired Anterior / Posterior Bolus Movement;Delayed Oral Transit   Pharyngeal Phase   Puree Residue In Pyriform Sinus   Nectar Thick Liquids Residue In Valleculae   Mechanical Soft / Mixed Residue In Valleculae;Residue In Pyriform Sinus   Solid Residue In Valleculae;Residue In Pyriform Sinus   Compensatory Strategies Attempted   Multiple Swallows effective   Liquid Wash After Swallow effective   Impression   Oral - Pharyngeal Slight Impairment   Prognosis   Prognosis for Improvement Excellent   Barriers to Improvement none   Positive Indicators for Improvement motivation   Recommendations   Diet / Liquid Recommendation Regular;Thin Liquid   Medication Administration  Float Whole with Puree   Strategies / Precautions Small Bites;Small Sips;Multiple Swallows   Interventions Compensatory Safe Swallow Strategy Training;Therapeutic Dining Program for Meals   SLP Contact Information (Name / Extension) [phone]   SLP Total Time Spent   SLP Individual Total Time Spent (Mins) 30   Charge Group   SLP Video Swallow / FEES Videofluoroscopic Evaluation       FIM Eating Score:  5 - Standby Prompting/Supervision or Set-up  Eating Description:         Assessment    MBSS was completed. Patient demonstrates slight oral-pharyngeal dysphagia. Was assessed with trials of thin liquids, NTL, purees, and solid textures. Premature spillage to the valleculae was present with all consistencies as well as slight residue along base of tongue post initial swallow. Patient was able to independently perform second swallow to resolve residue. Possible cervical osteophytes C-4, 5, & 6 altering contour of PPW. Epiglottic inversion is slightly impaired due to possible osteophyte, however pharyngeal squeeze was adequate. Mild pharyngeal residue was improved with second swallow and no penetration or aspiration was observed on this study. Poor a-p  movement of barium tablet and capsule.     Plan    Recommend initiate regular diet with thin liquids, float medication whole in puree. Continue to t-dine x1 for training of safe swallow strategies during meal. Discharge ST for dysphagia as appropriate.

## 2019-07-17 NOTE — DIETARY
Nutrition Services: Pt with elevated POC glucose levels with a range between 225-383 (7/16-7/17/19). Pt's Hb A1c on 7/6/19 was 9.5. Pt is receiving Decadron which most likely is causing elevated glucose levels. RD offered diet modification to diabetic diet but pt refused. Diet texture has been liberalized to regular and pt is looking forward to having scrambled eggs, potatoes and orange juice for breakfast. PO intake has been good at % of meals (based on 2 records). Glucose levels may improve as Decadron is tapered down. Hospitalist has been consulted. RD will follow weekly.

## 2019-07-18 PROBLEM — D72.829 LEUKOCYTOSIS: Status: ACTIVE | Noted: 2019-01-01

## 2019-07-18 PROBLEM — E55.9 VITAMIN D INSUFFICIENCY: Status: ACTIVE | Noted: 2019-01-01

## 2019-07-18 NOTE — CONSULTS
"DATE OF SERVICE:  7/18/2019    REQUESTING PHYSICIAN:  Javier Bella MD    CHIEF COMPLAINT / REASON FOR CONSULTATION:   Hypertension  Afib  Diabetes  Hyponatremia  Leukocytosis  Azotemia    HISTORY OF PRESENT ILLNESS:  This is a 72 y/o male with a PMH significant for hypertension, afib, newly diagnosed diabetes, CAD, WALTER, and hx of prostate cancer,who presented to [facility] with complaints of worsening weakness, fatigued, and a fall [place].  Workup revelaed a large 4.5 cm right fontal lobe mass.  Neurosurgery was consulted and recommended starting Keppra and steroids and subsequently underwent resection of the mass on 7/10/19 with Dr. Ruffin without complication.  Pathology eventually came back showing a high grade B cell lymphoma.   Hematology and oncology were consulted for high grade B cell lymphoma and recommended systemic chemotherapy.  Per oncology would recommend starting 3-4 weeks post-operatively.       Because of the patient's weakness and debility, Rehab was consulted, evaluated the patient, and was deemed a good Rehab candidate.  The patient was transferred over to the Rehab facility on 7/16/2019.      The patient denies fever, chills, nausea, vomiting, headaches, blurry vision, or chest pain.    Because of these issues, Internal Medicine was consulted to help evaluate and manage the patient.    REVIEW OF SYSTEMS: All review of systems are negative pre AMA and CMS criteria   except for that stated in the HPI.    PAST MEDICAL HISTORY:  Past Medical History:   Diagnosis Date   • Anesthesia     \"too much anesthesia kidneys stop woking and intestines slowed\"   • Arthritis    • Atrial fibrillation (HCC)    • Back pain    • Blood clotting disorder (HCC) 1999    blood clot leg Left   • Breath shortness    • CAD (coronary artery disease)    • Cancer (HCC) 2016    prostate cancer   • Cataract     no surgery   • Dyslipidemia    • High cholesterol    • Hypertension    • Ileus (HCC)    • Myocardial infarct " ([facility]) 1999,2006    x3   • Renal disorder     cycst left kidney   • Sleep apnea     no cpap   • Snoring    • Urinary incontinence        PAST SURGICAL HISTORY:  Past Surgical History:   Procedure Laterality Date   • CRANIOTOMY Right 7/10/2019    Procedure: RIGHT-SIDED CRANIOTOMY FOR TUMOR;  Surgeon: Son Ruffin M.D.;  Location: SURGERY [facility];  Service: Neurosurgery   • LUMBAR LAMINECTOMY DISKECTOMY N/A 11/26/2018    Procedure: LUMBAR LAMINECTOMY DISKECTOMY-  POSTERIOR L1-2 LAMI;  Surgeon: Son Ruffin M.D.;  Location: SURGERY [facility];  Service: Neurosurgery   • LAMINOTOMY Left 11/26/2018    Procedure: LAMINOTOMY-  L4-S1;  Surgeon: Son Ruffin M.D.;  Location: SURGERY [facility];  Service: Neurosurgery   • FORAMINOTOMY Left 11/26/2018    Procedure: FORAMINOTOMY;  Surgeon: Son Ruffin M.D.;  Location: SURGERY [facility];  Service: Neurosurgery   • OTHER NEUROLOGICAL SURG  2016    Thoracic 2-3 fusion    • APPENDECTOMY  2002   • OTHER CARDIAC SURGERY  1999,2002,2006    stents cardiac   • OTHER NEUROLOGICAL SURG      Laminectomy       Allergies   Allergen Reactions   • Gabapentin Rash     Broke out in a rash on R bicep       CURRENT MEDICATIONS:    Current Facility-Administered Medications:   •  oxybutynin SR  •  insulin glargine  •  Respiratory Care per Protocol  •  oxyCODONE immediate-release  •  oxyCODONE immediate release  •  hydrALAZINE  •  acetaminophen  •  senna-docusate **AND** polyethylene glycol/lytes **AND** magnesium hydroxide **AND** bisacodyl  •  artificial tears  •  benzocaine-menthol  •  mag hydrox-al hydrox-simeth  •  traZODone  •  ondansetron **OR** ondansetron  •  sodium chloride  •  scopolamine  •  insulin regular **AND** Accu-Chek ACHS **AND** NOTIFY MD and PharmD **AND** glucose **AND** dextrose 50%  •  atorvastatin  •  [COMPLETED] dexamethasone **FOLLOWED BY** [COMPLETED] dexamethasone **FOLLOWED BY** dexamethasone **FOLLOWED BY**  [START ON 7/20/2019] dexamethasone  •  metoprolol  •  therapeutic multivitamin-minerals  •  omeprazole    Social History     Social History   • Marital status:      Spouse name: N/A   • Number of children: N/A   • Years of education: N/A     Social History Main Topics   • Smoking status: Never Smoker   • Smokeless tobacco: Never Used   • Alcohol use No      Comment: 1 drink a month   • Drug use: No   • Sexual activity: Not on file     Other Topics Concern   • Not on file     Social History Narrative   • No narrative on file       FAMILY HISTORY:  was reviewed and is not pertinent to this consultation.    PHYSICAL EXAMINATION:  VITAL SIGNS:  Temp is 98.2, blood pressure is 120/68, heart rate is 64, respiratory rate is 18.  GENERAL:  Patient was lying in bed in no distress.  HEENT:  Pupils were equal, round and reactive to light and accomodation.  Oral mucosa was pink and moist.  NECK:  Soft.  Supple.  No JVD.  HEART:  Regular rate and rhythm.  Normal S1 and S2.  No murmurs were appreciated.  LUNGS:  Are clear to auscultation bilaterally.  ABDOMEN:  Soft, non tender, non distended.  Bowels sound were positive in all four quadrants.  EXTREMITIES:  No clubbing, cyanosis.  There was no lower extremity edema.  NEUROLOGIC:  Cranial nerves two through twelve were grossly intact.    LABS:  Lab Results   Component Value Date/Time    SODIUM 129 (L) 07/17/2019 05:12 AM    POTASSIUM 4.6 07/17/2019 05:12 AM    CHLORIDE 97 07/17/2019 05:12 AM    CO2 26 07/17/2019 05:12 AM    GLUCOSE 264 (H) 07/17/2019 05:12 AM    BUN 31 (H) 07/17/2019 05:12 AM    CREATININE 0.78 07/17/2019 05:12 AM      Lab Results   Component Value Date/Time    WBC 17.3 (H) 07/17/2019 05:12 AM    RBC 4.28 (L) 07/17/2019 05:12 AM    HEMOGLOBIN 14.1 07/17/2019 05:12 AM    HEMATOCRIT 40.8 (L) 07/17/2019 05:12 AM    MCV 95.3 07/17/2019 05:12 AM    MCH 32.9 07/17/2019 05:12 AM    MCHC 34.6 07/17/2019 05:12 AM    MPV 9.6 07/17/2019 05:12 AM    NEUTSPOLYS 86.50  (H) 07/17/2019 05:12 AM    LYMPHOCYTES 4.90 (L) 07/17/2019 05:12 AM    MONOCYTES 6.40 07/17/2019 05:12 AM    EOSINOPHILS 0.00 07/17/2019 05:12 AM    BASOPHILS 0.30 07/17/2019 05:12 AM      Lab Results   Component Value Date/Time    PROTHROMBTM 15.0 (H) 07/10/2019 02:12 AM    INR 1.15 (H) 07/10/2019 02:12 AM        Atrial fibrillation (HCC)  HR ok  On Lopressor: 25 mg bid  Cont to monitor    History of prostate cancer  S/P lasar treatment    Hypertension  BP ok  On Lopressor: 25 mg bid  Monitor    Hyponatremia  Na: 132 --> 131 --> 129 (7/18)  ? 2nd to SIADH  Consider salt tabs if Na continues to drop lower  Will monitor for now    Intracranial mass  S/P resection  Path --> high grade B cell Lymphoma  For chemo after hospital stay    CAD (coronary artery disease)  Hx of 3 cardiac stents    Azotemia  Bun: 37 --> 31 (7/17)  Encouraging fluid (water/juice) intake  Monitor    Vitamin D insufficiency  Vit D: 27 (7/17)  Will start supplements (7/18)    Leukocytosis  WBC's: 17.3 (7/17)  Has been increased since 7/8  Likely 2nd to steroids -- being tapered off  Monitor for now    Type 2 diabetes mellitus without complication, without long-term current use of insulin (HCC)  Hba1c: 10 (7/17)  BS recently: 203-229  On Lantus: 12 units qhs  Will start metformin: 750 mg bid (7/18 evening)  Will change diet to diabetic diet  Will try to get on oral meds only  Cont to monitor        This case has been discussed with the attending Physiatrist.    Thank you for the consultation.  Will follow the patient with you.

## 2019-07-18 NOTE — CARE PLAN
Problem: Bathing  Goal: STG-Within one week, patient will bathe  1) Individualized Goal:  Supervision with AE/DME prn.  2) Interventions:  OT Group Therapy, OT Self Care/ADL, OT Community Reintegration, OT Manual Ther Technique, OT Neuro Re-Ed/Balance, OT Therapeutic Activity, OT Evaluation and OT Therapeutic Exercise   Outcome: MET Date Met: 07/18/19      Problem: Dressing  Goal: STG-Within one week, patient will dress LB  1) Individualized Goal:  Min A with adaptive equipment.  2) Interventions:  OT Group Therapy, OT Self Care/ADL, OT Community Reintegration, OT Manual Ther Technique, OT Neuro Re-Ed/Balance, OT Therapeutic Activity, OT Evaluation and OT Therapeutic Exercise   Outcome: MET Date Met: 07/18/19      Problem: Toileting  Goal: STG-Within one week, patient will complete toileting tasks  1) Individualized Goal:  supervision with AE/DME prn.  2) Interventions:  OT Group Therapy, OT Self Care/ADL, OT Community Reintegration, OT Manual Ther Technique, OT Neuro Re-Ed/Balance, OT Therapeutic Activity, OT Evaluation and OT Therapeutic Exercise   Outcome: MET Date Met: 07/18/19      Problem: Functional Transfers  Goal: STG-Within one week, patient will  1) Individualized Goal: transfer to toilet and shower at supervision level with AD/DME prn.  2) Interventions:  OT Group Therapy, OT Self Care/ADL, OT Community Reintegration, OT Manual Ther Technique, OT Neuro Re-Ed/Balance, OT Therapeutic Activity, OT Evaluation and OT Therapeutic Exercise   Outcome: MET Date Met: 07/18/19

## 2019-07-18 NOTE — CARE PLAN
"Problem: Balance  Goal: STG-Within one week, patient will  1) Individualized goal:  Score >35/56 on the Payan balance assessment  2) Interventions:  PT Group Therapy, PT Gait Training, PT Self Care/Home Eval, PT Therapeutic Exercises, PT Neuro Re-Ed/Balance, PT Therapeutic Activity and PT Evaluation     Outcome: PROGRESSING AS EXPECTED  PT eval completed yesterday; will monitor pt's progress towards goals this week.    Problem: Mobility  Goal: STG-Within one week, patient will ambulate household distance  1) Individualized goal:  >300' with SPC and CGA  2) Interventions:  PT Group Therapy, PT Gait Training, PT Self Care/Home Eval, PT Therapeutic Exercises, PT Neuro Re-Ed/Balance, PT Therapeutic Activity and PT Evaluation       Outcome: PROGRESSING AS EXPECTED  PT eval completed yesterday; will monitor pt's progress towards goals this week.    Problem: Mobility Transfers  Goal: STG-Within one week, patient will sit to stand  1) Individualized goal:  SBA with use of UEs from 21\" apple seat  2) Interventions: PT Group Therapy, PT Gait Training, PT Self Care/Home Eval, PT Therapeutic Exercises, PT Neuro Re-Ed/Balance, PT Therapeutic Activity and PT Evaluation       Outcome: PROGRESSING AS EXPECTED  PT eval completed yesterday; will monitor pt's progress towards goals this week.      "

## 2019-07-18 NOTE — THERAPY
Physical Therapy   Daily Treatment     Patient Name: Marcio More  Age:  73 y.o., Sex:  male  Medical Record #: [id number]  Today's Date: 7/18/2019     Precautions  Precautions: Fall Risk, Other (See Comments)  Comments: impaired safety, pt goes by Richie (middle name)    Subjective    Patient agreeable to PT.     Objective       07/18/19 1001   Precautions   Precautions Fall Risk;Other (See Comments)   Comments impaired safety, pt goes by Richie (middle name)   Vitals   O2 (LPM) 0   O2 Delivery None (Room Air)   Cognition    Ability To Follow Commands 2 Step   Safety Awareness Impaired   Bed Mobility    Supine to Sit Supervised   Sit to Supine Supervised   Sit to Stand Contact Guard Assist   Scooting Supervised   Rolling Supervised   PT Total Time Spent   PT Individual Total Time Spent (Mins) 60   PT Charge Group   PT Gait Training 3   PT Therapeutic Activities 1      Discussed treatment goals and progress.  CGA with gait belt and FWW to bathroom at end of session; handoff to CNA.    FIM Bed/Chair/Wheelchair Transfers Score: 4 - Minimal Assistance  Bed/Chair/Wheelchair Transfers Description:   (CGA, FWW, gait belt, setup, increased time, cueing, elevated HOB.)    FIM Walking Score:  4 - Minimal Assistance  Walking Description:   (Min A at gait belt for balance and pants management, FWW, cueing, increased time.  5 indoor/outdoor reps for 180-430 feet today.  Seated breaks.  Cueing for path finding, safety with R sided clearance occasionally, and general safety.  Fwd trunk posture.)    FIM Wheelchair Score:  0 - Not tested,unsafe activity  Wheelchair Description:         Assessment    Patient has improving endurance; impaired posture, endurance, and balance limits pt's independence at this time; pt verbalizes safety but will require improved demonstration and consistency; mild safety/scanning deficits to R occasionally.    Plan    Safety, increased ambulation with FWW or SPC, ther ex for strengthening and endurance,  standing balance.

## 2019-07-18 NOTE — ASSESSMENT & PLAN NOTE
H/O stent x 3  On Lipitor and Metoprolol  Consider ASA when cleared by Neurosurgery  Consider ACE-I if blood pressure and renal function tolerates

## 2019-07-18 NOTE — THERAPY
Speech Language Pathology  Daily Treatment     Patient Name: Marcio More  Age:  73 y.o., Sex:  male  Medical Record #: [id number]  Today's Date: 7/18/2019     Subjective    Patient upright in chair unattended in gym when approached for SLP session.  Per PT, patient ambulating w/FWW and min assist.       Objective       07/18/19 0901   Dysphagia    Other Treatments post-MBSS review and education   Interdisciplinary Plan of Care Collaboration   IDT Collaboration with  Certified Nursing Assistant;Physical Therapist   Patient Position at End of Therapy In Bed;Bed Alarm On;Call Light within Reach;Tray Table within Reach;Phone within Reach   Collaboration Comments Reported to CNA, Nadiya, return back to bed after SLP session. Discussed ambulation status with PT   SLP Total Time Spent   SLP Individual Total Time Spent (Mins) 30   Charge Group   SLP Swallowing Dysfunction Treatment Swallowing Dysfunction Treatment       Assessment    Patient participated in 1:1 SLP session targeting dysphagia management with emphasis on post-MBSS review and education.  SLP provided verbal education re: head and neck anatomy, normal swallowing physiology, and slight post-swallow pyriform sinus residue observed. Patient engaged and asked appropriate questions throughout with no additional questions at session conclusion.     Plan    Follow up in T-dine with primary SLP.  Explore medication management.

## 2019-07-18 NOTE — PROGRESS NOTES
"Rehab Progress Note     Encounter Date: 7/18/2019    CC: intracranial tumor, decreased balance     Interval Events (Subjective)  Patient sitting up in room. He reports he is doing well. He reports he feels like he is making good progress with therapy. Patient having significant frequency of urine. Recommended with nursing for condom catheter. Will also start on Oxybutynin QPM.  Patient denies dysuria. Reports history of prostate cancer s/p resection.     IDT Team Meeting 7/18/2019    ILeticia M.D., was present and led the interdisciplinary team conference on 7/18/2019.  I led the IDT conference and agree with the IDT conference documentation and plan of care as noted below.     RN:  Diet Regular   % Meal     Pain    Sleep    Bowel Continent   Bladder Continent; urgency very limiting; urinal training   In's & Out's    Blood sugars improved on Lantus    PT:  Bed Mobility    Transfers Sofie   Mobility Sofie   Stairs maxA   Limited by balance and endurance  Using cane previously    OT:  Eating    Grooming    Bathing    UB Dressing SBA   LB Dressing Sofie   Toileting CGA   Shower & Transfer CGA   Improving transfers, limited by urinary frequency    SLP:  Sofie for memory  Discharge from T dine, on regular diet  Sofie for executive function  Working on phonation    CM:  Continues to work on disposition and DME needs.      DC/Disposition:  7/27/19    Objective:  VITAL SIGNS: /68   Pulse 64   Temp 36.8 °C (98.2 °F) (Oral)   Resp 18   Ht 1.854 m (6' 1\")   Wt 94.1 kg (207 lb 8 oz)   SpO2 93%   BMI 27.38 kg/m²   Gen: NAD  Psych: Mood and affect appropriate  CV: RRR, no edema  Resp: CTAB, no upper airway sounds  Abd: NTND  Neuro: AOx3, walking with FWW, stooped gait    Recent Results (from the past 72 hour(s))   ACCU-CHEK GLUCOSE    Collection Time: 07/15/19  1:16 PM   Result Value Ref Range    Glucose - Accu-Ck 367 (H) 65 - 99 mg/dL   ACCU-CHEK GLUCOSE    Collection Time: 07/15/19  5:19 PM   Result " Value Ref Range    Glucose - Accu-Ck 350 (H) 65 - 99 mg/dL   ACCU-CHEK GLUCOSE    Collection Time: 07/15/19  8:40 PM   Result Value Ref Range    Glucose - Accu-Ck 318 (H) 65 - 99 mg/dL   Basic Metabolic Panel    Collection Time: 07/16/19  2:54 AM   Result Value Ref Range    Sodium 131 (L) 135 - 145 mmol/L    Potassium 4.6 3.6 - 5.5 mmol/L    Chloride 98 96 - 112 mmol/L    Co2 27 20 - 33 mmol/L    Glucose 237 (H) 65 - 99 mg/dL    Bun 37 (H) 8 - 22 mg/dL    Creatinine 0.92 0.50 - 1.40 mg/dL    Calcium 8.5 8.5 - 10.5 mg/dL    Anion Gap 6.0 0.0 - 11.9   CBC WITHOUT DIFFERENTIAL    Collection Time: 07/16/19  2:54 AM   Result Value Ref Range    WBC 14.8 (H) 4.8 - 10.8 K/uL    RBC 4.17 (L) 4.70 - 6.10 M/uL    Hemoglobin 13.2 (L) 14.0 - 18.0 g/dL    Hematocrit 39.4 (L) 42.0 - 52.0 %    MCV 94.5 81.4 - 97.8 fL    MCH 31.7 27.0 - 33.0 pg    MCHC 33.5 (L) 33.7 - 35.3 g/dL    RDW 45.1 35.9 - 50.0 fL    Platelet Count 217 164 - 446 K/uL    MPV 9.6 9.0 - 12.9 fL   MAGNESIUM    Collection Time: 07/16/19  2:54 AM   Result Value Ref Range    Magnesium 1.9 1.5 - 2.5 mg/dL   ESTIMATED GFR    Collection Time: 07/16/19  2:54 AM   Result Value Ref Range    GFR If African American >60 >60 mL/min/1.73 m 2    GFR If Non African American >60 >60 mL/min/1.73 m 2   ACCU-CHEK GLUCOSE    Collection Time: 07/16/19  7:34 AM   Result Value Ref Range    Glucose - Accu-Ck 257 (H) 65 - 99 mg/dL   ACCU-CHEK GLUCOSE    Collection Time: 07/16/19 12:29 PM   Result Value Ref Range    Glucose - Accu-Ck 383 (H) 65 - 99 mg/dL   ACCU-CHEK GLUCOSE    Collection Time: 07/16/19  5:24 PM   Result Value Ref Range    Glucose - Accu-Ck 286 (H) 65 - 99 mg/dL   ACCU-CHEK GLUCOSE    Collection Time: 07/16/19  9:02 PM   Result Value Ref Range    Glucose - Accu-Ck 328 (H) 65 - 99 mg/dL   CBC with Differential    Collection Time: 07/17/19  5:12 AM   Result Value Ref Range    WBC 17.3 (H) 4.8 - 10.8 K/uL    RBC 4.28 (L) 4.70 - 6.10 M/uL    Hemoglobin 14.1 14.0 - 18.0 g/dL     Hematocrit 40.8 (L) 42.0 - 52.0 %    MCV 95.3 81.4 - 97.8 fL    MCH 32.9 27.0 - 33.0 pg    MCHC 34.6 33.7 - 35.3 g/dL    RDW 47.0 35.9 - 50.0 fL    Platelet Count 179 164 - 446 K/uL    MPV 9.6 9.0 - 12.9 fL    Neutrophils-Polys 86.50 (H) 44.00 - 72.00 %    Lymphocytes 4.90 (L) 22.00 - 41.00 %    Monocytes 6.40 0.00 - 13.40 %    Eosinophils 0.00 0.00 - 6.90 %    Basophils 0.30 0.00 - 1.80 %    Immature Granulocytes 1.90 (H) 0.00 - 0.90 %    Nucleated RBC 0.00 /100 WBC    Neutrophils (Absolute) 14.99 (H) 1.82 - 7.42 K/uL    Lymphs (Absolute) 0.85 (L) 1.00 - 4.80 K/uL    Monos (Absolute) 1.10 (H) 0.00 - 0.85 K/uL    Eos (Absolute) 0.00 0.00 - 0.51 K/uL    Baso (Absolute) 0.05 0.00 - 0.12 K/uL    Immature Granulocytes (abs) 0.33 (H) 0.00 - 0.11 K/uL    NRBC (Absolute) 0.00 K/uL   Comp Metabolic Panel (CMP)    Collection Time: 07/17/19  5:12 AM   Result Value Ref Range    Sodium 129 (L) 135 - 145 mmol/L    Potassium 4.6 3.6 - 5.5 mmol/L    Chloride 97 96 - 112 mmol/L    Co2 26 20 - 33 mmol/L    Anion Gap 6.0 0.0 - 11.9    Glucose 264 (H) 65 - 99 mg/dL    Bun 31 (H) 8 - 22 mg/dL    Creatinine 0.78 0.50 - 1.40 mg/dL    Calcium 8.4 (L) 8.5 - 10.5 mg/dL    AST(SGOT) 16 12 - 45 U/L    ALT(SGPT) 41 2 - 50 U/L    Alkaline Phosphatase 73 30 - 99 U/L    Total Bilirubin 0.8 0.1 - 1.5 mg/dL    Albumin 2.9 (L) 3.2 - 4.9 g/dL    Total Protein 5.5 (L) 6.0 - 8.2 g/dL    Globulin 2.6 1.9 - 3.5 g/dL    A-G Ratio 1.1 g/dL   HEMOGLOBIN A1C    Collection Time: 07/17/19  5:12 AM   Result Value Ref Range    Glycohemoglobin 10.0 (H) 0.0 - 5.6 %    Est Avg Glucose 240 mg/dL   TSH with Reflex to FT4    Collection Time: 07/17/19  5:12 AM   Result Value Ref Range    TSH 0.610 0.380 - 5.330 uIU/mL   Vitamin D, 25-hydroxy (blood)    Collection Time: 07/17/19  5:12 AM   Result Value Ref Range    25-Hydroxy   Vitamin D 25 27 (L) 30 - 100 ng/mL   ESTIMATED GFR    Collection Time: 07/17/19  5:12 AM   Result Value Ref Range    GFR If   >60 >60 mL/min/1.73 m 2    GFR If Non African American >60 >60 mL/min/1.73 m 2   ACCU-CHEK GLUCOSE    Collection Time: 07/17/19  7:38 AM   Result Value Ref Range    Glucose - Accu-Ck 225 (H) 65 - 99 mg/dL   ACCU-CHEK GLUCOSE    Collection Time: 07/17/19 11:27 AM   Result Value Ref Range    Glucose - Accu-Ck 339 (H) 65 - 99 mg/dL   ACCU-CHEK GLUCOSE    Collection Time: 07/17/19  5:28 PM   Result Value Ref Range    Glucose - Accu-Ck 367 (H) 65 - 99 mg/dL   ACCU-CHEK GLUCOSE    Collection Time: 07/17/19  9:56 PM   Result Value Ref Range    Glucose - Accu-Ck 276 (H) 65 - 99 mg/dL   ACCU-CHEK GLUCOSE    Collection Time: 07/18/19  7:24 AM   Result Value Ref Range    Glucose - Accu-Ck 203 (H) 65 - 99 mg/dL   ACCU-CHEK GLUCOSE    Collection Time: 07/18/19 11:16 AM   Result Value Ref Range    Glucose - Accu-Ck 229 (H) 65 - 99 mg/dL       Current Facility-Administered Medications   Medication Frequency   • insulin glargine (LANTUS) injection 12 Units Q EVENING   • Respiratory Care per Protocol Continuous RT   • oxyCODONE immediate-release (ROXICODONE) tablet 5 mg Q3HRS PRN   • oxyCODONE immediate release (ROXICODONE) tablet 10 mg Q3HRS PRN   • hydrALAZINE (APRESOLINE) tablet 25 mg Q8HRS PRN   • acetaminophen (TYLENOL) tablet 650 mg Q4HRS PRN   • senna-docusate (PERICOLACE or SENOKOT S) 8.6-50 MG per tablet 2 Tab BID    And   • polyethylene glycol/lytes (MIRALAX) PACKET 1 Packet QDAY PRN    And   • magnesium hydroxide (MILK OF MAGNESIA) suspension 30 mL QDAY PRN    And   • bisacodyl (DULCOLAX) suppository 10 mg QDAY PRN   • artificial tears 1.4 % ophthalmic solution 1 Drop PRN   • benzocaine-menthol (CEPACOL) lozenge 1 Lozenge Q2HRS PRN   • mag hydrox-al hydrox-simeth (MAALOX PLUS ES or MYLANTA DS) suspension 20 mL Q2HRS PRN   • traZODone (DESYREL) tablet 50 mg QHS PRN   • ondansetron (ZOFRAN ODT) dispertab 4 mg 4X/DAY PRN    Or   • ondansetron (ZOFRAN) syringe/vial injection 4 mg 4X/DAY PRN   • sodium chloride (OCEAN) 0.65 %  nasal spray 2 Spray PRN   • scopolamine (TRANSDERM-SCOP) patch 1 Patch Q72HRS PRN   • insulin regular (HUMULIN R) injection 3-14 Units 4X/DAY ACHS    And   • glucose 4 g chewable tablet 16 g Q15 MIN PRN    And   • dextrose 50% (D50W) injection 50 mL Q15 MIN PRN   • atorvastatin (LIPITOR) tablet 20 mg QHS   • dexamethasone (DECADRON) tablet 2 mg Q12HRS    Followed by   • [START ON 7/20/2019] dexamethasone (DECADRON) tablet 1 mg Q12HRS   • metoprolol (LOPRESSOR) tablet 25 mg TWICE DAILY   • therapeutic multivitamin-minerals (THERAGRAN-M) tablet 1 Tab DAILY   • omeprazole (PRILOSEC) capsule 20 mg DAILY       Orders Placed This Encounter   Procedures   • Diet Order Regular     Standing Status:   Standing     Number of Occurrences:   1     Order Specific Question:   Diet:     Answer:   Regular [1]     Order Specific Question:   Consistency/Fluid modifications:     Answer:   Thin Liquids [3]       Assessment:  Active Hospital Problems    Diagnosis   • *Primary cerebral lymphoma (HCC)   • Intracranial mass   • Hyponatremia   • Atrial fibrillation (HCC)   • CAD (coronary artery disease)   • Hyperglycemia   • Hypertension   • Chronic back pain   • Dyslipidemia   • History of prostate cancer       Medical Decision Making and Plan:  nonTraumatic Brain injury - 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 Ruffin on 7/10/19  -Staples out 10-14 days post-op. Follow-up with Dr. Ruffin in 1-2 weeks. Follow-up with Oncology  -Steroid taper until 7/21/19. Keppra prophylaxis completed 7/18/19  -PT and OT for mobility and ADLs  -SLP for cognition/speech     Dysphagia - Upgraded to dysphagia 2 with NTL prior to transfer.   -SLP for swallow evaluation - upgraded to regular with thins.      DM - Patient on SSI on transfer.  Previously on Lantus 12 U qEvening  -Restart Lantus 12 U. Consult hospitalist, restarted on home metformin.      A fib/HTN - Patient on metoprolol 25 mg BID. Previously on anticoagulation. No  ASA or AC per NSG at discharge. Previously on Azilsartan 40 mg daily. Continue to monitor  -Patient with A Fib HR into 100s at time. Consult hospitalist     Hyponatremia - 129 on transfer. Continue to monitor     HLD - Patient on Atorvastatin on transfer.     Urinary Frequency - Patient with severe urge incontinence. Will start on Oxybutynin 5 mg    GI Ppx - Patient on stool softeners while on pain medications and PPI while on steroids.      DVT Ppx - Patient is high risk for bleed s/p removal of hemorrhagic tumor. Continue to monitor ambulation.     Total time:  35 minutes.  I spent greater than 50% of the time for patient care, counseling, and coordination on this date, including unit/floor time, and face-to-face time with the patient as per interval events and assessment and plan above. Topics discussed included discharge planning, urinary urgency and start on Oxybutynin. Patient was discussed separately in IDT today; please see details above.    Leticia Bella M.D.

## 2019-07-18 NOTE — REHAB-PHARMACY IDT TEAM NOTE
Pharmacy   Pharmacy  Antibiotics/Antifungals/Antivirals:  Medication:      Active Orders     None        Route:        NA  Stop Date:  NA  Reason:      NA  Antihypertensives/Cardiac:  Medication:    Orders (72h ago through future)    Start     Ordered    07/16/19 2100  atorvastatin (LIPITOR) tablet 20 mg  EVERY BEDTIME      07/16/19 1036    07/16/19 1800  metoprolol (LOPRESSOR) tablet 25 mg  TWICE DAILY     Comments:  This medication was auto-substituted for Nebivolol per P&T Protocol    07/16/19 1036    07/16/19 1037  hydrALAZINE (APRESOLINE) tablet 25 mg  EVERY 8 HOURS PRN      07/16/19 1037        Patient Vitals for the past 24 hrs:   BP Pulse   07/17/19 1756 130/72 70   07/17/19 1345 130/72 68   07/17/19 1031 122/67 64   07/17/19 0630 128/88 100   07/16/19 1915 132/83 86       Anticoagulation:  Medication: None                                     Other key medications: A review of the medication list reveals no issues at this time. Patient is currently on antihypertensive(s). Recommend home blood pressure monitoring/recording if antihypertensive(s) regimen(s) continue. Patient is currently on diabetic medication(s) and/or Insulin(s). Recommend home blood glucose monitoring/recording if these regimen(s) continue.    Section completed by: Osmany Morales [facility]

## 2019-07-18 NOTE — REHAB-COLLABORATIVE ONGOING IDT TEAM NOTE
Weekly Interdisciplinary Team Conference Note    Weekly Interdisciplinary Team Conference # 1  Date:  7/18/2019    Clinicians present and reporting at team conference include the following:   MD: MARIA GUADALUPE Bella MD    RN:  Kailash Inman RN    PT:   Efrem Shankar, JACOB, DPT  OT:  Jer Lucero MS OTR/L    ST:  More Ponce MS, CCC-SLP  CM:  Becca Cobb RN, CCM  REC:  Not Applicable  RT:  Not Applicable  RPh:  Sofy [facility]  All reporting clinicians have a working knowledge of this patient's plan of care.    Targeted DC Date:  7.27.19     Medical    Patient Active Problem List    Diagnosis Date Noted   • Intracranial mass 07/06/2019     Priority: High   • Myelopathy (HCC) 05/15/2016     Priority: High   • Thoracic stenosis 05/15/2016     Priority: High     Class: Acute   • Ileus (HCC) 05/15/2016     Priority: High     Class: Acute   • Hyponatremia 07/12/2019     Priority: Medium   • Atrial fibrillation (HCC) 05/15/2016     Priority: Medium     Class: Chronic   • Hypertension 05/15/2016     Priority: Medium     Class: Chronic   • CAD (coronary artery disease) 05/15/2016     Priority: Medium     Class: Chronic   • Hyperglycemia 05/15/2016     Priority: Medium     Class: Acute   • Azotemia 05/15/2016     Priority: Medium     Class: Acute   • Chronic back pain 05/15/2016     Priority: Low     Class: Chronic   • Dyslipidemia 05/15/2016     Priority: Low     Class: Chronic   • 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   • History of prostate cancer 07/06/2019     Results     Procedure Component Value Ref Range Date/Time    ACCU-CHEK GLUCOSE [[id number]]  (Abnormal) Collected:  07/18/19 0724    Order Status:  Completed Lab Status:  Final result Updated:  07/18/19 0757     Glucose - Accu-Ck 203 (H) 65 - 99 mg/dL     ACCU-CHEK GLUCOSE [[id number]]  (Abnormal) Collected:  07/17/19 2156    Order Status:  Completed Lab Status:  Final result Updated:  07/17/19 2240     Glucose - Accu-Ck 276 (H) 65 - 99 mg/dL      HEMOGLOBIN A1C [[id number]]  (Abnormal) Collected:  07/17/19 0512    Order Status:  Completed Lab Status:  Final result Updated:  07/17/19 2150    Specimen:  Blood      Glycohemoglobin 10.0 (H) 0.0 - 5.6 %      Comment: Increased risk for diabetes:  5.7 -6.4%  Diabetes:  >6.4%  Glycemic control for adults with diabetes:  <7.0%  The above interpretations are per ADA guidelines.  Diagnosis  of diabetes mellitus on the basis of elevated Hemoglobin A1c  should be confirmed by repeating the Hb A1c test.          Est Avg Glucose 240 mg/dL      Comment: The eAG calculation is based on the A1c-Derived Daily Glucose  (ADAG) study.  See the ADA's website for additional information.         ACCU-CHEK GLUCOSE [[id number]]  (Abnormal) Collected:  07/17/19 1728    Order Status:  Completed Lab Status:  Final result Updated:  07/17/19 1742     Glucose - Accu-Ck 367 (H) 65 - 99 mg/dL     ACCU-CHEK GLUCOSE [[id number]]  (Abnormal) Collected:  07/17/19 1127    Order Status:  Completed Lab Status:  Final result Updated:  07/17/19 1140     Glucose - Accu-Ck 339 (H) 65 - 99 mg/dL            Nursing  Diet/Nutrition: Regular with thin liquids  Medication Administration:  Meds whole float.   % consumed at meals in last 24 hours:  Consumed % of meals per documentation.  Meal/Snack Consumption for the past 24 hrs:   Oral Nutrition   07/17/19 1807 Dinner;Between % Consumed   07/17/19 0900 Breakfast;Between % Consumed       Snack schedule:  HS    Appetite:  Good  Fluid Intake/Output in past 24 hours: In: 1780 [P.O.:1780]  Out: 150   Admit Weight:  Weight: 94.1 kg (207 lb 8 oz)  Weight Last 7 Days   [94.1 kg (207 lb 8 oz)] 94.1 kg (207 lb 8 oz) (07/16 1025)    Pain Issues:    Location:  --  --         Severity:  Denies   Scheduled pain medications:  None     PRN pain medications used in last 24 hours:  None   Non Pharmacologic Interventions:  relaxation, repositioned and rest  Effectiveness of pain management plan:  good=patient  states acceptable comfort after interventions    Bowel:    Bowel Assist Initial Score:  4 - Minimal Assistance  Bowel Assist Current Score:  4 - Minimal Assistance  Bowl Accidents in last 7 days:  0  Last bowel movement: 07/17/19  Stool Description: Medium, Brown, Soft     Usual bowel pattern:  More than once a day  Scheduled bowel medications:  senna-docusate (PERICOLACE or SENOKOT S)   PRN bowel medications used in last 24 hours:  None  Nursing Interventions:  Increased time, Scheduled medication, Verbal cueing, Supervision, Brief  Effectiveness of bowel program:   good=regular, predictable, controlled emptying of bowel  Bladder:    Bladder Assist Initial Score:  1 - Total Assistance  Bladder Assist Current Score:  1 - Total Assistance  Bladder Accidents in last 7 days:     PVR range for past 24-48 hours:  [20-25]  ()  Intermittent Catheterization:  N/A  Medications affecting bladder:  None    Time void schedule/voiding pattern:  Voiding every 2-4 hours  Interventions:  Increased time, Supervision, Verbal cueing, Emptying of device, Brief, Time void patient initiated  Effectiveness of bladder training:  Poor= > 1 incontinent emptying of bladder        Diabetes:  Blood Sugar Frequency:  Before meals and at bedtime    Range of BS for last 48 hours:   Recent Labs      07/16/19   0734  07/16/19   1229  07/16/19   1724  07/16/19   2102  07/17/19   0738  07/17/19   1127  07/17/19   1728  07/17/19   2156   POCGLUCOSE  257*  383*  286*  328*  225*  339*  367*  276*     Scheduled diabetic medications:  insulin glargine (LANTUS) injection   Sliding scale usage in past 24 hours:  Yes  Total Short acting insulin in the past 24 hours:  Humulin 33  Total Long acting insulin in the past 24 hours:  Lantus 12      Sleep/wake cycle:   Average hours slept:  Sleeps 3-4 hours without waking  Sleep medication usage:  None    Patient/Family Training/Education:  Bowel Management/Training and Diabetes Management  Discharge Supply  Recommendations:  Glucometer and Strips  Strengths: Alert and oriented, Willingly participates in therapeutic activities, Able to follow instructions, Supportive family, Pleasant and cooperative, Effective communication skills and Good carryover of learning   Barriers:   Fatigue and Generalized weakness            Nursing Problems           Problem: Bowel/Gastric:     Goal: Normal bowel function is maintained or improved           Goal: Will not experience complications related to bowel motility             Problem: Communication     Goal: The ability to communicate needs accurately and effectively will improve             Problem: Discharge Barriers/Planning     Goal: Patient's continuum of care needs will be met             Problem: Infection     Goal: Will remain free from infection             Problem: Knowledge Deficit     Goal: Knowledge of disease process/condition, treatment plan, diagnostic tests, and medications will improve           Goal: Knowledge of the prescribed therapeutic regimen will improve             Problem: Pain Management     Goal: Pain level will decrease to patient's comfort goal             Problem: Safety     Goal: Will remain free from injury           Goal: Will remain free from falls             Problem: Urinary Elimination:     Goal: Ability to reestablish a normal urinary elimination pattern will improve             Problem: Venous Thromboembolism (VTW)/Deep Vein Thrombosis (DVT) Prevention:     Goal: Patient will participate in Venous Thrombosis (VTE)/Deep Vein Thrombosis (DVT)Prevention Measures                  Long Term Goals:   At discharge patient will be able to function safely at home with support.    Section completed by:  Juani Gómez L.P.N.              Mobility  Bed mobility:   SBA  Bed /Chair/Wheelchair Transfer Initial:  4 - Minimal Assistance  Bed /Chair/Wheelchair Transfer Current:  4 - Minimal Assistance   Bed/Chair/Wheelchair Transfer Description:  Adaptive  equipment, Increased time, Supervision for safety, Verbal cueing, Set-up of equipment, Initial preparation for task (HOB raised for STS from EOB and use of bed rail for supine to sit; FWW for walking xfer and CGA during STS)  Walk Initial:  4 - Minimal Assistance  Walk Current:  4 - Minimal Assistance   Walk Description:  Assist device/equipment, Safety concerns, Verbal cueing, Supervision for safety (ambulated to/from therapy to front gym with FWW ~150 ft x 2 at start/end of session with CGA )  Wheelchair Initial:     Wheelchair Current:      Wheelchair Description:     Stairs Initial:  2 - Max Assistance  Stairs Current: 2 - Max Assistance   Stairs Description:  (pt ascended/descended 4 std height steps with B HRs, step-to pattern, min A)  Patient/Family Training/Education:  Initiating patient education yesterday and today  DME/DC Recommendations:  intermittent SPV, pt has SPC already, Outpatient PT  Strengths:  Able to follow instructions, Alert and oriented, Independent PLOF, Manages pain appropriately, Pleasant and cooperative and Willingly participates in therapeutic activities  Barriers:   Decreased endurance, Generalized weakness, Home accessibility and Poor balance  # of short term goals set= 3 goals set yesterday after PT eval  # of short term goals met= 0 since yesterday; will monitor pt's progress this week       Physical Therapy Problems           Problem: Balance     Dates: Start: 07/17/19       Goal: STG-Within one week, patient will     Dates: Start: 07/17/19   Expected End: 07/24/19       Description: 1) Individualized goal:  Score >35/56 on the Payan balance assessment  2) Interventions:  PT Group Therapy, PT Gait Training, PT Self Care/Home Eval, PT Therapeutic Exercises, PT Neuro Re-Ed/Balance, PT Therapeutic Activity and PT Evaluation       Note:     Goal Note filed on 07/18/19 0846 by Son Shankar, PT    Goal: STG-Within one week, patient will  Outcome: PROGRESSING AS EXPECTED  PT eval  "completed yesterday; will monitor pt's progress towards goals this   week.                  Problem: Mobility     Dates: Start: 07/17/19       Goal: STG-Within one week, patient will ambulate household distance     Dates: Start: 07/17/19   Expected End: 07/24/19       Description: 1) Individualized goal:  >300' with SPC and CGA  2) Interventions:  PT Group Therapy, PT Gait Training, PT Self Care/Home Eval, PT Therapeutic Exercises, PT Neuro Re-Ed/Balance, PT Therapeutic Activity and PT Evaluation         Note:     Goal Note filed on 07/18/19 0846 by Son Shankar, PT    Goal: STG-Within one week, patient will ambulate household distance  Outcome: PROGRESSING AS EXPECTED  PT eval completed yesterday; will monitor pt's progress towards goals this   week.                  Problem: Mobility Transfers     Dates: Start: 07/17/19       Goal: STG-Within one week, patient will sit to stand     Dates: Start: 07/17/19   Expected End: 07/24/19       Description: 1) Individualized goal:  SBA with use of UEs from 21\" apple seat  2) Interventions: PT Group Therapy, PT Gait Training, PT Self Care/Home Eval, PT Therapeutic Exercises, PT Neuro Re-Ed/Balance, PT Therapeutic Activity and PT Evaluation         Note:     Goal Note filed on 07/18/19 0846 by Son Shankar, PT    Goal: STG-Within one week, patient will sit to stand  Outcome: PROGRESSING AS EXPECTED  PT eval completed yesterday; will monitor pt's progress towards goals this   week.                  Problem: PT-Long Term Goals     Dates: Start: 07/17/19       Goal: LTG-By discharge, patient will maintain balance     Dates: Start: 07/17/19   Expected End: 07/31/19       Description: 1) Individualized goal:  >45/56 on the Payan balance assessment indicating low fall risk  2) Interventions:  PT Group Therapy, PT Gait Training, PT Self Care/Home Eval, PT Therapeutic Exercises, PT Neuro Re-Ed/Balance, PT Therapeutic Activity and PT Evaluation               Goal: " LTG-By discharge, patient will ambulate     Dates: Start: 07/17/19   Expected End: 07/31/19       Description: 1) Individualized goal:  >1000' with SPC during 6 Minute walk test and SPV to return to PLOF  2) Interventions:  PT Group Therapy, PT Gait Training, PT Self Care/Home Eval, PT Therapeutic Exercises, PT Neuro Re-Ed/Balance, PT Therapeutic Activity and PT Evaluation               Goal: LTG-By discharge, patient will transfer one surface to another     Dates: Start: 07/17/19   Expected End: 07/31/19       Description: 1) Individualized goal:  Mod I  2) Interventions:  PT Group Therapy, PT Gait Training, PT Self Care/Home Eval, PT Therapeutic Exercises, PT Neuro Re-Ed/Balance, PT Therapeutic Activity and PT Evaluation               Goal: LTG-By discharge, patient will transfer in/out of a car     Dates: Start: 07/17/19   Expected End: 07/31/19       Description: 1) Individualized goal:  SPV in/out of PT cruiser to return home safely with assist of family  2) Interventions:  PT Group Therapy, PT Gait Training, PT Self Care/Home Eval, PT Therapeutic Exercises, PT Neuro Re-Ed/Balance, PT Therapeutic Activity and PT Evaluation               Goal: LTG-By discharge, patient will     Dates: Start: 07/17/19   Expected End: 07/31/19       Description: 1) Individualized goal:  Ascend/descend 3 stairs with B HRs and SPV to safely enter/exit his home  2) Interventions:PT Group Therapy, PT Gait Training, PT Self Care/Home Eval, PT Therapeutic Exercises, PT Neuro Re-Ed/Balance, PT Therapeutic Activity and PT Evaluation                     Section completed by:  Son Shankar, PT       Activities of Daily Living  Eating Initial:  5 - Standby Prompting/Supervision or Set-up  Eating Current:  5 - Standby Prompting/Supervision or Set-up   Eating Description:  Increased time, Supervision for safety, Set-up of equipment or meal/tube feeding  Grooming Initial:  4 - Minimal Assistance  Grooming Current:  5 - Standby  Prompting/Supervision or Set-up   Grooming Description:  Supervision for safety, Increased time (Standing at sinkside w/ FWW to brush teeth)  Bathing Initial:  4 - Minimal Assistance  Bathing Current:  5 - Standby Prompting/Supervision or Set-up   Bathing Description:  Long handled bath tool, Tub bench, Grab bar, Hand held shower, Supervision for safety, Increased time  Upper Body Dressing Initial:  5 - Standby Prompting/Supervision or Set-up  Upper Body Dressing Current:  5 - Standby Prompting/Supervision or Set-up   Upper Body Dressing Description:  Increased time (Don/doff pull over shirt)  Lower Body Dressing Initial:  2 - Max Assistance  Lower Body Dressing Current:  4 - Minimal Assistance   Lower Body Dressing Description:  4 - Minimal Assistance  Toileting Initial:  4 - Minimal Assistance  Toileting Current:  5 - Standby Prompting/Supervision or Set-up   Toileting Description:  Supervision for safety, Increased time, Grab bar  Toilet Transfer Initial:  4 - Minimal Assistance  Toilet Transfer Current:  5 - Standby Prompting/Supervision or Set-up   Toilet Transfer Description:  5 - Standby Prompting/Supervision or Set-up  Tub / Shower Transfer Initial:  4 - Minimal Assistance  Tub / Shower Transfer Current:  5 - Standby Prompting/Supervision or Set-up   Tub / Shower Transfer Description:  Grab bar, Increased time, Supervision for safety (Sup/SBA w/ FWW to/from tub bench)  IADL:  TBD  Family Training/Education: TBD   DME/DC Recommendations:  TBD    Strengths:  Independent PLOF, Motivated for self care and independence, Pleasant and cooperative, Supportive family and Willingly participates in therapeutic activities  Barriers:  Decreased endurance, Generalized weakness, Limited mobility, Poor activity tolerance and Poor balance     # of short term goals set= 4    # of short term goals met= 4         Occupational Therapy Goals           Problem: Bathing     Dates: Start: 07/16/19       Goal: STG-Within one week,  patient will bathe     Dates: Start: 07/18/19       Description: 1) Individualized Goal:  Mod I w/ AE/DME PRN  2) Interventions:  OT Self Care/ADL, OT Neuro Re-Ed/Balance, OT Therapeutic Activity, OT Evaluation and OT Therapeutic Exercise               Problem: Dressing     Dates: Start: 07/16/19       Goal: STG-Within one week, patient will dress LB     Dates: Start: 07/18/19       Description: 1) Individualized Goal:  Sup/SBA for LB dressing w/ AE/DME PRN  2) Interventions:  OT Self Care/ADL, OT Neuro Re-Ed/Balance, OT Therapeutic Activity, OT Evaluation and OT Therapeutic Exercise               Problem: Functional Transfers     Dates: Start: 07/16/19       Goal: STG-Within one week, patient will transfer to toilet     Dates: Start: 07/18/19       Description: 1) Individualized Goal: Mod I w/ AE/DME PRN  2) Interventions:  OT Self Care/ADL, OT Neuro Re-Ed/Balance, OT Therapeutic Activity, OT Evaluation and OT Therapeutic Exercise               Problem: OT Long Term Goals     Dates: Start: 07/16/19       Goal: LTG-By discharge, patient will complete basic self care tasks     Dates: Start: 07/16/19       Description: 1) Individualized Goal:  Mod I with AE/AD/DME prn.  2) Interventions:  OT Group Therapy, OT Self Care/ADL, OT Community Reintegration, OT Manual Ther Technique, OT Neuro Re-Ed/Balance, OT Therapeutic Activity, OT Evaluation and OT Therapeutic Exercise           Goal: LTG-By discharge, patient will perform bathroom transfers     Dates: Start: 07/16/19       Description: 1) Individualized Goal:  Mod I with AD/DME prn.  2) Interventions:  OT Group Therapy, OT Self Care/ADL, OT Community Reintegration, OT Manual Ther Technique, OT Neuro Re-Ed/Balance, OT Therapeutic Activity, OT Evaluation and OT Therapeutic Exercise             Goal: LTG-By discharge, patient will complete basic home management     Dates: Start: 07/16/19       Description: 1) Individualized Goal:  For tasks necessary at d/c at supervision  to Mod I level wiith AE/AD/DME prn.  2) Interventions:  OT Group Therapy, OT Self Care/ADL, OT Community Reintegration, OT Manual Ther Technique, OT Neuro Re-Ed/Balance, OT Therapeutic Activity, OT Evaluation and OT Therapeutic Exercise             Problem: Toileting     Dates: Start: 07/16/19       Goal: STG-Within one week, patient will complete toileting tasks     Dates: Start: 07/18/19       Description: 1) Individualized Goal: Mod I for toileting tasks including hygiene  2) Interventions:   OT Self Care/ADL, OT Neuro Re-Ed/Balance, OT Therapeutic Activity, OT Evaluation and OT Therapeutic Exercise                   Section completed by:  Nara Garrett, Student       Cognitive Linquistic Functions  Comprehension Initial:  6 - Modified Independent  Comprehension Current:  6 - Modified Independent   Comprehension Description:  Verbal cues  Expression Initial:  7 - Independent  Expression Current:  7 - Independent   Expression Description:     Social Interaction Initial:  7 - Independent  Social Interaction Current:  7 - Independent   Social Interaction Description:     Problem Solving Initial:  4 - Minimal Assistance  Problem Solving Current:  5 - Standby Prompting/Supervision or Set-up   Problem Solving Description:  Verbal cueing, Therapy schedule, Increased time  Memory Initial:  3 - Moderate Assistance  Memory Current:  4 - Minimal Assistance   Memory Description:  Verbal cueing, Therapy schedule  Executive Functioning / Organization Initial:  Minimal (4)  Executive Functioning / Organization Current:  Minimal (4)   Executive Functioning Desciption:  To be assessed  Swallowing  Swallowing Status:  WFL, regular diet thin liquids  Orders Placed This Encounter   Procedures   • Diet Order Regular     Standing Status:   Standing     Number of Occurrences:   1     Order Specific Question:   Diet:     Answer:   Regular [1]     Order Specific Question:   Consistency/Fluid modifications:     Answer:   Thin Liquids [3]      Behavior Modification Plan  Give clear feedback, Set clear goals and Reinforce participation in desired tasks  Assistive Technology  Low tech: Calendar, planner, schedule, alarms/timers, pill organizer, post-it notes, lists  Family Training/Education:  Initiated with spouse  DC Recommendations:   HH vs. OP speech therapy  Strengths:  Able to follow instructions, Good carryover of learning, Good insight into deficits/needs, Independent PLOF, Making steady progress towards goals, Pleasant and cooperative and Willingly participates in therapeutic activities  Barriers:  Generalized weakness and Other: recall  # of short term goals set=4  # of short term goals met=2       Speech Therapy Problems           Problem: Memory STGs     Dates: Start: 07/16/19       Goal: STG-Within one week, patient will     Dates: Start: 07/16/19       Description: 1) Individualized goal:  Recall daily therapy sessions using external memory aids (memory notebook) given min A in 3/3 opportunities.    2) Interventions:  SLP Speech Language Treatment, SLP Self Care / ADL Training , SLP Cognitive Skill Development and SLP Group Treatment               Problem: Problem Solving STGs     Dates: Start: 07/16/19       Goal: STG-Within one week, patient will     Dates: Start: 07/16/19       Description: 1) Individualized goal:  Complete a functional medication sorting task with spv to achieve 100% accuracy in 2/2 attempts   2) Interventions:  SLP Speech Language Treatment, SLP Self Care / ADL Training , SLP Cognitive Skill Development and SLP Group Treatment               Problem: Speech/Swallowing LTGs     Dates: Start: 07/16/19       Goal: LTG-By discharge, patient will safely swallow     Dates: Start: 07/16/19       Description: 1) Individualized goal:  The least restrictive diet for safe intake of daily meals   2) Interventions:  SLP Swallowing Dysfunction Treatment, SLP Oral Pharyngeal Evaluation, SLP Video Swallow Evaluation and SLP Group  Treatment             Goal: LTG-By discharge, patient will solve complex problem     Dates: Start: 07/16/19       Description: 1) Individualized goal:  With modified independence for a safe discharge home   2) Interventions:  SLP Speech Language Treatment, SLP Self Care / ADL Training , SLP Cognitive Skill Development and SLP Group Treatment                    Section completed by:  More Ponce MS,CCC-SLP          REHAB-Pharmacy IDT Team Note by Osmany Morales RPH at 7/17/2019  6:01 PM  Version 1 of 1    Author:  Osmany Morales RPH Service:  (none) Author Type:  Pharmacist    Filed:  7/17/2019  6:04 PM Date of Service:  7/17/2019  6:01 PM Status:  Signed    :  Osmany Morales RPH (Pharmacist)         Pharmacy   Pharmacy  Antibiotics/Antifungals/Antivirals:  Medication:      Active Orders     None        Route:        NA  Stop Date:  NA  Reason:      NA  Antihypertensives/Cardiac:  Medication:    Orders (72h ago through future)    Start     Ordered    07/16/19 2100  atorvastatin (LIPITOR) tablet 20 mg  EVERY BEDTIME      07/16/19 1036    07/16/19 1800  metoprolol (LOPRESSOR) tablet 25 mg  TWICE DAILY     Comments:  This medication was auto-substituted for Nebivolol per P&T Protocol    07/16/19 1036    07/16/19 1037  hydrALAZINE (APRESOLINE) tablet 25 mg  EVERY 8 HOURS PRN      07/16/19 1037        Patient Vitals for the past 24 hrs:   BP Pulse   07/17/19 1756 130/72 70   07/17/19 1345 130/72 68   07/17/19 1031 122/67 64   07/17/19 0630 128/88 100   07/16/19 1915 132/83 86       Anticoagulation:  Medication: None                                     Other key medications: A review of the medication list reveals no issues at this time. Patient is currently on antihypertensive(s). Recommend home blood pressure monitoring/recording if antihypertensive(s) regimen(s) continue. Patient is currently on diabetic medication(s) and/or Insulin(s). Recommend home blood glucose monitoring/recording if these  regimen(s) continue.    Section completed by: Osmany Morales formerly [facility][[email or web address]]     Attribution Key     AW.1 - Osmany Morales [facility] on 7/17/2019  6:01 PM                      DC Planning    DC destination/dispostion:  Home w/ supportive wife to [place].    Referrals: TBD.    DC Needs: DME for patient safety & mobility. Therapies: HH vs OP. MD f/u appts. Family training.    Barriers to discharge: Functional mobility deficits. Cognitive deficits. Dysphagia[place]      Strengths: Motivation. PLOF active, independent. Supportive family.    Section completed by:  Becca Cobb R.N.    Summary: lives in [place], [place] on farm w/ wife, limited services available, bladder urgency/frequency, slow gait, decreased balance, strength & endurance.    Physician Summary  MARIA GUADALUPE Bella MD  participated and led team conference discussion.

## 2019-07-18 NOTE — ASSESSMENT & PLAN NOTE
Na: 132 --> 131 --> 129 (7/18) --> 133 (7/20)  ? 2nd to [facility]  Consider salt tabs if Na continues to drop lower  Cont to monitor

## 2019-07-18 NOTE — REHAB-PT IDT TEAM NOTE
Physical Therapy   Mobility  Bed mobility:   SBA  Bed /Chair/Wheelchair Transfer Initial:  4 - Minimal Assistance  Bed /Chair/Wheelchair Transfer Current:  4 - Minimal Assistance   Bed/Chair/Wheelchair Transfer Description:  Adaptive equipment, Increased time, Supervision for safety, Verbal cueing, Set-up of equipment, Initial preparation for task (HOB raised for STS from EOB and use of bed rail for supine to sit; FWW for walking xfer and CGA during STS)  Walk Initial:  4 - Minimal Assistance  Walk Current:  4 - Minimal Assistance   Walk Description:  Assist device/equipment, Safety concerns, Verbal cueing, Supervision for safety (ambulated to/from therapy to front gym with FWW ~150 ft x 2 at start/end of session with CGA )  Wheelchair Initial:     Wheelchair Current:      Wheelchair Description:     Stairs Initial:  2 - Max Assistance  Stairs Current: 2 - Max Assistance   Stairs Description:  (pt ascended/descended 4 std height steps with B HRs, step-to pattern, min A)  Patient/Family Training/Education:  Initiating patient education yesterday and today  DME/DC Recommendations:  intermittent SPV, pt has SPC already, Outpatient PT  Strengths:  Able to follow instructions, Alert and oriented, Independent PLOF, Manages pain appropriately, Pleasant and cooperative and Willingly participates in therapeutic activities  Barriers:   Decreased endurance, Generalized weakness, Home accessibility and Poor balance  # of short term goals set= 3 goals set yesterday after PT eval  # of short term goals met= 0 since yesterday; will monitor pt's progress this week       Physical Therapy Problems           Problem: Balance     Dates: Start: 07/17/19       Goal: STG-Within one week, patient will     Dates: Start: 07/17/19   Expected End: 07/24/19       Description: 1) Individualized goal:  Score >35/56 on the Payan balance assessment  2) Interventions:  PT Group Therapy, PT Gait Training, PT Self Care/Home Eval, PT Therapeutic  "Exercises, PT Neuro Re-Ed/Balance, PT Therapeutic Activity and PT Evaluation       Note:     Goal Note filed on 07/18/19 0846 by Son Shankar, PT    Goal: STG-Within one week, patient will  Outcome: PROGRESSING AS EXPECTED  PT eval completed yesterday; will monitor pt's progress towards goals this   week.                  Problem: Mobility     Dates: Start: 07/17/19       Goal: STG-Within one week, patient will ambulate household distance     Dates: Start: 07/17/19   Expected End: 07/24/19       Description: 1) Individualized goal:  >300' with SPC and CGA  2) Interventions:  PT Group Therapy, PT Gait Training, PT Self Care/Home Eval, PT Therapeutic Exercises, PT Neuro Re-Ed/Balance, PT Therapeutic Activity and PT Evaluation         Note:     Goal Note filed on 07/18/19 0846 by Son Shankar, PT    Goal: STG-Within one week, patient will ambulate household distance  Outcome: PROGRESSING AS EXPECTED  PT eval completed yesterday; will monitor pt's progress towards goals this   week.                  Problem: Mobility Transfers     Dates: Start: 07/17/19       Goal: STG-Within one week, patient will sit to stand     Dates: Start: 07/17/19   Expected End: 07/24/19       Description: 1) Individualized goal:  SBA with use of UEs from 21\" apple seat  2) Interventions: PT Group Therapy, PT Gait Training, PT Self Care/Home Eval, PT Therapeutic Exercises, PT Neuro Re-Ed/Balance, PT Therapeutic Activity and PT Evaluation         Note:     Goal Note filed on 07/18/19 0846 by Son Shankar, PT    Goal: STG-Within one week, patient will sit to stand  Outcome: PROGRESSING AS EXPECTED  PT eval completed yesterday; will monitor pt's progress towards goals this   week.                  Problem: PT-Long Term Goals     Dates: Start: 07/17/19       Goal: LTG-By discharge, patient will maintain balance     Dates: Start: 07/17/19   Expected End: 07/31/19       Description: 1) Individualized goal:  >45/56 on the " Payan balance assessment indicating low fall risk  2) Interventions:  PT Group Therapy, PT Gait Training, PT Self Care/Home Eval, PT Therapeutic Exercises, PT Neuro Re-Ed/Balance, PT Therapeutic Activity and PT Evaluation               Goal: LTG-By discharge, patient will ambulate     Dates: Start: 07/17/19   Expected End: 07/31/19       Description: 1) Individualized goal:  >1000' with SPC during 6 Minute walk test and SPV to return to PLOF  2) Interventions:  PT Group Therapy, PT Gait Training, PT Self Care/Home Eval, PT Therapeutic Exercises, PT Neuro Re-Ed/Balance, PT Therapeutic Activity and PT Evaluation               Goal: LTG-By discharge, patient will transfer one surface to another     Dates: Start: 07/17/19   Expected End: 07/31/19       Description: 1) Individualized goal:  Mod I  2) Interventions:  PT Group Therapy, PT Gait Training, PT Self Care/Home Eval, PT Therapeutic Exercises, PT Neuro Re-Ed/Balance, PT Therapeutic Activity and PT Evaluation               Goal: LTG-By discharge, patient will transfer in/out of a car     Dates: Start: 07/17/19   Expected End: 07/31/19       Description: 1) Individualized goal:  SPV in/out of PT cruiser to return home safely with assist of family  2) Interventions:  PT Group Therapy, PT Gait Training, PT Self Care/Home Eval, PT Therapeutic Exercises, PT Neuro Re-Ed/Balance, PT Therapeutic Activity and PT Evaluation               Goal: LTG-By discharge, patient will     Dates: Start: 07/17/19   Expected End: 07/31/19       Description: 1) Individualized goal:  Ascend/descend 3 stairs with B HRs and SPV to safely enter/exit his home  2) Interventions:PT Group Therapy, PT Gait Training, PT Self Care/Home Eval, PT Therapeutic Exercises, PT Neuro Re-Ed/Balance, PT Therapeutic Activity and PT Evaluation                     Section completed by:  Son Shankar, PT

## 2019-07-18 NOTE — REHAB-CM IDT TEAM NOTE
Case Management      DC Planning    DC destination/dispostion:  Home w/ supportive wife to [place].    Referrals: TBD.    DC Needs: DME for patient safety & mobility. Therapies: HH vs OP. MD f/u appts. Family training.    Barriers to discharge: Functional mobility deficits. Cognitive deficits. Dysphagia[place]      Strengths: Motivation. PLOF active, independent. Supportive family.    Section completed by:  Becca Cobb R.N.

## 2019-07-18 NOTE — REHAB-OT IDT TEAM NOTE
Occupational Therapy   Activities of Daily Living  Eating Initial:  5 - Standby Prompting/Supervision or Set-up  Eating Current:  5 - Standby Prompting/Supervision or Set-up   Eating Description:  Increased time, Supervision for safety, Set-up of equipment or meal/tube feeding  Grooming Initial:  4 - Minimal Assistance  Grooming Current:  5 - Standby Prompting/Supervision or Set-up   Grooming Description:  Supervision for safety, Increased time (Standing at sinkside w/ FWW to brush teeth)  Bathing Initial:  4 - Minimal Assistance  Bathing Current:  5 - Standby Prompting/Supervision or Set-up   Bathing Description:  Long handled bath tool, Tub bench, Grab bar, Hand held shower, Supervision for safety, Increased time  Upper Body Dressing Initial:  5 - Standby Prompting/Supervision or Set-up  Upper Body Dressing Current:  5 - Standby Prompting/Supervision or Set-up   Upper Body Dressing Description:  Increased time (Don/doff pull over shirt)  Lower Body Dressing Initial:  2 - Max Assistance  Lower Body Dressing Current:  4 - Minimal Assistance   Lower Body Dressing Description:  4 - Minimal Assistance  Toileting Initial:  4 - Minimal Assistance  Toileting Current:  5 - Standby Prompting/Supervision or Set-up   Toileting Description:  Supervision for safety, Increased time, Grab bar  Toilet Transfer Initial:  4 - Minimal Assistance  Toilet Transfer Current:  5 - Standby Prompting/Supervision or Set-up   Toilet Transfer Description:  5 - Standby Prompting/Supervision or Set-up  Tub / Shower Transfer Initial:  4 - Minimal Assistance  Tub / Shower Transfer Current:  5 - Standby Prompting/Supervision or Set-up   Tub / Shower Transfer Description:  Grab bar, Increased time, Supervision for safety (Sup/SBA w/ FWW to/from tub bench)  IADL:  TBD  Family Training/Education: TBD   DME/DC Recommendations:  TBD    Strengths:  Independent PLOF, Motivated for self care and independence, Pleasant and cooperative, Supportive family and  Willingly participates in therapeutic activities  Barriers:  Decreased endurance, Generalized weakness, Limited mobility, Poor activity tolerance and Poor balance     # of short term goals set= 4    # of short term goals met= 4         Occupational Therapy Goals           Problem: Bathing     Dates: Start: 07/16/19       Goal: STG-Within one week, patient will bathe     Dates: Start: 07/18/19       Description: 1) Individualized Goal:  Mod I w/ AE/DME PRN  2) Interventions:  OT Self Care/ADL, OT Neuro Re-Ed/Balance, OT Therapeutic Activity, OT Evaluation and OT Therapeutic Exercise               Problem: Dressing     Dates: Start: 07/16/19       Goal: STG-Within one week, patient will dress LB     Dates: Start: 07/18/19       Description: 1) Individualized Goal:  Sup/SBA for LB dressing w/ AE/DME PRN  2) Interventions:  OT Self Care/ADL, OT Neuro Re-Ed/Balance, OT Therapeutic Activity, OT Evaluation and OT Therapeutic Exercise               Problem: Functional Transfers     Dates: Start: 07/16/19       Goal: STG-Within one week, patient will transfer to toilet     Dates: Start: 07/18/19       Description: 1) Individualized Goal: Mod I w/ AE/DME PRN  2) Interventions:  OT Self Care/ADL, OT Neuro Re-Ed/Balance, OT Therapeutic Activity, OT Evaluation and OT Therapeutic Exercise               Problem: OT Long Term Goals     Dates: Start: 07/16/19       Goal: LTG-By discharge, patient will complete basic self care tasks     Dates: Start: 07/16/19       Description: 1) Individualized Goal:  Mod I with AE/AD/DME prn.  2) Interventions:  OT Group Therapy, OT Self Care/ADL, OT Community Reintegration, OT Manual Ther Technique, OT Neuro Re-Ed/Balance, OT Therapeutic Activity, OT Evaluation and OT Therapeutic Exercise           Goal: LTG-By discharge, patient will perform bathroom transfers     Dates: Start: 07/16/19       Description: 1) Individualized Goal:  Mod I with AD/DME prn.  2) Interventions:  OT Group Therapy, OT  Self Care/ADL, OT Community Reintegration, OT Manual Ther Technique, OT Neuro Re-Ed/Balance, OT Therapeutic Activity, OT Evaluation and OT Therapeutic Exercise             Goal: LTG-By discharge, patient will complete basic home management     Dates: Start: 07/16/19       Description: 1) Individualized Goal:  For tasks necessary at d/c at supervision to Mod I level wiith AE/AD/DME prn.  2) Interventions:  OT Group Therapy, OT Self Care/ADL, OT Community Reintegration, OT Manual Ther Technique, OT Neuro Re-Ed/Balance, OT Therapeutic Activity, OT Evaluation and OT Therapeutic Exercise             Problem: Toileting     Dates: Start: 07/16/19       Goal: STG-Within one week, patient will complete toileting tasks     Dates: Start: 07/18/19       Description: 1) Individualized Goal: Mod I for toileting tasks including hygiene  2) Interventions:   OT Self Care/ADL, OT Neuro Re-Ed/Balance, OT Therapeutic Activity, OT Evaluation and OT Therapeutic Exercise                   Section completed by:  Nara Garrett, Student

## 2019-07-18 NOTE — CARE PLAN
Problem: Safety  Goal: Will remain free from falls  Pt uses call light consistently and appropriately. Waits for assistance does not attempt self transfer this shift. Able to verbalize needs.    Problem: Pain Management  Goal: Pain level will decrease to patient's comfort goal  Patient denies pain or discomfort.

## 2019-07-18 NOTE — REHAB-SLP IDT TEAM NOTE
Speech Therapy   Cognitive Linquistic Functions  Comprehension Initial:  6 - Modified Independent  Comprehension Current:  6 - Modified Independent   Comprehension Description:  Verbal cues  Expression Initial:  7 - Independent  Expression Current:  7 - Independent   Expression Description:     Social Interaction Initial:  7 - Independent  Social Interaction Current:  7 - Independent   Social Interaction Description:     Problem Solving Initial:  4 - Minimal Assistance  Problem Solving Current:  5 - Standby Prompting/Supervision or Set-up   Problem Solving Description:  Verbal cueing, Therapy schedule, Increased time  Memory Initial:  3 - Moderate Assistance  Memory Current:  4 - Minimal Assistance   Memory Description:  Verbal cueing, Therapy schedule  Executive Functioning / Organization Initial:  Minimal (4)  Executive Functioning / Organization Current:  Minimal (4)   Executive Functioning Desciption:  To be assessed  Swallowing  Swallowing Status:  WFL, regular diet thin liquids  Orders Placed This Encounter   Procedures   • Diet Order Regular     Standing Status:   Standing     Number of Occurrences:   1     Order Specific Question:   Diet:     Answer:   Regular [1]     Order Specific Question:   Consistency/Fluid modifications:     Answer:   Thin Liquids [3]     Behavior Modification Plan  Give clear feedback, Set clear goals and Reinforce participation in desired tasks  Assistive Technology  Low tech: Calendar, planner, schedule, alarms/timers, pill organizer, post-it notes, lists  Family Training/Education:  Initiated with spouse  DC Recommendations:   HH vs. OP speech therapy  Strengths:  Able to follow instructions, Good carryover of learning, Good insight into deficits/needs, Independent PLOF, Making steady progress towards goals, Pleasant and cooperative and Willingly participates in therapeutic activities  Barriers:  Generalized weakness and Other: recall  # of short term goals set=4  # of short term  goals met=2       Speech Therapy Problems           Problem: Memory STGs     Dates: Start: 07/16/19       Goal: STG-Within one week, patient will     Dates: Start: 07/16/19       Description: 1) Individualized goal:  Recall daily therapy sessions using external memory aids (memory notebook) given min A in 3/3 opportunities.    2) Interventions:  SLP Speech Language Treatment, SLP Self Care / ADL Training , SLP Cognitive Skill Development and SLP Group Treatment               Problem: Problem Solving STGs     Dates: Start: 07/16/19       Goal: STG-Within one week, patient will     Dates: Start: 07/16/19       Description: 1) Individualized goal:  Complete a functional medication sorting task with spv to achieve 100% accuracy in 2/2 attempts   2) Interventions:  SLP Speech Language Treatment, SLP Self Care / ADL Training , SLP Cognitive Skill Development and SLP Group Treatment               Problem: Speech/Swallowing LTGs     Dates: Start: 07/16/19       Goal: LTG-By discharge, patient will safely swallow     Dates: Start: 07/16/19       Description: 1) Individualized goal:  The least restrictive diet for safe intake of daily meals   2) Interventions:  SLP Swallowing Dysfunction Treatment, SLP Oral Pharyngeal Evaluation, SLP Video Swallow Evaluation and SLP Group Treatment             Goal: LTG-By discharge, patient will solve complex problem     Dates: Start: 07/16/19       Description: 1) Individualized goal:  With modified independence for a safe discharge home   2) Interventions:  SLP Speech Language Treatment, SLP Self Care / ADL Training , SLP Cognitive Skill Development and SLP Group Treatment                    Section completed by:  More Ponce MS,CCC-SLP

## 2019-07-18 NOTE — THERAPY
Occupational Therapy  Daily Treatment     Patient Name: Marcio More  Age:  73 y.o., Sex:  male  Medical Record #: [id number]  Today's Date: 2019     Precautions  Precautions: Fall Risk, Other (See Comments), Swallow Precautions ( See Comments)  Comments: goes by charlie Quiroz         Subjective     Objective  FIM Lower Body Dressing Score:  4 - Minimal Assistance  Lower Body Dressing Description:  Supervision for safety, Increased time (Pt required min A for LLE to thread through pants. Sup for threading through briefs and managing pants/briefs at waistline)  FIM Grooming Score:  5 - Standby Prompting/Supervision or Set-up  Grooming Description:  Supervision for safety, Increased time (Standing at sinkside w/ FWW to brush teeth)  FIM Toilet Transfer Score:  5 - Standby Prompting/Supervision or Set-up  Toilet Transfer Description:  Supervision for safety, Increased time, Grab bar (Sup/SBA for SPT w/ FWW to/from toilet)  FIM Toiletin - Standby Prompting/Supervision or Set-up  Toileting Description:  Supervision for safety, Increased time, Grab bar  FIM Bathing Score:  5 - Standby Prompting/Supervision or Set-up  Bathing Description:  Long handled bath tool, Tub bench, Grab bar, Hand held shower, Supervision for safety, Increased time  FIM Upper Body Dressin - Standby Prompting/Supervision or Set-up  Upper Body Dressing Description:  Increased time (Don/doff pull over shirt)  FIM Upper Body Dressin - Standby Prompting/Supervision or Set-up  Upper Body Dressing Description:  Increased time (Don/doff pull over shirt)  FIM Tub/Shower Transfers Score:  5 - Standby Prompting/Supervision or Set-up  Tub/Shower Transfers Description:  Grab bar, Increased time, Supervision for safety (Sup/SBA w/ FWW to/from tub bench)       19 0800   Precautions   Precautions Fall Risk;Other (See Comments);Swallow Precautions ( See Comments)   Comments goes by charlie Quiroz   Vitals   O2 (LPM) 0   O2 Delivery None (Room Air)    Pain   Intervention Declines   Pain 0 - 10 Group   Pain Rating Scale (NPRS) 0   Non Verbal Descriptors   Non Verbal Scale  Calm   Cognition    Level of Consciousness Alert   Bed Mobility    Supine to Sit Stand by Assist   Scooting Stand by Assist   Interdisciplinary Plan of Care Collaboration   IDT Collaboration with  Nursing   Patient Position at End of Therapy Seated   Collaboration Comments Consulted nursing regarding pts blood sugar; pt escorted to T-dine for breakfast       Assessment    Pt alert and cooperative w/ tx session. Pt engaged in ADL routine for updated FIM scores. Pt demonstrated increased independence in bathing, LB dressing, toileting and transfers (refer to FIMs for details). Pt consistently at Sup level for BADLs, except for Min A for LB dressing.     Plan    Cont to address endurance, balance, strength, functional mobility for ADLs/IADLs

## 2019-07-18 NOTE — THERAPY
Speech Language Pathology  Daily Treatment     Patient Name: Marcio More  Age:  73 y.o., Sex:  male  Medical Record #: [id number]  Today's Date: 7/18/2019     Subjective    Patient arrived on time to ST with assistance.      Objective       07/18/19 1132   Dysphagia    Diet / Liquid Recommendation Regular;Thin Liquid   Nutritional Liquid Intake Rating Scale 3   Nutritional Food Intake Rating Scale 7   Nursing Communication Swallow Precaution Sign Posted at Head of Bed   SLP Total Time Spent   SLP Individual Total Time Spent (Mins) 30   Charge Group   SLP Swallowing Dysfunction Treatment Swallowing Dysfunction Treatment       FIM Eating Score:  5 - Standby Prompting/Supervision or Set-up  Eating Description:         Assessment    Patient was assessed with current diet textures. No overt s/sx of aspiration were noted. Discussed safe swallow strategies and carry-over outside of t-dine. Patient verbalized understanding.     Plan    D/c t-dine. Continue to cognitive linguistic deficits.

## 2019-07-18 NOTE — CARE PLAN
Problem: Swallowing STGs  Goal: STG-Within one week, patient will  1) Individualized goal:  Participate in a MBSS  2) Interventions:  SLP Swallowing Dysfunction Treatment, SLP Oral Pharyngeal Evaluation and SLP Video Swallow Evaluation     Outcome: MET Date Met: 07/18/19    Goal: STG-Within one week, patient will  1) Individualized goal:  Tolerate 8/10 trials of thin liquids (cup sips) outside of meals without any overt s/sx of aspiration/penetration   2) Interventions:  SLP Swallowing Dysfunction Treatment, SLP Oral Pharyngeal Evaluation, SLP Video Swallow Evaluation and SLP Group Treatment     Outcome: MET Date Met: 07/18/19      Problem: Problem Solving STGs  Goal: STG-Within one week, patient will  1) Individualized goal:  Complete a functional medication sorting task with spv to achieve 100% accuracy in 2/2 attempts   2) Interventions:  SLP Speech Language Treatment, SLP Self Care / ADL Training , SLP Cognitive Skill Development and SLP Group Treatment     Outcome: NOT MET      Problem: Memory STGs  Goal: STG-Within one week, patient will  1) Individualized goal:  Recall daily therapy sessions using external memory aids (memory notebook) given min A in 3/3 opportunities.    2) Interventions:  SLP Speech Language Treatment, SLP Self Care / ADL Training , SLP Cognitive Skill Development and SLP Group Treatment     Outcome: NOT MET

## 2019-07-18 NOTE — REHAB-NURSING IDT TEAM NOTE
Nursing   Nursing  Diet/Nutrition: Regular with thin liquids  Medication Administration:  Meds whole float.   % consumed at meals in last 24 hours:  Consumed % of meals per documentation.  Meal/Snack Consumption for the past 24 hrs:   Oral Nutrition   07/17/19 1807 Dinner;Between % Consumed   07/17/19 0900 Breakfast;Between % Consumed       Snack schedule:  HS    Appetite:  Good  Fluid Intake/Output in past 24 hours: In: 1780 [P.O.:1780]  Out: 150   Admit Weight:  Weight: 94.1 kg (207 lb 8 oz)  Weight Last 7 Days   [94.1 kg (207 lb 8 oz)] 94.1 kg (207 lb 8 oz) (07/16 1025)    Pain Issues:    Location:  --  --         Severity:  Denies   Scheduled pain medications:  None     PRN pain medications used in last 24 hours:  None   Non Pharmacologic Interventions:  relaxation, repositioned and rest  Effectiveness of pain management plan:  good=patient states acceptable comfort after interventions    Bowel:    Bowel Assist Initial Score:  4 - Minimal Assistance  Bowel Assist Current Score:  4 - Minimal Assistance  Bowl Accidents in last 7 days:  0  Last bowel movement: 07/17/19  Stool Description: Medium, Brown, Soft     Usual bowel pattern:  More than once a day  Scheduled bowel medications:  senna-docusate (PERICOLACE or SENOKOT S)   PRN bowel medications used in last 24 hours:  None  Nursing Interventions:  Increased time, Scheduled medication, Verbal cueing, Supervision, Brief  Effectiveness of bowel program:   good=regular, predictable, controlled emptying of bowel  Bladder:    Bladder Assist Initial Score:  1 - Total Assistance  Bladder Assist Current Score:  1 - Total Assistance  Bladder Accidents in last 7 days:     PVR range for past 24-48 hours:  [20-25]  ()  Intermittent Catheterization:  N/A  Medications affecting bladder:  None    Time void schedule/voiding pattern:  Voiding every 2-4 hours  Interventions:  Increased time, Supervision, Verbal cueing, Emptying of device, Brief, Time void  patient initiated  Effectiveness of bladder training:  Poor= > 1 incontinent emptying of bladder        Diabetes:  Blood Sugar Frequency:  Before meals and at bedtime    Range of BS for last 48 hours:   Recent Labs      07/16/19   0734  07/16/19   1229  07/16/19   1724  07/16/19   2102  07/17/19   0738  07/17/19   1127  07/17/19   1728  07/17/19   2156   POCGLUCOSE  257*  383*  286*  328*  225*  339*  367*  276*     Scheduled diabetic medications:  insulin glargine (LANTUS) injection   Sliding scale usage in past 24 hours:  Yes  Total Short acting insulin in the past 24 hours:  Humulin 33  Total Long acting insulin in the past 24 hours:  Lantus 12      Sleep/wake cycle:   Average hours slept:  Sleeps 3-4 hours without waking  Sleep medication usage:  None    Patient/Family Training/Education:  Bowel Management/Training and Diabetes Management  Discharge Supply Recommendations:  Glucometer and Strips  Strengths: Alert and oriented, Willingly participates in therapeutic activities, Able to follow instructions, Supportive family, Pleasant and cooperative, Effective communication skills and Good carryover of learning   Barriers:   Fatigue and Generalized weakness            Nursing Problems           Problem: Bowel/Gastric:     Goal: Normal bowel function is maintained or improved           Goal: Will not experience complications related to bowel motility             Problem: Communication     Goal: The ability to communicate needs accurately and effectively will improve             Problem: Discharge Barriers/Planning     Goal: Patient's continuum of care needs will be met             Problem: Infection     Goal: Will remain free from infection             Problem: Knowledge Deficit     Goal: Knowledge of disease process/condition, treatment plan, diagnostic tests, and medications will improve           Goal: Knowledge of the prescribed therapeutic regimen will improve             Problem: Pain Management     Goal:  Pain level will decrease to patient's comfort goal             Problem: Safety     Goal: Will remain free from injury           Goal: Will remain free from falls             Problem: Urinary Elimination:     Goal: Ability to reestablish a normal urinary elimination pattern will improve             Problem: Venous Thromboembolism (VTW)/Deep Vein Thrombosis (DVT) Prevention:     Goal: Patient will participate in Venous Thrombosis (VTE)/Deep Vein Thrombosis (DVT)Prevention Measures                  Long Term Goals:   At discharge patient will be able to function safely at home with support.    Section completed by:  Juani Gómez L.P.N.

## 2019-07-19 NOTE — CARE PLAN
Problem: GLYCEMIA IMBALANCE  Goal: Clinical indication of glycemia balance is achieved  HS FS is 346. 18 units of Humalog and scheduled Lantus 18 units given per MAR. No s/sx of hypo/hyperglycemia noted. HS snack given. Pt is on blood sugar monitoring because of steroids. Will continue to assess and monitor for hypo/hyperglycemia.

## 2019-07-19 NOTE — THERAPY
Occupational Therapy  Daily Treatment     Patient Name: Marcio More  Age:  73 y.o., Sex:  male  Medical Record #: [id number]  Today's Date: 2019     Precautions  Precautions: Fall Risk, Other (See Comments)  Comments: impaired safety, pt goes by Richie (middle name)         Subjective    Pt in bed upon arrival, requested to use bathroom prior to OT session     Objective  FIM Lower Body Dressing Score:  3 - Moderate Assistance  Lower Body Dressing Description:  Supervision for safety, Increased time, Set-up of equipment (Assist to doff shoes and to thread legs through pants briefs. Pt able to stand and manage clothes at waistline. Pt required assist to tie shoes )  FIM Upper Body Dressin - Standby Prompting/Supervision or Set-up  Upper Body Dressing Description:  Supervision for safety, Increased time (Seated EOB to doff button up and don pull over shirt)  FIM Toilet Transfer Score:  5 - Standby Prompting/Supervision or Set-up  Toilet Transfer Description:  Grab bar, Increased time, Supervision for safety  FIM Toiletin - Standby Prompting/Supervision or Set-up  Toileting Description:  Supervision for safety, Increased time, Grab bar       19 1331   Precautions   Precautions Fall Risk;Other (See Comments)   Comments impaired safety, pt goes by Richie (middle name)   Vitals   O2 (LPM) 0   O2 Delivery None (Room Air)   Pain   Intervention Declines   Pain 0 - 10 Group   Pain Rating Scale (NPRS) 0   Non Verbal Descriptors   Non Verbal Scale  Calm   Cognition    Level of Consciousness Alert   Sitting Lower Body Exercises   Nustep Resistance Level 1  (20 mins, 0.44 miles)   Interdisciplinary Plan of Care Collaboration   Patient Position at End of Therapy In Bed;Bed Alarm On;Call Light within Reach;Tray Table within Reach;Phone within Reach     Functional Mobility - pt ambulated w/ FFW via CGA to/from room to therapy gym - total of 664ft.    Assessment    Pt alert and cooperative w/ tx session. Pt requested to  use bathroom at beginning of therapy. Pt at Sup level for toileting and Sup/SBA for SPT to/from toilet w/ FWW. Pt required Mod A for LB dressing - pt will benefit from adaptive equipment to assist w/ dressing (sock aid, reacher, and shoe horn). Pt exercised on Nustep and tolerated it well, 0 RBs during session - pt completed at a slow and steady pace for 20 mins. Pt completed UB dressing when back in room before lying in bed.     Plan    Cont to address endurance, strength, balance, functional mobility/transfers for completion of ADL/IADLs

## 2019-07-19 NOTE — DISCHARGE PLANNING
"CASE MANAGEMENT INITIAL ASSESSMENT    Admit Date:  7/16/2019     I met with patient to discuss role of case management / discharge planning / team conference.   Patient is a  73 y.o. male transferred from [facility] 7.16.19 s/p right craniotomy w/ tumor resection.  PMHx: prostate cancer s/p resection & XRT, HTN, HLD, afib on pradaxa, CAD, WALTER, chronic back pain, neuropathy.  PLOF: lives in [place], [place] on farm (10 acres), mod independent w/ SPC, drives, works on farm, SSH w/ 2 RUBEN, walk in shower w/ grab bars, raised toilets, uses SPC, has FWW.  States \"wife has mild-mod dementia, have 2 adult daughters & ALLA in [place] who can help out. One of them will pick me up for going home. My wife does not need special care.\"   Discussed limited services available in [place] area, have HH w/ therapy (no SLP). States \"there is OP therapy also.\"  States \"prefer to change to PCP in [place] w/ [facility]. I don't want to f/u with current PCP in [place].\"    PCP: [facility]  NSGY: Dr. Son Ruffin  Urology: Dr. Zhao Angela  Rad Onc: Dr. Joshua Ortiz  Onc: Dr. Patt Ferrer    Admitting MD: Dr. Javier Bella    Diagnosis: 02.1 non traumatic  Primary cerebral lymphoma (HCC)    Co-morbidities:   Patient Active Problem List    Diagnosis Date Noted   • Intracranial mass 07/06/2019     Priority: High   • Myelopathy (HCC) 05/15/2016     Priority: High   • Thoracic stenosis 05/15/2016     Priority: High     Class: Acute   • Ileus (HCC) 05/15/2016     Priority: High     Class: Acute   • Hyponatremia 07/12/2019     Priority: Medium   • Atrial fibrillation (HCC) 05/15/2016     Priority: Medium     Class: Chronic   • Hypertension 05/15/2016     Priority: Medium     Class: Chronic   • CAD (coronary artery disease) 05/15/2016     Priority: Medium     Class: Chronic   • Type 2 diabetes mellitus without complication, without long-term current use of insulin (HCC) 05/15/2016     Priority: Medium     Class: Acute   • " Azotemia 05/15/2016     Priority: Medium     Class: Acute   • Chronic back pain 05/15/2016     Priority: Low     Class: Chronic   • Dyslipidemia 05/15/2016     Priority: Low     Class: Chronic   • Vitamin D insufficiency 07/18/2019   • Leukocytosis 07/18/2019   • 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   • History of prostate cancer 07/06/2019     Prior Living Situation:  Housing / Facility: 1 Story House  Lives with - Patient's Self Care Capacity: Spouse (mild-mod dementia)    Prior Level of Function:  Medication Management: Unable To Determine At This Time  Finances: Unable To Determine At This Time  Home Management: Independent (shares housework duties with SO/family)  Shopping: Unable To Determine At This Time  Prior Level Of Mobility: Independent With Device in Community, Independent With Device in Home (used SPC)  Driving / Transportation: Driving Independent    Support Systems:  Primary : Mell More wife [phone]  Other support systems: none provided  Advance Directives: No  Power of  (Name & Phone): none    Previous Services Utilized:   Equipment Owned: Front-Wheel Walker, Single Point Cane  Prior Services: Home-Independent    Other Information:  Occupation (Pre-Hospital Vocational): Retired Due To Age     Primary Payor Source: Medicare A, Medicare B  Secondary Payor Source: Supplemental Insurance (Violet)  Primary Care Practitioner : wants to establish w/ [facility] PCP  Other MDs: Dr. Son Ruffin NSGY, Dr. Patt Ferrer ONC, Dr. Zhao Angela urology, Dr. Joshua Manzanares Onc    Patient / Family Goal:  Patient / Family Goal: To be cleared to drive, To get back home, To establish with new PCP in [place]    Additional Case Management Questions:  Have you ever received case management services for yourself or a family member? No, I don't think so.    Do you feel you have and an understanding of what services  provide? Yes.    Do you have any additional questions  regarding case management?  Can I go home sooner than that?      CASE MANAGEMENT PLAN OF CARE   Individualized Goals:   1. To be cleared to drive  2. To get back home  3. To establish with new PCP in [place] w/ [facility]  4. To be able to control my urine    Barriers:   1. Functional mobility deficits  2. Cognitive deficits  3. Limited services available in [place], [place]  4. Urinary urgency/frequency    Plan:  1. Continue to follow patient through hospitalization and provide discharge planning in collaboration with patient, family, physicians and ancillary services.     2. Utilize community resources to ensure a safe discharge.

## 2019-07-19 NOTE — THERAPY
Speech Language Pathology  Daily Treatment     Patient Name: Marcio More  Age:  73 y.o., Sex:  male  Medical Record #: [id number]  Today's Date: 7/19/2019     Subjective  Patient concluding breakfast meal when approached for SLP session.  Pleasant and cooperative throughout.  Ambulating using FWW with min A.      Objective     07/19/19 0831   Cognition   Medication Management  Minimal (4)   SLP Total Time Spent   SLP Individual Total Time Spent (Mins) 60   Charge Group   SLP Cognitive Skill Development 4       Assessment  Patient participated in 1:1 SLP session targeting cognitive skills development with emphasis on medication management. Patient identified the purpose of 7/10 routine medications with spv assistance for occasional cues.  Patient requiring multiple repetitions of several new medications. Spv assist for cues re: medication problem solving.  Handed off to RN re: return to room with safety precautions in place.     Plan  Continue cognitive-communication intervention targeting memory, attention, problem solving, medication management.

## 2019-07-19 NOTE — CARE PLAN
Problem: Pain Management  Goal: Pain level will decrease to patient's comfort goal  Pt denies pain or discomfort this shift. Advised to alert staff if pain occurs. Refuses PRN pain medications. Will continue to assess and monitor for pain.

## 2019-07-19 NOTE — PROGRESS NOTES
"Rehab Progress Note     Encounter Date: 7/19/2019    CC: intracranial tumor, decreased balance     Interval Events (Subjective)  Patient sitting up in therapy gym practicing steps. Patient reports he was told his discharge might be the 27th. Patient reports he is motivated to get home sooner than that. He also reports that his medications need to be refilled on Monday as he gets some of his medications through [employer] and they take time to fill the prescriptions. Denies pain. Denies NVD. Reports balance is still poor even on shorter steps.     IDT Team Meeting 7/18/2019  DC/Disposition:  7/27/19    Objective:  VITAL SIGNS: /84   Pulse 84   Temp 36.2 °C (97.1 °F) (Temporal)   Resp 18   Ht 1.854 m (6' 1\")   Wt 94.1 kg (207 lb 8 oz)   SpO2 96%   BMI 27.38 kg/m²   Gen: NAD  Psych: Mood and affect appropriate  CV: RRR, no edema  Resp: CTAB, no upper airway sounds  Abd: NTND  Neuro: AOx3, walking with FWW, stooped gait  Unchanged from 7/18/19 except improved balance on steps compared to yesterday    Recent Results (from the past 72 hour(s))   ACCU-CHEK GLUCOSE    Collection Time: 07/16/19  5:24 PM   Result Value Ref Range    Glucose - Accu-Ck 286 (H) 65 - 99 mg/dL   ACCU-CHEK GLUCOSE    Collection Time: 07/16/19  9:02 PM   Result Value Ref Range    Glucose - Accu-Ck 328 (H) 65 - 99 mg/dL   CBC with Differential    Collection Time: 07/17/19  5:12 AM   Result Value Ref Range    WBC 17.3 (H) 4.8 - 10.8 K/uL    RBC 4.28 (L) 4.70 - 6.10 M/uL    Hemoglobin 14.1 14.0 - 18.0 g/dL    Hematocrit 40.8 (L) 42.0 - 52.0 %    MCV 95.3 81.4 - 97.8 fL    MCH 32.9 27.0 - 33.0 pg    MCHC 34.6 33.7 - 35.3 g/dL    RDW 47.0 35.9 - 50.0 fL    Platelet Count 179 164 - 446 K/uL    MPV 9.6 9.0 - 12.9 fL    Neutrophils-Polys 86.50 (H) 44.00 - 72.00 %    Lymphocytes 4.90 (L) 22.00 - 41.00 %    Monocytes 6.40 0.00 - 13.40 %    Eosinophils 0.00 0.00 - 6.90 %    Basophils 0.30 0.00 - 1.80 %    Immature Granulocytes 1.90 (H) 0.00 - " 0.90 %    Nucleated RBC 0.00 /100 WBC    Neutrophils (Absolute) 14.99 (H) 1.82 - 7.42 K/uL    Lymphs (Absolute) 0.85 (L) 1.00 - 4.80 K/uL    Monos (Absolute) 1.10 (H) 0.00 - 0.85 K/uL    Eos (Absolute) 0.00 0.00 - 0.51 K/uL    Baso (Absolute) 0.05 0.00 - 0.12 K/uL    Immature Granulocytes (abs) 0.33 (H) 0.00 - 0.11 K/uL    NRBC (Absolute) 0.00 K/uL   Comp Metabolic Panel (CMP)    Collection Time: 07/17/19  5:12 AM   Result Value Ref Range    Sodium 129 (L) 135 - 145 mmol/L    Potassium 4.6 3.6 - 5.5 mmol/L    Chloride 97 96 - 112 mmol/L    Co2 26 20 - 33 mmol/L    Anion Gap 6.0 0.0 - 11.9    Glucose 264 (H) 65 - 99 mg/dL    Bun 31 (H) 8 - 22 mg/dL    Creatinine 0.78 0.50 - 1.40 mg/dL    Calcium 8.4 (L) 8.5 - 10.5 mg/dL    AST(SGOT) 16 12 - 45 U/L    ALT(SGPT) 41 2 - 50 U/L    Alkaline Phosphatase 73 30 - 99 U/L    Total Bilirubin 0.8 0.1 - 1.5 mg/dL    Albumin 2.9 (L) 3.2 - 4.9 g/dL    Total Protein 5.5 (L) 6.0 - 8.2 g/dL    Globulin 2.6 1.9 - 3.5 g/dL    A-G Ratio 1.1 g/dL   HEMOGLOBIN A1C    Collection Time: 07/17/19  5:12 AM   Result Value Ref Range    Glycohemoglobin 10.0 (H) 0.0 - 5.6 %    Est Avg Glucose 240 mg/dL   TSH with Reflex to FT4    Collection Time: 07/17/19  5:12 AM   Result Value Ref Range    TSH 0.610 0.380 - 5.330 uIU/mL   Vitamin D, 25-hydroxy (blood)    Collection Time: 07/17/19  5:12 AM   Result Value Ref Range    25-Hydroxy   Vitamin D 25 27 (L) 30 - 100 ng/mL   ESTIMATED GFR    Collection Time: 07/17/19  5:12 AM   Result Value Ref Range    GFR If African American >60 >60 mL/min/1.73 m 2    GFR If Non African American >60 >60 mL/min/1.73 m 2   ACCU-CHEK GLUCOSE    Collection Time: 07/17/19  7:38 AM   Result Value Ref Range    Glucose - Accu-Ck 225 (H) 65 - 99 mg/dL   ACCU-CHEK GLUCOSE    Collection Time: 07/17/19 11:27 AM   Result Value Ref Range    Glucose - Accu-Ck 339 (H) 65 - 99 mg/dL   ACCU-CHEK GLUCOSE    Collection Time: 07/17/19  5:28 PM   Result Value Ref Range    Glucose - Accu-Ck  367 (H) 65 - 99 mg/dL   ACCU-CHEK GLUCOSE    Collection Time: 07/17/19  9:56 PM   Result Value Ref Range    Glucose - Accu-Ck 276 (H) 65 - 99 mg/dL   ACCU-CHEK GLUCOSE    Collection Time: 07/18/19  7:24 AM   Result Value Ref Range    Glucose - Accu-Ck 203 (H) 65 - 99 mg/dL   ACCU-CHEK GLUCOSE    Collection Time: 07/18/19 11:16 AM   Result Value Ref Range    Glucose - Accu-Ck 229 (H) 65 - 99 mg/dL   ACCU-CHEK GLUCOSE    Collection Time: 07/18/19  5:34 PM   Result Value Ref Range    Glucose - Accu-Ck 267 (H) 65 - 99 mg/dL   ACCU-CHEK GLUCOSE    Collection Time: 07/18/19  8:04 PM   Result Value Ref Range    Glucose - Accu-Ck 346 (H) 65 - 99 mg/dL   ACCU-CHEK GLUCOSE    Collection Time: 07/19/19  8:04 AM   Result Value Ref Range    Glucose - Accu-Ck 206 (H) 65 - 99 mg/dL   ACCU-CHEK GLUCOSE    Collection Time: 07/19/19 11:25 AM   Result Value Ref Range    Glucose - Accu-Ck 226 (H) 65 - 99 mg/dL       Current Facility-Administered Medications   Medication Frequency   • oxybutynin SR (DITROPAN-XL) tablet 5 mg Q EVENING   • metFORMIN (GLUCOPHAGE) tablet 750 mg BID WITH MEALS   • vitamin D (cholecalciferol) tablet 1,000 Units DAILY   • insulin glargine (LANTUS) injection 12 Units Q EVENING   • Respiratory Care per Protocol Continuous RT   • oxyCODONE immediate-release (ROXICODONE) tablet 5 mg Q3HRS PRN   • oxyCODONE immediate release (ROXICODONE) tablet 10 mg Q3HRS PRN   • hydrALAZINE (APRESOLINE) tablet 25 mg Q8HRS PRN   • acetaminophen (TYLENOL) tablet 650 mg Q4HRS PRN   • senna-docusate (PERICOLACE or SENOKOT S) 8.6-50 MG per tablet 2 Tab BID    And   • polyethylene glycol/lytes (MIRALAX) PACKET 1 Packet QDAY PRN    And   • magnesium hydroxide (MILK OF MAGNESIA) suspension 30 mL QDAY PRN    And   • bisacodyl (DULCOLAX) suppository 10 mg QDAY PRN   • artificial tears 1.4 % ophthalmic solution 1 Drop PRN   • benzocaine-menthol (CEPACOL) lozenge 1 Lozenge Q2HRS PRN   • mag hydrox-al hydrox-simeth (MAALOX PLUS ES or MYLANTA  DS) suspension 20 mL Q2HRS PRN   • traZODone (DESYREL) tablet 50 mg QHS PRN   • ondansetron (ZOFRAN ODT) dispertab 4 mg 4X/DAY PRN    Or   • ondansetron (ZOFRAN) syringe/vial injection 4 mg 4X/DAY PRN   • sodium chloride (OCEAN) 0.65 % nasal spray 2 Spray PRN   • scopolamine (TRANSDERM-SCOP) patch 1 Patch Q72HRS PRN   • insulin regular (HUMULIN R) injection 3-14 Units 4X/DAY ACHS    And   • glucose 4 g chewable tablet 16 g Q15 MIN PRN    And   • dextrose 50% (D50W) injection 50 mL Q15 MIN PRN   • atorvastatin (LIPITOR) tablet 20 mg QHS   • dexamethasone (DECADRON) tablet 2 mg Q12HRS    Followed by   • [START ON 7/20/2019] dexamethasone (DECADRON) tablet 1 mg Q12HRS   • metoprolol (LOPRESSOR) tablet 25 mg TWICE DAILY   • therapeutic multivitamin-minerals (THERAGRAN-M) tablet 1 Tab DAILY   • omeprazole (PRILOSEC) capsule 20 mg DAILY       Orders Placed This Encounter   Procedures   • Diet Order Diabetic     Standing Status:   Standing     Number of Occurrences:   1     Order Specific Question:   Diet:     Answer:   Diabetic [3]     Order Specific Question:   Consistency/Fluid modifications:     Answer:   Thin Liquids [3]       Assessment:  Active Hospital Problems    Diagnosis   • *Primary cerebral lymphoma (HCC)   • Intracranial mass   • Hyponatremia   • Atrial fibrillation (HCC)   • CAD (coronary artery disease)   • Hyperglycemia   • Hypertension   • Chronic back pain   • Dyslipidemia   • History of prostate cancer       Medical Decision Making and Plan:  nonTraumatic Brain injury - 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 Ruffin on 7/10/19  -Staples out 10-14 days post-op. Follow-up with Dr. Ruffin in 1-2 weeks. Follow-up with Oncology  -Steroid taper until 7/21/19. Keppra prophylaxis completed 7/18/19  -PT and OT for mobility and ADLs  -SLP for cognition/speech     Dysphagia - Upgraded to dysphagia 2 with NTL prior to transfer. SLP for swallow evaluation - upgraded to regular with  thins. Resolved     DM - Patient on SSI on transfer.  Previously on Lantus 12 U qEvening  -Restart Lantus 12 U. Consult hospitalist, restarted on home metformin. Monitor while tapering steroids      A fib/HTN - Patient on metoprolol 25 mg BID. Previously on anticoagulation. No ASA or AC per NSG at discharge. Previously on Azilsartan 40 mg daily. Continue to monitor  -Patient with A Fib HR into 100s at time. Consult hospitalist     Hyponatremia - 129 on transfer. Continue to monitor     HLD - Patient on Atorvastatin on transfer.     Urinary Frequency - Patient with severe urge incontinence.  -Started on Oxybutynin 5 mg, continues to have urgency    GI Ppx - Patient on stool softeners while on pain medications and PPI while on steroids.      DVT Ppx - Patient is high risk for bleed s/p removal of hemorrhagic tumor. Continue to monitor ambulation.     Total time:  25 minutes.  I spent greater than 50% of the time for patient care, counseling, and coordination on this date, including unit/floor time, and face-to-face time with the patient as per interval events and assessment and plan above. Topics discussed included discharge planning, discharge medications planning and continued urgency on Oxybutynin.    Leticia Bella M.D.

## 2019-07-19 NOTE — PROGRESS NOTES
[facility] Medicine Daily Progress Note    Date of Service  7/19/2019    Chief Complaint:  Hypertension  Afib  Diabetes  Hyponatremia  Leukocytosis  Azotemia    Interval History:  No significant events or changes since last visit    Review of Systems  Review of Systems   Constitutional: Negative for chills and fever.   Respiratory: Negative for shortness of breath.    Cardiovascular: Negative for chest pain.   Gastrointestinal: Negative for abdominal pain, diarrhea, nausea and vomiting.   Psychiatric/Behavioral: The patient is not nervous/anxious.         Physical Exam  Temp:  [36.6 °C (97.9 °F)-36.7 °C (98 °F)] 36.6 °C (97.9 °F)  Pulse:  [70-91] 85  Resp:  [16-18] 18  BP: (120-131)/(70-80) 120/70  SpO2:  [95 %-97 %] 97 %    Physical Exam   Constitutional: He is oriented to person, place, and time. He appears well-nourished.   HENT:   Head: Atraumatic.   Eyes: Pupils are equal, round, and reactive to light. Conjunctivae are normal.   Neck: Normal range of motion. Neck supple.   Cardiovascular: Normal rate, regular rhythm, S1 normal and S2 normal.    Pulmonary/Chest: Effort normal and breath sounds normal. He has no rhonchi.   Abdominal: Soft. Bowel sounds are normal.   Neurological: He is alert and oriented to person, place, and time. No sensory deficit.   Skin: Skin is warm and dry. No cyanosis.   Psychiatric: He has a normal mood and affect. His behavior is normal.   Nursing note and vitals reviewed.      Fluids    Intake/Output Summary (Last 24 hours) at 07/19/19 0812  Last data filed at 07/19/19 0534   Gross per 24 hour   Intake             1240 ml   Output              500 ml   Net              740 ml       Laboratory  Recent Labs      07/17/19   0512   WBC  17.3*   RBC  4.28*   HEMOGLOBIN  14.1   HEMATOCRIT  40.8*   MCV  95.3   MCH  32.9   MCHC  34.6   RDW  47.0   PLATELETCT  179   MPV  9.6     Recent Labs      07/17/19   0512   SODIUM  129*   POTASSIUM  4.6   CHLORIDE  97   CO2  26   GLUCOSE  264*   BUN  31*    CREATININE  0.78   CALCIUM  8.4*                   Imaging    Assessment/Plan  Intracranial mass- (present on admission)   Assessment & Plan    S/P resection  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 2nd to SIADH  Consider salt tabs if Na continues to drop lower  Will monitor for now     Azotemia- (present on admission)   Assessment & Plan    Bun: 37 --> 31 (7/17)  Encouraging fluid (water/juice) intake  Monitor     Type 2 diabetes mellitus without complication, without long-term current use of insulin (HCC)- (present on admission)   Assessment & Plan    Hba1c: 10 (7/17)  BS this am after med changed: 206 (am) --> 226 (nonn)  On Lantus: 12 units qhs  On Metformin: 750 mg bid (7/18 evening) --> will increase to 1000 mg bid (evening of 7/19)  Now on an diabetic diet (7/19)  Note: on steroids (being tapered off)  Note: will try to get on oral meds only  Cont to monitor     CAD (coronary artery disease)- (present on admission)   Assessment & Plan    Hx of 3 cardiac stents     Hypertension- (present on admission)   Assessment & Plan    BP ok  On Lopressor: 25 mg bid  Monitor     Atrial fibrillation (HCC)- (present on admission)   Assessment & Plan    HR ok  On Lopressor: 25 mg bid  Cont to monitor     Leukocytosis   Assessment & Plan    WBC's: 17.3 (7/17)  Has been increased since 7/8  Likely 2nd to steroids -- being tapered off  Monitor for now     Vitamin D insufficiency   Assessment & Plan    Vit D: 27 (7/17)  On supplements (7/18)     History of prostate cancer- (present on admission)   Assessment & Plan    S/P lasar treatment

## 2019-07-19 NOTE — DISCHARGE PLANNING
"Met w/ patient after IDT conference to review team recommendations & targeted DC date 7.27.19. States \" Can I go home earlier than that? I think I can manage better at home.\" Discussed functional mobility deficits: decreased balance, strength & endurance, impulsivity, cognitive deficits. States \"oh I'm fine.\" Reviewed driving restriction post op. States \"I don't believe I should have any problems driving.\" Discussed daughters' availability to assist at discharge including transportation. States \"yes, one of them will take me home.\" Requests referral to Dionne MD to establish new PCP mgmt. Discussed home accessibility. States \"I had the bathrooms modified last year for getting old: raised toilets, grab bars.\" Questions answered. Emotional support provided.  "

## 2019-07-20 PROBLEM — E83.39 HYPOPHOSPHATEMIA: Status: ACTIVE | Noted: 2019-01-01

## 2019-07-20 NOTE — CARE PLAN
Problem: Infection  Goal: Will remain free from infection  Outcome: PROGRESSING AS EXPECTED  Patient remains free from s/s infection; afebrile.  Will continue to monitor.    Problem: Respiratory:  Goal: Respiratory status will improve  Outcome: PROGRESSING AS EXPECTED  Pt is on room air; no SOB or increased WOB noted. Lower lobes diminished. Will continue to monitor.

## 2019-07-20 NOTE — PROGRESS NOTES
DATE OF SERVICE:  07/19/2019    The patient was seen for a followup visit.  The patient was in good spirits.    He talked about his therapies and some of his goals.  He said he believes he   is improving in his strength and endurance.  The patient said he is asking   good questions.  He reports his sleep is good and his appetite is returning.       ____________________________________     KOMAL WARNER, PHD    ILA / KATIE    DD:  07/20/2019 11:54:21  DT:  07/20/2019 16:38:49    D#:  [id number]  Job#:  [id number]

## 2019-07-20 NOTE — PROGRESS NOTES
[facility] Medicine Daily Progress Note    Date of Service  7/20/2019    Chief Complaint:  Hypertension  Afib  Diabetes  Hyponatremia  Leukocytosis  Azotemia    Interval History:  No significant events or changes since last visit    Review of Systems  Review of Systems   Constitutional: Negative for fever.   Eyes: Negative for blurred vision.   Respiratory: Negative for shortness of breath.    Cardiovascular: Negative for palpitations.   Gastrointestinal: Negative for nausea and vomiting.   Neurological: Negative for dizziness and headaches.   Psychiatric/Behavioral: Negative for hallucinations.        Physical Exam  Temp:  [36.2 °C (97.2 °F)-36.5 °C (97.7 °F)] 36.2 °C (97.2 °F)  Pulse:  [61-66] 61  Resp:  [18] 18  BP: (116-126)/(81-84) 116/81  SpO2:  [91 %] 91 %    Physical Exam   Constitutional: He is oriented to person, place, and time.   HENT:   Mouth/Throat: Oropharynx is clear and moist.   Eyes: No scleral icterus.   Cardiovascular: Normal rate, regular rhythm, S1 normal and S2 normal.    Pulmonary/Chest: Effort normal. No stridor. He has no wheezes. He has no rhonchi. He has no rales.   Abdominal: Soft. Bowel sounds are normal.   Neurological: He is alert and oriented to person, place, and time. No sensory deficit.   Skin: Skin is warm and dry. He is not diaphoretic. No cyanosis.   Psychiatric: He has a normal mood and affect. His behavior is normal.   Nursing note and vitals reviewed.      Fluids    Intake/Output Summary (Last 24 hours) at 07/20/19 0812  Last data filed at 07/20/19 0606   Gross per 24 hour   Intake              120 ml   Output             1700 ml   Net            -1580 ml       Laboratory      Recent Labs      07/20/19   0512   SODIUM  133*   POTASSIUM  4.5   CHLORIDE  99   CO2  26   GLUCOSE  183*   BUN  35*   CREATININE  0.83   CALCIUM  8.4*                   Imaging    Assessment/Plan  Intracranial mass- (present on admission)   Assessment & Plan    S/P resection  Path --> high grade B cell  Lymphoma  For chemo after hospital stay     Hyponatremia- (present on admission)   Assessment & Plan    Na: 132 --> 131 --> 129 (7/18) --> 133 (7/20)  ? 2nd to SIADH  Consider salt tabs if Na continues to drop lower  Cont to monitor     Azotemia- (present on admission)   Assessment & Plan    Bun: 37 --> 31 (7/17) --> 35 (7/20)  Encouraging fluid (water/juice) intake  Monitor     Type 2 diabetes mellitus without complication, without long-term current use of insulin (HCC)- (present on admission)   Assessment & Plan    Hba1c: 10 (7/17)  BS still elevated   On Lantus: 12 units qhs  On Metformin: 750 mg bid (7/18 evening) --> will increase to 1000 mg bid (7/20)  Now on an diabetic diet (7/19)  Note: on steroids (being tapered off)  Note: will try to get on oral meds only  Cont to monitor     CAD (coronary artery disease)- (present on admission)   Assessment & Plan    Hx of 3 cardiac stents     Hypertension- (present on admission)   Assessment & Plan    BP ok  On Lopressor: 25 mg bid  Monitor     Atrial fibrillation (HCC)- (present on admission)   Assessment & Plan    HR ok  On Lopressor: 25 mg bid  Cont to monitor     Hypophosphatemia   Assessment & Plan    PO4: 2.4 (7/20)  Will start supplements  Monitor     Leukocytosis   Assessment & Plan    WBC's: 17.3 (7/17)  Has been increased since 7/8  Likely 2nd to steroids -- being tapered off  Monitor for now     Vitamin D insufficiency   Assessment & Plan    Vit D: 27 (7/17)  On supplements (7/18)     History of prostate cancer- (present on admission)   Assessment & Plan    S/P lasar treatment

## 2019-07-20 NOTE — DISCHARGE PLANNING
Met w/ patient, wife Mell & daughter Aissatou. Patient covered under worker's comp for life for cardiac,  #[phone], Dr. Rk Moss in [place] is his [facility] cardiologist that orders cardiac meds. Will review cardiac meds w/ Dr. Bella Monday. Any new cardiac meds or changes to previous prescriptions will need to be sent to Dr. Moss to request. Questions answered.

## 2019-07-20 NOTE — CARE PLAN
Problem: Communication  Goal: The ability to communicate needs accurately and effectively will improve  Outcome: PROGRESSING AS EXPECTED  Pt is AOx4 and is able to communicate needs effectively with staff members. Call light is within reach at bedside.    Problem: Bowel/Gastric:  Goal: Normal bowel function is maintained or improved  Outcome: PROGRESSING AS EXPECTED  Pt is having regular bowel movements with last BM on 7/19. No s/s of constipation noted.

## 2019-07-20 NOTE — THERAPY
"Speech Language Pathology  Daily Treatment     Patient Name: Marcio More  Age:  73 y.o., Sex:  male  Medical Record #: [id number]  Today's Date: 7/20/2019     Subjective    Pt pleasant and cooperative during tx.       Objective       07/20/19 1501   Cognition   Functional Memory Activities Moderate (3)   Medication Management  Minimal (4)   SLP Total Time Spent   SLP Individual Total Time Spent (Mins) 60   Charge Group   SLP Cognitive Skill Development 4       FIM Comprehension Score:  6 - Modified Independent  Comprehension Description:  Verbal cues    FIM Expression Score:  7 - Independent  Expression Description:       FIM Social Interaction Score:  7 - Independent  Social Interaction Description:       FIM Problem Solving Score:  5 - Standby Prompting/Supervision or Set-up  Problem Solving Description:  Verbal cueing, Therapy schedule, Increased time    FIM Memory Score:  4 - Minimal Assistance  Memory Description:  Verbal cueing, Therapy schedule      Assessment    Pt recalled use of 5/7 medications, and 2/7 medication names.  Pt declined to complete med sort task, as he has his own system of medications on the night stand.  When asked if he would use a weekly pill box, pt stated, \"Honestly, I wouldn't use it.\"  Pt was worried that he would drop the box and the dog would eat the pills.  Pt required min-mod verbal cues to recall daily events.  Memory book introduced this day.  Pt verbalized understanding.      Plan    Target recall and medication management.     "

## 2019-07-20 NOTE — THERAPY
"Occupational Therapy  Daily Treatment     Patient Name: Marcio More  Age:  73 y.o., Sex:  male  Medical Record #: [id number]  Today's Date: 2019     Precautions  Precautions: Fall Risk, Other (See Comments)  Comments: impaired safety, pt goes by Richie (middle name)         Subjective  \"I'd like to shower this morning\"     Objective    FIM Grooming Score:  5 - Standby Prompting/Supervision or Set-up  Grooming Description:  Increased time, Supervision for safety (SBA in standing at sinkside for groom/hygiene tasks)    FIM Bathing Score:  5 - Standby Prompting/Supervision or Set-up  Bathing Description:       FIM Upper Body Dressin - Standby Prompting/Supervision or Set-up  Upper Body Dressing Description:  Increased time, Supervision for safety (Don/doff t-shirt)    FIM Lower Body Dressing Score:  3 - Moderate Assistance  Lower Body Dressing Description:  Increased time, Supervision for safety, Verbal cueing, Set-up of equipment (Mod assist to slip on shoes, Total assist to manage laces, Sba and additional time to thread feet through pants/briefs, SBA in stand to manage pants/briefs at waist.)    FIM Toiletin - Standby Prompting/Supervision or Set-up  Toileting Description:  Grab bar, Increased time, Supervision for safety    FIM Toilet Transfer Score:  5 - Standby Prompting/Supervision or Set-up  Toilet Transfer Description:  Grab bar, Increased time, Supervision for safety, Verbal cueing (Sup/SBA to transfer to/from FWW and commode)    FIM Tub/Shower Transfers Score:  5 - Standby Prompting/Supervision or Set-up  Tub/Shower Transfers Description:  Grab bar, Increased time, Supervision for safety (Sup/SBA to transfer to/from FWW and shower bench)    SBA for functional ambulation w/ ' (to/from bedroom and back gym)    Assessment    Pt was alert and cooperative w/ tx.  Limiters include impaired endurance, balance and strength.  Supervision for safety awareness/judgement    Plan    Cont to address " endurance, strength, balance, functional mobility/transfers for completion of ADL/IADLs

## 2019-07-20 NOTE — THERAPY
Physical Therapy   Daily Treatment     Patient Name: Marcio More  Age:  73 y.o., Sex:  male  Medical Record #: [id number]  Today's Date: 7/20/2019     Precautions  Precautions: Fall Risk, Other (See Comments)  Comments: impaired safety, pt goes by Richie (middle name)    Subjective    Patient agreeable to PT.  Reports not needing to use restroom during session.     Objective       07/19/19 1010   Vitals   O2 (LPM) 0   O2 Delivery None (Room Air)   Standing Lower Body Exercises   Standing Lower Body Exercises (// bars, CGA at gait belt, setup, cueing, rests q 2 sets)   Hamstring Curl 1 set of 10   Hip Abduction 1 set of 10   Marching 1 set of 15   Heel Rise 1 set of 15   Toe Rise 1 set of 15   Mini Squat Partial;1 set of 10   Bed Mobility    Supine to Sit Supervised   Sit to Supine Supervised   Sit to Stand Contact Guard Assist   Scooting Supervised   Rolling Supervised   Lower Extremity Outcome Measures   Lower Extremity Outcome Measures Utilized 6 Minute Walk Test   Assistive Device Front Wheel Walker  (and Gait belt)   6 Minute Walk Test   Distance (feet) 422   Gait Speed (meters per second) 0.36   Number of Rests 0   Level of Assistance 4   Interdisciplinary Plan of Care Collaboration   IDT Collaboration with  Physician   Collaboration Comments present for stairs observation and POC discussion x10 min   PT Total Time Spent   PT Individual Total Time Spent (Mins) 60   PT Charge Group   PT Gait Training 2   PT Therapeutic Exercise 1   PT Therapeutic Activities 1       FIM Bed/Chair/Wheelchair Transfers Score: 4 - Minimal Assistance  Bed/Chair/Wheelchair Transfers Description:   (FWW, gait belt, CGA, cueing, setup, elevated HOB)    FIM Walking Score:  4 - Minimal Assistance  Walking Description:   (Min A to CGA, gait belt, FWW, kyphotic and fwd trunk posture, cueing for posture and way finding. 3 indoors reps with longest rep of 440 ft (422 ft as part of 6MWT).  Bradykinetic.)    FIM Stairs Score:  4 - Minimal  Assistance  Stairs Description:   (Min A, gait belt, setup, B railings, 1 set of 18 adaptive height stairs, usually uses reciprocal pattern, mod cueing, very bradykinetic, fwd trunk posture)      Assessment    Patient has impaired safety, requires cueing for posture and standing quality, improving activity tolerance and endurance, good motivation, still very bradykinetic.    Plan    Safety, increased ambulation with FWW or SPC, ther ex for strengthening and endurance, standing balance

## 2019-07-21 NOTE — PROGRESS NOTES
[facility] Medicine Daily Progress Note    Date of Service  7/21/2019    Chief Complaint:  Hypertension  Afib  Diabetes  Hyponatremia  Leukocytosis  Azotemia    Interval History:  No significant events or changes since last visit    Review of Systems  Review of Systems   Constitutional: Negative for fever.   Eyes: Negative for blurred vision.   Respiratory: Negative for cough.    Cardiovascular: Negative for chest pain.   Gastrointestinal: Negative for diarrhea.   Musculoskeletal: Negative for joint pain.   Neurological: Negative for dizziness.   Psychiatric/Behavioral: The patient is not nervous/anxious.         Physical Exam  Temp:  [36.3 °C (97.3 °F)-36.6 °C (97.9 °F)] 36.4 °C (97.6 °F)  Pulse:  [68-92] 92  Resp:  [18] 18  BP: (126-131)/(79-84) 128/84  SpO2:  [94 %] 94 %    Physical Exam   Constitutional: He is oriented to person, place, and time. No distress.   HENT:   Mouth/Throat: No oropharyngeal exudate.   Eyes: EOM are normal.   Neck: No JVD present. No tracheal deviation present.   Cardiovascular: Normal rate, regular rhythm, S1 normal and S2 normal.    Pulmonary/Chest: Effort normal. He has no wheezes. He has no rhonchi. He has no rales.   Abdominal: Soft. He exhibits no distension. There is no tenderness.   Neurological: He is alert and oriented to person, place, and time. No sensory deficit.   Skin: Skin is warm and dry. No cyanosis.   Psychiatric: He has a normal mood and affect. His behavior is normal.   Nursing note and vitals reviewed.      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 2nd to SIADH  Consider salt tabs if Na continues to drop lower  Cont to monitor     Azotemia- (present on admission)   Assessment & Plan    Bun: 37 --> 31 (7/17) --> 35 (7/20)  Encouraging fluid (water/juice) intake  Monitor     Type 2 diabetes mellitus without complication, without long-term current use of insulin (HCC)- (present on admission)   Assessment & Plan    Hba1c: 10 (7/17)  BS recently: 131-182   On Lantus: 12 units qhs  On Metformin: 750 mg bid (7/18 evening) --> 1000 mg bid (7/20)  Now on an diabetic diet (7/19)  Note: on steroids (being tapered off)  Note: will try to get on oral meds only  Cont to monitor     CAD (coronary artery disease)- (present on admission)   Assessment & Plan    Hx of 3 cardiac stents     Hypertension- (present on admission)   Assessment & Plan    BP ok  On Lopressor: 25 mg bid  Monitor     Atrial fibrillation (HCC)- (present on admission)   Assessment & Plan    HR ok  On Lopressor: 25 mg bid  Cont to monitor     Hypophosphatemia   Assessment & Plan    PO4: 2.4 (7/20)  On supplements  Monitor     Leukocytosis   Assessment & Plan    WBC's: 17.3 (7/17)  Has been increased since 7/8  Likely 2nd to steroids -- being tapered off  Monitor for now     Vitamin D insufficiency   Assessment & Plan    Vit D: 27 (7/17)  On supplements (7/18)     History of prostate cancer- (present on admission)   Assessment & Plan    S/P lasar treatment

## 2019-07-21 NOTE — THERAPY
Physical Therapy   Daily Treatment     Patient Name: Marcio More  Age:  73 y.o., Sex:  male  Medical Record #: [id number]  Today's Date: 2019     Precautions  Precautions: Fall Risk, Other (See Comments)  Comments: impaired safety, pt goes by Richie (middle name)    Subjective    Patient agreeable to PT. Requesting to ambulate with FWW today. Family present initially.     Objective       19 1231   Vitals   O2 (LPM) 0   O2 Delivery None (Room Air)   Standing Lower Body Exercises   Standing Lower Body Exercises (// bars, CGA, gait belt, setup, cues, rests q2 sets)   Hamstring Curl 2 sets of 10   Hip Abduction 2 sets of 15   Marching 2 sets of 15   Heel Rise 2 sets of 15  (limited amplitude with fatigue)   Toe Rise 2 sets of 15  (limited amplitude with fatigue)   Bed Mobility    Supine to Sit Supervised   Sit to Supine Stand by Assist   Sit to Stand Contact Guard Assist   Scooting Stand by Assist   Rolling Supervised   Interdisciplinary Plan of Care Collaboration   IDT Collaboration with  Family / Caregiver   Collaboration Comments family present first 10 min of session during supine to standing and initial ambulation rep.   PT Total Time Spent   PT Individual Total Time Spent (Mins) 75   PT Charge Group   PT Gait Training 2   PT Therapeutic Exercise 2   PT Therapeutic Activities 1     Discussed treatment goals and pt's progress, improving endurance.    FIM Toiletin - Minimal Assistance  Toileting Description:   (CGA, gait belt, FWW, increased time)    FIM Bed/Chair/Wheelchair Transfers Score: 4 - Minimal Assistance  Bed/Chair/Wheelchair Transfers Description:   (CGA, increased time with sit to supine for LE positioning, cueing, elevated HOB, setup, gait belt, FWW)    FIM Toilet Transfer Score:  4 - Minimal Assistance  Toilet Transfer Description:   (FWW, setup, CGA at gait belt, increased time)    FIM Walking Score:  4 - Minimal Assistance  Walking Description:  Extra time (CGA for balance and pants  management, FWW, gait belt, 2x 510 ft outdoors/indoors with focus on increasing endurance, cueing for upright posture every 100ft.  3rd ambulation rep from gym to room, CGA, FWW, gait belt)    FIM Wheelchair Score:  0 - Not tested,unsafe activity  Wheelchair Description:       FIM Stairs Score:  4 - Minimal Assistance  Stairs Description:   (Min A at gait belt, cueing, setup, B railings, 1 set of 16 standard stairs, uses step-to pattern without cueing, increased time.)      Assessment    Patient has improving endurance and activity tolerance; requires CGA for balance, cueing for safety, and cueing for upright posture; decreasing A slowly; bradykinetic.    Plan    Safety, increased ambulation with FWW or SPC, ther ex for strengthening and endurance, standing balance

## 2019-07-21 NOTE — CARE PLAN
Problem: Safety  Goal: Will remain free from falls    Intervention: Implement fall precautions  Patient unsteady, assisted to transfer and ambulate to meals to prevent falls.      Problem: Skin Integrity  Goal: Risk for impaired skin integrity will decrease    Intervention: Implement precautions to protect skin integrity in collaboration with the interdisciplinary team  Patient with episodes of bowel/urinary incontinence, assisted with mary care to prevent skin breakdown.

## 2019-07-21 NOTE — CARE PLAN
Problem: Venous Thromboembolism (VTW)/Deep Vein Thrombosis (DVT) Prevention:  Goal: Patient will participate in Venous Thrombosis (VTE)/Deep Vein Thrombosis (DVT)Prevention Measures  Outcome: PROGRESSING AS EXPECTED  Pharmacologic DVT prophylaxis contraindicated per MD d/t recent craniotomy and cerebral lymphoma. Pt wears thigh high TEDs during the day. No s/s of DVT or edema noted; will continue to monitor.    Problem: Skin Integrity  Goal: Risk for impaired skin integrity will decrease  Outcome: PROGRESSING AS EXPECTED  Pt has head incisions with staples and JERAD. Pt c/o recent onset redness and discomfort to groin and sacrum. Applies his own barrier cream as necessary. Will continue to monitor.

## 2019-07-21 NOTE — CONSULTS
DATE OF SERVICE:  07/17/2019    BEHAVIORAL MEDICINE EVALUATION    BRIEF HISTORY OF PRESENTING COMPLAINTS:  The patient is a 73-year-old white    male who is referred for a behavioral medicine evaluation by Dr. Bella.  The patient was transferred to rehab from [facility] where he was   [place] [facility] [place] with worsening weakness, fatigue, and falls.    The patient was found to have a large right frontal lobe mass.  He was started   on Keppra.  He underwent right-sided resection on 07/10/2019 without   complications.  The patient on pathology showed a high-grade B-cell lymphoma.    The patient was stabilized and sent to rehab to address his general debility.    PAST MEDICAL HISTORY:  Significant for anesthesia, arthritis, atrial   fibrillation, back pain, blood clotting disorder, breath shortness, CAD,   prostate cancer, cataracts, dyslipidemia, hypercholesterolemia, hypertension,   ileus, myocardial infarction x3, renal disorder, sleep apnea, snoring, urinary   incontinence.    PSYCHOLOGICAL STATUS:  MENTAL STATUS EXAMINATION:  The patient is a well-nourished overweight male of   tall stature, who appeared older than his stated age of 73.  At presentation,   the patient was alert.  He was sitting upright in his bed when approached.    The patient oriented well to my presence.  The patient's appearance was   remarkable for shaved head and surgical lesions on his scalp.  The patient's   manner of presentation was cooperative and friendly.    The patient was grossly oriented to time, place, and person.  His language was   logical and goal oriented, speech is normal for rate and rhythm.  The   patient's concentration and memory functioning by his account was somewhat   diminished, especially for recent memories.    The patient's affect was slightly constricted, stable, and mildly intense.  He   related well.  His mood appeared slightly anxious, but appropriate to the   context.    There was no evidence  of delusional or perceptual disturbance.  Also, the   patient showed no unusual pain or motor behavior during the interview.    SPECIFIC BEHAVIORAL COMPLAINTS:  The patient denied any clinically significant   symptoms of a negative mood.  He reported no strong feelings of depression,   anxiety, or anger.    The patient admitted to some head pain of moderate intensity.  He rates his   average pain at a 5-6/10.  He is only using Tylenol for pain relief.    Other problems mentioned by the patient include low levels of energy.  He said   his appetite at least for the past few days has returned.  The patient   reported some sleep problems, mostly related to sleep apnea.    The patient denied any interpersonal discord or discomfort, family disharmony   or marital strife.    The patient also reported no EtOH or other drug abuse or any use of tobacco.    PSYCHIATRIC HISTORY:  The patient denied any history significant for   psychiatric disturbance or treatment including in or outpatient care.    PSYCHOMETRIC TESTING:  The patient was administered 2 psychometric tests and 2   screening instruments.  The PS/PC-R revealed no symptoms of generalized mood   disturbance.  The patient's CDR survey showed no problems with level of   consciousness, attention, thinking, perception, or speech.  He did show some   memory deficits.  He also revealed problems with behavioral activation and   basic self-care.  He denied any mood disturbance.  No problem behaviors are   noted.  Finally, the patient revealed a decline in his vigor, sleep   disturbance, and moderate levels of head pain.    The patient is screened for any elder abuse or risk of suicide.  There is no   strong evidence for either problem.    SOCIAL HISTORY:  The patient is retired, .  He lives with his wife in   [place], [place].    IMPRESSION:  Minor adjustment disorder.    RECOMMENDATIONS:  The patient will be followed for status and supportive care.        ____________________________________     PHD ILA STEPHENS / KATIE    DD:  07/21/2019 14:13:59  DT:  07/21/2019 14:36:46    D#:  [id number]  Job#:  [id number]

## 2019-07-22 NOTE — THERAPY
Occupational Therapy  Daily Treatment     Patient Name: Marcio More  Age:  73 y.o., Sex:  male  Medical Record #: [id number]  Today's Date: 2019     Precautions  Precautions: Fall Risk, Other (See Comments)  Comments: impaired safety, pt goes by Richie (middle name)         Subjective       Objective       19 1401   Precautions   Precautions Fall Risk;Other (See Comments)   Comments impaired safety, pt goes by Richie (middle name)   Vitals   O2 Delivery None (Room Air)   Cognition    Level of Consciousness Alert   Sitting Upper Body Exercises   Upper Extremity Bike Level 2 Resistance  (FluidoBike, 20 mins, rest break x 3, 3.266km)   Balance   Comments II bar/ ladder ball activity - Standing at one end of II bar, 12' away at opposite end 10 ladder balls placed.  At starting position (6' behind) ladder placed.  Pt instructed to ambulate to opposite end of II bar using II bar to stabilize via BUE's ambulate 12' to retrieve one ladder ball at a time and retuen 12' to starting position to toss ladder ball at ladder 6' away (1 ladder ball = 12'x2 = 24', 24' x 10 = 240')  CGA via gait belt w/ no LOB and additinal time.   Bed Mobility    Supine to Sit Supervised   Sit to Supine Supervised   Scooting Supervised   Rolling Supervised   Interdisciplinary Plan of Care Collaboration   IDT Collaboration with  Nursing   Patient Position at End of Therapy In Bed;Chair Alarm On;Call Light within Reach;Tray Table within Reach;Phone within Reach   OT Total Time Spent   OT Individual Total Time Spent (Mins) 60   OT Charge Group   OT Self Care / ADL 1   OT Therapy Activity 1   OT Therapeutic Exercise  2       FIM Eating Score:  6 - Modified Independent  Eating Description:  Increased time    FIM Grooming Score:  6 - Modified Independent  Grooming Description:  Increased time (Standing at sinkside for groom/hygiene tasks.)    FIM Toiletin - Standby Prompting/Supervision or Set-up  Toileting Description:  Grab bar, Increased  time, Supervision for safety    FIM Bed/Chair/Wheelchair Transfers Score: 4 - Minimal Assistance  Bed/Chair/Wheelchair Transfers Description:  Increased time, Adaptive equipment, Supervision for safety (CGA for SPT to/from EOB sitting and standing via SPC)    FIM Toilet Transfer Score:  4 - Minimal Assistance  Toilet Transfer Description:  Grab bar, Increased time, Supervision for safety (CGA for SPT to/from commode via grab bar and SPC)    CGA via gait belt to ambulate via SPC to/from EOB and back gym (616')  Assessment    Pt was alert and cooperative w/ tx.  Limiters include generalized weakness, impaired endurance, balance and pain.    Plan    Cont to address endurance, strength, balance, functional mobility/transfers for completion of ADL/IADLs

## 2019-07-22 NOTE — PROGRESS NOTES
[facility] Medicine Daily Progress Note    Date of Service  7/22/2019    Chief Complaint:  Hypertension  Afib  Diabetes  Hyponatremia  Leukocytosis  Azotemia    Interval History:  No significant events or changes since last visit    Review of Systems  Review of Systems   Constitutional: Negative for chills and fever.   Respiratory: Negative for shortness of breath.    Cardiovascular: Negative for chest pain.   Gastrointestinal: Negative for abdominal pain, diarrhea, nausea and vomiting.   Psychiatric/Behavioral: The patient is not nervous/anxious.         Physical Exam  Temp:  [36.3 °C (97.4 °F)-36.7 °C (98.1 °F)] 36.4 °C (97.6 °F)  Pulse:  [67-84] 83  Resp:  [18] 18  BP: (104-124)/(70-86) 124/86  SpO2:  [95 %-96 %] 96 %    Physical Exam   Constitutional: He is oriented to person, place, and time. He appears well-nourished.   HENT:   Head: Atraumatic.   Eyes: Pupils are equal, round, and reactive to light. Conjunctivae are normal.   Neck: Normal range of motion. Neck supple.   Cardiovascular: Normal rate, regular rhythm, S1 normal and S2 normal.    No murmur heard.  Pulmonary/Chest: Effort normal. He has no wheezes. He has no rhonchi. He has no rales.   Abdominal: Soft. He exhibits no distension. There is no tenderness.   Musculoskeletal: He exhibits no edema.   Neurological: He is alert and oriented to person, place, and time. No sensory deficit.   Skin: Skin is warm and dry. No rash noted. No cyanosis.   Psychiatric: He has a normal mood and affect. His behavior is normal.   Nursing note and vitals reviewed.      Fluids    Intake/Output Summary (Last 24 hours) at 07/22/19 0842  Last data filed at 07/22/19 0820   Gross per 24 hour   Intake             1020 ml   Output             1775 ml   Net             -755 ml       Laboratory      Recent Labs      07/20/19   0512   SODIUM  133*   POTASSIUM  4.5   CHLORIDE  99   CO2  26   GLUCOSE  183*   BUN  35*   CREATININE  0.83   CALCIUM  8.4*                    Imaging    Assessment/Plan  Intracranial mass- (present on admission)   Assessment & Plan    S/P resection  Path --> high grade B cell Lymphoma  For chemo after hospital stay     Hyponatremia- (present on admission)   Assessment & Plan    Na: 132 --> 131 --> 129 (7/18) --> 133 (7/20)  ? 2nd to SIADH  Consider salt tabs if Na continues to drop lower  Cont to monitor     Azotemia- (present on admission)   Assessment & Plan    Bun: 37 --> 31 (7/17) --> 35 (7/20)  Encouraging fluid (water/juice) intake  Monitor     Type 2 diabetes mellitus without complication, without long-term current use of insulin (HCC)- (present on admission)   Assessment & Plan    Recently diagnosed  Hba1c: 10 (7/17)  BS recently: 102-182   On Lantus: 12 units qhs  On Metformin: 750 mg bid (7/18 evening) --> 1000 mg bid (7/20)  Will lower SS coverage for better BS eval  Now on an diabetic diet (7/19)  Note: now off on steroids (7/21)  Note: will try to get on oral meds only  Cont to monitor     CAD (coronary artery disease)- (present on admission)   Assessment & Plan    Hx of 3 cardiac stents     Hypertension- (present on admission)   Assessment & Plan    BP ok  On Lopressor: 25 mg bid  Monitor     Atrial fibrillation (HCC)- (present on admission)   Assessment & Plan    HR ok  On Lopressor: 25 mg bid  Cont to monitor     Hypophosphatemia   Assessment & Plan    PO4: 2.4 (7/20)  On supplements  Monitor     Leukocytosis   Assessment & Plan    WBC's: 17.3 (7/17)  Has been increased since 7/8  2nd to steroids -- now off (7/21)  Monitor      Vitamin D insufficiency   Assessment & Plan    Vit D: 27 (7/17)  On supplements (7/18)     History of prostate cancer- (present on admission)   Assessment & Plan    S/P lasar treatment

## 2019-07-22 NOTE — PROGRESS NOTES
"Rehab Progress Note     Encounter Date: 7/22/2019    CC: intracranial tumor, decreased balance     Interval Events (Subjective)  Patient sitting up in room. He reports he is doing well. He has questions about discharge and medications, discussed that he has concerns about insulin. Discussed will try to get off insulin prior to discharge. He also has questions about staple removal. Discussed we could remove them or he could have them removed at his follow-up appointment. He reports he would just as soon have the NSG office remove them. He would like to go home early but his wife wants him to stay until 7/27/19.     IDT Team Meeting 7/18/2019  DC/Disposition:  7/27/19    Objective:  VITAL SIGNS: /62   Pulse 86   Temp 36.3 °C (97.4 °F) (Oral)   Resp 18   Ht 1.854 m (6' 1\")   Wt 90.7 kg (199 lb 14.4 oz)   SpO2 94%   BMI 26.37 kg/m²   Gen: NAD  Psych: Mood and affect appropriate  CV: RRR, no edema  Resp: CTAB, no upper airway sounds  Abd: NTND  Neuro: AOx4, 5/5 BUE    Recent Results (from the past 72 hour(s))   ACCU-CHEK GLUCOSE    Collection Time: 07/19/19  5:18 PM   Result Value Ref Range    Glucose - Accu-Ck 190 (H) 65 - 99 mg/dL   ACCU-CHEK GLUCOSE    Collection Time: 07/19/19  9:23 PM   Result Value Ref Range    Glucose - Accu-Ck 192 (H) 65 - 99 mg/dL   Basic Metabolic Panel    Collection Time: 07/20/19  5:12 AM   Result Value Ref Range    Sodium 133 (L) 135 - 145 mmol/L    Potassium 4.5 3.6 - 5.5 mmol/L    Chloride 99 96 - 112 mmol/L    Co2 26 20 - 33 mmol/L    Glucose 183 (H) 65 - 99 mg/dL    Bun 35 (H) 8 - 22 mg/dL    Creatinine 0.83 0.50 - 1.40 mg/dL    Calcium 8.4 (L) 8.5 - 10.5 mg/dL    Anion Gap 8.0 0.0 - 11.9   MAGNESIUM    Collection Time: 07/20/19  5:12 AM   Result Value Ref Range    Magnesium 1.7 1.5 - 2.5 mg/dL   PHOSPHORUS    Collection Time: 07/20/19  5:12 AM   Result Value Ref Range    Phosphorus 2.4 (L) 2.5 - 4.5 mg/dL   ESTIMATED GFR    Collection Time: 07/20/19  5:12 AM   Result Value " Ref Range    GFR If African American >60 >60 mL/min/1.73 m 2    GFR If Non African American >60 >60 mL/min/1.73 m 2   ACCU-CHEK GLUCOSE    Collection Time: 07/20/19  8:09 AM   Result Value Ref Range    Glucose - Accu-Ck 214 (H) 65 - 99 mg/dL   ACCU-CHEK GLUCOSE    Collection Time: 07/20/19 11:13 AM   Result Value Ref Range    Glucose - Accu-Ck 172 (H) 65 - 99 mg/dL   ACCU-CHEK GLUCOSE    Collection Time: 07/20/19  5:19 PM   Result Value Ref Range    Glucose - Accu-Ck 131 (H) 65 - 99 mg/dL   ACCU-CHEK GLUCOSE    Collection Time: 07/20/19  8:27 PM   Result Value Ref Range    Glucose - Accu-Ck 182 (H) 65 - 99 mg/dL   ACCU-CHEK GLUCOSE    Collection Time: 07/21/19  7:54 AM   Result Value Ref Range    Glucose - Accu-Ck 152 (H) 65 - 99 mg/dL   ACCU-CHEK GLUCOSE    Collection Time: 07/21/19 11:30 AM   Result Value Ref Range    Glucose - Accu-Ck 158 (H) 65 - 99 mg/dL   ACCU-CHEK GLUCOSE    Collection Time: 07/21/19  5:12 PM   Result Value Ref Range    Glucose - Accu-Ck 103 (H) 65 - 99 mg/dL   ACCU-CHEK GLUCOSE    Collection Time: 07/21/19  8:00 PM   Result Value Ref Range    Glucose - Accu-Ck 159 (H) 65 - 99 mg/dL   ACCU-CHEK GLUCOSE    Collection Time: 07/22/19  7:26 AM   Result Value Ref Range    Glucose - Accu-Ck 102 (H) 65 - 99 mg/dL   ACCU-CHEK GLUCOSE    Collection Time: 07/22/19 11:36 AM   Result Value Ref Range    Glucose - Accu-Ck 83 65 - 99 mg/dL       Current Facility-Administered Medications   Medication Frequency   • insulin regular (HUMULIN R) injection 3-14 Units 4X/DAY ACHS    And   • glucose 4 g chewable tablet 16 g Q15 MIN PRN    And   • dextrose 50% (D50W) injection 50 mL Q15 MIN PRN   • metFORMIN (GLUCOPHAGE) tablet 1,000 mg BID WITH MEALS   • phosphorus (K-PHOS-NEUTRAL, PHOSPHA 250 NEUTRAL) per tablet 1 Tab BID   • oxybutynin SR (DITROPAN-XL) tablet 5 mg Q EVENING   • vitamin D (cholecalciferol) tablet 1,000 Units DAILY   • insulin glargine (LANTUS) injection 12 Units Q EVENING   • Respiratory Care per  Protocol Continuous RT   • oxyCODONE immediate-release (ROXICODONE) tablet 5 mg Q3HRS PRN   • oxyCODONE immediate release (ROXICODONE) tablet 10 mg Q3HRS PRN   • hydrALAZINE (APRESOLINE) tablet 25 mg Q8HRS PRN   • acetaminophen (TYLENOL) tablet 650 mg Q4HRS PRN   • senna-docusate (PERICOLACE or SENOKOT S) 8.6-50 MG per tablet 2 Tab BID    And   • polyethylene glycol/lytes (MIRALAX) PACKET 1 Packet QDAY PRN    And   • magnesium hydroxide (MILK OF MAGNESIA) suspension 30 mL QDAY PRN    And   • bisacodyl (DULCOLAX) suppository 10 mg QDAY PRN   • artificial tears 1.4 % ophthalmic solution 1 Drop PRN   • benzocaine-menthol (CEPACOL) lozenge 1 Lozenge Q2HRS PRN   • mag hydrox-al hydrox-simeth (MAALOX PLUS ES or MYLANTA DS) suspension 20 mL Q2HRS PRN   • traZODone (DESYREL) tablet 50 mg QHS PRN   • ondansetron (ZOFRAN ODT) dispertab 4 mg 4X/DAY PRN    Or   • ondansetron (ZOFRAN) syringe/vial injection 4 mg 4X/DAY PRN   • sodium chloride (OCEAN) 0.65 % nasal spray 2 Spray PRN   • scopolamine (TRANSDERM-SCOP) patch 1 Patch Q72HRS PRN   • atorvastatin (LIPITOR) tablet 20 mg QHS   • metoprolol (LOPRESSOR) tablet 25 mg TWICE DAILY   • therapeutic multivitamin-minerals (THERAGRAN-M) tablet 1 Tab DAILY   • omeprazole (PRILOSEC) capsule 20 mg DAILY       Orders Placed This Encounter   Procedures   • Diet Order Diabetic     Standing Status:   Standing     Number of Occurrences:   1     Order Specific Question:   Diet:     Answer:   Diabetic [3]     Order Specific Question:   Consistency/Fluid modifications:     Answer:   Thin Liquids [3]       Assessment:  Active Hospital Problems    Diagnosis   • *Primary cerebral lymphoma (HCC)   • Intracranial mass   • Hyponatremia   • Atrial fibrillation (HCC)   • CAD (coronary artery disease)   • Hyperglycemia   • Hypertension   • Chronic back pain   • Dyslipidemia   • History of prostate cancer       Medical Decision Making and Plan:  nonTraumatic Brain injury - Patient with large right sided  B cell lymphoma s/p resection with Dr. Ruffin on 7/10/19  -Staples out 10-14 days post-op. Follow-up with Dr. Ruffin in 1-2 weeks. Follow-up with Oncology  -Steroid taper completed 7/21/19. Keppra prophylaxis completed 7/18/19  -PT and OT for mobility and ADLs  -SLP for cognition/speech     Dysphagia - Upgraded to dysphagia 2 with NTL prior to transfer.  SLP for swallow evaluation - upgraded to regular with thins. Resolved     DM - Patient on SSI on transfer.  Previously on Lantus 12 U qEvening  -Restart Lantus 12 U. Consult hospitalist, restarted on home metformin. Consider discontinue Lantus and monitor. Will discuss with hospitalist     A fib/HTN - Patient on metoprolol 25 mg BID. Previously on anticoagulation. No ASA or AC per NSG at discharge. Previously on Azilsartan 40 mg daily. Continue to monitor  -Patient with A Fib HR into 100s at time. Consult hospitalist     Hyponatremia - 129 on transfer. Continue to monitor     HLD - Patient on Atorvastatin on transfer.     Urinary Frequency - Patient with severe urge incontinence. Will start on Oxybutynin 5 mg, no improvement    GI Ppx - Patient on stool softeners while on pain medications and PPI while on steroids.      DVT Ppx - Patient is high risk for bleed s/p removal of hemorrhagic tumor. Continue to monitor ambulation.     Total time:  25 minutes.  I spent greater than 50% of the time for patient care, counseling, and coordination on this date, including unit/floor time, and face-to-face time with the patient as per interval events and assessment and plan above. Topics discussed included discharge planning, staple removal by NSG, and ongoing discussion about discharge medications.     Leticia Bella M.D.

## 2019-07-22 NOTE — THERAPY
Physical Therapy   Daily Treatment     Patient Name: Marcio More  Age:  73 y.o., Sex:  male  Medical Record #: [id number]  Today's Date: 7/22/2019     Precautions  Precautions: Fall Risk, Other (See Comments)  Comments: impaired safety, pt goes by Richie (middle name)    Subjective    Patient agreeable to PT; two guests present for most of session.     Objective       07/22/19 1031   Vitals   O2 (LPM) 0   O2 Delivery None (Room Air)   Bed Mobility    Supine to Sit Supervised   Sit to Supine Supervised   Sit to Stand Contact Guard Assist   Scooting Supervised   Rolling Supervised   PT Total Time Spent   PT Individual Total Time Spent (Mins) 60   PT Charge Group   PT Gait Training 3   PT Therapeutic Activities 1     Discussed pt's goals, treatment goals, posture, activity pacing, and home safety.    FIM Bed/Chair/Wheelchair Transfers Score: 4 - Minimal Assistance  Bed/Chair/Wheelchair Transfers Description:   (setup, gait belt, FWW, elevated HOB; SPV bed mobility)    FIM Walking Score:  4 - Minimal Assistance  Walking Description:   (FWW for 320 feet indoors and 450 feet outdoors, gait belt, CGA, cueing.  Indoors for 2 reps as far as 425 feet with gait belt, CGA, cueing.  Pt's upright posture improves with SPC usage.)    FIM Wheelchair Score:  0 - Not tested,unsafe activity  Wheelchair Description:       FIM Stairs Score:  4 - Minimal Assistance  Stairs Description:   (Min A to CGA, gait belt, SPC and 1-2 railings, usually step-to pattern especialyl when descending, significantly increased time for 1 set of 12 standard stairs)    FIM Toilet Transfer Score:  4 - Minimal Assistance  Toilet Transfer Description:  Increased time, Supervision for safety, Verbal cueing, Set-up of equipment (gait belt, FWW, setup)      Assessment    Patient has improving posture and endurance today; initiated use of SPC with ambulation and stairs; requires CGA for safety and balance at this time.    Plan    Safety, increased ambulation with FWW  or SPC, ther ex for strengthening and endurance, standing balance

## 2019-07-22 NOTE — CARE PLAN
Problem: Communication  Goal: The ability to communicate needs accurately and effectively will improve  Outcome: PROGRESSING AS EXPECTED  Pt is AOx4 and is able to communicate needs effectively with staff members. Call light is within reach and pt uses it appropriately.     Problem: Skin Integrity  Goal: Risk for impaired skin integrity will decrease  Outcome: PROGRESSING AS EXPECTED  Pt noted to have small wound on coccyx area. Mepilex applied for protection and pt educated on the importance of frequent repositioning in bed to offload pressure to the area. Pt verbalized he will try to switch from side to side while sleeping tonight. Wound consult ordered.

## 2019-07-22 NOTE — THERAPY
Speech Language Pathology  Daily Treatment     Patient Name: Marcio More  Age:  73 y.o., Sex:  male  Medical Record #: [id number]  Today's Date: 7/22/2019     Subjective  Patient resting in bed at session start.  Reports diarrhea over last several days; RN Wendy reports awareness.  Patient agreeable to work in room for access to toilet.      Objective   07/22/19 0901   Cognition   Functional Memory Activities Moderate (3)   Medication Management  Moderate (3)   Interdisciplinary Plan of Care Collaboration   IDT Collaboration with  Nursing   Patient Position at End of Therapy In Bed;Bed Alarm On;Call Light within Reach;Tray Table within Reach;Phone within Reach   Collaboration Comments With RN, Wendy, re: patient's reports of diarrhea, transition of care at session conclusion.   SLP Total Time Spent   SLP Individual Total Time Spent (Mins) 60   Charge Group   SLP Cognitive Skill Development 4     FIM Comprehension Score:  6 - Modified Independent  Comprehension Description:  Verbal cues    FIM Expression Score:  7 - Independent  Expression Description:  Verbal cueing    FIM Social Interaction Score:  7 - Independent  Social Interaction Description:       FIM Problem Solving Score:  5 - Standby Prompting/Supervision or Set-up  Problem Solving Description:  Verbal cueing, Bed/chair alarm, Increased time, Therapy schedule    FIM Memory Score:  4 - Minimal Assistance  Memory Description:  Verbal cueing, Therapy schedule, Bed/chair alarm    Assessment  Patient participated in 1:1 SLP session targeting cognitive-linguistic skills development with emphasis on prospective memory, external memory aide training, and medication management.    Patient had not attempted memory log since last SLP session; complete this date with min verbal cues for salient information.  Independently recalled 5/10 current medications either by purpose or name.  Required consistent cues to integrate new medication teaching vs referencing prior  medication regime.  Answered simple purpose and dose related medication with 75% accuracy and min verbal cues while referencing a written medication chart.      Plan  Continue functional prospective memory/recall training, medication management training.

## 2019-07-23 NOTE — CARE PLAN
Problem: Communication  Goal: The ability to communicate needs accurately and effectively will improve  Outcome: PROGRESSING AS EXPECTED  Plan of care for the day discussed this morning with pt. All questions and concerns answered at this time. Pt reports sleeping well last night. Will continue to monitor      Problem: Safety  Goal: Will remain free from injury  Outcome: PROGRESSING AS EXPECTED  Patient demonstrates good safety technique this shift.  Asks for assistance when needed and does not attempt self transfer.  Able to verbalize needs.  Will continue to monitor.

## 2019-07-23 NOTE — THERAPY
Physical Therapy   Daily Treatment     Patient Name: Marcio More  Age:  73 y.o., Sex:  male  Medical Record #: [id number]  Today's Date: 7/23/2019     Precautions  Precautions: Fall Risk, Other (See Comments)  Comments: impaired safety, pt goes by Richie (middle name)    Subjective    Patient agreeable to PT.     Objective       07/23/19 1231   Sitting Lower Body Exercises   Nustep Resistance Level 4  (23:46 active minutes in 25-minute period; BUE/BLE)   Bed Mobility    Supine to Sit Supervised   Sit to Supine Supervised   Sit to Stand Contact Guard Assist   Scooting Supervised   Rolling Supervised   Interdisciplinary Plan of Care Collaboration   IDT Collaboration with  (IDT team)   Patient Position at End of Therapy Seated;Call Light within Reach;Tray Table within Reach;Phone within Reach   Collaboration Comments roomboard updated. at IDT huddle: pt's d/c date still on 7/27/19.   PT Total Time Spent   PT Individual Total Time Spent (Mins) 60   PT Charge Group   PT Gait Training 2   PT Therapeutic Exercise 2     Discussed treatment goals, hydration, safety, call light usage, and POC.    FIM Bed/Chair/Wheelchair Transfers Score: 4 - Minimal Assistance  Bed/Chair/Wheelchair Transfers Description:   (SPC, gait belt, CGA, increased time, elevated HOB)    FIM Walking Score:  4 - Minimal Assistance  Walking Description:   (SPC, gait belt, CGA, 3 indoor reps of 230-260 feet with focus on increased B step length including with increased fatigue; wide BASSAM, bradykinetic, cueing for way finding also)      Assessment    Patient has improving endurance; reports mild LBP from prolonged sitting with NuStep so we did not focus the last 15 minutes on static standing balance today; good participation.    Plan    D/C on Saturday 7/27/19.  SPC ambulation, standing balance, ther ex for strength and endurance, decreased A with transfers, and continue education.

## 2019-07-23 NOTE — PROGRESS NOTES
Staples to right scalp removed as ordered, site well approximated, left open to air, procedure well tolerated.

## 2019-07-23 NOTE — THERAPY
Speech Language Pathology  Daily Treatment     Patient Name: Marcio More  Age:  73 y.o., Sex:  male  Medical Record #: [id number]  Today's Date: 7/23/2019     Subjective    Pt was pleasant and cooperative during this ST session      Objective       07/23/19 1401   SLP Total Time Spent   SLP Individual Total Time Spent (Mins) 60   Charge Group   SLP Cognitive Skill Development 4       Assessment    Pt willing to practice sorting medications using a pill sorter.  Pt was able to sort 5/8 medications independently with 100% accuracy and demonstrated multiple sorting errors with 3 medications (mostly double sorting).  Pt attempted to demonstrate how he would manage his medications without using pill box, but was unable to (Pt only had medications he took 1 time daily before his hospitalization and was unable to problem solve how to organize medications scheduled 2 times daily with his old system).      Plan    Continue practicing medication management sorting task

## 2019-07-23 NOTE — PROGRESS NOTES
"Rehab Progress Note     Encounter Date: 7/23/2019    CC: intracranial tumor, decreased balance     Interval Events (Subjective)  Patient sitting up in bed. He reports he is doing well. He reports he tolerated staple removal.  He reports he does not have any incision pain or headache.  Denies fever or chills. Denies NVD.  Reports his blood sugars are improving and wondering if he can discontinue finger sticks. Discussed we will continue to monitor. Patient also with questions about PET scan, discussed will need to ask his oncologist as outpatient when best timing for PET scan.     IDT Team Meeting 7/18/2019  DC/Disposition:  7/27/19    Objective:  VITAL SIGNS: /67   Pulse 94   Temp 36.5 °C (97.7 °F) (Temporal)   Resp 18   Ht 1.854 m (6' 1\")   Wt 90.7 kg (199 lb 14.4 oz)   SpO2 96%   BMI 26.37 kg/m²   Gen: NAD  Psych: Mood and affect appropriate  CV: RRR, no edema  Resp: CTAB, no upper airway sounds  Abd: NTND  Neuro: AOx4, 5/5 BUE  Unchanged from 7/22/19 except staples removed, well healing.     Recent Results (from the past 72 hour(s))   ACCU-CHEK GLUCOSE    Collection Time: 07/20/19  5:19 PM   Result Value Ref Range    Glucose - Accu-Ck 131 (H) 65 - 99 mg/dL   ACCU-CHEK GLUCOSE    Collection Time: 07/20/19  8:27 PM   Result Value Ref Range    Glucose - Accu-Ck 182 (H) 65 - 99 mg/dL   ACCU-CHEK GLUCOSE    Collection Time: 07/21/19  7:54 AM   Result Value Ref Range    Glucose - Accu-Ck 152 (H) 65 - 99 mg/dL   ACCU-CHEK GLUCOSE    Collection Time: 07/21/19 11:30 AM   Result Value Ref Range    Glucose - Accu-Ck 158 (H) 65 - 99 mg/dL   ACCU-CHEK GLUCOSE    Collection Time: 07/21/19  5:12 PM   Result Value Ref Range    Glucose - Accu-Ck 103 (H) 65 - 99 mg/dL   ACCU-CHEK GLUCOSE    Collection Time: 07/21/19  8:00 PM   Result Value Ref Range    Glucose - Accu-Ck 159 (H) 65 - 99 mg/dL   ACCU-CHEK GLUCOSE    Collection Time: 07/22/19  7:26 AM   Result Value Ref Range    Glucose - Accu-Ck 102 (H) 65 - 99 mg/dL "   ACCU-CHEK GLUCOSE    Collection Time: 07/22/19 11:36 AM   Result Value Ref Range    Glucose - Accu-Ck 83 65 - 99 mg/dL   ACCU-CHEK GLUCOSE    Collection Time: 07/22/19  5:15 PM   Result Value Ref Range    Glucose - Accu-Ck 131 (H) 65 - 99 mg/dL   ACCU-CHEK GLUCOSE    Collection Time: 07/22/19  8:18 PM   Result Value Ref Range    Glucose - Accu-Ck 159 (H) 65 - 99 mg/dL   CBC WITH DIFFERENTIAL    Collection Time: 07/23/19  6:00 AM   Result Value Ref Range    WBC 10.9 (H) 4.8 - 10.8 K/uL    RBC 4.39 (L) 4.70 - 6.10 M/uL    Hemoglobin 14.2 14.0 - 18.0 g/dL    Hematocrit 42.2 42.0 - 52.0 %    MCV 96.1 81.4 - 97.8 fL    MCH 32.3 27.0 - 33.0 pg    MCHC 33.6 (L) 33.7 - 35.3 g/dL    RDW 48.5 35.9 - 50.0 fL    Platelet Count 164 164 - 446 K/uL    MPV 9.2 9.0 - 12.9 fL    Neutrophils-Polys 72.30 (H) 44.00 - 72.00 %    Lymphocytes 11.50 (L) 22.00 - 41.00 %    Monocytes 13.10 0.00 - 13.40 %    Eosinophils 0.90 0.00 - 6.90 %    Basophils 0.20 0.00 - 1.80 %    Immature Granulocytes 2.00 (H) 0.00 - 0.90 %    Nucleated RBC 0.00 /100 WBC    Neutrophils (Absolute) 7.89 (H) 1.82 - 7.42 K/uL    Lymphs (Absolute) 1.26 1.00 - 4.80 K/uL    Monos (Absolute) 1.43 (H) 0.00 - 0.85 K/uL    Eos (Absolute) 0.10 0.00 - 0.51 K/uL    Baso (Absolute) 0.02 0.00 - 0.12 K/uL    Immature Granulocytes (abs) 0.22 (H) 0.00 - 0.11 K/uL    NRBC (Absolute) 0.00 K/uL   Basic Metabolic Panel    Collection Time: 07/23/19  6:00 AM   Result Value Ref Range    Sodium 134 (L) 135 - 145 mmol/L    Potassium 3.9 3.6 - 5.5 mmol/L    Chloride 100 96 - 112 mmol/L    Co2 25 20 - 33 mmol/L    Glucose 143 (H) 65 - 99 mg/dL    Bun 31 (H) 8 - 22 mg/dL    Creatinine 0.90 0.50 - 1.40 mg/dL    Calcium 8.3 (L) 8.5 - 10.5 mg/dL    Anion Gap 9.0 0.0 - 11.9   MAGNESIUM    Collection Time: 07/23/19  6:00 AM   Result Value Ref Range    Magnesium 1.6 1.5 - 2.5 mg/dL   PHOSPHORUS    Collection Time: 07/23/19  6:00 AM   Result Value Ref Range    Phosphorus 2.9 2.5 - 4.5 mg/dL   ESTIMATED  GFR    Collection Time: 07/23/19  6:00 AM   Result Value Ref Range    GFR If African American >60 >60 mL/min/1.73 m 2    GFR If Non African American >60 >60 mL/min/1.73 m 2   ACCU-CHEK GLUCOSE    Collection Time: 07/23/19  8:07 AM   Result Value Ref Range    Glucose - Accu-Ck 131 (H) 65 - 99 mg/dL   ACCU-CHEK GLUCOSE    Collection Time: 07/23/19 11:30 AM   Result Value Ref Range    Glucose - Accu-Ck 146 (H) 65 - 99 mg/dL       Current Facility-Administered Medications   Medication Frequency   • insulin regular (HUMULIN R) injection 3-14 Units 4X/DAY ACHS    And   • glucose 4 g chewable tablet 16 g Q15 MIN PRN    And   • dextrose 50% (D50W) injection 50 mL Q15 MIN PRN   • metFORMIN (GLUCOPHAGE) tablet 1,000 mg BID WITH MEALS   • phosphorus (K-PHOS-NEUTRAL, PHOSPHA 250 NEUTRAL) per tablet 1 Tab BID   • oxybutynin SR (DITROPAN-XL) tablet 5 mg Q EVENING   • vitamin D (cholecalciferol) tablet 1,000 Units DAILY   • insulin glargine (LANTUS) injection 12 Units Q EVENING   • Respiratory Care per Protocol Continuous RT   • oxyCODONE immediate-release (ROXICODONE) tablet 5 mg Q3HRS PRN   • oxyCODONE immediate release (ROXICODONE) tablet 10 mg Q3HRS PRN   • hydrALAZINE (APRESOLINE) tablet 25 mg Q8HRS PRN   • acetaminophen (TYLENOL) tablet 650 mg Q4HRS PRN   • senna-docusate (PERICOLACE or SENOKOT S) 8.6-50 MG per tablet 2 Tab BID    And   • polyethylene glycol/lytes (MIRALAX) PACKET 1 Packet QDAY PRN    And   • magnesium hydroxide (MILK OF MAGNESIA) suspension 30 mL QDAY PRN    And   • bisacodyl (DULCOLAX) suppository 10 mg QDAY PRN   • artificial tears 1.4 % ophthalmic solution 1 Drop PRN   • benzocaine-menthol (CEPACOL) lozenge 1 Lozenge Q2HRS PRN   • mag hydrox-al hydrox-simeth (MAALOX PLUS ES or MYLANTA DS) suspension 20 mL Q2HRS PRN   • traZODone (DESYREL) tablet 50 mg QHS PRN   • ondansetron (ZOFRAN ODT) dispertab 4 mg 4X/DAY PRN    Or   • ondansetron (ZOFRAN) syringe/vial injection 4 mg 4X/DAY PRN   • sodium chloride  (OCEAN) 0.65 % nasal spray 2 Spray PRN   • scopolamine (TRANSDERM-SCOP) patch 1 Patch Q72HRS PRN   • atorvastatin (LIPITOR) tablet 20 mg QHS   • metoprolol (LOPRESSOR) tablet 25 mg TWICE DAILY   • therapeutic multivitamin-minerals (THERAGRAN-M) tablet 1 Tab DAILY   • omeprazole (PRILOSEC) capsule 20 mg DAILY       Orders Placed This Encounter   Procedures   • Diet Order Diabetic     Standing Status:   Standing     Number of Occurrences:   1     Order Specific Question:   Diet:     Answer:   Diabetic [3]     Order Specific Question:   Consistency/Fluid modifications:     Answer:   Thin Liquids [3]       Assessment:  Active Hospital Problems    Diagnosis   • *Primary cerebral lymphoma (HCC)   • Intracranial mass   • Hyponatremia   • Atrial fibrillation (HCC)   • CAD (coronary artery disease)   • Hyperglycemia   • Hypertension   • Chronic back pain   • Dyslipidemia   • History of prostate cancer       Medical Decision Making and Plan:  nonTraumatic Brain injury - 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 Ruffin on 7/10/19  -Staples out - 7/23/19. Follow-up with Dr. Ruffin in 1-2 weeks. Follow-up with Oncology  -Steroid taper completed 7/21/19. Keppra prophylaxis completed 7/18/19  -PT and OT for mobility and ADLs  -SLP for cognition/speech     Dysphagia - Upgraded to dysphagia 2 with NTL prior to transfer.  SLP for swallow evaluation - upgraded to regular with thins. Resolved     DM - Patient on SSI on transfer.  Previously on Lantus 12 U qEvening  -Restart Lantus 12 U. Consult hospitalist, restarted on home metformin.      A fib/HTN - Patient on metoprolol 25 mg BID. Previously on anticoagulation. No ASA or AC per NSG at discharge. Previously on Azilsartan 40 mg daily. Continue to monitor  -Patient with A Fib HR into 100s at time. Consult hospitalist     Hyponatremia - 129 on transfer. Continue to monitor     HLD - Patient on Atorvastatin on transfer.     Urinary Frequency - Patient with  severe urge incontinence. Will start on Oxybutynin 5 mg, no improvement. Still having frequent low volume, increase Oxybutynin    GI Ppx - Patient on stool softeners while on pain medications and PPI while on steroids.      DVT Ppx - Patient is high risk for bleed s/p removal of hemorrhagic tumor. Continue to monitor ambulation.     Total time:  28 minutes.  I spent greater than 50% of the time for patient care, counseling, and coordination on this date, including unit/floor time, and face-to-face time with the patient as per interval events and assessment and plan above. Topics discussed included improved blood sugars, continues to have frequent urination and increase Oxybutynin to 10 mg    Leticia Bella M.D.

## 2019-07-23 NOTE — THERAPY
Occupational Therapy  Daily Treatment     Patient Name: Marcio More  Age:  73 y.o., Sex:  male  Medical Record #: [id number]  Today's Date: 2019     Precautions  Precautions: Fall Risk, Other (See Comments)  Comments: impaired safety, pt goes by Richie (middle name)         Subjective    Pt awake in bed upon arrival - requested to use the bathroom      Objective  FIM Toilet Transfer Score:  5 - Standby Prompting/Supervision or Set-up  Toilet Transfer Description:  Supervision for safety, Increased time, Grab bar (Sup/SBA for SPT )  FIM Toiletin - Standby Prompting/Supervision or Set-up  Toileting Description:  Supervision for safety, Increased time, Grab bar  FIM Lower Body Dressing Score:  5 - Standby Prompting/Supervsion or Set-up  Lower Body Dressing Description:  Shoe horn, Verbal cueing, Supervision for safety, Increased time (VQ to sit down to thread legs through pants)  FIM Upper Body Dressin - Modified Independent  Upper Body Dressing Description:  Increased time  FIM Bathing Score:  5 - Standby Prompting/Supervision or Set-up  Bathing Description:  Tub bench, Grab bar, Hand held shower, Supervision for safety, Increased time (Sup in standing for mary area )  FIM Tub/Shower Transfers Score:  5 - Standby Prompting/Supervision or Set-up  Tub/Shower Transfers Description:     FIM Grooming Score:  6 - Modified Independent  Grooming Description:  Increased time       19 0701   Precautions   Precautions Fall Risk;Other (See Comments)   Comments impaired safety, pt goes by Richie (middle name)   Vitals   O2 (LPM) 0   O2 Delivery None (Room Air)   Pain   Intervention Declines   Pain 0 - 10 Group   Location Back   Location Orientation Mid;Upper   Comfort Goal Comfort at Rest;Comfort with Movement;Perform Activity   Non Verbal Descriptors   Non Verbal Scale  Calm   Cognition    Level of Consciousness Alert   Bed Mobility    Supine to Sit Stand by Assist   Scooting Stand by Assist   Comments CGA for STS  from EOB with single point cane   Interdisciplinary Plan of Care Collaboration   Patient Position at End of Therapy Seated   Collaboration Comments Pt escorted to dining room for breakfast         Assessment    Pt alert and cooperative w/ tx session. Pt engaged in morning ADL routine w/ shower activity. Pt consistently at Sup/SBA for BADLs secondary to balance. Pt ambulated w/ single point cane via CGA w/ slight LOBs x3 - LOBs were right after pt stood up (pt ambulated around room and to dining room). Pt requires increased time for sit to stands and ambulation, but progressing as expected.     Plan    Cont to address endurance, strength, balance, functional mobility for completion for ADLs/IADLs

## 2019-07-24 PROBLEM — E83.42 HYPOMAGNESEMIA: Status: ACTIVE | Noted: 2019-01-01

## 2019-07-24 NOTE — THERAPY
Speech Language Pathology  Daily Treatment     Patient Name: Marcio More  Age:  73 y.o., Sex:  male  Medical Record #: [id number]  Today's Date: 7/24/2019     Subjective    Pt was pleasant and cooperative during this ST session      Objective       07/24/19 0901   SLP Total Time Spent   SLP Individual Total Time Spent (Mins) 60   Charge Group   SLP Cognitive Skill Development 4     Assessment    Pt completed a medication sorting task this session x2.  Pt made 2 sorting errors in his first attempt (pt sorted 1 medication in the morning that was meant for the evening and one in the evening that was meant for the morning).  On his second attempt pt was able to sort all medications independently with 100% accuracy.      Plan    Target financial tasks

## 2019-07-24 NOTE — THERAPY
Occupational Therapy  Daily Treatment     Patient Name: Marcio More  Age:  73 y.o., Sex:  male  Medical Record #: [id number]  Today's Date: 2019     Precautions  Precautions: Fall Risk, Other (See Comments)  Comments: impaired safety, pt goes by Richie (middle name)         Subjective     Objective  FIM Toilet Transfer Score:  5 - Standby Prompting/Supervision or Set-up  Toilet Transfer Description:  Supervision for safety, Increased time, Grab bar (SBA for transfer to toilet w/ cane and grab bar)  FIM Toiletin - Standby Prompting/Supervision or Set-up  Toileting Description:  Supervision for safety, Increased time, Grab bar (Sup/SBA for hygiene)       19 1031   Precautions   Precautions Fall Risk;Other (See Comments)   Comments impaired safety, pt goes by Richie (middle name)   Vitals   O2 (LPM) 0   O2 Delivery None (Room Air)   Pain   Intervention Nurse Notified   Pain 0 - 10 Group   Location Back   Comfort Goal Comfort at Rest;Perform Activity;Comfort with Movement   Therapist Pain Assessment Nurse Notified;Post Activity Pain Same as Prior to Activity   Non Verbal Descriptors   Non Verbal Scale  Calm   Cognition    Level of Consciousness Alert   Sitting Upper Body Exercises   Upper Extremity Bike Level 2 Resistance  (Standing at FluidoBike, 10 mins, 1 RB for water, 1.72km)   Balance   Comments Pt standing in // bars via CGA, pt instructed to bounce therapy ball to therapist 5ft away. Pt completed a total of 100 bounces w/ 1 RB. Pt had 10+ LOBs self corrected and therapist assist via gaitbelt for 4.    Interdisciplinary Plan of Care Collaboration   IDT Collaboration with  Family / Caregiver   Patient Position at End of Therapy Seated   Collaboration Comments Pts wife present for half of session - educated on pts balance and transfer status        Assessment    Pt alert and cooperative w/ tx session. Tx session emphasized endurance and balance. Pt stood at FluidoBike for 10 mins - demonstrated increased  endurance. Pt c/o pain in back, nursing notified for pain med to be given at lunch.  Pt requested to use bathroom during tx session and required SBA for transfer d/t balance and difficulty standing from toilet. Pt bounced therapy ball to therpaist while standing via CGA in // bars - pt had multiple LOBs and required therapist assist for 4. Pt's wife present during session and stated that his balance is about his baseline.     Plan    Cont to address endurance, strength, balance, functional mobility for ADLs/IADLs

## 2019-07-24 NOTE — PROGRESS NOTES
[facility] Medicine Daily Progress Note    Date of Service  7/23/2019    Chief Complaint:  AFib, HTN, DM    Interval History:  Pt denies new complaints but wants to know if he should get a pan-scan, will defer to Hem/Onc.    Review of Systems  Review of Systems   Constitutional: Negative for chills and fever.   HENT: Negative.    Eyes: Negative.    Respiratory: Negative for cough and shortness of breath.    Cardiovascular: Negative for chest pain and palpitations.   Gastrointestinal: Negative for abdominal pain, nausea and vomiting.   Skin: Negative for itching and rash.        Physical Exam  Temp:  [36.5 °C (97.7 °F)-36.6 °C (97.9 °F)] 36.5 °C (97.7 °F)  Pulse:  [60-98] 60  Resp:  [18] 18  BP: (109-131)/(67-87) 131/78  SpO2:  [93 %-96 %] 96 %    Physical Exam   Constitutional: He is oriented to person, place, and time. No distress.   HENT:   Right Ear: External ear normal.   Left Ear: External ear normal.   Crani incision C/D/I   Eyes: Conjunctivae and EOM are normal. Right eye exhibits no discharge. Left eye exhibits no discharge.   Neck: Normal range of motion. Neck supple. No tracheal deviation present.   Cardiovascular: Normal rate, regular rhythm, S1 normal and S2 normal.    Pulmonary/Chest: No respiratory distress. He has no wheezes. He has no rhonchi.   Decreased BS   Abdominal: Soft. Bowel sounds are normal. He exhibits no distension. There is no tenderness.   Musculoskeletal: He exhibits no edema or tenderness.   Neurological: He is alert and oriented to person, place, and time. No sensory deficit.   Skin: Skin is warm and dry. He is not diaphoretic. No cyanosis.   Vitals reviewed.      Fluids    Intake/Output Summary (Last 24 hours) at 07/23/19 1834  Last data filed at 07/23/19 1800   Gross per 24 hour   Intake             1440 ml   Output              800 ml   Net              640 ml       Laboratory  Recent Labs      07/23/19   0600   WBC  10.9*   RBC  4.39*   HEMOGLOBIN  14.2   HEMATOCRIT  42.2   MCV   96.1   MCH  32.3   MCHC  33.6*   RDW  48.5   PLATELETCT  164   MPV  9.2     Recent Labs      07/23/19   0600   SODIUM  134*   POTASSIUM  3.9   CHLORIDE  100   CO2  25   GLUCOSE  143*   BUN  31*   CREATININE  0.90   CALCIUM  8.3*                 Assessment/Plan  Intracranial mass- (present on admission)   Assessment & Plan    S/P resection  Pathology +B cell lymphoma  Pending outpt chemo     Azotemia- (present on admission)   Assessment & Plan    Encourage PO fluids     Type 2 diabetes mellitus without complication, without long-term current use of insulin (HCC)- (present on admission)   Assessment & Plan    HbA1c 10.0  On Lantus and Metformin  Observe serum glucose trends on SSI     CAD (coronary artery disease)- (present on admission)   Assessment & Plan    H/O stent x 3  On Lipitor and Metoprolol  Consider ASA when cleared by Neurosurgery  Consider ACE-I if blood pressure and renal function tolerates     Hypertension- (present on admission)   Assessment & Plan    Observe blood pressure trends on Metoprolol     Atrial fibrillation (HCC)- (present on admission)   Assessment & Plan    Rate controlled on Metoprolol     Hypophosphatemia   Assessment & Plan    Increase K-Phos supplements  Start MgOx for borderline hypomagnesemia     Leukocytosis   Assessment & Plan    2/2 steroids  WBC now resolved     Vitamin D insufficiency   Assessment & Plan    Vit D level 27  On supplementation     History of prostate cancer- (present on admission)   Assessment & Plan    S/P brachytherapy     Full Code

## 2019-07-24 NOTE — DISCHARGE PLANNING
Outpatient therapy referral sent to [facility] per choice form.  Awaiting response.

## 2019-07-24 NOTE — DISCHARGE PLANNING
Reviewed cardiac meds w/ Dr. Bella on Monday: home meds include Edarbi (azilsartan medoxomil 40mg daily), Bystolic 5mg daily & Pradaxa. Hospital med: metoprolol 25mg BID.    Met w/ patient & wife to discuss DCP in process. Patient requests to establish w/ [facility] PCP for after care. Discussed outpatient physical therapy referral. Patient requests [facility]. Working on MD f/u appts. Questions answered. Emotional support provided.

## 2019-07-24 NOTE — PROGRESS NOTES
"Rehab Progress Note     Encounter Date: 7/24/2019    CC: intracranial tumor, decreased balance     Interval Events (Subjective)  Patient sitting up in bed. He reports therapy is going very well. His wife is coming in later today. He reports no headache while in bed. Denies SOB.  Wife seen later in the day at bedside with list of questions, many are oncology related similar to yesterday. Answered to the best of my abilities but deferred timing and labs to oncology team.  Patient has NSG later today and he has questions about his precautions such as bending forward.     IDT Team Meeting 7/18/2019  DC/Disposition:  7/27/19    Objective:  VITAL SIGNS: /72   Pulse 80   Temp 36.5 °C (97.7 °F) (Temporal)   Resp 18   Ht 1.854 m (6' 1\")   Wt 90.7 kg (199 lb 14.4 oz)   SpO2 99%   BMI 26.37 kg/m²   Gen: NAD  Psych: Mood and affect appropriate  CV: RRR, no edema  Resp: CTAB, no upper airway sounds  Abd: NTND  Neuro: AOx4, following simple commands    Recent Results (from the past 72 hour(s))   ACCU-CHEK GLUCOSE    Collection Time: 07/21/19  5:12 PM   Result Value Ref Range    Glucose - Accu-Ck 103 (H) 65 - 99 mg/dL   ACCU-CHEK GLUCOSE    Collection Time: 07/21/19  8:00 PM   Result Value Ref Range    Glucose - Accu-Ck 159 (H) 65 - 99 mg/dL   ACCU-CHEK GLUCOSE    Collection Time: 07/22/19  7:26 AM   Result Value Ref Range    Glucose - Accu-Ck 102 (H) 65 - 99 mg/dL   ACCU-CHEK GLUCOSE    Collection Time: 07/22/19 11:36 AM   Result Value Ref Range    Glucose - Accu-Ck 83 65 - 99 mg/dL   ACCU-CHEK GLUCOSE    Collection Time: 07/22/19  5:15 PM   Result Value Ref Range    Glucose - Accu-Ck 131 (H) 65 - 99 mg/dL   ACCU-CHEK GLUCOSE    Collection Time: 07/22/19  8:18 PM   Result Value Ref Range    Glucose - Accu-Ck 159 (H) 65 - 99 mg/dL   CBC WITH DIFFERENTIAL    Collection Time: 07/23/19  6:00 AM   Result Value Ref Range    WBC 10.9 (H) 4.8 - 10.8 K/uL    RBC 4.39 (L) 4.70 - 6.10 M/uL    Hemoglobin 14.2 14.0 - 18.0 g/dL    " Hematocrit 42.2 42.0 - 52.0 %    MCV 96.1 81.4 - 97.8 fL    MCH 32.3 27.0 - 33.0 pg    MCHC 33.6 (L) 33.7 - 35.3 g/dL    RDW 48.5 35.9 - 50.0 fL    Platelet Count 164 164 - 446 K/uL    MPV 9.2 9.0 - 12.9 fL    Neutrophils-Polys 72.30 (H) 44.00 - 72.00 %    Lymphocytes 11.50 (L) 22.00 - 41.00 %    Monocytes 13.10 0.00 - 13.40 %    Eosinophils 0.90 0.00 - 6.90 %    Basophils 0.20 0.00 - 1.80 %    Immature Granulocytes 2.00 (H) 0.00 - 0.90 %    Nucleated RBC 0.00 /100 WBC    Neutrophils (Absolute) 7.89 (H) 1.82 - 7.42 K/uL    Lymphs (Absolute) 1.26 1.00 - 4.80 K/uL    Monos (Absolute) 1.43 (H) 0.00 - 0.85 K/uL    Eos (Absolute) 0.10 0.00 - 0.51 K/uL    Baso (Absolute) 0.02 0.00 - 0.12 K/uL    Immature Granulocytes (abs) 0.22 (H) 0.00 - 0.11 K/uL    NRBC (Absolute) 0.00 K/uL   Basic Metabolic Panel    Collection Time: 07/23/19  6:00 AM   Result Value Ref Range    Sodium 134 (L) 135 - 145 mmol/L    Potassium 3.9 3.6 - 5.5 mmol/L    Chloride 100 96 - 112 mmol/L    Co2 25 20 - 33 mmol/L    Glucose 143 (H) 65 - 99 mg/dL    Bun 31 (H) 8 - 22 mg/dL    Creatinine 0.90 0.50 - 1.40 mg/dL    Calcium 8.3 (L) 8.5 - 10.5 mg/dL    Anion Gap 9.0 0.0 - 11.9   MAGNESIUM    Collection Time: 07/23/19  6:00 AM   Result Value Ref Range    Magnesium 1.6 1.5 - 2.5 mg/dL   PHOSPHORUS    Collection Time: 07/23/19  6:00 AM   Result Value Ref Range    Phosphorus 2.9 2.5 - 4.5 mg/dL   ESTIMATED GFR    Collection Time: 07/23/19  6:00 AM   Result Value Ref Range    GFR If African American >60 >60 mL/min/1.73 m 2    GFR If Non African American >60 >60 mL/min/1.73 m 2   ACCU-CHEK GLUCOSE    Collection Time: 07/23/19  8:07 AM   Result Value Ref Range    Glucose - Accu-Ck 131 (H) 65 - 99 mg/dL   ACCU-CHEK GLUCOSE    Collection Time: 07/23/19 11:30 AM   Result Value Ref Range    Glucose - Accu-Ck 146 (H) 65 - 99 mg/dL   ACCU-CHEK GLUCOSE    Collection Time: 07/23/19  5:18 PM   Result Value Ref Range    Glucose - Accu-Ck 183 (H) 65 - 99 mg/dL   ACCU-CHEK  GLUCOSE    Collection Time: 07/23/19  8:22 PM   Result Value Ref Range    Glucose - Accu-Ck 121 (H) 65 - 99 mg/dL   ACCU-CHEK GLUCOSE    Collection Time: 07/24/19  7:51 AM   Result Value Ref Range    Glucose - Accu-Ck 134 (H) 65 - 99 mg/dL   ACCU-CHEK GLUCOSE    Collection Time: 07/24/19 11:39 AM   Result Value Ref Range    Glucose - Accu-Ck 150 (H) 65 - 99 mg/dL       Current Facility-Administered Medications   Medication Frequency   • senna-docusate (PERICOLACE or SENOKOT S) 8.6-50 MG per tablet 2 Tab BID PRN    And   • polyethylene glycol/lytes (MIRALAX) PACKET 1 Packet QDAY PRN    And   • magnesium hydroxide (MILK OF MAGNESIA) suspension 30 mL QDAY PRN    And   • bisacodyl (DULCOLAX) suppository 10 mg QDAY PRN   • oxybutynin SR (DITROPAN-XL) tablet 10 mg Q EVENING   • phosphorus (K-PHOS-NEUTRAL, PHOSPHA 250 NEUTRAL) per tablet 1 Tab TID   • magnesium oxide (MAG-OX) tablet 400 mg DAILY   • insulin regular (HUMULIN R) injection 3-14 Units 4X/DAY ACHS    And   • glucose 4 g chewable tablet 16 g Q15 MIN PRN    And   • dextrose 50% (D50W) injection 50 mL Q15 MIN PRN   • metFORMIN (GLUCOPHAGE) tablet 1,000 mg BID WITH MEALS   • vitamin D (cholecalciferol) tablet 1,000 Units DAILY   • insulin glargine (LANTUS) injection 12 Units Q EVENING   • Respiratory Care per Protocol Continuous RT   • oxyCODONE immediate-release (ROXICODONE) tablet 5 mg Q3HRS PRN   • oxyCODONE immediate release (ROXICODONE) tablet 10 mg Q3HRS PRN   • hydrALAZINE (APRESOLINE) tablet 25 mg Q8HRS PRN   • acetaminophen (TYLENOL) tablet 650 mg Q4HRS PRN   • artificial tears 1.4 % ophthalmic solution 1 Drop PRN   • benzocaine-menthol (CEPACOL) lozenge 1 Lozenge Q2HRS PRN   • mag hydrox-al hydrox-simeth (MAALOX PLUS ES or MYLANTA DS) suspension 20 mL Q2HRS PRN   • traZODone (DESYREL) tablet 50 mg QHS PRN   • ondansetron (ZOFRAN ODT) dispertab 4 mg 4X/DAY PRN    Or   • ondansetron (ZOFRAN) syringe/vial injection 4 mg 4X/DAY PRN   • sodium chloride (OCEAN)  0.65 % nasal spray 2 Spray PRN   • scopolamine (TRANSDERM-SCOP) patch 1 Patch Q72HRS PRN   • atorvastatin (LIPITOR) tablet 20 mg QHS   • metoprolol (LOPRESSOR) tablet 25 mg TWICE DAILY   • therapeutic multivitamin-minerals (THERAGRAN-M) tablet 1 Tab DAILY   • omeprazole (PRILOSEC) capsule 20 mg DAILY       Orders Placed This Encounter   Procedures   • Diet Order Diabetic     Standing Status:   Standing     Number of Occurrences:   1     Order Specific Question:   Diet:     Answer:   Diabetic [3]     Order Specific Question:   Consistency/Fluid modifications:     Answer:   Thin Liquids [3]       Assessment:  Active Hospital Problems    Diagnosis   • *Primary cerebral lymphoma (HCC)   • Intracranial mass   • Hyponatremia   • Atrial fibrillation (HCC)   • CAD (coronary artery disease)   • Hyperglycemia   • Hypertension   • Chronic back pain   • Dyslipidemia   • History of prostate cancer       Medical Decision Making and Plan:  nonTraumatic Brain injury - 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 Ruffin on 7/10/19. Steroid taper completed 7/21/19. Keppra prophylaxis completed 7/18/19. Staples out - 7/23/19.   -Follow-up with Dr. Ruffin 7/24/19 - no new recommendations. Follow-up with Oncology  -PT and OT for mobility and ADLs  -SLP for cognition/speech     Dysphagia - Upgraded to dysphagia 2 with NTL prior to transfer.  SLP for swallow evaluation - upgraded to regular with thins. Resolved     DM - Patient on SSI on transfer.  Previously on Lantus 12 U qEvening  -Restart Lantus 12 U. Consult hospitalist, restarted on home metformin.      A fib/HTN - Patient on metoprolol 25 mg BID. Previously on anticoagulation. No ASA or AC per NSG at discharge. Previously on Azilsartan 40 mg daily. Continue to monitor  -Patient with A Fib HR into 100s at time. Consult hospitalist     Hyponatremia - 129 on transfer. Continue to monitor     HLD - Patient on Atorvastatin on transfer.     Urinary Frequency -  Patient with severe urge incontinence. Will start on Oxybutynin 5 mg, no improvement. Still having frequent low volume, increase Oxybutynin. Less frequent per documentation, continue at current dose.     GI Ppx - Patient on stool softeners while on pain medications. Discontinue prilosec, off of steroids.      DVT Ppx - Patient is high risk for bleed s/p removal of hemorrhagic tumor. Continue to monitor ambulation.     Total time:  25 minutes.  I spent greater than 50% of the time for patient care, counseling, and coordination on this date, including unit/floor time, and face-to-face time with the patient as per interval events and assessment and plan above. Topics discussed included improved urination, discussed follow-up and status with wife and discharge planning for this weekend.     Leticia Bella M.D.

## 2019-07-24 NOTE — CARE PLAN
Problem: Communication  Goal: The ability to communicate needs accurately and effectively will improve  Outcome: MET Date Met: 07/24/19  Pt is able to communicate his needs effectively to staff.    Problem: Safety  Goal: Will remain free from injury  Outcome: MET Date Met: 07/24/19  Pt uses call light consistently for staff assistance. Pt has good safety awareness, no impulsivity observed.     Problem: Venous Thromboembolism (VTW)/Deep Vein Thrombosis (DVT) Prevention:  Goal: Patient will participate in Venous Thrombosis (VTE)/Deep Vein Thrombosis (DVT)Prevention Measures  Outcome: PROGRESSING AS EXPECTED  Pt wears TEDs during the daytime for DVT prophylaxis    Problem: Bowel/Gastric:  Goal: Normal bowel function is maintained or improved  Outcome: PROGRESSING SLOWER THAN EXPECTED  Pt reported having loose BMs with nighttime assessment. HS pericolace held. Later on early morning pt had large loose bowel accident just before making it to the toilet.     Problem: Pain Management  Goal: Pain level will decrease to patient's comfort goal  Outcome: PROGRESSING AS EXPECTED  Pt has been manageable with PRN Tylenol for pt throughout shift.    Problem: Urinary Elimination:  Goal: Ability to reestablish a normal urinary elimination pattern will improve  Outcome: PROGRESSING AS EXPECTED  Pt is continent of bladder, voiding via urinal with staff emptying.

## 2019-07-24 NOTE — THERAPY
"Physical Therapy   Daily Treatment     Patient Name: Marcio More  Age:  73 y.o., Sex:  male  Medical Record #: [id number]  Today's Date: 7/24/2019     Precautions  Precautions: Fall Risk, Other (See Comments)  Comments: impaired safety, pt goes by Richie (middle name)    Subjective    Patient agreeable to PT; received sitting in cafeteria with SO present.     Objective       07/24/19 1231   Vitals   O2 (LPM) 0   O2 Delivery None (Room Air)   Bed Mobility    Sit to Stand Contact Guard Assist  (from 15-19\" h today; SPC, increased time, gait belt)   Neuro-Muscular Treatments   Comments Standing balance for 1 set of 3 toe touches with significant motor apraxia and anxiety: SPC, gait belt, 8\" step, Min A, cueing  x7 min.  Second set, pt was able to complete 4 alternating toe touches in about 15 seconds following education (only mild apraxia and anxiety); same setup each set.   Interdisciplinary Plan of Care Collaboration   IDT Collaboration with  Family / Caregiver   Patient Position at End of Therapy Seated;Other (Comments)  (handoff to CNA with pt in restroom)   Collaboration Comments SO present and observing throughout session; discussed education on POC, expectations of short-term improvement vs long-term increased A; safety with guarding on stairs, ambulation, sit<>stands   PT Total Time Spent   PT Individual Total Time Spent (Mins) 60   PT Charge Group   PT Gait Training 2   PT Neuromuscular Re-Education / Balance 1   PT Therapeutic Activities 1       FIM Walking Score:  4 - Minimal Assistance  Walking Description:   (Min A once when fatigued at transition from thick grass surface to concrete; otherwise CGA with SPC and gait belt; 2x 350 feet outdoors/indoors, 1x 150 ft indoors; increased time as fatigue increases, uses wide BASSAM and decreased B step length. SO present)    FIM Stairs Score:  4 - Minimal Assistance  Stairs Description:   (CGA, gait belt, B railings, 1 set of 12 standard stairs, pt self managing SPC, " step-to pattern descending and variable pattern ascending, increased time, improving balance.  SO observing today & present for education.)      Assessment    Patient was able to initiate uneven surfaces with difficult grass surfaces and transitions to/from concrete but requires Min A at transitions; slowly improving activity tolerance; limited by balance and fatigue towards end of session.    Plan    D/C on Saturday 7/27/19.  SPC ambulation, Payan Balance Scale, ther ex for strength and endurance, decreased A with transfers, and continue education.

## 2019-07-24 NOTE — WOUND TEAM
Wound consult placed for evaluation of coccyx wound.  With patient standing in bathroom examined area noting two linear discolored areas that appear moisture related and resolving.  Improved when compared with admission photo.  Mepilex dressing replaced by staff and no other care indicated.  No follow up by wound team at this time.

## 2019-07-25 NOTE — THERAPY
Speech Language Pathology  Daily Treatment     Patient Name: Marcio More  Age:  73 y.o., Sex:  male  Medical Record #: [id number]  Today's Date: 7/25/2019     Subjective    Pt was pleasant and cooperative during this ST session      Objective       07/25/19 0831   SLP Total Time Spent   SLP Individual Total Time Spent (Mins) 60   Charge Group   SLP Cognitive Skill Development 4       FIM Eating Score:     Eating Description:       FIM Comprehension Score:  7 - Independent  Comprehension Description:       FIM Expression Score:  7 - Independent  Expression Description:       FIM Social Interaction Score:  7 - Independent  Social Interaction Description:       FIM Problem Solving Score:  6 - Modified Independent  Problem Solving Description:  Verbal cueing, Therapy schedule, Increased time    FIM Memory Score:  5 - Standby Prompting/Supervision or Set-up  Memory Description:  Verbal cueing, Therapy schedule      Assessment    Pt completed a checkbook management task independently to achieve approximately 90% accuracy.  Pt required min cues to fix 2 errors (1 was a copying error and on another pt added a transaction rather than subtracting it) to achieve 100% accuracy.      Plan    Complete outcome assessment using the [employer]

## 2019-07-25 NOTE — THERAPY
Speech Language Pathology  Daily Treatment     Patient Name: Marcio More  Age:  73 y.o., Sex:  male  Medical Record #: [id number]  Today's Date: 7/25/2019     Subjective    Patient arrived on time to speech therapy with assistance.      Objective       07/25/19 1402   Group Therapy    Group Topic Cognitive / Linguistic   Reason for Group Therapy To Increase Active Participation through Peer Pressure;To Enhance Motivation;To Validate Increased [place] with Skills;To Increase Patient Interaction during Physical Recovery;To Facilitate Goal Discussion    SLP Total Time Spent   SLP Group Total Time Spent (Mins) 45   Charge Group   SLP Treatment - Group Speech Language Treatment - Group       Assessment    Patient participated in cognitive group treatment targeting attention, mental manipulation, and problem solving.  Patient actively participated throughout session and required min verbal cues.     Plan    Continue with POC

## 2019-07-25 NOTE — REHAB-COLLABORATIVE ONGOING IDT TEAM NOTE
Weekly Interdisciplinary Team Conference Note    Weekly Interdisciplinary Team Conference # 2  Date:  7/25/2019    Clinicians present and reporting at team conference include the following:   MD: MARIA GUADALUPE Bella MD    RN:  Karin Roca RN    PT:   Efrem Shankar, JACOB, DPT  OT:  Jer Lucero MS OTR/L    ST:  Prashanth Medina MS, CCC-SLP  CM:  Becca Cobb RN, CCM  REC:  Not Applicable  RT:  Not Applicable  RPh:  Anthony Angela [facility]  All reporting clinicians have a working knowledge of this patient's plan of care.    Targeted DC Date:  7.27.19     Medical    Patient Active Problem List    Diagnosis Date Noted   • Intracranial mass 07/06/2019     Priority: High   • Myelopathy (HCC) 05/15/2016     Priority: High   • Thoracic stenosis 05/15/2016     Priority: High     Class: Acute   • Ileus (HCC) 05/15/2016     Priority: High     Class: Acute   • Hyponatremia 07/12/2019     Priority: Medium   • Atrial fibrillation (HCC) 05/15/2016     Priority: Medium     Class: Chronic   • Hypertension 05/15/2016     Priority: Medium     Class: Chronic   • CAD (coronary artery disease) 05/15/2016     Priority: Medium     Class: Chronic   • Type 2 diabetes mellitus without complication, without long-term current use of insulin (HCC) 05/15/2016     Priority: Medium     Class: Acute   • Azotemia 05/15/2016     Priority: Medium     Class: Acute   • Chronic back pain 05/15/2016     Priority: Low     Class: Chronic   • Dyslipidemia 05/15/2016     Priority: Low     Class: Chronic   • Hypomagnesemia 07/24/2019   • Hypophosphatemia 07/20/2019   • Vitamin D insufficiency 07/18/2019   • Leukocytosis 07/18/2019   • 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   • History of prostate cancer 07/06/2019     Results     Procedure Component Value Ref Range Date/Time    ACCU-CHEK GLUCOSE [[id number]]  (Abnormal) Collected:  07/25/19 0739    Order Status:  Completed Lab Status:  Final result Updated:  07/25/19 0811     Glucose - Accu-Ck 114 (H) 65 - 99  mg/dL     ACCU-CHEK GLUCOSE [[id number]]  (Abnormal) Collected:  07/24/19 2118    Order Status:  Completed Lab Status:  Final result Updated:  07/24/19 2133     Glucose - Accu-Ck 137 (H) 65 - 99 mg/dL     ACCU-CHEK GLUCOSE [[id number]]  (Abnormal) Collected:  07/24/19 1715    Order Status:  Completed Lab Status:  Final result Updated:  07/24/19 2007     Glucose - Accu-Ck 135 (H) 65 - 99 mg/dL     ACCU-CHEK GLUCOSE [[id number]]  (Abnormal) Collected:  07/24/19 1139    Order Status:  Completed Lab Status:  Final result Updated:  07/24/19 1153     Glucose - Accu-Ck 150 (H) 65 - 99 mg/dL            Nursing  Diet/Nutrition:  Diabetic and Thin Liquids  Medication Administration:  Whole with Liquid Wash  % consumed at meals in last 24 hours:  Consumed 75%-100% of meals per documentation.  Meal/Snack Consumption for the past 24 hrs:   Oral Nutrition   07/24/19 1800 Dinner;Between % Consumed   07/24/19 1211 Lunch;Between % Consumed   07/24/19 0815 Breakfast;Between % Consumed     Snack schedule:  None  Appetite:  Good  Fluid Intake/Output in past 24 hours: In: 1440 [P.O.:1440]  Out: 550   Admit Weight:  Weight: 94.1 kg (207 lb 8 oz)  Weight Last 7 Days   [90.7 kg (199 lb 14.4 oz)] 90.7 kg (199 lb 14.4 oz) (07/21 0800)    Pain Issues:    Location:  Back (07/24 1031)  --         Severity:  Moderate   Scheduled pain medications:  None     PRN pain medications used in last 24 hours:  acetaminophen (TYLENOL)    Non Pharmacologic Interventions:  emotional support, repositioned and rest  Effectiveness of pain management plan:  good=patient states acceptable comfort after interventions    Bowel:    Bowel Assist Initial Score:  4 - Minimal Assistance  Bowel Assist Current Score:  5 - Standby Prompting/Supervision or Set-up  Bowl Accidents in last 7 days:  1  Last bowel movement: 07/24/19  Stool Description: Large, Watery, Yellow     Usual bowel pattern:  every other day  Scheduled bowel medications:  None  PRN bowel  medications used in last 24 hours:  None  Nursing Interventions:  Increased time, Scheduled medication, Supervision, Verbal cueing  Effectiveness of bowel program:   fair=sometimes needs prn bowel meds for constipation  Bladder:    Bladder Assist Initial Score:  1 - Total Assistance  Bladder Assist Current Score:  5 - Standby Prompting/Supervision or Set-up  Bladder Accidents in last 7 days:  0  PVR range for past 24-48 hours: -- ()  Intermittent Catheterization:    Medications affecting bladder:  Ditropan    Interventions:  Increased time, Supervision    Diabetes:  Blood Sugar Frequency:  Before meals and at bedtime    Range of BS for last 48 hours:   Recent Labs      07/23/19   0807  07/23/19   1130  07/23/19   1718  07/23/19   2022  07/24/19   0751  07/24/19   1139  07/24/19   1715  07/24/19   2118   POCGLUCOSE  131*  146*  183*  121*  134*  150*  135*  137*     Scheduled diabetic medications:  metformin (GLUCOPHAGE)  and insulin glargine (LANTUS) injection   Sliding scale usage in past 24 hours:  No  Total Short acting insulin in the past 24 hours:  None  Total Long acting insulin in the past 24 hours:  Lantus 12 units    Wound:     Active Current Wounds     Name: Placement date: Placement time: Site: Days:    Wound 07/21/19 Coccyx 07/21/19 2200      3                   Interventions:  Monitor and assess  Effectiveness of intervention:  wound is unchanged     Sleep/wake cycle:   Average hours slept:  Sleeps 4-6 hours without waking  Sleep medication usage:  None    Patient/Family Training/Education:  Bladder Management/Training, Bowel Management/Training, Diabetes Management, Diet/Nutrition, Fall Prevention, General Self Care, Medication Management, Pain Management, Respiratory Hygiene, Safety and Skin Care  Discharge Supply Recommendations:  Glucometer and Strips  Strengths: Alert and oriented, Able to follow instructions, Supportive family and Manages pain appropriately   Barriers:   Fatigue, Generalized  weakness and Limited mobility            Nursing Problems           Problem: Bowel/Gastric:     Goal: Normal bowel function is maintained or improved           Goal: Will not experience complications related to bowel motility             Problem: Discharge Barriers/Planning     Goal: Patient's continuum of care needs will be met             Problem: GLYCEMIA IMBALANCE     Goal: Clinical indication of glycemia balance is achieved             Problem: Infection     Goal: Will remain free from infection             Problem: Knowledge Deficit     Goal: Knowledge of disease process/condition, treatment plan, diagnostic tests, and medications will improve           Goal: Knowledge of the prescribed therapeutic regimen will improve             Problem: Pain Management     Goal: Pain level will decrease to patient's comfort goal             Problem: Respiratory:     Goal: Respiratory status will improve             Problem: Safety     Goal: Will remain free from injury (Resolved)           Goal: Will remain free from falls             Problem: Urinary Elimination:     Goal: Ability to reestablish a normal urinary elimination pattern will improve             Problem: Venous Thromboembolism (VTW)/Deep Vein Thrombosis (DVT) Prevention:     Goal: Patient will participate in Venous Thrombosis (VTE)/Deep Vein Thrombosis (DVT)Prevention Measures     Flowsheet:     Taken at 07/20/19 1030    Mechanical Prophylaxis  SHAMA Hose (Graduated Compression Stockings) by Lisha Trujillo R.N.    SHAMA Hose (Graduated Compression Stockings) On by Lisha Trujillo R.N.    Pharmacologic Prophylaxis Used Contraindicated per MD by Lisha Trujillo R.N.    Risk Assessment Score 2 (calculated) by Lisha Trujillo R.N.    VTE RISK Moderate (calculated) by Lisha Trujillo R.N.                       Long Term Goals:   At discharge patient will be able to function safely at home and in the community with support.    Section completed by:  Feli [place]  JACKSON Yepez              Mobility  Bed mobility:   SPV  Bed /Chair/Wheelchair Transfer Initial:  4 - Minimal Assistance  Bed /Chair/Wheelchair Transfer Current:  4 - Minimal Assistance   Bed/Chair/Wheelchair Transfer Description:   (SPC, gait belt, CGA, increased time, elevated HOB)  Walk Initial:  4 - Minimal Assistance  Walk Current:  4 - Minimal Assistance   Walk Description:   (Min A once when fatigued at transition from thick grass surface to concrete; otherwise CGA with SPC and gait belt; 2x 350 feet outdoors/indoors, 1x 150 ft indoors; increased time as fatigue increases, uses wide BASSAM and decreased B step length. SO present)  Wheelchair Initial:  0 - Not tested,unsafe activity  Wheelchair Current:  0 - Not tested,unsafe activity   Wheelchair Description:     Stairs Initial:  2 - Max Assistance  Stairs Current: 4 - Minimal Assistance   Stairs Description:  (CGA, gait belt, B railings, 1 set of 12 standard stairs, pt self managing SPC, step-to pattern descending and variable pattern ascending, increased time, improving balance.  SO observing today & present for education.)  Patient/Family Training/Education:  Ongoing patient education; SO observed pt's mobility yesterday  DME/DC Recommendations:  CGA from SO or family, pt has SPC, Home health PT initially  Strengths:  Able to follow instructions, Alert and oriented, Effective communication skills, Independent PLOF, Making steady progress towards goals, Motivated for self care and independence, Pleasant and cooperative, Supportive family and Willingly participates in therapeutic activities  Barriers:   Decreased endurance, Generalized weakness, Home accessibility, Poor balance and Poor carryover of learning  # of short term goals set= 3  # of short term goals met=2       Physical Therapy Problems           Problem: Balance     Dates: Start: 07/17/19       Goal: STG-Within one week, patient will     Dates: Start: 07/17/19   Expected End: 07/24/19        Description: 1) Individualized goal:  Score >35/56 on the Payan balance assessment  2) Interventions:  PT Group Therapy, PT Gait Training, PT Self Care/Home Eval, PT Therapeutic Exercises, PT Neuro Re-Ed/Balance, PT Therapeutic Activity and PT Evaluation       Note:     Goal Note filed on 07/25/19 0840 by Son Shankar, PT    Goal: STG-Within one week, patient will  Outcome: NOT MET  Patient uses SPC for ambulation; will perform Payan today or tomorrow.                  Problem: Mobility     Dates: Start: 07/17/19       Goal: STG-Within one week, patient will ambulate community distances     Dates: Start: 07/25/19       Description: 1) Individualized goal: >800' with SPC during 6 Minute walk test and SPV to return to PLOF  2) Interventions: PT Group Therapy, PT Gait Training, PT Self Care/Home Eval, PT Therapeutic Exercises, PT Neuro Re-Ed/Balance, PT Therapeutic Activity and PT Evaluation                Goal: STG-Within one week, patient will ascend and descend four to six stairs     Dates: Start: 07/25/19       Description: 1) Individualized goal: Ascend/descend 3 stairs with B HRs and SPV to safely enter/exit his home  2) Interventions:PT Group Therapy, PT Gait Training, PT Self Care/Home Eval, PT Therapeutic Exercises, PT Neuro Re-Ed/Balance, PT Therapeutic Activity and PT Evaluation                Problem: Mobility Transfers     Dates: Start: 07/17/19       Goal: STG-Within one week, patient will transfer bed to chair     Dates: Start: 07/25/19       Description: 1) Individualized goal: SPV with SPC  2) Interventions: PT Group Therapy, PT Gait Training, PT Self Care/Home Eval, PT Therapeutic Exercises, PT Neuro Re-Ed/Balance, PT Therapeutic Activity and PT Evaluation                Goal: STG-Within one week, patient will transfer in/out of car     Dates: Start: 07/25/19       Description: 1) Individualized goal: SPV in/out of PT cruiser to return home safely with assist of family  2) Interventions: PT  Group Therapy, PT Gait Training, PT Self Care/Home Eval, PT Therapeutic Exercises, PT Neuro Re-Ed/Balance, PT Therapeutic Activity and PT Evaluation                  Problem: PT-Long Term Goals     Dates: Start: 07/17/19       Goal: LTG-By discharge, patient will maintain balance     Dates: Start: 07/17/19   Expected End: 07/31/19       Description: 1) Individualized goal:  >45/56 on the Payan balance assessment indicating low fall risk  2) Interventions:  PT Group Therapy, PT Gait Training, PT Self Care/Home Eval, PT Therapeutic Exercises, PT Neuro Re-Ed/Balance, PT Therapeutic Activity and PT Evaluation               Goal: LTG-By discharge, patient will ambulate     Dates: Start: 07/17/19   Expected End: 07/31/19       Description: 1) Individualized goal:  >1000' with SPC during 6 Minute walk test and SPV to return to PLOF  2) Interventions:  PT Group Therapy, PT Gait Training, PT Self Care/Home Eval, PT Therapeutic Exercises, PT Neuro Re-Ed/Balance, PT Therapeutic Activity and PT Evaluation               Goal: LTG-By discharge, patient will transfer one surface to another     Dates: Start: 07/17/19   Expected End: 07/31/19       Description: 1) Individualized goal:  Mod I  2) Interventions:  PT Group Therapy, PT Gait Training, PT Self Care/Home Eval, PT Therapeutic Exercises, PT Neuro Re-Ed/Balance, PT Therapeutic Activity and PT Evaluation               Goal: LTG-By discharge, patient will transfer in/out of a car     Dates: Start: 07/17/19   Expected End: 07/31/19       Description: 1) Individualized goal:  SPV in/out of PT cruiser to return home safely with assist of family  2) Interventions:  PT Group Therapy, PT Gait Training, PT Self Care/Home Eval, PT Therapeutic Exercises, PT Neuro Re-Ed/Balance, PT Therapeutic Activity and PT Evaluation               Goal: LTG-By discharge, patient will     Dates: Start: 07/17/19   Expected End: 07/31/19       Description: 1) Individualized goal:  Ascend/descend 3 stairs  with B HRs and SPV to safely enter/exit his home  2) Interventions:PT Group Therapy, PT Gait Training, PT Self Care/Home Eval, PT Therapeutic Exercises, PT Neuro Re-Ed/Balance, PT Therapeutic Activity and PT Evaluation                     Section completed by:  Son Shankar, PT       Activities of Daily Living  Eating Initial:  5 - Standby Prompting/Supervision or Set-up  Eating Current:  6 - Modified Independent   Eating Description:  Increased time  Grooming Initial:  4 - Minimal Assistance  Grooming Current:  6 - Modified Independent   Grooming Description:  Increased time  Bathing Initial:  4 - Minimal Assistance  Bathing Current:  5 - Standby Prompting/Supervision or Set-up   Bathing Description:  Tub bench, Grab bar, Hand held shower, Supervision for safety, Increased time (Sup in standing for mary area )  Upper Body Dressing Initial:  5 - Standby Prompting/Supervision or Set-up  Upper Body Dressing Current:  6 - Modified Independent   Upper Body Dressing Description:  Increased time  Lower Body Dressing Initial:  2 - Max Assistance  Lower Body Dressing Current:  5 - Standby Prompting/Supervsion or Set-up   Lower Body Dressing Description:  5 - Standby Prompting/Supervsion or Set-up  Toileting Initial:  4 - Minimal Assistance  Toileting Current:  5 - Standby Prompting/Supervision or Set-up   Toileting Description:  Supervision for safety, Increased time, Grab bar (Sup/SBA for hygiene)  Toilet Transfer Initial:  4 - Minimal Assistance  Toilet Transfer Current:  5 - Standby Prompting/Supervision or Set-up   Toilet Transfer Description:  5 - Standby Prompting/Supervision or Set-up  Tub / Shower Transfer Initial:  4 - Minimal Assistance  Tub / Shower Transfer Current:  5 - Standby Prompting/Supervision or Set-up   Tub / Shower Transfer Description:  Supervision for safety, Increased time, Grab bar (Sup/SBA for SPT)  IADL:  TBD   Family Training/Education:  TBD  DME/DC Recommendations:  TBD    Strengths:   Independent PLOF, Motivated for self care and independence, Pleasant and cooperative and Willingly participates in therapeutic activities  Barriers:  Decreased endurance, Generalized weakness, Limited mobility, Poor activity tolerance and Poor balance     # of short term goals set= 4   # of short term goals met= 1       Occupational Therapy Goals           Problem: Bathing     Dates: Start: 07/16/19       Goal: STG-Within one week, patient will bathe     Dates: Start: 07/18/19       Description: 1) Individualized Goal:  Mod I w/ AE/DME PRN  2) Interventions:  OT Self Care/ADL, OT Neuro Re-Ed/Balance, OT Therapeutic Activity, OT Evaluation and OT Therapeutic Exercise       Note:     Goal Note filed on 07/25/19 1202 by Nara Garrett, Student    Goal: STG-Within one week, patient will bathe  Outcome: NOT MET  Sup level for bathing                  Problem: Functional Transfers     Dates: Start: 07/16/19       Goal: STG-Within one week, patient will transfer to toilet     Dates: Start: 07/18/19       Description: 1) Individualized Goal: Mod I w/ AE/DME PRN  2) Interventions:  OT Self Care/ADL, OT Neuro Re-Ed/Balance, OT Therapeutic Activity, OT Evaluation and OT Therapeutic Exercise       Note:     Goal Note filed on 07/25/19 1202 by Nara Garrett, Student    Goal: STG-Within one week, patient will transfer to toilet  Outcome: NOT MET  Sup level                  Problem: OT Long Term Goals     Dates: Start: 07/16/19       Goal: LTG-By discharge, patient will complete basic self care tasks     Dates: Start: 07/16/19       Description: 1) Individualized Goal:  Mod I with AE/AD/DME prn.  2) Interventions:  OT Group Therapy, OT Self Care/ADL, OT Community Reintegration, OT Manual Ther Technique, OT Neuro Re-Ed/Balance, OT Therapeutic Activity, OT Evaluation and OT Therapeutic Exercise           Goal: LTG-By discharge, patient will perform bathroom transfers     Dates: Start: 07/16/19       Description: 1) Individualized  Goal:  Mod I with AD/DME prn.  2) Interventions:  OT Group Therapy, OT Self Care/ADL, OT Community Reintegration, OT Manual Ther Technique, OT Neuro Re-Ed/Balance, OT Therapeutic Activity, OT Evaluation and OT Therapeutic Exercise             Goal: LTG-By discharge, patient will complete basic home management     Dates: Start: 07/16/19       Description: 1) Individualized Goal:  For tasks necessary at d/c at supervision to Mod I level wiith AE/AD/DME prn.  2) Interventions:  OT Group Therapy, OT Self Care/ADL, OT Community Reintegration, OT Manual Ther Technique, OT Neuro Re-Ed/Balance, OT Therapeutic Activity, OT Evaluation and OT Therapeutic Exercise             Problem: Toileting     Dates: Start: 07/16/19       Goal: STG-Within one week, patient will complete toileting tasks     Dates: Start: 07/18/19       Description: 1) Individualized Goal: Mod I for toileting tasks including hygiene  2) Interventions:   OT Self Care/ADL, OT Neuro Re-Ed/Balance, OT Therapeutic Activity, OT Evaluation and OT Therapeutic Exercise       Note:     Goal Note filed on 07/25/19 1202 by Nara Garrett, Student    Goal: STG-Within one week, patient will complete toileting tasks  Outcome: NOT MET  Sup level                       Section completed by:  Nara Garrett, Student       Cognitive Linquistic Functions  Comprehension Initial:  6 - Modified Independent  Comprehension Current:  7 - Independent   Comprehension Description:  Verbal cues  Expression Initial:  7 - Independent  Expression Current:  7 - Independent   Expression Description:  Verbal cueing  Social Interaction Initial:  7 - Independent  Social Interaction Current:  7 - Independent   Social Interaction Description:     Problem Solving Initial:  4 - Minimal Assistance  Problem Solving Current:  6 - Modified Independent   Problem Solving Description:  Verbal cueing, Therapy schedule, Increased time  Memory Initial:  3 - Moderate Assistance  Memory Current:  5 - Standby  Prompting/Supervision or Set-up   Memory Description:  Verbal cueing, Therapy schedule  Executive Functioning / Organization Initial:  Minimal (4)  Executive Functioning / Organization Current:  Minimal (4)   Executive Functioning Desciption:  Verbal cues, extra time   Swallowing  Swallowing Status:  Not following for dysphagia management   Orders Placed This Encounter   Procedures   • Diet Order Diabetic     Standing Status:   Standing     Number of Occurrences:   1     Order Specific Question:   Diet:     Answer:   Diabetic [3]     Order Specific Question:   Consistency/Fluid modifications:     Answer:   Thin Liquids [3]     Behavior Modification Plan  Analyze tasks (break down into smaller steps)  Assistive Technology  Memory aides: and Low tech: Calendar, planner, schedule, alarms/timers, pill organizer, post-it notes, lists  Family Training/Education:  Not completed   DC Recommendations:   Outpatient ST   Strengths:  Able to follow instructions, Effective communication skills, Making steady progress towards goals, Motivated for self care and independence, Pleasant and cooperative, Supportive family and Willingly participates in therapeutic activities  Barriers:  Poor carryover of learning, complex attention tasks   # of short term goals set=2  # of short term goals met=1       Speech Therapy Problems           Problem: Memory STGs     Dates: Start: 07/16/19       Goal: STG-Within one week, patient will     Dates: Start: 07/16/19       Description: 1) Individualized goal:  Recall daily therapy sessions using external memory aids (memory notebook) given min A in 3/3 opportunities.    2) Interventions:  SLP Speech Language Treatment, SLP Self Care / ADL Training , SLP Cognitive Skill Development and SLP Group Treatment       Note:     Goal Note filed on 07/25/19 0803 by Prashanth Medina MS,CCC-SLP    Goal: STG-Within one week, patient will  Outcome: NOT MET  Mod A required for pt to implement memory notebook                    Problem: Problem Solving STGs     Dates: Start: 07/16/19       Goal: STG-Within one week, patient will     Dates: Start: 07/25/19       Description: 1) Individualized goal:  Complete financial management tasks given spv to achieve 80% accuracy or greater   2) Interventions:  SLP Speech Language Treatment, SLP Cognitive Skill Development and SLP Group Treatment               Problem: Speech/Swallowing LTGs     Dates: Start: 07/16/19       Goal: LTG-By discharge, patient will safely swallow     Dates: Start: 07/16/19       Description: 1) Individualized goal:  The least restrictive diet for safe intake of daily meals   2) Interventions:  SLP Swallowing Dysfunction Treatment, SLP Oral Pharyngeal Evaluation, SLP Video Swallow Evaluation and SLP Group Treatment             Goal: LTG-By discharge, patient will solve complex problem     Dates: Start: 07/16/19       Description: 1) Individualized goal:  With modified independence for a safe discharge home   2) Interventions:  SLP Speech Language Treatment, SLP Self Care / ADL Training , SLP Cognitive Skill Development and SLP Group Treatment                    Section completed by:  Prashanth Medina MS,CCC-SLP          REHAB-Pharmacy IDT Team Note by Pascual Israel RPH at 7/24/2019  5:06 PM  Version 1 of 1    Author:  Pascual Israel RPH Service:  (none) Author Type:  Pharmacist    Filed:  7/24/2019  5:07 PM Date of Service:  7/24/2019  5:06 PM Status:  Signed    :  Pascual Israel RPH (Pharmacist)         Pharmacy   Pharmacy  Antibiotics/Antifungals/Antivirals:  Medication:      Active Orders     None        Route:         None  Stop Date:  N/A  Reason:   Antihypertensives/Cardiac:  Medication:    Orders (72h ago through future)    Start     Ordered    07/16/19 2100  atorvastatin (LIPITOR) tablet 20 mg  EVERY BEDTIME      07/16/19 1036    07/16/19 1800  metoprolol (LOPRESSOR) tablet 25 mg  TWICE DAILY     Comments:  This medication was  auto-substituted for Nebivolol per P&T Protocol    07/16/19 1036    07/16/19 1037  hydrALAZINE (APRESOLINE) tablet 25 mg  EVERY 8 HOURS PRN      07/16/19 1037        Patient Vitals for the past 24 hrs:   BP Pulse   07/24/19 0700 114/72 80   07/24/19 0615 116/76 86   07/23/19 1910 121/77 99   07/23/19 1734 131/78 60       Anticoagulation:  Medication:  Not applicable  INR:      Other key medications:      A review of the medication list reveals no issues at this time. Patient is currently on antihypertensive(s). Recommend home blood pressure monitoring/recording if antihypertensive(s) regimen(s) continue. Patient is currently on diabetic medication(s) and/or Insulin(s). Recommend home blood glucose monitoring/recording if these regimen(s) continue.    Section completed by:  Pascual Israel RPH[WR.1]        Attribution Abraham     WR.1 - Pascual Israel RPH on 7/24/2019  5:06 PM                      DC Planning    DC destination/dispostion:  Home w/ supportive family to [place].    Referrals: OP therapy.    DC Needs: MD f/u appts. Establish new PCP. OP therapy.    Barriers to discharge: Functional mobility deficits.    Strengths: Motivation. Supportive family.    Section completed by:  Becca Cobb R.N.      Physician Summary  MARIA GUADALUPE Bella MD  participated and led team conference discussion.

## 2019-07-25 NOTE — THERAPY
Occupational Therapy  Daily Treatment     Patient Name: Marcio More  Age:  73 y.o., Sex:  male  Medical Record #: [id number]  Today's Date: 2019     Precautions  Precautions: Fall Risk  Comments: impaired safety, pt goes by Richie (middle name)         Subjective     Objective  FIM Toilet Transfer Score:  5 - Standby Prompting/Supervision or Set-up  Toilet Transfer Description:  Supervision for safety, Increased time, Grab bar  FIM Toiletin - Standby Prompting/Supervision or Set-up  Toileting Description:  Supervision for safety, Increased time, Grab bar       19 1231   Precautions   Precautions Fall Risk   Comments impaired safety, pt goes by Richie (middle name)   Vitals   O2 (LPM) 0   O2 Delivery None (Room Air)   Pain 0 - 10 Group   Location Back   Location Orientation Mid;Lower   Pain Rating Scale (NPRS) 3   Comfort Goal Comfort at Rest;Perform Activity;Comfort with Movement   Therapist Pain Assessment Post Activity Pain Same as Prior to Activity   Non Verbal Descriptors   Non Verbal Scale  Calm   Cognition    Level of Consciousness Alert   Sitting Upper Body Exercises   Upper Extremity Bike Level 2 Resistance  (Standing at FluidoBike, 1 RB, 15 mins, 2.014km)   Sitting Lower Body Exercises   Sit to Stand Other Resistance (See Comments)  (2 sets of 5 via CGA)   Balance   Comments Bean Bag Toss - pt in // bars via CGA, instructed to ambulate to other end of bars and gather one bean bag, walk back and toss at target. Pt required to walk over 3 bolsters each way. Pt completed 10xs. 10x20=200ft ambulated. 10x6=60xs over bolsters. No LOB, no RB.    Interdisciplinary Plan of Care Collaboration   Patient Position at End of Therapy Seated;Call Light within Reach;Tray Table within Reach;Phone within Reach       Assessment    Pt alert and cooperative w/ tx session. Pt engaged in exercise and balance activities to assist w/ future functional mobility/transfers. During FluidoBike, pt c/o mid-low back pain 3/10 -  required 1 seated RB for 15 mins. Pt practiced 2 sets of 5 sit to stands from the therapy mat - pt only required CGA w/o using single point cane - only required VQs to stand straight. Pt demonstrated increased balance during // bar activity - no LOB and no RB. At end of session pt was seated in chair - pt required max A to stand secondary to chair being low and little surface area to place his hands.     Plan    DC FIMs to be conducted tomorrow morning. Pt to DC on Saturday.

## 2019-07-25 NOTE — CARE PLAN
Problem: Safety  Goal: Will remain free from falls  Outcome: PROGRESSING AS EXPECTED  Pt uses call light consistently and appropriately. Waits for assistance does not attempt self transfer this shift. Able to verbalize needs. Pt has been ambulating with staff using cane. Pt aware of physical limitations      Problem: Bowel/Gastric:  Goal: Normal bowel function is maintained or improved  Outcome: PROGRESSING AS EXPECTED  Pt has been having loose BMs the last few days but has resolved today

## 2019-07-25 NOTE — REHAB-PHARMACY IDT TEAM NOTE
Pharmacy   Pharmacy  Antibiotics/Antifungals/Antivirals:  Medication:      Active Orders     None        Route:         None  Stop Date:  N/A  Reason:   Antihypertensives/Cardiac:  Medication:    Orders (72h ago through future)    Start     Ordered    07/16/19 2100  atorvastatin (LIPITOR) tablet 20 mg  EVERY BEDTIME      07/16/19 1036    07/16/19 1800  metoprolol (LOPRESSOR) tablet 25 mg  TWICE DAILY     Comments:  This medication was auto-substituted for Nebivolol per P&T Protocol    07/16/19 1036    07/16/19 1037  hydrALAZINE (APRESOLINE) tablet 25 mg  EVERY 8 HOURS PRN      07/16/19 1037        Patient Vitals for the past 24 hrs:   BP Pulse   07/24/19 0700 114/72 80   07/24/19 0615 116/76 86   07/23/19 1910 121/77 99   07/23/19 1734 131/78 60       Anticoagulation:  Medication:  Not applicable  INR:      Other key medications:      A review of the medication list reveals no issues at this time. Patient is currently on antihypertensive(s). Recommend home blood pressure monitoring/recording if antihypertensive(s) regimen(s) continue. Patient is currently on diabetic medication(s) and/or Insulin(s). Recommend home blood glucose monitoring/recording if these regimen(s) continue.    Section completed by:  Pascual Israel RPH

## 2019-07-25 NOTE — PROGRESS NOTES
DATE OF SERVICE:  07/23/2019    Patient is doing fairly well at followup.  He was spoken to at length about   his discharge plans that included questions he wants to raise with his   oncologist as he anticipates undergoing treatment for his cancer.       ____________________________________     KOMAL WARNER, PHD    ILA / KATIE    DD:  07/25/2019 11:04:23  DT:  07/25/2019 15:58:10    D#:  [id number]  Job#:  [id number]

## 2019-07-25 NOTE — REHAB-NURSING IDT TEAM NOTE
Nursing   Nursing  Diet/Nutrition:  Diabetic and Thin Liquids  Medication Administration:  Whole with Liquid Wash  % consumed at meals in last 24 hours:  Consumed 75%-100% of meals per documentation.  Meal/Snack Consumption for the past 24 hrs:   Oral Nutrition   07/24/19 1800 Dinner;Between % Consumed   07/24/19 1211 Lunch;Between % Consumed   07/24/19 0815 Breakfast;Between % Consumed     Snack schedule:  None  Appetite:  Good  Fluid Intake/Output in past 24 hours: In: 1440 [P.O.:1440]  Out: 550   Admit Weight:  Weight: 94.1 kg (207 lb 8 oz)  Weight Last 7 Days   [90.7 kg (199 lb 14.4 oz)] 90.7 kg (199 lb 14.4 oz) (07/21 0800)    Pain Issues:    Location:  Back (07/24 1031)  --         Severity:  Moderate   Scheduled pain medications:  None     PRN pain medications used in last 24 hours:  acetaminophen (TYLENOL)    Non Pharmacologic Interventions:  emotional support, repositioned and rest  Effectiveness of pain management plan:  good=patient states acceptable comfort after interventions    Bowel:    Bowel Assist Initial Score:  4 - Minimal Assistance  Bowel Assist Current Score:  5 - Standby Prompting/Supervision or Set-up  Bowl Accidents in last 7 days:  1  Last bowel movement: 07/24/19  Stool Description: Large, Watery, Yellow     Usual bowel pattern:  every other day  Scheduled bowel medications:  None  PRN bowel medications used in last 24 hours:  None  Nursing Interventions:  Increased time, Scheduled medication, Supervision, Verbal cueing  Effectiveness of bowel program:   fair=sometimes needs prn bowel meds for constipation  Bladder:    Bladder Assist Initial Score:  1 - Total Assistance  Bladder Assist Current Score:  5 - Standby Prompting/Supervision or Set-up  Bladder Accidents in last 7 days:  0  PVR range for past 24-48 hours: -- ()  Intermittent Catheterization:    Medications affecting bladder:  Ditropan    Interventions:  Increased time, Supervision    Diabetes:  Blood Sugar  Frequency:  Before meals and at bedtime    Range of BS for last 48 hours:   Recent Labs      07/23/19   0807  07/23/19   1130  07/23/19   1718  07/23/19   2022 07/24/19   0751  07/24/19   1139  07/24/19   1715  07/24/19   2118   POCGLUCOSE  131*  146*  183*  121*  134*  150*  135*  137*     Scheduled diabetic medications:  metformin (GLUCOPHAGE)  and insulin glargine (LANTUS) injection   Sliding scale usage in past 24 hours:  No  Total Short acting insulin in the past 24 hours:  None  Total Long acting insulin in the past 24 hours:  Lantus 12 units    Wound:     Active Current Wounds     Name: Placement date: Placement time: Site: Days:    Wound 07/21/19 Coccyx 07/21/19 2200      3                   Interventions:  Monitor and assess  Effectiveness of intervention:  wound is unchanged     Sleep/wake cycle:   Average hours slept:  Sleeps 4-6 hours without waking  Sleep medication usage:  None    Patient/Family Training/Education:  Bladder Management/Training, Bowel Management/Training, Diabetes Management, Diet/Nutrition, Fall Prevention, General Self Care, Medication Management, Pain Management, Respiratory Hygiene, Safety and Skin Care  Discharge Supply Recommendations:  Glucometer and Strips  Strengths: Alert and oriented, Able to follow instructions, Supportive family and Manages pain appropriately   Barriers:   Fatigue, Generalized weakness and Limited mobility            Nursing Problems           Problem: Bowel/Gastric:     Goal: Normal bowel function is maintained or improved           Goal: Will not experience complications related to bowel motility             Problem: Discharge Barriers/Planning     Goal: Patient's continuum of care needs will be met             Problem: GLYCEMIA IMBALANCE     Goal: Clinical indication of glycemia balance is achieved             Problem: Infection     Goal: Will remain free from infection             Problem: Knowledge Deficit     Goal: Knowledge of disease  process/condition, treatment plan, diagnostic tests, and medications will improve           Goal: Knowledge of the prescribed therapeutic regimen will improve             Problem: Pain Management     Goal: Pain level will decrease to patient's comfort goal             Problem: Respiratory:     Goal: Respiratory status will improve             Problem: Safety     Goal: Will remain free from injury (Resolved)           Goal: Will remain free from falls             Problem: Urinary Elimination:     Goal: Ability to reestablish a normal urinary elimination pattern will improve             Problem: Venous Thromboembolism (VTW)/Deep Vein Thrombosis (DVT) Prevention:     Goal: Patient will participate in Venous Thrombosis (VTE)/Deep Vein Thrombosis (DVT)Prevention Measures     Flowsheet:     Taken at 07/20/19 1030    Mechanical Prophylaxis  SHAMA Hose (Graduated Compression Stockings) by Lisha Trujillo R.N.    SHAMA Hose (Graduated Compression Stockings) On by Lisha Trujillo R.N.    Pharmacologic Prophylaxis Used Contraindicated per MD by Lisha Trujillo R.N.    Risk Assessment Score 2 (calculated) by Lisha Trujillo R.N.    VTE RISK Moderate (calculated) by Lisha Trujillo R.N.                       Long Term Goals:   At discharge patient will be able to function safely at home and in the community with support.    Section completed by:  Feli Yepez R.N.

## 2019-07-25 NOTE — PROGRESS NOTES
[facility] Medicine Daily Progress Note    Date of Service  7/24/2019    Chief Complaint:  AFib, HTN, DM    Interval History:  No 24 hour clinical changes.    Review of Systems  Review of Systems   Constitutional: Negative for chills and fever.   HENT: Negative.    Eyes: Negative.    Respiratory: Negative for cough and shortness of breath.    Cardiovascular: Negative for chest pain and palpitations.   Gastrointestinal: Negative for abdominal pain, nausea and vomiting.   Skin: Negative for itching and rash.        Physical Exam  Temp:  [36.5 °C (97.7 °F)-36.8 °C (98.2 °F)] 36.5 °C (97.7 °F)  Pulse:  [76-99] 76  Resp:  [18] 18  BP: (114-121)/(72-78) 116/78  SpO2:  [95 %-99 %] 99 %    Physical Exam   Constitutional: He is oriented to person, place, and time. No distress.   HENT:   Right Ear: External ear normal.   Left Ear: External ear normal.   Nose: Nose normal.   Crani incision C/D/I   Eyes: Conjunctivae and EOM are normal. Right eye exhibits no discharge. Left eye exhibits no discharge.   Neck: Normal range of motion. Neck supple. No tracheal deviation present.   Cardiovascular: Normal rate, regular rhythm, S1 normal and S2 normal.    Pulmonary/Chest: No respiratory distress. He has no wheezes. He has no rhonchi.   Decreased BS   Abdominal: Soft. Bowel sounds are normal. He exhibits no distension. There is no tenderness.   Musculoskeletal: He exhibits no edema or tenderness.   Neurological: He is alert and oriented to person, place, and time. No sensory deficit.   Skin: Skin is warm and dry. He is not diaphoretic. No cyanosis.   Vitals reviewed.      Fluids    Intake/Output Summary (Last 24 hours) at 07/24/19 1734  Last data filed at 07/24/19 1211   Gross per 24 hour   Intake             1320 ml   Output              550 ml   Net              770 ml       Laboratory  Recent Labs      07/23/19   0600   WBC  10.9*   RBC  4.39*   HEMOGLOBIN  14.2   HEMATOCRIT  42.2   MCV  96.1   MCH  32.3   MCHC  33.6*   RDW  48.5  Patient   PLATELETCT  164   MPV  9.2     Recent Labs      07/23/19   0600   SODIUM  134*   POTASSIUM  3.9   CHLORIDE  100   CO2  25   GLUCOSE  143*   BUN  31*   CREATININE  0.90   CALCIUM  8.3*                 Assessment/Plan  Intracranial mass- (present on admission)   Assessment & Plan    S/P resection  Pathology +B cell lymphoma  Pending outpt chemo     Azotemia- (present on admission)   Assessment & Plan    Encouraging PO fluids     Type 2 diabetes mellitus without complication, without long-term current use of insulin (HCC)- (present on admission)   Assessment & Plan    HbA1c 10.0  Increase Metformin w/ goal of no insulin on discharge  Continue Lantus and SSI for now     CAD (coronary artery disease)- (present on admission)   Assessment & Plan    H/O stent x 3  On Lipitor and Metoprolol  Consider ASA when cleared by Neurosurgery  Consider ACE-I if blood pressure and renal function tolerates     Hypertension- (present on admission)   Assessment & Plan    Observe blood pressure trends on Metoprolol     Atrial fibrillation (HCC)- (present on admission)   Assessment & Plan    Rate controlled on Metoprolol     Hypomagnesemia   Assessment & Plan    Continue MgOx for borderline low Mg++ levels     Hypophosphatemia   Assessment & Plan    Continue K-Phos supplements     Leukocytosis   Assessment & Plan    2/2 steroids  WBC now resolved     Vitamin D insufficiency   Assessment & Plan    Vit D level 27  On supplementation     History of prostate cancer- (present on admission)   Assessment & Plan    S/P brachytherapy     Full Code

## 2019-07-25 NOTE — REHAB-CM IDT TEAM NOTE
Case Management      DC Planning    DC destination/dispostion:  Home w/ supportive family to [place].    Referrals: OP therapy.    DC Needs: MD f/u appts. Establish new PCP. OP therapy.    Barriers to discharge: Functional mobility deficits.    Strengths: Motivation. Supportive family.    Section completed by:  Becca Cobb R.N.

## 2019-07-25 NOTE — CARE PLAN
Problem: Bathing  Goal: STG-Within one week, patient will bathe  1) Individualized Goal:  Mod I w/ AE/DME PRN  2) Interventions:  OT Self Care/ADL, OT Neuro Re-Ed/Balance, OT Therapeutic Activity, OT Evaluation and OT Therapeutic Exercise     Outcome: NOT MET  Sup level for bathing    Problem: Dressing  Goal: STG-Within one week, patient will dress LB  1) Individualized Goal:  Sup/SBA for LB dressing w/ AE/DME PRN  2) Interventions:  OT Self Care/ADL, OT Neuro Re-Ed/Balance, OT Therapeutic Activity, OT Evaluation and OT Therapeutic Exercise     Outcome: MET Date Met: 07/25/19      Problem: Toileting  Goal: STG-Within one week, patient will complete toileting tasks  1) Individualized Goal: Mod I for toileting tasks including hygiene  2) Interventions:   OT Self Care/ADL, OT Neuro Re-Ed/Balance, OT Therapeutic Activity, OT Evaluation and OT Therapeutic Exercise     Outcome: NOT MET  Sup level     Problem: Functional Transfers  Goal: STG-Within one week, patient will transfer to toilet  1) Individualized Goal: Mod I w/ AE/DME PRN  2) Interventions:  OT Self Care/ADL, OT Neuro Re-Ed/Balance, OT Therapeutic Activity, OT Evaluation and OT Therapeutic Exercise     Outcome: NOT MET  Sup level

## 2019-07-25 NOTE — REHAB-OT IDT TEAM NOTE
Occupational Therapy   Activities of Daily Living  Eating Initial:  5 - Standby Prompting/Supervision or Set-up  Eating Current:  6 - Modified Independent   Eating Description:  Increased time  Grooming Initial:  4 - Minimal Assistance  Grooming Current:  6 - Modified Independent   Grooming Description:  Increased time  Bathing Initial:  4 - Minimal Assistance  Bathing Current:  5 - Standby Prompting/Supervision or Set-up   Bathing Description:  Tub bench, Grab bar, Hand held shower, Supervision for safety, Increased time (Sup in standing for mary area )  Upper Body Dressing Initial:  5 - Standby Prompting/Supervision or Set-up  Upper Body Dressing Current:  6 - Modified Independent   Upper Body Dressing Description:  Increased time  Lower Body Dressing Initial:  2 - Max Assistance  Lower Body Dressing Current:  5 - Standby Prompting/Supervsion or Set-up   Lower Body Dressing Description:  5 - Standby Prompting/Supervsion or Set-up  Toileting Initial:  4 - Minimal Assistance  Toileting Current:  5 - Standby Prompting/Supervision or Set-up   Toileting Description:  Supervision for safety, Increased time, Grab bar (Sup/SBA for hygiene)  Toilet Transfer Initial:  4 - Minimal Assistance  Toilet Transfer Current:  5 - Standby Prompting/Supervision or Set-up   Toilet Transfer Description:  5 - Standby Prompting/Supervision or Set-up  Tub / Shower Transfer Initial:  4 - Minimal Assistance  Tub / Shower Transfer Current:  5 - Standby Prompting/Supervision or Set-up   Tub / Shower Transfer Description:  Supervision for safety, Increased time, Grab bar (Sup/SBA for SPT)  IADL:  TBD   Family Training/Education:  TBD  DME/DC Recommendations:  TBD    Strengths:  Independent PLOF, Motivated for self care and independence, Pleasant and cooperative and Willingly participates in therapeutic activities  Barriers:  Decreased endurance, Generalized weakness, Limited mobility, Poor activity tolerance and Poor balance     # of short term  goals set= 4   # of short term goals met= 1       Occupational Therapy Goals           Problem: Bathing     Dates: Start: 07/16/19       Goal: STG-Within one week, patient will bathe     Dates: Start: 07/18/19       Description: 1) Individualized Goal:  Mod I w/ AE/DME PRN  2) Interventions:  OT Self Care/ADL, OT Neuro Re-Ed/Balance, OT Therapeutic Activity, OT Evaluation and OT Therapeutic Exercise       Note:     Goal Note filed on 07/25/19 1202 by Nara Garrett, Student    Goal: STG-Within one week, patient will bathe  Outcome: NOT MET  Sup level for bathing                  Problem: Functional Transfers     Dates: Start: 07/16/19       Goal: STG-Within one week, patient will transfer to toilet     Dates: Start: 07/18/19       Description: 1) Individualized Goal: Mod I w/ AE/DME PRN  2) Interventions:  OT Self Care/ADL, OT Neuro Re-Ed/Balance, OT Therapeutic Activity, OT Evaluation and OT Therapeutic Exercise       Note:     Goal Note filed on 07/25/19 1202 by Nara Garrett, Student    Goal: STG-Within one week, patient will transfer to toilet  Outcome: NOT MET  Sup level                  Problem: OT Long Term Goals     Dates: Start: 07/16/19       Goal: LTG-By discharge, patient will complete basic self care tasks     Dates: Start: 07/16/19       Description: 1) Individualized Goal:  Mod I with AE/AD/DME prn.  2) Interventions:  OT Group Therapy, OT Self Care/ADL, OT Community Reintegration, OT Manual Ther Technique, OT Neuro Re-Ed/Balance, OT Therapeutic Activity, OT Evaluation and OT Therapeutic Exercise           Goal: LTG-By discharge, patient will perform bathroom transfers     Dates: Start: 07/16/19       Description: 1) Individualized Goal:  Mod I with AD/DME prn.  2) Interventions:  OT Group Therapy, OT Self Care/ADL, OT Community Reintegration, OT Manual Ther Technique, OT Neuro Re-Ed/Balance, OT Therapeutic Activity, OT Evaluation and OT Therapeutic Exercise             Goal: LTG-By discharge,  patient will complete basic home management     Dates: Start: 07/16/19       Description: 1) Individualized Goal:  For tasks necessary at d/c at supervision to Mod I level wiith AE/AD/DME prn.  2) Interventions:  OT Group Therapy, OT Self Care/ADL, OT Community Reintegration, OT Manual Ther Technique, OT Neuro Re-Ed/Balance, OT Therapeutic Activity, OT Evaluation and OT Therapeutic Exercise             Problem: Toileting     Dates: Start: 07/16/19       Goal: STG-Within one week, patient will complete toileting tasks     Dates: Start: 07/18/19       Description: 1) Individualized Goal: Mod I for toileting tasks including hygiene  2) Interventions:   OT Self Care/ADL, OT Neuro Re-Ed/Balance, OT Therapeutic Activity, OT Evaluation and OT Therapeutic Exercise       Note:     Goal Note filed on 07/25/19 1202 by Nara Garrett, Student    Goal: STG-Within one week, patient will complete toileting tasks  Outcome: NOT MET  Sup level                       Section completed by:  Nara Garrett, Student

## 2019-07-25 NOTE — CARE PLAN
Problem: Safety  Goal: Will remain free from falls    Intervention: Implement fall precautions  Use of call light encouraged to make needs known.Bed in low position, non skid socks/shoes on when out of bed.No impulsive activity noted.Call light within reach.Will continue to monitor and assess needs and safety.      Problem: Pain Management  Goal: Pain level will decrease to patient's comfort goal  Pt is calm, comfortable and no sign of acute distress noted.Repositioned with pillows for comfort.Will continue to monitor and assess pain level and medicate as needed.    Problem: GLYCEMIA IMBALANCE  Goal: Clinical indication of glycemia balance is achieved    Intervention: MONITOR BLOOD GLUCOSE LEVELS AS ORDERED  FSBS 137, scheduled lantus 12 units given with hs snack.Will continue to monitor and assess for s/sx of hyper/hypoglycemia.

## 2019-07-25 NOTE — PROGRESS NOTES
Hospital Medicine Daily Progress Note      Chief Complaint:  AFib, HTN, DM    Interval History:  Doing well, denies new complaints.    Review of Systems  Review of Systems   Constitutional: Negative for chills and fever.   HENT: Negative.    Eyes: Negative.    Respiratory: Negative for cough and shortness of breath.    Cardiovascular: Negative for chest pain and palpitations.   Gastrointestinal: Negative for abdominal pain, nausea and vomiting.   Skin: Negative for itching and rash.        Physical Exam  Temp:  [36 °C (96.8 °F)-37.2 °C (99 °F)] 36 °C (96.8 °F)  Pulse:  [63-88] 83  Resp:  [16-18] 18  BP: (108-131)/(64-86) 113/74  SpO2:  [94 %-98 %] 94 %    Physical Exam   Constitutional: He is oriented to person, place, and time. No distress.   HENT:   Right Ear: External ear normal.   Left Ear: External ear normal.   Nose: Nose normal.   Crani incision C/D/I   Eyes: Conjunctivae and EOM are normal. Right eye exhibits no discharge. Left eye exhibits no discharge.   Neck: Normal range of motion. Neck supple. No tracheal deviation present.   Cardiovascular: Normal rate, regular rhythm, S1 normal and S2 normal.    Pulmonary/Chest: No respiratory distress. He has no wheezes. He has no rhonchi.   Decreased BS   Abdominal: Soft. Bowel sounds are normal. He exhibits no distension. There is no tenderness.   Musculoskeletal: He exhibits no edema or tenderness.   Neurological: He is alert and oriented to person, place, and time. No sensory deficit.   Skin: Skin is warm and dry. He is not diaphoretic. No cyanosis.   Vitals reviewed.      Fluids    Intake/Output Summary (Last 24 hours) at 07/26/19 1116  Last data filed at 07/26/19 0849   Gross per 24 hour   Intake              880 ml   Output              540 ml   Net              340 ml       Laboratory                      Assessment/Plan  Intracranial mass- (present on admission)   Assessment & Plan    S/P resection  Pathology +B cell lymphoma  Pending outpt chemo      Azotemia- (present on admission)   Assessment & Plan    Continuet to encourage PO fluids     Type 2 diabetes mellitus without complication, without long-term current use of insulin (HCC)- (present on admission)   Assessment & Plan    HbA1c 10.0  Serum glucose well controlled on Metformin  Decrease Lantus to avoid hypoglycemia     CAD (coronary artery disease)- (present on admission)   Assessment & Plan    H/O stent x 3  On Lipitor and Metoprolol  Consider ASA when cleared by Neurosurgery  Consider ACE-I if blood pressure and renal function tolerates     Hypertension- (present on admission)   Assessment & Plan    Blood pressure controlled on Metoprolol     Atrial fibrillation (HCC)- (present on admission)   Assessment & Plan    Rate controlled on Metoprolol     Hypomagnesemia   Assessment & Plan    Adequately repleted  Discontinue MgOx     Hypophosphatemia   Assessment & Plan    Adequately repleted  Discontinue K-Phos     Leukocytosis   Assessment & Plan    2/2 steroids  WBC now resolved     Vitamin D insufficiency   Assessment & Plan    Vit D level 27  On supplementation     History of prostate cancer- (present on admission)   Assessment & Plan    S/P brachytherapy     Full Code

## 2019-07-25 NOTE — THERAPY
07/25/19 7475   IDT Conference   IDT Conference I have reviewed and discussed the POC and barriers to discharge for this patient.  I am knowledgeable of the patient's needs and have attended the IDT Conference.   Interdisciplinary Plan of Care Collaboration   IDT Collaboration with  (IDT team)   Collaboration Comments IDT conference: d/c on Saturday; family training tomorrow p.m. with possible TLA use; pt has DME, will continue with Outpatient therapies near home       Plan     FIMs, IRF-Meir, family training (possible TLA usage).  Payan Balance Scale and 6MWT.  D/C on Saturday 7/27/19.

## 2019-07-25 NOTE — CARE PLAN
"Problem: Balance  Goal: STG-Within one week, patient will  1) Individualized goal:  Score >35/56 on the Payan balance assessment  2) Interventions:  PT Group Therapy, PT Gait Training, PT Self Care/Home Eval, PT Therapeutic Exercises, PT Neuro Re-Ed/Balance, PT Therapeutic Activity and PT Evaluation     Outcome: NOT MET  Patient uses SPC for ambulation; will perform Payan today or tomorrow.    Problem: Mobility  Goal: STG-Within one week, patient will ambulate household distance  1) Individualized goal:  >300' with SPC and CGA  2) Interventions:  PT Group Therapy, PT Gait Training, PT Self Care/Home Eval, PT Therapeutic Exercises, PT Neuro Re-Ed/Balance, PT Therapeutic Activity and PT Evaluation       Outcome: MET Date Met: 07/25/19      Problem: Mobility Transfers  Goal: STG-Within one week, patient will sit to stand  1) Individualized goal:  SBA with use of UEs from 21\" apple seat  2) Interventions: PT Group Therapy, PT Gait Training, PT Self Care/Home Eval, PT Therapeutic Exercises, PT Neuro Re-Ed/Balance, PT Therapeutic Activity and PT Evaluation       Outcome: MET Date Met: 07/25/19        "

## 2019-07-25 NOTE — REHAB-SLP IDT TEAM NOTE
Speech Therapy   Cognitive Linquistic Functions  Comprehension Initial:  6 - Modified Independent  Comprehension Current:  7 - Independent   Comprehension Description:  Verbal cues  Expression Initial:  7 - Independent  Expression Current:  7 - Independent   Expression Description:  Verbal cueing  Social Interaction Initial:  7 - Independent  Social Interaction Current:  7 - Independent   Social Interaction Description:     Problem Solving Initial:  4 - Minimal Assistance  Problem Solving Current:  6 - Modified Independent   Problem Solving Description:  Verbal cueing, Therapy schedule, Increased time  Memory Initial:  3 - Moderate Assistance  Memory Current:  5 - Standby Prompting/Supervision or Set-up   Memory Description:  Verbal cueing, Therapy schedule  Executive Functioning / Organization Initial:  Minimal (4)  Executive Functioning / Organization Current:  Minimal (4)   Executive Functioning Desciption:  Verbal cues, extra time   Swallowing  Swallowing Status:  Not following for dysphagia management   Orders Placed This Encounter   Procedures   • Diet Order Diabetic     Standing Status:   Standing     Number of Occurrences:   1     Order Specific Question:   Diet:     Answer:   Diabetic [3]     Order Specific Question:   Consistency/Fluid modifications:     Answer:   Thin Liquids [3]     Behavior Modification Plan  Analyze tasks (break down into smaller steps)  Assistive Technology  Memory aides: and Low tech: Calendar, planner, schedule, alarms/timers, pill organizer, post-it notes, lists  Family Training/Education:  Not completed   DC Recommendations:   Outpatient ST   Strengths:  Able to follow instructions, Effective communication skills, Making steady progress towards goals, Motivated for self care and independence, Pleasant and cooperative, Supportive family and Willingly participates in therapeutic activities  Barriers:  Poor carryover of learning, complex attention tasks   # of short term goals  set=2  # of short term goals met=1       Speech Therapy Problems           Problem: Memory STGs     Dates: Start: 07/16/19       Goal: STG-Within one week, patient will     Dates: Start: 07/16/19       Description: 1) Individualized goal:  Recall daily therapy sessions using external memory aids (memory notebook) given min A in 3/3 opportunities.    2) Interventions:  SLP Speech Language Treatment, SLP Self Care / ADL Training , SLP Cognitive Skill Development and SLP Group Treatment       Note:     Goal Note filed on 07/25/19 0803 by Prashanth Medina MS,CCC-SLP    Goal: STG-Within one week, patient will  Outcome: NOT MET  Mod A required for pt to implement memory notebook                   Problem: Problem Solving STGs     Dates: Start: 07/16/19       Goal: STG-Within one week, patient will     Dates: Start: 07/25/19       Description: 1) Individualized goal:  Complete financial management tasks given spv to achieve 80% accuracy or greater   2) Interventions:  SLP Speech Language Treatment, SLP Cognitive Skill Development and SLP Group Treatment               Problem: Speech/Swallowing LTGs     Dates: Start: 07/16/19       Goal: LTG-By discharge, patient will safely swallow     Dates: Start: 07/16/19       Description: 1) Individualized goal:  The least restrictive diet for safe intake of daily meals   2) Interventions:  SLP Swallowing Dysfunction Treatment, SLP Oral Pharyngeal Evaluation, SLP Video Swallow Evaluation and SLP Group Treatment             Goal: LTG-By discharge, patient will solve complex problem     Dates: Start: 07/16/19       Description: 1) Individualized goal:  With modified independence for a safe discharge home   2) Interventions:  SLP Speech Language Treatment, SLP Self Care / ADL Training , SLP Cognitive Skill Development and SLP Group Treatment                    Section completed by:  Prashanth Medina MS,CCC-SLP

## 2019-07-25 NOTE — THERAPY
Physical Therapy   Daily Treatment     Patient Name: Marcio More  Age:  73 y.o., Sex:  male  Medical Record #: [id number]  Today's Date: 7/25/2019     Precautions  Precautions: Fall Risk  Comments: impaired safety, pt goes by Richie (middle name)    Subjective    Pt in bed resting, agreeable to PT     Objective       07/25/19 1031   Precautions   Precautions Fall Risk   Comments impaired safety, pt goes by Richie (middle name)   Sitting Lower Body Exercises   Nustep Resistance Level 3  (10 min, 0.26 miles )   Interdisciplinary Plan of Care Collaboration   Patient Position at End of Therapy Seated  (in cafeteria for lunch)   PT Total Time Spent   PT Individual Total Time Spent (Mins) 60   PT Charge Group   PT Therapeutic Exercise 1   PT Neuromuscular Re-Education / Balance 1   PT Therapeutic Activities 2     Standing tolerance/ balance activities in // bars: standing on air-ex for 20 min without rest break pt participated in ladder ball and balloon volley activities, single UE support and SBA throughout. Pt also attempted balance on air-ex without UE support, 2x ~20 sec before needing UE support.     FIM Bed/Chair/Wheelchair Transfers Score: 5 - Standby Prompting/Supervision or Set-up  Bed/Chair/Wheelchair Transfers Description:  Increased time, Set-up of equipment, Supervision for safety (pt transferred OOB at ambulation level with SPC and close SBA)    FIM Walking Score:  4 - Minimal Assistance  Walking Description:  Extra time, Verbal cueing, Requires incidental assist, Assist device/equipment (room > back gym and back gym > cafeteria, CGA with SPC and gait belt)      Assessment    Pt pleasant and cooperative throughout session. Good standing tolerance demonstrated with 2 different dynamic activities.     Plan    D/C on Saturday 7/27/19.  SPC ambulation, Payan Balance Scale, ther ex for strength and endurance, decreased A with transfers, and continue education.

## 2019-07-25 NOTE — REHAB-PT IDT TEAM NOTE
Physical Therapy   Mobility  Bed mobility:   SPV  Bed /Chair/Wheelchair Transfer Initial:  4 - Minimal Assistance  Bed /Chair/Wheelchair Transfer Current:  4 - Minimal Assistance   Bed/Chair/Wheelchair Transfer Description:   (SPC, gait belt, CGA, increased time, elevated HOB)  Walk Initial:  4 - Minimal Assistance  Walk Current:  4 - Minimal Assistance   Walk Description:   (Min A once when fatigued at transition from thick grass surface to concrete; otherwise CGA with SPC and gait belt; 2x 350 feet outdoors/indoors, 1x 150 ft indoors; increased time as fatigue increases, uses wide BASSAM and decreased B step length. SO present)  Wheelchair Initial:  0 - Not tested,unsafe activity  Wheelchair Current:  0 - Not tested,unsafe activity   Wheelchair Description:     Stairs Initial:  2 - Max Assistance  Stairs Current: 4 - Minimal Assistance   Stairs Description:  (CGA, gait belt, B railings, 1 set of 12 standard stairs, pt self managing SPC, step-to pattern descending and variable pattern ascending, increased time, improving balance.  SO observing today & present for education.)  Patient/Family Training/Education:  Ongoing patient education; SO observed pt's mobility yesterday  DME/DC Recommendations:  CGA from SO or family, pt has SPC, Home health PT initially  Strengths:  Able to follow instructions, Alert and oriented, Effective communication skills, Independent PLOF, Making steady progress towards goals, Motivated for self care and independence, Pleasant and cooperative, Supportive family and Willingly participates in therapeutic activities  Barriers:   Decreased endurance, Generalized weakness, Home accessibility, Poor balance and Poor carryover of learning  # of short term goals set= 3  # of short term goals met=2       Physical Therapy Problems           Problem: Balance     Dates: Start: 07/17/19       Goal: STG-Within one week, patient will     Dates: Start: 07/17/19   Expected End: 07/24/19       Description: 1)  Individualized goal:  Score >35/56 on the Paayn balance assessment  2) Interventions:  PT Group Therapy, PT Gait Training, PT Self Care/Home Eval, PT Therapeutic Exercises, PT Neuro Re-Ed/Balance, PT Therapeutic Activity and PT Evaluation       Note:     Goal Note filed on 07/25/19 0840 by Son Shankar, PT    Goal: STG-Within one week, patient will  Outcome: NOT MET  Patient uses SPC for ambulation; will perform Payan today or tomorrow.                  Problem: Mobility     Dates: Start: 07/17/19       Goal: STG-Within one week, patient will ambulate community distances     Dates: Start: 07/25/19       Description: 1) Individualized goal: >800' with SPC during 6 Minute walk test and SPV to return to PLOF  2) Interventions: PT Group Therapy, PT Gait Training, PT Self Care/Home Eval, PT Therapeutic Exercises, PT Neuro Re-Ed/Balance, PT Therapeutic Activity and PT Evaluation                Goal: STG-Within one week, patient will ascend and descend four to six stairs     Dates: Start: 07/25/19       Description: 1) Individualized goal: Ascend/descend 3 stairs with B HRs and SPV to safely enter/exit his home  2) Interventions:PT Group Therapy, PT Gait Training, PT Self Care/Home Eval, PT Therapeutic Exercises, PT Neuro Re-Ed/Balance, PT Therapeutic Activity and PT Evaluation                Problem: Mobility Transfers     Dates: Start: 07/17/19       Goal: STG-Within one week, patient will transfer bed to chair     Dates: Start: 07/25/19       Description: 1) Individualized goal: SPV with SPC  2) Interventions: PT Group Therapy, PT Gait Training, PT Self Care/Home Eval, PT Therapeutic Exercises, PT Neuro Re-Ed/Balance, PT Therapeutic Activity and PT Evaluation                Goal: STG-Within one week, patient will transfer in/out of car     Dates: Start: 07/25/19       Description: 1) Individualized goal: SPV in/out of PT cruiser to return home safely with assist of family  2) Interventions: PT Group Therapy, PT  Gait Training, PT Self Care/Home Eval, PT Therapeutic Exercises, PT Neuro Re-Ed/Balance, PT Therapeutic Activity and PT Evaluation                  Problem: PT-Long Term Goals     Dates: Start: 07/17/19       Goal: LTG-By discharge, patient will maintain balance     Dates: Start: 07/17/19   Expected End: 07/31/19       Description: 1) Individualized goal:  >45/56 on the Payan balance assessment indicating low fall risk  2) Interventions:  PT Group Therapy, PT Gait Training, PT Self Care/Home Eval, PT Therapeutic Exercises, PT Neuro Re-Ed/Balance, PT Therapeutic Activity and PT Evaluation               Goal: LTG-By discharge, patient will ambulate     Dates: Start: 07/17/19   Expected End: 07/31/19       Description: 1) Individualized goal:  >1000' with SPC during 6 Minute walk test and SPV to return to PLOF  2) Interventions:  PT Group Therapy, PT Gait Training, PT Self Care/Home Eval, PT Therapeutic Exercises, PT Neuro Re-Ed/Balance, PT Therapeutic Activity and PT Evaluation               Goal: LTG-By discharge, patient will transfer one surface to another     Dates: Start: 07/17/19   Expected End: 07/31/19       Description: 1) Individualized goal:  Mod I  2) Interventions:  PT Group Therapy, PT Gait Training, PT Self Care/Home Eval, PT Therapeutic Exercises, PT Neuro Re-Ed/Balance, PT Therapeutic Activity and PT Evaluation               Goal: LTG-By discharge, patient will transfer in/out of a car     Dates: Start: 07/17/19   Expected End: 07/31/19       Description: 1) Individualized goal:  SPV in/out of PT cruiser to return home safely with assist of family  2) Interventions:  PT Group Therapy, PT Gait Training, PT Self Care/Home Eval, PT Therapeutic Exercises, PT Neuro Re-Ed/Balance, PT Therapeutic Activity and PT Evaluation               Goal: LTG-By discharge, patient will     Dates: Start: 07/17/19   Expected End: 07/31/19       Description: 1) Individualized goal:  Ascend/descend 3 stairs with B HRs and SPV  to safely enter/exit his home  2) Interventions:PT Group Therapy, PT Gait Training, PT Self Care/Home Eval, PT Therapeutic Exercises, PT Neuro Re-Ed/Balance, PT Therapeutic Activity and PT Evaluation                     Section completed by:  Son Shankar, PT

## 2019-07-25 NOTE — CARE PLAN
Problem: Problem Solving STGs  Goal: STG-Within one week, patient will  1) Individualized goal:  Complete a functional medication sorting task with spv to achieve 100% accuracy in 2/2 attempts   2) Interventions:  SLP Speech Language Treatment, SLP Self Care / ADL Training , SLP Cognitive Skill Development and SLP Group Treatment     Outcome: MET Date Met: 07/25/19  Pt is able to sort medications with spv to achieve 100% accuracy    Problem: Memory STGs  Goal: STG-Within one week, patient will  1) Individualized goal:  Recall daily therapy sessions using external memory aids (memory notebook) given min A in 3/3 opportunities.    2) Interventions:  SLP Speech Language Treatment, SLP Self Care / ADL Training , SLP Cognitive Skill Development and SLP Group Treatment     Outcome: NOT MET  Mod A required for pt to implement memory notebook

## 2019-07-25 NOTE — PROGRESS NOTES
"Rehab Progress Note     Encounter Date: 7/25/2019    CC: intracranial tumor, decreased balance     Interval Events (Subjective)  Patient sitting up in room. He reports he is doing well. He is continuing to have issues with urination but it is slowly improving. Reports no new updates from NS. He reports he will need to follow-up in a few months, does not know how long.  Denies pain. Discussed will have final IDT today and patient looking forward to going home this weekend.      IDT Team Meeting 7/25/2019    ILeticia M.D., was present and led the interdisciplinary team conference on 7/25/2019.  I led the IDT conference and agree with the IDT conference documentation and plan of care as noted below.     RN:  Diet    % Meal     Pain No complaints   Sleep    Bowel Continent   Bladder Incontinent - improved on Oxybutynin    In's & Out's      PT:  Bed Mobility    Transfers    Mobility CGA for safety; a little worse on grass and transition changes   Stairs 12-16 CGA   Can manage cane   Would like family training     OT:  Eating    Grooming    Bathing    UB Dressing    LB Dressing    Toileting SBA   Shower & Transfer SBA   Needs breaks; needed maxA when fatigued to get out of chair  Improving; need supervision    SLP:  Medication management with supervs  Financial management with supervs    CM:  Continues to work on disposition and DME needs.      DC/Disposition:  7/27/19    Objective:  VITAL SIGNS: /74   Pulse 93   Temp 36.9 °C (98.5 °F) (Oral)   Resp 16   Ht 1.854 m (6' 1\")   Wt 90.7 kg (199 lb 14.4 oz)   SpO2 95%   BMI 26.37 kg/m²   Gen: NAD  Psych: Mood and affect appropriate  CV: RRR, no edema  Resp: CTAB, no upper airway sounds  Abd: NTND  Neuro: AOx4, following simple commands  Unchanged from 7/24/19    Recent Results (from the past 72 hour(s))   ACCU-CHEK GLUCOSE    Collection Time: 07/22/19  5:15 PM   Result Value Ref Range    Glucose - Accu-Ck 131 (H) 65 - 99 mg/dL   ACCU-CHEK " GLUCOSE    Collection Time: 07/22/19  8:18 PM   Result Value Ref Range    Glucose - Accu-Ck 159 (H) 65 - 99 mg/dL   CBC WITH DIFFERENTIAL    Collection Time: 07/23/19  6:00 AM   Result Value Ref Range    WBC 10.9 (H) 4.8 - 10.8 K/uL    RBC 4.39 (L) 4.70 - 6.10 M/uL    Hemoglobin 14.2 14.0 - 18.0 g/dL    Hematocrit 42.2 42.0 - 52.0 %    MCV 96.1 81.4 - 97.8 fL    MCH 32.3 27.0 - 33.0 pg    MCHC 33.6 (L) 33.7 - 35.3 g/dL    RDW 48.5 35.9 - 50.0 fL    Platelet Count 164 164 - 446 K/uL    MPV 9.2 9.0 - 12.9 fL    Neutrophils-Polys 72.30 (H) 44.00 - 72.00 %    Lymphocytes 11.50 (L) 22.00 - 41.00 %    Monocytes 13.10 0.00 - 13.40 %    Eosinophils 0.90 0.00 - 6.90 %    Basophils 0.20 0.00 - 1.80 %    Immature Granulocytes 2.00 (H) 0.00 - 0.90 %    Nucleated RBC 0.00 /100 WBC    Neutrophils (Absolute) 7.89 (H) 1.82 - 7.42 K/uL    Lymphs (Absolute) 1.26 1.00 - 4.80 K/uL    Monos (Absolute) 1.43 (H) 0.00 - 0.85 K/uL    Eos (Absolute) 0.10 0.00 - 0.51 K/uL    Baso (Absolute) 0.02 0.00 - 0.12 K/uL    Immature Granulocytes (abs) 0.22 (H) 0.00 - 0.11 K/uL    NRBC (Absolute) 0.00 K/uL   Basic Metabolic Panel    Collection Time: 07/23/19  6:00 AM   Result Value Ref Range    Sodium 134 (L) 135 - 145 mmol/L    Potassium 3.9 3.6 - 5.5 mmol/L    Chloride 100 96 - 112 mmol/L    Co2 25 20 - 33 mmol/L    Glucose 143 (H) 65 - 99 mg/dL    Bun 31 (H) 8 - 22 mg/dL    Creatinine 0.90 0.50 - 1.40 mg/dL    Calcium 8.3 (L) 8.5 - 10.5 mg/dL    Anion Gap 9.0 0.0 - 11.9   MAGNESIUM    Collection Time: 07/23/19  6:00 AM   Result Value Ref Range    Magnesium 1.6 1.5 - 2.5 mg/dL   PHOSPHORUS    Collection Time: 07/23/19  6:00 AM   Result Value Ref Range    Phosphorus 2.9 2.5 - 4.5 mg/dL   ESTIMATED GFR    Collection Time: 07/23/19  6:00 AM   Result Value Ref Range    GFR If African American >60 >60 mL/min/1.73 m 2    GFR If Non African American >60 >60 mL/min/1.73 m 2   ACCU-CHEK GLUCOSE    Collection Time: 07/23/19  8:07 AM   Result Value Ref Range     Glucose - Accu-Ck 131 (H) 65 - 99 mg/dL   ACCU-CHEK GLUCOSE    Collection Time: 07/23/19 11:30 AM   Result Value Ref Range    Glucose - Accu-Ck 146 (H) 65 - 99 mg/dL   ACCU-CHEK GLUCOSE    Collection Time: 07/23/19  5:18 PM   Result Value Ref Range    Glucose - Accu-Ck 183 (H) 65 - 99 mg/dL   ACCU-CHEK GLUCOSE    Collection Time: 07/23/19  8:22 PM   Result Value Ref Range    Glucose - Accu-Ck 121 (H) 65 - 99 mg/dL   ACCU-CHEK GLUCOSE    Collection Time: 07/24/19  7:51 AM   Result Value Ref Range    Glucose - Accu-Ck 134 (H) 65 - 99 mg/dL   ACCU-CHEK GLUCOSE    Collection Time: 07/24/19 11:39 AM   Result Value Ref Range    Glucose - Accu-Ck 150 (H) 65 - 99 mg/dL   ACCU-CHEK GLUCOSE    Collection Time: 07/24/19  5:15 PM   Result Value Ref Range    Glucose - Accu-Ck 135 (H) 65 - 99 mg/dL   ACCU-CHEK GLUCOSE    Collection Time: 07/24/19  9:18 PM   Result Value Ref Range    Glucose - Accu-Ck 137 (H) 65 - 99 mg/dL   ACCU-CHEK GLUCOSE    Collection Time: 07/25/19  7:39 AM   Result Value Ref Range    Glucose - Accu-Ck 114 (H) 65 - 99 mg/dL   ACCU-CHEK GLUCOSE    Collection Time: 07/25/19 11:32 AM   Result Value Ref Range    Glucose - Accu-Ck 152 (H) 65 - 99 mg/dL       Current Facility-Administered Medications   Medication Frequency   • senna-docusate (PERICOLACE or SENOKOT S) 8.6-50 MG per tablet 2 Tab BID PRN    And   • polyethylene glycol/lytes (MIRALAX) PACKET 1 Packet QDAY PRN    And   • magnesium hydroxide (MILK OF MAGNESIA) suspension 30 mL QDAY PRN    And   • bisacodyl (DULCOLAX) suppository 10 mg QDAY PRN   • metFORMIN (GLUCOPHAGE) tablet 850 mg TID WITH MEALS   • oxybutynin SR (DITROPAN-XL) tablet 10 mg Q EVENING   • phosphorus (K-PHOS-NEUTRAL, PHOSPHA 250 NEUTRAL) per tablet 1 Tab TID   • magnesium oxide (MAG-OX) tablet 400 mg DAILY   • insulin regular (HUMULIN R) injection 3-14 Units 4X/DAY ACHS    And   • glucose 4 g chewable tablet 16 g Q15 MIN PRN    And   • dextrose 50% (D50W) injection 50 mL Q15 MIN PRN   •  vitamin D (cholecalciferol) tablet 1,000 Units DAILY   • insulin glargine (LANTUS) injection 12 Units Q EVENING   • Respiratory Care per Protocol Continuous RT   • oxyCODONE immediate-release (ROXICODONE) tablet 5 mg Q3HRS PRN   • oxyCODONE immediate release (ROXICODONE) tablet 10 mg Q3HRS PRN   • hydrALAZINE (APRESOLINE) tablet 25 mg Q8HRS PRN   • acetaminophen (TYLENOL) tablet 650 mg Q4HRS PRN   • artificial tears 1.4 % ophthalmic solution 1 Drop PRN   • benzocaine-menthol (CEPACOL) lozenge 1 Lozenge Q2HRS PRN   • mag hydrox-al hydrox-simeth (MAALOX PLUS ES or MYLANTA DS) suspension 20 mL Q2HRS PRN   • traZODone (DESYREL) tablet 50 mg QHS PRN   • ondansetron (ZOFRAN ODT) dispertab 4 mg 4X/DAY PRN    Or   • ondansetron (ZOFRAN) syringe/vial injection 4 mg 4X/DAY PRN   • sodium chloride (OCEAN) 0.65 % nasal spray 2 Spray PRN   • scopolamine (TRANSDERM-SCOP) patch 1 Patch Q72HRS PRN   • atorvastatin (LIPITOR) tablet 20 mg QHS   • metoprolol (LOPRESSOR) tablet 25 mg TWICE DAILY   • therapeutic multivitamin-minerals (THERAGRAN-M) tablet 1 Tab DAILY       Orders Placed This Encounter   Procedures   • Diet Order Diabetic     Standing Status:   Standing     Number of Occurrences:   1     Order Specific Question:   Diet:     Answer:   Diabetic [3]     Order Specific Question:   Consistency/Fluid modifications:     Answer:   Thin Liquids [3]       Assessment:  Active Hospital Problems    Diagnosis   • *Primary cerebral lymphoma (HCC)   • Intracranial mass   • Hyponatremia   • Atrial fibrillation (HCC)   • CAD (coronary artery disease)   • Hyperglycemia   • Hypertension   • Chronic back pain   • Dyslipidemia   • History of prostate cancer       Medical Decision Making and Plan:  nonTraumatic Brain injury - 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 Ruffin on 7/10/19. Steroid taper completed 7/21/19. Keppra prophylaxis completed 7/18/19. Staples out - 7/23/19.   -Follow-up with Dr. Ruffin 7/24/19 -  no new recommendations. Follow-up with Oncology  -PT and OT for mobility and ADLs  -SLP for cognition/speech     Dysphagia - Upgraded to dysphagia 2 with NTL prior to transfer.  SLP for swallow evaluation - upgraded to regular with thins. Resolved     DM - Patient on SSI on transfer.  Previously on Lantus 12 U qEvening  -Restart Lantus 12 U. Consult hospitalist, restarted on home metformin. Metformin changed to 850 mg TID     A fib/HTN - Patient on metoprolol 25 mg BID. Previously on anticoagulation. No ASA or AC per NSG at discharge. Previously on Azilsartan 40 mg daily. Continue to monitor  -Patient with A Fib HR into 100s at time. Consult hospitalist     Hyponatremia - 129 on transfer. Continue to monitor     HLD - Patient on Atorvastatin on transfer.     Urinary Frequency - Patient with severe urge incontinence. Will start on Oxybutynin 5 mg, no improvement. Still having frequent low volume, increase Oxybutynin. Less frequent per documentation, continue at current dose.     GI Ppx - Patient on stool softeners while on pain medications. Discontinue prilosec, off of steroids.      DVT Ppx - Patient is high risk for bleed s/p removal of hemorrhagic tumor. Continue to monitor ambulation.     Total time:  35 minutes.  I spent greater than 50% of the time for patient care, counseling, and coordination on this date, including unit/floor time, and face-to-face time with the patient as per interval events and assessment and plan above. Topics discussed included discharge planning, improved urination, and improved blood sugars. Patient was discussed separately in IDT today; please see details above.    Leticia Bella M.D.

## 2019-07-26 NOTE — CARE PLAN
Problem: Pain Management  Goal: Pain level will decrease to patient's comfort goal  Outcome: PROGRESSING AS EXPECTED  Pt reports no pain today.    Problem: GLYCEMIA IMBALANCE  Goal: Clinical indication of glycemia balance is achieved  Outcome: PROGRESSING AS EXPECTED  Discussed diabetes medications and diet with pt and pt's daughter. Discussed good meal options for optimal glucose control.

## 2019-07-26 NOTE — THERAPY
"Occupational Therapy  Daily Treatment     Patient Name: Marcio More  Age:  73 y.o., Sex:  male  Medical Record #: [id number]  Today's Date: 2019     Precautions  Precautions: Fall Risk  Comments: impaired safety, pt goes by Richie (middle name)         Subjective    \"It's like exhaust\" - pt stated while in bathroom      Objective  FIM Toilet Transfer Score:  5 - Standby Prompting/Supervision or Set-up  Toilet Transfer Description:  Supervision for safety, Increased time, Grab bar  FIM Toiletin - Standby Prompting/Supervision or Set-up  Toileting Description:  Supervision for safety, Increased time, Grab bar  FIM Lower Body Dressing Score:  5 - Standby Prompting/Supervsion or Set-up  Lower Body Dressing Description:  Supervision for safety, Increased time, Set-up of equipment (Mod I for threading legs through pants/briefs and managing at waistline. Min A to adjust socks after donned  independently. Assist to adjust back of shoes after donned by pt. pt able to tie shoes)  FIM Upper Body Dressin - Modified Independent  Upper Body Dressing Description:  Supervision for safety, Increased time (Mod I while seated for pull over shirt )  FIM Grooming Score:  6 - Modified Independent  Grooming Description:  Set-up of equipment, Increased time (Standing at sinkside to brush teeth)  FIM Bathing Score:  5 - Standby Prompting/Supervision or Set-up  Bathing Description:  Grab bar, Hand held shower, Supervision for safety, Increased time (Mod I while seated, Sup for standing )  FIM Tub/Shower Transfers Score:  5 - Standby Prompting/Supervision or Set-up  Tub/Shower Transfers Description:  Grab bar, Increased time, Supervision for safety  FIM Eating Score:  6 - Modified Independent  Eating Description:  Increased time       19 0701   Precautions   Precautions Fall Risk   Comments impaired safety, pt goes by Richie (middle name)   Vitals   O2 (LPM) 0   O2 Delivery None (Room Air)   Pain   Intervention Declines   Pain " 0 - 10 Group   Pain Rating Scale (NPRS) 0   Non Verbal Descriptors   Non Verbal Scale  Calm   Cognition    Level of Consciousness Alert   Bed Mobility    Supine to Sit Supervised   Scooting Supervised   Interdisciplinary Plan of Care Collaboration   IDT Collaboration with  Certified Nursing Assistant   Patient Position at End of Therapy Seated   Collaboration Comments Consulted CNA regarding application of patch on pt's back. Pt escorted to dining room for breakfast       Assessment    Pt alert and cooperative w/ tx session. Pt engaged in ADL routine for DC FIMs. Pt consistently at Sup level for transfers secondary to decreased balance. Pt required Sup for donning socks and shoes - required min A to adjust d/t pt refusing adaptive equipment - pt stated he has the equipment at home but doesn't like to use it. Pt demonstrated appropriate safety awareness during BADLs - supported self using grab bar, sitting to dress LB, slow pace to ensure safety.     Plan    Pt to DC tomorrow.

## 2019-07-26 NOTE — DISCHARGE PLANNING
Case Management Discharge Instructions        Discharge Location: Home with Outpatient Services   Agency Name / Address / Phone: [facility]         [address]         [place], [place] [id number] [phone]   Outpatient Services: Physical Therapy  Comments: initial physical therapy appointment Friday, 8.9.19 at 10:00am      Follow-up Information:    Jessica Francisco M.D. [facility]    [address]    [place] [address]    [phone]    Thursday 8.1.2019, check in at 12:45pm      Patt Ferrer M.D. Oncology    [address] [place] [address] [place] [address]    [phone]    Friday 8.16.2019, check in at 10:30am for 11:00am appointment      Son Ruffin M.D. Neuro-surgery    [address]    [place] [address]    [phone]    Tuesday 8.27.2019, check in at 3:00pm for 3:15pm appointment      Zhao Angela M.D. Urology    [address]    [place] [address]    [phone]    Friday 9.27.2019, appointment at 11:15am      Eulalia Hunter D.O. New Primary Care Provider    [address]    [place] [id number] [phone]    Friday 9.27.2019, check in at 2:00pm for 2:15pm new patient appointment. please bring photo ID & insurance cards

## 2019-07-26 NOTE — REHAB-NURSING IDT TEAM NOTE
Nursing   Nursing  Diet/Nutrition:  Diabetic and Thin Liquids  Medication Administration:  Whole with Liquid Wash  % consumed at meals in last 24 hours:  Consumed % of meals per documentation.  Meal/Snack Consumption for the past 24 hrs:   Oral Nutrition   07/25/19 2053 Snack;Between % Consumed   07/25/19 1800 Dinner;Between 25-50% Consumed   07/25/19 1200 Lunch;Between % Consumed   07/25/19 0800 Breakfast;Between % Consumed       Snack schedule:  None  Appetite:  Good  Fluid Intake/Output in past 24 hours: In: 1210 [P.O.:1210]  Out: 1550   Admit Weight:  Weight: 94.1 kg (207 lb 8 oz)  Weight Last 7 Days   [90.7 kg (199 lb 14.4 oz)] 90.7 kg (199 lb 14.4 oz) (07/21 0800)    Pain Issues:    Location:  Head (07/25 2053)  Mid;Lower (07/25 1231)         Severity:  Mild   Scheduled pain medications:  None     PRN pain medications used in last 24 hours:  acetaminophen (TYLENOL)    Non Pharmacologic Interventions:  distraction, relaxation and rest  Effectiveness of pain management plan:  good=patient states acceptable comfort after interventions    Bowel:    Bowel Assist Initial Score:  4 - Minimal Assistance  Bowel Assist Current Score:  5 - Standby Prompting/Supervision or Set-up  Bowl Accidents in last 7 days:  1  Last bowel movement: 07/25/19  Stool Description: Large, Soft     Usual bowel pattern:  every other day  Scheduled bowel medications:  None  PRN bowel medications used in last 24 hours:  None  Nursing Interventions:  Increased time, Supervision, Verbal cueing, Brief  Effectiveness of bowel program:   good=regular, predictable, controlled emptying of bowel  Bladder:    Bladder Assist Initial Score:  1 - Total Assistance  Bladder Assist Current Score:  4 - Minimal Assistance  Bladder Accidents in last 7 days:  0  PVR range for past 24-48 hours:  [135-212]  ()  Medications affecting bladder:  Ditropan    Time void schedule/voiding pattern:  Voiding every 2-4 hours  Interventions:  Increased  time, Emptying of device, Urinal  Effectiveness of bladder training:  Good=regular, predictable, emptying of bladder, patient initiates time voiding    Ortiz:    Type:     Patient Lines/Drains/Airways Status    Active Catheter     None              Date placed:          Justification:    Diabetes:  Blood Sugar Frequency:  Before meals and at bedtime    Range of BS for last 48 hours:   Recent Labs      07/24/19   0751  07/24/19   1139  07/24/19   1715  07/24/19   2118  07/25/19   0739  07/25/19   1132  07/25/19   1707  07/25/19 2008   POCGLUCOSE  134*  150*  135*  137*  114*  152*  91  97     Scheduled diabetic medications:  insulin glargine (LANTUS) injection  and Other Humulin R  Sliding scale usage in past 24 hours:  No  Total Short acting insulin in the past 24 hours:  None  Total Long acting insulin in the past 24 hours:  Lantus 12 units    Wound:         Patient Lines/Drains/Airways Status    Active Current Wounds     Name: Placement date: Placement time: Site: Days:    Wound 07/21/19 Coccyx 07/21/19 2200      4                   Interventions:  Mepilex and q2h turns  Effectiveness of intervention:  wound is improving       Sleep/wake cycle:   Average hours slept:  Sleeps 3-4 hours without waking  Sleep medication usage:  None    Patient/Family Training/Education:  Diabetes Management, Diet/Nutrition, Fall Prevention, General Self Care, Medication Management, Safe Transfers, Safety and Skin Care  Discharge Supply Recommendations:  Glucometer and Strips  Strengths: Alert and oriented, Willingly participates in therapeutic activities, Able to follow instructions, Pleasant and cooperative, Effective communication skills, Good carryover of learning and Motivated for self care and independence   Barriers:   Generalized weakness            Nursing Problems           Problem: Bowel/Gastric:     Goal: Normal bowel function is maintained or improved           Goal: Will not experience complications related to bowel  motility             Problem: Discharge Barriers/Planning     Goal: Patient's continuum of care needs will be met             Problem: GLYCEMIA IMBALANCE     Goal: Clinical indication of glycemia balance is achieved             Problem: Infection     Goal: Will remain free from infection             Problem: Knowledge Deficit     Goal: Knowledge of disease process/condition, treatment plan, diagnostic tests, and medications will improve           Goal: Knowledge of the prescribed therapeutic regimen will improve             Problem: Pain Management     Goal: Pain level will decrease to patient's comfort goal             Problem: Respiratory:     Goal: Respiratory status will improve             Problem: Safety     Goal: Will remain free from injury (Resolved)           Goal: Will remain free from falls             Problem: Skin Integrity     Goal: Risk for impaired skin integrity will decrease             Problem: Urinary Elimination:     Goal: Ability to reestablish a normal urinary elimination pattern will improve             Problem: Venous Thromboembolism (VTW)/Deep Vein Thrombosis (DVT) Prevention:     Goal: Patient will participate in Venous Thrombosis (VTE)/Deep Vein Thrombosis (DVT)Prevention Measures     Flowsheet:     Taken at 07/20/19 1030    Mechanical Prophylaxis  SHAMA Hose (Graduated Compression Stockings) by Lisha Trujillo R.N.    SHAMA Hose (Graduated Compression Stockings) On by Lisha Trujillo R.N.    Pharmacologic Prophylaxis Used Contraindicated per MD by Lisha Trujillo R.N.    Risk Assessment Score 2 (calculated) by Lisha Trujillo R.N.    VTE RISK Moderate (calculated) by Lisha Trujillo R.N.                       Long Term Goals:   At discharge patient will be able to function safely at home and in the community with support.    Section completed by:  Kourtney Newman R.N.

## 2019-07-26 NOTE — CARE PLAN
Problem: Bathing  Goal: STG-Within one week, patient will bathe  1) Individualized Goal:  Mod I w/ AE/DME PRN  2) Interventions:  OT Self Care/ADL, OT Neuro Re-Ed/Balance, OT Therapeutic Activity, OT Evaluation and OT Therapeutic Exercise     Outcome: NOT MET  Mod I while seated, Sup while standing     Problem: Toileting  Goal: STG-Within one week, patient will complete toileting tasks  1) Individualized Goal: Mod I for toileting tasks including hygiene  2) Interventions:   OT Self Care/ADL, OT Neuro Re-Ed/Balance, OT Therapeutic Activity, OT Evaluation and OT Therapeutic Exercise     Outcome: NOT MET  Sup for toileting - clothing management and hygiene    Problem: Functional Transfers  Goal: STG-Within one week, patient will transfer to toilet  1) Individualized Goal: Mod I w/ AE/DME PRN  2) Interventions:  OT Self Care/ADL, OT Neuro Re-Ed/Balance, OT Therapeutic Activity, OT Evaluation and OT Therapeutic Exercise     Outcome: NOT MET  Sup w/ single point cane and grab bars    Problem: OT Long Term Goals  Goal: LTG-By discharge, patient will complete basic self care tasks  1) Individualized Goal:  Mod I with AE/AD/DME prn.  2) Interventions:  OT Group Therapy, OT Self Care/ADL, OT Community Reintegration, OT Manual Ther Technique, OT Neuro Re-Ed/Balance, OT Therapeutic Activity, OT Evaluation and OT Therapeutic Exercise   Outcome: NOT MET  Sup level for all BADLs (Mod I for grooming and UB dressing)  Goal: LTG-By discharge, patient will perform bathroom transfers  1) Individualized Goal:  Mod I with AD/DME prn.  2) Interventions:  OT Group Therapy, OT Self Care/ADL, OT Community Reintegration, OT Manual Ther Technique, OT Neuro Re-Ed/Balance, OT Therapeutic Activity, OT Evaluation and OT Therapeutic Exercise     Outcome: NOT MET  Sup level for all transfers  Goal: LTG-By discharge, patient will complete basic home management  1) Individualized Goal:  For tasks necessary at d/c at supervision to Mod I level any  AE/AD/DME prn.  2) Interventions:  OT Group Therapy, OT Self Care/ADL, OT Community Reintegration, OT Manual Ther Technique, OT Neuro Re-Ed/Balance, OT Therapeutic Activity, OT Evaluation and OT Therapeutic Exercise   Outcome: NOT MET  Goal was not addressed in tx sessions

## 2019-07-26 NOTE — CARE PLAN
Problem: Infection  Goal: Will remain free from infection  Outcome: PROGRESSING AS EXPECTED  Patient does not present signs and symptoms of infection such as fever, inflammation, purulent drainage, change in LOC, turbid urine or loose stools.     Problem: Respiratory:  Goal: Respiratory status will improve  Outcome: PROGRESSING AS EXPECTED  Patient does not have any signs of respiratory distress during this shift. Patient is currently on room air.

## 2019-07-26 NOTE — DISCHARGE PLANNING
Future Appointments  Date Time Provider Department Center   7/29/2019 9:40 AM [facility] MANA[facility] [address]   8/1/2019 1:00 PM Jessica Francisco M.D. [address]   8/6/2019 11:30 AM [id number] [address]   9/27/2019 2:30 PM Eulalia Hunter D.O. [facility] [place]

## 2019-07-26 NOTE — PROGRESS NOTES
Hospital Medicine Daily Progress Note      Chief Complaint:  AFib, HTN, DM    Interval History:  Pt seen and examined in dining room.  Has multiple questions regarding his lymphoma--defer to Oncology.    Review of Systems  Review of Systems   Constitutional: Negative for chills and fever.   HENT: Negative.    Eyes: Negative.    Respiratory: Negative for cough and shortness of breath.    Cardiovascular: Negative for chest pain and palpitations.   Gastrointestinal: Negative for abdominal pain, nausea and vomiting.   Skin: Negative for itching and rash.        Physical Exam  Temp:  [36 °C (96.8 °F)-37.2 °C (99 °F)] 36 °C (96.8 °F)  Pulse:  [63-88] 83  Resp:  [16-18] 18  BP: (108-131)/(64-86) 113/74  SpO2:  [94 %-98 %] 94 %    Physical Exam   Constitutional: He is oriented to person, place, and time. No distress.   HENT:   Right Ear: External ear normal.   Left Ear: External ear normal.   Nose: Nose normal.   Crani incision C/D/I   Eyes: Conjunctivae and EOM are normal. Right eye exhibits no discharge. Left eye exhibits no discharge.   Neck: Normal range of motion. Neck supple. No tracheal deviation present.   Cardiovascular: Normal rate, regular rhythm, S1 normal and S2 normal.    Pulmonary/Chest: No respiratory distress. He has no wheezes. He has no rhonchi.   Decreased BS   Abdominal: Soft. Bowel sounds are normal. He exhibits no distension. There is no tenderness.   Musculoskeletal: He exhibits no edema or tenderness.   Neurological: He is alert and oriented to person, place, and time. No sensory deficit.   Skin: Skin is warm and dry. He is not diaphoretic. No cyanosis.   Vitals reviewed.      Fluids    Intake/Output Summary (Last 24 hours) at 07/26/19 1119  Last data filed at 07/26/19 0849   Gross per 24 hour   Intake              880 ml   Output              540 ml   Net              340 ml       Laboratory                      Assessment/Plan  Intracranial mass- (present on admission)   Assessment & Plan    S/P  resection  Pathology +B cell lymphoma  Noted PET scan pending  Outpt Heme/Onc F/U     Azotemia- (present on admission)   Assessment & Plan    Continue to encourage PO fluids     Type 2 diabetes mellitus without complication, without long-term current use of insulin (HCC)- (present on admission)   Assessment & Plan    HbA1c 10.0  Serum glucose well controlled on Metformin  Decreased Lantus to avoid hypoglycemia  Will not need Lantus or SSI on discharge     CAD (coronary artery disease)- (present on admission)   Assessment & Plan    H/O stent x 3  On Lipitor and Metoprolol  Consider ASA when cleared by Neurosurgery  Consider ACE-I if blood pressure and renal function tolerates     Hypertension- (present on admission)   Assessment & Plan    Blood pressure controlled on Metoprolol     Atrial fibrillation (HCC)- (present on admission)   Assessment & Plan    Rate controlled on Metoprolol     Leukocytosis   Assessment & Plan    2/2 steroids  WBC now resolved     Vitamin D insufficiency   Assessment & Plan    Vit D level 27  On supplementation     History of prostate cancer- (present on admission)   Assessment & Plan    S/P brachytherapy     Full Code    Reviewed w/ Dr. Bella

## 2019-07-26 NOTE — THERAPY
Speech Language Pathology  Daily Treatment     Patient Name: Marcio More  Age:  73 y.o., Sex:  male  Medical Record #: [id number]  Today's Date: 7/26/2019     Subjective    Pt was pleasant and cooperative during this ST session      Objective       07/26/19 1001   SCCAN (Scales of Cognitive and Communicative Ability for Neurorehabilitation)   Oral Expression - Raw Score 18   Oral Expression - Scale Performance Score 95   Orientation - Raw Score 12   Orientation - Scale Performance Score 100   Memory - Raw Score 15   Memory - Scale Performance Score 79   Speech Comprehension - Raw Score 12   Speech Comprehension - Scale Performance Score 92   Reading Comprehension - Raw Score 12   Reading Comprehension - Scale Performance Score 100   Writing - Raw Score 7   Writing - Scale Performance Score 100   Attention - Raw Score 14   Attention - Scale Performance Score 88   Problem Solving - Raw Score 20   Problem Solving - Scale Performance Score 87   SCCAN Total Raw Score 85   SCCAN Degree of Severity Mild Impairment   SLP Total Time Spent   SLP Individual Total Time Spent (Mins) 60   Charge Group   SLP Cognitive Skill Development 4       FIM Eating Score:     Eating Description:       FIM Comprehension Score:  7 - Independent  Comprehension Description:       FIM Expression Score:  7 - Independent  Expression Description:       FIM Social Interaction Score:  7 - Independent  Social Interaction Description:       FIM Problem Solving Score:  6 - Modified Independent  Problem Solving Description:  Verbal cueing, Therapy schedule, Increased time    FIM Memory Score:  5 - Standby Prompting/Supervision or Set-up  Memory Description:  Verbal cueing, Therapy schedule      Assessment    Pt completed a cognitive outcome assessment using the SCCAN and scored overall 85/94 indicating mild cognitive deficits (score increased from 80/94).  Pt also demonstrated improvement in the areas of memory (63% to 79%) and problem solving (83% to  87%).     Plan    Pt to discharge home tomorrow

## 2019-07-26 NOTE — DISCHARGE PLANNING
"Spoke w/ wife Mell via phone to review IDT recommendations & targeted DC date. Discussed family training availability. Wife states \"I have pretty much been following beside him for the past year & a half. I hold on to his belt loops if needed. I'm comfortable w/ that already. I have concerns about helping me get up though.\" Offered for patient & wife to spend Friday night in [place] prior to discharge Saturday. Daughter Aissatou staying at parents home in [place]. Wife asks me to speak w/ daughter about TLA overnight & family training. Daughter Aissatou states \"I'm concerned about him falling. My mother cannot stop him from falling or pick him up.\" Reassured patient has made functional mobility progress since admit. Offered family training to daughter. Wife & daughter avail Friday at 3pm for family training. Message left w/ therapy scheduling. Updated wife regarding ONC appt set up for 8.16.19. Daughter listening to phone conversation & interrupted to voice concern about ONC appt date. States \"it needs to be sooner than that. Dr. Ferrer told us the work up needed to happen as soon as possible. My father has a fast growing cancer. We need answers about the path report, type of cancer, primary sight, treatment, etc. Waiting until the 16th is unacceptable.\" Encouraged daughter to contact ONC office at [phone] & speak to office personal regarding concerns or to go to ONC office to discuss in person. Emotional support provided.  "

## 2019-07-26 NOTE — CONSULTS
DATE OF SERVICE:  07/25/2019    PODIATRY CONSULTATION    LOCATION:  [facility].    REQUESTING PHYSICIAN:  Leticia Bella MD, per order, 07/24/2019.    HISTORY OF PRESENT ILLNESS:  H and P reviewed, 07/16/2019 admit date, written   by Dr. Bella, reveals a 73-year-old male with a cerebral lymphoma, status   post craniotomy for rehab, had some lower extremity dysfunction and weakening.    Podiatry consult for ingrown toenails with also thickened mycotic toenails   1-10.    REVIEW OF SYSTEMS:  Please refer to Dr. Bella' note.    PAST MEDICAL HISTORY:  Arthritis; AFib; back pain; clotting disorder, a   history of blood clot, left leg; coronary artery disease; prostate cancer;   cataract; dyslipidemia; high cholesterol; hypertension; ileus; myocardial   infarct x3; renal disorder, left kidney; sleep apnea, snoring; urinary   incontinence.    PAST SURGICAL HISTORY:  Craniotomy on 07/10/2019, right sided for tumor;   diskectomy, lumbar laminectomy in 2018; laminectomy, 2018; foraminotomy in   2018; thoracic 2-3 fusion, 2016; appendectomy, 2002; also, a history of   cardiac surgery and stents.    FAMILY HISTORY:  Noncontributory.  There is no history of lymphoma.    MEDICATIONS:  Oxycodone in the form of Roxicodone, Apresoline, Tylenol,   Loreta-Colace, MiraLax, milk of magnesia, artificial tears, Cepacol, Maalox   Plus, Desyrel, Zofran ODT, Ocean nasal spray, Colace, transderm patch,   insulin, dextrose, Decadron, Keppra, Lopressor, Theragran.    ALLERGIES:  TO GABAPENTIN.    PSYCHOSOCIAL HISTORY:  Lives in a one-didier home with no living assistance.    PHYSICAL EXAMINATION:  VITAL SIGNS:  Upon admission, 6 feet 1 inch; weight 207; temperature 97.9;   blood pressure 98/64, abnormal for him; pulse is 74; respirations 18.  Refer   to Dr. Bella' notes for the rest of the  examination.  EXTREMITIES:  Lower extremity examination reveals 3/4 dorsalis pedis and   posterior tibial pulses bilateral  symmetrical.  Capillary fill time 1 second,   digits 1 through 10.  Hair growth is present.  Nails are thickened severely,   hallux greater than 2 through 5 bilateral.  SKIN:  Warm and supple.  There is no edema.  Toenails 1 bilateral deeply   incurvated medial and lateral borders, especially the medial, positive   hypertrophy, positive hyperkeratosis in the medial rims, negative cellulitis,   negative purulence, mild erythema, no calor, mild tenderness to touch.  ORTHOPEDIC:  No bunion or hammertoe deformities noted.  MUSCULOSKELETAL:  Strength is approximately 4/5 bilateral, major foot and   ankle groups.  NEUROLOGIC:  Sharp, dull light touch, vibratory and proprioception are within   normal limits.    PODIATRIC ASSESSMENT:  1.  Onychocryptosis.  2.  Onychomycosis.  3.  Pain.    PLAN:  Professional care necessary secondary to the morbidity incurred by the   diagnostic condition and the keratosis, the mycosis and the pain.    TREATMENT GOALS:  To decrease infection potential, decrease pain, increase   activities of daily living.    FOLLOWUP:  As needed.  Reconsult for emergent podiatric problems.    DESCRIPTION OF PROCEDURE:  Order review, med sheet review, history and   physical review, evaluation of new patient, discussion with patient and staff.    Treatment performed:  A Betadine prep.  A toenail border avulsion was   performed without anesthesia without incident.  Debridement of 10 mycotic   toenails 1 through 10 performed, a Dremel thickness without incident.       ____________________________________     MD HIGINIO SCHUMACHER / KATIE    DD:  07/25/2019 17:03:59  DT:  07/25/2019 18:49:30    D#:  [id number]  Job#:  [id number]

## 2019-07-26 NOTE — PROGRESS NOTES
"Rehab Progress Note     Encounter Date: 7/26/2019    CC: intracranial tumor, decreased balance     Interval Events (Subjective)  Patient sitting up in room. He reports he is doing well. His wife and daughter are coming in later today. Discussed will continue his home medications at discharge except no oral anticoagulation until cleared by NSG.  Discussed also no insulin at discharge. Discussed with hospitalist and no Lantus or SSI at discharge.  Discussed with wife and daughter. Had discussion about follow-up. Discussed that specifics of treatment, diagnosis, and prognosis would be better discussed with Oncology team. Denies SOB. Denies pain.      IDT Team Meeting 7/25/2019  DC/Disposition:  7/27/19    Objective:  VITAL SIGNS: /74   Pulse 83   Temp 36 °C (96.8 °F) (Temporal)   Resp 18   Ht 1.854 m (6' 1\")   Wt 90.7 kg (199 lb 14.4 oz)   SpO2 94%   BMI 26.37 kg/m²   Gen: NAD  Psych: Mood and affect appropriate  CV: RRR, no edema  Resp: CTAB, no upper airway sounds  Abd: NTND  Neuro: AOx4, 5/5 BUE    Recent Results (from the past 72 hour(s))   ACCU-CHEK GLUCOSE    Collection Time: 07/23/19  5:18 PM   Result Value Ref Range    Glucose - Accu-Ck 183 (H) 65 - 99 mg/dL   ACCU-CHEK GLUCOSE    Collection Time: 07/23/19  8:22 PM   Result Value Ref Range    Glucose - Accu-Ck 121 (H) 65 - 99 mg/dL   ACCU-CHEK GLUCOSE    Collection Time: 07/24/19  7:51 AM   Result Value Ref Range    Glucose - Accu-Ck 134 (H) 65 - 99 mg/dL   ACCU-CHEK GLUCOSE    Collection Time: 07/24/19 11:39 AM   Result Value Ref Range    Glucose - Accu-Ck 150 (H) 65 - 99 mg/dL   ACCU-CHEK GLUCOSE    Collection Time: 07/24/19  5:15 PM   Result Value Ref Range    Glucose - Accu-Ck 135 (H) 65 - 99 mg/dL   ACCU-CHEK GLUCOSE    Collection Time: 07/24/19  9:18 PM   Result Value Ref Range    Glucose - Accu-Ck 137 (H) 65 - 99 mg/dL   ACCU-CHEK GLUCOSE    Collection Time: 07/25/19  7:39 AM   Result Value Ref Range    Glucose - Accu-Ck 114 (H) 65 - 99 " mg/dL   ACCU-CHEK GLUCOSE    Collection Time: 07/25/19 11:32 AM   Result Value Ref Range    Glucose - Accu-Ck 152 (H) 65 - 99 mg/dL   ACCU-CHEK GLUCOSE    Collection Time: 07/25/19  5:07 PM   Result Value Ref Range    Glucose - Accu-Ck 91 65 - 99 mg/dL   ACCU-CHEK GLUCOSE    Collection Time: 07/25/19  8:08 PM   Result Value Ref Range    Glucose - Accu-Ck 97 65 - 99 mg/dL   ACCU-CHEK GLUCOSE    Collection Time: 07/26/19  8:02 AM   Result Value Ref Range    Glucose - Accu-Ck 102 (H) 65 - 99 mg/dL   ACCU-CHEK GLUCOSE    Collection Time: 07/26/19 11:24 AM   Result Value Ref Range    Glucose - Accu-Ck 104 (H) 65 - 99 mg/dL       Current Facility-Administered Medications   Medication Frequency   • insulin glargine (LANTUS) injection 10 Units Q EVENING   • senna-docusate (PERICOLACE or SENOKOT S) 8.6-50 MG per tablet 2 Tab BID PRN    And   • polyethylene glycol/lytes (MIRALAX) PACKET 1 Packet QDAY PRN    And   • magnesium hydroxide (MILK OF MAGNESIA) suspension 30 mL QDAY PRN    And   • bisacodyl (DULCOLAX) suppository 10 mg QDAY PRN   • metFORMIN (GLUCOPHAGE) tablet 850 mg TID WITH MEALS   • oxybutynin SR (DITROPAN-XL) tablet 10 mg Q EVENING   • insulin regular (HUMULIN R) injection 3-14 Units 4X/DAY ACHS    And   • glucose 4 g chewable tablet 16 g Q15 MIN PRN    And   • dextrose 50% (D50W) injection 50 mL Q15 MIN PRN   • vitamin D (cholecalciferol) tablet 1,000 Units DAILY   • Respiratory Care per Protocol Continuous RT   • oxyCODONE immediate-release (ROXICODONE) tablet 5 mg Q3HRS PRN   • oxyCODONE immediate release (ROXICODONE) tablet 10 mg Q3HRS PRN   • hydrALAZINE (APRESOLINE) tablet 25 mg Q8HRS PRN   • acetaminophen (TYLENOL) tablet 650 mg Q4HRS PRN   • artificial tears 1.4 % ophthalmic solution 1 Drop PRN   • benzocaine-menthol (CEPACOL) lozenge 1 Lozenge Q2HRS PRN   • mag hydrox-al hydrox-simeth (MAALOX PLUS ES or MYLANTA DS) suspension 20 mL Q2HRS PRN   • traZODone (DESYREL) tablet 50 mg QHS PRN   • ondansetron  (ZOFRAN ODT) dispertab 4 mg 4X/DAY PRN    Or   • ondansetron (ZOFRAN) syringe/vial injection 4 mg 4X/DAY PRN   • sodium chloride (OCEAN) 0.65 % nasal spray 2 Spray PRN   • scopolamine (TRANSDERM-SCOP) patch 1 Patch Q72HRS PRN   • atorvastatin (LIPITOR) tablet 20 mg QHS   • metoprolol (LOPRESSOR) tablet 25 mg TWICE DAILY   • therapeutic multivitamin-minerals (THERAGRAN-M) tablet 1 Tab DAILY       Orders Placed This Encounter   Procedures   • Diet Order Diabetic     Standing Status:   Standing     Number of Occurrences:   1     Order Specific Question:   Diet:     Answer:   Diabetic [3]     Order Specific Question:   Consistency/Fluid modifications:     Answer:   Thin Liquids [3]       Assessment:  Active Hospital Problems    Diagnosis   • *Primary cerebral lymphoma (HCC)   • Intracranial mass   • Hyponatremia   • Atrial fibrillation (HCC)   • CAD (coronary artery disease)   • Hyperglycemia   • Hypertension   • Chronic back pain   • Dyslipidemia   • History of prostate cancer       Medical Decision Making and Plan:  nonTraumatic Brain injury - 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 Ruffin on 7/10/19. Steroid taper completed 7/21/19. Keppra prophylaxis completed 7/18/19. Staples out - 7/23/19.   -Follow-up with Dr. Ruffin 7/24/19 - no new recommendations. Follow-up with Oncology - PET scan ordered, outpatient MRI - body, US, LP?  -PT and OT for mobility and ADLs  -SLP for cognition/speech     Dysphagia - Upgraded to dysphagia 2 with NTL prior to transfer.  SLP for swallow evaluation - upgraded to regular with thins. Resolved     DM - Patient on SSI on transfer.  Previously on Lantus 12 U qEvening  -Restart Lantus 12 U. Consult hospitalist, restarted on home metformin. Metformin changed to 850 mg TID. Will not need at discharge      A fib/HTN - Patient on metoprolol 25 mg BID. Previously on anticoagulation. No ASA or AC per NSG at discharge. Previously on Azilsartan 40 mg daily. Continue  to monitor  -Patient with A Fib HR into 100s at time. Consult hospitalist     Hyponatremia - 129 on transfer. Continue to monitor     HLD - Patient on Atorvastatin on transfer.     Urinary Frequency - Patient with severe urge incontinence. Will start on Oxybutynin 5 mg, no improvement. Still having frequent low volume, increase Oxybutynin. Less frequent per documentation, continue at current dose.     GI Ppx - Patient on stool softeners while on pain medications. Discontinue prilosec, off of steroids.      DVT Ppx - Patient is high risk for bleed s/p removal of hemorrhagic tumor. Continue to monitor ambulation.     Total time:  40 minutes.  I spent greater than 50% of the time for patient care, counseling, and coordination on this date, including unit/floor time, and face-to-face time with the patient as per interval events and assessment and plan above. Topics discussed included discharge planning, discharge medications, and family discussion about DLBCL.     Leticia Bella M.D.

## 2019-07-27 NOTE — THERAPY
Physical Therapy   Daily Treatment     Patient Name: Marcio More  Age:  73 y.o., Sex:  male  Medical Record #: [id number]  Today's Date: 7/26/2019     Precautions  Precautions: Fall Risk  Comments: impaired safety, pt goes by Richie (middle name)    Subjective    Patient agreeable to PT.     Objective       07/26/19 1501   Vitals   O2 (LPM) 0   O2 Delivery None (Room Air)   Bed Mobility    Supine to Sit Supervised   Sit to Supine Modified Independent   Sit to Stand Contact Guard Assist   Scooting Modified Independent   Rolling Supervised   Interdisciplinary Plan of Care Collaboration   IDT Collaboration with  Family / Caregiver;;Nursing   Collaboration Comments CM present at beginning of session; SO and dtr hands-on family training throughout session; handoff to RN at end of session   PT Total Time Spent   PT Individual Total Time Spent (Mins) 75   PT Charge Group   PT Gait Training 3   PT Therapeutic Activities 2     Focus on family training throughout session with hands-on participation; completed all mobility FIMs and IRF-Meir; discussed POC and expectations of Min A with uneven surfaces, curbs, and possibly the last stair.    FIM Bed/Chair/Wheelchair Transfers Score: 4 - Minimal Assistance  Bed/Chair/Wheelchair Transfers Description:  Increased time, Supervision for safety, Verbal cueing, Set-up of equipment (CGA, gait belt, SPC.  1 rep at hospital bed, 1 rep at standard bed, and 1 rep at raised mat.  SO provided guarding for 2.5 of the reps today.)    FIM Walking Score:  4 - Minimal Assistance  Walking Description:  Extra time, Safety concerns, Verbal cueing, Supervision for safety, Requires incidental assist (CGA at gait belt, SPC.  SO provided guarding for 80% of ambulation this session; cueing for path finding; focus on family training and education with pt's SO and dtr.  4 reps, up to 350 feet without fatigue noted.)    FIM Wheelchair Score:  0 - Not tested,unsafe activity  Wheelchair Description:   "     FIM Stairs Score:  4 - Minimal Assistance  Stairs Description:  Hand rails, Extra time, Safety concerns, Verbal cueing, Supervision for safety, Requires incidental assist, Ascends/descends 12 to 14 steps (gait belt, SPC at top/bottom, B railings, 1 set of 12 standard stairs; CGA throughout; very bradykinetic at last stair; dtr and SO each guarding for 4 stairs.  Discussed likelihood of increased A in future and with fatigue.  Also: 4\" curb with Min A, SPC)    FIM Toiletin - Standby Prompting/Supervision or Set-up  Toileting Description:  Grab bar, Increased time, Supervision for safety (gait belt, SPC)    FIM Toilet Transfer Score:  5 - Standby Prompting/Supervision or Set-up  Toilet Transfer Description:  Grab bar, Increased time, Supervision for safety, Verbal cueing, Set-up of equipment (gait belt, SPC)      Assessment    Pt's dtr has great carryover with education and SO is receptive to guided cueing/instruction; consistent performance for CLOF with patient.  Impaired problem solving and balance discussed and family is receptive.  Pt is ready for next level of care.    Plan    D/c tomorrow 19 home with family.      "

## 2019-07-27 NOTE — CARE PLAN
Problem: Safety  Goal: Will remain free from falls  Outcome: PROGRESSING AS EXPECTED  Patient has remained free of falls during this shift. Uses call light appropriately to get help with transfers and does not attempt to transfer without assistance.     Intervention: Assess risk factors for falls    RodriguezVinnie fall assessment completed (see flow sheets). Patient is a moderate fall risk.      Problem: Urinary Elimination:  Goal: Ability to reestablish a normal urinary elimination pattern will improve  Outcome: PROGRESSING AS EXPECTED  Patient has been able to remain continent this shift using the urinal. Voids are patient initiated and occur every 3 to 4 hours.

## 2019-07-27 NOTE — DISCHARGE SUMMARY
Rehab Discharge Summary    Admission Date: 7/16/2019    Discharge Date: 7/27/2019    Attending Provider: Leticia Bella MD/PhD    Admission Diagnosis:   Active Hospital Problems    Diagnosis   • Intracranial mass   • Hyponatremia   • Atrial fibrillation (HCC)   • CAD (coronary artery disease)   • Type 2 diabetes mellitus without complication, without long-term current use of insulin (HCC)   • Hypertension   • Azotemia   • Dyslipidemia   • Hypomagnesemia   • Hypophosphatemia   • Vitamin D insufficiency   • Leukocytosis   • History of prostate cancer       Discharge Diagnosis:  Active Hospital Problems    Diagnosis   • Intracranial mass   • Hyponatremia   • Atrial fibrillation (HCC)   • CAD (coronary artery disease)   • Type 2 diabetes mellitus without complication, without long-term current use of insulin (HCC)   • Hypertension   • Azotemia   • Dyslipidemia   • Hypomagnesemia   • Hypophosphatemia   • Vitamin D insufficiency   • Leukocytosis   • History of prostate cancer       HPI per H&P:  Patient is a 73 y.o. year-old male with a past medical history significant for A fib on AC, CAD, Hx of prostate cancer, HTN, HLD, and WALTER admitted to [facility] on 7/5/2019 11:59 PM with worsening weakness, fatig[place]. Patient found to have large 4.5 cm right fontal lobe mass. NSG was consulted and recommended starting Keppra and steroids as well as possible intervention. Patient underwent right sided resection on 7/10/19 with Dr. Ruffin without complication.  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 Hematology and oncology were consulted for high grade B cell lymphoma and recommended systemic chemotherapy. Per oncology would recommend starting 3-4 weeks post-operatively.      Patient was last evaluated by PT on 7/15/19 and was Sofie for bed mobility and supervised for mobility.  Patient was last evaluated by SLP on 7/13/19 and was recommended for NTFL diet  with 1:1 supervision due to dysphagia.  Patient was last evaluated by OT on 7/12/19 and was min-modA for ADLs. Patient previously living in a 1 story home with 2 steps to enter; living with spouse.       Patient was admitted to [facility] on 7/16/2019.     Hospital Course by Problem List:  nonTraumatic Brain injury - 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 Ruffin on 7/10/19. Steroid taper completed 7/21/19. Keppra prophylaxis completed 7/18/19. Staples out - 7/23/19. Patient underwent acute inpatient rehabilitation from 7/16/19 to 7/26/19 with good improvement in mobility, ADLs, and cognition.  -Follow-up with Dr. Ruffin 7/24/19 - no new recommendations.   -Follow-up with Oncology - PET scan ordered, outpatient MRI - body, US, LP? Final pathology reported on 7/26/19. Discussed briefly with family, will need more in depth discussion at follow-up with Oncology.      Dysphagia - Upgraded to dysphagia 2 with NTL prior to transfer.  SLP for swallow evaluation - upgraded to regular with thins. Resolved     DM - Patient on SSI on transfer.  Previously on Lantus 12 U qEvening. Restart Lantus 12 U. Metformin changed to 850 mg TID. Will not need Insulin at discharge. A1c 10 on admission, family reporting no diabetes prior to steroids, performed counseling.    -Continue Metformin 850 mg TID, can discuss DM with PCP     A fib/HTN - Patient on metoprolol 25 mg BID. Previously on anticoagulation. No ASA or AC per NSG at discharge. Previously on Azilsartan 40 mg daily. Continue to monitor.     Hyponatremia - 129 on transfer. Continue to monitor      HLD - Patient on Atorvastatin on transfer.     Urinary Frequency - Patient with severe urge incontinence. Will start on Oxybutynin 5 mg, no improvement. Still having frequent low volume, increase Oxybutynin. Less frequent per documentation, continue at current dose.   -Improved on Oxybutynin 10 mg     GI Ppx - Patient on stool softeners  while on pain medications. Discontinue prilosec, off of steroids.      DVT Ppx - Patient is high risk for bleed s/p removal of hemorrhagic tumor. Continue to monitor ambulation.        Functional Status at Discharge  Eatin - Modified Independent  Eating Description:  Increased time  Groomin - Modified Independent  Grooming Description:  Set-up of equipment, Increased time (Standing at sinkside to brush teeth)  Bathin - Standby Prompting/Supervision or Set-up  Bathing Description:  Grab bar, Hand held shower, Supervision for safety, Increased time (Mod I while seated, Sup for standing )  Upper Body Dressin - Modified Independent  Upper Body Dressing Description:  Supervision for safety, Increased time (Mod I while seated for pull over shirt )  Lower Body Dressin - Standby Prompting/Supervsion or Set-up  Lower Body Dressing Description:  5 - Standby Prompting/Supervsion or Set-up  Discharge Location : Home  Patient Discharging with Assist of: Spouse / Significant Other;Other (See Comments) (Friends and adult children nearby to help out)  Level of Supervision Required: Intermittent Supervision  Recommended Equipment for Discharge: Single Point Cane;Shower Chair;Grab Bars in Tub / Shower;Hand Held Shower Head;Grab Bars by Toilet  Recommended Services Upon Discharge: Outpatient Occupational Therapy  Long Term Goals Met: 3  Long Term Goals Not Met: 0  Reason(s) for Goals Not Met: Pt at Sup level for BADLs and transfers  Criteria for Termination of Services: Maximum Function Achieved for Inpatient Rehabilitation  Walk:  4 - Minimal Assistance  Distance Walked:  Walks a minimum of 150 feet  Walk Description:  Extra time, Safety concerns, Verbal cueing, Supervision for safety, Requires incidental assist (CGA at gait belt, SPC.  SO provided guarding for 80% of ambulation this session; cueing for path finding; focus on family training and education with pt's SO and dtr.  4 reps, up to 350 feet without  "fatigue noted.)  Wheelchair:  0 - Not tested,unsafe activity  Distance Propelled:      Wheelchair Description:     Stairs 4 - Minimal Assistance  Stairs DescriptionHand rails, Extra time, Safety concerns, Verbal cueing, Supervision for safety, Requires incidental assist, Ascends/descends 12 to 14 steps (gait belt, SPC at top/bottom, B railings, 1 set of 12 standard stairs; CGA throughout; very bradykinetic at last stair; dtr and SO each guarding for 4 stairs.  Discussed likelihood of increased A in future and with fatigue.  Also: 4\" curb with Min A, SPC)  Discharge Location: Home  Patient Discharging with Assist of: Spouse / Significant Other;Family  Level of Supervision Required Upon Discharge: Intermittent Supervision  Recommended Equipment for Discharge: Single Point Cane;Other (See Comments) (and Gait belt)  Recommeded Services Upon Discharge: Home Health Physical Therapy;Outpatient Physical Therapy  Long Term Goals Met: 0  Long Term Goals Not Met: 5  Reason(s) for Goals Not Met: Patient requires CGA-Min A for most mobility for safety due to balance impairments; SO/family aware.  Criteria for Termination of Services: Maximum Function Achieved for Inpatient Rehabilitation  Comprehension Mode:  Both  Comprehension:  7 - Independent  Comprehension Description:  Verbal cues  Expression Mode:  Both  Expression:  7 - Independent  Expression Description:  Verbal cueing  Social Interaction:  7 - Independent  Social Interaction Description:     Problem Solvin - Modified Independent  Problem Solving Description:  Verbal cueing, Therapy schedule, Increased time  Memory:  5 - Standby Prompting/Supervision or Set-up  Memory Description:  Verbal cueing, Therapy schedule       ILeticia M.D., personally performed a complete drug regimen review and no potential clinically significant medication issues were identified.   Discharge Medication:     Medication List      START taking these medications      " Instructions   metFORMIN 850 MG Tabs  Start taking on:  7/27/2019  Commonly known as:  GLUCOPHAGE   Take 1 Tab by mouth 3 times a day, with meals.  Dose:  850 mg     oxybutynin SR 10 MG CR tablet  Commonly known as:  DITROPAN-XL   Take 1 Tab by mouth every evening.  Dose:  10 mg        CONTINUE taking these medications      Instructions   Acetaminophen 650 MG Tabs   Take 650-1,300 mg by mouth every four hours as needed for Mild Pain (Pain or temp = or > 101 F).  Dose:  650-1300 mg     atorvastatin 20 MG Tabs  Commonly known as:  LIPITOR   Take 1 Tab by mouth every bedtime.  Dose:  20 mg     Azilsartan Medoxomil 40 MG Tabs   Take 40 mg by mouth every day.  Dose:  40 mg     docusate sodium 100 MG Caps   Take 100 mg by mouth 2 Times a Day.  Dose:  100 mg     nebivolol 5 MG Tabs tablet  Commonly known as:  BYSTOLIC   Take 1 Tab by mouth every day.  Dose:  5 mg     therapeutic multivitamin-minerals Tabs   Take 1 Tab by mouth every day.  Dose:  1 Tab        STOP taking these medications    artificial tears 1.4 % Soln     dexamethasone 1 MG Tabs  Commonly known as:  DECADRON     dexamethasone 2 MG tablet  Commonly known as:  DECADRON     dexamethasone 4 MG Tabs  Commonly known as:  DECADRON     insulin glargine 100 UNIT/ML Soln  Commonly known as:  LANTUS     insulin regular 100 Unit/mL Soln  Commonly known as:  HUMULIN R     levETIRAcetam 500 MG Tabs  Commonly known as:  KEPPRA            Discharge Diet:  Regular, diabetic    Discharge Activity:  As tolerated     Disposition:  Patient to discharge home with family support and community resources.     Equipment:  SPC    Follow-up & Discharge Instructions:  Follow up with your primary care provider (PCP) within 7-10 days of discharge to review your medications and take over your care.     If you develop chest pain, fever, chills, change in neurologic function (weakness, sensation changes, vision changes), or other concerning sxs, seek immediate medical attention or call  [phone].    Oncology, NSG    Condition on Discharge:  Gaye Bella M.D.    This note is for documentation purposes only.

## 2019-07-27 NOTE — PROGRESS NOTES
Hospital Medicine Daily Progress Note      Chief Complaint:  AFib, HTN, DM    Interval History:  Pt looking forward to going home today.  No new problems.    Review of Systems  Review of Systems   Constitutional: Negative for chills and fever.   HENT: Negative.    Eyes: Negative.    Respiratory: Negative for cough and shortness of breath.    Cardiovascular: Negative for chest pain and palpitations.   Gastrointestinal: Negative for abdominal pain, nausea and vomiting.   Skin: Negative for itching and rash.        Physical Exam  Temp:  [36.4 °C (97.5 °F)-36.7 °C (98 °F)] 36.7 °C (98 °F)  Pulse:  [69-98] 69  Resp:  [16-20] 20  BP: (112-125)/(70-79) 120/79  SpO2:  [96 %] 96 %    Physical Exam   Constitutional: He is oriented to person, place, and time. No distress.   HENT:   Right Ear: External ear normal.   Left Ear: External ear normal.   Nose: Nose normal.   Crani incision C/D/I   Eyes: Conjunctivae and EOM are normal. Right eye exhibits no discharge. Left eye exhibits no discharge.   Neck: Normal range of motion. Neck supple. No tracheal deviation present.   Cardiovascular: Normal rate, regular rhythm, S1 normal and S2 normal.    Pulmonary/Chest: No respiratory distress. He has no wheezes. He has no rhonchi.   Decreased BS   Abdominal: Soft. Bowel sounds are normal. He exhibits no distension. There is no tenderness.   Musculoskeletal: He exhibits no edema or tenderness.   Neurological: He is alert and oriented to person, place, and time. No sensory deficit.   Skin: Skin is warm and dry. He is not diaphoretic. No cyanosis.   Vitals reviewed.      Fluids    Intake/Output Summary (Last 24 hours) at 07/27/19 1104  Last data filed at 07/27/19 0843   Gross per 24 hour   Intake              360 ml   Output             1200 ml   Net             -840 ml       Laboratory                      Assessment/Plan  Intracranial mass- (present on admission)   Assessment & Plan    S/P crani w/ mass resection  Pathology +B cell  lymphoma  Outpt Heme/Onc F/U for PET scan     Azotemia- (present on admission)   Assessment & Plan    Continue to encourage PO fluids     Type 2 diabetes mellitus without complication, without long-term current use of insulin (HCC)- (present on admission)   Assessment & Plan    HbA1c 10.0  Serum glucose well controlled on Metformin  Will not need Lantus or SSI on discharge     CAD (coronary artery disease)- (present on admission)   Assessment & Plan    H/O stent x 3  On Lipitor and Metoprolol  Consider ASA when cleared by Neurosurgery  Consider ACE-I if blood pressure and renal function tolerates     Hypertension- (present on admission)   Assessment & Plan    Blood pressure controlled on Metoprolol     Atrial fibrillation (HCC)- (present on admission)   Assessment & Plan    Rate controlled on Metoprolol  On no anticoagulation or antiplatelets given recent crani     Leukocytosis   Assessment & Plan    2/2 steroids  WBC now resolved     Vitamin D insufficiency   Assessment & Plan    Vit D level 27  On supplementation     History of prostate cancer- (present on admission)   Assessment & Plan    S/P brachytherapy     Full Code

## 2019-07-27 NOTE — CARE PLAN
Problem: Balance  Goal: STG-Within one week, patient will  1) Individualized goal:  Score >35/56 on the Payan balance assessment  2) Interventions:  PT Group Therapy, PT Gait Training, PT Self Care/Home Eval, PT Therapeutic Exercises, PT Neuro Re-Ed/Balance, PT Therapeutic Activity and PT Evaluation     Outcome: DISCHARGED-GOAL NOT MET Date Met: 07/26/19      Problem: Mobility  Goal: STG-Within one week, patient will ambulate community distances  1) Individualized goal: >800' with SPC during 6 Minute walk test and SPV to return to PLOF  2) Interventions: PT Group Therapy, PT Gait Training, PT Self Care/Home Eval, PT Therapeutic Exercises, PT Neuro Re-Ed/Balance, PT Therapeutic Activity and PT Evaluation        Outcome: DISCHARGED-GOAL NOT MET Date Met: 07/26/19    Goal: STG-Within one week, patient will ascend and descend four to six stairs  1) Individualized goal: Ascend/descend 3 stairs with B HRs and SPV to safely enter/exit his home  2) Interventions:PT Group Therapy, PT Gait Training, PT Self Care/Home Eval, PT Therapeutic Exercises, PT Neuro Re-Ed/Balance, PT Therapeutic Activity and PT Evaluation      Outcome: DISCHARGED-GOAL NOT MET Date Met: 07/26/19      Problem: Mobility Transfers  Goal: STG-Within one week, patient will transfer bed to chair  1) Individualized goal: SPV with SPC  2) Interventions: PT Group Therapy, PT Gait Training, PT Self Care/Home Eval, PT Therapeutic Exercises, PT Neuro Re-Ed/Balance, PT Therapeutic Activity and PT Evaluation        Outcome: DISCHARGED-GOAL NOT MET Date Met: 07/26/19    Goal: STG-Within one week, patient will transfer in/out of car  1) Individualized goal: SPV in/out of PT cruiser to return home safely with assist of family  2) Interventions: PT Group Therapy, PT Gait Training, PT Self Care/Home Eval, PT Therapeutic Exercises, PT Neuro Re-Ed/Balance, PT Therapeutic Activity and PT Evaluation        Outcome: DISCHARGED-GOAL NOT MET Date Met: 07/26/19      Problem:  PT-Long Term Goals  Goal: LTG-By discharge, patient will maintain balance  1) Individualized goal:  >45/56 on the Payan balance assessment indicating low fall risk  2) Interventions:  PT Group Therapy, PT Gait Training, PT Self Care/Home Eval, PT Therapeutic Exercises, PT Neuro Re-Ed/Balance, PT Therapeutic Activity and PT Evaluation       Outcome: DISCHARGED-GOAL NOT MET Date Met: 07/26/19    Goal: LTG-By discharge, patient will ambulate  1) Individualized goal:  >1000' with SPC during 6 Minute walk test and SPV to return to PLOF  2) Interventions:  PT Group Therapy, PT Gait Training, PT Self Care/Home Eval, PT Therapeutic Exercises, PT Neuro Re-Ed/Balance, PT Therapeutic Activity and PT Evaluation       Outcome: DISCHARGED-GOAL NOT MET Date Met: 07/26/19    Goal: LTG-By discharge, patient will transfer one surface to another  1) Individualized goal:  Mod I  2) Interventions:  PT Group Therapy, PT Gait Training, PT Self Care/Home Eval, PT Therapeutic Exercises, PT Neuro Re-Ed/Balance, PT Therapeutic Activity and PT Evaluation       Outcome: DISCHARGED-GOAL NOT MET Date Met: 07/26/19    Goal: LTG-By discharge, patient will transfer in/out of a car  1) Individualized goal:  SPV in/out of PT cruiser to return home safely with assist of family  2) Interventions:  PT Group Therapy, PT Gait Training, PT Self Care/Home Eval, PT Therapeutic Exercises, PT Neuro Re-Ed/Balance, PT Therapeutic Activity and PT Evaluation       Outcome: DISCHARGED-GOAL NOT MET Date Met: 07/26/19    Goal: LTG-By discharge, patient will  1) Individualized goal:  Ascend/descend 3 stairs with B HRs and SPV to safely enter/exit his home  2) Interventions:PT Group Therapy, PT Gait Training, PT Self Care/Home Eval, PT Therapeutic Exercises, PT Neuro Re-Ed/Balance, PT Therapeutic Activity and PT Evaluation     Outcome: DISCHARGED-GOAL NOT MET Date Met: 07/26/19

## 2019-07-27 NOTE — DISCHARGE PLANNING
Case management Summary:   Met with patient, wife Mell & daughter Aissatou prior to discharge.   Reviewed all follow up appointments: DC clinic, Onc, NSGY, urology, new PCP.    Patient scheduled for PET scan 8.6.19.  Referral made to [facility] and they are have accepted referral and are ready to follow.    No equipment needed.   Discussed w/ wife & daughter ordering bed rail from [employer] if preferred.  Family training completed.    During hospitalization, I have provided support and education and have been available for questions and information during hours of operation, communicated with therapy team and MD along with providing links/resources  to outside services.    Patient verbalizes agreement with all plans and has an understanding of the next steps within the post acute services.     Individualized Goals:   1. To be cleared to drive  2. To get back home  3. To establish with new PCP in [place] w/ [facility]  4. To be able to control my urine    Outcome:   1. Goal not met: will f/u with ALEKS MD for return to driving clearance  2. Goal met & completed: patient discharging home w/ wife to [place].  3. Goal met & completed: f/u appt w/ DC clinic for transition to new PCP appt in September.  4. Goal partially met: ongoing issues w/ urgency. Urology f/u appt in place.

## 2019-07-27 NOTE — DISCHARGE INSTRUCTIONS
[facility] NURSING DISCHARGE INSTRUCTIONS    Blood Pressure : 125/75  Weight: 90.7 kg (199 lb 14.4 oz)  Nursing recommendations for Marcio More at time of discharge are as follows:  Client and Family Member verbalized understanding of all discharge instructions and prescriptions.     Review all your home medications and newly ordered medications with your doctor and/or pharmacist. Follow medication instructions as directed by your doctor and/or pharmacist.    Pain Management:   Discharge Pain Medication Instructions:  Comfort Goal: Comfort at Rest, Comfort with Movement, Sleep Comfortably  Notify your primary care provider if pain is unrelieved with these measures, if the pain is new, or increased in intensity.    Discharge Skin Characteristics:    Discharge Skin Exam:    Wound 07/21/19 Coccyx (Active)   Site Assessment Clean;Intact;Pink 7/26/2019  9:45 PM   Loreta-wound Assessment Clean;Intact 7/26/2019  9:45 PM   Margins Attached edges 7/24/2019  7:50 AM   Wound Length (cm) 1 cm 7/24/2019  7:50 AM   Wound Width (cm) 0.2 cm 7/24/2019  7:50 AM   Wound Depth (cm) 0.1 cm 7/24/2019  7:50 AM   Wound Surface Area (cm^2) 0.2 cm^2 7/24/2019  7:50 AM   Tunneling 0 cm 7/24/2019  7:50 AM   Undermining 0 cm 7/24/2019  7:50 AM   Closure Secondary intention 7/24/2019  7:50 AM   Drainage Amount None 7/24/2019  7:50 AM   Non-staged Wound Description Partial thickness 7/26/2019  9:45 PM   Treatments Site care 7/24/2019  9:00 AM   Cleansing Not Applicable 7/26/2019  9:45 PM   Periwound Protectant Not Applicable 7/26/2019  9:45 PM   Dressing Options Mepilex 7/26/2019  9:45 PM   Dressing Cleansing/Solutions Not Applicable 7/24/2019  7:50 AM   Dressing Changed Changed 7/24/2019  7:50 AM   Dressing Status Intact 7/24/2019  7:50 AM   Dressing Change Frequency As Needed 7/26/2019  9:45 PM   NEXT Dressing Change  07/24/19 7/23/2019  8:38 PM   NEXT Weekly Photo (Inpatient Only) 07/27/19 7/24/2019  7:50 AM   WOUND  NURSE ONLY - Odor None 7/24/2019  7:50 AM   WOUND NURSE ONLY - Exposed Structures None 7/24/2019  7:50 AM   WOUND NURSE ONLY - Tissue Type and Percentage pink 100% 7/24/2019  7:50 AM   WOUND NURSE ONLY - Time Spent with Patient (mins) 30 7/24/2019  7:50 AM     Skin / Wound Care Instructions: Please contact your primary care physician for any change in skin integrity.     If You Have Surgical Incisions / Wounds:  Monitor surgical site(s) for signs of increased swelling, redness or symptoms of drainage from the site or fever as this could indicate signs and symptoms of infection. If these symptoms are noted, notifiy your primary care provider.      Discharge Safety Instructions:       Discharge Safety Concerns:    The interdisciplinary team has made recommendation that you should have adult supervision in the house due to weakness  Anti-embolic stockings are not required to increase circulation to the lower extremities.    Discharge Diet: Diabetic   Discharge Liquids:  Thin  Discharge Bowel Function:  Continent  Please contact your primary care physician for any changes in bowel habits.  Discharge Bowel Program:  Continue to take stool softeners as scheduled and additional bowel medications as needed to help relieve constipation   Discharge Bladder Function:  Continent  Discharge Urinary Devices:  Disposable briefs      Nursing Discharge Plan:   Pneumococcal Vaccine Administered/Refused: Not given - Patient refused pneumococcal vaccine  Influenza Vaccine Indication: Patient Refuses    Case Management Discharge Instructions:   Discharge Location: Home with Outpatient Services  Agency Name/Address/Phone: [facility]  [address] [place] [address]. [address]  [place] [address] [phone]  Home Health:    Outpatient Services: Physical Therapy  DME Provider/Phone:    Medical Equipment Ordered:    Prescription Faxed to:        Discharge Medication Instructions:  Below are the medications your physician expects you to take  upon discharge:    Brain Tumor Information  Adult brain tumor is a disease in which cancer (malignant) cells begin to grow in the tissues of the brain. The brain controls memory and learning, senses (hearing, sight, smell, taste, and touch), and emotion. It also controls other parts of the body. These include muscles, organs, and blood vessels. This summary discusses tumors that start in the brain (primary brain tumors). Often, cancer found in the brain has started somewhere else in the body. It then has spread (metastasized) to the brain. This is called brain metastasis.   SYMPTOMS   You should see your caregiver if the following symptoms appear:  Frequent headaches.   Vomiting.   Difficulty walking or speaking.   If there are problems, your caregiver may order a CT (computed tomographic) scan. This is an X-ray that uses a computer to make a picture of the brain. An MRI (magnetic resonance imaging) scan may also be done. This test uses magnetic waves to make a picture of the brain. Surgery may be required to determine if there is a brain tumor and to see what type of tumor it is.   PROGNOSIS   The chance of recovery (prognosis) and choice of treatment depend on:  The type of brain tumor.   The patient's general state of health.   There are many types of brain tumors and the treatments are varied. Your caregivers will help you decide what treatment is best for you and what treatment will give you the best outcome. Your caregivers can help answer your questions.  STAGE EXPLANATION  Types of adult brain tumor   Once a brain tumor is found, more tests will be done to determine the type of tumor. Your caregiver will also need to know how different the tumor cells are from the cells that are near it. This is called the histologic grade of the tumor. To plan treatment, your caregiver needs to know the type and grade of brain tumor. The following types of tumors are used to group adult brain tumors.   RECURRENT   Recurrent  disease means that the cancer has come back (recurred) after it has been treated. It may come back in the brain or in another part of the body.   HOW ADULT BRAIN TUMORS ARE TREATED  There are treatments for all patients with an adult brain tumor.   Surgery.   The most common treatment of adult brain tumors.   To take out the cancer from the brain, a surgeon will cut a part of the bone from the skull to get to the brain. This procedure is called a craniotomy. After the surgeon removes the cancer, the bone will be put back or a piece of metal or fabric will be used to cover the opening in the skull.   Radiation therapy.   Uses X-rays produced by a machine called a linear accelerator or a cobalt machine to kill cancer cells from the outside and shrink tumors (external-beam radiation therapy).   May also be used by putting materials that produce radiation (radioisotopes) through thin plastic tubes into the tumor to kill cancer cells from the inside (internal radiation therapy).   Chemotherapy.   Uses drugs to kill cancer cells.   May be taken by pill or put into the body by a needle in the vein or muscle.   Is called a systemic treatment because the drug enters the bloodstream, travels through the body, and can kill cancer cells throughout the body.   Biological therapy.   Is sometimes called biological response modifier therapy or immunotherapy.   Uses the body's immune system to fight cancer.   Is also being studied in clinical trials.   Tries to get the body to fight cancer.   Uses materials made by the body or made in a lab to boost, direct, and restore the body's natural defenses against disease.   TREATMENT BY TYPE   Treatment of adult brain tumor depends on:   The type and stage of the disease.   The patient's age and overall health.   Standard treatment may be considered because of its effectiveness in past studies or participation in a clinical trial may be considered. Not all patients are cured with standard  therapy. Some standard treatments may have more side effects than are desired. For these reasons, clinical trials are designed to find better ways to treat cancer patients. They are based on the most up-to-date information. Clinical trials are ongoing in most parts of the country for most types of adult brain tumor.     Atrial Fibrillation  Atrial fibrillation is a type of irregular or rapid heartbeat (arrhythmia). In atrial fibrillation, the heart quivers continuously in a chaotic pattern. This occurs when parts of the heart receive disorganized signals that make the heart unable to pump blood normally. This can increase the risk for stroke, heart failure, and other heart-related conditions. There are different types of atrial fibrillation, including:  · Paroxysmal atrial fibrillation. This type starts suddenly, and it usually stops on its own shortly after it starts.  · Persistent atrial fibrillation. This type often lasts longer than a week. It may stop on its own or with treatment.  · Long-lasting persistent atrial fibrillation. This type lasts longer than 12 months.  · Permanent atrial fibrillation. This type does not go away.  Talk with your health care provider to learn about the type of atrial fibrillation that you have.  What are the causes?  This condition is caused by some heart-related conditions or procedures, including:  · A heart attack.  · Coronary artery disease.  · Heart failure.  · Heart valve conditions.  · High blood pressure.  · Inflammation of the sac that surrounds the heart (pericarditis).  · Heart surgery.  · Certain heart rhythm disorders, such as Love-Parkinson-White syndrome.  Other causes include:  · Pneumonia.  · Obstructive sleep apnea.  · Blockage of an artery in the lungs (pulmonary embolism, or PE).  · Lung cancer.  · Chronic lung disease.  · Thyroid problems, especially if the thyroid is overactive (hyperthyroidism).  · Caffeine.  · Excessive alcohol use or illegal drug  use.  · Use of some medicines, including certain decongestants and diet pills.  Sometimes, the cause cannot be found.  What increases the risk?  This condition is more likely to develop in:  · People who are older in age.  · People who smoke.  · People who have diabetes mellitus.  · People who are overweight (obese).  · Athletes who exercise vigorously.  What are the signs or symptoms?  Symptoms of this condition include:  · A feeling that your heart is beating rapidly or irregularly.  · A feeling of discomfort or pain in your chest.  · Shortness of breath.  · Sudden light-headedness or weakness.  · Getting tired easily during exercise.  In some cases, there are no symptoms.  How is this diagnosed?  Your health care provider may be able to detect atrial fibrillation when taking your pulse. If detected, this condition may be diagnosed with:  · An electrocardiogram (ECG).  · A Holter monitor test that records your heartbeat patterns over a 24-hour period.  · Transthoracic echocardiogram (TTE) to evaluate how blood flows through your heart.  · Transesophageal echocardiogram (MOLLY) to view more detailed images of your heart.  · A stress test.  · Imaging tests, such as a CT scan or chest X-ray.  · Blood tests.  How is this treated?  The main goals of treatment are to prevent blood clots from forming and to keep your heart beating at a normal rate and rhythm. The type of treatment that you receive depends on many factors, such as your underlying medical conditions and how you feel when you are experiencing atrial fibrillation.  This condition may be treated with:  · Medicine to slow down the heart rate, bring the heart’s rhythm back to normal, or prevent clots from forming.  · Electrical cardioversion. This is a procedure that resets your heart’s rhythm by delivering a controlled, low-energy shock to the heart through your skin.  · Different types of ablation, such as catheter ablation, catheter ablation with pacemaker, or  surgical ablation. These procedures destroy the heart tissues that send abnormal signals. When the pacemaker is used, it is placed under your skin to help your heart beat in a regular rhythm.  Follow these instructions at home:  · Take over-the counter and prescription medicines only as told by your health care provider.  · If your health care provider prescribed a blood-thinning medicine (anticoagulant), take it exactly as told. Taking too much blood-thinning medicine can cause bleeding. If you do not take enough blood-thinning medicine, you will not have the protection that you need against stroke and other problems.  · Do not use tobacco products, including cigarettes, chewing tobacco, and e-cigarettes. If you need help quitting, ask your health care provider.  · If you have obstructive sleep apnea, manage your condition as told by your health care provider.  · Do not drink alcohol.  · Do not drink beverages that contain caffeine, such as coffee, soda, and tea.  · Maintain a healthy weight. Do not use diet pills unless your health care provider approves. Diet pills may make heart problems worse.  · Follow diet instructions as told by your health care provider.  · Exercise regularly as told by your health care provider.  · Keep all follow-up visits as told by your health care provider. This is important.  How is this prevented?  · Avoid drinking beverages that contain caffeine or alcohol.  · Avoid certain medicines, especially medicines that are used for breathing problems.  · Avoid certain herbs and herbal medicines, such as those that contain ephedra or ginseng.  · Do not use illegal drugs, such as cocaine and amphetamines.  · Do not smoke.  · Manage your high blood pressure.  Contact a health care provider if:  · You notice a change in the rate, rhythm, or strength of your heartbeat.  · You are taking an anticoagulant and you notice increased bruising.  · You tire more easily when you exercise or exert  yourself.  Get help right away if:  · You have chest pain, abdominal pain, sweating, or weakness.  · You feel nauseous.  · You notice blood in your vomit, bowel movement, or urine.  · You have shortness of breath.  · You suddenly have swollen feet and ankles.  · You feel dizzy.  · You have sudden weakness or numbness of the face, arm, or leg, especially on one side of the body.  · You have trouble speaking, trouble understanding, or both (aphasia).  · Your face or your eyelid droops on one side.  These symptoms may represent a serious problem that is an emergency. Do not wait to see if the symptoms will go away. Get medical help right away. Call your local emergency services (911 in the [place] Do not drive yourself to the hospital.   This information is not intended to replace advice given to you by your health care provider. Make sure you discuss any questions you have with your health care provider.  Document Released: 12/18/2006 Document Revised: 04/26/2017 Document Reviewed: 04/13/2016  [employer] Interactive Patient Education © 2017 [employer]    Diabetes Mellitus and Standards of Medical Care    Your glycohemoglobin 7/17/19 was 10.0      Managing diabetes (diabetes mellitus) can be complicated. Your diabetes treatment may be managed by a team of health care providers, including:  · A diet and nutrition specialist (registered dietitian).  · A nurse.  · A certified diabetes educator (CDE).  · A diabetes specialist (endocrinologist).  · An eye doctor.  · A primary care provider.  · A dentist.  Your health care providers follow a schedule in order to help you get the best quality of care. The following schedule is a general guideline for your diabetes management plan. Your health care providers may also give you more specific instructions.  HbA1c (  hemoglobin A1c) test  This test provides information about blood sugar (glucose) control over the previous 2-3 months. It is used to check whether your diabetes  management plan needs to be adjusted.  · If you are meeting your treatment goals, this test is done at least 2 times a year.  · If you are not meeting treatment goals or if your treatment goals have changed, this test is done 4 times a year.  Blood pressure test  · This test is done at every routine medical visit. For most people, the goal is less than 140/90. In some cases, your goal blood pressure may be 130/80 or less. Ask your health care provider what your goal blood pressure should be.  Dental and eye exams  · Visit your dentist two times a year.  · If you have type 1 diabetes, get an eye exam 3-5 years after you are diagnosed, and then once a year after your first exam.  ¨ If you were diagnosed with type 1 diabetes as a child, get an eye exam when you are age 10 or older and have had diabetes for 3-5 years. After the first exam, you should get an eye exam once a year.  · If you have type 2 diabetes, have an eye exam as soon as you are diagnosed, and then once a year after your first exam.  Foot care exam  · Visual foot exams are done at every routine medical visit. The exams check for cuts, bruises, redness, blisters, sores, or other problems with the feet.  · A complete foot exam is done by your health care provider once a year. This exam includes an inspection of the structure and skin of your feet, and a check of the pulses and sensation in your feet.  ¨ Type 1 diabetes: Get your first exam 3-5 years after diagnosis.  ¨ Type 2 diabetes: Get your first exam as soon as you are diagnosed.  · Check your feet every day for cuts, bruises, redness, blisters, or sores. If you have any of these or other problems that are not healing, contact your health care provider.  Kidney function test (  urine microalbumin)  · This test is done once a year.  ¨ Type 1 diabetes: Get your first test 5 years after diagnosis.  ¨ Type 2 diabetes: Get your first test as soon as you are diagnosed.  · If you have chronic kidney disease  (CKD), get a serum creatinine and estimated glomerular filtration rate (eGFR) test once a year.  Lipid profile (cholesterol, HDL, LDL, triglycerides)  · This test should be done when you are diagnosed with diabetes, and every 5 years after the first test. If you are on medicines to lower your cholesterol, you may need to get this test done every year.  ¨ The goal for LDL is less than 100 mg/dL (5.5 mmol/L). If you are at high risk, the goal is less than 70 mg/dL (3.9 mmol/L).  ¨ The goal for HDL is 40 mg/dL (2.2 mmol/L) for men and 50 mg/dL(2.8 mmol/L) for women. An HDL cholesterol of 60 mg/dL (3.3 mmol/L) or higher gives some protection against heart disease.  ¨ The goal for triglycerides is less than 150 mg/dL (8.3 mmol/L).  Immunizations  · The yearly flu (influenza) vaccine is recommended for everyone 6 months or older who has diabetes.  · The pneumonia (pneumococcal) vaccine is recommended for everyone 2 years or older who has diabetes. If you are 65 or older, you may get the pneumonia vaccine as a series of two separate shots.  · The hepatitis B vaccine is recommended for adults shortly after they have been diagnosed with diabetes.  · The Tdap (tetanus, diphtheria, and pertussis) vaccine should be given:  ¨ According to normal childhood vaccination schedules, for children.  ¨ Every 10 years, for adults who have diabetes.  · The shingles vaccine is recommended for people who have had chicken pox and are 50 years or older.  Mental and emotional health  · Screening for symptoms of eating disorders, anxiety, and depression is recommended at the time of diagnosis and afterward as needed. If your screening shows that you have symptoms (you have a positive screening result), you may need further evaluation and be referred to a mental health care provider.  Diabetes self-management education  · Education about how to manage your diabetes is recommended at diagnosis and ongoing as needed.  Treatment plan  · Your  treatment plan will be reviewed at every medical visit.  Summary  · Managing diabetes (diabetes mellitus) can be complicated. Your diabetes treatment may be managed by a team of health care providers.  · Your health care providers follow a schedule in order to help you get the best quality of care.  · Standards of care including having regular physical exams, blood tests, blood pressure monitoring, immunizations, screening tests, and education about how to manage your diabetes.  · Your health care providers may also give you more specific instructions based on your individual health.  This information is not intended to replace advice given to you by your health care provider. Make sure you discuss any questions you have with your health care provider.  Document Released: 10/15/2010 Document Revised: 09/15/2017 Document Reviewed: 09/15/2017  [employer] Interactive Patient Education © 2017 [employer]      Occupational Therapy Discharge Instructions for Marcio More    7/26/2019    Level of Assist Required for Eating: Able to Complete Eating without Assist  Level of Assist Required for Grooming: Able to Complete Grooming without Assist  Level of Assist Required for Dressing: Requires Supervision with Dressing  Equipment for Dressing: Reacher, Long Handled Shoe Horn  Level of Assist Required for Toileting: Requires Supervision with Toileting  Level of Assist Required for Toilet Transfer: Requires Supervision with Toilet Transfer  Equipment for Toilet Transfer: Grab Bars by Toilet  Level of Assist Required for Bathing: Requires Supervision with Bathing  Equipment for Bathing: Shower Chair, Grab Bars in Tub / Shower, Hand Held Shower Head  Level of Assist Required for Shower Transfer: Requires Supervision with Shower Transfer  Equipment for Shower Transfer: Grab Bars in Tub / Shower  Level of Assist Required for Home Mgmt: Requires Supervision with Home Management  Level of Assist Required for Meal Prep: Requires  Supervision with Meal Preparation  Driving: Please Contact Physician Prior to Driving  Home Exercise Program: None Issued        Physical Therapy Discharge Instructions for Marcio More    7/26/2019    Level of Assist Required for Ambulation: Physical Assist on Curbs, Assist for Balance on Stairs, Assist for Balance on Flat Surfaces  Distance Patient May Ambulate: limited community distances  Device Recommended for Ambulation: Single Point Cane (and Gait belt)  Level of Assist Required for Transfers:  (Assistance for Balance)  Device Recommended for Transfers: Single Point Cane (and Gait belt)  Home Exercise Program: None Issued  Prosthesis / Orthosis Recommendation / Location: No Prosthesis  or Orthosis Recommended

## 2019-07-28 NOTE — PROGRESS NOTES
DATE OF SERVICE:  07/26/2019    The patient will be discharged tomorrow.  He said he is doing well overall.    He reportedly is stronger and has more endurance.  The session was devoted to   reminding the patient about some of the things he needs to attend to upon his   return home in terms of his home program and the questions he would like to   raise with his oncologist.       ____________________________________     KOMAL WARNER, PHD    ILA / KATIE    DD:  07/27/2019 14:10:38  DT:  07/27/2019 20:07:50    D#:  [id number]  Job#:  [id number]

## 2019-07-29 NOTE — PROGRESS NOTES
Patient noted on CM schedule for post discharge TCM call.  Patient lives in [facility].  This CM unable to complete TCM call. Patient is aware of follow-up appointment with Dr. Francisco at discharge clinic on 8/1/19. Pt does have questions related to his discharge instructions and his medications.  CM transferred patient to discharge planner at [facility] regarding his discharge instructions.

## 2019-08-01 NOTE — PROCEDURES
Bone Marrow Biopsy/Aspiration  Date/Time: 8/1/2019 11:43 AM  Performed by: Young Veliz M.D.  Authorized by: Young Veliz M.D.     Consent:     Consent obtained:  Written    Consent given by:  Patient    Risks discussed:  Bleeding and pain    Alternatives discussed:  No treatment  Universal protocol:     Procedure explained and questions answered to patient or proxy's satisfaction: yes      Relevant documents present and verified: yes      Test results available and properly labeled: yes      Imaging studies available: yes      Required blood products, implants, devices, and special equipment available: yes      Immediately prior to procedure a time out was called: yes      Site/side marked: yes      Patient identity confirmed:  Verbally with patient and hospital-assigned identification number  Pre-procedure details:     Procedure type:  Aspiration and biopsy    Requesting physician:  Nabil    Indications:  B Cell Lymphoma    Position:  Prone    Buttock laterality:  Left    Subcutaneous volume:  1 mL    Periosteum anesthetic volume:  4 mL    Preparation: Patient was prepped and draped in usual sterile fashion    Sedation:     Patient Sedated: Yes      Sedation type: moderate (conscious) sedation      Sedation:  Midazolam    Analgesia:  Fentanyl  Procedure details:     Aspirate obtained:  5 mL followed by 5 mL    Biopsy performed:  1 core    Number of attempts:  1  Post-procedure:     Puncture site:  Adhesive bandage applied    Patient tolerance of procedure:  Tolerated well, no immediate complications  Comments:      Sedation was administered at 11:49 and the procedure finished at ended at 11:56  One mg IV versed and 50 mcg IV fentanyl were given.  I personally monitored him until 12:05 due to conscious sedation

## 2019-08-01 NOTE — OR NURSING
1233  Arrived PACu on [facility] from [facility]. Awake and alert. O2 in place at 1l nc. D/c'd at this time. VSS.  Family present. Denies discomfort.     1300 UP to bathroom to void, dresses. Discharge instructions explained. Copy signed and given.   IV d/c'd intact. To family car by walker. Gait is slow and steady. Home with wife and daughter.   ALMAS Larson RN

## 2019-08-07 PROBLEM — E83.39 HYPOPHOSPHATEMIA: Status: RESOLVED | Noted: 2019-01-01 | Resolved: 2019-01-01

## 2019-08-07 NOTE — PATIENT INSTRUCTIONS
If you need further assistance, or have any questions; concerns or lingering symptoms before seeing your Primary Care Provider or specialist.     Do not hesitate to contact us.     Please contact us at the Post-Hospital Follow Up Program at [phone] and ask for the Medical Assistant for Dr Francisco.   Our offices hours are Monday-Friday 8 am-5 pm.

## 2019-08-07 NOTE — PROGRESS NOTES
Subjective:     Marcio More Jr. is a 73 y.o. male who presents for Hospital Follow-up.  Chart and discharge summary reviewed.   Date of discharge 7/27/2019.  48- hour post discharge RN call not completed.         HPI: The patient is a 73-year-old white male who was hospitalized early last month and discharged from rehab on 7/27/2019 after 11 days.  His history is significant for chronic atrial fibrillation previously on Pradaxa, coronary artery disease, history of prostate cancer, hypertension, hyperlipidemia, obstructive sleep apnea.  He lives in [place] and his cardiologist is in [place].  He was hospitalized with worsening weakness and fatigue.  He was found to have a large right frontal lobe mass.  He underwent resection on 7/10/2019 done by Dr. Ruffin.  Pathology showed high-grade B-cell lymphoma.  Oncology was consulted and recommended systemic chemotherapy.  He is following up with Dr. Ferrer.  It appears he was not restarted on Pradaxa due to high risk for bleed status post removal of hemorrhagic tumor.  He was started on metformin for type 2 diabetes but it caused stomach upset so he is not taking it.  He says his blood sugars are in the low 100s.  He was also started on oxybutynin for urinary incontinence which has been an ongoing issue.  He is not quite sure if it is helping.    Since returning home, patient reports feeling tired.  He says he was feeling better the first week after he got home from rehab but for the past week he has felt more fatigued..     The patient has questions regarding hospitalization or discharge: All questions were answered from he, his wife, and his daughter.  The patient has weakness; has difficulty taking care of self at home.  His wife and daughter are caregivers.      Current Outpatient Medications   Medication Sig Dispense Refill   • atorvastatin (LIPITOR) 20 MG Tab Take 1 Tab by mouth every bedtime. 30 Tab 2   • metFORMIN (GLUCOPHAGE) 850 MG Tab Take 1 Tab by mouth  "3 times a day, with meals. 90 Tab 2   • oxybutynin SR (DITROPAN-XL) 10 MG CR tablet Take 1 Tab by mouth every evening. 30 Tab 2   • therapeutic multivitamin-minerals (THERAGRAN-M) Tab Take 1 Tab by mouth every day.     • acetaminophen 650 MG Tab Take 650-1,300 mg by mouth every four hours as needed for Mild Pain (Pain or temp = or > 101 F). 60 Tab      No current facility-administered medications for this visit.        Allergies:   Gabapentin    Social History:  Social History     Tobacco Use   • Smoking status: Never Smoker   • Smokeless tobacco: Never Used   Substance Use Topics   • Alcohol use: No     Comment: 1 drink a month        Family History:  History reviewed. No pertinent family history.    Past Medical History:   Diagnosis Date   • Anesthesia     \"too much anesthesia kidneys stop woking and intestines slowed\"   • Arthritis    • Atrial fibrillation (HCC)    • Back pain    • Blood clotting disorder (HCC) 1999    blood clot leg Left   • Breath shortness    • CAD (coronary artery disease)    • Cancer (HCC) 2016    prostate cancer   • Cataract     no surgery   • Dyslipidemia    • High cholesterol    • Hypertension    • Ileus (HCC)    • Myocardial infarct (HCC) 1999,2006    x3   • Renal disorder     cycst left kidney   • Sleep apnea     no cpap   • Snoring    • Urinary incontinence        Surgical History  Past Surgical History:   Procedure Laterality Date   • CT DX BONE MARROW ASPIRATIONS Left 8/1/2019    Procedure: ASPIRATION, BONE MARROW - REGANTI;  Surgeon: Young Veliz M.D.;  Location: [facility];  Service: Orthopedics   • CT DX BONE MARROW BIOPSIES Left 8/1/2019    Procedure: BIOPSY, BONE MARROW, USING NEEDLE OR TROCAR;  Surgeon: Young Veliz M.D.;  Location: ENDOSCOPY [facility];  Service: Orthopedics   • CRANIOTOMY Right 7/10/2019    Procedure: RIGHT-SIDED CRANIOTOMY FOR TUMOR;  Surgeon: Son Ruffin M.D.;  Location: SURGERY [facility];  Service: Neurosurgery   • " LUMBAR LAMINECTOMY DISKECTOMY N/A 11/26/2018    Procedure: LUMBAR LAMINECTOMY DISKECTOMY-  POSTERIOR L1-2 LAMI;  Surgeon: Son Ruffin M.D.;  Location: SURGERY [facility];  Service: Neurosurgery   • LAMINOTOMY Left 11/26/2018    Procedure: LAMINOTOMY-  L4-S1;  Surgeon: Son Ruffin M.D.;  Location: SURGERY [facility];  Service: Neurosurgery   • FORAMINOTOMY Left 11/26/2018    Procedure: FORAMINOTOMY;  Surgeon: Son Ruffin M.D.;  Location: SURGERY [facility];  Service: Neurosurgery   • OTHER NEUROLOGICAL SURG  2016    Thoracic 2-3 fusion    • APPENDECTOMY  2002   • OTHER CARDIAC SURGERY  1999,2002,2006    stents cardiac   • OTHER NEUROLOGICAL SURG      Laminectomy       ROS:    Constitutional:  Denies fever, chills, night sweats.  He has fatigue.  HENT: Denies head congestion, ear pain or drainage, decreased hearing, sore throat  Eyes: Denies visual changes, eye drainage or redness, eye pain  Neck: Denies pain, swollen glands, decreased range of motion  Lungs: Denies shortness of breath, wheezing, cough  Cardiovascular: Denies chest pain, orthopnea, lower extremity edema, palpitations  Abdominal: Denies abdominal pain, change in bowel or bladder habits, nausea or vomiting.  He has had an ongoing problem with urinary incontinence, unclear if this is due to his prostate surgery or a neurological spine abnormality.  He has intermittent stool incontinence.  Musculoskeletal: He has ongoing back pain and has had back surgery.  Endocrine: Denies unexplained weight changes, heat or cold intolerance, hair loss, polyuria or polydipsia  Neurological: Denies dizziness, headache, confusion, focal weakness or numbness, memory loss  Psychiatric: Denies depression, anxiety, insomnia       Objective:     /74 (BP Location: Left arm, Patient Position: Sitting, BP Cuff Size: Adult)   Pulse 78   Temp 36.2 °C (97.2 °F) (Temporal)   Resp 16   Wt 88.9 kg (196 lb)   SpO2 95%      Physical  Exam:    GEN:  Alert, oriented, in no distress  HEENT:  PERRLA, EOMI, well-healed surgical scar right side of his head.  NECK;  Supple without cervical adenopathy   LUNGS:  Clear to auscultation without rales, rhonci, or wheezes.  CV:  Heart irregularly irregular rhythm without murmurs or S3 or S4  EXT:  Without cyanosis, clubbing, or edema.  NEURO:  Cranial nerves II through XII intact.  Motor function and sensation intact.  SKIN: No rashes or suspicious lesions.  PSYCH:  Behavior is appropriate.      Assessment and Plan:     1. Hospital follow-up:   Hospitalization and results reviewed with patient. High risk conditions requiring teaching or care coordination were identified and addressed.The patient demonstrate understanding of admission and underlying conditions. The patient understands discharge instructions and when to seek medical attention. Medications reviewed including instructions regarding high risk medications, dosing and side effects.    The patient is able to safely adhere to ADL/IADL, treatment and medication regimen, self-manage of high-risk conditions? No   The patient requires physical therapy/home health/DME referral? Yes  The patient requires referral to care coordination/behavioral health/social work?  No   Patient requires referral for pharmacy consult? No   Advance directive/POLST on file?  No   Required counseling on advance directive?  Deferred    - REFERRAL TO HOME HEALTH    2. Primary cerebral lymphoma (HCC)  -Follow-up as scheduled with oncologist  - REFERRAL TO HOME HEALTH    3. Other fatigue  - CBC WITH DIFFERENTIAL; Future.  He had a normal CBC 1 week ago but that was right before he started feeling worse.  - Basic Metabolic Panel; Future  - REFERRAL TO HOME HEALTH    4. Type 2 diabetes mellitus without complication, without long-term current use of insulin (HCC)  -He was started on metformin 850 mg 3 times daily but is not taking it due to stomach upset.  I suggested that he try  restarting it half a tab twice a day and increase by half a tab every 3 to 5 days if tolerated.  - REFERRAL TO HOME HEALTH    5. Chronic atrial fibrillation (HCC)  -Currently not anticoagulated due to recent surgery.  I recommended that he get a cardiologist here in [place] but he wants to stick with the one he has in [place].  - REFERRAL TO HOME HEALTH    6. Essential hypertension  -Blood pressure normal off medications since his hospitalization.  - REFERRAL TO HOME HEALTH          Medication Reconciliation  Medication list at end of encounter:   Current Outpatient Medications   Medication Sig Dispense Refill   • atorvastatin (LIPITOR) 20 MG Tab Take 1 Tab by mouth every bedtime. 30 Tab 2   • metFORMIN (GLUCOPHAGE) 850 MG Tab Take 1 Tab by mouth 3 times a day, with meals. 90 Tab 2   • oxybutynin SR (DITROPAN-XL) 10 MG CR tablet Take 1 Tab by mouth every evening. 30 Tab 2   • therapeutic multivitamin-minerals (THERAGRAN-M) Tab Take 1 Tab by mouth every day.     • acetaminophen 650 MG Tab Take 650-1,300 mg by mouth every four hours as needed for Mild Pain (Pain or temp = or > 101 F). 60 Tab      No current facility-administered medications for this visit.        Primary care follow-up:    Recommended followup:  With new Pcp   Future Appointments       Provider Department Center    8/9/2019 1:00 PM [facility]    8/9/2019 2:00 PM [facility]    8/9/2019 3:00 PM [facility]    8/9/2019 4:00 PM [facility]    9/27/2019 2:30 PM Eulalia Hunter D.O. [facility] Located within [facility]          Patient Instruction  Patient provided with educational material on discharge diagnosis and management of symptoms/red flags. Patient instructed to keep follow-up appointments and to bring written questions and and actual medications to each office visit. Patient instructed to call PCP/specialist with any  problems/questions/concerns. Patient verbalizes understanding and has no further questions at this time.    Face-to-face transitional care management services with moderate medical decision complexity were provided.       Face to Face Supporting Documentation - Home Health    The encounter with this patient was in whole or in part the primary reason for home health admission.    Date of encounter:   Patient:                    MRN:                       YOB: 2021  Marcio More Jr.  [id number]  1945     Home health to see patient for:  Skilled Nursing care for assessment, interventions & education    Skilled need for:  New Onset Medical Diagnosis Primary cerebral lymphoma    Skilled nursing interventions to include:  Comment: Evaluation and monitoring    Homebound status evidenced by:  Needs the assistance of another person in order to leave the home. Leaving home requires a considerable and taxing effort. There is a normal inability to leave the home.    Community Physician to provide follow up care: New PCP appointment on 9/27/19 with Dr. Hunter         I certify the face to face encounter for this home health care referral meets the CMS requirements and the encounter/clinical assessment with the patient was, in whole, or in part, for the medical condition(s) listed above, which is the primary reason for home health care. Based on my clinical findings: the service(s) are medically necessary, support the need for home health care, and the homebound criteria are met.  I certify that this patient has had a face to face encounter by myself.  Jessica Francisco M.D. - NPI: [id number]     stated

## 2019-08-09 NOTE — DOCUMENTATION QUERY
DOCUMENTATION QUERY    PROVIDERS: Please select “Cosign w/ note”to reply to query.    To better represent the severity of illness of your patient, please review the following information and exercise your independent professional judgment in responding to this query.     It is not clear what kind of coccyx wound was treated during the rehab hospital stay. Please clarify the type of coccyx wound if possible.    • Coccyx wound is a pressure injury (please document the stage)  • Coccyx wound is a deep tissue injury  • Coccyx wound is a traumatic injury (please document)  • Other explanation of clinical findings (please document)  • Unable to determine          The medical record reflects the following:   Clinical Findings Coccyx wound noted on 7/21 at 22:00     Treatment Monitor and assess   Risk Factors     Location within medical record Collaborative team note on 7/25     Thank You,   Angelita ROJAS, CCS, CPC, BRIANNA

## 2019-09-11 NOTE — DISCHARGE INSTRUCTIONS
ACTIVITY: Rest and take it easy for the first 24 hours.  A responsible adult is recommended to remain with you during that time.  It is normal to feel sleepy.  We encourage you to not do anything that requires balance, judgment or coordination.    MILD FLU-LIKE SYMPTOMS ARE NORMAL. YOU MAY EXPERIENCE GENERALIZED MUSCLE ACHES, THROAT IRRITATION, HEADACHE AND/OR SOME NAUSEA.    FOR 24 HOURS DO NOT:  Drive, operate machinery or run household appliances.  Drink beer or alcoholic beverages.   Make important decisions or sign legal documents.      DIET: To avoid nausea, slowly advance diet as tolerated, avoiding spicy or greasy foods for the first day.  Add more substantial food to your diet according to your physician's instructions. INCREASE FLUIDS AND FIBER TO AVOID CONSTIPATION.    SURGICAL DRESSING/BATHING: leave dressing in place for 24 hours, may shower once removed    FOLLOW-UP APPOINTMENT:  A follow-up appointment should be arranged with your doctor; call to schedule.    You should CALL YOUR PHYSICIAN if you develop:  Fever greater than 101 degrees F.  Pain not relieved by medication, or persistent nausea or vomiting.  Excessive bleeding (blood soaking through dressing) or unexpected drainage from the wound.  Extreme redness or swelling around the incision site, drainage of pus or foul smelling drainage.  Inability to urinate or empty your bladder within 8 hours.  Problems with breathing or chest pain.    You should call 911 if you develop problems with breathing or chest pain.  If you are unable to contact your doctor or surgical center, you should go to the nearest emergency room or urgent care center.  Physician's telephone #: [phone]    If any questions arise, call your doctor.  If your doctor is not available, please feel free to call the Surgical Center at [phone].  The Center is open Monday through Friday from 7AM to 7PM.  You can also call the [employer] HOTLINE open 24 hours/day, 7 days/week and  speak to a nurse at [phone], or toll free at [phone].    A registered nurse may call you a few days after your surgery to see how you are doing after your procedure.    MEDICATIONS: Resume taking daily medication.  Take prescribed pain medication with food.  If no medication is prescribed, you may take non-aspirin pain medication if needed.  PAIN MEDICATION CAN BE VERY CONSTIPATING.  Take a stool softener or laxative such as senokot, pericolace, or milk of magnesia if needed.      If your physician has prescribed pain medication that includes Acetaminophen (Tylenol), do not take additional Acetaminophen (Tylenol) while taking the prescribed medication.    Image-Guided Biopsy      A needle is used to take a sample of tissue from inside the body.    Before your procedure  Tell your healthcare provider about any health conditions you have and all medicines, herbs, or supplements you are taking. This includes any illegal street drugs and over-the-counter medicines, such as aspirin or ibuprofen.  Also tell your healthcare provider if you:  · Are allergic to any medicines  · Are pregnant or think you may be pregnant  Follow any directions you are given for not eating or drinking before the procedure. Follow any other instructions from your healthcare provider.  During your procedure  · You will change into a hospital gown and lie on a special table. The table that is used will depend on the type of imaging that will guide the biopsy. You may lie on your back, front, or side. Your position depends on where the biopsy is to be done.  · An IV line may be started. This will give you fluids and medicines. You may be given medicine through the IV to help you relax.  · The skin over the biopsy site is cleaned. Medicine is put on the site to numb the skin.  · The radiologist will use CT scan, MRI, X-ray, or ultrasound images as a guide. He or she will put a thin, hollow needle through the skin. It will be guided to  the area where the biopsy will be done.  · The needle will take a sample of tissue or fluid from the area. The needle is then taken out. The sample is sent to a lab. It will be checked for cells that aren’t normal.  After the procedure  You will be taken to a room to wake up from the anesthesia. You may feel sleepy and nauseated at first. You will get medicine to help with any pain. If you have a drain, you will be shown how to care for it. When you are ready to go home, have an adult family member or friend drive you. Follow your healthcare provider’s instructions for recovery, such as:  · Take any prescribed medicine as directed.  · Care for your incision as instructed.  · Don't do strenuous activity until your healthcare provider says it’s OK.  Call 911  Call 911 if you have:  · Chest pain or trouble breathing   When to call your healthcare provider  Be sure you have a contact number for your healthcare provider so you can get in touch after office hours and on weekends if needed. After you get home, call this number if you have any of the following:  · Swelling or pain in your lower legs  · Fever of 100.4°F (38°C) or higher, or as directed by your healthcare provider  · Strong pain at the biopsy site that's getting worse  · Any increased redness or swelling, warmth, worsening pain, or foul-smelling drainage at the incision site. These could be signs of infection.  · Bleeding or bruising at the incision site  · Numbness or tingling in lower legs  Follow-up  During follow-up visits, your healthcare provider will check on your healing. You and your healthcare provider will also discuss your biopsy results. If cancer was found, further treatment may be needed.  Risks and possible complications  Risks of this procedure include:  · Bleeding  · Infection  · Injury to the nerves near the lymph node  · Scarring  · Reopening of the incision  · Another enlarged lymph node  · Risks of anesthesia. You will discuss these with  the anesthesiologist.        Depression / Suicide Risk    As you are discharged from this [facility] facility, it is important to learn how to keep safe from harming yourself.    Recognize the warning signs:  · Abrupt changes in personality, positive or negative- including increase in energy   · Giving away possessions  · Change in eating patterns- significant weight changes-  positive or negative  · Change in sleeping patterns- unable to sleep or sleeping all the time   · Unwillingness or inability to communicate  · Depression  · Unusual sadness, discouragement and loneliness  · Talk of wanting to die  · Neglect of personal appearance   · Rebelliousness- reckless behavior  · Withdrawal from people/activities they love  · Confusion- inability to concentrate     If you or a loved one observes any of these behaviors or has concerns about self-harm, here's what you can do:  · Talk about it- your feelings and reasons for harming yourself  · Remove any means that you might use to hurt yourself (examples: pills, rope, extension cords, firearm)  · Get professional help from the community (Mental Health, Substance Abuse, psychological counseling)  · Do not be alone:Call your Safe Contact- someone whom you trust who will be there for you.  · Call your local CRISIS HOTLINE 596-2677 or 379-609-9123  · Call your local Children's Mobile Crisis Response Team [place] (920) 560-7158 or www.[employer]  · Call the toll free National Suicide Prevention Hotlines   · National Suicide Prevention Lifeline 023-334-DHCV (1066)  · National Hope Line Network 800-SUICIDE (457-6959)

## 2019-09-11 NOTE — OR SURGEON
Immediate Post- Operative Note        PostOp Diagnosis:Right retroperitoneal mass      Procedure(s): CT guided Bx      Estimated Blood Loss: Less than 5 ml        Complications: None            9/11/2019     2:42 PM     Tom Gil

## 2019-09-11 NOTE — TELEPHONE ENCOUNTER
VOICEMAIL  1. Caller Name: Aissatou                      Call Back Number: [phone] (home)       2. Message: Calling for her dad. Pt is having abdominal issues and diarrhea from the metformin. Would like to know if he could be put on a extended release form of metformin?     3. Patient approves office to leave a detailed voicemail/MyChart message: yes

## 2019-09-11 NOTE — PROGRESS NOTES
IR RN note  Patient underwent a Right Retroperitoneal Mass Bx  by Dr. Gil. Procedure site was marked by MD and verified using imaging guidance.  Patient was placed in a prone position.  Vitals were taken every 5 minutes and remained stable during procedure (see doc flow sheet for results).  CO2 waveform capnography was monitored throughout procedure, see chart  A gauze and tegaderm dressing was placed over surgical site. Report called to Kristy WISE. Pt transported by lucretia with RN to PPU, with monitor . Core  Biopsy Labs X 3 hand x1 core in RPMI  delivered to lab by ELIN Cameron

## 2019-09-11 NOTE — PROGRESS NOTES
Med rec updated and complete  Allergies reviewed  Interviewed pt with family at bedside with permission from pt.  Pts daughter reports that pt takes his PRADAXA 150MG once a day, not twice a day.  Pts daughter reports that pt takes his METFORMIN 850MG half a tablet once a day  Pt reports no antibiotics in the last 2 weeks

## 2019-09-12 NOTE — TELEPHONE ENCOUNTER
Claudio, will you call the daughter, Aissatou who is HIPAA compliant, ph. [phone], to let her know please? Thank you!

## 2019-09-17 NOTE — TELEPHONE ENCOUNTER
VOICEMAIL  1. Caller Name: Aissatou                      Call Back Number: [phone]    2. Message: Calling because patient thinks he might have another UTI. They are on their way in to town @ 4:09 pm and would like to know if they can stop by for testing.     3. Patient approves office to leave a detailed voicemail/[employer] message: yes    4. Returned call to Aissatou. I asked what they patient's symptoms were. She said he was having some slight odor and some discomfort. Daughter Aissatou was advised that Dr. Francisco is not in the office. Traditionally patients should be seen. She asked if any provider would just send a order to the lab without being seen and I told her it was dependent upon provider and patient's history. But ultimately, Dr. Francisco is only in on Wednesday's & Thursday's. I will send a message to ask, but if they think he has a UTI, he should be seen. She said if it got worse before Dr. Francisco replied, they would take him to be seen. She was advised that should he develop worsening, or additional symptoms, he needs to be seen right away.

## 2019-09-17 NOTE — TELEPHONE ENCOUNTER
He should be seen (numerous reasons including advanced age, multiple medical problems, immunocompromised state, etc., which put him at risk for complications).

## 2019-09-19 NOTE — OR SURGEON
Immediate Post- Operative Note        PostOp Diagnosis: B cell lymphoma.       Procedure(s): Port placement for chemotherapy.      Estimated Blood Loss: Less than 5 ml        Complications: None            9/19/2019     1500 PM     Kailash Tovar

## 2019-09-19 NOTE — DISCHARGE INSTRUCTIONS
Implanted Port Insertion  An implanted port is a central line that has a round shape and is placed under the skin. It is used as a long-term IV access for:   · Medicines, such as chemotherapy.    · Fluids.    · Liquid nutrition, such as total parenteral nutrition (TPN).    · Blood samples.    LET YOUR HEALTH CARE PROVIDER KNOW ABOUT:  · Allergies to food or medicine.    · Medicines taken, including vitamins, herbs, eye drops, creams, and over-the-counter medicines.    · Any allergies to heparin.  · Use of steroids (by mouth or creams).    · Previous problems with anesthetics or numbing medicines.    · History of bleeding problems or blood clots.    · Previous surgery.    · Other health problems, including diabetes and kidney problems.    · Possibility of pregnancy, if this applies.  RISKS AND COMPLICATIONS  Generally, this is a safe procedure. However, as with any procedure, problems can occur. Possible problems include:  · Damage to the blood vessel, bruising, or bleeding at the puncture site.    · Infection.  · Blood clot in the vessel that the port is in.  · Breakdown of the skin over your port.  · Very rarely a person may develop a condition called a pneumothorax, a collection of air in the chest that may cause one of the lungs to collapse. The placement of these catheters with the appropriate imaging guidance significantly decreases the risk of a pneumothorax.    BEFORE THE PROCEDURE   · Your health care provider may want you to have blood tests. These tests can help tell how well your kidneys and liver are working. They can also show how well your blood clots.    · If you take blood thinners (anticoagulant medicines), ask your health care provider when you should stop taking them.    · Make arrangements for someone to drive you home. This is necessary if you have been sedated for your procedure.    PROCEDURE   Port insertion usually takes about 30-45 minutes.   · An IV needle will be inserted in your arm.  Medicine for pain and medicine to help relax you (sedative) will flow directly into your body through this needle.    · You will lie on an exam table, and you will be connected to monitors to keep track of your heart rate, blood pressure, and breathing throughout the procedure.  · An oxygen monitoring device may be attached to your finger. Oxygen will be given.    · Everything will be kept as germ free (sterile) as possible during the procedure. The skin near the point of the incision will be cleansed with antiseptic, and the area will be draped with sterile towels. The skin and deeper tissues over the port area will be made numb with a local anesthetic.  · Two small cuts (incisions) will be made in the skin to insert the port. One will be made in the neck to obtain access to the vein where the catheter will lie.    · Because the port reservoir will be placed under the skin, a small skin incision will be made in the upper chest, and a small pocket for the port will be made under the skin. The catheter that will be connected to the port tunnels to a large central vein in the chest. A small, raised area will remain on your body at the site of the reservoir when the procedure is complete.  · The port placement will be done under imaging guidance to ensure the proper placement.    · The reservoir has a silicone covering that can be punctured with a special needle.    · The port will be flushed with normal saline, and blood will be drawn to make sure it is working properly.  · There will be nothing remaining outside the skin when the procedure is finished.    · Incisions will be held together by stitches, surgical glue, or a special tape.  AFTER THE PROCEDURE  · You will stay in a recovery area until the anesthesia has worn off. Your blood pressure and pulse will be checked.  · A final chest X-ray will be taken to check the placement of the port and to ensure that there is no injury to your lung.  This information is not  intended to replace advice given to you by your health care provider. Make sure you discuss any questions you have with your health care provider.  Document Released: 10/08/2014 Document Revised: 01/08/2016 Document Reviewed: 10/08/2014  [place] [employer] Interactive Patient Education © 2017 [place] [employer]      Wound Care, Adult  Taking care of your wound properly can help to prevent pain and infection. It can also help your wound to heal more quickly.  How is this treated?  Wound care  · Follow instructions from your health care provider about how to take care of your wound. Make sure you:  ¨ Wash your hands with soap and water before you change the bandage (dressing). If soap and water are not available, use hand .  ¨ Change your dressing as told by your health care provider.  ¨ Leave stitches (sutures), skin glue, or adhesive strips in place. These skin closures may need to stay in place for 2 weeks or longer. If adhesive strip edges start to loosen and curl up, you may trim the loose edges. Do not remove adhesive strips completely unless your health care provider tells you to do that.  · Check your wound area every day for signs of infection. Check for:  ¨ More redness, swelling, or pain.  ¨ More fluid or blood.  ¨ Warmth.  ¨ Pus or a bad smell.  · Ask your health care provider if you should clean the wound with mild soap and water. Doing this may include:  ¨ Using a clean towel to pat the wound dry after cleaning it. Do not rub or scrub the wound.  ¨ Applying a cream or ointment. Do this only as told by your health care provider.  ¨ Covering the incision with a clean dressing.  · Ask your health care provider when you can leave the wound uncovered.  Medicines   · If you were prescribed an antibiotic medicine, cream, or ointment, take or use the antibiotic as told by your health care provider. Do not stop taking or using the antibiotic even if your condition improves.  · Take over-the-counter and prescription  medicines only as told by your health care provider. If you were prescribed pain medicine, take it at least 30 minutes before doing any wound care or as told by your health care provider.  General instructions  · Return to your normal activities as told by your health care provider. Ask your health care provider what activities are safe.  · Do not scratch or pick at the wound.  · Keep all follow-up visits as told by your health care provider. This is important.  · Eat a diet that includes protein, vitamin A, vitamin C, and other nutrient-rich foods. These help the wound heal:  ¨ Protein-rich foods include meat, dairy, beans, nuts, and other sources.  ¨ Vitamin A-rich foods include carrots and dark green, leafy vegetables.  ¨ Vitamin C-rich foods include citrus, tomatoes, and other fruits and vegetables.  ¨ Nutrient-rich foods have protein, carbohydrates, fat, vitamins, or minerals. Eat a variety of healthy foods including vegetables, fruits, and whole grains.  Contact a health care provider if:  · You received a tetanus shot and you have swelling, severe pain, redness, or bleeding at the injection site.  · Your pain is not controlled with medicine.  · You have more redness, swelling, or pain around the wound.  · You have more fluid or blood coming from the wound.  · Your wound feels warm to the touch.  · You have pus or a bad smell coming from the wound.  · You have a fever or chills.  · You are nauseous or you vomit.  · You are dizzy.  Get help right away if:  · You have a red streak going away from your wound.  · The edges of the wound open up and separate.  · Your wound is bleeding and the bleeding does not stop with gentle pressure.  · You have a rash.  · You faint.  · You have trouble breathing.  This information is not intended to replace advice given to you by your health care provider. Make sure you discuss any questions you have with your health care provider.  Document Released: 09/26/2009 Document  Revised: 08/16/2017 Document Reviewed: 07/04/2017  [employer]vier Interactive Patient Education © 2017 [employer]

## 2019-09-19 NOTE — PROGRESS NOTES
OPIR Discharge instructions    Reviewed DC instructions with pt, wife and daughter. DIC PIV. PT alert, RA, vitals stable.  No questions from family or pt.       Implanted Port Insertion  An implanted port is a central line that has a round shape and is placed under the skin. It is used as a long-term IV access for:   · Medicines, such as chemotherapy.    · Fluids.    · Liquid nutrition, such as total parenteral nutrition (TPN).    · Blood samples.    LET YOUR HEALTH CARE PROVIDER KNOW ABOUT:  · Allergies to food or medicine.    · Medicines taken, including vitamins, herbs, eye drops, creams, and over-the-counter medicines.    · Any allergies to heparin.  · Use of steroids (by mouth or creams).    · Previous problems with anesthetics or numbing medicines.    · History of bleeding problems or blood clots.    · Previous surgery.    · Other health problems, including diabetes and kidney problems.    · Possibility of pregnancy, if this applies.  RISKS AND COMPLICATIONS  Generally, this is a safe procedure. However, as with any procedure, problems can occur. Possible problems include:  · Damage to the blood vessel, bruising, or bleeding at the puncture site.    · Infection.  · Blood clot in the vessel that the port is in.  · Breakdown of the skin over your port.  · Very rarely a person may develop a condition called a pneumothorax, a collection of air in the chest that may cause one of the lungs to collapse. The placement of these catheters with the appropriate imaging guidance significantly decreases the risk of a pneumothorax.    BEFORE THE PROCEDURE   · Your health care provider may want you to have blood tests. These tests can help tell how well your kidneys and liver are working. They can also show how well your blood clots.    · If you take blood thinners (anticoagulant medicines), ask your health care provider when you should stop taking them.    · Make arrangements for someone to drive you home. This is necessary  if you have been sedated for your procedure.    PROCEDURE   Port insertion usually takes about 30-45 minutes.   · An IV needle will be inserted in your arm. Medicine for pain and medicine to help relax you (sedative) will flow directly into your body through this needle.    · You will lie on an exam table, and you will be connected to monitors to keep track of your heart rate, blood pressure, and breathing throughout the procedure.  · An oxygen monitoring device may be attached to your finger. Oxygen will be given.    · Everything will be kept as germ free (sterile) as possible during the procedure. The skin near the point of the incision will be cleansed with antiseptic, and the area will be draped with sterile towels. The skin and deeper tissues over the port area will be made numb with a local anesthetic.  · Two small cuts (incisions) will be made in the skin to insert the port. One will be made in the neck to obtain access to the vein where the catheter will lie.    · Because the port reservoir will be placed under the skin, a small skin incision will be made in the upper chest, and a small pocket for the port will be made under the skin. The catheter that will be connected to the port tunnels to a large central vein in the chest. A small, raised area will remain on your body at the site of the reservoir when the procedure is complete.  · The port placement will be done under imaging guidance to ensure the proper placement.    · The reservoir has a silicone covering that can be punctured with a special needle.    · The port will be flushed with normal saline, and blood will be drawn to make sure it is working properly.  · There will be nothing remaining outside the skin when the procedure is finished.    · Incisions will be held together by stitches, surgical glue, or a special tape.  AFTER THE PROCEDURE  · You will stay in a recovery area until the anesthesia has worn off. Your blood pressure and pulse will be  checked.  · A final chest X-ray will be taken to check the placement of the port and to ensure that there is no injury to your lung.  This information is not intended to replace advice given to you by your health care provider. Make sure you discuss any questions you have with your health care provider.  Document Released: 10/08/2014 Document Revised: 01/08/2016 Document Reviewed: 10/08/2014  [employer] Interactive Patient Education © 2017 [employer]      Wound Care, Adult  Taking care of your wound properly can help to prevent pain and infection. It can also help your wound to heal more quickly.  How is this treated?  Wound care  · Follow instructions from your health care provider about how to take care of your wound. Make sure you:  ¨ Wash your hands with soap and water before you change the bandage (dressing). If soap and water are not available, use hand .  ¨ Change your dressing as told by your health care provider.  ¨ Leave stitches (sutures), skin glue, or adhesive strips in place. These skin closures may need to stay in place for 2 weeks or longer. If adhesive strip edges start to loosen and curl up, you may trim the loose edges. Do not remove adhesive strips completely unless your health care provider tells you to do that.  · Check your wound area every day for signs of infection. Check for:  ¨ More redness, swelling, or pain.  ¨ More fluid or blood.  ¨ Warmth.  ¨ Pus or a bad smell.  · Ask your health care provider if you should clean the wound with mild soap and water. Doing this may include:  ¨ Using a clean towel to pat the wound dry after cleaning it. Do not rub or scrub the wound.  ¨ Applying a cream or ointment. Do this only as told by your health care provider.  ¨ Covering the incision with a clean dressing.  · Ask your health care provider when you can leave the wound uncovered.  Medicines   · If you were prescribed an antibiotic medicine, cream, or ointment, take or use the antibiotic  as told by your health care provider. Do not stop taking or using the antibiotic even if your condition improves.  · Take over-the-counter and prescription medicines only as told by your health care provider. If you were prescribed pain medicine, take it at least 30 minutes before doing any wound care or as told by your health care provider.  General instructions  · Return to your normal activities as told by your health care provider. Ask your health care provider what activities are safe.  · Do not scratch or pick at the wound.  · Keep all follow-up visits as told by your health care provider. This is important.  · Eat a diet that includes protein, vitamin A, vitamin C, and other nutrient-rich foods. These help the wound heal:  ¨ Protein-rich foods include meat, dairy, beans, nuts, and other sources.  ¨ Vitamin A-rich foods include carrots and dark green, leafy vegetables.  ¨ Vitamin C-rich foods include citrus, tomatoes, and other fruits and vegetables.  ¨ Nutrient-rich foods have protein, carbohydrates, fat, vitamins, or minerals. Eat a variety of healthy foods including vegetables, fruits, and whole grains.  Contact a health care provider if:  · You received a tetanus shot and you have swelling, severe pain, redness, or bleeding at the injection site.  · Your pain is not controlled with medicine.  · You have more redness, swelling, or pain around the wound.  · You have more fluid or blood coming from the wound.  · Your wound feels warm to the touch.  · You have pus or a bad smell coming from the wound.  · You have a fever or chills.  · You are nauseous or you vomit.  · You are dizzy.  Get help right away if:  · You have a red streak going away from your wound.  · The edges of the wound open up and separate.  · Your wound is bleeding and the bleeding does not stop with gentle pressure.  · You have a rash.  · You faint.  · You have trouble breathing.  This information is not intended to replace advice given to  you by your health care provider. Make sure you discuss any questions you have with your health care provider.  Document Released: 09/26/2009 Document Revised: 08/16/2017 Document Reviewed: 07/04/2017  [employer] Interactive Patient Education © 2017 [employer]

## 2019-09-19 NOTE — PROGRESS NOTES
OPIR RN note  Patient underwent a Tunneled power port by Dr. Tovar. Procedure site was marked by MD and verified using imaging guidance.  Patient was placed in a Supine position.  Vitals were taken every 5 minutes and remained stable during procedure (see doc flow sheet for results).  CO2 waveform capnography was monitored throughout procedure, see chart  A sutures, derma bond, steri strips, gauze and tegaderm dressing was placed over surgical site. Pt transported by lucretia with RN to the room .     [employer]  PowerPort ClearVue Slim Implantable Port  8F  Ref [id number]  Lot# [id number]    26cm

## 2019-10-01 PROBLEM — C79.31 METASTASIS TO BRAIN (HCC): Status: ACTIVE | Noted: 2019-01-01

## 2019-10-01 PROBLEM — R13.10 DYSPHAGIA: Status: ACTIVE | Noted: 2019-01-01

## 2019-10-01 PROBLEM — C85.10 B-CELL LYMPHOMA (HCC): Status: ACTIVE | Noted: 2019-01-01

## 2019-10-01 PROBLEM — D70.9 NEUTROPENIA (HCC): Status: ACTIVE | Noted: 2019-01-01

## 2019-10-01 NOTE — ED TRIAGE NOTES
Chief Complaint   Patient presents with   • Weakness     Pt BIB family via wheelchair. report of pt with brain tumor removed on July 10th, family states pt with left sided defecits/mild confusion since. tumor was on right side of brain. pt to [facility] today from PT due to his increased weakness/fatigue.    • Altered Mental Status   • Tired     Explained to pt triage process, made pt aware to tell this RN/staff of any changes/concerns, pt verbalized understanding of process and instructions given. Pt to ER lobby.

## 2019-10-01 NOTE — ED NOTES
Med rec complete per family at bedside and a list from cancer care specialists for chemo drugs from 9-23-19  Allergies reviewed    Patient is on a R chop Chemo and next scheduled dose is 10-14-19

## 2019-10-01 NOTE — ED PROVIDER NOTES
"ED Provider Note    CHIEF COMPLAINT  Chief Complaint   Patient presents with   • Weakness     Pt BIB family via wheelchair. report of pt with brain tumor removed on July 10th, family states pt with left sided defecits/mild confusion since. tumor was on right side of brain. pt to [facility] today from PT due to his increased weakness/fatigue.    • Altered Mental Status   • Tired       HPI  Marcio More Jr. is a 73 y.o. male who presents to the emergency department complaining of weakness.  Patient has a history of brain tumor and had a craniotomy resection in July.  Is from the lymphoma.  He is on chemotherapy.  He is been having weakness in the left side of his body since Friday.  Is been associated with confusion, unusual fatigue and difficulty swallowing.  This is been gradually worsening.  Is an acute process.  No recent falls or trauma.  No headache.  No chest pain, no fevers no chills.  No other aggravating leaving factors or associated complaints.    The patient does have a history of A. fib.  Takes chronic anticoagulation.  No history of stroke.    REVIEW OF SYSTEMS  See HPI for further details. All other systems are negative.    PAST MEDICAL HISTORY  Past Medical History:   Diagnosis Date   • Anesthesia     \"too much anesthesia kidneys stop woking and intestines slowed\"   • Arthritis    • Atrial fibrillation (HCC)    • Back pain    • Blood clotting disorder (HCC) 1999    blood clot leg Left   • Breath shortness    • CAD (coronary artery disease)    • Cancer (HCC) 2016    prostate cancer   • Cataract     no surgery   • Dyslipidemia    • High cholesterol    • Hypertension    • Ileus (HCC)    • Myocardial infarct (HCC) 1999,2006    x3   • Renal disorder     cycst left kidney   • Sleep apnea     no cpap   • Snoring    • Urinary incontinence        FAMILY HISTORY  No family history on file.    SOCIAL HISTORY  Social History     Socioeconomic History   • Marital status:      Spouse name: Not on file "   • Number of children: Not on file   • Years of education: Not on file   • Highest education level: Not on file   Occupational History   • Not on file   Social Needs   • Financial resource strain: Not on file   • Food insecurity:     Worry: Not on file     Inability: Not on file   • Transportation needs:     Medical: Not on file     Non-medical: Not on file   Tobacco Use   • Smoking status: Never Smoker   • Smokeless tobacco: Never Used   Substance and Sexual Activity   • Alcohol use: Yes     Comment: 1 drink a month   • Drug use: No   • Sexual activity: Not on file   Lifestyle   • Physical activity:     Days per week: Not on file     Minutes per session: Not on file   • Stress: Not on file   Relationships   • Social connections:     Talks on phone: Not on file     Gets together: Not on file     Attends Jainism service: Not on file     Active member of club or organization: Not on file     Attends meetings of clubs or organizations: Not on file     Relationship status: Not on file   • Intimate partner violence:     Fear of current or ex partner: Not on file     Emotionally abused: Not on file     Physically abused: Not on file     Forced sexual activity: Not on file   Other Topics Concern   • Not on file   Social History Narrative   • Not on file       SURGICAL HISTORY  Past Surgical History:   Procedure Laterality Date   • LA DX BONE MARROW ASPIRATIONS Left 8/1/2019    Procedure: ASPIRATION, BONE MARROW - REGANTI;  Surgeon: Young Veliz M.D.;  Location: [facility];  Service: Orthopedics   • LA DX BONE MARROW BIOPSIES Left 8/1/2019    Procedure: BIOPSY, BONE MARROW, USING NEEDLE OR TROCAR;  Surgeon: Young Veliz M.D.;  Location: [facility];  Service: Orthopedics   • CRANIOTOMY Right 7/10/2019    Procedure: RIGHT-SIDED CRANIOTOMY FOR TUMOR;  Surgeon: Son Ruffin M.D.;  Location: SURGERY [facility];  Service: Neurosurgery   • LUMBAR LAMINECTOMY DISKECTOMY N/A  11/26/2018    Procedure: LUMBAR LAMINECTOMY DISKECTOMY-  POSTERIOR L1-2 LAMI;  Surgeon: Son Ruffin M.D.;  Location: SURGERY [facility];  Service: Neurosurgery   • LAMINOTOMY Left 11/26/2018    Procedure: LAMINOTOMY-  L4-S1;  Surgeon: Son Ruffin M.D.;  Location: SURGERY [facility];  Service: Neurosurgery   • FORAMINOTOMY Left 11/26/2018    Procedure: FORAMINOTOMY;  Surgeon: Son Ruffin M.D.;  Location: SURGERY [facility];  Service: Neurosurgery   • OTHER NEUROLOGICAL SURG  2016    Thoracic 2-3 fusion    • APPENDECTOMY  2002   • OTHER CARDIAC SURGERY  1999,2002,2006    stents cardiac   • OTHER NEUROLOGICAL SURG      Laminectomy       CURRENT MEDICATIONS  Home Medications     Reviewed by Derick Ace (Pharmacy Tech) on 10/01/19 at 1419  Med List Status: Complete   Medication Last Dose Status   acetaminophen (TYLENOL) 500 MG Tab 9/30/2019 Active   allopurinol (ZYLOPRIM) 300 MG Tab 9/27/2019 Active   Aprepitant 130 MG/18ML Emulsion 09/23/2019 Active   atorvastatin (LIPITOR) 20 MG Tab 9/29/2019 Active   Baclofen 5 MG Tab 9/28/2019 Active   CYCLOPHOSPHAMIDE IV 09/23/2019 Active   dabigatran (PRADAXA) 150 MG Cap capsule 9/29/2019 Active   dexamethasone (DECADRON) 10 MG/ML Solution 09/23/2019 Active   DIPHENHYDRAMINE HCL INJ 09/23/2019 Active   docusate sodium (COLACE) 100 MG Cap 9/30/2019 Active   DOXORUBICIN HCL IV 09/23/2019 Active   ferrous sulfate 325 (65 Fe) MG tablet 9/29/2019 Active   loratadine (CLARITIN) 10 MG Tab 9/29/2019 Active   metFORMIN ER (GLUCOPHAGE XR) 500 MG TABLET SR 24 HR 9/29/2019 Active   omeprazole (PRILOSEC) 40 MG delayed-release capsule 9/29/2019 Active   ondansetron (ZOFRAN ODT) 8 MG TABLET DISPERSIBLE 9/27/2019 Active   palonosetron (ALOXI) 0.25 MG/5ML injection 09/23/2019 Active   pegfilgrastim (NEULASTA) 6 MG/0.6ML Solution Prefilled Syringe injection 9/24/2019 Active   predniSONE (DELTASONE) 10 MG Tab 9/29/2019 Active   predniSONE  (DELTASONE) 50 MG Tab 09/23/2019 Active   RITUXIMAB IV 09/23/2019 Active   therapeutic multivitamin-minerals (THERAGRAN-M) Tab 9/29/2019 Active   TURMERIC PO 9/29/2019 Active   vinCRIStine (ONCOVIN) 1 MG/ML Solution 09/23/2019 Active                ALLERGIES  Allergies   Allergen Reactions   • Gabapentin Rash     Broke out in a rash on R bicep       PHYSICAL EXAM  VITAL SIGNS: /96   Pulse (!) 118   Temp 36.4 °C (97.5 °F) (Temporal)   Resp 16   Ht 1.829 m (6')   Wt 89.4 kg (197 lb)   SpO2 96%   BMI 26.72 kg/m²    Constitutional: Awake alert, chronically ill-appearing no acute court-appointed distress  HENT: Normocephalic, Atraumatic, Bilateral external ears normal, Oropharynx moist, No oral exudates, Nose normal.   Eyes: PERRL, EOMI, Conjunctiva normal, No discharge.   Neck: Normal range of motion, No tenderness, Supple, No stridor.   Cardiovascular: Normal heart rate, Normal rhythm, No murmurs, No rubs, No gallops.   Thorax & Lungs: Normal breath sounds, No respiratory distress, No wheezing,  Abdomen: Bowel sounds normal, Soft, No tenderness  Skin: Warm, Dry, No erythema, No rash.   Back: No tenderness, No CVA tenderness..   Musculoskeletal: Good range of motion in all major joints.   Neurologic: Awake, alert, noted to be generally weak.  Weakness is present in both upper and lower extremities is more pronounced in the left upper and left lower extremity.  Cranial nerves are intact.  Unable to perform finger-to-nose.  Sensation is intact  Psychiatric: Affect normal    Results for orders placed or performed during the hospital encounter of 10/01/19   CBC WITH DIFFERENTIAL   Result Value Ref Range    WBC 2.5 (LL) 4.8 - 10.8 K/uL    RBC 4.11 (L) 4.70 - 6.10 M/uL    Hemoglobin 13.0 (L) 14.0 - 18.0 g/dL    Hematocrit 39.2 (L) 42.0 - 52.0 %    MCV 95.4 81.4 - 97.8 fL    MCH 31.6 27.0 - 33.0 pg    MCHC 33.2 (L) 33.7 - 35.3 g/dL    RDW 47.2 35.9 - 50.0 fL    Platelet Count 127 (L) 164 - 446 K/uL    MPV 9.8 9.0  - 12.9 fL    Neutrophils-Polys 22.80 (L) 44.00 - 72.00 %    Lymphocytes 33.30 22.00 - 41.00 %    Monocytes 14.90 (H) 0.00 - 13.40 %    Eosinophils 5.30 0.00 - 6.90 %    Basophils 3.50 (H) 0.00 - 1.80 %    Nucleated RBC 0.00 /100 WBC    Neutrophils (Absolute) 1.03 (L) 1.82 - 7.42 K/uL    Lymphs (Absolute) 0.83 (L) 1.00 - 4.80 K/uL    Monos (Absolute) 0.37 0.00 - 0.85 K/uL    Eos (Absolute) 0.13 0.00 - 0.51 K/uL    Baso (Absolute) 0.09 0.00 - 0.12 K/uL    NRBC (Absolute) 0.00 K/uL   COMP METABOLIC PANEL   Result Value Ref Range    Sodium 137 135 - 145 mmol/L    Potassium 4.1 3.6 - 5.5 mmol/L    Chloride 101 96 - 112 mmol/L    Co2 26 20 - 33 mmol/L    Anion Gap 10.0 0.0 - 11.9    Glucose 123 (H) 65 - 99 mg/dL    Bun 21 8 - 22 mg/dL    Creatinine 0.91 0.50 - 1.40 mg/dL    Calcium 9.9 8.5 - 10.5 mg/dL    AST(SGOT) 16 12 - 45 U/L    ALT(SGPT) 19 2 - 50 U/L    Alkaline Phosphatase 85 30 - 99 U/L    Total Bilirubin 0.5 0.1 - 1.5 mg/dL    Albumin 4.4 3.2 - 4.9 g/dL    Total Protein 6.8 6.0 - 8.2 g/dL    Globulin 2.4 1.9 - 3.5 g/dL    A-G Ratio 1.8 g/dL   LIPASE   Result Value Ref Range    Lipase 18 11 - 82 U/L   PROTHROMBIN TIME   Result Value Ref Range    PT 13.6 12.0 - 14.6 sec    INR 1.01 0.87 - 1.13   APTT   Result Value Ref Range    APTT 27.7 24.7 - 36.0 sec   TROPONIN   Result Value Ref Range    Troponin T 27 (H) 6 - 19 ng/L   ESTIMATED GFR   Result Value Ref Range    GFR If African American >60 >60 mL/min/1.73 m 2    GFR If Non African American >60 >60 mL/min/1.73 m 2   MORPHOLOGY   Result Value Ref Range    RBC Morphology Present     Poikilocytosis 1+     Ovalocytes 1+    PERIPHERAL SMEAR REVIEW   Result Value Ref Range    Peripheral Smear Review see below    DIFFERENTIAL MANUAL   Result Value Ref Range    Bands-Stabs 18.40 (H) 0.00 - 10.00 %    Metamyelocytes 1.80 %    Manual Diff Status PERFORMED    PLATELET ESTIMATE   Result Value Ref Range    Plt Estimation Decreased         RADIOLOGY/PROCEDURES  CT-HEAD W/O    Final Result      1.  Stable RIGHT frontoparietal low-density subdural fluid collection, likely hygroma, with associated mass effect.   2.  No acute intracranial hemorrhage or focal edema.   3.  Postoperative changes are seen with associated RIGHT frontal encephalomalacia.   4.  No acute findings.      DX-CHEST-PORTABLE (1 VIEW)   Final Result      Slight hypoinflation with bibasilar atelectasis/scarring. No focal consolidation or pleural effusions.      MR-BRAIN-WITH & W/O    (Results Pending)   MR-CERVICAL SPINE-WITH & W/O    (Results Pending)   MR-THORACIC SPINE-WITH & W/O    (Results Pending)         COURSE & MEDICAL DECISION MAKING  Pertinent Labs & Imaging studies reviewed. (See chart for details)      Patient presents with weakness that is generalized but more pronounced on the left side than the right.  It is associated with some difficulty swallowing as well.  He was seen by his oncologist and sent in here for MRIs been continued work-up and treatment.    Patient is worked up with some basic labs I did a head CT to make sure there is no hemorrhage.  This is unremarkable.    Chest x-ray shows slight hyperinflation.  Labs show neutropenia with a slight bandemia.  I suspect this is related to his Neupogen.  He recently received Neupogen.    He is not febrile or ill.  I think with neutropenia with your doctor urine catheter we need one.  Does not have any other symptoms.  He does not have a cough his abdomen is benign.  He has no signs of external infection of this time.  Will be admitted for further work-up and treatment.    Care is transferred to the hospital he is admitted in guarded condition.     Of note I have considered stroke in this patient.  He would not be a candidate for alteplase because his symptoms have been present for several days.  For the same reason he would not be a candidate for thrombectomy or clot retrieval.    The patient will be admitted for continued work-up.      FINAL  IMPRESSION  1. Weakness     2. Left-sided weakness     3. Lymphoma, unspecified body region, unspecified lymphoma type (HCC)         2.   3.         Electronically signed by: Naveed Ambrose, 10/1/2019 12:28 PM

## 2019-10-01 NOTE — ED NOTES
Pt placed on neutropenic precautions.  Pt has iv started with blood drawn and sent to lab.  Pt placed on cardiac monitor with vital signs cycling.  Family remains at bedside and adds to history.

## 2019-10-02 NOTE — PROGRESS NOTES
Call out to Dr Amato to notify him of NPO status and inquire about changing PO cardizem.  Awaiting return call.

## 2019-10-02 NOTE — H&P
Hospital Medicine History & Physical Note    Date of Service  10/1/2019    Primary Care Physician  Patt Ferrer M.D.    Consultants  Hematology oncology    Code Status  Full code    Chief Complaint  Increasing weakness, fatigue, confusion, left arm weakness and difficulty swallowing    History of Presenting Illness  73 y.o. male who presented 10/1/2019 with history of high-grade B-cell lymphoma stage IV, under the care of Dr. Ferrer,, had a recent port placed and a peritoneal mass biopsied, he received chemotherapy in form of R-CHOP on 9/23, the patient is brought to the ER because of increasing weakness, fatigue, confusion, left arm weakness difficulty speaking and swallowing.  This has been going on since Friday worsening over the weekend, the patient is on chronic anticoagulation with a history of atrial fibrillation, heart disease, diabetes, hypertension.  I discussed the case with the oncologist who requested additional imaging to rule out progression of disease versus other.  The patient received Neulasta just recently for neutropenia.  Patient is accompanied by family who gives additional history.  Full CODE STATUS is currently requested.  Review of Systems  Review of Systems   Unable to perform ROS: Medical condition   Constitutional: Negative.    HENT: Negative.    Eyes: Negative.    Respiratory: Negative.    Cardiovascular: Negative.    Gastrointestinal: Negative.    Genitourinary: Negative.    Musculoskeletal: Positive for myalgias.   Skin: Negative.    Neurological: Positive for speech change, focal weakness and headaches.   Endo/Heme/Allergies: Negative.    Psychiatric/Behavioral: The patient is nervous/anxious.    All other systems reviewed and are negative.      Past Medical History   has a past medical history of Anesthesia, Arthritis, Atrial fibrillation (HCC), Back pain, Blood clotting disorder ([facility]) (1999), Breath shortness, CAD (coronary artery disease), Cancer ([facility]) (2016), Cataract,  Dyslipidemia, High cholesterol, Hypertension, Ileus (HCC), Myocardial infarct (HCC) (,), Renal disorder, Sleep apnea, Snoring, and Urinary incontinence.    Surgical History   has a past surgical history that includes lumbar laminectomy diskectomy (N/A, 2018); laminotomy (Left, 2018); foraminotomy (Left, 2018); other neurological surg (); other neurological surg; craniotomy (Right, 7/10/2019); appendectomy (); other cardiac surgery (,,); pr dx bone marrow aspirations (Left, 2019); and pr dx bone marrow biopsies (Left, 2019).     Family History  family history is not on file.     Social History   reports that he has never smoked. He has never used smokeless tobacco. He reports that he drinks alcohol. He reports that he does not use drugs.    Allergies  Allergies   Allergen Reactions   • Gabapentin Rash     Broke out in a rash on R bicep       Medications  Prior to Admission Medications   Prescriptions Last Dose Informant Patient Reported? Taking?   Aprepitant 130 MG/18ML Emulsion 2019 at [facility] Yes Yes   Si mg by Intravenous route Once.   Baclofen 5 MG Tab 2019 at PM Family Member Yes No   Sig: Take 1 Tab by mouth 3 times a day as needed.   CYCLOPHOSPHAMIDE IV 2019 at [facility] Yes Yes   Si,580 mg by Intravenous route Once.   DIPHENHYDRAMINE HCL INJ 2019 at [facility] Yes Yes   Si mg by Injection route Once.   DOXORUBICIN HCL IV 2019 at [facility] Yes Yes   Si mg by Intravenous route Once.   RITUXIMAB IV 2019 at [facility] Yes Yes   Si mg by Intravenous route Once.   TURMERIC PO 2019 Family Member Yes Yes   Sig: Take 20 mL by mouth 3 times a day.   acetaminophen (TYLENOL) 500 MG Tab 2019 at [facility] Family Member Yes No   Sig: Take 1,000 mg by mouth every 6 hours as needed for Moderate Pain.   allopurinol (ZYLOPRIM) 300 MG Tab 2019 Family  Member Yes Yes   Sig: Take 300 mg by mouth every day.   atorvastatin (LIPITOR) 20 MG Tab 2019 at PM Family Member Yes No   Sig: Take 20 mg by mouth every evening.   dabigatran (PRADAXA) 150 MG Cap capsule 2019 at PM Family Member Yes No   Sig: Take 150 mg by mouth every bedtime.   dexamethasone (DECADRON) 10 MG/ML Solution 2019 at [facility] Yes Yes   Sig: 10 mg by Intravenous route Once.   docusate sodium (COLACE) 100 MG Cap 2019 at D/C Family Member Yes Yes   Sig: Take 100 mg by mouth 2 times a day.   ferrous sulfate 325 (65 Fe) MG tablet 2019 at AM Family Member Yes Yes   Sig: Take 325 mg by mouth every day.   loratadine (CLARITIN) 10 MG Tab 2019 at AM Family Member Yes Yes   Sig: Take 10 mg by mouth every day.   metFORMIN ER (GLUCOPHAGE XR) 500 MG TABLET SR 24 HR 2019 at 1100 Family Member No No   Sig: Take 1 Tab by mouth every day.   omeprazole (PRILOSEC) 40 MG delayed-release capsule 2019 at 1100 Family Member Yes Yes   Sig: Take 40 mg by mouth every day.   ondansetron (ZOFRAN ODT) 8 MG TABLET DISPERSIBLE 2019 at [facility] Family Member Yes Yes   Sig: Take 8 mg by mouth every 8 hours as needed for Nausea.   palonosetron (ALOXI) 0.25 MG/5ML injection 2019 at [facility] Yes Yes   Si.25 mg by Intravenous route Once.   pegfilgrastim (NEULASTA) 6 MG/0.6ML Solution Prefilled Syringe injection 2019 at [facility] Yes Yes   Sig: Inject 6 mg as instructed Once.   predniSONE (DELTASONE) 10 MG Tab 2019 Family Member Yes Yes   Sig: Take 10 mg by mouth every day. 5 DAYS   predniSONE (DELTASONE) 50 MG Tab 2019 at [facility] Yes Yes   Sig: Take 100 mg by mouth Once.   therapeutic multivitamin-minerals (THERAGRAN-M) Tab 2019 at 1100 Family Member Yes No   Sig: Take 1 Tab by mouth every bedtime.   vinCRIStine (ONCOVIN) 1 MG/ML Solution 2019 at [facility] Other Facility Yes Yes   Si mg by Intravenous route Once.       Facility-Administered Medications: None       Physical Exam  Temp:  [36.4 °C (97.5 °F)-37.1 °C (98.8 °F)] 37.1 °C (98.8 °F)  Pulse:  [] 102  Resp:  [16-20] 20  BP: (134-152)/(79-98) 150/89  SpO2:  [92 %-99 %] 95 %    Physical Exam   Constitutional: He is oriented to person, place, and time. He appears well-developed.   Pt seen and examined.   HENT:   Head: Normocephalic and atraumatic.   Eyes: Pupils are equal, round, and reactive to light.   Neck: Normal range of motion. Neck supple.   Cardiovascular: Normal rate, normal heart sounds and intact distal pulses.   Pulmonary/Chest: Effort normal and breath sounds normal.   Abdominal: Soft. Bowel sounds are normal.   Genitourinary: Penis normal.   Musculoskeletal: Normal range of motion.   Neurological: He is alert and oriented to person, place, and time. He exhibits abnormal muscle tone. Coordination abnormal.   Left upper and left lower extremity weakness, approximately 3 out of 5  Very soft voice     Skin: Skin is warm and dry.   Psychiatric: He has a normal mood and affect. His behavior is normal.   Nursing note and vitals reviewed.      Laboratory:  Recent Labs     10/01/19  1215   WBC 2.5*   RBC 4.11*   HEMOGLOBIN 13.0*   HEMATOCRIT 39.2*   MCV 95.4   MCH 31.6   MCHC 33.2*   RDW 47.2   PLATELETCT 127*   MPV 9.8     Recent Labs     10/01/19  1008 10/01/19  1215   SODIUM 138 137   POTASSIUM 4.4 4.1   CHLORIDE 102 101   CO2 25 26   GLUCOSE 148* 123*   BUN 18 21   CREATININE 0.92 0.91   CALCIUM 9.9 9.9     Recent Labs     10/01/19  1008 10/01/19  1215   ALTSGPT 22 19   ASTSGOT 19 16   ALKPHOSPHAT 88 85   TBILIRUBIN 0.5 0.5   LIPASE  --  18   GLUCOSE 148* 123*     Recent Labs     10/01/19  1215   APTT 27.7   INR 1.01     No results for input(s): NTPROBNP in the last 72 hours.      Recent Labs     10/01/19  1215   TROPONINT 27*       Urinalysis:    No results found     Imaging:  CT-HEAD W/O   Final Result      1.  Stable RIGHT frontoparietal low-density subdural  fluid collection, likely hygroma, with associated mass effect.   2.  No acute intracranial hemorrhage or focal edema.   3.  Postoperative changes are seen with associated RIGHT frontal encephalomalacia.   4.  No acute findings.      DX-CHEST-PORTABLE (1 VIEW)   Final Result      Slight hypoinflation with bibasilar atelectasis/scarring. No focal consolidation or pleural effusions.      MR-BRAIN-WITH & W/O    (Results Pending)   MR-CERVICAL SPINE-WITH & W/O    (Results Pending)   MR-THORACIC SPINE-WITH & W/O    (Results Pending)         Assessment/Plan:  I anticipate this patient will require at least two midnights for appropriate medical management, necessitating inpatient admission.    Neutropenia (HCC)  Assessment & Plan  Status post chemotherapy, status post Neulasta, monitor  Protective isolation    Metastasis to brain (HCC)  Assessment & Plan  With resection in July 2019, 4.5 cm lesion.    B-cell lymphoma (HCC)  Assessment & Plan  Per hematology oncology with primary abdominal origin and brain metastasis, currently undergoing chemotherapy  We will consult hematology oncology for follow-up    CAD (coronary artery disease)- (present on admission)  Assessment & Plan  History of, continue conservative care    Hypertension- (present on admission)  Assessment & Plan  History of, monitor, adjust medication as indicated    Atrial fibrillation (HCC)- (present on admission)  Assessment & Plan  Continue with rate and rhythm control, continue anticoagulation    Dysphagia  Assessment & Plan  Possibly result from neurologic status, await additional imaging, speech therapy    Plan  Await additional imaging in form of MRI, discussed with Dr. Carver from oncology  Supportive care  Speech therapy eval for swallow safety and diet modification possibly  Continue supportive medication regimen  Currently no indication the patient is suffering from a an infection, but watch closely  Further direction after imaging results are  available  Medically complex and high risk  VTE prophylaxis: Pradaxa

## 2019-10-02 NOTE — THERAPY
"  Speech Language Therapy Clinical Swallow Evaluation completed.  Functional Status: The patient presents with poor, delayed verbal and physical responses. Oral motor movement is slowed and weak. He has xerostomia. Poor A-P lingual movement noted. Lateral movement was decent but weak against resistance. His jaw is in an open mouth, relaxed positioned with poor labial approximation during articulation. Laryngeal elevation palpated as weak. PO trials resulted in an increase in wet vocal quality, oral residue and patient report of \"sticking\" in his throat. Trials were discontinued. Recommend patient remain NPO today. Okay for 3 ice chips per hour. SLP to reassess tomorrow morning. Patient aware.     Recommendations - Diet: Diet / Liquid Recommendation: NPO(ok for 3 ice chips per hour with RN)                          Strategies: to be determined                          Medication Administration: Medication Administration : (defer to md)  Plan of Care: Will benefit from Speech Therapy 3 times per week  Post-Acute Therapy: Recommend inpatient transitional care services for continued speech therapy services.        See \"Rehab Therapy-Acute\" Patient Summary Report for complete documentation.   "

## 2019-10-02 NOTE — ASSESSMENT & PLAN NOTE
With resection in July 2019, 4.5 cm lesion-- Path revealed B cell lymphoma.  Had reported left weakness- now resolved - Neuro exam no focal weakness.   10/3: Pending MRI of the brain.  10/4: MRI of the brain negative for metastasis.  No new neurological deficits.  PT OT reevaluation.

## 2019-10-02 NOTE — PROGRESS NOTES
[facility] Medicine Daily Progress Note    Date of Service  10/2/2019    Chief Complaint  73 y.o. male admitted 10/1/2019 with increasing weakness, fatigue, left arm weakness and difficulty swallowing.    Hospital Course      Hx of high-grade B-cell lymphoma stage IV, under the care of Dr. Ferrer,, had a recent port placed and a peritoneal mass biopsied, brain metastasis with cranio resection 710/2019, post R-CHOP chemo  on 9/23 admitted with above symptoms. Plan workup for above symptoms with MRI and therapies.      Interval Problem Update    Developed increased agitation, N/V with A fib RVR.  Unable to complete MRI.  Ativan .5 mg given early this morning.  Remains confused, slow  w difficulty swallowing.     Consultants/Specialty    None      Code Status    Full     Disposition    Plan workup for weakness, PT/OT/SLP, TBD    Review of Systems  Review of Systems   Constitutional: Positive for malaise/fatigue. Negative for chills, diaphoresis and fever.   HENT: Positive for congestion.    Eyes: Negative for discharge and redness.   Respiratory: Negative for cough, shortness of breath, wheezing and stridor.    Cardiovascular: Negative for chest pain, palpitations and leg swelling.   Gastrointestinal: Positive for nausea. Negative for abdominal pain, diarrhea and vomiting.   Genitourinary: Negative for flank pain and hematuria.   Musculoskeletal: Negative for back pain, joint pain and neck pain.   Neurological: Positive for weakness. Negative for tremors, sensory change, speech change and focal weakness.   Psychiatric/Behavioral: Positive for memory loss. Negative for hallucinations and substance abuse.        Physical Exam  Temp:  [36.4 °C (97.5 °F)-37.5 °C (99.5 °F)] 37.5 °C (99.5 °F)  Pulse:  [] 134  Resp:  [16-28] 28  BP: (134-162)/(79-98) 161/92  SpO2:  [92 %-99 %] 92 %    Physical Exam   Constitutional:   Alert, slow w responses, mildly confused.    HENT:   Head: Normocephalic and atraumatic.   Nose: Nose  normal.   Eyes: Conjunctivae and EOM are normal. No scleral icterus.   Neck: Normal range of motion. No JVD present.   Cardiovascular:   No murmur heard.  irreg irreg, tachycardic.    Pulmonary/Chest: No stridor. He has no wheezes. He has no rales.   Dim bs bases, sporadic insp rhonchi.    Abdominal: Soft. He exhibits no distension. There is no tenderness.   Musculoskeletal: He exhibits no edema or tenderness.   Generalized 4/5 strength.    Neurological: He is alert. No cranial nerve deficit.   Ox 2.   No gross focal weakness   Skin: Skin is warm and dry. He is not diaphoretic. No pallor.   Psychiatric: His speech is delayed. He is slowed.   Vitals reviewed.      Fluids  No intake or output data in the 24 hours ending 10/02/19 0926    Laboratory  Recent Labs     10/01/19  1215 10/02/19  0405   WBC 2.5* 5.2   RBC 4.11* 4.44*   HEMOGLOBIN 13.0* 14.1   HEMATOCRIT 39.2* 43.0   MCV 95.4 96.8   MCH 31.6 31.8   MCHC 33.2* 32.8*   RDW 47.2 47.4   PLATELETCT 127* 156*   MPV 9.8 9.9     Recent Labs     10/01/19  1008 10/01/19  1215 10/02/19  0405   SODIUM 138 137 137   POTASSIUM 4.4 4.1 4.2   CHLORIDE 102 101 97   CO2 25 26 29   GLUCOSE 148* 123* 128*   BUN 18 21 20   CREATININE 0.92 0.91 0.97   CALCIUM 9.9 9.9 9.7     Recent Labs     10/01/19  1215   APTT 27.7   INR 1.01               Imaging  CT-HEAD W/O   Final Result      1.  Stable RIGHT frontoparietal low-density subdural fluid collection, likely hygroma, with associated mass effect.   2.  No acute intracranial hemorrhage or focal edema.   3.  Postoperative changes are seen with associated RIGHT frontal encephalomalacia.   4.  No acute findings.      DX-CHEST-PORTABLE (1 VIEW)   Final Result      Slight hypoinflation with bibasilar atelectasis/scarring. No focal consolidation or pleural effusions.      MR-BRAIN-WITH & W/O    (Results Pending)   MR-CERVICAL SPINE-WITH & W/O    (Results Pending)   MR-THORACIC SPINE-WITH & W/O    (Results Pending)        Assessment/Plan  *  Atrial fibrillation (HCC) w Rapid Ventricular Response - (present on admission)  Assessment & Plan  Will give IV Cardizem x 1.   Start sched oral cardizem.  Tx to Tele. Cardiology consult if poor rate control.]  Thyroid fxn.  Continue with Pradaxa.    Neutropenia (HCC)  Assessment & Plan  Status post chemotherapy and Neulasta-- now resolved.   Monitor CBC    Metastasis to brain (HCC)  Assessment & Plan  With resection in July 2019, 4.5 cm lesion-- Path revealed B cell lymphoma.  Had reported left weakness- now resolved - Neuro exam no focal weakness.   Plan to complete MRI brain.     B-cell lymphoma (HCC)  Assessment & Plan  Per hematology oncology with primary abdominal origin and brain metastasis, currently undergoing chemotherapy  Plan MRI brain ,  Spine to assess for met dz  Will consult Onc depending on findings.     Type 2 diabetes mellitus without complication, without long-term current use of insulin (HCC)- (present on admission)  Assessment & Plan  Fair control  Hold metformin  Monitor BS with serial Accu checks, SSI as needed.     CAD (coronary artery disease)- (present on admission)  Assessment & Plan  Continue with statin  Control rapid afib.     Hypertension- (present on admission)  Assessment & Plan  Start oral cardizem for added BP control.   Prn IV Vasotec, hydralazine if unable to tolerate orals.      Dysphagia  Assessment & Plan  Possibly result from neurologic status, await additional imaging  Difficulty taking pills, coughing. - Plan NPO, SLP        VTE prophylaxis: Pradaxa

## 2019-10-02 NOTE — PROGRESS NOTES
Pt much more comfortable.  Pulse between 110-115.  Vitals stable.  Pt c/o neck pain earlier, pt was repositioned and ice applied.  Pt now states pain is relieved.

## 2019-10-02 NOTE — PROGRESS NOTES
Pt placed on continuous pulse oximetry at start of this RN's shift per existing orsders. O2 saturation stable throughout evening but pulse fluctuating from 100-125, irregular on auscultation (pt has known afib).   Later in shift, approximately 0415 but became very nauseated and had a large amount of emesis, HR at this time fluctuating from 130-145 on , upon auscultation rate confirmed 140, irregular.   IV zofran administered for nausea. Pts nausea/vomiting persistent.   Dr. Varghese (on call hospitalist) notified of pt symptoms, Tele monitoring and additional anti-emetic requested. New orders for continuous tele monitoring and IV ativan placed.   Ativan administered, pt visibly more relaxed and no longer feels nauseated. Tele monitoring initiated. Pt resting in bed eyes closed.    Siri Zayas

## 2019-10-02 NOTE — DISCHARGE PLANNING
SW met with patient in his room for assessment. Patient does not have any visitors. Patient is tired and on oxygen. Answered yes or no questions with head nods.     Patient is a 73-year old  father of one adult daughter, Aissatou. Patient lives with his wife, Mell in their own home in [place], [place]. Patient reports he is retired.    Patient has PCP is Dr. Ferrer and has a local pharmacy. Patient reports he needs assistance at home with ADLs, uses cane and walker and daughter helps with bathing. Patient denies any issues with MH or AOD.    SW to continue to assess patient, possibly call family to further assess.     Care Transition Team Assessment    Information Source  Orientation : Oriented x 4  Information Given By: Patient  Who is responsible for making decisions for patient? : Patient    Readmission Evaluation  Is this a readmission?: Yes - unplanned readmission  Why do you think you were readmitted?: dyshpagia    Elopement Risk  Legal Hold: No  Ambulatory or Self Mobile in Wheelchair: No-Not an Elopement Risk  Elopement Risk: Not at Risk for Elopement    Interdisciplinary Discharge Planning  Lives with - Patient's Self Care Capacity: Unable To Determine At This Time  Patient or legal guardian wants to designate a caregiver (see row info): No  Prior Services: Unable To Determine At This Time    Discharge Preparedness  What is your plan after discharge?: Uncertain - pending medical team collaboration, Home with help  What are your discharge supports?: Spouse  Prior Functional Level: Needs Assist with Activities of Daily Living, Independent with Medication Management, Uses Walker  Difficulity with ADLs: Bathing, Walking  Difficulty with ADLs Comment: (Reports needs walker and family helps with bathing)  Difficulity with IADLs: None    Functional Assesment  Prior Functional Level: Needs Assist with Activities of Daily Living, Independent with Medication Management, Uses Walker    Finances  Financial Barriers to  Discharge: No  Prescription Coverage: Yes     Advance Directive  Advance Directive?: None    Domestic Abuse  Have you ever been the victim of abuse or violence?: No  Physical Abuse or Sexual Abuse: No  Verbal Abuse or Emotional Abuse: No  Possible Abuse Reported to::     Psychological Assessment  History of Substance Abuse: None  History of Psychiatric Problems: No  Non-compliant with Treatment: No  Newly Diagnosed Illness: No    Discharge Risks or Barriers  Discharge risks or barriers?: Complex medical needs  Patient risk factors: Complex medical needs, Readmission    Anticipated Discharge Information  Anticipated discharge disposition: Discharge needs currently unknown, Home  Discharge Address: ([place], [place])  Discharge Contact Phone Number: ([phone])

## 2019-10-02 NOTE — PROGRESS NOTES
Return call from Dr Amato.  MD aware pt is strict NPO.  OK to hold cardazem PO and PO meds since Pts heart rate now better controlled.  metopolol IV is available.   Will repeat swallow study tomorrow and hopefully be able to resume PO meds.

## 2019-10-02 NOTE — ASSESSMENT & PLAN NOTE
Per hematology oncology with primary abdominal origin and brain metastasis, currently undergoing chemotherapy  Plan MRI brain ,  Spine to assess for met dz  10/3: Discussed with oncology Dr. Carver who is on board.  MRI of the brain was not performed yesterday.  That was reordered today and may be possibly cervical and thoracic spine MRI as well as per Dr. Haney recommendations when she saw the patient last.  10/4: MRI of the brain, cervical, and thoracic spine with no evidence of metastases.  Stable needs follow-up with oncology as outpatient

## 2019-10-02 NOTE — PROGRESS NOTES
Pt transferred to floor from oncology due to pt going into A-fib RVR.  Pt has B-cell lymphoma stage IV.  Pt had craniotomy in July of this year for brain CA.  Pt has chest port for chemo.  Pt is alert but very slow thick speech of one or two words.  Pt given IV cardizem with order for PO as well.  Pt unable to swallow pills was given in applesauce.  MD at bedside and was made aware.  Pt NPO until speech sees pt.

## 2019-10-02 NOTE — PROGRESS NOTES
2 RN Skin check done with BILLIE Morgan. Blanchable Erythema at sacrum. Heels, elbows, and ears intact, warm, and dry. Plan to apply barrier cream to prevent breakdown.

## 2019-10-02 NOTE — CARE PLAN
Problem: Infection  Goal: Will remain free from infection  Outcome: PROGRESSING AS EXPECTED  Note:   Currently no signs/symptoms of infection present. Neutropenic precautions in place.      Problem: Communication  Goal: The ability to communicate needs accurately and effectively will improve  Outcome: PROGRESSING SLOWER THAN EXPECTED  Note:   Patient is demonstrating ability to utilize call bell system although does not always ring when in need d/t confusion. Pt moved to room close to nursing station, hourly rounding in place. Pt able to make needs known when communication is face to face.      Problem: Urinary Elimination:  Goal: Ability to reestablish a normal urinary elimination pattern will improve  Outcome: PROGRESSING SLOWER THAN EXPECTED     Siri Zayas

## 2019-10-03 PROBLEM — D64.9 NORMOCYTIC ANEMIA: Status: ACTIVE | Noted: 2019-01-01

## 2019-10-03 NOTE — CONSULTS
"Consult Note: Hematology/Oncology    Date of consultation: 10/2/2019 9:59 PM    Referring provider: Dr Amato    Reason for consultation: lymphoma      History of presenting illness:       73-year-old gentleman with a history of significant bone originally felt to be primary CNS lymphoma status post resection of right frontal mass on July 10, 2019.  He then under further work-up had lymphadenopathy in his abdomen found.  He had a biopsy of this right retroperitoneal lymph node on 9/11/2019 which revealed large cell lymphoma as well.    ABC subtype/double expressor status post 1 cycle of R-CHOP 9/23/2019 and did get Neulasta.    He was admitted to [facility] on 10/1/2019 for some confusion, weakness, failure to thrive.  He had an MRI attempted when he came in but did not finish.  He then went into atrial fibrillation.  He had to be moved to telemetry floor.    He is currently stable.  Nurse states that the best he has been.  He is communicating well.        Past Medical History:    Past Medical History:   Diagnosis Date   • Anesthesia     \"too much anesthesia kidneys stop woking and intestines slowed\"   • Arthritis    • Atrial fibrillation (HCC)    • Back pain    • Blood clotting disorder (HCC) 1999    blood clot leg Left   • Breath shortness    • CAD (coronary artery disease)    • Cancer (HCC) 2016    prostate cancer   • Cataract     no surgery   • Dyslipidemia    • High cholesterol    • Hypertension    • Ileus (HCC)    • Myocardial infarct (HCC) 1999,2006    x3   • Renal disorder     cycst left kidney   • Sleep apnea     no cpap   • Snoring    • Urinary incontinence        Past surgical history:    Past Surgical History:   Procedure Laterality Date   • SC DX BONE MARROW ASPIRATIONS Left 8/1/2019    Procedure: ASPIRATION, BONE MARROW - REGANTI;  Surgeon: Young Veliz M.D.;  Location: [facility];  Service: Orthopedics   • SC DX BONE MARROW BIOPSIES Left 8/1/2019    Procedure: BIOPSY, BONE " MARROW, USING NEEDLE OR TROCAR;  Surgeon: Young Veliz M.D.;  Location: [facility];  Service: Orthopedics   • CRANIOTOMY Right 7/10/2019    Procedure: RIGHT-SIDED CRANIOTOMY FOR TUMOR;  Surgeon: Son Ruffin M.D.;  Location: [facility];  Service: Neurosurgery   • LUMBAR LAMINECTOMY DISKECTOMY N/A 11/26/2018    Procedure: LUMBAR LAMINECTOMY DISKECTOMY-  POSTERIOR L1-2 LAMI;  Surgeon: Son Ruffin M.D.;  Location: [facility];  Service: Neurosurgery   • LAMINOTOMY Left 11/26/2018    Procedure: LAMINOTOMY-  L4-S1;  Surgeon: Son Ruffin M.D.;  Location: [facility];  Service: Neurosurgery   • FORAMINOTOMY Left 11/26/2018    Procedure: FORAMINOTOMY;  Surgeon: Son Ruffin M.D.;  Location: SUR[facility];  Service: Neurosurgery   • OTHER NEUROLOGICAL SURG  2016    Thoracic 2-3 fusion    • APPENDECTOMY  2002   • OTHER CARDIAC SURGERY  1999,2002,2006    stents cardiac   • OTHER NEUROLOGICAL SURG      Laminectomy       Allergies:  Gabapentin    Medications:    Current Facility-Administered Medications   Medication Dose Route Frequency Provider Last Rate Last Dose   • DILTIAZem (CARDIZEM) tablet 60 mg  60 mg Oral Q6HRS Chase Amato M.D.   Stopped at 10/02/19 1200   • Metoprolol Tartrate (LOPRESSOR) injection 5 mg  5 mg Intravenous Q5 MIN PRN Chase Amato M.D.   5 mg at 10/02/19 1012   • allopurinol (ZYLOPRIM) tablet 300 mg  300 mg Oral DAILY Jin Jones M.D.   Stopped at 10/02/19 0600   • atorvastatin (LIPITOR) tablet 20 mg  20 mg Oral Nightly Jin Jones M.D.   Stopped at 10/02/19 2100   • docusate sodium (COLACE) capsule 100 mg  100 mg Oral BID Jin Jones M.D.   Stopped at 10/01/19 1800   • ferrous sulfate tablet 325 mg  325 mg Oral DAILY Jin Jones M.D.   Stopped at 10/02/19 0600   • loratadine (CLARITIN) tablet 10 mg  10 mg Oral DAILY Jin Jones M.D.   Stopped at 10/02/19 0600    • omeprazole (PRILOSEC) capsule 40 mg  40 mg Oral DAILY Jin Jones M.D.   Stopped at 10/02/19 0600   • predniSONE (DELTASONE) tablet 10 mg  10 mg Oral DAILY Jin Jones M.D.   Stopped at 10/02/19 0600   • senna-docusate (PERICOLACE or SENOKOT S) 8.6-50 MG per tablet 2 Tab  2 Tab Oral BID Jin Jones M.D.   Stopped at 10/01/19 1800    And   • polyethylene glycol/lytes (MIRALAX) PACKET 1 Packet  1 Packet Oral QDAY PRN Jin Jones M.D.        And   • magnesium hydroxide (MILK OF MAGNESIA) suspension 30 mL  30 mL Oral QDAY PRN Jin Jones M.D.        And   • bisacodyl (DULCOLAX) suppository 10 mg  10 mg Rectal QDAY PRN Jin Jones M.D.       • lactated ringers infusion   Intravenous Continuous Jin Jones M.D. 75 mL/hr at 10/02/19 2050     • acetaminophen (TYLENOL) tablet 650 mg  650 mg Oral Q6HRS PRN Jin Jones M.D.   Stopped at 10/02/19 0335   • Pharmacy Consult Request ...Pain Management Review 1 Each  1 Each Other PHARMACY TO DOSE Jin Jones M.D.        And   • oxyCODONE immediate-release (ROXICODONE) tablet 2.5 mg  2.5 mg Oral Q3HRS PRN Jin Jones M.D.        And   • oxyCODONE immediate-release (ROXICODONE) tablet 5 mg  5 mg Oral Q3HRS PRRAFFY Jones M.D.        And   • morphine (pf) 4 mg/ml injection 2 mg  2 mg Intravenous Q3HRS PRN Jin Jones M.D.       • ondansetron (ZOFRAN) syringe/vial injection 4 mg  4 mg Intravenous Q4HRS PRRAFFY Jones M.D.   4 mg at 10/02/19 0348   • ondansetron (ZOFRAN ODT) dispertab 4 mg  4 mg Oral Q4HRS PRN Jin Jones M.D.       • insulin regular (HUMULIN R) injection 2-9 Units  2-9 Units Subcutaneous 4X/DAY SAIM Jones M.D.   Stopped at 10/01/19 2100    And   • glucose 4 g chewable tablet 16 g  16 g Oral Q15 MIN PRN Jin Jones M.D.        And   • DEXTROSE 10% BOLUS 250 mL  250 mL Intravenous Q15 MIN PRN Jin Jones M.D.       •  acetaminophen (TYLENOL) oral suspension 325 mg  325 mg Oral Q4HRS PRN Jin Jones M.D.   325 mg at 10/01/19 1628   • dabigatran (PRADAXA) capsule 75 mg  75 mg Oral BID Jin Jones M.D.   Stopped at 10/02/19 0900       Social History:     Social History     Socioeconomic History   • Marital status:      Spouse name: Not on file   • Number of children: Not on file   • Years of education: Not on file   • Highest education level: Not on file   Occupational History   • Not on file   Social Needs   • Financial resource strain: Not on file   • Food insecurity:     Worry: Not on file     Inability: Not on file   • Transportation needs:     Medical: Not on file     Non-medical: Not on file   Tobacco Use   • Smoking status: Never Smoker   • Smokeless tobacco: Never Used   Substance and Sexual Activity   • Alcohol use: Yes     Comment: 1 drink a month   • Drug use: No   • Sexual activity: Not on file   Lifestyle   • Physical activity:     Days per week: Not on file     Minutes per session: Not on file   • Stress: Not on file   Relationships   • Social connections:     Talks on phone: Not on file     Gets together: Not on file     Attends Yarsani service: Not on file     Active member of club or organization: Not on file     Attends meetings of clubs or organizations: Not on file     Relationship status: Not on file   • Intimate partner violence:     Fear of current or ex partner: Not on file     Emotionally abused: Not on file     Physically abused: Not on file     Forced sexual activity: Not on file   Other Topics Concern   • Not on file   Social History Narrative   • Not on file       Family History:   No family history on file.    Review of Systems:  All other review of systems are negative except what was mentioned above in the HPI.    Constitutional: No fever, chills, weight loss , some malaise/fatigue.    HEENT: No new auditory or visual complaints. No sore throat and neck pain.      Respiratory:No new cough, sputum production, shortness of breath and wheezing.    Cardiovascular: No new chest pain, palpitations, orthopnea and leg swelling.    Gastrointestinal: No heartburn, nausea, vomiting ,abdominal pain, hematochezia or melena     Genitourinary: Negative for dysuria, hematuria    Musculoskeletal: No new arthralgias or myalgias   Skin: Negative for rash and itching.    Neurological: Negative for focal weakness or headaches.    Endo/Heme/Allergies: No abnormal bleed/bruise.    Psychiatric/Behavioral: No new depression/anxiety.    Physical Exam:   Vitals:   /90   Pulse 92   Temp 36.6 °C (97.9 °F) (Temporal)   Resp 16   Ht 1.829 m (6')   Wt 89.4 kg (197 lb 1.5 oz)   SpO2 97%   BMI 27.49 kg/m²     General: Not in acute distress, alert and oriented x 3  HEENT: No pallor  Neck: Supple, no palpable masses.  Lymph nodes: No palpable cervical, supraclavicular  CVS: regular rate and rhythm, no rubs or gallops  RESP: Clear to auscultate bilaterally  ABD: Soft, non tender, non distended, positive bowel sounds, no palpable organomegaly  EXT: No edema or cyanosis  CNS: Alert and oriented x3, No focal deficits.  Skin- No rash      Labs:   Recent Labs     10/01/19  1215 10/02/19  0405   RBC 4.11* 4.44*   HEMOGLOBIN 13.0* 14.1   HEMATOCRIT 39.2* 43.0   PLATELETCT 127* 156*   PROTHROMBTM 13.6  --    APTT 27.7  --    INR 1.01  --      Lab Results   Component Value Date/Time    SODIUM 137 10/02/2019 04:05 AM    POTASSIUM 4.2 10/02/2019 04:05 AM    CHLORIDE 97 10/02/2019 04:05 AM    CO2 29 10/02/2019 04:05 AM    GLUCOSE 128 (H) 10/02/2019 04:05 AM    BUN 20 10/02/2019 04:05 AM    CREATININE 0.97 10/02/2019 04:05 AM        Assessment and Plan:    73-year-old gentleman ECOG performance status of 3 with a high-grade large cell lymphoma status post right frontal resection from the brain with intra-abdominal disease biopsy-proven.  Double expressor, ABC subtype status post 1 cycle of about 8 days ago.   He is due for high-dose methotrexate next week.    He seems this afternoon.  His MRI still pending.  His counts are good.  He has really no focal deficits.  I think all of it could just be post chemotherapy during his kirill time.  We will wait on his MRI to make sure there is no disease progression.  His primary oncologist I discussed with him in the office if there was significant progression in the CNS then one could consider hospice/comfort care.    I think OT and PT would be good for him to be evaluated.  The nurse tells me the speech/swallow team will be evaluating again.    He agreed and verbalized his agreement and understanding with the current plan.  I answered all questions and concerns he has at this time.                  Thank you for allowing me to participate in his care.    Please note that this dictation was created using voice recognition software. I have made every reasonable attempt to correct obvious errors, but I expect that there are errors of grammar and possibly content that I did not discover before finalizing the note.      SIGNATURES:  Anshul Carver    CC:  Patt Ferrer M.D.

## 2019-10-03 NOTE — PROGRESS NOTES
2 RN skin check complete.   Devices in place O2, IV, condom cath.  Skin assessed under devices yes  .  Confirmed pressure ulcers found on none.  New potential pressure ulcers noted on coccyx  . Wound consult placed no.  The following interventions in place barrier cream on buttocks

## 2019-10-03 NOTE — PROGRESS NOTES
Pt much more alert, talkative, able to answer questions and make needs known.  Pt bathed. No distress.  Vitals and heart rate under good control.  Pt denies pain.  Pt to be moved to private room 721 when available and clean

## 2019-10-03 NOTE — PROGRESS NOTES
Hospital Medicine Daily Progress Note    Date of Service  10/3/2019    Chief Complaint  73 y.o. male admitted 10/1/2019 with increasing weakness, fatigue, left arm weakness and difficulty swallowing.    Hospital Course      Hx of high-grade B-cell lymphoma stage IV, under the care of Dr. Ferrer,, had a recent port placed and a peritoneal mass biopsied, brain metastasis with cranio resection 710/2019, post R-CHOP chemo  on 9/23 admitted with above symptoms. Plan workup for above symptoms with MRI and therapies.      Interval Problem Update    Developed increased agitation, N/V with A fib RVR.  Unable to complete MRI.  Ativan .5 mg given early this morning.  Remains confused, slow  w difficulty swallowing.   10/3: Heart rate is better controlled.  Oral Cardizem converted to long-acting.  Patient is awake and alert.  Follow commands appropriately.  Answer simple questions appropriately.  No acute distress noted.  No focal neurological deficit on exam.  Discussed the case with oncology Dr. Carver.  Consultants/Specialty    None      Code Status    Full     Disposition    Plan workup for weakness, PT/OT/SLP, TBD    Review of Systems  Review of Systems   Constitutional: Positive for malaise/fatigue. Negative for chills, diaphoresis, fever and weight loss.   HENT: Positive for congestion. Negative for ear pain, hearing loss and tinnitus.    Eyes: Negative for blurred vision, double vision, discharge and redness.   Respiratory: Negative for cough, hemoptysis, shortness of breath, wheezing and stridor.    Cardiovascular: Negative for chest pain, palpitations, orthopnea and leg swelling.   Gastrointestinal: Positive for nausea. Negative for abdominal pain, diarrhea and vomiting.   Genitourinary: Negative for dysuria, flank pain, hematuria and urgency.   Musculoskeletal: Negative for back pain, myalgias and neck pain.   Neurological: Positive for weakness. Negative for tremors, sensory change, speech change and focal weakness.    Psychiatric/Behavioral: Positive for memory loss. Negative for depression, hallucinations, substance abuse and suicidal ideas.        Physical Exam  Temp:  [36.4 °C (97.6 °F)-36.7 °C (98.1 °F)] 36.7 °C (98.1 °F)  Pulse:  [] 102  Resp:  [14-18] 18  BP: (132-137)/(86-97) 137/96  SpO2:  [94 %-97 %] 94 %    Physical Exam   Constitutional: No distress.   HENT:   Head: Normocephalic and atraumatic.   Nose: Nose normal.   Eyes: Conjunctivae and EOM are normal. No scleral icterus.   Neck: Normal range of motion. No JVD present. No tracheal deviation present.   Cardiovascular: Normal rate and regular rhythm. Exam reveals no friction rub.   No murmur heard.  irreg irreg, tachycardic.    Pulmonary/Chest: No stridor. He has decreased breath sounds. He has no wheezes. He has no rales.   Dim bs bases, sporadic insp rhonchi.    Abdominal: Soft. He exhibits no distension. There is no tenderness.   Musculoskeletal: He exhibits no edema or tenderness.   Generalized 4/5 strength.    Neurological: He is alert. No cranial nerve deficit.   Ox 2.   No gross focal weakness   Skin: Skin is warm and dry. No rash noted. He is not diaphoretic. No erythema.   Psychiatric: His speech is delayed. He is slowed.   Vitals reviewed.      Fluids    Intake/Output Summary (Last 24 hours) at 10/3/2019 1610  Last data filed at 10/3/2019 0600  Gross per 24 hour   Intake --   Output 820 ml   Net -820 ml       Laboratory  Recent Labs     10/01/19  1215 10/02/19  0405 10/03/19  0320   WBC 2.5* 5.2 8.6   RBC 4.11* 4.44* 3.77*   HEMOGLOBIN 13.0* 14.1 11.8*   HEMATOCRIT 39.2* 43.0 36.1*   MCV 95.4 96.8 95.8   MCH 31.6 31.8 31.3   MCHC 33.2* 32.8* 32.7*   RDW 47.2 47.4 47.1   PLATELETCT 127* 156* 138*   MPV 9.8 9.9 9.2     Recent Labs     10/01/19  1215 10/02/19  0405 10/03/19  0320   SODIUM 137 137 135   POTASSIUM 4.1 4.2 3.8   CHLORIDE 101 97 100   CO2 26 29 29   GLUCOSE 123* 128* 109*   BUN 21 20 19   CREATININE 0.91 0.97 0.90   CALCIUM 9.9 9.7 9.1      Recent Labs     10/01/19  1215   APTT 27.7   INR 1.01               Imaging  MR-BRAIN-WITH & W/O   Final Result      1.  Motion artifact limits exam.   2.  RIGHT frontal resection with interval slight improvement of peripheral nodular enhancement favoring evolving postoperative changes.  Tumor recurrence is felt unlikely.   3.  Increased edema or gliosis in the frontal lobes bilaterally since prior exam, of uncertain clinical significance.   4.  RIGHT hemispheric subdural hygroma appears stable.   5.  Interval significant improvement of previously described LEFT subdural hematoma.      MR-CERVICAL SPINE-WITH & W/O   Final Result      1.  Again seen multilevel degenerative disc disease, uncinate and facet degeneration. No central canal narrowing. There are varying degrees of neural foraminal narrowing at levels as specifically described above.      2.  Loss of the normal cervical lordosis.      3.  No evidence of spinal cord lesion or abnormal enhancement.      MR-THORACIC SPINE-WITH & W/O   Final Result      1.  Postoperative changes of the upper thoracic spine as described.   2.  No evidence to indicate lymphomatous involvement of the thoracic spine or spinal canal.   3.  Mild multilevel degenerative change, unchanged from prior exam.      CT-HEAD W/O   Final Result      1.  Stable RIGHT frontoparietal low-density subdural fluid collection, likely hygroma, with associated mass effect.   2.  No acute intracranial hemorrhage or focal edema.   3.  Postoperative changes are seen with associated RIGHT frontal encephalomalacia.   4.  No acute findings.      DX-CHEST-PORTABLE (1 VIEW)   Final Result      Slight hypoinflation with bibasilar atelectasis/scarring. No focal consolidation or pleural effusions.           Assessment/Plan  * Atrial fibrillation (HCC) w Rapid Ventricular Response - (present on admission)  Assessment & Plan  Will give IV Cardizem x 1.   Continue with oral Cardizem for now.  I will switch to  long-acting.  Continue to assess the effectiveness of Cardizem to control heart rate.  Will consider switching to beta-blockers or add beta-blockers if no adequate control at this point.  Continue with Pradaxa 150 mg p.o. twice daily.  Echocardiogram done in July 2019 unremarkable.  TSH in July 2019 was within normal limits.  Continue telemetry monitoring.    Neutropenia (HCC)  Assessment & Plan  Status post chemotherapy and Neulasta-- now resolved.   Monitor CBC    Metastasis to brain (HCC)  Assessment & Plan  With resection in July 2019, 4.5 cm lesion-- Path revealed B cell lymphoma.  Had reported left weakness- now resolved - Neuro exam no focal weakness.   10/3: Pending MRI of the brain.    B-cell lymphoma (HCC)  Assessment & Plan  Per hematology oncology with primary abdominal origin and brain metastasis, currently undergoing chemotherapy  Plan MRI brain ,  Spine to assess for met dz  10/3: Discussed with oncology Dr. Carver who is on board.  MRI of the brain was not performed yesterday.  That was reordered today and may be possibly cervical and thoracic spine MRI as well as per Dr. Haney recommendations when she saw the patient last.    Type 2 diabetes mellitus without complication, without long-term current use of insulin (HCC)- (present on admission)  Assessment & Plan  Fair control  Hold metformin  Monitor BS with serial Accu checks, SSI as needed.     CAD (coronary artery disease)- (present on admission)  Assessment & Plan  Continue with statin      Hypertension- (present on admission)  Assessment & Plan  Start oral cardizem for added BP control.   Prn IV Vasotec, hydralazine if unable to tolerate orals.      Normocytic anemia- (present on admission)  Assessment & Plan  Monitor H&H.    Dysphagia  Assessment & Plan  Possibly result from neurologic status, await additional imaging  Difficulty taking pills, coughing. - Plan NPO, SLP        VTE prophylaxis: Pradaxa

## 2019-10-03 NOTE — THERAPY
"Pt is a 72 y/o male admitted for increasing weakness, difficulty swallowing and confusion with hx of Afib, DM, HTN and stage IV B cell lymphoma with mets to brain (s/p resection July 2019). Pt presents to acute PT with ability to provide limited history, rest obtained from previous admission in July. Pt demonstrated significant impairments in functional mobility and balance due to very rigiditiy throughout body, with pt demonstrated strong R lateral and posterior lean at EOB. Unable to fully assess strength, transfers and ambulation due to rigidity. Pt will benefit from acute PT to address present impairments.     Physical Therapy Evaluation completed.   Bed Mobility:  Supine to Sit: Maximal Assist, Total A sit to supine  Transfers: Sit to Stand: Unable to Participate  Gait: Level Of Assist: Unable to Participate       Plan of Care: Will benefit from Physical Therapy 3 times per week  Discharge Recommendations: Equipment: Will Continue to Assess for Equipment Needs.   Post-acute therapy: Currently would recommend post acute placement prior to DC home.     See \"Rehab Therapy-Acute\" Patient Summary Report for complete documentation.     "

## 2019-10-04 NOTE — DISCHARGE PLANNING
SW talked with patient to inform him of recommendation that PT/OT/ST put in. Patient is aware that they recommended SNF. However, patient reports he wants to go home. He reports he has a walker, cane, and wheelchair at home and also his spouse is waiting for him.  JEANNA to inform treatment team and possible HH referral instead.

## 2019-10-04 NOTE — DOCUMENTATION QUERY
Count includes the [facility]                                                                       Query Response Note      PATIENT:               BUZZ RODARTE  ACCT #:                  [id number]  MRN:                     [id number]  :                      1945  ADMIT DATE:       10/1/2019 11:57 AM  DISCH DATE:          RESPONDING  PROVIDER #:        [id number]           QUERY TEXT:    Atrial fibrillation is documented in the Medical Record.  Please specify the type.    NOTE:  If an appropriate response is not listed below, please respond with a new note.    The patient's Clinical Indicators include:  Atrial fibrillation w/RVR is documented in the H&P and MD PN  Treatment: Cardizem, Pradaxa, Telemetry monitoring, ECHO  Risk Factors: Advanced age, Atrial fibrillation, B-cell lymphoma, CAD, HTN  Options provided:   -- Paroxysmal atrial fibrillation   -- Permanent atrial fibrillation   -- Longstanding persistent atrial fibrillation   -- Other persistent atrial fibrillation   -- Chronic atrial fibrillation, unspecified   -- Unable to determine      Query created by: Carlita Brito on 10/4/2019 9:32 AM    RESPONSE TEXT:    Chronic atrial fibrillation, unspecified          Electronically signed by:  TYLER PETERS 10/4/2019 12:13 PM

## 2019-10-04 NOTE — DISCHARGE PLANNING
SW reviewed PT/OT and ST notes and all recommend SNF. No current order by physician.   SW printed choice forms, researched local SNF's. Unsure if [place] SNF is taking patients. SW called and left message.     [facility] in [place] is 40 min away from [facility] in [place] is 1.5 hours away from [facility] in [place] is 1.5 hours away from [facility]'s are 1.5 hours away from [place].    JEANNA to provide information to patient for choice.

## 2019-10-04 NOTE — CARE PLAN
Problem: Skin Integrity  Goal: Risk for impaired skin integrity will decrease  Outcome: PROGRESSING AS EXPECTED  Note:   Pt is moderate risk for skin breakdown. Interventions in place include Q2H pt repositioning, barrier paste, condom catheter to decrease moisture.     Problem: Mobility  Goal: Risk for activity intolerance will decrease  Outcome: PROGRESSING SLOWER THAN EXPECTED  Intervention: Assess and monitor signs of activity intolerance  Note:   Pt unwilling to ambulate, states he is too weak. Pt education provided and pt encouraged to sit up for meals.

## 2019-10-04 NOTE — PROGRESS NOTES
Wife called my office to notify us that Richie was admitted.    MRI from today personally reviewed- looks good, no recurrence, less mass effect.  I discussed this with the pt.    No neurosurgical issues at this time  Please call with questions

## 2019-10-04 NOTE — PROGRESS NOTES
Bedside report received, assumed pt care. Pt resting in bed, no signs of pain or distress at this time. POC updated and questions answered. Tele monitor on, safety precautions in place, call light in reach. Will continue to monitor.

## 2019-10-05 NOTE — DISCHARGE PLANNING
Received Choice form at [phone]  Agency/Facility Name: [facility]   Referral sent per Choice form @ [phone].

## 2019-10-05 NOTE — CARE PLAN
Problem: Communication  Goal: The ability to communicate needs accurately and effectively will improve  Outcome: PROGRESSING AS EXPECTED  Intervention: Reorient patient to environment as needed  Flowsheets (Taken 10/5/2019 1014)  Oriented to:: All of the Following : Location of Bathroom, Visiting Policy, Unit Routine, Call Light and Bedside Controls, Bedside Rail Policy, Smoking Policy, Rights and Responsibilities, Bedside Report, and Patient Education Notebook     Problem: Pain Management  Goal: Pain level will decrease to patient's comfort goal  Outcome: PROGRESSING AS EXPECTED  Flowsheets  Taken 10/4/2019 0800  Comfort Goal: Comfort at Rest;Sleep Comfortably  Taken 10/5/2019 0800  Pain Rating Scale (NPRS): 0

## 2019-10-05 NOTE — PROGRESS NOTES
[facility] Medicine Daily Progress Note    Date of Service  10/4/2019    Chief Complaint  73 y.o. male admitted 10/1/2019 with increasing weakness, fatigue, left arm weakness and difficulty swallowing.    Hospital Course      Hx of high-grade B-cell lymphoma stage IV, under the care of Dr. Ferrer,, had a recent port placed and a peritoneal mass biopsied, brain metastasis with cranio resection 710/2019, post R-CHOP chemo  on 9/23 admitted with above symptoms. Plan workup for above symptoms with MRI and therapies.      Interval Problem Update    Developed increased agitation, N/V with A fib RVR.  Unable to complete MRI.  Ativan .5 mg given early this morning.  Remains confused, slow  w difficulty swallowing.   10/3: Heart rate is better controlled.  Oral Cardizem converted to long-acting.  Patient is awake and alert.  Follow commands appropriately.  Answer simple questions appropriately.  No acute distress noted.  No focal neurological deficit on exam.  Discussed the case with oncology Dr. Carver.  10/4: More awake and interactive today.  Answer questions appropriately.  No headache.  Still slightly weak on left arm which is his baseline.  Heart rate is better controlled but still not optimized.  Denies any chest pain or shortness of breath.  Denies any lightheadedness or dizziness.  No acute distress noted.  Consultants/Specialty    None      Code Status    Full     Disposition    PT/OT recommending home health.    Review of Systems  Review of Systems   Constitutional: Positive for malaise/fatigue. Negative for chills, diaphoresis, fever and weight loss.   HENT: Positive for congestion. Negative for ear discharge, ear pain, hearing loss and tinnitus.    Eyes: Negative for blurred vision, double vision, photophobia and pain.   Respiratory: Negative for cough, hemoptysis, sputum production and stridor.    Cardiovascular: Negative for chest pain, palpitations and orthopnea.   Gastrointestinal: Negative for abdominal pain,  constipation, diarrhea, nausea and vomiting.   Genitourinary: Negative for dysuria, frequency and urgency.   Musculoskeletal: Negative for back pain, myalgias and neck pain.   Neurological: Positive for weakness. Negative for tremors, sensory change, speech change and focal weakness.   Psychiatric/Behavioral: Positive for memory loss. Negative for depression, hallucinations, substance abuse and suicidal ideas. The patient is not nervous/anxious.         Physical Exam  Temp:  [36.9 °C (98.4 °F)-37.5 °C (99.5 °F)] 37 °C (98.6 °F)  Pulse:  [] 108  Resp:  [18-20] 18  BP: (127-147)/(71-97) 141/91  SpO2:  [91 %-95 %] 93 %    Physical Exam   Constitutional: No distress.   HENT:   Head: Normocephalic and atraumatic.   Nose: Nose normal.   Eyes: Conjunctivae and EOM are normal. Right eye exhibits no discharge. Left eye exhibits no discharge.   Neck: Normal range of motion. No JVD present. No tracheal deviation present.   Cardiovascular: Normal rate and regular rhythm. Exam reveals no friction rub.   No murmur heard.  irreg irreg, tachycardic.    Pulmonary/Chest: No stridor. He has decreased breath sounds. He has no wheezes. He has no rales.   Dim bs bases, sporadic insp rhonchi.    Abdominal: Soft. He exhibits no distension.   Musculoskeletal: He exhibits no edema or tenderness.   Generalized 4/5 strength.    Neurological: He is alert.   Ox 2.   No gross focal weakness   Skin: Skin is warm and dry. No rash noted. He is not diaphoretic. No erythema.   Psychiatric: He has a normal mood and affect. His behavior is normal.   Vitals reviewed.      Fluids    Intake/Output Summary (Last 24 hours) at 10/4/2019 1720  Last data filed at 10/4/2019 1200  Gross per 24 hour   Intake 825 ml   Output 1950 ml   Net -1125 ml       Laboratory  Recent Labs     10/02/19  0405 10/03/19  0320 10/04/19  0353   WBC 5.2 8.6 10.9*   RBC 4.44* 3.77* 3.86*   HEMOGLOBIN 14.1 11.8* 12.1*   HEMATOCRIT 43.0 36.1* 36.7*   MCV 96.8 95.8 95.1   MCH 31.8  31.3 31.3   MCHC 32.8* 32.7* 33.0*   RDW 47.4 47.1 45.8   PLATELETCT 156* 138* 138*   MPV 9.9 9.2 9.3     Recent Labs     10/02/19  0405 10/03/19  0320 10/04/19  0353   SODIUM 137 135 135   POTASSIUM 4.2 3.8 3.8   CHLORIDE 97 100 100   CO2 29 29 27   GLUCOSE 128* 109* 115*   BUN 20 19 13   CREATININE 0.97 0.90 0.85   CALCIUM 9.7 9.1 8.4*                   Imaging  MR-BRAIN-WITH & W/O   Final Result      1.  Motion artifact limits exam.   2.  RIGHT frontal resection with interval slight improvement of peripheral nodular enhancement favoring evolving postoperative changes.  Tumor recurrence is felt unlikely.   3.  Increased edema or gliosis in the frontal lobes bilaterally since prior exam, of uncertain clinical significance.   4.  RIGHT hemispheric subdural hygroma appears stable.   5.  Interval significant improvement of previously described LEFT subdural hematoma.      MR-CERVICAL SPINE-WITH & W/O   Final Result      1.  Again seen multilevel degenerative disc disease, uncinate and facet degeneration. No central canal narrowing. There are varying degrees of neural foraminal narrowing at levels as specifically described above.      2.  Loss of the normal cervical lordosis.      3.  No evidence of spinal cord lesion or abnormal enhancement.      MR-THORACIC SPINE-WITH & W/O   Final Result      1.  Postoperative changes of the upper thoracic spine as described.   2.  No evidence to indicate lymphomatous involvement of the thoracic spine or spinal canal.   3.  Mild multilevel degenerative change, unchanged from prior exam.      CT-HEAD W/O   Final Result      1.  Stable RIGHT frontoparietal low-density subdural fluid collection, likely hygroma, with associated mass effect.   2.  No acute intracranial hemorrhage or focal edema.   3.  Postoperative changes are seen with associated RIGHT frontal encephalomalacia.   4.  No acute findings.      DX-CHEST-PORTABLE (1 VIEW)   Final Result      Slight hypoinflation with  bibasilar atelectasis/scarring. No focal consolidation or pleural effusions.           Assessment/Plan  * Atrial fibrillation (HCC) w Rapid Ventricular Response - (present on admission)  Assessment & Plan  Will give IV Cardizem x 1.   Continue with oral Cardizem for now.  I will switch to long-acting.  Continue to assess the effectiveness of Cardizem to control heart rate.  Will consider switching to beta-blockers or add beta-blockers if no adequate control at this point.  Continue with Pradaxa 150 mg p.o. twice daily.  Echocardiogram done in July 2019 unremarkable.  TSH in July 2019 was within normal limits.  Continue telemetry monitoring.  10/4: Increased Cardizem dose to 360 mg for better rate control.     Neutropenia (HCC)  Assessment & Plan  Status post chemotherapy and Neulasta-- now resolved.   Monitor CBC  10/4: Resolved. Now with leukocytosis likely secondary to Neulasta.    Metastasis to brain (HCC)  Assessment & Plan  With resection in July 2019, 4.5 cm lesion-- Path revealed B cell lymphoma.  Had reported left weakness- now resolved - Neuro exam no focal weakness.   10/3: Pending MRI of the brain.  10/4: MRI of the brain negative for metastasis.    B-cell lymphoma (HCC)  Assessment & Plan  Per hematology oncology with primary abdominal origin and brain metastasis, currently undergoing chemotherapy  Plan MRI brain ,  Spine to assess for met dz  10/3: Discussed with oncology Dr. Carver who is on board.  MRI of the brain was not performed yesterday.  That was reordered today and may be possibly cervical and thoracic spine MRI as well as per Dr. Haney recommendations when she saw the patient last.  10/4: MRI of the brain, cervical, and thoracic spine with no evidence of metastases.    Type 2 diabetes mellitus without complication, without long-term current use of insulin (HCC)- (present on admission)  Assessment & Plan  Fair control  Hold metformin  Monitor BS with serial Accu checks, SSI as needed.     CAD  (coronary artery disease)- (present on admission)  Assessment & Plan  Continue with statin      Hypertension- (present on admission)  Assessment & Plan  Start oral cardizem for added BP control.   Prn IV Vasotec, hydralazine if unable to tolerate orals.      Normocytic anemia- (present on admission)  Assessment & Plan  Monitor H&H.    Dysphagia  Assessment & Plan  Possibly result from neurologic status, await additional imaging  Difficulty taking pills, coughing. - Plan NPO, SLP   Plan of care discussed with multidisciplinary team during rounds.    VTE prophylaxis: Pradaxa

## 2019-10-05 NOTE — PROGRESS NOTES
Bedside report received, assumed pt care. Pt sitting up in bed, no signs of distress and no complaints of pain. Pt is AOx3, disoriented to time and generally confused. Pt updated on POC. Tele monitor on, safety precautions in place, call light in reach.

## 2019-10-05 NOTE — DISCHARGE PLANNING
Anticipated Discharge Disposition: home with home health    Action: Obtained choice for home health: [facility]. Faxed to TAMIKA Mccormick at [phone].     Barriers to Discharge: HH acceptance, medical clearance    Plan: follow up with home health

## 2019-10-06 NOTE — DISCHARGE PLANNING
[facility] Transitional Care Coordination     Referral from:  Dr. Wagnre   Facesheet indicates: Medicare   Potential Rehab Diagnosis: Stroke protocol MRI negative  Debility exacerbated by CA     Chart review indicate patient choice is home with home care Therapy note reviewed dependent for mobility self care.     D/C support: Spouse      Physiatry consultation forwarded per protocol. Will follow for physiatry recommendation for POC             Thank you for referral.

## 2019-10-06 NOTE — PROGRESS NOTES
Bedside report received, assumed pt care. Pt resting in bed, on RA, with no signs of distress or pain at this time. Pt updated on POC. Tele monitor on, safety precautions in place, call light in reach.

## 2019-10-06 NOTE — PROGRESS NOTES
[facility] Medicine Daily Progress Note    Date of Service  10/5/2019    Chief Complaint  73 y.o. male admitted 10/1/2019 with increasing weakness, fatigue, left arm weakness and difficulty swallowing.    Hospital Course      Hx of high-grade B-cell lymphoma stage IV, under the care of Dr. Ferrer,, had a recent port placed and a peritoneal mass biopsied, brain metastasis with cranio resection 710/2019, post R-CHOP chemo  on 9/23 admitted with above symptoms. Plan workup for above symptoms with MRI and therapies.      Interval Problem Update    Developed increased agitation, N/V with A fib RVR.  Unable to complete MRI.  Ativan .5 mg given early this morning.  Remains confused, slow  w difficulty swallowing.   10/3: Heart rate is better controlled.  Oral Cardizem converted to long-acting.  Patient is awake and alert.  Follow commands appropriately.  Answer simple questions appropriately.  No acute distress noted.  No focal neurological deficit on exam.  Discussed the case with oncology Dr. Carver.  10/4: More awake and interactive today.  Answer questions appropriately.  No headache.  Still slightly weak on left arm which is his baseline.  Heart rate is better controlled but still not optimized.  Denies any chest pain or shortness of breath.  Denies any lightheadedness or dizziness.  No acute distress noted.  10/5: Heart rate is not optimally controlled.  Low-dose metoprolol was added.  Family members requested that patient be sent to rehab or SNF as they will be able to care for him at home.  He denies any chest pain or shortness of breath.  More awake and interactive.  Follow commands well.  No acute distress noted.  Consultants/Specialty    None      Code Status    Full     Disposition    Awaiting rehab/SNF acceptance.    Review of Systems  Review of Systems   Constitutional: Positive for malaise/fatigue. Negative for chills, diaphoresis, fever and weight loss.   HENT: Positive for congestion. Negative for ear  discharge, ear pain, hearing loss and tinnitus.    Eyes: Negative for blurred vision, double vision, photophobia, pain and discharge.   Respiratory: Negative for cough, hemoptysis, sputum production, shortness of breath and stridor.    Cardiovascular: Negative for chest pain, palpitations, orthopnea and claudication.   Gastrointestinal: Negative for abdominal pain, blood in stool, constipation, diarrhea, nausea and vomiting.   Genitourinary: Negative for dysuria, frequency, hematuria and urgency.   Musculoskeletal: Negative for back pain, joint pain, myalgias and neck pain.   Neurological: Positive for weakness. Negative for tingling, tremors, sensory change, speech change and focal weakness.   Psychiatric/Behavioral: Positive for memory loss. Negative for depression, hallucinations, substance abuse and suicidal ideas. The patient is not nervous/anxious.         Physical Exam  Temp:  [36.5 °C (97.7 °F)-37.2 °C (99 °F)] 37.2 °C (99 °F)  Pulse:  [64-94] 94  Resp:  [13-20] 20  BP: (134-147)/(67-93) 147/89  SpO2:  [90 %-95 %] 93 %    Physical Exam   Constitutional: He is oriented to person, place, and time. No distress.   HENT:   Head: Normocephalic and atraumatic.   Nose: Nose normal.   Eyes: Conjunctivae and EOM are normal. Right eye exhibits no discharge. Left eye exhibits no discharge.   Neck: Normal range of motion. No JVD present. No tracheal deviation present.   Cardiovascular: Regular rhythm. Tachycardia present. Exam reveals no friction rub.   No murmur heard.  irreg irreg, tachycardic.    Pulmonary/Chest: No stridor. He has decreased breath sounds. He has no wheezes. He has no rales.   Dim bs bases, sporadic insp rhonchi.    Abdominal: Soft. He exhibits no distension. There is no tenderness. There is no rebound.   Musculoskeletal: He exhibits no edema or tenderness.   Generalized 4/5 strength.    Neurological: He is alert and oriented to person, place, and time.   Ox 2.   No gross focal weakness   Skin: Skin  is warm and dry. No rash noted. He is not diaphoretic. No erythema.   Psychiatric: He has a normal mood and affect. His behavior is normal.   Vitals reviewed.      Fluids    Intake/Output Summary (Last 24 hours) at 10/5/2019 1850  Last data filed at 10/5/2019 1800  Gross per 24 hour   Intake 800 ml   Output 620 ml   Net 180 ml       Laboratory  Recent Labs     10/03/19  0320 10/04/19  0353 10/05/19  0351   WBC 8.6 10.9* 16.2*   RBC 3.77* 3.86* 4.34*   HEMOGLOBIN 11.8* 12.1* 13.7*   HEMATOCRIT 36.1* 36.7* 41.3*   MCV 95.8 95.1 95.2   MCH 31.3 31.3 31.6   MCHC 32.7* 33.0* 33.2*   RDW 47.1 45.8 45.3   PLATELETCT 138* 138* 157*   MPV 9.2 9.3 9.1     Recent Labs     10/03/19  0320 10/04/19  0353 10/05/19  0351   SODIUM 135 135 137   POTASSIUM 3.8 3.8 3.8   CHLORIDE 100 100 100   CO2 29 27 26   GLUCOSE 109* 115* 129*   BUN 19 13 11   CREATININE 0.90 0.85 0.83   CALCIUM 9.1 8.4* 9.3                   Imaging  MR-BRAIN-WITH & W/O   Final Result      1.  Motion artifact limits exam.   2.  RIGHT frontal resection with interval slight improvement of peripheral nodular enhancement favoring evolving postoperative changes.  Tumor recurrence is felt unlikely.   3.  Increased edema or gliosis in the frontal lobes bilaterally since prior exam, of uncertain clinical significance.   4.  RIGHT hemispheric subdural hygroma appears stable.   5.  Interval significant improvement of previously described LEFT subdural hematoma.      MR-CERVICAL SPINE-WITH & W/O   Final Result      1.  Again seen multilevel degenerative disc disease, uncinate and facet degeneration. No central canal narrowing. There are varying degrees of neural foraminal narrowing at levels as specifically described above.      2.  Loss of the normal cervical lordosis.      3.  No evidence of spinal cord lesion or abnormal enhancement.      MR-THORACIC SPINE-WITH & W/O   Final Result      1.  Postoperative changes of the upper thoracic spine as described.   2.  No evidence  to indicate lymphomatous involvement of the thoracic spine or spinal canal.   3.  Mild multilevel degenerative change, unchanged from prior exam.      CT-HEAD W/O   Final Result      1.  Stable RIGHT frontoparietal low-density subdural fluid collection, likely hygroma, with associated mass effect.   2.  No acute intracranial hemorrhage or focal edema.   3.  Postoperative changes are seen with associated RIGHT frontal encephalomalacia.   4.  No acute findings.      DX-CHEST-PORTABLE (1 VIEW)   Final Result      Slight hypoinflation with bibasilar atelectasis/scarring. No focal consolidation or pleural effusions.           Assessment/Plan  * Atrial fibrillation (HCC) w Rapid Ventricular Response - (present on admission)  Assessment & Plan  Will give IV Cardizem x 1.   Continue with oral Cardizem for now.  I will switch to long-acting.  Continue to assess the effectiveness of Cardizem to control heart rate.  Will consider switching to beta-blockers or add beta-blockers if no adequate control at this point.  Continue with Pradaxa 150 mg p.o. twice daily.  Echocardiogram done in July 2019 unremarkable.  TSH in July 2019 was within normal limits.  Continue telemetry monitoring.  10/4: Increased Cardizem dose to 360 mg for better rate control.   10/5: Heart rate still not optimally controlled.  Low-dose metoprolol was added.    Neutropenia (HCC)  Assessment & Plan  Status post chemotherapy and Neulasta-- now resolved.   Monitor CBC  10/4: Resolved. Now with leukocytosis likely secondary to Neulasta.    Metastasis to brain (HCC)  Assessment & Plan  With resection in July 2019, 4.5 cm lesion-- Path revealed B cell lymphoma.  Had reported left weakness- now resolved - Neuro exam no focal weakness.   10/3: Pending MRI of the brain.  10/4: MRI of the brain negative for metastasis.    B-cell lymphoma (HCC)  Assessment & Plan  Per hematology oncology with primary abdominal origin and brain metastasis, currently undergoing  chemotherapy  Plan MRI brain ,  Spine to assess for met dz  10/3: Discussed with oncology Dr. Carver who is on board.  MRI of the brain was not performed yesterday.  That was reordered today and may be possibly cervical and thoracic spine MRI as well as per Dr. Haney recommendations when she saw the patient last.  10/4: MRI of the brain, cervical, and thoracic spine with no evidence of metastases.    Type 2 diabetes mellitus without complication, without long-term current use of insulin (HCC)- (present on admission)  Assessment & Plan  Fair control  Hold metformin  Monitor BS with serial Accu checks, SSI as needed.     CAD (coronary artery disease)- (present on admission)  Assessment & Plan  Continue with statin      Hypertension- (present on admission)  Assessment & Plan  Start oral cardizem for added BP control.   Prn IV Vasotec, hydralazine if unable to tolerate orals.      Normocytic anemia- (present on admission)  Assessment & Plan  Monitor H&H.    Dysphagia  Assessment & Plan  Possibly result from neurologic status, await additional imaging  Difficulty taking pills, coughing. - Plan NPO, SLP   Plan of care discussed with multidisciplinary team during rounds.    VTE prophylaxis: Pradaxa

## 2019-10-06 NOTE — CARE PLAN
Problem: Infection  Goal: Will remain free from infection  Outcome: PROGRESSING AS EXPECTED     Problem: Knowledge Deficit  Goal: Knowledge of the prescribed therapeutic regimen will improve  Outcome: PROGRESSING AS EXPECTED

## 2019-10-06 NOTE — PROGRESS NOTES
[facility] Medicine Daily Progress Note    Date of Service  10/6/2019    Chief Complaint  73 y.o. male admitted 10/1/2019 with increasing weakness, fatigue, left arm weakness and difficulty swallowing.    Hospital Course      Hx of high-grade B-cell lymphoma stage IV, under the care of Dr. Ferrer,, had a recent port placed and a peritoneal mass biopsied, brain metastasis with cranio resection 710/2019, post R-CHOP chemo  on 9/23 admitted with above symptoms. Plan workup for above symptoms with MRI and therapies.      Interval Problem Update    Developed increased agitation, N/V with A fib RVR.  Unable to complete MRI.  Ativan .5 mg given early this morning.  Remains confused, slow  w difficulty swallowing.   10/3: Heart rate is better controlled.  Oral Cardizem converted to long-acting.  Patient is awake and alert.  Follow commands appropriately.  Answer simple questions appropriately.  No acute distress noted.  No focal neurological deficit on exam.  Discussed the case with oncology Dr. Carver.  10/4: More awake and interactive today.  Answer questions appropriately.  No headache.  Still slightly weak on left arm which is his baseline.  Heart rate is better controlled but still not optimized.  Denies any chest pain or shortness of breath.  Denies any lightheadedness or dizziness.  No acute distress noted.  10/5: Heart rate is not optimally controlled.  Low-dose metoprolol was added.  Family members requested that patient be sent to rehab or SNF as they will be able to care for him at home.  He denies any chest pain or shortness of breath.  More awake and interactive.  Follow commands well.  No acute distress noted.  10/6: Resting comfortably in bed.  Heart rate is better controlled.  No acute distress noted.  Consultants/Specialty    None      Code Status    Full     Disposition    Awaiting rehab/SNF acceptance.    Review of Systems  Review of Systems   Constitutional: Positive for malaise/fatigue. Negative for  chills, diaphoresis, fever and weight loss.   HENT: Positive for congestion. Negative for ear discharge, ear pain, hearing loss, nosebleeds and tinnitus.    Eyes: Negative for blurred vision, double vision, photophobia, pain and discharge.   Respiratory: Negative for cough, hemoptysis, sputum production, shortness of breath and stridor.    Cardiovascular: Negative for chest pain, palpitations, orthopnea, claudication and leg swelling.   Gastrointestinal: Negative for abdominal pain, blood in stool, constipation, diarrhea, nausea and vomiting.   Genitourinary: Negative for dysuria, frequency, hematuria and urgency.   Musculoskeletal: Negative for back pain, falls, joint pain, myalgias and neck pain.   Neurological: Positive for weakness. Negative for tingling, tremors, sensory change, speech change, focal weakness, seizures and headaches.   Psychiatric/Behavioral: Positive for memory loss. Negative for depression, hallucinations, substance abuse and suicidal ideas. The patient is not nervous/anxious and does not have insomnia.         Physical Exam  Temp:  [36.6 °C (97.9 °F)-37.1 °C (98.7 °F)] 36.6 °C (97.9 °F)  Pulse:  [72-99] 97  Resp:  [16-18] 17  BP: (111-140)/(72-80) 124/79  SpO2:  [91 %-95 %] 91 %    Physical Exam   Constitutional: He is oriented to person, place, and time. No distress.   HENT:   Head: Normocephalic and atraumatic.   Nose: Nose normal.   Eyes: Conjunctivae and EOM are normal. Right eye exhibits no discharge. Left eye exhibits no discharge. No scleral icterus.   Neck: Normal range of motion. No JVD present. No tracheal deviation present.   Cardiovascular: Normal rate and regular rhythm. Exam reveals no friction rub.   No murmur heard.  irreg irreg   Pulmonary/Chest: No stridor. He has decreased breath sounds. He has no wheezes. He has no rales.   Dim bs bases, sporadic insp rhonchi.    Abdominal: Soft. He exhibits no distension. There is no tenderness. There is no rebound and no guarding.    Musculoskeletal: He exhibits no edema or tenderness.   Generalized 4/5 strength.    Neurological: He is alert and oriented to person, place, and time.   Ox 2.   Weakness on left arm (baseline).    Skin: Skin is warm and dry. No rash noted. He is not diaphoretic. No erythema.   Psychiatric: He has a normal mood and affect. His behavior is normal. Thought content normal.   Vitals reviewed.      Fluids    Intake/Output Summary (Last 24 hours) at 10/6/2019 1541  Last data filed at 10/6/2019 0900  Gross per 24 hour   Intake 560 ml   Output 2350 ml   Net -1790 ml       Laboratory  Recent Labs     10/04/19  0353 10/05/19  0351 10/06/19  0521   WBC 10.9* 16.2* 16.0*   RBC 3.86* 4.34* 4.32*   HEMOGLOBIN 12.1* 13.7* 13.4*   HEMATOCRIT 36.7* 41.3* 41.1*   MCV 95.1 95.2 95.1   MCH 31.3 31.6 31.0   MCHC 33.0* 33.2* 32.6*   RDW 45.8 45.3 45.9   PLATELETCT 138* 157* 177   MPV 9.3 9.1 8.8*     Recent Labs     10/04/19  0353 10/05/19  0351 10/06/19  0521   SODIUM 135 137 135   POTASSIUM 3.8 3.8 4.1   CHLORIDE 100 100 99   CO2 27 26 29   GLUCOSE 115* 129* 126*   BUN 13 11 10   CREATININE 0.85 0.83 0.89   CALCIUM 8.4* 9.3 9.4                   Imaging  MR-BRAIN-WITH & W/O   Final Result      1.  Motion artifact limits exam.   2.  RIGHT frontal resection with interval slight improvement of peripheral nodular enhancement favoring evolving postoperative changes.  Tumor recurrence is felt unlikely.   3.  Increased edema or gliosis in the frontal lobes bilaterally since prior exam, of uncertain clinical significance.   4.  RIGHT hemispheric subdural hygroma appears stable.   5.  Interval significant improvement of previously described LEFT subdural hematoma.      MR-CERVICAL SPINE-WITH & W/O   Final Result      1.  Again seen multilevel degenerative disc disease, uncinate and facet degeneration. No central canal narrowing. There are varying degrees of neural foraminal narrowing at levels as specifically described above.      2.  Loss of  the normal cervical lordosis.      3.  No evidence of spinal cord lesion or abnormal enhancement.      MR-THORACIC SPINE-WITH & W/O   Final Result      1.  Postoperative changes of the upper thoracic spine as described.   2.  No evidence to indicate lymphomatous involvement of the thoracic spine or spinal canal.   3.  Mild multilevel degenerative change, unchanged from prior exam.      CT-HEAD W/O   Final Result      1.  Stable RIGHT frontoparietal low-density subdural fluid collection, likely hygroma, with associated mass effect.   2.  No acute intracranial hemorrhage or focal edema.   3.  Postoperative changes are seen with associated RIGHT frontal encephalomalacia.   4.  No acute findings.      DX-CHEST-PORTABLE (1 VIEW)   Final Result      Slight hypoinflation with bibasilar atelectasis/scarring. No focal consolidation or pleural effusions.           Assessment/Plan  * Atrial fibrillation (HCC) w Rapid Ventricular Response - (present on admission)  Assessment & Plan  Will give IV Cardizem x 1.   Continue with oral Cardizem for now.  I will switch to long-acting.  Continue to assess the effectiveness of Cardizem to control heart rate.  Will consider switching to beta-blockers or add beta-blockers if no adequate control at this point.  Continue with Pradaxa 150 mg p.o. twice daily.  Echocardiogram done in July 2019 unremarkable.  TSH in July 2019 was within normal limits.  Continue telemetry monitoring.  10/4: Increased Cardizem dose to 360 mg for better rate control.   10/5: Heart rate still not optimally controlled.  Low-dose metoprolol was added.  10/6: Heart rate is better controlled now.  We will continue with current regimen.    Neutropenia (HCC)  Assessment & Plan  Status post chemotherapy and Neulasta-- now resolved.   Monitor CBC  10/4: Resolved. Now with leukocytosis likely secondary to Neulasta.    Metastasis to brain (HCC)  Assessment & Plan  With resection in July 2019, 4.5 cm lesion-- Path revealed B  cell lymphoma.  Had reported left weakness- now resolved - Neuro exam no focal weakness.   10/3: Pending MRI of the brain.  10/4: MRI of the brain negative for metastasis.    B-cell lymphoma (HCC)  Assessment & Plan  Per hematology oncology with primary abdominal origin and brain metastasis, currently undergoing chemotherapy  Plan MRI brain ,  Spine to assess for met dz  10/3: Discussed with oncology Dr. Carver who is on board.  MRI of the brain was not performed yesterday.  That was reordered today and may be possibly cervical and thoracic spine MRI as well as per Dr. Haney recommendations when she saw the patient last.  10/4: MRI of the brain, cervical, and thoracic spine with no evidence of metastases.    Type 2 diabetes mellitus without complication, without long-term current use of insulin (HCC)- (present on admission)  Assessment & Plan  Fair control  Hold metformin  Monitor BS with serial Accu checks, SSI as needed.     CAD (coronary artery disease)- (present on admission)  Assessment & Plan  Continue with statin      Hypertension- (present on admission)  Assessment & Plan  Start oral cardizem for added BP control.   Prn IV Vasotec, hydralazine if unable to tolerate orals.      Normocytic anemia- (present on admission)  Assessment & Plan  Monitor H&H.    Dysphagia  Assessment & Plan  Possibly result from neurologic status, await additional imaging  Difficulty taking pills, coughing. - Plan NPO, SLP   Plan of care discussed with multidisciplinary team during rounds.    VTE prophylaxis: Pradaxa

## 2019-10-07 NOTE — THERAPY
"Pt seen for PT tx session. Demonstrated less R lateral lean than inital evaluation; however, continues to demonstrate strong posterior lean in sitting and standing. Improved seated balance after extensive anterior weight and postural facilitation. Today, able to initiate sit <> stand x3 with FWW. Pt demonstrated strong posterior lean and \"water skiing\" posture which required Max A x2 people for safety. Pt again endorsed he was ambulatory with FWW prior to current admission, would like to confirm pt's PLOF with family as current presentation is considerable change from pt's reported baseline. PT will continue to follow while in house.     Physical Therapy Treatment completed.   Bed Mobility:  Supine to Sit: Maximal Assist  Transfers: Sit to Stand: Maximal Assist(x2 people for safety)  Gait: Level Of Assist: Unable to Participate       Plan of Care: Will benefit from Physical Therapy 3 times per week  Discharge Recommendations: Equipment: Will Continue to Assess for Equipment Needs.   Post-acute therapy: Recommend post-acute placement for continued physical therapy services prior to discharge home. Patient can tolerate post-acute therapies at a 5x/week frequency.          See \"Rehab Therapy-Acute\" Patient Summary Report for complete documentation.       "

## 2019-10-07 NOTE — DISCHARGE PLANNING
Agency/Facility Name: [facility]  Spoke To: Emili  Outcome: Processing referral.    @1100  Agency/Facility Name: [facility]  Outcome: Left message, awaiting call back.

## 2019-10-07 NOTE — DISCHARGE PLANNING
Received Choice form at [phone]  Agency/Facility Name: [facility]  Referral sent per Choice form @ [phone]

## 2019-10-07 NOTE — CONSULTS
Physical Medicine and Rehabilitation Consultation         Initial Consult      Date of Consultation: 10/7/2019  Consulting provider: Douglas Harrell D.O.  Reason for consultation: assess for acute inpatient rehab appropriateness  Consulting physician: Dr Wagner    Chief complaint: LE rigidity    HPI:   Patient is a 73 y.o. year-old male with a past medical history significant for high-grade B-cell lymphoma, stage IV, type 2 diabetes, atrial fibrillation, coronary artery disease, hypertension, admitted to [facility] on 10/1/2019 11:57 AM with increased weakness, fatigue left arm weakness and difficulty swallowing.      He is currently under the care of Dr Ferrer for his B-cell lymphoma, recently had a port placed and a peritoneal mass biopsied.  He underwent craniotomy and resection of brain metastasis on 7/10.  Has not undergone R-CHOP on 9/23.      Work up has been limited because patient developed increased agitation, nausea vomiting and atrial fibrillation with rapid ventricular rate, making completion of MRI impossible.  Patient was given Ativan 0.5 mg, remained confused with difficulty swallowing.  He was able to complete the MRI brain on 10/4 which was negative for further metastasis.     He reportedly was slightly weak in the left arm at baseline.     Patient was found to have significant dysphasia, placed on dysphagia 2 with nectar thick liquids, meds floated and purée.    The patient reports he was on gabapentin prior to admission to the hospital, unclear on the dosing, but on admission gabapentin was DC'd.  The spasticity was improved when he was on gabapentin.  He denies being on baclofen in the past.    ROS:  Gen: No fatigue, confusion, significant weight loss  Eyes: no blurry vision, double vision or pain in eyes  ENT: no changes in hearing, runny nose, nose bleeds, sinus pain  Endo: no episodes of low blood sugar  CV: No CP, palpitations,   Lungs: no shortness of breath, changes in  "secretions, changes in cough, pain with coughing  Abd: No abd pain, nausea, vomiting, pain with eating  : no blood in urine, suprapubic pain  Ext/MSK: No swelling in legs, asymmetric atrophy  Neuro: no changes in strength or sensation  Skin: no new ulcers/skin breakdown appreciated by patient  Mood: No changes in mood today, increase in depression or anxiety  Heme: no bruising, or bleeding  Allergy: No recent allergies  Rest of 14 point review of systems is negative    PMH:  Past Medical History:   Diagnosis Date   • Anesthesia     \"too much anesthesia kidneys stop woking and intestines slowed\"   • Arthritis    • Atrial fibrillation (HCC)    • Back pain    • Blood clotting disorder (HCC) 1999    blood clot leg Left   • Breath shortness    • CAD (coronary artery disease)    • Cancer (HCC) 2016    prostate cancer   • Cataract     no surgery   • Dyslipidemia    • High cholesterol    • Hypertension    • Ileus (HCC)    • Myocardial infarct (HCC) 1999,2006    x3   • Renal disorder     cycst left kidney   • Sleep apnea     no cpap   • Snoring    • Urinary incontinence        PSH:  Past Surgical History:   Procedure Laterality Date   • WA DX BONE MARROW ASPIRATIONS Left 8/1/2019    Procedure: ASPIRATION, BONE MARROW - REGANTI;  Surgeon: Young Veliz M.D.;  Location: [facility];  Service: Orthopedics   • WA DX BONE MARROW BIOPSIES Left 8/1/2019    Procedure: BIOPSY, BONE MARROW, USING NEEDLE OR TROCAR;  Surgeon: Young Veliz M.D.;  Location: [facility];  Service: Orthopedics   • CRANIOTOMY Right 7/10/2019    Procedure: RIGHT-SIDED CRANIOTOMY FOR TUMOR;  Surgeon: Son Ruffin M.D.;  Location: SURGERY [facility];  Service: Neurosurgery   • LUMBAR LAMINECTOMY DISKECTOMY N/A 11/26/2018    Procedure: LUMBAR LAMINECTOMY DISKECTOMY-  POSTERIOR L1-2 LAMI;  Surgeon: Son Ruffin M.D.;  Location: SURGERY [facility];  Service: Neurosurgery   • LAMINOTOMY Left 11/26/2018 "    Procedure: LAMINOTOMY-  L4-S1;  Surgeon: Son Ruffin M.D.;  Location: SURGERY [facility];  Service: Neurosurgery   • FORAMINOTOMY Left 11/26/2018    Procedure: FORAMINOTOMY;  Surgeon: Son Ruffin M.D.;  Location: SURGERY [facility];  Service: Neurosurgery   • OTHER NEUROLOGICAL SURG  2016    Thoracic 2-3 fusion    • APPENDECTOMY  2002   • OTHER CARDIAC SURGERY  1999,2002,2006    stents cardiac   • OTHER NEUROLOGICAL SURG      Laminectomy       FHX:  History of B-cell lymphoma    Medications:  Current Facility-Administered Medications   Medication Dose   • metoprolol SR (TOPROL XL) tablet 25 mg  25 mg   • DILTIAZem CD (CARDIZEM CD) capsule 360 mg  360 mg   • dabigatran (PRADAXA) capsule 150 mg  150 mg   • Metoprolol Tartrate (LOPRESSOR) injection 5 mg  5 mg   • allopurinol (ZYLOPRIM) tablet 300 mg  300 mg   • atorvastatin (LIPITOR) tablet 20 mg  20 mg   • docusate sodium (COLACE) capsule 100 mg  100 mg   • ferrous sulfate tablet 325 mg  325 mg   • loratadine (CLARITIN) tablet 10 mg  10 mg   • omeprazole (PRILOSEC) capsule 40 mg  40 mg   • predniSONE (DELTASONE) tablet 10 mg  10 mg   • senna-docusate (PERICOLACE or SENOKOT S) 8.6-50 MG per tablet 2 Tab  2 Tab    And   • polyethylene glycol/lytes (MIRALAX) PACKET 1 Packet  1 Packet    And   • magnesium hydroxide (MILK OF MAGNESIA) suspension 30 mL  30 mL    And   • bisacodyl (DULCOLAX) suppository 10 mg  10 mg   • acetaminophen (TYLENOL) tablet 650 mg  650 mg   • Pharmacy Consult Request ...Pain Management Review 1 Each  1 Each    And   • oxyCODONE immediate-release (ROXICODONE) tablet 2.5 mg  2.5 mg    And   • oxyCODONE immediate-release (ROXICODONE) tablet 5 mg  5 mg    And   • morphine (pf) 4 mg/ml injection 2 mg  2 mg   • ondansetron (ZOFRAN) syringe/vial injection 4 mg  4 mg   • ondansetron (ZOFRAN ODT) dispertab 4 mg  4 mg   • insulin regular (HUMULIN R) injection 2-9 Units  2-9 Units    And   • glucose 4 g chewable tablet 16 g   16 g    And   • DEXTROSE 10% BOLUS 250 mL  250 mL   • acetaminophen (TYLENOL) oral suspension 325 mg  325 mg       Allergies:  Allergies   Allergen Reactions   • Gabapentin Rash     Broke out in a rash on R bicep       Social Hx:  Pre-morbidly, this patient lived in a single level home with ramp, with spouse.   Tobacco: none  Alcohol: none  Drugs: none    Prior level of function:   SBA - Mod I, was able to ambulate    Current level of function:  Current function: Participation limited by rigidity  Physical Therapy Treatment completed.   Bed Mobility:  Supine to Sit: Maximal Assist  Transfers: Sit to Stand: Maximal Assist(x2 people for safety)  Gait: Level Of Assist: Unable to Participate         Occupational Therapy Evaluation completed.   Functional Status:  Max A supine>sit, Mod A seated grooming, Max A (x2 for safety) sit>stand, Total A LB dressing    Physical Exam:  Vitals: /73   Pulse 61   Temp 37.3 °C (99.1 °F) (Temporal)   Resp 20   Ht 1.829 m (6')   Wt 92.5 kg (203 lb 14.8 oz)   SpO2 92%   Gen: NAD  HEENT: NC/AT, PERRLA, moist mucous membranes  Cardio: RRR, no mumurs  Pulm: CTAB, with normal respiratory effort  Abd: Soft NTND, active bowel sounds,   Ext: No peripheral edema.  +dorsal pedalis pulses bilaterally.  Spasticity: Severe spasticity in bilateral lower extremities MAS 3-4/4    CRANIAL NERVES:  2,3: visual acuity grossly intact, PERRL  3,4,6: EOMI bilaterally, no nystagmus or diplopia  5: sensation intact to light touch bilaterally and symmetric  7: no facial asymmetry  8: hearing grossly intact  9,10: symmetric palate elevation  11: SCM/Trapezius strength 5/5 bilaterally  12: tongue protrudes midline    Motor:  Unable to evaluate lower extremity strength secondary to severe spasticity/rigidity      Labs:  Recent Labs     10/05/19  0351 10/06/19  0521   RBC 4.34* 4.32*   HEMOGLOBIN 13.7* 13.4*   HEMATOCRIT 41.3* 41.1*   PLATELETCT 157* 177     Recent Labs     10/05/19  0351 10/06/19  0521  10/07/19  0348   SODIUM 137 135 137   POTASSIUM 3.8 4.1 3.8   CHLORIDE 100 99 100   CO2 26 29 28   GLUCOSE 129* 126* 117*   BUN 11 10 12   CREATININE 0.83 0.89 0.86   CALCIUM 9.3 9.4 8.8     Recent Results (from the past 24 hour(s))   ACCU-CHEK GLUCOSE    Collection Time: 10/06/19  6:28 PM   Result Value Ref Range    Glucose - Accu-Ck 116 (H) 65 - 99 mg/dL   ACCU-CHEK GLUCOSE    Collection Time: 10/06/19  8:48 PM   Result Value Ref Range    Glucose - Accu-Ck 162 (H) 65 - 99 mg/dL   Comp Metabolic Panel    Collection Time: 10/07/19  3:48 AM   Result Value Ref Range    Sodium 137 135 - 145 mmol/L    Potassium 3.8 3.6 - 5.5 mmol/L    Chloride 100 96 - 112 mmol/L    Co2 28 20 - 33 mmol/L    Anion Gap 9.0 0.0 - 11.9    Glucose 117 (H) 65 - 99 mg/dL    Bun 12 8 - 22 mg/dL    Creatinine 0.86 0.50 - 1.40 mg/dL    Calcium 8.8 8.5 - 10.5 mg/dL    AST(SGOT) 17 12 - 45 U/L    ALT(SGPT) 30 2 - 50 U/L    Alkaline Phosphatase 94 30 - 99 U/L    Total Bilirubin 0.5 0.1 - 1.5 mg/dL    Albumin 3.7 3.2 - 4.9 g/dL    Total Protein 6.3 6.0 - 8.2 g/dL    Globulin 2.6 1.9 - 3.5 g/dL    A-G Ratio 1.4 g/dL   ESTIMATED GFR    Collection Time: 10/07/19  3:48 AM   Result Value Ref Range    GFR If African American >60 >60 mL/min/1.73 m 2    GFR If Non African American >60 >60 mL/min/1.73 m 2   ACCU-CHEK GLUCOSE    Collection Time: 10/07/19  8:43 AM   Result Value Ref Range    Glucose - Accu-Ck 171 (H) 65 - 99 mg/dL   ACCU-CHEK GLUCOSE    Collection Time: 10/07/19 12:39 PM   Result Value Ref Range    Glucose - Accu-Ck 175 (H) 65 - 99 mg/dL     MRI of brain 10/3:  1.  Motion artifact limits exam.  2.  RIGHT frontal resection with interval slight improvement of peripheral nodular enhancement favoring evolving postoperative changes.  Tumor recurrence is felt unlikely.  3.  Increased edema or gliosis in the frontal lobes bilaterally since prior exam, of uncertain clinical significance.  4.  RIGHT hemispheric subdural hygroma appears stable.  5.   Interval significant improvement of previously described LEFT subdural hematoma.    ASSESSMENT:    Brain injury: Nontraumatic, secondary to metastasis, status post resection in 7/2019  -MRI of brain 10/3 personally evaluated shows no signs of metastasis, right frontoparietal resection    Spasticity: Patient was abruptly removed from baclofen which supposedly was helping with spasticity in the past.  Withdrawal from baclofen can result in severe rigidity in lower extremities.  Patient stated he was withdrawn from the gabapentin although in his chart there are no signs of gabapentin allergy to this medication.  This is most likely baclofen.  -Start baclofen 5 mg 3 times daily, can quickly titrate this medication to 10 mg 3 times daily, major side effect is confusion and fatigue    Dysphagia:   - D3 with thin liquids    Rehabilitation: Impaired ADLs and mobility  - Much of his limitation is likely due to spasticity  -Recommend continued evaluation by physical and occupational therapy once spasticity is better controlled.  -We will continue to follow    Discussed with pt and family, summarized hospitalization and care, options for next step of care    Discussed with team about recommendations     Patient was seen for 85 minutes on unit/floor of which > 50% of time was spent on counseling and coordination of care regarding the above, including prognosis, risk reduction, benefits of treatment, and options for next stage of care.      Douglas Harrell D.O.  Physical Medicine and Rehabilitation

## 2019-10-07 NOTE — CARE PLAN
Patient alert to person and place. Needs reorientation and reinforcement. Call light is within reach. Bed alarm is on. Patient close to nurses station.

## 2019-10-07 NOTE — DISCHARGE PLANNING
SW reviewed chart and followed up with spouse, Mell. Spouse confirmed that plan is to go with [facility]. SW informed her that this is plan per physician and PT/OT.    JEANNA had patient sign choice form, faxed to [facility] [phone]

## 2019-10-07 NOTE — THERAPY
"Speech Language Therapy dysphagia treatment completed.   Functional Status:  Patient just finished visiting with his family. His voice is dysphonic. RN reports it was not like this earlier today. He reports that his tongue and lips feels thick. Patient consuming D2, NTL without overt signs of aspiration. He took sips of thin water from the cup without coughing. Throat clearing x1 out of 6 sips was observed. He declined further intake as he was falling asleep, tired from his visit. Recommend sips of thin water in between meals with supervision.   Recommendations: Dysphagia 2, NTL.  (Ok for sips of thin water in between meals with RN supervision)    Plan of Care: Will benefit from Speech Therapy 3 times per week  Post-Acute Therapy: Recommend inpatient transitional care services for continued speech therapy services.        See \"Rehab Therapy-Acute\" Patient Summary Report for complete documentation.     "

## 2019-10-07 NOTE — DISCHARGE PLANNING
JEANNA talked with family, daughter, updated on referral to [facility], pending acceptance. Family reports understanding, hopeful that [facility] will accept, as he has been there today.

## 2019-10-07 NOTE — THERAPY
"Occupational Therapy Evaluation completed.   Functional Status:  Max A supine>sit, Mod A seated grooming, Max A (x2 for safety) sit>stand, Total A LB dressing  Plan of Care: Will benefit from Occupational Therapy 3 times per week  Discharge Recommendations:  Equipment: Will Continue to Assess for Equipment Needs. Post-acute therapy Recommend post-acute placement for additional occupational therapy services prior to discharge home. Patient can tolerate post-acute therapies at a 5x/week frequency.    See \"Rehab Therapy-Acute\" Patient Summary Report for complete documentation.      Pt seen for OT tx session. Pt presents w/ a strong posterior lean in sitting and standing. Max facilitation required to lean forward. Pt requires increased processing time in order to initiate functional tasks. Pt participated in seated balance activities to increase independence w/ seated ADLs. Recommend post acute placement. Will continue to follow for Acute OT services.   "

## 2019-10-08 NOTE — CARE PLAN
Patient is alert to self and place. Call light is with-in reach. Non-skid foot wear is on. Patient calls appropriately. Needs reinforcement of education on how to call for help. Bed alarm is on.

## 2019-10-08 NOTE — PROGRESS NOTES
Atrial Fibrillation Admission Alert:    Your patient has been identified through our atrial fibrillation admission alert tracking system to have the diagnosis of atrial fibrillation.    After chart review, it was noted that this patient doesn't meet criteria to have a primary diagnosis of atrial fibrillation. Please consider changing the principal diagnosis prior to discharge.

## 2019-10-08 NOTE — PROGRESS NOTES
Physician Dr. Hernandez at bedside. Aspiration of left knee. Consent signed by wife at bedside. 2 RN identification. 2 sites of entry. Procedure time 15 minutes. Specimen sent to lab.

## 2019-10-08 NOTE — PROGRESS NOTES
[facility] Medicine Daily Progress Note    Date of Service  10/7/2019    Chief Complaint  73 y.o. male admitted 10/1/2019 with increasing weakness, fatigue, left arm weakness and difficulty swallowing.    Hospital Course      Hx of high-grade B-cell lymphoma stage IV, under the care of Dr. Ferrer,, had a recent port placed and a peritoneal mass biopsied, brain metastasis with cranio resection 710/2019, post R-CHOP chemo  on 9/23 admitted with above symptoms. Plan workup for above symptoms with MRI and therapies.      Interval Problem Update    Developed increased agitation, N/V with A fib RVR.  Unable to complete MRI.  Ativan .5 mg given early this morning.  Remains confused, slow  w difficulty swallowing.   10/3: Heart rate is better controlled.  Oral Cardizem converted to long-acting.  Patient is awake and alert.  Follow commands appropriately.  Answer simple questions appropriately.  No acute distress noted.  No focal neurological deficit on exam.  Discussed the case with oncology Dr. Carver.  10/4: More awake and interactive today.  Answer questions appropriately.  No headache.  Still slightly weak on left arm which is his baseline.  Heart rate is better controlled but still not optimized.  Denies any chest pain or shortness of breath.  Denies any lightheadedness or dizziness.  No acute distress noted.  10/5: Heart rate is not optimally controlled.  Low-dose metoprolol was added.  Family members requested that patient be sent to rehab or SNF as they will be able to care for him at home.  He denies any chest pain or shortness of breath.  More awake and interactive.  Follow commands well.  No acute distress noted.  10/6: Resting comfortably in bed.  Heart rate is better controlled.  No acute distress noted.  10/7: Resting comfortably in bed.  No acute distress noted.  Heart rate fairly controlled with the current regimen.  Denies any chest pain.  Denies any headache.  Tolerating p.o. fairly.  Awaiting for rehab/SNF  evaluation.  Consultants/Specialty    None      Code Status    Full     Disposition    Awaiting rehab/SNF evaluation /acceptance.    Review of Systems  Review of Systems   Constitutional: Positive for malaise/fatigue. Negative for chills, diaphoresis, fever and weight loss.   HENT: Positive for congestion. Negative for ear discharge, ear pain, hearing loss, nosebleeds and tinnitus.    Eyes: Negative for blurred vision, double vision, photophobia, pain and discharge.   Respiratory: Negative for cough, hemoptysis, sputum production and stridor.    Cardiovascular: Negative for chest pain, palpitations, orthopnea and claudication.   Gastrointestinal: Negative for abdominal pain, constipation, diarrhea, nausea and vomiting.   Genitourinary: Negative for dysuria, frequency and urgency.   Musculoskeletal: Negative for back pain, myalgias and neck pain.   Neurological: Positive for weakness. Negative for tingling, tremors, sensory change, speech change and focal weakness.   Psychiatric/Behavioral: Positive for memory loss. Negative for depression, hallucinations, substance abuse and suicidal ideas. The patient is not nervous/anxious and does not have insomnia.         Physical Exam  Temp:  [36.4 °C (97.5 °F)-37.3 °C (99.1 °F)] 36.4 °C (97.5 °F)  Pulse:  [] 102  Resp:  [16-20] 19  BP: (110-140)/(70-87) 127/78  SpO2:  [92 %-94 %] 93 %    Physical Exam   Constitutional: He is oriented to person, place, and time. No distress.   HENT:   Head: Normocephalic and atraumatic.   Nose: Nose normal.   Eyes: Conjunctivae and EOM are normal. Right eye exhibits no discharge. Left eye exhibits no discharge. No scleral icterus.   Neck: Normal range of motion. No JVD present. No tracheal deviation present.   Cardiovascular: Normal rate and regular rhythm. Exam reveals no friction rub.   No murmur heard.  irreg irreg   Pulmonary/Chest: No stridor. He has decreased breath sounds. He has no wheezes. He has no rales.   Dim bs bases,  sporadic insp rhonchi.    Abdominal: Soft. He exhibits no distension. There is no tenderness. There is no rebound and no guarding.   Musculoskeletal: He exhibits no edema or tenderness.   Generalized 4/5 strength.    Neurological: He is alert and oriented to person, place, and time.   Ox 2.   Weakness on left arm (baseline).    Skin: Skin is warm and dry. No rash noted. He is not diaphoretic. No erythema.   Psychiatric: He has a normal mood and affect. His behavior is normal. Thought content normal.   Vitals reviewed.      Fluids    Intake/Output Summary (Last 24 hours) at 10/7/2019 1829  Last data filed at 10/7/2019 1104  Gross per 24 hour   Intake 1000 ml   Output 3600 ml   Net -2600 ml       Laboratory  Recent Labs     10/05/19  0351 10/06/19  0521   WBC 16.2* 16.0*   RBC 4.34* 4.32*   HEMOGLOBIN 13.7* 13.4*   HEMATOCRIT 41.3* 41.1*   MCV 95.2 95.1   MCH 31.6 31.0   MCHC 33.2* 32.6*   RDW 45.3 45.9   PLATELETCT 157* 177   MPV 9.1 8.8*     Recent Labs     10/05/19  0351 10/06/19  0521 10/07/19  0348   SODIUM 137 135 137   POTASSIUM 3.8 4.1 3.8   CHLORIDE 100 99 100   CO2 26 29 28   GLUCOSE 129* 126* 117*   BUN 11 10 12   CREATININE 0.83 0.89 0.86   CALCIUM 9.3 9.4 8.8                   Imaging  MR-BRAIN-WITH & W/O   Final Result      1.  Motion artifact limits exam.   2.  RIGHT frontal resection with interval slight improvement of peripheral nodular enhancement favoring evolving postoperative changes.  Tumor recurrence is felt unlikely.   3.  Increased edema or gliosis in the frontal lobes bilaterally since prior exam, of uncertain clinical significance.   4.  RIGHT hemispheric subdural hygroma appears stable.   5.  Interval significant improvement of previously described LEFT subdural hematoma.      MR-CERVICAL SPINE-WITH & W/O   Final Result      1.  Again seen multilevel degenerative disc disease, uncinate and facet degeneration. No central canal narrowing. There are varying degrees of neural foraminal  narrowing at levels as specifically described above.      2.  Loss of the normal cervical lordosis.      3.  No evidence of spinal cord lesion or abnormal enhancement.      MR-THORACIC SPINE-WITH & W/O   Final Result      1.  Postoperative changes of the upper thoracic spine as described.   2.  No evidence to indicate lymphomatous involvement of the thoracic spine or spinal canal.   3.  Mild multilevel degenerative change, unchanged from prior exam.      CT-HEAD W/O   Final Result      1.  Stable RIGHT frontoparietal low-density subdural fluid collection, likely hygroma, with associated mass effect.   2.  No acute intracranial hemorrhage or focal edema.   3.  Postoperative changes are seen with associated RIGHT frontal encephalomalacia.   4.  No acute findings.      DX-CHEST-PORTABLE (1 VIEW)   Final Result      Slight hypoinflation with bibasilar atelectasis/scarring. No focal consolidation or pleural effusions.           Assessment/Plan  * Atrial fibrillation (HCC) w Rapid Ventricular Response - (present on admission)  Assessment & Plan  Will give IV Cardizem x 1.   Continue with oral Cardizem for now.  I will switch to long-acting.  Continue to assess the effectiveness of Cardizem to control heart rate.  Will consider switching to beta-blockers or add beta-blockers if no adequate control at this point.  Continue with Pradaxa 150 mg p.o. twice daily.  Echocardiogram done in July 2019 unremarkable.  TSH in July 2019 was within normal limits.  Continue telemetry monitoring.  10/4: Increased Cardizem dose to 360 mg for better rate control.   10/5: Heart rate still not optimally controlled.  Low-dose metoprolol was added.  10/6: Heart rate is better controlled now.  We will continue with current regimen.    Neutropenia (HCC)  Assessment & Plan  Status post chemotherapy and Neulasta-- now resolved.   Monitor CBC  10/4: Resolved. Now with leukocytosis likely secondary to Neulasta.    Metastasis to brain  (HCC)  Assessment & Plan  With resection in July 2019, 4.5 cm lesion-- Path revealed B cell lymphoma.  Had reported left weakness- now resolved - Neuro exam no focal weakness.   10/3: Pending MRI of the brain.  10/4: MRI of the brain negative for metastasis.    B-cell lymphoma (HCC)  Assessment & Plan  Per hematology oncology with primary abdominal origin and brain metastasis, currently undergoing chemotherapy  Plan MRI brain ,  Spine to assess for met dz  10/3: Discussed with oncology Dr. Carver who is on board.  MRI of the brain was not performed yesterday.  That was reordered today and may be possibly cervical and thoracic spine MRI as well as per Dr. Haney recommendations when she saw the patient last.  10/4: MRI of the brain, cervical, and thoracic spine with no evidence of metastases.    Type 2 diabetes mellitus without complication, without long-term current use of insulin (HCC)- (present on admission)  Assessment & Plan  Fair control  Hold metformin  Monitor BS with serial Accu checks, SSI as needed.     CAD (coronary artery disease)- (present on admission)  Assessment & Plan  Continue with statin      Hypertension- (present on admission)  Assessment & Plan  Start oral cardizem for added BP control.   Prn IV Vasotec, hydralazine if unable to tolerate orals.      Normocytic anemia- (present on admission)  Assessment & Plan  Monitor H&H.    Dysphagia  Assessment & Plan  Possibly result from neurologic status, await additional imaging  Difficulty taking pills, coughing. - Plan NPO, SLP   Plan of care discussed with multidisciplinary team during rounds.    VTE prophylaxis: Pradaxa

## 2019-10-08 NOTE — PROGRESS NOTES
"    Physical Medicine and Rehabilitation Consultation         Initial Consult      Date of Consultation: 10/8/2019  Reason for consultation: assess for acute inpatient rehab appropriateness  LOS: 7 Day(s)      Chief complaint: Lower extremity rigidity    HPI: The patient is a 73 y.o. right hand dominant male with a past medical history of high-grade B-cell lymphoma stage IV under the care of Dr. Ferrer, type 2 diabetes, atrial fibrillation, coronary artery disease, hypertension;  who presented on 10/1/2019 11:57 AM with increased weakness, fatigue left arm weakness and difficulty swallowing.  Patient recently had peritoneal mass biopsied, and a port placed.  He also underwent craniotomy and resection of brain mets on 7/10.  Post R-CHOP chemo on 9/23.  Work-up has been limited due to agitation, nausea, vomiting, and atrial fibrillation with RVR.  Recent MRI brain on 10/3 is negative for brain mets.    Chart review consult by Dr. stapleton on 10/07 reflects initiation of baclofen 5 mg 3 times daily,  the patient currently reports slightly improved symptoms after starting baclofen last night. He denies headache, N/V/D, has a guy for urine management, and last BM 3 days ago.     ROS:  Pertinent positives are listed in HPI, all other systems reviewed and are negative    Social Hx:  Pre-morbidly, this patient lived in a single level home with Three steps to enter, with spouse.   Employment: retired   Tobacco: denies   Alcohol: denies   Drugs: denies     From [facility]    Current level of function:  The patient was evaluated by acute care Physical Therapy, Occupational Therapy and Speech Language Pathology; currently requiring maximal assistance for mobility and maximal assistance for ADLs, also with ongoing swallowing deficits.    Restrictions: Dysphagia 2 diet, NTL    PMH:  Past Medical History:   Diagnosis Date   • Anesthesia     \"too much anesthesia kidneys stop woking and intestines slowed\"   • Arthritis    • Atrial " fibrillation (HCC)    • Back pain    • Blood clotting disorder (HCC) 1999    blood clot leg Left   • Breath shortness    • CAD (coronary artery disease)    • Cancer (HCC) 2016    prostate cancer   • Cataract     no surgery   • Dyslipidemia    • High cholesterol    • Hypertension    • Ileus (HCC)    • Myocardial infarct (HCC) 1999,2006    x3   • Renal disorder     cycst left kidney   • Sleep apnea     no cpap   • Snoring    • Urinary incontinence        PSH:  Past Surgical History:   Procedure Laterality Date   • AK DX BONE MARROW ASPIRATIONS Left 8/1/2019    Procedure: ASPIRATION, BONE MARROW - REGANTI;  Surgeon: Young Veliz M.D.;  Location: [facility];  Service: Orthopedics   • AK DX BONE MARROW BIOPSIES Left 8/1/2019    Procedure: BIOPSY, BONE MARROW, USING NEEDLE OR TROCAR;  Surgeon: Young Veliz M.D.;  Location: [facility];  Service: Orthopedics   • CRANIOTOMY Right 7/10/2019    Procedure: RIGHT-SIDED CRANIOTOMY FOR TUMOR;  Surgeon: Son Ruffin M.D.;  Location: [facility];  Service: Neurosurgery   • LUMBAR LAMINECTOMY DISKECTOMY N/A 11/26/2018    Procedure: LUMBAR LAMINECTOMY DISKECTOMY-  POSTERIOR L1-2 LAMI;  Surgeon: Son Ruffin M.D.;  Location: [facility];  Service: Neurosurgery   • LAMINOTOMY Left 11/26/2018    Procedure: LAMINOTOMY-  L4-S1;  Surgeon: Son Ruffin M.D.;  Location: [facility];  Service: Neurosurgery   • FORAMINOTOMY Left 11/26/2018    Procedure: FORAMINOTOMY;  Surgeon: Son Ruffin M.D.;  Location: [facility];  Service: Neurosurgery   • OTHER NEUROLOGICAL SURG  2016    Thoracic 2-3 fusion    • APPENDECTOMY  2002   • OTHER CARDIAC SURGERY  1999,2002,2006    stents cardiac   • OTHER NEUROLOGICAL SURG      Laminectomy       FHX:  Father had an MI, mother had DM   No family history on file.    Medications:  Current Facility-Administered Medications   Medication Dose   •  baclofen (LIORESAL) tablet 5 mg  5 mg   • metoprolol SR (TOPROL XL) tablet 25 mg  25 mg   • DILTIAZem CD (CARDIZEM CD) capsule 360 mg  360 mg   • dabigatran (PRADAXA) capsule 150 mg  150 mg   • Metoprolol Tartrate (LOPRESSOR) injection 5 mg  5 mg   • allopurinol (ZYLOPRIM) tablet 300 mg  300 mg   • atorvastatin (LIPITOR) tablet 20 mg  20 mg   • docusate sodium (COLACE) capsule 100 mg  100 mg   • ferrous sulfate tablet 325 mg  325 mg   • loratadine (CLARITIN) tablet 10 mg  10 mg   • omeprazole (PRILOSEC) capsule 40 mg  40 mg   • predniSONE (DELTASONE) tablet 10 mg  10 mg   • senna-docusate (PERICOLACE or SENOKOT S) 8.6-50 MG per tablet 2 Tab  2 Tab    And   • polyethylene glycol/lytes (MIRALAX) PACKET 1 Packet  1 Packet    And   • magnesium hydroxide (MILK OF MAGNESIA) suspension 30 mL  30 mL    And   • bisacodyl (DULCOLAX) suppository 10 mg  10 mg   • acetaminophen (TYLENOL) tablet 650 mg  650 mg   • Pharmacy Consult Request ...Pain Management Review 1 Each  1 Each    And   • oxyCODONE immediate-release (ROXICODONE) tablet 2.5 mg  2.5 mg    And   • oxyCODONE immediate-release (ROXICODONE) tablet 5 mg  5 mg    And   • morphine (pf) 4 mg/ml injection 2 mg  2 mg   • ondansetron (ZOFRAN) syringe/vial injection 4 mg  4 mg   • ondansetron (ZOFRAN ODT) dispertab 4 mg  4 mg   • insulin regular (HUMULIN R) injection 2-9 Units  2-9 Units    And   • glucose 4 g chewable tablet 16 g  16 g    And   • DEXTROSE 10% BOLUS 250 mL  250 mL   • acetaminophen (TYLENOL) oral suspension 325 mg  325 mg       Allergies:  Allergies   Allergen Reactions   • Gabapentin Rash     Broke out in a rash on R bicep       Physical Exam:  Vitals: /83   Pulse 86   Temp 37.6 °C (99.6 °F) (Temporal)   Resp 17   Ht 1.829 m (6')   Wt 90.7 kg (199 lb 15.3 oz)   SpO2 95%   Gen: NAD  Head:  NC/AT  Eyes/ Nose/ Mouth: PERRLA, moist mucous membranes  Cardio: RRR, no mumurs  Pulm: CTAB, with normal respiratory effort  Abd: Soft NTND, active bowel  sounds,   Ext: No peripheral edema. No calf tenderness. No clubbing/cyanosis. +dorsal pedalis pulses bilaterally.    Mental status:  A&Ox4 (person, place, date, situation) answers questions appropriately follows commands  Speech: fluent, no aphasia or dysarthria    CRANIAL NERVES:  2,3: visual acuity grossly intact, PERRL  3,4,6: EOMI bilaterally, no nystagmus or diplopia  5: sensation intact to light touch bilaterally and symmetric  7: no facial asymmetry  8: hearing grossly intact  9,10: symmetric palate elevation  11: SCM/Trapezius strength 5/5 bilaterally  12: tongue protrudes midline    Motor:      Shoulder flexors:  Bilateral -  5/5  Elbow flexors:  Bilateral -  5/5  Wrist extensors:  Bilateral -  5/5  Elbow extensors:  Bilateral -  5/5  Finger flexors:  Bilateral -  5/5  Finger abductors:  Bilateral -  5/5  Hip flexors:  Bilateral -  2/5  Knee ext:  Bilateral -  4/5  Dorsiflexors:  Bilateral -  2/5  EHL:  Bilateral -  2/5  Plantar flexors:  Bilateral -  3/5     Sensory:   intact to light touch through out  Abnormal sensation below the knees     DTRs: 2+ in bilateral biceps, triceps, brachioradialis, 2+ in bilateral patellar and achilles tendons  No clonus at bilateral ankles  Negative babinski b/l  Negative Basurto b/l     Tone: increased tone and muscle spasms in the bilateral lower extremities, L>R    Labs: Reviewed and significant for   Recent Labs     10/06/19  0521 10/08/19  0433   RBC 4.32* 4.50*   HEMOGLOBIN 13.4* 14.3   HEMATOCRIT 41.1* 42.7   PLATELETCT 177 242     Recent Labs     10/06/19  0521 10/07/19  0348 10/08/19  0433   SODIUM 135 137 139   POTASSIUM 4.1 3.8 3.7   CHLORIDE 99 100 101   CO2 29 28 29   GLUCOSE 126* 117* 120*   BUN 10 12 13   CREATININE 0.89 0.86 0.87   CALCIUM 9.4 8.8 9.1     Recent Results (from the past 24 hour(s))   ACCU-CHEK GLUCOSE    Collection Time: 10/07/19 12:39 PM   Result Value Ref Range    Glucose - Accu-Ck 175 (H) 65 - 99 mg/dL   URINALYSIS    Collection Time:  10/07/19  1:25 PM   Result Value Ref Range    Color Yellow     Character Clear     Specific Gravity 1.011 <1.035    Ph 8.0 5.0 - 8.0    Glucose Negative Negative mg/dL    Ketones Negative Negative mg/dL    Protein Negative Negative mg/dL    Bilirubin Negative Negative    Urobilinogen, Urine 0.2 Negative    Nitrite Negative Negative    Leukocyte Esterase Negative Negative    Occult Blood Negative Negative    Micro Urine Req see below    ACCU-CHEK GLUCOSE    Collection Time: 10/07/19  6:09 PM   Result Value Ref Range    Glucose - Accu-Ck 151 (H) 65 - 99 mg/dL   ACCU-CHEK GLUCOSE    Collection Time: 10/07/19  8:23 PM   Result Value Ref Range    Glucose - Accu-Ck 110 (H) 65 - 99 mg/dL   Comp Metabolic Panel    Collection Time: 10/08/19  4:33 AM   Result Value Ref Range    Sodium 139 135 - 145 mmol/L    Potassium 3.7 3.6 - 5.5 mmol/L    Chloride 101 96 - 112 mmol/L    Co2 29 20 - 33 mmol/L    Anion Gap 9.0 0.0 - 11.9    Glucose 120 (H) 65 - 99 mg/dL    Bun 13 8 - 22 mg/dL    Creatinine 0.87 0.50 - 1.40 mg/dL    Calcium 9.1 8.5 - 10.5 mg/dL    AST(SGOT) 22 12 - 45 U/L    ALT(SGPT) 36 2 - 50 U/L    Alkaline Phosphatase 95 30 - 99 U/L    Total Bilirubin 0.5 0.1 - 1.5 mg/dL    Albumin 3.8 3.2 - 4.9 g/dL    Total Protein 6.8 6.0 - 8.2 g/dL    Globulin 3.0 1.9 - 3.5 g/dL    A-G Ratio 1.3 g/dL   CBC WITH DIFFERENTIAL    Collection Time: 10/08/19  4:33 AM   Result Value Ref Range    WBC 13.6 (H) 4.8 - 10.8 K/uL    RBC 4.50 (L) 4.70 - 6.10 M/uL    Hemoglobin 14.3 14.0 - 18.0 g/dL    Hematocrit 42.7 42.0 - 52.0 %    MCV 94.9 81.4 - 97.8 fL    MCH 31.8 27.0 - 33.0 pg    MCHC 33.5 (L) 33.7 - 35.3 g/dL    RDW 45.4 35.9 - 50.0 fL    Platelet Count 242 164 - 446 K/uL    MPV 8.5 (L) 9.0 - 12.9 fL    Neutrophils-Polys 74.20 (H) 44.00 - 72.00 %    Lymphocytes 7.10 (L) 22.00 - 41.00 %    Monocytes 12.50 0.00 - 13.40 %    Eosinophils 0.30 0.00 - 6.90 %    Basophils 1.30 0.00 - 1.80 %    Immature Granulocytes 4.60 (H) 0.00 - 0.90 %     Nucleated RBC 0.00 /100 WBC    Neutrophils (Absolute) 10.08 (H) 1.82 - 7.42 K/uL    Lymphs (Absolute) 0.97 (L) 1.00 - 4.80 K/uL    Monos (Absolute) 1.70 (H) 0.00 - 0.85 K/uL    Eos (Absolute) 0.04 0.00 - 0.51 K/uL    Baso (Absolute) 0.18 (H) 0.00 - 0.12 K/uL    Immature Granulocytes (abs) 0.62 (H) 0.00 - 0.11 K/uL    NRBC (Absolute) 0.00 K/uL   ESTIMATED GFR    Collection Time: 10/08/19  4:33 AM   Result Value Ref Range    GFR If African American >60 >60 mL/min/1.73 m 2    GFR If Non African American >60 >60 mL/min/1.73 m 2   ACCU-CHEK GLUCOSE    Collection Time: 10/08/19  7:50 AM   Result Value Ref Range    Glucose - Accu-Ck 151 (H) 65 - 99 mg/dL       Imaging:   MR Brain 10/3/2019  1.  Motion artifact limits exam.  2.  RIGHT frontal resection with interval slight improvement of peripheral nodular enhancement favoring evolving postoperative changes.  Tumor recurrence is felt unlikely.  3.  Increased edema or gliosis in the frontal lobes bilaterally since prior exam, of uncertain clinical significance.  4.  RIGHT hemispheric subdural hygroma appears stable.  5.  Interval significant improvement of previously described LEFT subdural hematoma.    ASSESSMENT:  Brain injury: Nontraumatic, secondary to metastasis, status post resection in 7/2019 -MRI of brain 10/3 personally evaluated shows no signs of metastasis, right frontoparietal resection    Rehabilitation: Impaired ADLs and mobility  Patient is a good candidate for inpatient rehab based on needs for PT, OT, and speech therapy.  Patient will also benefit from family training.  Patient has a good discharge situation which will be home with wife.     Spasticity   - Baclofen 5mg TID   - Can increase to 10mg TID tomorrow     Pain  - lidocaine patch to left knee daily   - Roxicodone 2.5-5mg Q3 PRN   - Tylenol 650mg Q6 PRN     Left Knee effusion   -  Aspiration left knee performed today, 15cc clear yellow synovial fluid collected and sent to lab for cell count and  culture   - Patient still with moderate knee effusion   - Encourage ROM    Bowel program  - Patient is constipated, likely related to immobility, recommend the following:  - Senokot 1 tab nightly  - MiraLAX 1 packet twice daily PRN  - Milk of magnesia 30mL daily if no bowel movement in greater than 24 hours  - Dulcolax suppository 10 mg if patient goes more than 48 hours without a bowel movement  - Fleet enema if patient goes more than 72 hours without a bowel movement    DVT Prophylaxis:   -Pradaxa      Discussed with pt and family, summarized hospitalization and care, options for next step of care  Labs reviewed and significant for leukocytosis, down trending, likely steroid related  Imaging personally reviewed and MR brain shows right frontal lobe resection w/o signs of mets.    Discussed with team about recommendations     Thank you for allowing us to participate in the care of this patient.     Rishi Hernandez, DO   Physical Medicine and Rehabilitation   10/8/2019

## 2019-10-08 NOTE — WOUND TEAM
"[facility]  Inpatient Services  Initial Wound and Skin Care Evaluation    Admission Date: 10/1/2019     Consult Date: 10/05/2019 @ 1620   \[facility]\"", PMH, SH: Reviewed    Unit where seen by Wound Team: [id number]     WOUND CONSULT RELATED TO:  Sacrum     SUBJECTIVE:  \"O that has been there for months I've been putting neosporin on it at home to try and heal it up\" - per wife regarding pts sacrum wound      Self Report / Pain Level:  Denies pain       OBJECTIVE:  Pt lying in bed with waffle overlay in use and pillow under left side.     WOUND TYPE, LOCATION, CHARACTERISTICS (Pressure Injuries: location, stage, POA or date identified)  Pressure Injury 10/01/19 Sacrum;Coccyx (POA per wife at bedside) (Active)   Wound Image       Pressure Injury Stage 2    State of Healing Non-healing    Site Assessment Red;Excoriated;Fragile    Mary-wound Assessment Pink;Red;Fragile    Margins Attached edges;Defined edges    Wound Length (cm) 1.5 cm    Wound Width (cm) 1 cm    Wound Depth (cm) 0.1 cm    Wound Surface Area (cm^2) 1.5 cm^2    Closure Open to air    Drainage Amount Scant    Drainage Description Serosanguineous    Non-staged Wound Description Not applicable    Treatments Cleansed;Site care    Periwound Protectant Barrier Paste    Dressing Options Open to Air    NEXT Weekly Photo (Inpatient Only) 10/15/19    WOUND NURSE ONLY - Odor None    WOUND NURSE ONLY - Exposed Structures None    WOUND NURSE ONLY - Tissue Type and Percentage 100% Red with maceration to mary-wound    WOUND NURSE ONLY - Time Spent with Patient (mins) 60      Vascular:    Dorsal Pedal pulses:  NA  Posterior tib pulses:   NA    BENITO:      NA    ECHO:                                   No results found for this or any previous visit.     Lab Values:    Lab Results   Component Value Date/Time    WBC 13.6 (H) 10/08/2019 04:33 AM    RBC 4.50 (L) 10/08/2019 04:33 AM    HEMOGLOBIN 14.3 10/08/2019 04:33 AM    HEMATOCRIT 42.7 10/08/2019 04:33 AM                "     Lab Results   Component Value Date/Time    HBA1C 10.0 (H) 07/17/2019 05:12 AM      Culture:   NA    INTERVENTIONS BY WOUND TEAM:  Chart reviewed, this RN in to assess patient with bedside RN. Pt was turned to the right side. Sacrococcygeal area was cleansed with moist warm washcloth and no rinse soap. Pt has what appears to be stage II pressure injury with macerated thickened skin surrounding related to moisture and incontinence. Wife over this RNs shoulder and states pt has had the open wound for over a month and she has been treating it at home with neosporin at home. Wound was measured and photographed. Barrier paste applied due to pts incontinence. Pt was assisted back to a supine position and pillow was then placed under the right side to offload pressure.     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 Pt presented to the ER with weakness, confusion and difficulty swallowing. Pts wife states progressively got weaker until the point where he fell and had to call their 3 daughters and sons-in-law to have them assist in getting him up. Pt has had a wound to his sacrum for some time. The pressure wound appears to be primarily related to moisture but does have a pressure component as per pts wife pt was primarily bed bound at home. Unfortunately due to pts level of incontinence despite having condom cath pt is not appropriate for a dressing and therefore barrier paste was applied.     Factors affecting wound healing: Age, Incontinence, Decreased Mobility, Lymphoma with brain mets, Dysphagia     Goals: Steady decrease in wound area and depth weekly.    NURSING PLAN OF CARE ORDERS (X):    Dressing changes: See Dressing Care orders:   Skin care: See Skin Care orders: X  Rectal tube care: See Rectal Tube Care orders:   Other orders:    RSKIN: CURRENT (X) ORDERED (O):    Q shift Isra:  X  Q shift pressure point assessments:  X  Pressure redistribution mattress X           JAYLENE          Bariatric JAYLENE         Bariatric foam           Heel float boots X         Float Heels off Bed with Pillows               Barrier wipes         Barrier Cream         Barrier paste X         Sacral silicone dressing NA incontinent        Silicone O2 tubing X        Anchorfast         Cannula fixation Device (Tender )          Gray Foam Ear protectors           Trach with Optifoam split foam                 Waffle cushion        Waffle Overlay X        Rectal tube or BMS    Purwick/Condom Cath X         Antifungal tx      Interdry          Reposition q 2 hours X       Up to chair        Ambulate      PT/OT  X      Dietician        Diabetes Education      PO  X   TF     TPN     NPO   # days   Other        WOUND TEAM PLAN OF CARE (X):   NPWT change 3 x week:        Dressing changes by wound team:       Follow up as needed:  X, If wound deteriorates     Other (explain):     Anticipated discharge plans (X):   SNF:           Home Care:           Outpatient Wound Center:            Self Care:            Other:  X, TBD pt has been at home with wife for care however pt has become progressively weaker

## 2019-10-08 NOTE — PROCEDURES
"Left Knee Aspiration   Date of service: 10/8/2019       Left Knee aspiration consent reviewed with patient prior to beginning the procedure.  Time out was performed and witnessed by the nurse.     Location for injection identified on left knee joint.  The area was thoroughly cleansed with alcohol. First using the superior medial approach, a 21 gauge 1.5\" needle was inserted and about 3 cc of synovial fluid was aspirated. Patients spasms caused the leg to rotate internally limiting the approach angle of the needle, so the needle was withdrawn and the superior lateral approach was retired using a 1\" 18G needle. about 15cc of clear yellow synovial fluid was aspirated. No blood aspirated.  Pt tolerated procedure well. Synovial fluid was sent to the lab for cell count and culture.       Patient informed about signs and symptoms of infection and all questions answered.      Rishi Hernandez, DO   Physical Medicine and Rehabilitation   10/8/2019                "

## 2019-10-08 NOTE — PROGRESS NOTES
"Patient agitated/confused and unable to reorient. Patient trying to get out of bed to \"go to the kitchen and put his gun away so the kids don't get it.\" Security called and came to bedside. RN and security able to calm patient down and reorient. Lap belt applied; Patient demonstrated ability to remove. Will continue to monitor.  "

## 2019-10-09 NOTE — CARE PLAN
Problem: Safety  Goal: Will remain free from injury  Outcome: PROGRESSING AS EXPECTED   Pt calls appropriately, call light within reach. Bed in lowest and locked position, with hourly rounding in place. Bed alarm on and treaded slipper socks on. Mobility assessed with appropriate signs in place. Hourly rounding in place.     Problem: Infection  Goal: Will remain free from infection  Outcome: PROGRESSING AS EXPECTED   Standard precautions in place. Hand hygiene completed before entering room and exiting room.

## 2019-10-09 NOTE — PROGRESS NOTES
Gave RN hand-off report to BILLIE Ortiz. Made sure all belongings were sent with patient. Patient moved to [facility] Bed # 197-1.

## 2019-10-09 NOTE — PROGRESS NOTES
[facility] Medicine Daily Progress Note    Date of Service  10/9/2019    Chief Complaint  73 y.o. male admitted 10/1/2019 with increasing weakness, fatigue, left arm weakness and difficulty swallowing.    Hospital Course      Hx of high-grade B-cell lymphoma stage IV, under the care of Dr. Ferrer,, had a recent port placed and a peritoneal mass biopsied, brain metastasis with cranio resection 710/2019, post R-CHOP chemo  on 9/23 admitted with above symptoms. Plan workup for above symptoms with MRI and therapies.      Interval Problem Update    Developed increased agitation, N/V with A fib RVR.  Unable to complete MRI.  Ativan .5 mg given early this morning.  Remains confused, slow  w difficulty swallowing.   10/3: Heart rate is better controlled.  Oral Cardizem converted to long-acting.  Patient is awake and alert.  Follow commands appropriately.  Answer simple questions appropriately.  No acute distress noted.  No focal neurological deficit on exam.  Discussed the case with oncology Dr. Carver.  10/4: More awake and interactive today.  Answer questions appropriately.  No headache.  Still slightly weak on left arm which is his baseline.  Heart rate is better controlled but still not optimized.  Denies any chest pain or shortness of breath.  Denies any lightheadedness or dizziness.  No acute distress noted.  10/5: Heart rate is not optimally controlled.  Low-dose metoprolol was added.  Family members requested that patient be sent to rehab or SNF as they will be able to care for him at home.  He denies any chest pain or shortness of breath.  More awake and interactive.  Follow commands well.  No acute distress noted.  10/6: Resting comfortably in bed.  Heart rate is better controlled.  No acute distress noted.  10/7: Resting comfortably in bed.  No acute distress noted.  Heart rate fairly controlled with the current regimen.  Denies any chest pain.  Denies any headache.  Tolerating p.o. fairly.  Awaiting for rehab/SNF  evaluation.    10/8: Patient is in bed, denies any palpitation shortness of breath chest pain tolerating diet, no coughing no fever chills, physiatry evaluated patient and recommended to start baclofen for spasticity.  Had knee aspiration, follow-up fluid analysis.    10/9: Patient is resting in bed, no new complaints, will increase baclofen to 10 mg 3 times daily today, knee aspiration did not show signs of infection, states that his knee feels better.  Physiatry evaluation possible rehab, I have ordered PT OT for evaluation.    Consultants/Specialty    None      Code Status    Full     Disposition    Rehab pain 1 to 2 days    Review of Systems  Review of Systems   Constitutional: Negative for malaise/fatigue and weight loss.   HENT: Positive for congestion. Negative for ear discharge and nosebleeds.    Eyes: Negative for blurred vision and photophobia.   Respiratory: Negative for cough, hemoptysis and stridor.    Cardiovascular: Negative for chest pain and orthopnea.   Gastrointestinal: Negative for heartburn and vomiting.   Genitourinary: Negative for dysuria.   Musculoskeletal: Negative for myalgias and neck pain.   Neurological: Positive for weakness. Negative for speech change and focal weakness.   Psychiatric/Behavioral: Negative for depression, substance abuse and suicidal ideas.        Physical Exam  Temp:  [36.4 °C (97.6 °F)-37.3 °C (99.1 °F)] 36.9 °C (98.5 °F)  Pulse:  [] 86  Resp:  [17-18] 18  BP: (125-137)/(69-92) 125/82  SpO2:  [91 %-96 %] 95 %    Physical Exam   Constitutional: No distress.   HENT:   Head: Normocephalic and atraumatic.   Nose: Nose normal.   Eyes: Conjunctivae and EOM are normal. No scleral icterus.   Neck: Normal range of motion. Neck supple. No JVD present.   Cardiovascular: Normal rate. An irregularly irregular rhythm present. Exam reveals no friction rub.   No murmur heard.  Pulmonary/Chest: No stridor. No respiratory distress. He has decreased breath sounds. He has no  wheezes. He has no rales.   Abdominal: Soft. He exhibits no distension. There is no tenderness. There is no rebound.   Musculoskeletal: He exhibits no edema or tenderness.   Generalized 4/5 strength.    Neurological: He is alert. He exhibits abnormal muscle tone (Left arm).   Oriented x2  Follows command  Left arm weakness (baseline)   Skin: Skin is warm and dry.   Psychiatric: He has a normal mood and affect. Thought content normal.   Vitals reviewed.      Fluids    Intake/Output Summary (Last 24 hours) at 10/9/2019 1556  Last data filed at 10/9/2019 0600  Gross per 24 hour   Intake 540 ml   Output 1600 ml   Net -1060 ml       Laboratory  Recent Labs     10/08/19  0433 10/09/19  0336   WBC 13.6* 13.1*   RBC 4.50* 4.43*   HEMOGLOBIN 14.3 14.2   HEMATOCRIT 42.7 41.2*   MCV 94.9 93.0   MCH 31.8 32.1   MCHC 33.5* 34.5   RDW 45.4 44.9   PLATELETCT 242 316   MPV 8.5* 8.5*     Recent Labs     10/07/19  0348 10/08/19  0433 10/09/19  0336   SODIUM 137 139 139   POTASSIUM 3.8 3.7 4.0   CHLORIDE 100 101 102   CO2 28 29 26   GLUCOSE 117* 120* 122*   BUN 12 13 18   CREATININE 0.86 0.87 0.86   CALCIUM 8.8 9.1 9.0                   Imaging  MR-BRAIN-WITH & W/O   Final Result      1.  Motion artifact limits exam.   2.  RIGHT frontal resection with interval slight improvement of peripheral nodular enhancement favoring evolving postoperative changes.  Tumor recurrence is felt unlikely.   3.  Increased edema or gliosis in the frontal lobes bilaterally since prior exam, of uncertain clinical significance.   4.  RIGHT hemispheric subdural hygroma appears stable.   5.  Interval significant improvement of previously described LEFT subdural hematoma.      MR-CERVICAL SPINE-WITH & W/O   Final Result      1.  Again seen multilevel degenerative disc disease, uncinate and facet degeneration. No central canal narrowing. There are varying degrees of neural foraminal narrowing at levels as specifically described above.      2.  Loss of the  normal cervical lordosis.      3.  No evidence of spinal cord lesion or abnormal enhancement.      MR-THORACIC SPINE-WITH & W/O   Final Result      1.  Postoperative changes of the upper thoracic spine as described.   2.  No evidence to indicate lymphomatous involvement of the thoracic spine or spinal canal.   3.  Mild multilevel degenerative change, unchanged from prior exam.      CT-HEAD W/O   Final Result      1.  Stable RIGHT frontoparietal low-density subdural fluid collection, likely hygroma, with associated mass effect.   2.  No acute intracranial hemorrhage or focal edema.   3.  Postoperative changes are seen with associated RIGHT frontal encephalomalacia.   4.  No acute findings.      DX-CHEST-PORTABLE (1 VIEW)   Final Result      Slight hypoinflation with bibasilar atelectasis/scarring. No focal consolidation or pleural effusions.           Assessment/Plan  * Dysphagia  Assessment & Plan  Possibly result from neurologic status,  Dysphagia 2 with nectar thick diet    Neutropenia (HCC)  Assessment & Plan  Status post chemotherapy and Neulasta  Monitor CBC  Resolved    Metastasis to brain (HCC)  Assessment & Plan  With resection in July 2019, 4.5 cm lesion-- Path revealed B cell lymphoma.  Had reported left weakness- now resolved - Neuro exam no focal weakness.   10/3: Pending MRI of the brain.  10/4: MRI of the brain negative for metastasis.  No new neurological deficits.  PT OT reevaluation.    B-cell lymphoma (HCC)  Assessment & Plan  Per hematology oncology with primary abdominal origin and brain metastasis, currently undergoing chemotherapy  Plan MRI brain ,  Spine to assess for met dz  10/3: Discussed with oncology Dr. Carver who is on board.  MRI of the brain was not performed yesterday.  That was reordered today and may be possibly cervical and thoracic spine MRI as well as per Dr. Haney recommendations when she saw the patient last.  10/4: MRI of the brain, cervical, and thoracic spine with no  evidence of metastases.  Stable needs follow-up with oncology as outpatient    Atrial fibrillation (HCC) w Rapid Ventricular Response - (present on admission)  Assessment & Plan  Continue metoprolol and Pradaxa heart rate better controlled    Type 2 diabetes mellitus without complication, without long-term current use of insulin (HCC)- (present on admission)  Assessment & Plan  Fair control  Hold metformin  Continue sliding scale insulin    CAD (coronary artery disease)- (present on admission)  Assessment & Plan  Continue with statin and metoprolol      Hypertension- (present on admission)  Assessment & Plan  On metoprolol, blood pressure stable continue monitoring    Normocytic anemia- (present on admission)  Assessment & Plan  Monitor H&H.      VTE prophylaxis: Pradaxa     Case discussed with nurse staff  Case discussed during multidisciplinary rounds.

## 2019-10-09 NOTE — THERAPY
"Occupational Therapy Treatment completed with focus on ADLs, ADL transfers, patient education and cognition.  Functional Status:  Pt was seen for Occupational Therapy treatment today, see Therapy Kardex for details.Treatment included education in breath control with activity and at rest, self pacing techs and energy conservation with self care tasks. Educated pt in safety awareness techs as well. Psychosocial intervention addressed.  Pt seen for education and ADL's seated EOB. Pt required Mod A for supine to sit at EOB with extended time for MP. Pt demo slight increase in sequencing tasks and MP activity. Increased static sitting balance at midline needed to prepare for and attempt self care tasks. Pt demonstrated Min A for UB dressing, with extended time. Unable to attempt LB dressing due to fatigue.Pt demos fair activity tolerance. Pt able to wash face and brush teeth seated EOB with set up and SBA. Pt having small BM in bed needing Total A for toilet hygiene in standing position using FWW and PT assist. Max A for sit to stand using FWW. Pt gave good effort and is motivated in therapy.Pt wanted to attempt BSC transfer but was unable to stand pivot transfer. Pt was Min/Mod A for sit to supine in bed due to fatigue, call light in reach, bedside table in reach and nursing is aware. RN updated on OT treatment findings and recommendations.  Continue Occupational Therapy services as per plan.    Plan of Care: Will benefit from Occupational Therapy 3 times per week  Discharge Recommendations:  Equipment Will Continue to Assess for Equipment Needs. Post-acute therapy: Recommend post-acute placement for additional Occupational Therapy services prior to discharge home. Patient can tolerate post-acute therapies at a 5x/week frequency.    See \"Rehab Therapy-Acute\" Patient Summary Report for complete documentation.   "

## 2019-10-09 NOTE — THERAPY
"Pt seen for PT tx. Pt remains very motivated to work with PT, but has limited insight into current level of function as he was requesting to ambualte to the bathroom upon PT arrival. Pt demonstrated improved initiation and motor planning to assist with supine to sit with bed railing for support. Pt continues to demonstrated posterior lean upon sitting EOB; however, much less than previous session and able to faciliate anterior weight shift and improved seated posture with greater ease. Pt able to perform sit <> stand with FWW and Max A x3 trials. Initiated pre-gait lateral weight shift and shuffled side stepping at EOB. Pt continues to make steady progress toward acute PT goals, and demonstrated improvement in gross ROM and ease of movement with initiation of baclofen. Continue to recommend intensive post acute placement to optimize function prior to DC home.     Physical Therapy Treatment completed.   Bed Mobility:  Supine to Sit: Moderate Assist  Transfers: Sit to Stand: Maximal Assist  Gait: Level Of Assist: Unable to Participate       Plan of Care: Will benefit from Physical Therapy 3 times per week  Discharge Recommendations: Equipment: Will Continue to Assess for Equipment Needs.   Post-acute therapy: Recommend post-acute placement for continued physical therapy services prior to discharge home. Patient can tolerate post-acute therapies at a 5x/week frequency.          See \"Rehab Therapy-Acute\" Patient Summary Report for complete documentation.       "

## 2019-10-09 NOTE — CARE PLAN
Patient alert and self and place. Call light is within reach. Non-skid foot wear is on. Bed-alarm is on. Patient calls appropriately.

## 2019-10-09 NOTE — PROGRESS NOTES
Bedside report received from day shift RN, assumed pt care. Pt assessment complete. Pt alert and confused, oriented to person, and time. Not to event or place. Reviewed plan of care with pt. Tele box on and rhythm verified.  Chart and labs reviewed.  Bed in lowest position, call light within reach. Hourly rounding in place. Educated about calling for assistance.

## 2019-10-09 NOTE — DISCHARGE PLANNING
Anticipated Discharge Disposition: [facility]    Action: Spoke with Michele at [facility]. Per Michele, Rehab MD stated medication adjustment possible r/t pt decreased mobility. Medications adjusted per Michele.  Rehab acceptance pending updated PT/OT documentation. Current documentation states pt 2+ assist.     Barriers to Discharge: recent PT/OT notes    Plan: pending rehab documentation

## 2019-10-09 NOTE — PROGRESS NOTES
Chart review update. Increased baclofen to 10mg TID today. Synovial fluid not grossly infected. Ok to treat knee pain with lidocaine patch. Encourage ROM. Ready for rehab, possible admit to [facility] tomorrow.     Rishi Hernandez, DO   Physical Medicine and Rehabilitation   10/9/2019

## 2019-10-09 NOTE — PROGRESS NOTES
[facility] Medicine Daily Progress Note    Date of Service  10/8/2019    Chief Complaint  73 y.o. male admitted 10/1/2019 with increasing weakness, fatigue, left arm weakness and difficulty swallowing.    Hospital Course      Hx of high-grade B-cell lymphoma stage IV, under the care of Dr. Ferrer,, had a recent port placed and a peritoneal mass biopsied, brain metastasis with cranio resection 710/2019, post R-CHOP chemo  on 9/23 admitted with above symptoms. Plan workup for above symptoms with MRI and therapies.      Interval Problem Update    Developed increased agitation, N/V with A fib RVR.  Unable to complete MRI.  Ativan .5 mg given early this morning.  Remains confused, slow  w difficulty swallowing.   10/3: Heart rate is better controlled.  Oral Cardizem converted to long-acting.  Patient is awake and alert.  Follow commands appropriately.  Answer simple questions appropriately.  No acute distress noted.  No focal neurological deficit on exam.  Discussed the case with oncology Dr. Carver.  10/4: More awake and interactive today.  Answer questions appropriately.  No headache.  Still slightly weak on left arm which is his baseline.  Heart rate is better controlled but still not optimized.  Denies any chest pain or shortness of breath.  Denies any lightheadedness or dizziness.  No acute distress noted.  10/5: Heart rate is not optimally controlled.  Low-dose metoprolol was added.  Family members requested that patient be sent to rehab or SNF as they will be able to care for him at home.  He denies any chest pain or shortness of breath.  More awake and interactive.  Follow commands well.  No acute distress noted.  10/6: Resting comfortably in bed.  Heart rate is better controlled.  No acute distress noted.  10/7: Resting comfortably in bed.  No acute distress noted.  Heart rate fairly controlled with the current regimen.  Denies any chest pain.  Denies any headache.  Tolerating p.o. fairly.  Awaiting for rehab/SNF  evaluation.    10/8: Patient is in bed, denies any palpitation shortness of breath chest pain tolerating diet, no coughing no fever chills, physiatry evaluated patient and recommended to start baclofen for spasticity.  Had knee aspiration, follow-up fluid analysis.    Consultants/Specialty    None      Code Status    Full     Disposition    Awaiting rehab/SNF evaluation /acceptance.    Review of Systems  Review of Systems   Constitutional: Negative for diaphoresis, malaise/fatigue and weight loss.   HENT: Positive for congestion. Negative for ear discharge and nosebleeds.    Eyes: Negative for blurred vision and double vision.   Respiratory: Negative for cough, hemoptysis and stridor.    Cardiovascular: Negative for chest pain and palpitations.   Gastrointestinal: Negative for nausea and vomiting.   Genitourinary: Negative for dysuria.   Musculoskeletal: Negative for myalgias and neck pain.   Neurological: Positive for weakness. Negative for speech change and focal weakness.   Psychiatric/Behavioral: Negative for depression, substance abuse and suicidal ideas.        Physical Exam  Temp:  [36.4 °C (97.5 °F)-37.6 °C (99.6 °F)] 36.6 °C (97.9 °F)  Pulse:  [62-99] 99  Resp:  [17-18] 17  BP: (127-149)/(78-86) 129/86  SpO2:  [92 %-98 %] 92 %    Physical Exam   Constitutional: No distress.   HENT:   Head: Normocephalic and atraumatic.   Nose: Nose normal.   Eyes: Conjunctivae and EOM are normal. No scleral icterus.   Neck: Normal range of motion. Neck supple. No JVD present.   Cardiovascular: Normal rate. An irregularly irregular rhythm present. Exam reveals no friction rub.   No murmur heard.  Pulmonary/Chest: No stridor. No respiratory distress. He has decreased breath sounds. He has no wheezes. He has no rales.   Abdominal: Soft. He exhibits no distension. There is no tenderness. There is no rebound.   Musculoskeletal: He exhibits no edema or tenderness.   Generalized 4/5 strength.    Neurological: He is alert. He  exhibits abnormal muscle tone (Left arm).   Oriented x2  Follows command  Left arm weakness (baseline)   Skin: Skin is warm and dry.   Psychiatric: He has a normal mood and affect. Thought content normal.   Vitals reviewed.      Fluids    Intake/Output Summary (Last 24 hours) at 10/8/2019 1747  Last data filed at 10/8/2019 0600  Gross per 24 hour   Intake 500 ml   Output 2500 ml   Net -2000 ml       Laboratory  Recent Labs     10/06/19  0521 10/08/19  0433   WBC 16.0* 13.6*   RBC 4.32* 4.50*   HEMOGLOBIN 13.4* 14.3   HEMATOCRIT 41.1* 42.7   MCV 95.1 94.9   MCH 31.0 31.8   MCHC 32.6* 33.5*   RDW 45.9 45.4   PLATELETCT 177 242   MPV 8.8* 8.5*     Recent Labs     10/06/19  0521 10/07/19  0348 10/08/19  0433   SODIUM 135 137 139   POTASSIUM 4.1 3.8 3.7   CHLORIDE 99 100 101   CO2 29 28 29   GLUCOSE 126* 117* 120*   BUN 10 12 13   CREATININE 0.89 0.86 0.87   CALCIUM 9.4 8.8 9.1                   Imaging  MR-BRAIN-WITH & W/O   Final Result      1.  Motion artifact limits exam.   2.  RIGHT frontal resection with interval slight improvement of peripheral nodular enhancement favoring evolving postoperative changes.  Tumor recurrence is felt unlikely.   3.  Increased edema or gliosis in the frontal lobes bilaterally since prior exam, of uncertain clinical significance.   4.  RIGHT hemispheric subdural hygroma appears stable.   5.  Interval significant improvement of previously described LEFT subdural hematoma.      MR-CERVICAL SPINE-WITH & W/O   Final Result      1.  Again seen multilevel degenerative disc disease, uncinate and facet degeneration. No central canal narrowing. There are varying degrees of neural foraminal narrowing at levels as specifically described above.      2.  Loss of the normal cervical lordosis.      3.  No evidence of spinal cord lesion or abnormal enhancement.      MR-THORACIC SPINE-WITH & W/O   Final Result      1.  Postoperative changes of the upper thoracic spine as described.   2.  No evidence to  indicate lymphomatous involvement of the thoracic spine or spinal canal.   3.  Mild multilevel degenerative change, unchanged from prior exam.      CT-HEAD W/O   Final Result      1.  Stable RIGHT frontoparietal low-density subdural fluid collection, likely hygroma, with associated mass effect.   2.  No acute intracranial hemorrhage or focal edema.   3.  Postoperative changes are seen with associated RIGHT frontal encephalomalacia.   4.  No acute findings.      DX-CHEST-PORTABLE (1 VIEW)   Final Result      Slight hypoinflation with bibasilar atelectasis/scarring. No focal consolidation or pleural effusions.           Assessment/Plan  * Dysphagia  Assessment & Plan  Possibly result from neurologic status,  Dysphagia 2 with nectar thick diet    Neutropenia (HCC)  Assessment & Plan  Status post chemotherapy and Neulasta  Monitor CBC  Resolved    Metastasis to brain (HCC)  Assessment & Plan  With resection in July 2019, 4.5 cm lesion-- Path revealed B cell lymphoma.  Had reported left weakness- now resolved - Neuro exam no focal weakness.   10/3: Pending MRI of the brain.  10/4: MRI of the brain negative for metastasis.  No new neurological deficits.    B-cell lymphoma (HCC)  Assessment & Plan  Per hematology oncology with primary abdominal origin and brain metastasis, currently undergoing chemotherapy  Plan MRI brain ,  Spine to assess for met dz  10/3: Discussed with oncology Dr. Carver who is on board.  MRI of the brain was not performed yesterday.  That was reordered today and may be possibly cervical and thoracic spine MRI as well as per Dr. Haney recommendations when she saw the patient last.  10/4: MRI of the brain, cervical, and thoracic spine with no evidence of metastases.  Stable needs follow-up with oncology as outpatient    Atrial fibrillation (HCC) w Rapid Ventricular Response - (present on admission)  Assessment & Plan  Continue metoprolol and Pradaxa heart rate better controlled    Type 2 diabetes  mellitus without complication, without long-term current use of insulin (HCC)- (present on admission)  Assessment & Plan  Fair control  Hold metformin  Continue sliding scale insulin    CAD (coronary artery disease)- (present on admission)  Assessment & Plan  Continue with statin and metoprolol      Hypertension- (present on admission)  Assessment & Plan  On metoprolol, blood pressure stable continue monitoring    Normocytic anemia- (present on admission)  Assessment & Plan  Monitor H&H.      VTE prophylaxis: Pradaxa     Case discussed with nurse staff  Case discussed during multidisciplinary rounds.

## 2019-10-10 PROBLEM — G93.40 ENCEPHALOPATHY ACUTE: Status: ACTIVE | Noted: 2019-01-01

## 2019-10-10 NOTE — CARE PLAN
Problem: Safety  Goal: Will remain free from injury  Outcome: PROGRESSING AS EXPECTED  Fall precautions in place.     Problem: Skin Integrity  Goal: Risk for impaired skin integrity will decrease  Outcome: PROGRESSING SLOWER THAN EXPECTED  Waffle overlay in place. Q2hr turns in place.

## 2019-10-10 NOTE — PROGRESS NOTES
Monitor summary: Aflutter 77-94, IA n/a, QRS 0.08, QT n/a, with frequent PVCs and frequent bigeminy per strip from monitor room.

## 2019-10-10 NOTE — PREADMISSION SCREENING NOTE
"  Pre-Admission Screening Form    Patient Information:   Name: Marcio More Jr.     MRN: [id number]       : 1945      Age: 73 y.o.   Gender: male      Race: White [7]       Marital Status:  [2]  Family Contact: DerikMell Tao,Aissatou More,Mary Jane Fish        Relationship: Spouse [17]  Daughter [2]  Daughter [2]  Daughter [2]  Home Phone: [phone]           Cell Phone: [phone]    Advanced Directives: None  Code Status:  FULL  Current Attending Provider: Addison Anderson M.D.  Referring Physician: Dr. Wagner   Physiatrist Consult: Dr. Harrell    Referral Date: 10-06-19  Primary Payor Source:  MEDICARE  Secondary Payor Source:  [facility]    Medical Information:   Date of Admission to Acute Care Setting:10/1/2019  Room Number: S197/01  Rehabilitation Diagnosis: 02.1 Non-Traumatic  There is no immunization history for the selected administration types on file for this patient.  Allergies   Allergen Reactions   • Gabapentin Rash     Broke out in a rash on R bicep     Past Medical History:   Diagnosis Date   • Anesthesia     \"too much anesthesia kidneys stop woking and intestines slowed\"   • Arthritis    • Atrial fibrillation (HCC)    • Back pain    • Blood clotting disorder (HCC)     blood clot leg Left   • Breath shortness    • CAD (coronary artery disease)    • Cancer (HCC) 2016    prostate cancer   • Cataract     no surgery   • Dyslipidemia    • High cholesterol    • Hypertension    • Ileus (HCC)    • Myocardial infarct (HCC) ,2006    x3   • Renal disorder     cycst left kidney   • Sleep apnea     no cpap   • Snoring    • Urinary incontinence      Past Surgical History:   Procedure Laterality Date   • RI DX BONE MARROW ASPIRATIONS Left 2019    Procedure: ASPIRATION, BONE MARROW - REGANTI;  Surgeon: Young Veliz M.D.;  Location: [facility];  Service: Orthopedics   • RI DX BONE MARROW BIOPSIES " Left 8/1/2019    Procedure: BIOPSY, BONE MARROW, USING NEEDLE OR TROCAR;  Surgeon: Young Veliz M.D.;  Location: [facility];  Service: Orthopedics   • CRANIOTOMY Right 7/10/2019    Procedure: RIGHT-SIDED CRANIOTOMY FOR TUMOR;  Surgeon: Son Ruffin M.D.;  Location: [facility];  Service: Neurosurgery   • LUMBAR LAMINECTOMY DISKECTOMY N/A 11/26/2018    Procedure: LUMBAR LAMINECTOMY DISKECTOMY-  POSTERIOR L1-2 LAMI;  Surgeon: Son Ruffin M.D.;  Location: SUR[facility];  Service: Neurosurgery   • LAMINOTOMY Left 11/26/2018    Procedure: LAMINOTOMY-  L4-S1;  Surgeon: Son Ruffin M.D.;  Location: SUR[facility];  Service: Neurosurgery   • FORAMINOTOMY Left 11/26/2018    Procedure: FORAMINOTOMY;  Surgeon: Son Ruffin M.D.;  Location: SUR[facility]ERY [facility];  Service: Neurosurgery   • OTHER NEUROLOGICAL SURG  2016    Thoracic 2-3 fusion    • APPENDECTOMY  2002   • OTHER CARDIAC SURGERY  1999,2002,2006    stents cardiac   • OTHER NEUROLOGICAL SURG      Laminectomy       History Leading to Admission, Conditions that Caused the Need for Rehab (CMS):     Dr. Jones H&P:     Chief Complaint  Increasing weakness, fatigue, confusion, left arm weakness and difficulty swallowing     History of Presenting Illness  73 y.o. male who presented 10/1/2019 with history of high-grade B-cell lymphoma stage IV, under the care of Dr. Ferrer,, had a recent port placed and a peritoneal mass biopsied, he received chemotherapy in form of R-CHOP on 9/23, the patient is brought to the ER because of increasing weakness, fatigue, confusion, left arm weakness difficulty speaking and swallowing.  This has been going on since Friday worsening over the weekend, the patient is on chronic anticoagulation with a history of atrial fibrillation, heart disease, diabetes, hypertension.  I discussed the case with the oncologist who requested additional imaging to rule  out progression of disease versus other.  The patient received Neulasta just recently for neutropenia.  Patient is accompanied by family who gives additional history.  Full CODE STATUS is currently requested.  Assessment/Plan:  I anticipate this patient will require at least two midnights for appropriate medical management, necessitating inpatient admission.     Neutropenia (HCC)  Assessment & Plan  Status post chemotherapy, status post Neulasta, monitor  Protective isolation     Metastasis to brain (HCC)  Assessment & Plan  With resection in July 2019, 4.5 cm lesion.     B-cell lymphoma (HCC)  Assessment & Plan  Per hematology oncology with primary abdominal origin and brain metastasis, currently undergoing chemotherapy  We will consult hematology oncology for follow-up     CAD (coronary artery disease)- (present on admission)  Assessment & Plan  History of, continue conservative care     Hypertension- (present on admission)  Assessment & Plan  History of, monitor, adjust medication as indicated     Atrial fibrillation (HCC)- (present on admission)  Assessment & Plan  Continue with rate and rhythm control, continue anticoagulation     Dysphagia  Assessment & Plan  Possibly result from neurologic status, await additional imaging, speech therapy     Plan  Await additional imaging in form of MRI, discussed with Dr. Carver from oncology  Supportive care  Speech therapy eval for swallow safety and diet modification possibly  Continue supportive medication regimen  Currently no indication the patient is suffering from a an infection, but watch closely  Further direction after imaging results are available  Medically complex and high risk  VTE prophylaxis: Prajudy Harrell (Physiatry) recommendations:  ASSESSMENT:     Brain injury: Nontraumatic, secondary to metastasis, status post resection in 7/2019  -MRI of brain 10/3 personally evaluated shows no signs of metastasis, right frontoparietal resection     Spasticity:  Patient was abruptly removed from baclofen which supposedly was helping with spasticity in the past.  Withdrawal from baclofen can result in severe rigidity in lower extremities.  Patient stated he was withdrawn from the gabapentin although in his chart there are no signs of gabapentin allergy to this medication.  This is most likely baclofen.  -Start baclofen 5 mg 3 times daily, can quickly titrate this medication to 10 mg 3 times daily, major side effect is confusion and fatigue     Dysphagia:   - D3 with thin liquids     Rehabilitation: Impaired ADLs and mobility  - Much of his limitation is likely due to spasticity  -Recommend continued evaluation by physical and occupational therapy once spasticity is better controlled.  -We will continue to follow     Discussed with pt and family, summarized hospitalization and care, options for next step of care     Discussed with team about recommendations      Dr. Carver (Hematology & Oncology) recommendations:  Assessment and Plan:     73-year-old gentleman ECOG performance status of 3 with a high-grade large cell lymphoma status post right frontal resection from the brain with intra-abdominal disease biopsy-proven.  Double expressor, ABC subtype status post 1 cycle of about 8 days ago.  He is due for high-dose methotrexate next week.     He seems this afternoon.  His MRI still pending.  His counts are good.  He has really no focal deficits.  I think all of it could just be post chemotherapy during his kirill time.  We will wait on his MRI to make sure there is no disease progression.  His primary oncologist I discussed with him in the office if there was significant progression in the CNS then one could consider hospice/comfort care.     I think OT and PT would be good for him to be evaluated.  The nurse tells me the speech/swallow team will be evaluating again.     He agreed and verbalized his agreement and understanding with the current plan.  I answered all questions and  "concerns he has at this time.                Rishi Hernandez D.O.   Physician   Physical Medicine & Rehab   Procedures   Signed   Date of Service:  10/8/2019  2:20 PM            Pre-procedure Diagnoses   Effusion of left knee [M25.462]   Post-procedure Diagnoses   Effusion of left knee [M25.462]   Procedures   PROCEDURE ARTHROCENTESIS [PRO90 (Custom)]           []Hide copied text    []Hover for details  Left Knee Aspiration   Date of service: 10/8/2019        Left Knee aspiration consent reviewed with patient prior to beginning the procedure.  Time out was performed and witnessed by the nurse.     Location for injection identified on left knee joint.  The area was thoroughly cleansed with alcohol. First using the superior medial approach, a 21 gauge 1.5\" needle was inserted and about 3 cc of synovial fluid was aspirated. Patients spasms caused the leg to rotate internally limiting the approach angle of the needle, so the needle was withdrawn and the superior lateral approach was retired using a 1\" 18G needle. about 15cc of clear yellow synovial fluid was aspirated. No blood aspirated.  Pt tolerated procedure well. Synovial fluid was sent to the lab for cell count and culture.      Patient informed about signs and symptoms of infection and all questions answered.       Rishi Hernandez DO   Physical Medicine and Rehabilitation   10/8/2019                            Brain MRI 10-03-19:  1.  Motion artifact limits exam.  2.  RIGHT frontal resection with interval slight improvement of peripheral nodular enhancement favoring evolving postoperative changes.  Tumor recurrence is felt unlikely.  3.  Increased edema or gliosis in the frontal lobes bilaterally since prior exam, of uncertain clinical significance.  4.  RIGHT hemispheric subdural hygroma appears stable.  5.  Interval significant improvement of previously described LEFT subdural hematoma.    Co-morbidities: See PMH  Potential Risk - Complications: Aphasia, " Cognitive Impairment, Contractures, Deep Vein Thrombosis, Dysphagia, Incontinence, Malnutrition, Pain, Perceptual Impairment, Pneumonia, Pressure Ulcer, Seizures and Urinary Tract Infection  Level of Risk: High    Ongoing Medical Management Needed (Medical/Nursing Needs):   Patient Active Problem List    Diagnosis Date Noted   • B-cell lymphoma (HCC) 10/01/2019     Priority: High   • Metastasis to brain (HCC) 10/01/2019     Priority: High   • Neutropenia (HCC) 10/01/2019     Priority: High   • Intracranial mass 07/06/2019     Priority: High   • Myelopathy (HCC) 05/15/2016     Priority: High   • Thoracic stenosis 05/15/2016     Priority: High     Class: Acute   • Ileus (HCC) 05/15/2016     Priority: High     Class: Acute   • Atrial fibrillation (HCC) w Rapid Ventricular Response  05/15/2016     Priority: High     Class: Chronic   • Hyponatremia 07/12/2019     Priority: Medium   • Hypertension 05/15/2016     Priority: Medium     Class: Chronic   • CAD (coronary artery disease) 05/15/2016     Priority: Medium     Class: Chronic   • Type 2 diabetes mellitus without complication, without long-term current use of insulin (HCC) 05/15/2016     Priority: Medium     Class: Acute   • Azotemia 05/15/2016     Priority: Medium     Class: Acute   • Chronic back pain 05/15/2016     Priority: Low     Class: Chronic   • Dyslipidemia 05/15/2016     Priority: Low     Class: Chronic   • Normocytic anemia 10/03/2019   • Dysphagia 10/01/2019   • Hypomagnesemia 07/24/2019   • Vitamin D insufficiency 07/18/2019   • Leukocytosis 07/18/2019   • 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   • History of prostate cancer 07/06/2019     Alert and oriented to person & time.    Current Vital Signs:   Temperature: 37.1 °C (98.7 °F) Pulse: 61 Respiration: 16 Blood Pressure : 124/77  Weight: 89.3 kg (196 lb 13.9 oz) Height: 182.9 cm (6')  Pulse Oximetry: 94 % O2 (LPM): 0      Completed Laboratory Reports:  Recent Labs     10/07/19  1239 10/07/19  9182  10/07/19  2023 10/08/19  0433 10/08/19  0750 10/08/19  1238 10/08/19  1802 10/08/19  2010 10/09/19  0336 10/09/19  1233 10/09/19  1714 10/09/19  2128 10/10/19  0458 10/10/19  0906   WBC  --   --   --  13.6*  --   --   --   --  13.1*  --   --   --  16.2*  --    HEMOGLOBIN  --   --   --  14.3  --   --   --   --  14.2  --   --   --  15.0  --    HEMATOCRIT  --   --   --  42.7  --   --   --   --  41.2*  --   --   --  44.4  --    PLATELETCT  --   --   --  242  --   --   --   --  316  --   --   --  357  --    SODIUM  --   --   --  139  --   --   --   --  139  --   --   --  139  --    POTASSIUM  --   --   --  3.7  --   --   --   --  4.0  --   --   --  3.7  --    BUN  --   --   --  13  --   --   --   --  18  --   --   --  16  --    CREATININE  --   --   --  0.87  --   --   --   --  0.86  --   --   --  0.86  --    ALBUMIN  --   --   --  3.8  --   --   --   --  3.8  --   --   --  3.8  --    GLUCOSE  --   --   --  120*  --   --   --   --  122*  --   --   --  122*  --    POCGLUCOSE 175* 151* 110*  --  151* 187* 143* 154*  --  251* 138* 165*  --  101*     Additional Labs: Not Applicable    Prior Living Situation:   Housing / Facility: 1 Story House  Steps Into Home: 2  Steps In Home: 0  Lives with - Patient's Self Care Capacity: Spouse, Adult Children(Dtrs)  Equipment Owned: Front-Wheel Walker, Single Point Cane    Prior Level of Function / Living Situation:   Physical Therapy: Prior Services: Intermittent Physical Support for ADL Per Family  Housing / Facility: 1 Story House  Steps Into Home: 2  Steps In Home: 0  Equipment Owned: Front-Wheel Walker, Single Point Cane  Lives with - Patient's Self Care Capacity: Spouse, Adult Children(Dtrs)  Bed Mobility: Independent  Transfer Status: Independent  Ambulation: Required Assist  Distance Ambulation (Feet): (limited community)  Assistive Devices Used: Front-Wheel Walker  Stairs: Required Assist  Current Level of Function:   Level Of Assist: Unable to Participate  Comments: initiated  "pre-gait activities of standing lateral weight shifting with small scooting steps  Supine to Sit: Moderate Assist  Sit to Supine: Moderate Assist  Scooting: Maximal Assist  Rolling: Moderate Assist to Lt., Moderate Assist to Rt.  Skilled Intervention: Verbal Cuing, Tactile Cuing, Sequencing, Postural Facilitation, Compensatory Strategies  Comments: HOB elevated and use of bedrail   Sit to Stand: Maximal Assist  Bed, Chair, Wheelchair Transfer: Unable to Participate  Toilet Transfers: Unable to Participate  Skilled Intervention: Verbal Cuing, Tactile Cuing, Sequencing, Compensatory Strategies  Comments: with FWW for sit to stand only. See PT notes for additional information   Sitting in Chair: NT  Sitting Edge of Bed: 35 mins   Standing: 3-4 mins total  Occupational Therapy:   Self Feeding: Unable To Determine At This Time  Dressing: Unable To Determine At This Time  Medication Management: Unable To Determine At This Time  Prior Level Of Mobility: Unable to Determine At This Time  Prior Services: Intermittent Physical Support for ADL Per Family  Housing / Facility: [address] [place] [address]  Current Level of Function:   Eating: Not Tested  Bathing: Not Tested  Upper Body Dressing: Minimal Assist  Lower Body Dressing: Total Assist  Toileting: Total Assist  Skilled Intervention: Verbal Cuing, Tactile Cuing, Sequencing, Postural Facilitation, Compensatory Strategies  Comments: See note below  Speech Language Pathology:   Assessment : The patient presents with poor, delayed verbal and physical responses. Oral motor movement is slowed and weak. He has xerostomia. Poor A-P lingual movement noted. Lateral movement was decent but weak against resistance. His jaw is in an open mouth, relaxed positioned with poor labial approximation during articulation. Laryngeal elevation palpated as weak. PO trials resulted in an increase in wet vocal quality, oral residue and patient report of \"sticking\" in his throat. Trials were discontinued. " Recommend patient remain NPO today. Okay for 3 ice chips per hour. SLP to reassess tomorrow morning. Patient aware.   Problem List: Dysphagia  Diet / Liquid Recommendation: Dysphagia II, Nectar Thick Liquid(thin water between meals with supervision)  Comments: Patient just finished visiting with his family. His voice is dysphonic. RN reports it was not like this earlier today. He reports that his tongue and lips feels thick. Patient consuming D2, NTL without overt signs of aspiration. He took sips of thin water from the cup without coughing. Throat clearing x1 out of 6 sips was observed. He declined further intake as he was falling asleep, tired from his visit. Recommend sips of thin water in between meals with supervision.   Rehabilitation Prognosis/Potential: Good  Estimated Length of Stay: 14-21 days    Nursing:   Orientation : Disoriented to Time, Disoriented to Event, Disoriented to Place  Incontinent    Scope/Intensity of Services Recommended:  Physical Therapy: 1 hr / day  5 days / week. Therapeutic Interventions Required: Maximize Endurance, Mobility, Strength and Safety  Occupational Therapy: 1 hr / day 5 days / week. Therapeutic Interventions Required: Maximize Self Care, ADLs, IADLs and Energy Conservation  Speech & Language Pathology: 1 hr / day 5 days / week. Therapeutic Interventions Required: Maximize Cognition, Swallowing and Safety  Rehabilitation Nursin/7. Therapeutic Interventions Required: Monitor Pain, Skin, Vital Signs, Intake and Output, Labs, Safety, Aspiration Risk and Family Training  Rehabilitation Physician: 3 - 5 days / week. Therapeutic Interventions Required: Medical Management    Rehabilitation Goals and Plan (Expected frequency & duration of treatment in the IRF):   Return to the Community, Modified Independent Level of Care and Family Able to Provide 24/7 Assistance  Anticipated Date of Rehabilitation Admission: 10-10-19  Patient/Family oriented IRF level of care/facility/plan:  Yes  Patient/Family willing to participate in IRF care/facility/plan: Yes  Patient able to tolerate IRF level of care proposed: Yes  Patient has potential to benefit IRF level of care proposed: Yes  Comments: Not Applicable    Special Needs or Precautions - Medical Necessity:  Safety Concerns/Precautions:  Fall Risk / High Risk for Falls, Balance, Cognition and Bed / Chair Alarm  Pain Management  IV Site: Peripheral  Current Medications:    Current Facility-Administered Medications Ordered in Epic   Medication Dose Route Frequency Provider Last Rate Last Dose   • baclofen (LIORESAL) tablet 10 mg  10 mg Oral TID Rishi Hernandez D.ODavid   10 mg at 10/10/19 0442   • metoprolol SR (TOPROL XL) tablet 25 mg  25 mg Oral Q DAY Michelle Wagner M.D.   25 mg at 10/10/19 0442   • DILTIAZem CD (CARDIZEM CD) capsule 360 mg  360 mg Oral Q EVENING Michelle Wagner M.D.   360 mg at 10/09/19 1755   • dabigatran (PRADAXA) capsule 150 mg  150 mg Oral BID Michelle Wagner M.D.   150 mg at 10/10/19 0909   • Metoprolol Tartrate (LOPRESSOR) injection 5 mg  5 mg Intravenous Q5 MIN PRN Chase Amato M.D.   5 mg at 10/02/19 1012   • allopurinol (ZYLOPRIM) tablet 300 mg  300 mg Oral DAILY Jin Jones M.D.   300 mg at 10/10/19 0442   • atorvastatin (LIPITOR) tablet 20 mg  20 mg Oral Nightly Jin Jones M.D.   20 mg at 10/09/19 2210   • docusate sodium (COLACE) capsule 100 mg  100 mg Oral BID Jin Jones M.D.   Stopped at 10/08/19 0600   • ferrous sulfate tablet 325 mg  325 mg Oral DAILY Jin Jones M.D.   325 mg at 10/10/19 0442   • loratadine (CLARITIN) tablet 10 mg  10 mg Oral DAILY Jin Jones M.D.   10 mg at 10/10/19 0442   • omeprazole (PRILOSEC) capsule 40 mg  40 mg Oral DAILY Jin Jones M.D.   40 mg at 10/10/19 0442   • predniSONE (DELTASONE) tablet 10 mg  10 mg Oral DAILY Jin Jones M.D.   10 mg at 10/10/19 0443   • senna-docusate (PERICOLACE or SENOKOT S) 8.6-50 MG per tablet  2 Tab  2 Tab Oral BID Jin Jones M.D.   Stopped at 10/08/19 0600    And   • polyethylene glycol/lytes (MIRALAX) PACKET 1 Packet  1 Packet Oral QDAY PRN Jin Jones M.D.        And   • magnesium hydroxide (MILK OF MAGNESIA) suspension 30 mL  30 mL Oral QDAY PRN Jin Jones M.D.        And   • bisacodyl (DULCOLAX) suppository 10 mg  10 mg Rectal QDAY PRN Jin Jones M.D.       • acetaminophen (TYLENOL) tablet 650 mg  650 mg Oral Q6HRS PRN Jin Jones M.D.   650 mg at 10/09/19 1229   • Pharmacy Consult Request ...Pain Management Review 1 Each  1 Each Other PHARMACY TO DOSE Jin Jones M.D.        And   • oxyCODONE immediate-release (ROXICODONE) tablet 2.5 mg  2.5 mg Oral Q3HRS PRN Jin Jones M.D.        And   • oxyCODONE immediate-release (ROXICODONE) tablet 5 mg  5 mg Oral Q3HRS PRN Jin Jones M.D.   5 mg at 10/08/19 1437    And   • morphine (pf) 4 mg/ml injection 2 mg  2 mg Intravenous Q3HRS PRRAFFY Jones M.D.       • ondansetron (ZOFRAN) syringe/vial injection 4 mg  4 mg Intravenous Q4HRS PRRAFFY Jones M.D.   4 mg at 10/02/19 0348   • ondansetron (ZOFRAN ODT) dispertab 4 mg  4 mg Oral Q4HRS PRRAFFY Jones M.D.       • insulin regular (HUMULIN R) injection 2-9 Units  2-9 Units Subcutaneous 4X/DAY ASIM Jones M.D.   Stopped at 10/10/19 0700    And   • glucose 4 g chewable tablet 16 g  16 g Oral Q15 MIN PRRAFFY Jones M.D.        And   • DEXTROSE 10% BOLUS 250 mL  250 mL Intravenous Q15 MIN PRRAFFY Jones M.D.       • acetaminophen (TYLENOL) oral suspension 325 mg  325 mg Oral Q4HRS PRRAFFY Jones M.D.   325 mg at 10/01/19 1628     No current Epic-ordered outpatient medications on file.     Diet:   DIET ORDERS (From admission to next 24h)     Start     Ordered    10/03/19 1056  Diet Order Diabetic  ALL MEALS     Question Answer Comment   Diet: Diabetic    Texture/Fiber modifications:  Dysphagia 2(Pureed/Chopped)specify fluid consistency(question 6)    Consistency/Fluid modifications: Nectar Thick    Miscellaneous modifications: SLP - Deliver to Nursing Station assist with meal tray set up       10/03/19 1055                Anticipated Discharge Destination / Patient/Family Goal:  Destination: Home with Assistance Support System: Spouse  Anticipated home health services: OT, PT and SLP  Previously used HH service/ provider: Not Applicable  Anticipated DME Needs: Walker, Wheelchair and [employer]  Outpatient Services: OT and PT  Alternative resources to address additional identified needs:     Pre-Screen Completed: 10/10/2019 11:46 AM Yg Mullins L.P.N.

## 2019-10-10 NOTE — DISCHARGE PLANNING
Dr. Anderson has medically cleared.  In anticipation of Dr. Bella accepting, I've arranged for transport @ 1430.  Msg placed to Dr. Anderson.  Serena (CM) is aware.

## 2019-10-10 NOTE — DISCHARGE INSTRUCTIONS
Discharge Instructions    Discharged to other by [facility] with escort. Discharged via wheelchair, hospital escort: Yes.  Special equipment needed: Wheelchair    Be sure to schedule a follow-up appointment with your primary care doctor or any specialists as instructed.     Discharge Plan:   Diet Plan: Discussed  Activity Level: Discussed  Confirmed Follow up Appointment: Patient to Call and Schedule Appointment  Confirmed Symptoms Management: Discussed  Medication Reconciliation Updated: Yes  Influenza Vaccine Indication: Possibly indicated: Contact facility to determine if vaccine previously given    I understand that a diet low in cholesterol, fat, and sodium is recommended for good health. Unless I have been given specific instructions below for another diet, I accept this instruction as my diet prescription.   Other diet: mechanical soft/chopped, nectar thick liquids    Special Instructions:   HF Patient Discharge Instructions  · Monitor your weight daily, and maintain a weight chart, to track your weight changes.   · Activity as tolerated, unless your Doctor has ordered otherwise. Other activity order: n/a.  · Follow a low fat, low cholesterol, low salt diet unless instructed otherwise by your Doctor. Read the labels on the back of food products and track your intake of fat, cholesterol and salt.   · Fluid Restriction No. If a Fluid Restriction has been ordered by your Doctor, measure fluids with a measuring cup to ensure that you are not exceeding the restriction.   · No smoking.  · Oxygen No. If your Doctor has ordered that you wear Oxygen at home, it is important to wear it as ordered.  · Did you receive an explanation from staff on the importance of taking each of your medications and why it is necessary to keep taking them unless your doctor says to stop? Yes  · Were all of your questions answered about how to manage your heart failure and what to do if you have increased signs and symptoms after you go  home? Yes  · Do you feel like your heart failure care team involved you in the care treatment plan and allowed you to make decisions regarding your care while in the hospital and addressed any discharge needs you might have? Yes    See the educational handout provided at discharge for more information on monitoring your daily weight, activity and diet. This also explains more about Heart Failure, symptoms of a flare-up and some of the tests that you have undergone.     Warning Signs of a Flare-Up include:  · Swelling in the ankles or lower legs.  · Shortness of breath, while at rest, or while doing normal activities.   · Shortness of breath at night when in bed, or coughing in bed.   · Requiring more pillows to sleep at night, or needing to sit up at night to sleep.  · Feeling weak, dizzy or fatigued.     When to call your Doctor:  · Call [facility] seven days a week from 8:00 a.m. to 8:00 p.m. for medical questions [phone].  · Call your Primary Care Physician or Cardiologist if:   1. You experience any pain radiating to your jaw or neck.  2. You have any difficulty breathing.  3. You experience weight gain of 3 lbs in a day or 5 lbs in a week.   4. You feel any palpitations or irregular heartbeats.  5. You become dizzy or lose consciousness.   If you have had an angiogram or had a pacemaker or AICD placed, and experience:  1. Bleeding, drainage or swelling at the surgical / puncture site.  2. Fever greater than 100.0 F  3. Shock from internal defibrillator.  4. Cool and / or numb extremities.      · Is patient discharged on Warfarin / Coumadin?   No     Depression / Suicide Risk    As you are discharged from this [facility], it is important to learn how to keep safe from harming yourself.    Recognize the warning signs:  · Abrupt changes in personality, positive or negative- including increase in energy   · Giving away possessions  · Change in eating patterns- significant weight  changes-  positive or negative  · Change in sleeping patterns- unable to sleep or sleeping all the time   · Unwillingness or inability to communicate  · Depression  · Unusual sadness, discouragement and loneliness  · Talk of wanting to die  · Neglect of personal appearance   · Rebelliousness- reckless behavior  · Withdrawal from people/activities they love  · Confusion- inability to concentrate     If you or a loved one observes any of these behaviors or has concerns about self-harm, here's what you can do:  · Talk about it- your feelings and reasons for harming yourself  · Remove any means that you might use to hurt yourself (examples: pills, rope, extension cords, firearm)  · Get professional help from the community (Mental Health, Substance Abuse, psychological counseling)  · Do not be alone:Call your Safe Contact- someone whom you trust who will be there for you.  · Call your local CRISIS HOTLINE 308-7214 or 191-521-4731  · Call your local Children's Mobile Crisis Response Team [place] (843) 548-3590 or www.[employer]  · Call the toll free National Suicide Prevention Hotlines   · National Suicide Prevention Lifeline 708-880-PRRY (8259)  · National Hope Line Network 800-SUICIDE (960-6563)

## 2019-10-10 NOTE — PROGRESS NOTES
Pt discharged to [facility] with transporter via w/c will all belonging. Report called to BILLIE Adams; all questions answered. Family updated regarding discharge.

## 2019-10-10 NOTE — DISCHARGE PLANNING
Anticipated Discharge Disposition:   [facility]    Action:   BILLIE RIVERA contacted by Yg with [facility] to notify of patient's acceptance to [facility].  Dr. Bella will accept the patient today at 2:30 pm.      RN CM completed COBRA for transport.  RN CM also notified patient's daughter, Aissatou, of acceptance to [facility] as well as transport time,    Barriers to Discharge:   None    Plan:   RN CM to inform patient's daughter, Aissatou, of patient's room number.

## 2019-10-10 NOTE — PROGRESS NOTES
2 RN skin assessment with BILLIE Baxter. Patient only skin issue is a pressure injury to his sacrum that wound care is already following. According to the documentation they saw the patient yesterday and are still following the patient. The sacrum is very slow to duane. Patient has been positioned on his left side and patient will be repositioned q2h

## 2019-10-10 NOTE — DISCHARGE SUMMARY
Discharge Summary    CHIEF COMPLAINT ON ADMISSION  Chief Complaint   Patient presents with   • Weakness     Pt BIB family via wheelchair. report of pt with brain tumor removed on July 10th, family states pt with left sided defecits/mild confusion since. tumor was on right side of brain. pt to [facility] today from PT due to his increased weakness/fatigue.    • Altered Mental Status   • Tired       Reason for Admission  Sent by MD     CODE STATUS  Full Code    HPI & HOSPITAL COURSE  This is a 73 y.o. Male with PMHX of high grade lymphoma stage IV had a recent port placed and a peritoneal mass biopsied, he received chemotherapy in form of R-CHOP on 9/23 h/o A.fibb  admitted on 10/1/19 after presenting to ER complaining of increased fatigue, weakness confusion and difficulty swallowing. Oncology was consulted and recommended MRI  Brain w/wo, MRI cervical and thoracic spine which were done and did not show any acute issues or metastasis.  Pt has history of A.fibb but he went into a.fibb with RVR so diltiazem was added and his HR is well controlled now, was continued on pradaxa.  Pt was seen by speech regarding his dysphagia and  Dysphagia 2 nectar thick liquid has been recommended.   Pt was also seen by therapy and rehab has been recommended   Pt will be discharged to rehab today     The patient met 2-midnight criteria for an inpatient stay at the time of discharge.      FOLLOW UP ITEMS POST DISCHARGE  Oncology     DISCHARGE DIAGNOSES  Principal Problem:    Dysphagia POA: Unknown  Active Problems:    Atrial fibrillation (HCC) w Rapid Ventricular Response  POA: Yes    B-cell lymphoma (HCC) POA: Unknown    Metastasis to brain (HCC) POA: Unknown    Neutropenia (HCC) POA: Unknown    Hypertension POA: Yes    CAD (coronary artery disease) POA: Yes    Type 2 diabetes mellitus without complication, without long-term current use of insulin (HCC) POA: Yes    Normocytic anemia POA: Yes  Resolved Problems:    * No resolved hospital  problems. *      FOLLOW UP  Future Appointments   Date Time Provider Department Center   10/21/2019  1:05 PM Dahlia Pringle P.A.-C. [facility] YURIY Ferrer M.D.  [address] [place] [id number] [phone]      PCP closed for weekend. Please call them directly to schedule a follow up appointment as soon as possible. Thank you.      MEDICATIONS ON DISCHARGE     Medication List      START taking these medications      Instructions   diltiazem  MG ER capsule  Commonly known as:  CARDIZEM CD   Take 1 Cap by mouth every evening.  Dose:  360 mg     metoprolol SR 25 MG Tb24  Start taking on:  10/11/2019  Commonly known as:  TOPROL XL   Take 1 Tab by mouth every day.  Dose:  25 mg        CHANGE how you take these medications      Instructions   baclofen 10 MG Tabs  What changed:    · medication strength  · how much to take  · when to take this  · reasons to take this  Commonly known as:  LIORESAL   Take 1 Tab by mouth 3 times a day.  Dose:  10 mg     predniSONE 10 MG Tabs  What changed:  Another medication with the same name was removed. Continue taking this medication, and follow the directions you see here.  Commonly known as:  DELTASONE   Take 10 mg by mouth every day. 5 DAYS  Dose:  10 mg        CONTINUE taking these medications      Instructions   acetaminophen 500 MG Tabs  Commonly known as:  TYLENOL   Take 1,000 mg by mouth every 6 hours as needed for Moderate Pain.  Dose:  1,000 mg     allopurinol 300 MG Tabs  Commonly known as:  ZYLOPRIM   Take 300 mg by mouth every day.  Dose:  300 mg     ALOXI 0.25 MG/5ML injection  Generic drug:  palonosetron   0.25 mg by Intravenous route Once.  Dose:  0.25 mg     Aprepitant 130 MG/18ML Emul   130 mg by Intravenous route Once.  Dose:  130 mg     atorvastatin 20 MG Tabs  Commonly known as:  LIPITOR   Take 20 mg by mouth every evening.  Dose:  20 mg     CYCLOPHOSPHAMIDE IV   1,580 mg by Intravenous route Once.  Dose:  1,580 mg     dabigatran 150  MG Caps capsule  Commonly known as:  PRADAXA   Take 150 mg by mouth every bedtime.  Dose:  150 mg     dexamethasone 10 MG/ML Soln  Commonly known as:  DECADRON   10 mg by Intravenous route Once.  Dose:  10 mg     DIPHENHYDRAMINE HCL INJ   25 mg by Injection route Once.  Dose:  25 mg     docusate sodium 100 MG Caps  Commonly known as:  COLACE   Take 100 mg by mouth 2 times a day.  Dose:  100 mg     DOXORUBICIN HCL IV   105 mg by Intravenous route Once.  Dose:  105 mg     ferrous sulfate 325 (65 Fe) MG tablet   Take 325 mg by mouth every day.  Dose:  325 mg     loratadine 10 MG Tabs  Commonly known as:  CLARITIN   Take 10 mg by mouth every day.  Dose:  10 mg     metFORMIN  MG Tb24  Commonly known as:  GLUCOPHAGE XR   Take 1 Tab by mouth every day.  Dose:  500 mg     omeprazole 40 MG delayed-release capsule  Commonly known as:  PRILOSEC   Take 40 mg by mouth every day.  Dose:  40 mg     ondansetron 8 MG Tbdp  Commonly known as:  ZOFRAN ODT   Take 8 mg by mouth every 8 hours as needed for Nausea.  Dose:  8 mg     pegfilgrastim 6 MG/0.6ML Sosy injection  Commonly known as:  NEULASTA   Inject 6 mg as instructed Once.  Dose:  6 mg     RITUXIMAB IV   800 mg by Intravenous route Once.  Dose:  800 mg     therapeutic multivitamin-minerals Tabs   Take 1 Tab by mouth every bedtime.  Dose:  1 Tab     TURMERIC PO   Take 20 mL by mouth 3 times a day.  Dose:  20 mL     vinCRIStine 1 MG/ML Soln  Commonly known as:  ONCOVIN   2 mg by Intravenous route Once.  Dose:  2 mg            Allergies  Allergies   Allergen Reactions   • Gabapentin Rash     Broke out in a rash on R bicep       DIET  Orders Placed This Encounter   Procedures   • Diet Order Diabetic     Standing Status:   Standing     Number of Occurrences:   1     Order Specific Question:   Diet:     Answer:   Diabetic [3]     Order Specific Question:   Texture/Fiber modifications:     Answer:   Dysphagia 2(Pureed/Chopped)specify fluid consistency(question 6) [2]     Order  Specific Question:   Consistency/Fluid modifications:     Answer:   Nectar Thick [2]     Order Specific Question:   Miscellaneous modifications:     Answer:   SLP - Deliver to Nursing Station [22]     Comments:   assist with meal tray set up       ACTIVITY  As tolerated.  Weight bearing as tolerated    LINES, DRAINS, AND WOUNDS  This is an automated list. Peripheral IVs will be removed prior to discharge.  Peripheral IV 10/01/19 20 G Right Antecubital (Active)   Site Assessment Clean;Dry;Intact 10/9/2019  8:00 PM   Dressing Type Transparent 10/9/2019  8:00 PM   Line Status Saline locked 10/9/2019  8:00 PM   Dressing Status Clean;Dry;Intact 10/9/2019  8:00 PM   Dressing Intervention N/A 10/9/2019  8:00 PM   Date Primary Tubing Changed 10/05/19 10/6/2019  8:00 AM   NEXT Primary Tubing Change  10/08/19 10/6/2019  8:00 AM   Infiltration Grading ([facility], [facility]) 0 10/9/2019  8:00 PM   Phlebitis Scale ([facility] Only) 0 10/9/2019  8:00 PM     Condom Urinary Catheter (Active)   Collection Container Standard drainage bag 10/9/2019  6:00 AM   Output (mL) 1600 mL 10/9/2019  6:00 AM         Peripheral IV 10/01/19 20 G Right Antecubital (Active)   Site Assessment Clean;Dry;Intact 10/9/2019  8:00 PM   Dressing Type Transparent 10/9/2019  8:00 PM   Line Status Saline locked 10/9/2019  8:00 PM   Dressing Status Clean;Dry;Intact 10/9/2019  8:00 PM   Dressing Intervention N/A 10/9/2019  8:00 PM   Date Primary Tubing Changed 10/05/19 10/6/2019  8:00 AM   NEXT Primary Tubing Change  10/08/19 10/6/2019  8:00 AM   Infiltration Grading ([facility], [facility]) 0 10/9/2019  8:00 PM   Phlebitis Scale ([facility] Only) 0 10/9/2019  8:00 PM               MENTAL STATUS ON TRANSFER  Level of Consciousness: Confused  Orientation : Disoriented to Time, Disoriented to Event, Disoriented to Place  Speech: Speech Clear    CONSULTATIONS  oncology    PROCEDURES  None     LABORATORY  Lab Results   Component Value Date    SODIUM 139 10/10/2019    POTASSIUM 3.7 10/10/2019     CHLORIDE 101 10/10/2019    CO2 26 10/10/2019    GLUCOSE 122 (H) 10/10/2019    BUN 16 10/10/2019    CREATININE 0.86 10/10/2019        Lab Results   Component Value Date    WBC 16.2 (H) 10/10/2019    HEMOGLOBIN 15.0 10/10/2019    HEMATOCRIT 44.4 10/10/2019    PLATELETCT 357 10/10/2019        Total time of the discharge process exceeds 41 minutes.

## 2019-10-11 NOTE — PROGRESS NOTES
"Rehab Progress Note     Encounter Date: 10/11/2019    CC: Encephalopathy, confusion, weakness    Interval Events (Subjective)  Patient sitting up in wheelchair. He reports therapy is going OK. He cannot tell me what they worked on this morning. He reports he has low back pain but that is chronic.  Reviewed admission labs including leukocytosis and Vitamin D deficiency.  Discussed will consult hospitalist. Notified later in the afternoon of worsening confusion. Reduced Baclofen.     Objective:  VITAL SIGNS: /85   Pulse 64   Temp 36.2 °C (97.2 °F) (Temporal)   Resp 17   Ht 1.88 m (6' 2\")   Wt 83.5 kg (184 lb 1.4 oz)   SpO2 95%   BMI 23.64 kg/m²   Gen: NAD  Psych: Mood and affect blunted  CV: RRR, no edema  Resp: CTAB, no upper airway sounds  Abd: NTND  Neuro: AOx2, following commands intermittently     Recent Results (from the past 72 hour(s))   ACCU-CHEK GLUCOSE    Collection Time: 10/08/19  6:02 PM   Result Value Ref Range    Glucose - Accu-Ck 143 (H) 65 - 99 mg/dL   ACCU-CHEK GLUCOSE    Collection Time: 10/08/19  8:10 PM   Result Value Ref Range    Glucose - Accu-Ck 154 (H) 65 - 99 mg/dL   Comp Metabolic Panel    Collection Time: 10/09/19  3:36 AM   Result Value Ref Range    Sodium 139 135 - 145 mmol/L    Potassium 4.0 3.6 - 5.5 mmol/L    Chloride 102 96 - 112 mmol/L    Co2 26 20 - 33 mmol/L    Anion Gap 11.0 0.0 - 11.9    Glucose 122 (H) 65 - 99 mg/dL    Bun 18 8 - 22 mg/dL    Creatinine 0.86 0.50 - 1.40 mg/dL    Calcium 9.0 8.5 - 10.5 mg/dL    AST(SGOT) 24 12 - 45 U/L    ALT(SGPT) 39 2 - 50 U/L    Alkaline Phosphatase 93 30 - 99 U/L    Total Bilirubin 0.4 0.1 - 1.5 mg/dL    Albumin 3.8 3.2 - 4.9 g/dL    Total Protein 6.7 6.0 - 8.2 g/dL    Globulin 2.9 1.9 - 3.5 g/dL    A-G Ratio 1.3 g/dL   CBC WITH DIFFERENTIAL    Collection Time: 10/09/19  3:36 AM   Result Value Ref Range    WBC 13.1 (H) 4.8 - 10.8 K/uL    RBC 4.43 (L) 4.70 - 6.10 M/uL    Hemoglobin 14.2 14.0 - 18.0 g/dL    Hematocrit 41.2 (L) 42.0 " - 52.0 %    MCV 93.0 81.4 - 97.8 fL    MCH 32.1 27.0 - 33.0 pg    MCHC 34.5 33.7 - 35.3 g/dL    RDW 44.9 35.9 - 50.0 fL    Platelet Count 316 164 - 446 K/uL    MPV 8.5 (L) 9.0 - 12.9 fL    Neutrophils-Polys 74.00 (H) 44.00 - 72.00 %    Lymphocytes 8.00 (L) 22.00 - 41.00 %    Monocytes 13.60 (H) 0.00 - 13.40 %    Eosinophils 0.20 0.00 - 6.90 %    Basophils 1.10 0.00 - 1.80 %    Immature Granulocytes 3.10 (H) 0.00 - 0.90 %    Nucleated RBC 0.00 /100 WBC    Neutrophils (Absolute) 9.70 (H) 1.82 - 7.42 K/uL    Lymphs (Absolute) 1.05 1.00 - 4.80 K/uL    Monos (Absolute) 1.79 (H) 0.00 - 0.85 K/uL    Eos (Absolute) 0.02 0.00 - 0.51 K/uL    Baso (Absolute) 0.15 (H) 0.00 - 0.12 K/uL    Immature Granulocytes (abs) 0.41 (H) 0.00 - 0.11 K/uL    NRBC (Absolute) 0.00 K/uL   ESTIMATED GFR    Collection Time: 10/09/19  3:36 AM   Result Value Ref Range    GFR If African American >60 >60 mL/min/1.73 m 2    GFR If Non African American >60 >60 mL/min/1.73 m 2   MAGNESIUM    Collection Time: 10/09/19  3:36 AM   Result Value Ref Range    Magnesium 1.8 1.5 - 2.5 mg/dL   ACCU-CHEK GLUCOSE    Collection Time: 10/09/19  8:58 AM   Result Value Ref Range    Glucose - Accu-Ck 172 (H) 65 - 99 mg/dL   ACCU-CHEK GLUCOSE    Collection Time: 10/09/19 12:33 PM   Result Value Ref Range    Glucose - Accu-Ck 251 (H) 65 - 99 mg/dL   ACCU-CHEK GLUCOSE    Collection Time: 10/09/19  5:14 PM   Result Value Ref Range    Glucose - Accu-Ck 138 (H) 65 - 99 mg/dL   ACCU-CHEK GLUCOSE    Collection Time: 10/09/19  9:28 PM   Result Value Ref Range    Glucose - Accu-Ck 165 (H) 65 - 99 mg/dL   Comp Metabolic Panel    Collection Time: 10/10/19  4:58 AM   Result Value Ref Range    Sodium 139 135 - 145 mmol/L    Potassium 3.7 3.6 - 5.5 mmol/L    Chloride 101 96 - 112 mmol/L    Co2 26 20 - 33 mmol/L    Anion Gap 12.0 (H) 0.0 - 11.9    Glucose 122 (H) 65 - 99 mg/dL    Bun 16 8 - 22 mg/dL    Creatinine 0.86 0.50 - 1.40 mg/dL    Calcium 9.8 8.5 - 10.5 mg/dL    AST(SGOT) 22 12  - 45 U/L    ALT(SGPT) 36 2 - 50 U/L    Alkaline Phosphatase 97 30 - 99 U/L    Total Bilirubin 0.5 0.1 - 1.5 mg/dL    Albumin 3.8 3.2 - 4.9 g/dL    Total Protein 6.7 6.0 - 8.2 g/dL    Globulin 2.9 1.9 - 3.5 g/dL    A-G Ratio 1.3 g/dL   CBC WITH DIFFERENTIAL    Collection Time: 10/10/19  4:58 AM   Result Value Ref Range    WBC 16.2 (H) 4.8 - 10.8 K/uL    RBC 4.72 4.70 - 6.10 M/uL    Hemoglobin 15.0 14.0 - 18.0 g/dL    Hematocrit 44.4 42.0 - 52.0 %    MCV 94.1 81.4 - 97.8 fL    MCH 31.8 27.0 - 33.0 pg    MCHC 33.8 33.7 - 35.3 g/dL    RDW 44.9 35.9 - 50.0 fL    Platelet Count 357 164 - 446 K/uL    MPV 8.3 (L) 9.0 - 12.9 fL    Neutrophils-Polys 77.20 (H) 44.00 - 72.00 %    Lymphocytes 6.80 (L) 22.00 - 41.00 %    Monocytes 12.70 0.00 - 13.40 %    Eosinophils 0.20 0.00 - 6.90 %    Basophils 0.80 0.00 - 1.80 %    Immature Granulocytes 2.30 (H) 0.00 - 0.90 %    Nucleated RBC 0.00 /100 WBC    Neutrophils (Absolute) 12.52 (H) 1.82 - 7.42 K/uL    Lymphs (Absolute) 1.11 1.00 - 4.80 K/uL    Monos (Absolute) 2.06 (H) 0.00 - 0.85 K/uL    Eos (Absolute) 0.03 0.00 - 0.51 K/uL    Baso (Absolute) 0.13 (H) 0.00 - 0.12 K/uL    Immature Granulocytes (abs) 0.37 (H) 0.00 - 0.11 K/uL    NRBC (Absolute) 0.00 K/uL   ESTIMATED GFR    Collection Time: 10/10/19  4:58 AM   Result Value Ref Range    GFR If African American >60 >60 mL/min/1.73 m 2    GFR If Non African American >60 >60 mL/min/1.73 m 2   ACCU-CHEK GLUCOSE    Collection Time: 10/10/19  9:06 AM   Result Value Ref Range    Glucose - Accu-Ck 101 (H) 65 - 99 mg/dL   ACCU-CHEK GLUCOSE    Collection Time: 10/10/19 12:33 PM   Result Value Ref Range    Glucose - Accu-Ck 183 (H) 65 - 99 mg/dL   ACCU-CHEK GLUCOSE    Collection Time: 10/10/19  9:01 PM   Result Value Ref Range    Glucose - Accu-Ck 191 (H) 65 - 99 mg/dL   CBC with Differential    Collection Time: 10/11/19  5:51 AM   Result Value Ref Range    WBC 12.8 (H) 4.8 - 10.8 K/uL    RBC 4.56 (L) 4.70 - 6.10 M/uL    Hemoglobin 14.1 14.0 -  18.0 g/dL    Hematocrit 44.1 42.0 - 52.0 %    MCV 96.7 81.4 - 97.8 fL    MCH 30.9 27.0 - 33.0 pg    MCHC 32.0 (L) 33.7 - 35.3 g/dL    RDW 46.2 35.9 - 50.0 fL    Platelet Count 390 164 - 446 K/uL    MPV 8.3 (L) 9.0 - 12.9 fL    Neutrophils-Polys 75.00 (H) 44.00 - 72.00 %    Lymphocytes 8.10 (L) 22.00 - 41.00 %    Monocytes 13.70 (H) 0.00 - 13.40 %    Eosinophils 0.10 0.00 - 6.90 %    Basophils 1.20 0.00 - 1.80 %    Immature Granulocytes 1.90 (H) 0.00 - 0.90 %    Nucleated RBC 0.00 /100 WBC    Neutrophils (Absolute) 9.63 (H) 1.82 - 7.42 K/uL    Lymphs (Absolute) 1.04 1.00 - 4.80 K/uL    Monos (Absolute) 1.76 (H) 0.00 - 0.85 K/uL    Eos (Absolute) 0.01 0.00 - 0.51 K/uL    Baso (Absolute) 0.15 (H) 0.00 - 0.12 K/uL    Immature Granulocytes (abs) 0.25 (H) 0.00 - 0.11 K/uL    NRBC (Absolute) 0.00 K/uL   Comp Metabolic Panel (CMP)    Collection Time: 10/11/19  5:51 AM   Result Value Ref Range    Sodium 136 135 - 145 mmol/L    Potassium 3.8 3.6 - 5.5 mmol/L    Chloride 101 96 - 112 mmol/L    Co2 26 20 - 33 mmol/L    Anion Gap 9.0 0.0 - 11.9    Glucose 136 (H) 65 - 99 mg/dL    Bun 21 8 - 22 mg/dL    Creatinine 0.89 0.50 - 1.40 mg/dL    Calcium 9.2 8.5 - 10.5 mg/dL    AST(SGOT) 21 12 - 45 U/L    ALT(SGPT) 39 2 - 50 U/L    Alkaline Phosphatase 93 30 - 99 U/L    Total Bilirubin 0.6 0.1 - 1.5 mg/dL    Albumin 3.7 3.2 - 4.9 g/dL    Total Protein 6.9 6.0 - 8.2 g/dL    Globulin 3.2 1.9 - 3.5 g/dL    A-G Ratio 1.2 g/dL   HEMOGLOBIN A1C    Collection Time: 10/11/19  5:51 AM   Result Value Ref Range    Glycohemoglobin 7.2 (H) 0.0 - 5.6 %    Est Avg Glucose 160 mg/dL   TSH with Reflex to FT4    Collection Time: 10/11/19  5:51 AM   Result Value Ref Range    TSH 2.190 0.380 - 5.330 uIU/mL   Vitamin D, 25-hydroxy (blood)    Collection Time: 10/11/19  5:51 AM   Result Value Ref Range    25-Hydroxy   Vitamin D 25 24 (L) 30 - 100 ng/mL   ESTIMATED GFR    Collection Time: 10/11/19  5:51 AM   Result Value Ref Range    GFR If   >60 >60 mL/min/1.73 m 2    GFR If Non African American >60 >60 mL/min/1.73 m 2   ACCU-CHEK GLUCOSE    Collection Time: 10/11/19  7:52 AM   Result Value Ref Range    Glucose - Accu-Ck 148 (H) 65 - 99 mg/dL   ACCU-CHEK GLUCOSE    Collection Time: 10/11/19 11:30 AM   Result Value Ref Range    Glucose - Accu-Ck 207 (H) 65 - 99 mg/dL       Current Facility-Administered Medications   Medication Frequency   • DILTIAZem (CARDIZEM) tablet 90 mg Q6HRS   • [START ON 10/12/2019] metoprolol (LOPRESSOR) tablet 25 mg TWICE DAILY   • apixaban (ELIQUIS) tablet 5 mg BID   • baclofen (LIORESAL) tablet 5 mg TID   • Respiratory Care per Protocol Continuous RT   • oxyCODONE immediate-release (ROXICODONE) tablet 2.5 mg Q3HRS PRN   • oxyCODONE immediate-release (ROXICODONE) tablet 5 mg Q3HRS PRN   • tramadol (ULTRAM) 50 MG tablet 50 mg Q4HRS PRN   • hydrALAZINE (APRESOLINE) tablet 25 mg Q8HRS PRN   • acetaminophen (TYLENOL) tablet 650 mg Q4HRS PRN   • senna-docusate (PERICOLACE or SENOKOT S) 8.6-50 MG per tablet 2 Tab BID    And   • polyethylene glycol/lytes (MIRALAX) PACKET 1 Packet QDAY PRN    And   • magnesium hydroxide (MILK OF MAGNESIA) suspension 30 mL QDAY PRN    And   • bisacodyl (DULCOLAX) suppository 10 mg QDAY PRN   • artificial tears ophthalmic solution 1 Drop PRN   • benzocaine-menthol (CEPACOL) lozenge 1 Lozenge Q2HRS PRN   • mag hydrox-al hydrox-simeth (MAALOX PLUS ES or MYLANTA DS) suspension 20 mL Q2HRS PRN   • ondansetron (ZOFRAN ODT) dispertab 4 mg 4X/DAY PRN    Or   • ondansetron (ZOFRAN) syringe/vial injection 4 mg 4X/DAY PRN   • traZODone (DESYREL) tablet 50 mg QHS PRN   • sodium chloride (OCEAN) 0.65 % nasal spray 2 Spray PRN   • insulin regular (HUMULIN R) injection 2-9 Units 4X/DAY ACHS    And   • glucose 4 g chewable tablet 16 g Q15 MIN PRN    And   • dextrose 50% (D50W) injection 50 mL Q15 MIN PRN   • allopurinol (ZYLOPRIM) tablet 300 mg DAILY   • atorvastatin (LIPITOR) tablet 20 mg Nightly   • ferrous sulfate  tablet 325 mg DAILY   • loratadine (CLARITIN) tablet 10 mg DAILY   • omeprazole (PRILOSEC) capsule 40 mg DAILY   • predniSONE (DELTASONE) tablet 10 mg DAILY       Orders Placed This Encounter   Procedures   • Diet Order Diabetic     Standing Status:   Standing     Number of Occurrences:   1     Order Specific Question:   Diet:     Answer:   Diabetic [3]     Order Specific Question:   Texture/Fiber modifications:     Answer:   Dysphagia 2(Pureed/Chopped)specify fluid consistency(question 6) [2]     Order Specific Question:   Consistency/Fluid modifications:     Answer:   [place] Thick [2]       Assessment:  Active Hospital Problems    Diagnosis   • *Encephalopathy acute   • B-cell lymphoma (HCC)   • Metastasis to brain (HCC)   • Atrial fibrillation (HCC) w Rapid Ventricular Response    • CAD (coronary artery disease)   • Hypertension   • Type 2 diabetes mellitus without complication, without long-term current use of insulin (HCC)   • Chronic back pain   • Dysphagia   • Primary cerebral lymphoma (HCC)       Medical Decision Making and Plan:  Encephalopathy - Patient with recent diagnosis of stage IV B cell lymphoma s/p R-CHOP on 9/23/19 now with diffuse weakness, confusion and dysphagia  -PT and OT for mobility and ADLs  -SLP for cognition      Dysphagia - Patient on dysphagia 2 with NTL on transfer.  -SLP for swallow     Spasticity - Restarted on Baclofen and increased prior to admission.  -Continue on Baclofen 10 mg TID. With AMS, will reduce to 5 mg TID     B cell lymphoma - Underwent R Chop on 9/23/19.  Followed by Oncology. Recent right retroperitoneal mass s/p biopsy which is positive for B cell lymphoma  -Follow-up Heme/Onc  -Continue Prednisone 10 mg for 5 days      HTN/A Fib - Patient on Pradaxa.  Patient on Diltiazem 360 mg QHS, Metoprolol 25 mg XL daily   -Patient chewing medication, will switch medications to short acting.      DM - Patient on SSI on transfer.      Leukocytosis - B cell lymphoma on  steroids. Check AM CBC - continued  -Consult hospitalist     Hx of Gout - Patient on Allopurinol 300 mg daily.     Vitamin D - 24 on admission, start on 1000 U for deficiency.      GI Ppx - Patient on Omeprazole 40 mg daily     DVT ppx - Patient on Pradaxa 150 mg QHS    Total time:  35 minutes.  I spent greater than 50% of the time for patient care and coordination on this date, including unit/floor time, and face-to-face time with the patient as per assessment and plan above.  Discussion included admission labs, reduce Baclofen and consult hospitalist.     Leticia Bella M.D.

## 2019-10-11 NOTE — CARE PLAN
Problem: Discharge Barriers/Planning  Goal: Patient's continuum of care needs will be met  Note:   Pt. Has been confused through the day. Unable to follow directions this afternoon. Pt. requires max 2 assistance for transfers due to confusion. Constant redirection and rounding in place.      Problem: Skin Integrity  Goal: Risk for impaired skin integrity will decrease  Note:   Sacrum wound was assessed today by wound care nurse. Mepilex dressing was applied on affected area. New orders for dressing change as needed.

## 2019-10-11 NOTE — CARE PLAN
Problem: Safety  Goal: Will remain free from injury  Note:   Pt educated to used call light when in need of assistance, call light and personal belongings within reach.

## 2019-10-11 NOTE — FLOWSHEET NOTE
10/10/19 1720   Type of Assessment   Assessment Yes   Patient History   Pulmonary Diagnosis None   Surgical Procedures None   Home O2 No   Home Treatments/Frequency No   COPD Risk Screening   Do you have a history of COPD? No   COPD Population Screener   During the past 4 weeks, how much did you feel short of breath? 0   Do you ever cough up any mucus or phlegm? 0   In the past 12 months, you do less than you used to because of your breathing problems 0   Have you smoked at least 100 cigarettes in your entire life? 0   How old are you? 2   COPD Screening Score 2   Smoking History   Have you ever smoked Never   Level Of Consciousness   Level of Consciousness Alert   Respiratory WDL   Respiratory (WDL) X   Chest Exam   Respiration 16   Pulse 80   Breath Sounds   RUL Breath Sounds Clear   RML Breath Sounds Clear   RLL Breath Sounds Clear   JUWAN Breath Sounds Clear   LLL Breath Sounds Clear   Secretions   Cough Non Productive   Oximetry   #Pulse Oximetry (Single Determination) Yes   Oxygen   Home O2 Use Prior To Admission? No   Pulse Oximetry 95 %   O2 Daily Delivery Respiratory  Room Air with O2 Available

## 2019-10-11 NOTE — WOUND TEAM
"[facility]  Inpatient Services  Initial Wound and Skin Care Evaluation    Admission Date: 10/10/2019     Consult Date: 10/10/19   HPI, PMH, SH: Reviewed    Unit where seen by Wound Team: [id number]     WOUND CONSULT RELATED TO:  Evaluation of sacral wound     SUBJECTIVE:  \"I don't like the boots\"      Self Report / Pain Level:  Denies related to wound       OBJECTIVE:  Less maceration noted than when compared with admission photo    WOUND TYPE, LOCATION, CHARACTERISTICS (Pressure Injuries: location, stage, POA or date identified)       Pressure Injury 10/10/19 Sacrum;Coccyx stage 2 pressure injury POA (Active)   Wound Image   10/11/2019  7:40 AM   Pressure Injury Stage 2 10/11/2019  7:40 AM   State of Healing Early/partial granulation 10/11/2019  7:40 AM   Site Assessment Clean;Intact 10/11/2019  7:40 AM   Loreta-wound Assessment Pink;Maceration;Clean 10/11/2019  7:40 AM   Margins Attached edges 10/11/2019  7:40 AM   Wound Length (cm) 1.5 cm 10/11/2019  7:40 AM   Wound Width (cm) 1.5 cm 10/11/2019  7:40 AM   Wound Depth (cm) 0.2 cm 10/11/2019  7:40 AM   Wound Surface Area (cm^2) 2.25 cm^2 10/11/2019  7:40 AM   Tunneling 0 cm 10/11/2019  7:40 AM   Undermining 0 cm 10/11/2019  7:40 AM   Closure Secondary intention 10/11/2019  7:40 AM   Drainage Amount None 10/11/2019  7:40 AM   Treatments Cleansed;Site care 10/11/2019  7:40 AM   Cleansing Normal Saline Irrigation 10/11/2019  7:40 AM   Periwound Protectant Not Applicable 10/11/2019  7:40 AM   Dressing Options Mepilex 10/11/2019  7:40 AM   Dressing Cleansing/Solutions Not Applicable 10/11/2019  7:40 AM   Dressing Changed Reinforced 10/11/2019  7:40 AM   Dressing Status Intact 10/11/2019  7:40 AM   Dressing Change Frequency As Needed 10/11/2019  7:40 AM   NEXT Weekly Photo (Inpatient Only) 10/17/19 10/11/2019  7:40 AM   WOUND NURSE ONLY - Odor None 10/11/2019  7:40 AM   WOUND NURSE ONLY - Exposed Structures None 10/11/2019  7:40 AM   WOUND NURSE ONLY - Tissue " Type and Percentage red 100% 10/11/2019  7:40 AM   WOUND NURSE ONLY - Time Spent with Patient (mins) 45 10/11/2019  7:40 AM     Lab Values:    Lab Results   Component Value Date/Time    WBC 12.8 (H) 10/11/2019 05:51 AM    RBC 4.56 (L) 10/11/2019 05:51 AM    HEMOGLOBIN 14.1 10/11/2019 05:51 AM    HEMATOCRIT 44.1 10/11/2019 05:51 AM        Lab Results   Component Value Date/Time    HBA1C 10.0 (H) 07/17/2019 05:12 AM           Culture:  NA      INTERVENTIONS BY WOUND TEAM:  With staff assistance turned patient to side and peeled back mepilex.  Measurements taken.  Replaced mepilex.  Discussed with staff RN.    Dressing Selection:  mepilex         Interdisciplinary consultation: Patient, Bedside RN     EVALUATION: clean appearing stage 2 pressure injury that seems to be chronic as reported by wife at [facility] that he has had this.  Should respond to off loading and care    Factors affecting wound healing: DM, pressure    Goals: Steady decrease in wound area and depth weekly.    NURSING PLAN OF CARE ORDERS (X):    Dressing changes: See Dressing Care orders: X  Skin care: See Skin Care orders:   Rectal tube care: See Rectal Tube Care orders:   Other orders:    RSKIN: CURRENT (X) ORDERED (O):   Q shift Isra:  X  Q shift pressure point assessments:  X  Pressure redistribution mattress   X         JAYLENE          Bariatric JAYLENE         Bariatric foam           Heel float boots      Heel silicone dressing      Float Heels off Bed with Pillows   X            Barrier wipes         Barrier Cream         Barrier paste          Sacral silicone dressing  X   Silicone O2 tubing         Anchorfast         Cannula fixation Device (Tender )          Gray Foam Ear protectors           Trach with Optifoam split foam                 Waffle cushion        Waffle Overlay         Rectal tube or BMS   Purwick/Condom Cath         Antifungal tx      Interdry          Reposition q 2 hours    X    Up to chair        Ambulate      PT/OT         Dietician        Diabetes Education      PO X    TF     TPN     NPO   # days   Other        WOUND TEAM PLAN OF CARE (X):   NPWT change 3 x week:        Dressing changes by wound team:       Follow up as needed:   X weekly    Other (explain):     Anticipated discharge plans (X):   SNF:           Home Care:           Outpatient Wound Center:            Self Care:            Other:   TBD

## 2019-10-11 NOTE — H&P
"REHABILITATION HISTORY AND PHYSICAL/POST ADMISSION PHYSICAL EVALUATION    Date of Admission: 10/10/2019  8:52 PM  Marcio More JrDavid  [id number]    [facility] Code / Diagnosis to Support: 02.1 Non-Traumatic  Etiologic Diagnosis: Encephalopathy acute    CC: Diffuse weakness, spasticity and cognitive decline     HPI:  Adapted from PM&R notes form 10/7 and 10/8/19:  The patient is a 73 y.o. right hand dominant male with a past medical history of high-grade B-cell lymphoma stage IV under the care of Dr. Ferrer, type 2 diabetes, atrial fibrillation, coronary artery disease, hypertension;  who presented on 10/1/2019 11:57 AM with increased weakness, fatigue left arm weakness and difficulty swallowing.  Patient underwent acute inpatient rehabilitation in July 2019 for new B cell lymphoma in brain after resection on 7/10/19.  Patient recently had peritoneal mass biopsied, and a port placed.  Post R-CHOP chemo on 9/23.  Work-up has been limited due to agitation, nausea, vomiting, and atrial fibrillation with RVR.  Recent MRI brain on 10/3 is negative for brain mets. Chart review consult by Dr. Harrell on 10/07 reflects initiation of baclofen 5 mg 3 times daily,  the patient currently reports slightly improved symptoms after starting baclofen last night. Patient has since been increased on Baclofen to 10 mg TID.      Patient tolerated transfer over. He reports he is very tired after transfer. He is appreciative of being back to [facility].  He reports he is exhausted all of the time after starting chemotherapy. Cousin at bedside reports that the family is concerned that \"he has given up.\" Patient actively falling asleep during examination but smiles with eyes closed. He denies wanting to pass and continues to express desire to be full code. Most of history is given from cousin who reports most common complaint is diffuse weakness, rigidity and lower back pain as well as left knee pain.  His cousin reports he also has new skin ulcer on " "his sacrum.       REVIEW OF SYSTEMS:     Patient unreliable due to falling asleep.     PMH:  Past Medical History:   Diagnosis Date   • Anesthesia     \"too much anesthesia kidneys stop woking and intestines slowed\"   • Arthritis    • Atrial fibrillation (HCC)    • Back pain    • Blood clotting disorder (HCC) 1999    blood clot leg Left   • Breath shortness    • CAD (coronary artery disease)    • Cancer (HCC) 2016    prostate cancer   • Cataract     no surgery   • Dyslipidemia    • High cholesterol    • Hypertension    • Ileus (HCC)    • Myocardial infarct (HCC) 1999,2006    x3   • Renal disorder     cycst left kidney   • Sleep apnea     no cpap   • Snoring    • Urinary incontinence        PSH:  Past Surgical History:   Procedure Laterality Date   • AR DX BONE MARROW ASPIRATIONS Left 8/1/2019    Procedure: ASPIRATION, BONE MARROW - REGANTI;  Surgeon: Young Veliz M.D.;  Location: [facility];  Service: Orthopedics   • AR DX BONE MARROW BIOPSIES Left 8/1/2019    Procedure: BIOPSY, BONE MARROW, USING NEEDLE OR TROCAR;  Surgeon: Young Veliz M.D.;  Location: [facility];  Service: Orthopedics   • CRANIOTOMY Right 7/10/2019    Procedure: RIGHT-SIDED CRANIOTOMY FOR TUMOR;  Surgeon: Son Ruffin M.D.;  Location: [facility];  Service: Neurosurgery   • LUMBAR LAMINECTOMY DISKECTOMY N/A 11/26/2018    Procedure: LUMBAR LAMINECTOMY DISKECTOMY-  POSTERIOR L1-2 LAMI;  Surgeon: Son Ruffin M.D.;  Location: [facility];  Service: Neurosurgery   • LAMINOTOMY Left 11/26/2018    Procedure: LAMINOTOMY-  L4-S1;  Surgeon: Son Ruffin M.D.;  Location: [facility];  Service: Neurosurgery   • FORAMINOTOMY Left 11/26/2018    Procedure: FORAMINOTOMY;  Surgeon: Son Ruffin M.D.;  Location: [facility];  Service: Neurosurgery   • OTHER NEUROLOGICAL SURG  2016    Thoracic 2-3 fusion    • APPENDECTOMY  2002   • OTHER CARDIAC " SURGERY  1999,2002,2006    stents cardiac   • OTHER NEUROLOGICAL SURG      Laminectomy       FAMILY HISTORY:  History reviewed. No pertinent family history.   No family history of B cell lymphoma     MEDICATIONS:  Current Facility-Administered Medications   Medication Dose   • Respiratory Care per Protocol     • oxyCODONE immediate-release (ROXICODONE) tablet 2.5 mg  2.5 mg   • oxyCODONE immediate-release (ROXICODONE) tablet 5 mg  5 mg   • tramadol (ULTRAM) 50 MG tablet 50 mg  50 mg   • hydrALAZINE (APRESOLINE) tablet 25 mg  25 mg   • acetaminophen (TYLENOL) tablet 650 mg  650 mg   • senna-docusate (PERICOLACE or SENOKOT S) 8.6-50 MG per tablet 2 Tab  2 Tab    And   • polyethylene glycol/lytes (MIRALAX) PACKET 1 Packet  1 Packet    And   • magnesium hydroxide (MILK OF MAGNESIA) suspension 30 mL  30 mL    And   • bisacodyl (DULCOLAX) suppository 10 mg  10 mg   • artificial tears ophthalmic solution 1 Drop  1 Drop   • benzocaine-menthol (CEPACOL) lozenge 1 Lozenge  1 Lozenge   • mag hydrox-al hydrox-simeth (MAALOX PLUS ES or MYLANTA DS) suspension 20 mL  20 mL   • ondansetron (ZOFRAN ODT) dispertab 4 mg  4 mg    Or   • ondansetron (ZOFRAN) syringe/vial injection 4 mg  4 mg   • traZODone (DESYREL) tablet 50 mg  50 mg   • sodium chloride (OCEAN) 0.65 % nasal spray 2 Spray  2 Spray   • insulin regular (HUMULIN R) injection 2-9 Units  2-9 Units    And   • glucose 4 g chewable tablet 16 g  16 g    And   • dextrose 50% (D50W) injection 50 mL  50 mL   • [START ON 10/11/2019] allopurinol (ZYLOPRIM) tablet 300 mg  300 mg   • atorvastatin (LIPITOR) tablet 20 mg  20 mg   • baclofen (LIORESAL) tablet 10 mg  10 mg   • DILTIAZem CD (CARDIZEM CD) capsule 360 mg  360 mg   • docusate sodium (COLACE) capsule 100 mg  100 mg   • [START ON 10/11/2019] ferrous sulfate tablet 325 mg  325 mg   • [START ON 10/11/2019] loratadine (CLARITIN) tablet 10 mg  10 mg   • [START ON 10/11/2019] metoprolol SR (TOPROL XL) tablet 25 mg  25 mg   • [START  ON 10/11/2019] omeprazole (PRILOSEC) capsule 40 mg  40 mg   • dabigatran (PRADAXA) capsule 150 mg  150 mg   • [START ON 10/11/2019] predniSONE (DELTASONE) tablet 10 mg  10 mg       ALLERGIES:  Gabapentin    PSYCHOSOCIAL HISTORY:  Housing / Facility: 1 Story House  Steps Into Home: 2  Steps In Home: 0  Lives with - Patient's Self Care Capacity: Spouse, Adult Children(Dtrs)  Equipment Owned: Front-Wheel Walker, Single Point Cane     Prior Level of Function / Living Situation:   Physical Therapy: Prior Services: Intermittent Physical Support for ADL Per Family  Housing / Facility: 1 Story House  Steps Into Home: 2  Steps In Home: 0  Equipment Owned: Front-Wheel Walker, Single Point Cane  Lives with - Patient's Self Care Capacity: Spouse, Adult Children(Dtrs)  Bed Mobility: Independent  Transfer Status: Independent  Ambulation: Required Assist  Distance Ambulation (Feet): (limited community)  Assistive Devices Used: Front-Wheel Walker  Stairs: Required Assist  Current Level of Function:   Level Of Assist: Unable to Participate  Comments: initiated pre-gait activities of standing lateral weight shifting with small scooting steps  Supine to Sit: Moderate Assist  Sit to Supine: Moderate Assist  Scooting: Maximal Assist  Rolling: Moderate Assist to Lt., Moderate Assist to Rt.  Skilled Intervention: Verbal Cuing, Tactile Cuing, Sequencing, Postural Facilitation, Compensatory Strategies  Comments: HOB elevated and use of bedrail   Sit to Stand: Maximal Assist  Bed, Chair, Wheelchair Transfer: Unable to Participate  Toilet Transfers: Unable to Participate  Skilled Intervention: Verbal Cuing, Tactile Cuing, Sequencing, Compensatory Strategies  Comments: with FWW for sit to stand only. See PT notes for additional information   Sitting in Chair: NT  Sitting Edge of Bed: 35 mins   Standing: 3-4 mins total  Occupational Therapy:   Self Feeding: Unable To Determine At This Time  Dressing: Unable To Determine At This Time  Medication  "Management: Unable To Determine At This Time  Prior Level Of Mobility: Unable to Determine At This Time  Prior Services: Intermittent Physical Support for ADL Per Family  Housing / Facility: [address]  Current Level of Function:   Eating: Not Tested  Bathing: Not Tested  Upper Body Dressing: Minimal Assist  Lower Body Dressing: Total Assist  Toileting: Total Assist  Skilled Intervention: Verbal Cuing, Tactile Cuing, Sequencing, Postural Facilitation, Compensatory Strategies  Comments: See note below  Speech Language Pathology:   Assessment : The patient presents with poor, delayed verbal and physical responses. Oral motor movement is slowed and weak. He has xerostomia. Poor A-P lingual movement noted. Lateral movement was decent but weak against resistance. His jaw is in an open mouth, relaxed positioned with poor labial approximation during articulation. Laryngeal elevation palpated as weak. PO trials resulted in an increase in wet vocal quality, oral residue and patient report of \"sticking\" in his throat. Trials were discontinued. Recommend patient remain NPO today. Okay for 3 ice chips per hour. SLP to reassess tomorrow morning. Patient aware.   Problem List: Dysphagia  Diet / Liquid Recommendation: Dysphagia II, Nectar Thick Liquid(thin water between meals with supervision)  Comments: Patient just finished visiting with his family. His voice is dysphonic. RN reports it was not like this earlier today. He reports that his tongue and lips feels thick. Patient consuming D2, NTL without overt signs of aspiration. He took sips of thin water from the cup without coughing. Throat clearing x1 out of 6 sips was observed. He declined further intake as he was falling asleep, tired from his visit. Recommend sips of thin water in between meals with supervision.   Rehabilitation Prognosis/Potential: Good  Estimated Length of Stay: 14-21 days    CURRENT LEVEL OF FUNCTION:   Same as level of function prior to admission to " "[facility]    PHYSICAL EXAM:     VITAL SIGNS:   height is 1.88 m (6' 2\") and weight is 83.5 kg (184 lb 1.4 oz). His oral temperature is 36.4 °C (97.5 °F). His blood pressure is 130/75 and his pulse is 90. His respiration is 17 and oxygen saturation is 96%.     GENERAL: No apparent distress, falling asleep  HEENT: Normocephalic/atraumatic, EOMI and PERRL  CARDIAC: Regular rate and rhythm, normal S1, S2   LUNGS: Clear to auscultation   ABDOMINAL: bowel sounds present, soft, nontender and nondistended    EXTREMITIES: no contractures, spasticity, or edema.  No calf tenderness bilaterally  NEURO:  Mental status:  A&Ox3 (person, place, date, situation) answers questions appropriately  Speech: fluent, no aphasia or dysarthria; trails off in conversation  Motor:  5/5  RUE, 4/5 LUE  HF: 2/5  KE 4/5  ADF 2/5  APF: 3/5   Sensory: intact to light touch through out  DTRs: 2+ in bilateral biceps and patellar tendons    RADIOLOGY:        Results for orders placed during the hospital encounter of 10/01/19   MR-BRAIN-WITH & W/O    Impression 1.  Motion artifact limits exam.  2.  RIGHT frontal resection with interval slight improvement of peripheral nodular enhancement favoring evolving postoperative changes.  Tumor recurrence is felt unlikely.  3.  Increased edema or gliosis in the frontal lobes bilaterally since prior exam, of uncertain clinical significance.  4.  RIGHT hemispheric subdural hygroma appears stable.  5.  Interval significant improvement of previously described LEFT subdural hematoma.      Results for orders placed during the hospital encounter of 08/22/19   WD-IMCLNID-C/O    Impression 1.  There is a 2.8 cm right upper abdominal retroperitoneal mass adjacent to the IVC and right adrenal gland which is most consistent with metastatic adenopathy.                                Results for orders placed during the hospital encounter of 10/01/19   MR-CERVICAL SPINE-WITH & W/O    Impression 1.  Again " seen multilevel degenerative disc disease, uncinate and facet degeneration. No central canal narrowing. There are varying degrees of neural foraminal narrowing at levels as specifically described above.    2.  Loss of the normal cervical lordosis.    3.  No evidence of spinal cord lesion or abnormal enhancement.           Results for orders placed during the hospital encounter of 08/09/19   MR-LUMBAR SPINE-WITH & W/O    Impression 1.  No definite acute abnormality or abnormal enhancement in the lumbar spine.  2.  Status post laminectomy at L1-2.  3.  Multilevel degenerative changes of the lumbar spine as described above.              Results for orders placed during the hospital encounter of 10/01/19   MR-THORACIC SPINE-WITH & W/O    Impression 1.  Postoperative changes of the upper thoracic spine as described.  2.  No evidence to indicate lymphomatous involvement of the thoracic spine or spinal canal.  3.  Mild multilevel degenerative change, unchanged from prior exam.                                       LABS:  Recent Labs     10/08/19  0433 10/09/19  0336 10/10/19  0458   SODIUM 139 139 139   POTASSIUM 3.7 4.0 3.7   CHLORIDE 101 102 101   CO2 29 26 26   GLUCOSE 120* 122* 122*   BUN 13 18 16   CREATININE 0.87 0.86 0.86   CALCIUM 9.1 9.0 9.8     Recent Labs     10/08/19  0433 10/09/19  0336 10/10/19  0458   WBC 13.6* 13.1* 16.2*   RBC 4.50* 4.43* 4.72   HEMOGLOBIN 14.3 14.2 15.0   HEMATOCRIT 42.7 41.2* 44.4   MCV 94.9 93.0 94.1   MCH 31.8 32.1 31.8   MCHC 33.5* 34.5 33.8   RDW 45.4 44.9 44.9   PLATELETCT 242 316 357   MPV 8.5* 8.5* 8.3*             PRIMARY REHAB DIAGNOSIS:    This patient is a 73 y.o. male admitted for acute inpatient rehabilitation with Encephalopathy acute.    IMPAIRMENTS:   Cognitive  ADLs/IADLs  Mobility  Swallow    SECONDARY DIAGNOSIS/MEDICAL CO-MORBIDITIES AFFECTING FUNCTION:   high-grade B-cell lymphoma stage IV, type 2 diabetes, atrial fibrillation, coronary artery disease,  hypertension    RELEVANT CHANGES SINCE PREADMISSION EVALUATION:    Status unchanged    The patient's rehabilitation potential is Fair  The patient's medical prognosis is fair    PLAN:   Discussion and Recommendations:   1. The patient requires an acute inpatient rehabilitation program with a coordinated program of care at an intensity and frequency not available at a lower level of care. This recommendation is substantiated by the patient's medical physicians who recommend that the patient's intervention and assessment of medical issues needs to be done at an acute level of care for patient's safety and maximum outcome.   2. A coordinated program of care will be supplied by an interdisciplinary team of physical therapy, occupational therapy, rehab physician, rehab nursing, and, if needed, speech therapy and rehab psychology. Rehab team presents a patient-specific rehabilitation and education program concentrating on prevention of future problems related to accessibility, mobility, skin, bowel, bladder, sexuality, and psychosocial and medical/surgical problems.   3. Need for Rehabilitation Physician: The rehab physician will be evaluating the patient on a multi-weekly basis to help coordinate the program of care. The rehab physician communicates between medical physicians, therapists, and nurses to maximize the patient's potential outcome. Specific areas in which the rehab physician will be providing daily assessment include the following:   A. Assessing the patient's heart rate and blood pressure response (vitals monitoring) to activity and making adjustments in medications or conservative measures as needed.   B. The rehab physician will be assessing the frequency at which the program can be increased to allow the patient to reach optimal functional outcome.   C. The rehab physician will also provide assessments in daily skin care, especially in light of patient's impairments in mobility.   D. The rehab physician  will provide special expertise in understanding how to work with functional impairment and recommend appropriate interventions, compensatory techniques, and education that will facilitate the patient's outcome.   4. Rehab R.N.   The rehab RN will be working with patient to carry over in room mobility and activities of daily living when the patient is not in 3 hours of skilled therapy. Rehab nursing will be working in conjunction with rehab physician to address all the medical issues above and continue to assess laboratory work and discuss abnormalities with the treating physicians, assess vitals, and response to activity, and discuss and report abnormalities with the rehab physician. Rehab RN will also continue daily skin care, supervise bladder/bowel program, instruct in medication administration, and ensure patient safety.   5. Rehab Therapy: Therapies to treat at intensity and frequency of (may change after completion of evaluation by all therapeutic disciplines):       PT:  Physical therapy to address mobility, transfer, gait training and evaluation for adaptive equipment needs 1 hour/day at least 5 days/week for the duration of the ELOS (see below)       OT:  Occupational therapy to address ADLs, self-care, home management training, functional mobility/transfers and assistive device evaluation, and community re-integration 1 hour/day at least 5 days/week for the duration of the ELOS (see below).        ST/Dysphagia:  Speech therapy to address speech, language, and cognitive deficits as well as swallowing difficulties with retraining/dysphagia management and community re-integration with comprehension, expression, cognitive training 1 hour/day at least 5 days/week for the duration of the ELOS (see below).     Medical management / Rehabilitation Issues/ Adverse Potential as part of rehabilitation plan     REHABILITATION ISSUES/ADVERSE POTENTIAL:  1. Encephalopathy - Patient with recent diagnosis of stage IV B  cell lymphoma s/p R-CHOP on 9/23/19 now with diffuse weakness, confusion and dysphagia. Patient demonstrates functional deficits in cognition, behavior, strength, balance, coordination, and ADL's. The patient requires therapy to correct these deficits prior to discharge. Patient is admitted to [facility] for comprehensive rehabilitation therapy, including physical, occupational and speech therapy.     Rehabilitation nursing monitors bowel and bladder control, educates on medication administration, co-morbidities and monitors patient safety.    2.  Neurostimulants: None at this time but continue to assess daily for need to initiate should status change.    3.  DVT prophylaxis:  Patient is on Pradaxa for anticoagulation upon transfer. Encourage OOB. Monitor daily for signs and symptoms of DVT including but not limited to swelling and pain to prevent the development of DVT that may interfere with therapies.    4.  GI prophylaxis:  On prilosec to prevent gastritis/dyspepsia which may interfere with therapies.    5.  Pain: No issues with pain currently / Controlled with APAP/Oxycodone    6.  Nutrition/Dysphagia: Dietician monitors nutrient intake, recommend supplements prn and provide nutrition education to pt/family to promote optimal nutrition for wound healing/recovery.     7.  Bladder/bowel:  Start bowel and bladder program as described below, to prevent constipation, urinary retention (which may lead to UTI), and urinary incontinence (which will impact upon pt's functional independence).   - TV Q3h while awake with post void bladder scans, I&O cath for PVRs >400  - up to commode after meal     8.  Skin/dermal ulcer prophylaxis: Monitor for new skin conditions with q.2 h. turns as required to prevent the development of skin breakdown.     9.  Cognition/Behavior:  Psychologist Dr. Mace provides adjustment counseling to illness and psychosocial barriers that may be potential barriers to  rehabilitation.     10. Respiratory therapy: RT performs O2 management prn, breathing retraining, pulmonary hygiene and bronchospasm management prn to optimize participation in therapies.     MEDICAL CO-MORBIDITIES/ADVERSE POTENTIAL AFFECTING FUNCTION:  Encephalopathy - Patient with recent diagnosis of stage IV B cell lymphoma s/p R-CHOP on 9/23/19 now with diffuse weakness, confusion and dysphagia  -PT and OT for mobility and ADLs  -SLP for cognition     Dysphagia - Patient on dysphagia 2 with NTL on transfer.  -SLP for swallow    Spasticity - Patient previously on Baclofen 15 mg TID. Restarted on Baclofen and increased-  -Continue on Baclofen 10 mg TID    B cell lymphoma - Underwent R Chop on 9/23/19.  Followed by Oncology. Recent right retroperitoneal mass s/p biopsy which is positive for B cell lymphoma  -Follow-up Heme/Onc  -Continue Prednisone 10 mg for 5 days     HTN/A Fib - Patient on Pradaxa.  Patient on Diltiazem 360 mg QHS, Metoprolol 25 mg XL daily     DM - Patient on SSI on transfer.     Leukocytosis - B cell lymphoma on steroids. Check AM CBC    Hx of Gout - Patient on Allopurinol 300 mg daily.    GI Ppx - Patient on Omeprazole 40 mg daily    DVT ppx - Patient on Pradaxa 150 mg QHS    I personally performed a complete drug regimen review and no potential clinically significant medication issues were identified.     Pt was seen today for 76 min, and entire time spent in face-to-face contact was >50% in counseling and coordination of care as detailed in A/P above.        GOALS/EXPECTED LEVEL OF FUNCTION BASED ON CURRENT MEDICAL AND FUNCTIONAL STATUS (may change based on patient's medical status and rate of impairment recovery):  Transfers:   Modified Independent  Mobility/Gait:   Modified Independent  ADL's:   Modified Independent  Cognition:  supervision  Swallowing:  regular    DISPOSITION: Discharge to pre-morbid independent living setting with the supportive care of patient's family and community  resources.    ELOS: 14-21 days

## 2019-10-11 NOTE — THERAPY
Occupational Therapy   Initial Evaluation     Patient Name: Marcio More Jr.  Age:  73 y.o., Sex:  male  Medical Record #: [id number]  Today's Date: 10/11/2019     Subjective    Pt was seated in w/c completing his breakfast requiring Min A to bring food to mouth.      Objective       10/11/19 0901   Prior Living Situation   Prior Services Home With Outpatient Therapy   Housing / Facility 1 Story House   Steps Into Home 2   Steps In Home 0   Rail Both Rail (Steps into Home)   Elevator No   Lives with - Patient's Self Care Capacity Spouse;Adult Children   Prior Level of ADL Function   Self Feeding Unable To Determine At This Time   Grooming / Hygiene Unable To Determine At This Time   Bathing Requires Assist   Dressing Requires Assist   Toileting Unable To Determine At This Time   Prior Level of IADL Function   Medication Management Requires Assist  (Per Pt)   Laundry Requires Assist   Kitchen Mobility Requires Assist   Finances Requires Assist   Home Management Requires Assist   Shopping Requires Assist   Prior Level Of Mobility Unable to Determine At This Time   Driving / Transportation Relatives / Others Provide Transportation   Occupation (Pre-Hospital Vocational) Retired Due To Age   Leisure Interests Unable To Determine At This Time   IRF-EDEL:  Prior Functioning: Everyday Activities   Self Care Independent   Indoor Mobility (Ambulation) Independent   Stairs Unknown   Functional Cognition Unknown   Prior Device Use Walker   Vitals   Pulse 71   Patient BP Position Sitting   Blood Pressure  126/68   Pulse Oximetry (!) 87 %   Vitals Comments O2 increased    Cognition    Orientation Level Not Oriented to Place;Not Oriented to Reason;Not Oriented to Month;Not Oriented to Year   Level of Consciousness Alert   IRF-EDEL:  Cognitive Pattern Assessment   Cognitive Pattern Assessment Used BIMS   IRF-EDEL:  Brief Interview for Mental Status (BIMS)   Repetition of Three Words (First Attempt) 3   Temporal Orientation: Able  "to Report the Correct Year Correct   Temporal Orientation: Able to Report the Correct Month Accurate within 5 days   Temporal Orientation: Able to Report the Correct Day of the Week Correct   Able to Recall \"Sock\" Yes, no cue required   Able to Recall \"Blue\" Yes, after cueing (\"a color\")   Able to Recall \"Bed\" No, could not recall   BIMS Summary Score 12   Vision Screen   Vision Not tested   Passive ROM Upper Body   Passive ROM Upper Body X   Comments Variable based on spasticity    Active ROM Upper Body   Active ROM Upper Body  X   Dominant Hand Right   Rt Shoulder Flexion Degrees 90   Rt Elbow Extension Degrees (!) 90   Lt Shoulder Flexion Degrees 90   Comments variable contingent on spasticity   Strength Upper Body   Upper Body Strength  Not Tested   Comments variable contingent on spasticity    Sensation Upper Body   Upper Extremity Sensation  WDL   Comments per pt report   Upper Body Muscle Tone   Upper Body Muscle Tone  X   Rt Upper Extremity Muscle Tone Spastic;Rigidity   Lt Upper Extremity Muscle Tone Spastic;Rigidity   Balance Assessment   Sitting Balance (Static) Poor   Sitting Balance (Dynamic) Poor -   Standing Balance (Static) Trace +   Standing Balance (Dynamic) Dependent   Weight Shift Sitting Poor   Weight Shift Standing Absent   Bed Mobility    Sit to Stand Total Assist X 2   Coordination Upper Body   Coordination X   Gross Motor Coordination impaired from spasticity    IRF-EDEL:  Eating   Assistance Needed physical assistance   [place] Physical Assistance Level 25%-49%   CARE Score 3   Discharge Goal:  Assistance Needed Independent;Adaptive equipment   Discharge Goal:  Score 6   IRF-EDEL:  Oral Hygiene   Assistance Needed Physical assistance   Physical Assistance Level 25%-49%   CARE Score 3   Discharge Goal:  Assistance Needed Independent;Adaptive equipment   Discharge Goal:  Score 6   IRF-EDEL:  Shower/Bathe Self   Assistance Needed Physical assistance;Verbal cues   Physical Assistance Level 75% or " more   CARE Score 2   Discharge Goal:  Assistance Needed Physical assistance   Discharge Goal:  Physical Assistance Level 25%-49%   Discharge Goal Score 3   IRF-EDEL:  Upper Body Dressing   Assistance Needed Supervision;Physical assistance   Physical Assistance Level 50%-74%   CARE Score 2   Discharge Goal:  Assistance Needed Physical assistance   Discharge Goal:  Physical Assistance Level 25%-49%   Dischage Goal:  Score 3   IRF-EDEL:  Lower Body Dressing   Assistance Needed Physical assistance;Verbal cues;Supervision   Physical Assistance Level 75% or more   CARE Score 2   Discharge Goal:  Assistance Needed Physical assistance   Discharge Goal:  Physical Assistance Level 25%-49%   Discharge Goal:  Score 3   IRF EDEL:  Putting On/Taking Off Footwear   Assistance Needed Physical assistance;Supervision   Physical Assistance Level 75% or more   CARE Score 2   Discharge Goal:  Assistance Needed Physical assistance   Discharge Goal:  Physical Assistance Level 25%-49%   Discharge Goal:  Score 3   IRF-EDEL:  Toileting Hygiene   Assistance Needed Physical assistance   Physical Assistance Level Total assistance   CARE Score 1   Discharge Goal:  Assistance Needed Physical assistance   Discharge Goal:  Physical Assistance Level 25%-49%   Discharge Goal:  Score 3   IRF-EDEL:  Toilet Transfer   Assistance Needed Physical assistance;Verbal cues;Supervision   Physical Assistance Level Total assistance   CARE Score 1   Discharge Goal:  Assistance Needed Physical assistance   Discharge Goal:  Physical Assistance Level 25%-49%   Discahrge Goal:  Score 3   IRF-EDEL:  Hearing, Speech, and Vision   Expression of Ideas and Wants 3   Understanding Verbal and Non-Verbal Content 4   Problem List   Problem List Decreased Active Daily Living Skills;Decreased Homemaking Skills;Decreased Upper Extremity AROM Right;Decreased Upper Extremity AROM Left;Decreased Upper Extremity PROM Right;Decreased Upper Extremity PROM Left;Decreased Functional  Mobility;Decreased Activity Tolerance;Impaired Upper Extremity Tone Right;Impaired Upper Extremity Tone Left;Impaired Postural Control / Balance;Impaired Coordination Right Upper Extremity;Impaired Coordination Left Upper Extremity   Precautions   Precautions Fall Risk   Interdisciplinary Plan of Care Collaboration   IDT Collaboration with  Certified Nursing Assistant   Patient Position at End of Therapy Seated;Self Releasing Lap Belt Applied;Tray Table within Reach;Phone within Reach;Call Light within Reach  (CNA present )   Collaboration Comments bathing/dressing/transfers   Equipment Needs   Assistive Device / DME Shower Chair;Grab Bars In Shower / Tub;Grab Bars By Toilet   Adaptive Equipment Long Handled Sponge;Built Up Utensils;Dressing Stick   OT Total Time Spent   OT Individual Total Time Spent (Mins) 60   OT Charge Group   OT Self Care / ADL 1   OT Evaluation OT Evaluation High            FIM Grooming Score:  3 - Moderate Assistance  Grooming Description:  Increased time, Supervision for safety, Verbal cueing(Mod A for brusing hair and teeth)    FIM Bathing Score:  2 - Max Assistance  Bathing Description:  Grab bar, Tub bench, Hand held shower, Supervision for safety, Verbal cueing, Set-up of equipment, Requires minimal contact(Max A for BLE, bottom, Shoulders, RUE)    FIM Upper Body Dressing:  3 - Moderate Assistance  Upper Body Dressing Description:  Increased time, Supervision for safety, Verbal cueing, Requires minimal contact, Initial preparation for task(Mod A to jose over head and pull down)    FIM Lower Body Dressing Score:  1 - Total Assistance  Lower Body Dressing Description:  Increased time, Supervision for safety, Verbal cueing, Requires 2 people to assist(Max A to stand and Max A to jose over hips)    FIM Toilet Transfer Score:  1 - Total Assistance  Toilet Transfer Description:  Grab bar, Increased time, Supervision for safety, Verbal cueing(Max A X 2)    FIM Tub/Shower Transfers Score:  1 -  Total Assistance  Tub/Shower Transfers Description:  Grab bar, Increased time, Supervision for safety, Verbal cueing, Requires 2 people to assist(Max A x 1 and Min A x1 )       Assessment  Patient is 73 y.o. male with a diagnosis of Encephalopathy with diffuse weakness, spasticity, ans cognitive decline.  Additional factors influencing patient status / progress (ie: cognitive factors, co-morbidities, social support, etc): PMH of brain tumor resection 7/10/19, peritoneal mass biopsy, chemotherapy 9/23/19, DM, HTN, CAD, AFib. Pt was living with spouse with assistance with ADLs and IADLs as needed. Pt presents as inconsistant historian, thus, amount of assistance was undetermined at this time. Pt reports good family support.       Plan  Recommend Occupational Therapy  minutes per day 5-7 days per week for 14-21 days for the following treatments:  OT E Stim Attended, OT Self Care/ADL, OT Cognitive Skill Dev, OT Community Reintegration, OT Manual Ther Technique, OT Neuro Re-Ed/Balance, OT Sensory Int Techniques, OT Therapeutic Activity, OT Evaluation and OT Therapeutic Exercise.    Goals:  Long term and short term goals have been discussed with patient and they are in agreement.    Occupational Therapy Goals     Problem: Bathing     Dates: Start: 10/11/19       Description:     Goal: STG-Within one week, patient will bathe     Dates: Start: 10/11/19       Description: 1) Individualized Goal:  Mod A with AE/DME PRN  2) Interventions:  OT E Stim Attended, OT Group Therapy, OT Self Care/ADL, OT Cognitive Skill Dev, OT Community Reintegration, OT Manual Ther Technique, OT Neuro Re-Ed/Balance, OT Sensory Int Techniques, OT Therapeutic Activity and OT Evaluation                 Problem: Dressing     Dates: Start: 10/11/19       Description:     Goal: STG-Within one week, patient will dress LB     Dates: Start: 10/11/19       Description: 1) Individualized Goal:  Mod A with AE/DME PRN  2) Interventions:  OT E Stim  Attended, OT Group Therapy, OT Self Care/ADL, OT Cognitive Skill Dev, OT Community Reintegration, OT Manual Ther Technique, OT Neuro Re-Ed/Balance, OT Sensory Int Techniques, OT Therapeutic Activity and OT Evaluation                 Problem: Functional Transfers     Dates: Start: 10/11/19       Description:     Goal: STG-Within one week, patient will transfer to toilet     Dates: Start: 10/11/19       Description: 1) Individualized Goal:  Max A with AE/DME PRN  2) Interventions:  OT E Stim Attended, OT Group Therapy, OT Self Care/ADL, OT Cognitive Skill Dev, OT Community Reintegration, OT Manual Ther Technique, OT Neuro Re-Ed/Balance, OT Sensory Int Techniques, OT Therapeutic Activity and OT Evaluation           Goal: STG-Within one week, patient will transfer to step in shower     Dates: Start: 10/11/19       Description: 1) Individualized Goal:  Max A with AE/DME PRN  2) Interventions:  OT E Stim Attended, OT Group Therapy, OT Self Care/ADL, OT Cognitive Skill Dev, OT Community Reintegration, OT Manual Ther Technique, OT Neuro Re-Ed/Balance, OT Sensory Int Techniques, OT Therapeutic Activity and OT Evaluation                 Problem: OT Long Term Goals     Dates: Start: 10/11/19       Description:     Goal: LTG-By discharge, patient will complete basic self care tasks     Dates: Start: 10/11/19       Description: 1) Individualized Goal:  Min A with AE/DME PRN  2) Interventions:  OT E Stim Attended, OT Group Therapy, OT Self Care/ADL, OT Cognitive Skill Dev, OT Community Reintegration, OT Manual Ther Technique, OT Neuro Re-Ed/Balance, OT Sensory Int Techniques, OT Therapeutic Activity and OT Evaluation           Goal: LTG-By discharge, patient will perform bathroom transfers     Dates: Start: 10/11/19       Description: 1) Individualized Goal:  Min A with AE/DME PRN  2) Interventions:  OT E Stim Attended, OT Group Therapy, OT Self Care/ADL, OT Cognitive Skill Dev, OT Community Reintegration, OT Manual Ther  Technique, OT Neuro Re-Ed/Balance, OT Sensory Int Techniques, OT Therapeutic Activity and OT Evaluation
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
## 2019-10-11 NOTE — PROGRESS NOTES
Patient becomes very stiff when attempting to roll him to change bedding. Patient will grab rails and push away from being rolled onto side for changing. Patient denies pain at times and will state he cannot bend knees due to pain in the next sentence.     Patient will dislodge condom cath and fidget with it - patient very confused as to it's purpose even after being reoriented to condom cath several times.

## 2019-10-11 NOTE — PROGRESS NOTES
Patient admitted to facility at 1530 via wheelchair; accompanied by hospital transport.  Patient assisted to room and positioned in bed for comfort and safety; call light within reach.  Pt oriented x1, no impulsiveness noted.Patient assisted with stowing belongings and oriented to room and facility.  Admission assessment performed and documented in computer.  Admission paperwork completed; signed copies placed in chart. 2 RN skin check by this RN and Maryjane CORLEY RN, photos uploaded in epic.Will continue to monitor.

## 2019-10-11 NOTE — CARE PLAN
Problem: Safety  Goal: Will remain free from injury  Note:   Education reinforced to wash hands thoroughly after using the restroom, prior to eating and throughout the day to minimize the potential of acquiring germs while in a facility setting. Patient engaged in conversation and verbalizes understanding.       Problem: Infection  Goal: Will remain free from infection  Note:   Education reinforced to wash hands thoroughly after using the restroom, prior to eating and throughout the day to minimize the potential of acquiring germs while in a facility setting. Patient engaged in conversation and verbalizes understanding. Patient will need redirecting

## 2019-10-11 NOTE — THERAPY
Physical Therapy   Initial Evaluation     Patient Name: Marcio More Jr.  Age:  73 y.o., Sex:  male  Medical Record #: [id number]  Today's Date: 10/11/2019     Subjective    Pt seated in room, agreeable to PT evaluation      Objective       10/11/19 1031   Prior Living Situation   Prior Services None   Housing / Facility 1 Story House   Steps Into Home 3   Steps In Home 0   Rail Both Rail (Steps into Home)   Equipment Owned Front-Wheel Walker;Wheelchair   Lives with - Patient's Self Care Capacity Spouse;Adult Children   Prior Level of Functional Mobility   Bed Mobility Independent   Transfer Status Independent   Ambulation Independent   Assistive Devices Used Front-Wheel Walker   Wheelchair Independent   Stairs Unable To Determine At This Time   Comments Above information according to pt report, questionable historian   IRF-EDEL:  Prior Functioning: Everyday Activities   Self Care Independent   Indoor Mobility (Ambulation) Independent   Stairs Unknown   Functional Cognition Unknown   Prior Device Use Walker   Passive ROM Lower Body   Passive ROM Lower Body WDL   Active ROM Lower Body    Comments AROM limited throughout BLEs secondary to spasticity and tone, no formal measurements taken    Strength Lower Body   Comments BLEs grossly 1-3/5 throughout, MMT not performed in gravity eliminated positions; difficult to assess muscle weakness vs motor planning deficit   Sensation Lower Body   Lower Extremity Sensation   Not Tested   Lower Body Muscle Tone   Rt Lower Extremity Muscle Tone Hypertonic;Spastic   Lt Lower Extremity Muscle Tone Hypertonic;Spastic   Comments No formal testing for spasticity performed   Balance Assessment   Sitting Balance (Static) Poor   Sitting Balance (Dynamic) Poor -   Standing Balance (Static) Trace +   Standing Balance (Dynamic) Dependent   Weight Shift Sitting Poor   Weight Shift Standing Absent   Bed Mobility    Supine to Sit Total Assist X 2   Sit to Supine Total Assist X 2   Sit to  Stand Maximal Assist  (in // bars )   Scooting Total Assist   Rolling Total Assist X 2 to Lt.;Total Assist X 2 to Rt.   IRF-EDEL:  Roll Left and Right   Assistance Needed Physical assistance   Physical Assistance Level Total assistance   CARE Score 1   Discharge Goal:  Assistance Needed Physical assistance   Discharge Goal:  Physical Assistance Level 25%-49%   Discharge Goal:  Score 3   IRF-EDEL:  Sit to Lying   Assistance Needed Physical assistance   Physical Assistance Level Total assistance   CARE Score 1   Discharge Goal:  Assistance Needed Physical assistance   Discharge Goal:  Physical Assistance Level 25%-49%   Discharge Goal:  Score 3   IRF-EDEL:  Lying to Sitting on Side of Bed   Assistance Needed Physical assistance   Physical Assistance Level Total assistance   CARE Score 1   Discharge Goal:  Assistance Needed Physical assistance   Discharge Goal:  Physical Assistance Level 25%-49%   Discharge Goal:  Score 3   IRF-EDEL:  Sit to Stand   Assistance Needed Physical assistance   Physical Assistance Level 75% or more   CARE Score 2   Discharge Goal:  Assistance Needed Physical assistance   Discharge Goal:  Physical Assistance Level 25%-49%   Discahrge Goal:  Score 3   IRF-EDEL:  Chair/Bed-to-Chair Transfer   Assistance Needed Physical assistance   Physical Assistance Level Total assistance   CARE Score 1   Discharge Goal:  Assistance Needed Physical assistance   Discharge Goal:  Physical Assistance Level 25%-49%   Discharge Goal:  Score 3   IRF-EDEL:  Car Transfer   Reason if not Attempted Safety concerns   CARE Score 88   Discharge Goal:  Assistance Needed Physical assistance   Discharge Goal:  Physical Assistance Level 25%-49%   Discharge Goal:  Score 3   IRF EDEL:  Walking   Does the Patient Walk? No   Is Walking a Clinically Indicated Goal? Yes   Uses a Wheelchair/Scooter? Yes   IRF EDEL:  Walk 10 Feet   Reason if not Attempted Safety concerns   CARE Score 88   Discharge Goal:  Assistance Needed Physical  assistance;Adaptive equipment   Discharge Goal:  Physical Assistance Level 25%-49%   Discharge Goal:  Score 3   IRF-EDEL:  Walk 50 Feet with Two Turns   Reason if not Attempted Safety concerns   CARE Score 88   Discharge Goal:  Assistance Needed Physical assistance;Adaptive equipment   Discharge Goal:  Physical Assistance Level 25%-49%   Discharge Goal:  Score 3   IRF-EDEL:  Walk 150 Feet   Reason if not Attempted Safety concerns   CARE Score 88   Discharge Goal:  Assistance Needed Physical assistance;Adaptive equipment   Discharge Goal:  Physical Assistance Level Total assistance   Discharge Goal:  Score 1   IRF EDEL:  Walking 10 Feet on Uneven Surfaces   Reason if not Attempted Safety concerns   CARE Score 88   Discharge Goal:  Assistance Needed Physical assistance   Discharge Goal:  Physical Assistance Level 25%-49%   Discharge Goal:  Score 3   IRF EDEL:  1 Step (Curb)   Reason if not Attempted Safety concerns   CARE Score 88   Discharge Goal:  Assistance Needed Physical assistance;Adaptive equipment   Discharge Goal:  Physical Assistance Level 50%-74%   Discharge Goal:  Score 2   IRF-EDEL:  4 Steps   Reason if not Attempted Safety concerns   CARE Score 88   Discharge Goal:  Assistance Needed Physical assistance   Discharge Goal:  Physical Assistance Level Total assistance   Discharge Goal:  Score 1   IRF EDEL:  12 Steps   Reason if not Attempted Safety concerns   CARE Score 88   Discharge Goal:  Assistance Needed Physical assistance   Discharge Goal:  Physical Assistance Level Total assistance   Discharge Goal:  Score 1   IRF EDEL:  Picking Up Object   Reason if not Attempted Safety concerns   CARE Score 88   Discharge Goal:  Assistance Needed Physical assistance   Discharge Goal:  Physical Assistance Level Total assistance   Discharge Goal:  Score 1   IRF-EDEL:  Wheel 50 Feet with Two Turns   Reason if not Attempted Medical concerns   CARE Score 88   Discharge Goal:  Assistance Needed Supervision   Discharge Goal:   Score 4   IRF-EDEL:  Wheel 150 Feet   Reason if not Attempted Medical concerns   CARE Score 88   Discharge Goal:  Assistance Needed Supervision   Discharge Goal:  Score 4   Problem List    Problems Impaired Bed Mobility;Impaired Transfers;Functional Strength Deficit;Impaired Coordination;Motor Planning / Sequencing;Decreased Activity Tolerance   Precautions   Precautions Fall Risk   Comments spasticity, poor motor planning   Interdisciplinary Plan of Care Collaboration   IDT Collaboration with  Occupational Therapist   Patient Position at End of Therapy Seated  (in cafeteria for lunch )   Collaboration Comments re: CLOF    PT Total Time Spent   PT Individual Total Time Spent (Mins) 60   PT Charge Group   PT Therapeutic Activities 1   PT Evaluation PT Evaluation High       FIM Bed/Chair/Wheelchair Transfers Score: 1 - Total Assistance  Bed/Chair/Wheelchair Transfers Description:  Increased time, Set-up of equipment, Verbal cueing, Requires 2 people to assist(WC <> supine on treament mat, 2 person slideboard transfer with 2 person assist for bed mobility; pt limited by spasticity/ tone)    FIM Walking Score:  0 - Not tested,unsafe activity  Walking Description:       FIM Wheelchair Score:  0 - Not tested,medical condition  Wheelchair Description:       FIM Stairs Score:  0 - Not tested,unsafe activity  Stairs Description:       Assessment  Patient is 73 y.o. male with a diagnosis of Stage 4 lymphoma with acute onset of weakness and spasticity.  Additional factors influencing patient status / progress (ie: cognitive factors, co-morbidities, social support, etc): unknown PLOF, confusion, poor motor planning.      Plan  Recommend Physical Therapy 30-60 minutes per day 5-7 days per week for 2-3 weeks for the following treatments:  PT Group Therapy, PT E Stim Attended, PT Gait Training, PT Therapeutic Exercises, PT TENS Application, PT Neuro Re-Ed/Balance, PT Aquatic Therapy, PT Therapeutic Activity and PT Manual  Therapy.    Goals:  Long term and short term goals have been discussed with patient and they are in agreement.    Physical Therapy Problems     Problem: Balance     Dates: Start: 10/11/19       Description:     Goal: STG-Within one week, patient will maintain static standing     Dates: Start: 10/11/19       Description: 1) Individualized goal:  3 min in // bars with max A   2) Interventions:  PT Group Therapy, PT E Stim Attended, PT Gait Training, PT Therapeutic Exercises, PT Neuro Re-Ed/Balance, PT Aquatic Therapy, PT Therapeutic Activity, PT Manual Therapy and PT Wound Therapy                   Problem: Mobility     Dates: Start: 10/11/19       Description:     Goal: STG-Within one week, patient will propel wheelchair household distances     Dates: Start: 10/11/19       Description: 1) Individualized goal:  50 ft using BUEs, mod A   2) Interventions: PT Group Therapy, PT E Stim Attended, PT Gait Training, PT Therapeutic Exercises, PT Neuro Re-Ed/Balance, PT Aquatic Therapy, PT Therapeutic Activity, PT Manual Therapy and PT Wound Therapy                       Problem: Mobility Transfers     Dates: Start: 10/11/19       Description:     Goal: STG-Within one week, patient will perform bed mobility     Dates: Start: 10/11/19       Description: 1) Individualized goal:  Mod A x1   2) Interventions: PT Group Therapy, PT E Stim Attended, PT Gait Training, PT Therapeutic Exercises, PT Neuro Re-Ed/Balance, PT Aquatic Therapy, PT Therapeutic Activity, PT Manual Therapy and PT Wound Therapy                 Goal: STG-Within one week, patient will transfer bed to chair     Dates: Start: 10/11/19       Description: 1) Individualized goal:  Max A x1 with slideboard   2) Interventions:  PT Group Therapy, PT E Stim Attended, PT Gait Training, PT Therapeutic Exercises, PT Neuro Re-Ed/Balance, PT Aquatic Therapy, PT Therapeutic Activity, PT Manual Therapy and PT Wound Therapy                       Problem: PT-Long Term Goals      Dates: Start: 10/11/19       Description:     Goal: LTG-By discharge, patient will propel wheelchair     Dates: Start: 10/11/19       Description: 1) Individualized goal:  150 ft, spv   2) Interventions:  PT Group Therapy, PT E Stim Attended, PT Gait Training, PT Therapeutic Exercises, PT Neuro Re-Ed/Balance, PT Aquatic Therapy, PT Therapeutic Activity, PT Manual Therapy and PT Wound Therapy                 Goal: LTG-By discharge, patient will transfer one surface to another     Dates: Start: 10/11/19       Description: 1) Individualized goal:  Min A with LRAD   2) Interventions: PT Group Therapy, PT E Stim Attended, PT Gait Training, PT Therapeutic Exercises, PT Neuro Re-Ed/Balance, PT Aquatic Therapy, PT Therapeutic Activity, PT Manual Therapy and PT Wound Therapy                 Goal: LTG-By discharge, patient will perform home exercise program     Dates: Start: 10/11/19       Description: 1) Individualized goal:  SBA with LE HEP with use of handout  2) Interventions: PT Group Therapy, PT E Stim Attended, PT Gait Training, PT Therapeutic Exercises, PT Neuro Re-Ed/Balance, PT Aquatic Therapy, PT Therapeutic Activity, PT Manual Therapy and PT Wound Therapy                 Goal: LTG-By discharge, patient will transfer in/out of a car     Dates: Start: 10/11/19       Description: 1) Individualized goal:  Min A with LRAD  2) Interventions: PT Group Therapy, PT E Stim Attended, PT Gait Training, PT Therapeutic Exercises, PT Neuro Re-Ed/Balance, PT Aquatic Therapy, PT Therapeutic Activity, PT Manual Therapy and PT Wound Therapy

## 2019-10-11 NOTE — DISCHARGE PLANNING
CASE MANAGEMENT INITIAL ASSESSMENT    Admit Date:  10/10/2019     I met with patient to discuss role of case management / discharge planning / team conference.  Patient is a  73 y.o. male transferred from [facility].  He is drowsy and confirms he is very tired.  Family was not present but I left a message for his daughter with my contact information.  Patient's admitting physician for rehab is Dr. Bella.    Diagnosis: 02.1 non traumatic  Encephalopathy acute    Co-morbidities:   Patient Active Problem List    Diagnosis Date Noted   • B-cell lymphoma (HCC) 10/01/2019     Priority: High   • Metastasis to brain (HCC) 10/01/2019     Priority: High   • Neutropenia (HCC) 10/01/2019     Priority: High   • Intracranial mass 07/06/2019     Priority: High   • Myelopathy (HCC) 05/15/2016     Priority: High   • Thoracic stenosis 05/15/2016     Priority: High     Class: Acute   • Ileus (HCC) 05/15/2016     Priority: High     Class: Acute   • Atrial fibrillation (HCC) w Rapid Ventricular Response  05/15/2016     Priority: High     Class: Chronic   • Hyponatremia 07/12/2019     Priority: Medium   • Hypertension 05/15/2016     Priority: Medium     Class: Chronic   • CAD (coronary artery disease) 05/15/2016     Priority: Medium     Class: Chronic   • Type 2 diabetes mellitus without complication, without long-term current use of insulin (HCC) 05/15/2016     Priority: Medium     Class: Acute   • Azotemia 05/15/2016     Priority: Medium     Class: Acute   • Chronic back pain 05/15/2016     Priority: Low     Class: Chronic   • Dyslipidemia 05/15/2016     Priority: Low     Class: Chronic   • Encephalopathy acute 10/10/2019   • Normocytic anemia 10/03/2019   • Dysphagia 10/01/2019   • Hypomagnesemia 07/24/2019   • Vitamin D insufficiency 07/18/2019   • Leukocytosis 07/18/2019   • 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   • History of prostate cancer 07/06/2019     Prior Living Situation:  Housing / Facility: (P) 1 Story House  Lives with  - Patient's Self Care Capacity: (P) Spouse, Adult Children    Prior Level of Function:  Medication Management: (P) Requires Assist(Per Pt)  Finances: (P) Requires Assist  Home Management: (P) Requires Assist  Shopping: (P) Requires Assist  Prior Level Of Mobility: (P) Unable to Determine At This Time  Driving / Transportation: (P) Relatives / Others Provide Transportation    Support Systems:  Primary : wife is Mell More at [phone] or   Other support systems: daughter is Aissatou Tao at [phone]     Previous Services Utilized:   Equipment Owned: (P) 4-Wheel Walker, Single Point Cane  Prior Services: (P) Home With Outpatient Therapy    Other Information:  Occupation (Pre-Hospital Vocational): (P) Retired Due To Age  Primary Payor Source: Medicare A, Medicare B  Secondary Payor Source: Supplemental Insurance  Primary Care Practitioner : Dahlia VILLARREAL scheduled for new PCP  Other MDs: Dr. Ferrer for oncology    Additional Case Management Questions:  Have you ever received case management services for yourself or a family member?  Yes    Do you feel you have and an understanding of what services  provide?  Yes    Do you have any additional questions regarding case management?    no      CASE MANAGEMENT PLAN OF CARE   Individualized Goals:   1.  Patient hopes to regain some strength.  2.  I will confirm if he will need home health versus outpatient therapy.  3.  I will follow to see if he will need a w/c for home.    Barriers:   1. Weakness from his treatments    Patient / Family Goal:  Patient / Family Goal: Patient lives with his wife in [place], [place].  He was in rehab earlier this year.  He is very fatigued but able to provide some information.  He has used [facility] prior.  His daughter lives near and has been assisting with some of his personal care.  We will follow for home health versus outpatient therapy.    Plan:  1. Continue to follow patient through hospitalization and  provide discharge planning in collaboration with patient, family, physicians and ancillary services.     2. Utilize community resources to ensure a safe discharge.

## 2019-10-11 NOTE — THERAPY
Speech Language Pathology   Initial Assessment     Patient Name: Marcio More Jr.  AGE:  73 y.o., SEX:  male  Medical Record #: [id number]  Today's Date: 10/11/2019     Subjective    Pt pleasant and cooperative during evaluation.  Swallow evaluation completed.  Speech language evaluation to be completed at a later date.  Not completed this day due to time constraint.  Pt required additional time to transfer and dress.     Objective       10/11/19 0801   Prior Living Situation   Prior Services Home With Outpatient Therapy   Housing / Facility 1 Story House   Lives with - Patient's Self Care Capacity Spouse;Adult Children   Prior Level Of Function   Communication Within Functional Limits   Swallow Within Functional Limits   Dentition Intact   Dentures None   Hearing Within Functional Limits for Evaluation   Hearing Aid None   Vision Within Functional Limits for Evaluation  (family believes he needs glasses)   Patient's Primary Language English   Education Completed College   Occupation (Pre-Hospital Vocational) Retired Due To Age   Comments [employer]    Labial Function   Labial Function (WDL) WDL   Lingual Function   Lingual Protrude Minimal   Elevate Outside Mouth Minimal   Lateralization Minimal Right;Minimal Left   Laryngeal Function   Voice Quality Minimal   Volutional Cough Minimal   Excursion Upon Swallow Weak    Swallowing   Thick Nectar / Honey / Liquid Within Functional Limits   Thin Liquid Minimal   Masticated Foods Minimal   Dysphagia Strategies / Recommendations   Strategies / Interventions Recommended (Yes / No) Yes   Compensatory Strategies Single Sips / Bites;Multiple Swallows;Alternate Solids / Liquids   Diet / Liquid Recommendation Dysphagia II;Nectar Thick Liquid   Medication Administration  Crush all Medications in Puree   IRF-EDEL:  Swallowing/Nutritional Status   Swallowing/Nutritional Status Modified food consistency   Problem List   Problem List Dysphagia;Dysarthia   Current  Discharge Plan   Current Discharge Plan Return to Prior Living Situation   Benefit   Therapy Benefit Patient would benefit from Inpatient Rehab Speech-Language Pathology to address above identified deficits.   Interdisciplinary Plan of Care Collaboration   IDT Collaboration with  Nursing;Family / Caregiver   Collaboration Comments medication administration, case history questions.    Speech Language Pathologist Assigned   Assigned SLP / Pager # CW cog/sw, tdine   SLP Total Time Spent   SLP Individual Total Time Spent (Mins) 60   SLP Charge Group   SLP Oral Pharyngeal Evaluation Oral Pharyngeal Evaluation       FIM Eating Score:  4 - Minimal Assistance  Eating Description:  Modified diet, Increased time, Supervision for safety, Verbal cueing, Needs help bringing food to mouth    FIM Comprehension Score:  4 - Minimal Assistance  Comprehension Description:  Verbal cues(additional time)    FIM Expression Score:  4 - Minimal Assistance  Expression Description:  Verbal cueing(soft, breathy vocal quality.)    FIM Social Interaction Score:  5 - Stand-by Prompting/Supervision or Set-up  Social Interaction Description:  Increased time    FIM Problem Solving Score:  3 - Moderate Assistance  Problem Solving Description:  Verbal cueing, Therapy schedule, Bed/chair alarm, Increased time, Seat belt    FIM Memory Score:  3 - Moderate Assistance  Memory Description:  Verbal cueing, Therapy schedule, Supervision    Assessment    Patient is 73 y.o. male with a diagnosis of encephalopathy, diffuse weakness, cognitive decline and spasticity.  Additional factors influencing patient status/progress (ie: cognitive factors, co-morbidities, social support, etc): Mild oral, and suspect mild pharyngeal dysphagia.  Oral motor examination was significant for mild lingual weakness, and mildly decreased range of motion.  Pt required intermittent assistance bringing food and liquid to mouth.  Pt tolerated dys2 textures and NTL without overt s/s of  aspiration.  Pt benefits from additional time to aid in mastication.  Pt presented with consistent immediate cough following thin liquids by cup.  Recommend dys2/NTL, and meds crushed with puree.  Recommend modified barium swallow study to fully assess swallow function.  Recommend speech language evaluation to assess cognition.      Plan  Recommend Speech Therapy 30-60 minutes per day 5-6 days per week for 4 weeks for the following treatments:  SLP Aphasia Evaluation, SLP Swallowing Dysfunction Treatment, SLP Oral Pharyngeal Evaluation, SLP Video Swallow Evaluation, SLP Cognitive Skill Development and SLP Group Treatment.    Goals:  Long term and short term goals have been discussed with patient and spouse and they are in agreement.    Speech Therapy Problems     Problem: Problem Solving STGs     Dates: Start: 10/11/19       Description:     Goal: STG-Within one week, patient will     Dates: Start: 10/11/19       Description: 1) Individualized goal:  Complete the SCCAN with additional goals added as appropriate.    2) Interventions:  SLP Cognitive Skill Development                   Problem: Speech/Swallowing LTGs     Dates: Start: 10/11/19       Description:     Goal: LTG-By discharge, patient will safely swallow     Dates: Start: 10/11/19       Description: 1) Individualized goal:  Regular textures and thin liquids without aspiration.    2) Interventions:  SLP Swallowing Dysfunction Treatment, SLP Oral Pharyngeal Evaluation, SLP Video Swallow Evaluation and SLP Group Treatment             Goal: LTG-By discharge, patient will solve basic problems     Dates: Start: 10/11/19       Description: 1) Individualized goal:  Marco Antonio for safe discharge home.    2) Interventions:  SLP Cognitive Skill Development and SLP Group Treatment                   Problem: Swallowing STGs     Dates: Start: 10/11/19       Description:     Goal: STG-Within one week, patient will     Dates: Start: 10/11/19       Description: 1) Individualized  goal:  Tolerate dys2/NTL diet without overt s/s of aspiration over 3 meals.    2) Interventions:  SLP Swallowing Dysfunction Treatment and SLP Group Treatment             Goal: STG-Within one week, patient will     Dates: Start: 10/11/19       Description: 1) Individualized goal:  Participate in a modified barium swallow study.   2) Interventions:  SLP Video Swallow Evaluation

## 2019-10-12 PROBLEM — R29.818 NEUROLOGIC ABNORMALITY: Status: ACTIVE | Noted: 2019-01-01

## 2019-10-12 NOTE — CARE PLAN
Problem: Safety  Goal: Will remain free from falls  Outcome: PROGRESSING AS EXPECTED  Note:   Resident's bed kept at the lowest position with rails for safety. Will continue to monitor.     Problem: Infection  Goal: Will remain free from infection  Outcome: PROGRESSING AS EXPECTED  Note:   Patient remains free from s/s infection; afebrile.  Will continue to monitor.       Problem: Skin Integrity  Goal: Risk for impaired skin integrity will decrease  Outcome: PROGRESSING AS EXPECTED  Note:   Resident's skin was checked for integrity, Mepilex maintained on sacrum, clean and intact. Resident was positioned and changed every 2 hours with CNA. Will continue to monitor.

## 2019-10-12 NOTE — THERAPY
Physical Therapy   Daily Treatment     Patient Name: Marcio More Jr.  Age:  73 y.o., Sex:  male  Medical Record #: [id number]  Today's Date: 10/12/2019     Precautions  Precautions: (P) Fall Risk  Comments: (P) spasticity, poor motor planning    Subjective    Pt received up in wc, agreeable to PT     Objective     10/12/19 1031   Precautions   Precautions Fall Risk   Comments spasticity, poor motor planning   Vitals   Pulse 70   Blood Pressure  104/57   Sitting Lower Body Exercises   Sitting Lower Body Exercises Yes   Long Arc Quad 1 set of 10;Bilateral   Other Exercises LE radha med x 12minutes distance 0.72mi distance tone decreased from 25 to 15 from beginning to end of session   Bed Mobility    Sit to Stand Maximal Assist  (pulling up in // mod/max)   Neuro-Muscular Treatments   Neuro-Muscular Treatments Facilitation   Comments facilitation of hip flexion during gait in //   Interdisciplinary Plan of Care Collaboration   IDT Collaboration with  Therapy Tech;Occupational Therapist;Nursing   Patient Position at End of Therapy Seated   Collaboration Comments wc follow, CLOF, transfers   PT Total Time Spent   PT Individual Total Time Spent (Mins) 60   PT Charge Group   PT Gait Training 1   PT Therapeutic Exercise 1   PT Neuromuscular Re-Education / Balance 1   PT Therapeutic Activities 1   Supervising Physical Therapist Christine Blancas      Instructed pt on lateral ws in seated to scoot forward in wc    2 STS in // mod/maxA    FIM Walking Score:  1 - Total Assistance  Walking Description:  Two hand rails, Verbal cueing, Safety concerns, Extra time, Requires assist to advance foot, Requires wheelchair follow(8' x 1 and 6' x 1 // wc follow modA for balance 2/2 post lean max facilitation for weight shifting, assistance to advance LE's only with first few steps)    FIM Wheelchair Score:  1 - Total Assistance  Wheelchair Description:  Extra time, Requires incidental assist, Verbal cueing, Supervision for safety,  Safety concerns(35' x 1 SBA, vc for sequencing and obstacle avoidance, hand over hand assistance to locate wc rims)      Assessment    Pt was able to amb 6' and 8' with modA for balance/posterior lean and maxA for ws. Pt limited by confusion, tone/spasticity and poor motor planning. Pt benefits from redirection, education and encouragement for participation     Plan    Standing tolerance, cont gait in //, transfers and sequencing, LE exercise

## 2019-10-12 NOTE — THERAPY
Speech Language Pathology   Initial Assessment     Patient Name: Marcio More Jr.  AGE:  73 y.o., SEX:  male  Medical Record #: [id number]  Today's Date: 10/12/2019     Subjective    Pt pleasant and cooperative during speech language evaluation.      Objective       10/12/19 1331   Prior Living Situation   Prior Services Home With Outpatient Therapy   Housing / Facility 1 Story House   Lives with - Patient's Self Care Capacity Spouse;Adult Children   Prior Level Of Function   Communication Within Functional Limits   Hearing Within Functional Limits for Evaluation   Vision Within Functional Limits for Evaluation   Patient's Primary Language English   Receptive Language / Auditory Comprehension   Receptive Language / Auditory Comprehension X   Identifies Pictures Supervision (5)   Answers Yes / No Personal Questions Supervision (5)   Answers Yes / No Simple / Contextual Questions Supervision (5)   Follows One Unit Commands Supervision (5)   Follows Two Unit Commands Minimal (4)   Understands Simple, Structured Conversation  Minimal (4)   Expressive Language   Expressive Language (WDL) X   Naming Within Functional Limits (6-7)   Automatic Language Appropriate Supervision (5)   Verbalizes Wants / Needs Supervision (5)   Word Finding Deficits Minimal (4)   Cognition   Cognitive-Linguistic (WDL) X   Attention to Task Moderate (3)   Simple Attention Minimal (4)   Visual Scanning / Cancellation Skills Minimal (4)   Simple Information Processing Minimal (4)   Verbal Short Term Memory   (Less than 5 mins)   Simple Reasoning / Problem Solving Moderate (3)   Insight into Deficits Severe (2)   Initiation Minimal (4)   Clock Drawing Poor Planning;Disorganization;Numeric Errors;Omissions;Impaired Hand Placement   Outcome Measures   Outcome Measures Utilized SCCAN   SCCAN (Scales of Cognitive and Communicative Ability for Neurorehabilitation)   Oral Expression - Raw Score 14   Oral Expression - Scale Performance Score 74    Orientation - Raw Score 5   Orientation - Scale Performance Score 42   Memory - Raw Score 7   Memory - Scale Performance Score 37   Speech Comprehension - Raw Score 12   Speech Comprehension - Scale Performance Score 92   Problem Solving - Raw Score 12   Problem Solving - Scale Performance Score 52   Problem List   Problem List Cognitive-Linguistic Deficits;Voice Quality Deficit;Memory Deficit   Current Discharge Plan   Current Discharge Plan Return to Prior Living Situation   Benefit   Therapy Benefit Patient would benefit from Inpatient Rehab Speech-Language Pathology to address above identified deficits.   Interdisciplinary Plan of Care Collaboration   IDT Collaboration with  Speech Therapist   Patient Position at End of Therapy In Bed;Call Light within Reach;Phone within Reach   Collaboration Comments CLOF   Speech Language Pathologist Assigned   Assigned SLP / Pager # CW cog/sw, t-dine, 60   SLP Total Time Spent   SLP Individual Total Time Spent (Mins) 60   SLP Charge Group   Charges Yes   SLP Speech Language Evaluation Speech Sound Language Comprehension         FIM Comprehension Score:  4 - Minimal Assistance  Comprehension Description:  Verbal cues(Increased time)    FIM Expression Score:  3 - Moderate Assistance  Expression Description:  Verbal cueing    FIM Social Interaction Score:  5 - Stand-by Prompting/Supervision or Set-up  Social Interaction Description:  Increased time, Verbal cues    FIM Problem Solving Score:  3 - Moderate Assistance  Problem Solving Description:  Verbal cueing, Bed/chair alarm, Increased time, Therapy schedule, Seat belt    FIM Memory Score:  2 - Max Assistance  Memory Description:  Verbal cueing, Seat belt, Therapy schedule    Assessment    Patient is 73 y.o. male with a diagnosis of encephalopathy with cognitive decline and diffuse weakness.  Additional factors influencing patient status/progress (ie: cognitive factors, co-morbidities, social support, etc):lives w/ wife and  adult child and pt presents with cognitive linguistic and voice deficits.    The Scales of Cognitive Communicative Ability for Neurorehabilitation was initiated and revealed significant deficits with orientation, memory, and problem solving. Additionally, pt presents with significantly decreased abiltiy to produce voicing with weak, breathy and aphonic vocal quality. Pt was able to produce voicing when humming following a throat clear. Pt will benefit from inpatient speech language therapy addressing orientation, memory, problem solving and voicing.     Plan  Recommend Speech Therapy  minutes per day 5-7 days per week for 2 weeks for the following treatments:  SLP Speech Language Treatment, SLP Cognitive Skill Development and SLP Group Treatment.    Goals:  Long term and short term goals have been discussed with patient and they are in agreement.    Speech Therapy Problems     Problem: Expression STGs     Dates: Start: 10/12/19       Description:     Goal: STG-Within one week, patient will     Dates: Start: 10/12/19       Description: 1) Individualized goal:  Will produce prolonged voicing (e.g., hum, 'ah', frontal focus) with 80% accuracy and MOD A.   2) Interventions:  SLP Speech Language Treatment and SLP Group Treatment                   Problem: Memory STGs     Dates: Start: 10/12/19       Description:     Goal: STG-Within one week, patient will     Dates: Start: 10/12/19       Description: 1) Individualized goal:  Be oriented to date, time of day, location, and situation in 80% of opportunities with MOD A.   2) Interventions:  SLP Self Care / ADL Training , SLP Cognitive Skill Development and SLP Group Treatment             Goal: STG-Within one week, patient will     Dates: Start: 10/12/19       Description: 1) Individualized goal:  Use external memory aids to recall novel information from H stay with 80% accuracy and MOD A.   2) Interventions:  SLP Self Care / ADL Training , SLP Cognitive Skill  Development and SLP Group Treatment                   Problem: Problem Solving STGs     Dates: Start: 10/11/19       Description:     Goal: STG-Within one week, patient will     Dates: Start: 10/11/19       Description: 1) Individualized goal:  Complete the SCCAN with additional goals added as appropriate.    2) Interventions:  SLP Cognitive Skill Development                   Problem: Speech/Swallowing LTGs     Dates: Start: 10/11/19       Description:     Goal: LTG-By discharge, patient will safely swallow     Dates: Start: 10/11/19       Description: 1) Individualized goal:  Regular textures and thin liquids without aspiration.    2) Interventions:  SLP Swallowing Dysfunction Treatment, SLP Oral Pharyngeal Evaluation, SLP Video Swallow Evaluation and SLP Group Treatment             Goal: LTG-By discharge, patient will solve basic problems     Dates: Start: 10/11/19       Description: 1) Individualized goal:  Marco Antonio for safe discharge home.    2) Interventions:  SLP Cognitive Skill Development and SLP Group Treatment                   Problem: Swallowing STGs     Dates: Start: 10/11/19       Description:     Goal: STG-Within one week, patient will     Dates: Start: 10/11/19       Description: 1) Individualized goal:  Tolerate dys2/NTL diet without overt s/s of aspiration over 3 meals.    2) Interventions:  SLP Swallowing Dysfunction Treatment and SLP Group Treatment             Goal: STG-Within one week, patient will     Dates: Start: 10/11/19       Description: 1) Individualized goal:  Participate in a modified barium swallow study.   2) Interventions:  SLP Video Swallow Evaluation

## 2019-10-12 NOTE — ASSESSMENT & PLAN NOTE
Pt presented to [facility] with increased weakness, confusion, difficulty swallowing  An MRI of the brain was unremarkable and did not show the previous mass  An MRI of the cervical spine and thoracic spine did not show any acute issues or metastasis (has DDD)

## 2019-10-12 NOTE — THERAPY
"Occupational Therapy  Daily Treatment     Patient Name: Marcio More Jr.  Age:  73 y.o., Sex:  male  Medical Record #: [id number]  Today's Date: 10/12/2019     Precautions  Precautions: Fall Risk  Comments: spasticity, poor motor planning    Safety   ADL Safety : Requires Supervision for Safety, Requires Physical Assist for Safety  Bathroom Safety: Requires Supervision for Safety, Requires Physical Assist for Safety  Comments: see FIM for oral care and eating    Subjective    \"I'm hungry I didn't get to eat breakfast.\" \"I have a wound over my tailbone and it hurts.\" \"I don't think I can get out of bed.\"     Objective       10/12/19 1001   Precautions   Precautions Fall Risk   Comments spasticity, poor motor planning, dys 2 NTL, skin, wound over coccyx, \"Richie,\" back pain   Safety    ADL Safety  Requires Supervision for Safety;Requires Physical Assist for Safety   Bathroom Safety Requires Supervision for Safety;Requires Physical Assist for Safety   Comments see FIM for oral care and eating   Cognition    Ability To Follow Commands 1 Step   Safety Awareness Impaired   New Learning Impaired   Attention Impaired   Sequencing Impaired   Initiation Impaired   Comments requires heavy cues for initiation and sequencing ADLs   Bed Mobility    Supine to Sit Total Assist X 2   Scooting Maximal Assist   Rolling Total Assist X 2 to Rt.   Skilled Intervention Verbal Cuing;Tactile Cuing;Facilitation   Comments requires heavy vc and tactile cues to sequence bed mob with A x 2   Interdisciplinary Plan of Care Collaboration   IDT Collaboration with  Therapy Tech;Certified Nursing Assistant;Speech Therapist;Nursing;Physical Therapist Assistant (PTA)   Patient Position at End of Therapy Seated;Other (Comments)  (w/PT)   Collaboration Comments requested bed inflation when pt in bed to CNA, discussed diet and eating strategies with ST, tech assisted with transfers, informed PTA of pt status and transitioned care   OT Total Time Spent "   OT Individual Total Time Spent (Mins) 60   OT Charge Group   OT Self Care / ADL 2   OT Therapy Activity 2       FIM Eating Score:  5 - Standby Prompting/Supervision or Set-up  Eating Description:  Increased time, Adaptive equipment, Modified diet, Verbal cueing(set up, enlarged handled spoon, extra time, intermittent cues for small bites and hard swallows)    FIM Grooming Score:  4 - Minimal Assistance  Grooming Description:  (set up and min A to faciliate forward lean for swallow precautions during oral care)      Assessment    Pt resistant to therapy and bed transfer but given heavy tactile and vc able to perform SB transfer. Recommend attempting SQPT x 2 next tx session. Pt missed breakfast this morning due to inability to transfer to chair and miss communication between tech and CNA. Pt demonstrated ability to eat with enlarged handled spoon and cues for safety. Ability to perform oral care with min A for forward lean, set up and cues for safety. Provided pt with roho cushion to relieve pain over coccyx.     Plan    Recommend attempting SQPT x 2 next tx session. Tactile and simple vc for sequencing. ADL re-training, stand/sitting balance, spasticity management, functional cog, general strengthening.

## 2019-10-12 NOTE — CONSULTS
"DATE OF SERVICE:  10/12/2019    REQUESTING PHYSICIAN:  Javier Bella MD    CHIEF COMPLAINT / REASON FOR CONSULTATION:   Hypertension  Afib  Diabetes  Leukocytosis    HISTORY OF PRESENT ILLNESS:  This is a 72 y/o. male with a PMH significant for hypertension, afib, diabetes, and high grade lymphoma stage IV, s/p resection and chemotherapy in the form of R-CHOP on 9/23 who presented to [facility] with increased fatigue, weakness, confusion, and difficulty swallowing.  Oncology was consulted.  An MRI of the brain, cervical spine, and thoracic spine was unremarkable for any acute issues or metastasis.  At the hospital, the patient did go into rapid afib and Cardizem was added to his regiment with good control.  He had a swallowing eval done and was subsequently placed on a dysphagia 2 nectar thick liquid diet.     Because of the patient's weakness and debility, Rehab was consulted, evaluated the patient, and was deemed a good Rehab candidate.  The patient was transferred over to the Rehab facility on 10/10/2019.      The patient denies fever, chills, nausea, vomiting, headaches, blurry vision, or chest pain.    REVIEW OF SYSTEMS: All review of systems are negative pre AMA and CMS criteria   except for that stated in the HPI.    PAST MEDICAL HISTORY:  Past Medical History:   Diagnosis Date   • Anesthesia     \"too much anesthesia kidneys stop woking and intestines slowed\"   • Arthritis    • Atrial fibrillation (HCC)    • Back pain    • Blood clotting disorder (HCC) 1999    blood clot leg Left   • Breath shortness    • CAD (coronary artery disease)    • Cancer (HCC) 2016    prostate cancer   • Cataract     no surgery   • Dyslipidemia    • High cholesterol    • Hypertension    • Ileus (HCC)    • Myocardial infarct (HCC) 1999,2006    x3   • Renal disorder     cycst left kidney   • Sleep apnea     no cpap   • Snoring    • Urinary incontinence        PAST SURGICAL HISTORY:  Past Surgical History:   Procedure Laterality Date   • OK " DX BONE MARROW ASPIRATIONS Left 8/1/2019    Procedure: ASPIRATION, BONE MARROW - REGANTI;  Surgeon: Young Veliz M.D.;  Location: ENDOSCOPY [facility];  Service: Orthopedics   • CT DX BONE MARROW BIOPSIES Left 8/1/2019    Procedure: BIOPSY, BONE MARROW, USING NEEDLE OR TROCAR;  Surgeon: Young Veliz M.D.;  Location: ENDOSCOPY [facility];  Service: Orthopedics   • CRANIOTOMY Right 7/10/2019    Procedure: RIGHT-SIDED CRANIOTOMY FOR TUMOR;  Surgeon: Son Ruffin M.D.;  Location: SURGERY [facility];  Service: Neurosurgery   • LUMBAR LAMINECTOMY DISKECTOMY N/A 11/26/2018    Procedure: LUMBAR LAMINECTOMY DISKECTOMY-  POSTERIOR L1-2 LAMI;  Surgeon: Son Ruffin M.D.;  Location: SURGERY [facility];  Service: Neurosurgery   • LAMINOTOMY Left 11/26/2018    Procedure: LAMINOTOMY-  L4-S1;  Surgeon: Son Ruffin M.D.;  Location: SURGERY [facility];  Service: Neurosurgery   • FORAMINOTOMY Left 11/26/2018    Procedure: FORAMINOTOMY;  Surgeon: Son Ruffin M.D.;  Location: SURGERY [facility];  Service: Neurosurgery   • OTHER NEUROLOGICAL SURG  2016    Thoracic 2-3 fusion    • APPENDECTOMY  2002   • OTHER CARDIAC SURGERY  1999,2002,2006    stents cardiac   • OTHER NEUROLOGICAL SURG      Laminectomy       Allergies   Allergen Reactions   • Gabapentin Rash     Broke out in a rash on R bicep       CURRENT MEDICATIONS:    Current Facility-Administered Medications:   •  DILTIAZem  •  metoprolol  •  apixaban  •  baclofen  •  vitamin D  •  Respiratory Care per Protocol  •  oxyCODONE immediate-release  •  oxyCODONE immediate-release  •  tramadol  •  hydrALAZINE  •  acetaminophen  •  senna-docusate **AND** polyethylene glycol/lytes **AND** magnesium hydroxide **AND** bisacodyl  •  artificial tears  •  benzocaine-menthol  •  mag hydrox-al hydrox-simeth  •  ondansetron **OR** ondansetron  •  traZODone  •  sodium chloride  •  insulin regular **AND** Accu-Chek ACHS **AND**  glucose **AND** dextrose 50%  •  allopurinol  •  atorvastatin  •  ferrous sulfate  •  loratadine  •  omeprazole  •  predniSONE    Social History     Socioeconomic History   • Marital status:      Spouse name: Not on file   • Number of children: Not on file   • Years of education: Not on file   • Highest education level: Not on file   Occupational History   • Not on file   Social Needs   • Financial resource strain: Not on file   • Food insecurity:     Worry: Not on file     Inability: Not on file   • Transportation needs:     Medical: Not on file     Non-medical: Not on file   Tobacco Use   • Smoking status: Never Smoker   • Smokeless tobacco: Never Used   Substance and Sexual Activity   • Alcohol use: Yes     Comment: 1 drink a month   • Drug use: No   • Sexual activity: Not on file   Lifestyle   • Physical activity:     Days per week: Not on file     Minutes per session: Not on file   • Stress: Not on file   Relationships   • Social connections:     Talks on phone: Not on file     Gets together: Not on file     Attends Hoahaoism service: Not on file     Active member of club or organization: Not on file     Attends meetings of clubs or organizations: Not on file     Relationship status: Not on file   • Intimate partner violence:     Fear of current or ex partner: Not on file     Emotionally abused: Not on file     Physically abused: Not on file     Forced sexual activity: Not on file   Other Topics Concern   • Not on file   Social History Narrative   • Not on file       FAMILY HISTORY:  was reviewed and is not pertinent to this consultation.    PHYSICAL EXAMINATION:  VITAL SIGNS:  Temp is 98.4, blood pressure is 113/76, heart rate is 85, respiratory rate is 18.  GENERAL:  Patient was lying in bed in no distress.  HEENT:  Pupils were equal, round and reactive to light and accomodation.  Oral mucosa was pink and moist.  NECK:  Soft.  Supple.  No JVD.  HEART:  Irregular rhythm.  Normal S1 and S2.  No murmurs  were appreciated.  LUNGS:  Are clear to auscultation bilaterally.  ABDOMEN:  Soft, non tender, non distended.  Bowels sound were positive in all four quadrants.  EXTREMITIES:  No clubbing, cyanosis.  There was no lower extremity edema.  NEUROLOGIC:  Cranial nerves two through twelve were grossly intact.    LABS:  Lab Results   Component Value Date/Time    SODIUM 136 10/11/2019 05:51 AM    POTASSIUM 3.8 10/11/2019 05:51 AM    CHLORIDE 101 10/11/2019 05:51 AM    CO2 26 10/11/2019 05:51 AM    GLUCOSE 136 (H) 10/11/2019 05:51 AM    BUN 21 10/11/2019 05:51 AM    CREATININE 0.89 10/11/2019 05:51 AM      Lab Results   Component Value Date/Time    WBC 12.8 (H) 10/11/2019 05:51 AM    RBC 4.56 (L) 10/11/2019 05:51 AM    HEMOGLOBIN 14.1 10/11/2019 05:51 AM    HEMATOCRIT 44.1 10/11/2019 05:51 AM    MCV 96.7 10/11/2019 05:51 AM    MCH 30.9 10/11/2019 05:51 AM    MCHC 32.0 (L) 10/11/2019 05:51 AM    MPV 8.3 (L) 10/11/2019 05:51 AM    NEUTSPOLYS 75.00 (H) 10/11/2019 05:51 AM    LYMPHOCYTES 8.10 (L) 10/11/2019 05:51 AM    MONOCYTES 13.70 (H) 10/11/2019 05:51 AM    EOSINOPHILS 0.10 10/11/2019 05:51 AM    BASOPHILS 1.20 10/11/2019 05:51 AM    ANISOCYTOSIS 1+ 10/04/2019 03:53 AM      Lab Results   Component Value Date/Time    PROTHROMBTM 13.6 10/01/2019 12:15 PM    INR 1.01 10/01/2019 12:15 PM        A-fib (HCC)  HR ok  S/P RVR at [facility]  On Cardizem: 90 mg qid  On Lopressor: 25 mg bid  Cont to monitor    B-cell lymphoma (HCC)  Stage IV  Hx of brain resection  S/P R-CHOP chemo on 9/23  Recent brain MRI showed no mass    Hypertension  BP ok  On Cardizem: 90 mg qid  On Lopressor: 25 mg bid  Monitor    Type 2 diabetes mellitus without complication, without long-term current use of insulin (HCC)  Hba1c: 7.2 (10/11)  BS labile: 101-210  BS appear to be trending upwards recently  Currently not on any diabetic meds  Consider starting low dose Metformin  Note: home meds include Metformin  mg daily  Will monitor another day before adjusting  meds (10/12    Leukocytosis  WBC's: 16.2 --> 12.8 (10/12)  Has been afebrile  Denies cough  Denies diarrhea  U/A neg (10/7)  2nd to steroids  On Prednisone 10 mg daily (thru 10/15) then will be off  Monitor    Vitamin D insufficiency  Vit D: 24  On supplements    Neurologic abnormality  Pt presented to [facility] with increased weakness, confusion, difficulty swallowing  An MRI of the brain was unremarkable and did not show the previous mass  An MRI of the cervical spine and thoracic spine did not show any acute issues or metastasis (has DDD)      This case has been discussed with the attending Physiatrist.    Thank you for the consultation.  Will follow the patient with you.

## 2019-10-12 NOTE — ASSESSMENT & PLAN NOTE
Stage IV w/ h/o brain mass resection  S/P chemo on 9/23/19  S/P Prednisone for confusion per Physiatry

## 2019-10-13 NOTE — PROGRESS NOTES
Hospital Medicine Daily Progress Note    Date of Service  10/13/2019    Chief Complaint:  Hypertension  Afib  Diabetes  Leukocytosis    Interval History:  No significant events or changes since last visit    Review of Systems  Review of Systems   Constitutional: Negative for fever.   Eyes: Negative for blurred vision.   Respiratory: Negative for cough.    Cardiovascular: Negative for chest pain.   Gastrointestinal: Negative for diarrhea.   Musculoskeletal: Negative for joint pain.   Neurological: Negative for dizziness.   Psychiatric/Behavioral: The patient is not nervous/anxious.         Physical Exam  Temp:  [36.6 °C (97.9 °F)-36.9 °C (98.4 °F)] 36.6 °C (97.9 °F)  Pulse:  [62-85] 62  Resp:  [18] 18  BP: (113-132)/(68-82) 121/82    Physical Exam   Constitutional: No distress.   HENT:   Mouth/Throat: No oropharyngeal exudate.   Eyes: EOM are normal.   Neck: No JVD present. No tracheal deviation present.   Cardiovascular: Normal rate and regular rhythm.   No murmur heard.  Pulmonary/Chest: Effort normal and breath sounds normal.   Abdominal: Soft. Bowel sounds are normal.   Musculoskeletal: He exhibits no edema.   Skin: Skin is warm and dry. No rash noted.   Psychiatric: His behavior is normal.   Nursing note and vitals reviewed.      Fluids    Intake/Output Summary (Last 24 hours) at 10/13/2019 1059  Last data filed at 10/13/2019 0900  Gross per 24 hour   Intake 490 ml   Output --   Net 490 ml       Laboratory  Recent Labs     10/11/19  0551   WBC 12.8*   RBC 4.56*   HEMOGLOBIN 14.1   HEMATOCRIT 44.1   MCV 96.7   MCH 30.9   MCHC 32.0*   RDW 46.2   PLATELETCT 390   MPV 8.3*     Recent Labs     10/11/19  0551   SODIUM 136   POTASSIUM 3.8   CHLORIDE 101   CO2 26   GLUCOSE 136*   BUN 21   CREATININE 0.89   CALCIUM 9.2                   Imaging    Assessment/Plan  B-cell lymphoma (HCC)- (present on admission)  Assessment & Plan  Stage IV  Hx of brain resection  S/P R-CHOP chemo on 9/23  Recent brain MRI showed no  mass    A-fib (HCC)- (present on admission)  Assessment & Plan  HR ok  S/P RVR at [facility]  On Cardizem: 90 mg qid  On Lopressor: 25 mg bid  Cont to monitor    Type 2 diabetes mellitus without complication, without long-term current use of insulin (HCC)- (present on admission)  Assessment & Plan  Hba1c: 7.2 (10/11)  BS labile: 115 (am) - 194  Currently not on any diabetic meds  Will start Metformin: 250 mg qam (10/13)  Note: home meds include Metformin  mg daily  Cont to monitor    Hypertension- (present on admission)  Assessment & Plan  BP ok  On Cardizem: 90 mg qid  On Lopressor: 25 mg bid  Monitor    Neurologic abnormality  Assessment & Plan  Pt presented to [facility] with increased weakness, confusion, difficulty swallowing  An MRI of the brain was unremarkable and did not show the previous mass  An MRI of the cervical spine and thoracic spine did not show any acute issues or metastasis (has DDD)    Leukocytosis- (present on admission)  Assessment & Plan  WBC's: 16.2 --> 12.8 (10/12)  Has been afebrile  Denies cough  Denies diarrhea  U/A neg (10/7)  2nd to steroids  On Prednisone 10 mg daily (thru 10/15) then will be off  Monitor    Vitamin D insufficiency- (present on admission)  Assessment & Plan  Vit D: 24  On supplements

## 2019-10-13 NOTE — PROGRESS NOTES
1914 received report from day shift nurse and assume patient care. Pt is stable and calm . Denies any pain at this time. No s/sx of distress. Safety precautions in place. Call light with in reach. Will continue to monitor.

## 2019-10-13 NOTE — CARE PLAN
Problem: Pain Management  Goal: Pain level will decrease to patient's comfort goal  Outcome: PROGRESSING AS EXPECTED  Flowsheets (Taken 10/12/2019 5551)  Pain Rating Scale (NPRS): 7  Note:   When asked, pt notes chronic back pain and pain associated with pressure ulcer to sacrum. Reports positive relief of pain with PRN oxycodone. Will continue to monitor.

## 2019-10-13 NOTE — CARE PLAN
Problem: GLYCEMIA IMBALANCE  Goal: Clinical indication of glycemia balance is achieved  Outcome: PROGRESSING SLOWER THAN EXPECTED  Note:   Pt has hx of type 2 diabetes with unclear home use of diabetic medication. High A1c and high blood sugars; pt started on 250 mg metformin daily per hospitalist. Will continue to monitor.     Problem: Pain Management  Goal: Pain level will decrease to patient's comfort goal  Flowsheets (Taken 10/13/2019 9310)  Pain Rating Scale (NPRS): 9  Note:   Per pt's wife and daughter, though oxycodone works for pt's chronic back pain, it leads to excessive lethargy. Pt's behavior following PRN oxy this shift supports this statement; family recommends starting pt on scheduled Tylenol. Will discuss with Md; will initiate regular administration of PRN Tylenol.

## 2019-10-13 NOTE — CARE PLAN
Problem: Communication  Goal: The ability to communicate needs accurately and effectively will improve  Outcome: PROGRESSING SLOWER THAN EXPECTED  Note:   Resident is confuse and has memory loss. Resident was encouraged to make his needs known, such as pain. Resident was able to communicate pain however not some of the adl's.      Problem: Safety  Goal: Will remain free from falls  Outcome: PROGRESSING AS EXPECTED  Note:   Patient was changed with two person assist, kept rails up and bed at the lowest position. Safety precautions maintained. Kept rested during the night.  Will continue to monitor.

## 2019-10-13 NOTE — PROGRESS NOTES
Noted with bloody soft medium stool this morning. Patient with hemorrhoids and on anticoagulant Apixaban. Charge nurse notified.

## 2019-10-14 NOTE — THERAPY
Physical Therapy   Daily Treatment     Patient Name: Marcio More Jr.  Age:  73 y.o., Sex:  male  Medical Record #: [id number]  Today's Date: 10/14/2019       Precautions: (P) Fall Risk  Comments: (P) spasticity, poor motor planning, coccyx ulcer    Subjective    Patient reported fatigue, but agreed to all treatments. Patient acknowledged progress to date.      Objective       10/14/19 0931   Precautions   Precautions Fall Risk   Comments spasticity, poor motor planning, coccyx ulcer   Sitting Lower Body Exercises   Sit to Stand 1 set of 10  (// bars slowly, impraired ability to stand fully upright-LBP)   Other Exercises Lateral weight shifting in // bars min A for improved ROM. Unable to toe tap BLE. Heel raises 5x BLE. Stepping fwd/ bwd 10x BLE CGA.    Bed Mobility    Sit to Supine Moderate Assist   Sit to Stand Minimal Assist  (Min A -> SBA with practice, improving eccentric control)   Scooting Minimal Assist   Rolling Moderate Assist to Lt.   Neuro-Muscular Treatments   Neuro-Muscular Treatments Weight Shift Right;Weight Shift Left;Verbal Cuing;Tactile Cuing;Sequencing;Postural Facilitation;Postural Changes;Joint Approximation;Facilitation   Comments Pressure relief education, and demonstration, including frequency. Patient was ineffective at lateral relief due to LBP, but can perform tricep press up.  2 -4 min increments standing in // bars.    Interdisciplinary Plan of Care Collaboration   IDT Collaboration with  Therapy Tech   Patient Position at End of Therapy In Bed;Tray Table within Reach;Call Light within Reach;Other (Comments)  (pressure relieving booties donned)   Collaboration Comments POC   PT Total Time Spent   PT Individual Total Time Spent (Mins) 60   PT Charge Group   PT Gait Training 1   PT Therapeutic Exercise 1   PT Neuromuscular Re-Education / Balance 1   PT Therapeutic Activities 1       FIM Bed/Chair/Wheelchair Transfers Score: 3 - Moderate Assistance  Bed/Chair/Wheelchair Transfers  Description:  Assist with two limbs(Min A reach pivot wc to bed, mod A BLE into bed)    FIM Walking Score:  1 - Total Assistance  Walking Description:  Two hand rails, Extra time, Safety concerns, Requires incidental assist(CGA 10ft fwd, + 10 ft fwd/ bwd x 2 with wc follow in // bars)      Assessment    Patient demonstrated improved STS/reach pivot transfers, and gait tolerance in // bars.  Patient remains limited by dec activity tolerance, posture, and LBP.     Plan    Progress to FWW for gait with wc follow, and STS/SPT transfers with FWW, ongoing pressure relief education and practice, standing tolerance, bed mobility.

## 2019-10-14 NOTE — THERAPY
Occupational Therapy  Daily Treatment     Patient Name: Marcio More Jr.  Age:  73 y.o., Sex:  male  Medical Record #: [id number]  Today's Date: 10/14/2019     Precautions  Precautions: (P) Fall Risk  Comments: (P) spasticity, poor motor planning, coccyx ulcer    Safety   ADL Safety : (P) Requires Physical Assist for Safety, Requires Cueing for Safety  Bathroom Safety: (P) Requires Physical Assist for Safety, Requires Cuing for Safety  Comments: see FIM for oral care and eating    Subjective    Pt was supine in bed and asleep upon arrival. Pt was easily aroused and agreeable to therapy     Objective       10/14/19 0701   Precautions   Precautions Fall Risk   Comments spasticity, poor motor planning, coccyx ulcer   Safety    ADL Safety  Requires Physical Assist for Safety;Requires Cueing for Safety   Bathroom Safety Requires Physical Assist for Safety;Requires Cuing for Safety   Cognition    Level of Consciousness Alert   Ability To Follow Commands 2 Step   New Learning Impaired   Attention Impaired   Sequencing Impaired   Initiation Impaired   Comments requires maximal cues for sequencing, initation and termination of ADLs   Balance   Sitting Balance (Static) Poor   Sitting Balance (Dynamic) Poor   Standing Balance (Static) Trace +   Standing Balance (Dynamic) Trace   Weight Shift Sitting Poor   Weight Shift Standing Poor   Skilled Intervention Verbal Cuing;Tactile Cuing;Sequencing   Bed Mobility    Supine to Sit Total Assist X 2  (Min A X 2)   Scooting Stand by Assist   Rolling Maximal Assist to Rt.   Skilled Intervention Facilitation;Tactile Cuing;Verbal Cuing   Interdisciplinary Plan of Care Collaboration   IDT Collaboration with  Nursing   Patient Position at End of Therapy Seated;Self Releasing Lap Belt Applied  (in t-dine with SLP)   OT Total Time Spent   OT Individual Total Time Spent (Mins) 60   OT Charge Group   OT Self Care / ADL 4       FIM Grooming Score:  5 - Standby Prompting/Supervision or  Set-up  Grooming Description:  Increased time, Supervision for safety, Verbal cueing, Initial preparation for task(SBA for verbal cues for pacing, initiation and termination)    FIM Bathing Score:  3 - Moderate Assistance  Bathing Description:  Grab bar, Tub bench, Hand rails, Hand held shower, Increased time, Supervision for safety, Verbal cueing, Initial preparation for task, Requires minimal contact(Mod A for feet and bottom, CGA for balance when standing)     FIM Upper Body Dressin - Minimal Assistance  Upper Body Dressing Description:  Increased time(Min A to assist with pull down, verbal cues for sequencing )    FIM Lower Body Dressing Score:  1 - Total Assistance  Lower Body Dressing Description:  Reacher, Dressing stick, Increased time, Supervision for safety, Verbal cueing, Set-up of equipment, Requires 2 people to assist, Requires minimal contact(Min A x 1 for balance and Min A x 1 for donning over hips. Total assist for socks, Max A to thread BLE)    FIM Bed/Chair/Wheelchair Transfers Score: 1 - Total Assistance  Bed/Chair/Wheelchair Transfers Description:  Increased time, Verbal cueing, Supervision for safety, Requires 2 people to assist, Requires lift(Min A for lift and Min A x1 for balance and verbal cues for sequencing)    FIM Tub/Shower Transfers Score:  2 - Max Assistance  Tub/Shower Transfers Description:  Increased time, Supervision for safety, Verbal cueing, Initial preparation for task, Assist with two limbs, Requires lift(Max A for lift and balance, tactile and verbal cues for sequencing steps. SPT)      Assessment    Pt demonstrated increased independence with functional transfers, requiring only Min A x 2 (Total A transfer), demonstrating increased motor planning with increased time. Pt was able to doff pants and socks with Min A using dressing stick, still requiring Max A to jose clothing with CGA from 2nd person. Pt continues to require maximal verbal cues for initiation, sequencing,  and termination of tasks as well as requiring additional time for ADL tasks.     Plan    ADLs (use of AE/DME), IADLs, functional transfers, spasticity management, motor planning by repetition.

## 2019-10-14 NOTE — REHAB-PHARMACY IDT TEAM NOTE
Pharmacy   Pharmacy  Antibiotics/Antifungals/Antivirals:  Medication:      Active Orders (From admission, onward)    None        Route:        NA  Stop Date:  NA  Reason:      NA  Antihypertensives/Cardiac:  Medication:    Orders (72h ago, onward)     Start     Ordered    10/12/19 0600  metoprolol (LOPRESSOR) tablet 25 mg  TWICE DAILY      10/11/19 1004    10/11/19 1200  DILTIAZem (CARDIZEM) tablet 90 mg  EVERY 6 HOURS      10/11/19 1004    10/10/19 2100  atorvastatin (LIPITOR) tablet 20 mg  NIGHTLY      10/10/19 1546    10/10/19 1546  hydrALAZINE (APRESOLINE) tablet 25 mg  EVERY 8 HOURS PRN      10/10/19 1546              Patient Vitals for the past 24 hrs:   BP Pulse   10/14/19 0700 114/68 98   10/14/19 0516 124/90 91   10/13/19 2358 116/69 81   10/13/19 1950 124/80 80   10/13/19 1400 116/74 76     Anticoagulation:  Medication: Eliquis    Other key medications: A review of the medication list reveals no issues at this time. Patient is currently on antihypertensive(s). Recommend home blood pressure monitoring/recording if antihypertensive(s) regimen(s) continue. Patient is currently on diabetic medication(s) and/or Insulin(s). Recommend home blood glucose monitoring/recording if these regimen(s) continue.    Section completed by: Osmany Morales [facility]

## 2019-10-14 NOTE — REHAB-OT IDT TEAM NOTE
Occupational Therapy   Activities of Daily Living  Eating Initial:  4 - Minimal Assistance  Eating Current:  5 - Standby Prompting/Supervision or Set-up   Eating Description:  Increased time, Adaptive equipment, Modified diet, Verbal cueing(set up, enlarged handled spoon, extra time, intermittent cues for small bites and hard swallows)  Grooming Initial:  3 - Moderate Assistance  Grooming Current:  5 - Standby Prompting/Supervision or Set-up   Grooming Description:  Increased time, Supervision for safety, Verbal cueing, Initial preparation for task(SBA for verbal cues for pacing, initiation and termination)  Bathing Initial:  2 - Max Assistance  Bathing Current:  3 - Moderate Assistance   Bathing Description:  Grab bar, Tub bench, Hand rails, Hand held shower, Increased time, Supervision for safety, Verbal cueing, Initial preparation for task, Requires minimal contact(Mod A for feet and bottom, CGA for balance when standing)  Upper Body Dressing Initial:  3 - Moderate Assistance  Upper Body Dressing Current:  4 - Minimal Assistance   Upper Body Dressing Description:  Increased time(Min A to assist with pull down, verbal cues for sequencing )  Lower Body Dressing Initial:  1 - Total Assistance  Lower Body Dressing Current:  1 - Total Assistance   Lower Body Dressing Description:  1 - Total Assistance  Toileting Initial:     Toileting Current:      Toileting Description:     Toilet Transfer Initial:  1 - Total Assistance  Toilet Transfer Current:  1 - Total Assistance   Toilet Transfer Description:  1 - Total Assistance  Tub / Shower Transfer Initial:  1 - Total Assistance  Tub / Shower Transfer Current:  2 - Max Assistance   Tub / Shower Transfer Description:  Increased time, Supervision for safety, Verbal cueing, Initial preparation for task, Assist with two limbs, Requires lift(Max A for lift and balance, tactile and verbal cues for sequencing steps. SPT)  IADL:  Therapy concentrated on ADLs and functional  transfers   Family Training/Education:  Training on dressing AE   DME/DC Recommendations:  Reacher, dressing stick, sock aid, SC, grab bars in shower and by toilet     Strengths:  Able to follow instructions, Effective communication skills, Good insight into deficits/needs, Motivated for self care and independence, Pleasant and cooperative, Supportive family and Willingly participates in therapeutic activities  Barriers:  Limited mobility, Poor balance and Other: spasticity      # of short term goals set= 4    # of short term goals met= 1     Occupational Therapy Goals     Problem: Bathing     Dates: Start: 10/11/19       Description:     Goal: STG-Within one week, patient will bathe     Dates: Start: 10/11/19       Description: 1) Individualized Goal:  Mod A with AE/DME PRN  2) Interventions:  OT E Stim Attended, OT Group Therapy, OT Self Care/ADL, OT Cognitive Skill Dev, OT Community Reintegration, OT Manual Ther Technique, OT Neuro Re-Ed/Balance, OT Sensory Int Techniques, OT Therapeutic Activity and OT Evaluation                 Problem: Dressing     Dates: Start: 10/11/19       Description:     Goal: STG-Within one week, patient will dress LB     Dates: Start: 10/11/19       Description: 1) Individualized Goal:  Mod A with AE/DME PRN  2) Interventions:  OT E Stim Attended, OT Group Therapy, OT Self Care/ADL, OT Cognitive Skill Dev, OT Community Reintegration, OT Manual Ther Technique, OT Neuro Re-Ed/Balance, OT Sensory Int Techniques, OT Therapeutic Activity and OT Evaluation                 Problem: Functional Transfers     Dates: Start: 10/11/19       Description:     Goal: STG-Within one week, patient will transfer to step in shower     Dates: Start: 10/11/19       Description: 1) Individualized Goal:  Max A with AE/DME PRN  2) Interventions:  OT E Stim Attended, OT Group Therapy, OT Self Care/ADL, OT Cognitive Skill Dev, OT Community Reintegration, OT Manual Ther Technique, OT Neuro Re-Ed/Balance, OT  Sensory Int Techniques, OT Therapeutic Activity and OT Evaluation                 Problem: OT Long Term Goals     Dates: Start: 10/11/19       Description:     Goal: LTG-By discharge, patient will complete basic self care tasks     Dates: Start: 10/11/19       Description: 1) Individualized Goal:  Min A with AE/DME PRN  2) Interventions:  OT E Stim Attended, OT Group Therapy, OT Self Care/ADL, OT Cognitive Skill Dev, OT Community Reintegration, OT Manual Ther Technique, OT Neuro Re-Ed/Balance, OT Sensory Int Techniques, OT Therapeutic Activity and OT Evaluation           Goal: LTG-By discharge, patient will perform bathroom transfers     Dates: Start: 10/11/19       Description: 1) Individualized Goal:  Min A with AE/DME PRN  2) Interventions:  OT E Stim Attended, OT Group Therapy, OT Self Care/ADL, OT Cognitive Skill Dev, OT Community Reintegration, OT Manual Ther Technique, OT Neuro Re-Ed/Balance, OT Sensory Int Techniques, OT Therapeutic Activity and OT Evaluation                         Section completed by:  Leanne Marie MS, OTR/L, CHT

## 2019-10-14 NOTE — REHAB-CM IDT TEAM NOTE
Case Management    DC Planning  DC destination/dispostion: Home with wife in [place], [place].  Referrals: None at this time.  DC Needs: HH vs Outpatient therapy, f/u with Dr. Ferrer and PCP  Barriers to discharge: Patient frequently fatigued.  Strengths: Supportive family.    Section completed by:  Lois Hernandez

## 2019-10-14 NOTE — PROGRESS NOTES
1910 received report from day shift nurse and assume patient care. Pt is with wife and daughter stable and calm. Denies any pain at this time. No s/sx of distress. Place comfortably in bed with CNA. Safety precautions in place. Call light with in reach. Will continue to monitor.

## 2019-10-14 NOTE — CARE PLAN
Problem: Communication  Goal: The ability to communicate needs accurately and effectively will improve  Outcome: PROGRESSING SLOWER THAN EXPECTED  Note:   Patient was able to express himself when asked with periods of confusion but was able to answer some of the questions appropriately. Will encourage patient to express pain and other needs when asked. Will continue to monitor.     Problem: Safety  Goal: Will remain free from falls  Outcome: PROGRESSING AS EXPECTED  Note:   Patient is unable to turn by himself and was turned by nurses every 2 hours. Kept bed at the lowest position. Unable to use call light for needs, checked patient often to check for needs. Safety precautions maintained.

## 2019-10-14 NOTE — PROGRESS NOTES
[facility] Medicine Daily Progress Note    Date of Service  10/14/2019    Chief Complaint:  Hypertension  Afib  Diabetes  Leukocytosis    Interval History:  No significant events or changes since last visit    Review of Systems  Review of Systems   Constitutional: Negative for chills and fever.   Respiratory: Negative for shortness of breath.    Cardiovascular: Negative for chest pain.   Gastrointestinal: Negative for abdominal pain, diarrhea, nausea and vomiting.   Psychiatric/Behavioral: The patient is not nervous/anxious.         Physical Exam  Temp:  [36.3 °C (97.3 °F)-36.6 °C (97.9 °F)] 36.4 °C (97.6 °F)  Pulse:  [76-98] 98  Resp:  [18] 18  BP: (114-124)/(68-90) 114/68    Physical Exam   Constitutional: He is oriented to person, place, and time. He appears well-nourished.   HENT:   Head: Atraumatic.   Eyes: Pupils are equal, round, and reactive to light. Conjunctivae are normal.   Neck: Normal range of motion. Neck supple.   Cardiovascular: Normal rate, regular rhythm, S1 normal and S2 normal.   No murmur heard.  Pulmonary/Chest: Effort normal. He has no wheezes. He has no rhonchi. He has no rales.   Abdominal: Soft. He exhibits no distension. There is no tenderness.   Musculoskeletal: He exhibits no edema.   Neurological: He is alert and oriented to person, place, and time. No sensory deficit.   Skin: Skin is warm and dry. No rash noted. No cyanosis.   Psychiatric: He has a normal mood and affect. His behavior is normal.   Nursing note and vitals reviewed.      Fluids    Intake/Output Summary (Last 24 hours) at 10/14/2019 1133  Last data filed at 10/14/2019 0900  Gross per 24 hour   Intake 500 ml   Output 100 ml   Net 400 ml       Laboratory                        Imaging    Assessment/Plan  B-cell lymphoma (HCC)- (present on admission)  Assessment & Plan  Stage IV  Hx of brain resection  S/P R-CHOP chemo on 9/23  Recent brain MRI showed no mass    A-fib (HCC)- (present on admission)  Assessment & Plan  HR  ok  S/P RVR at [facility]  On Cardizem: 90 mg qid  On Lopressor: 25 mg bid  Cont to monitor    Type 2 diabetes mellitus without complication, without long-term current use of insulin (HCC)- (present on admission)  Assessment & Plan  Hba1c: 7.2 (10/11)  BS after Metformin started: 143-167  On Metformin: 250 mg qam (10/13)  Note: home meds include Metformin  mg daily  Monitor another day since meds were recently adjusted (10/14)    Hypertension- (present on admission)  Assessment & Plan  BP ok  On Cardizem: 90 mg qid  On Lopressor: 25 mg bid  Monitor    Neurologic abnormality  Assessment & Plan  Pt presented to [facility] with increased weakness, confusion, difficulty swallowing  An MRI of the brain was unremarkable and did not show the previous mass  An MRI of the cervical spine and thoracic spine did not show any acute issues or metastasis (has DDD)    Leukocytosis- (present on admission)  Assessment & Plan  WBC's: 16.2 --> 12.8 (10/12)  Has been afebrile  Denies cough  Denies diarrhea  U/A neg (10/7)  2nd to steroids  On Prednisone 10 mg daily (thru 10/15) then will be off  Monitor    Vitamin D insufficiency- (present on admission)  Assessment & Plan  Vit D: 24  On supplements

## 2019-10-14 NOTE — THERAPY
Speech Language Pathology  Daily Treatment     Patient Name: Marcio More Jr.  Age:  73 y.o., Sex:  male  Medical Record #: [id number]  Today's Date: 10/14/2019     Subjective    Pt seen for therapy at bedside, pt was pleasant and cooperative during this session      Objective       10/14/19 1501   SCCAN (Scales of Cognitive and Communicative Ability for Neurorehabilitation)   Oral Expression - Raw Score 14   Oral Expression - Scale Performance Score 74   Orientation - Raw Score 5   Orientation - Scale Performance Score 42   Memory - Raw Score 7   Memory - Scale Performance Score 37   Speech Comprehension - Raw Score 12   Speech Comprehension - Scale Performance Score 92   Reading Comprehension - Raw Score 9   Reading Comprehension - Scale Performance Score 75   Writing - Raw Score 4   Writing - Scale Performance Score 57   Attention - Raw Score 5   Attention - Scale Performance Score 31   Problem Solving - Raw Score 12   Problem Solving - Scale Performance Score 52   SCCAN Total Raw Score 56   SCCAN Degree of Severity Moderate Impairment   SLP Total Time Spent   SLP Individual Total Time Spent (Mins) 30   Charge Group   SLP Cognitive Skill Development 2       Assessment    Completed administration of the SCCAN, pt scored a 56/94 indicating moderate cognitive deficits.  Initiated the O-log, pt confused stating he is currently in a care home in [facility].  Pt perseverating on this throughout the session stating that he believes that he broke the law in [place] and was ordered to this facility by a .  Pt scored a 14/15 on the o-log and was independently only oriented to the current month and year.  Pt is able to phonate in short words with mod cues, but in conversation continues to whisper.      Plan    Continue targeting orientation and simple attention tasks

## 2019-10-15 NOTE — REHAB-NURSING IDT TEAM NOTE
Nursing   Nursing  Diet/Nutrition:  Dysphagia II and Nectar Thick Liquids Diabetic  Medication Administration:  Crush all Medications in Puree  % consumed at meals in last 24 hours:  Consumed % of meals per documentation.  Meal/Snack Consumption for the past 24 hrs:   Oral Nutrition   10/14/19 1200 Lunch;Less than 25% Consumed   10/14/19 0900 Breakfast;Between % Consumed     Snack schedule:  None  Supplement:  Other: none  Appetite:  Fair  Fluid Intake/Output in past 24 hours: In: 420 [P.O.:420]  Out: 100  -   Admit Weight:  Weight: 83.5 kg (184 lb 1.4 oz)  Weight Last 7 Days   [83.1 kg (183 lb 3.2 oz)-83.5 kg (184 lb 1.4 oz)] 83.1 kg (183 lb 3.2 oz) (10/13 0900)    Pain Issues:    Location:  --  --         Severity:  Moderate you really have to ask patient specifically - he prefers not to take pain medications, however if you take the time pt will engage in conversation regarding pain  Scheduled pain medications:  None     PRN pain medications used in last 24 hours:  oxycodone immediate release (ROXICODONE)    Non Pharmacologic Interventions:  relaxation, repositioned and rest  Effectiveness of pain management plan:  good=patient states acceptable comfort after interventions PATIENT WILL NOT COMPLAIN OF PAIN UNLESS HE IS ASKED    Bowel:    Bowel Assist Initial Score:  1 - Total Assistance  Bowel Assist Current Score:  1 - Total Assistance  Bowl Accidents in last 7 days:  2  Last bowel movement: 10/13/19  Stool Description: Large, Brown, Soft     Usual bowel pattern:  UNSURE OF PATIENTS NORM  Scheduled bowel medications:  senna-docusate (PERICOLACE or SENOKOT S)   PRN bowel medications used in last 24 hours:  None  Nursing Interventions:  Increased time, Scheduled medication, Supervision, Verbal cueing, Brief, Requires 2 people to assist  Effectiveness of bowel program:   fair=occasional unpredictable, incontinent emptying of bowel (less than 1 time day)  Bladder:    Bladder Assist Initial Score:  1 -  Total Assistance  Bladder Assist Current Score:  1 - Total Assistance  Bladder Accidents in last 7 days:  0  PVR range for past 24-48 hours: -- ()  Intermittent Catheterization:  N/A  Medications affecting bladder:  None    Time void schedule/voiding pattern:   Interventions:  Increased time, Brief, Requires 2 people to assist, Verbal cueing  Effectiveness of bladder training:  NO BLADDER TRAINING AT THIS TIME    F    Diabetes:  Blood Sugar Frequency:  Other METFORMIN BEFORE BREAKFAST - INSULIN S/S AC-HS    Range of BS for last 48 hours:     Recent Labs     10/13/19  0815 10/13/19  1124 10/13/19  1738 10/14/19  0811 10/14/19  1110 10/14/19  1736 10/14/19  2017   POCGLUCOSE 115* 166* 160* 143* 167* 161* 183*     Scheduled diabetic medications:  metformin (GLUCOPHAGE)   Sliding scale usage in past 24 hours:  Yes  Total Short acting insulin in the past 24 hours:  Humulin r  Total Long acting insulin in the past 24 hours:  None    Wound:         Patient Lines/Drains/Airways Status    Active Current Wounds     None                   I  Sleep/wake cycle:   Average hours slept:  Sleeps less than 2 hours without waking **PATIENT IS AWAKE ALL NIGHT LONG - DAY SHIFT INFORMS THAT HE IS VERY TIRED THROUGHOUT DAY - PT WAS GIVEN PRN tRAMADOL 50MG - AND WAS AWAKE ONE HOUR LATER -   Sleep medication usage:  Other TRAMADOL    Patient/Family Training/Education:  Aspiration Precautions, Diet/Nutrition, Fall Prevention, Medication Management, Safe Transfers, Safety and Skin Care  Discharge Supply Recommendations:  Glucometer and Strips ??  Strengths: Alert and orientedX3,  FAMILY SUPPORT,   Barriers:   Bladder incontinence, Bowel incontinence, Generalized weakness, Limited mobility, Pain poorly managed and Poor sleep pattern       Nursing Problems     Problem: Bowel/Gastric:     Description:     Goal: Normal bowel function is maintained or improved     Description:           Goal: Will not experience complications related to bowel  motility     Description:                 Problem: Communication     Description:     Goal: The ability to communicate needs accurately and effectively will improve     Description:                 Problem: Discharge Barriers/Planning     Description:     Goal: Patient's continuum of care needs will be met     Description:                 Problem: GLYCEMIA IMBALANCE     Description:     Goal: Clinical indication of glycemia balance is achieved     Description:                 Problem: Infection     Description:     Goal: Will remain free from infection     Description:                 Problem: Knowledge Deficit     Description:     Goal: Knowledge of disease process/condition, treatment plan, diagnostic tests, and medications will improve     Description:           Goal: Knowledge of the prescribed therapeutic regimen will improve     Description:                 Problem: Pain Management     Description:     Goal: Pain level will decrease to patient's comfort goal     Description:     Flowsheet:     Taken at 10/13/19 0740    Pain Rating Scale (NPRS) 9 - Can't bear the pain, unable to do anything by  Lisha Trujillo RDavidN.    Taken at 10/12/19 0838    Pain Rating Scale (NPRS) 7 - Focus of attention, prevents doing daily activities by  Lisha Trujillo R.N.                      Problem: Respiratory:     Description:     Goal: Respiratory status will improve     Description:                 Problem: Safety     Description:     Goal: Will remain free from injury     Description:           Goal: Will remain free from falls     Description:                 Problem: Skin Integrity     Description:     Goal: Risk for impaired skin integrity will decrease     Description:                 Problem: Urinary Elimination:     Description:     Goal: Ability to reestablish a normal urinary elimination pattern will improve     Description:                 Problem: Venous Thromboembolism (VTW)/Deep Vein Thrombosis (DVT) Prevention:      Description:     Goal: Patient will participate in Venous Thrombosis (VTE)/Deep Vein Thrombosis (DVT)Prevention Measures     Description:                        Long Term Goals:   At discharge patient will be able to function safely at home with support.    Section completed by:  Nadia Umaña R.N.

## 2019-10-15 NOTE — CARE PLAN
Problem: Safety  Goal: Will remain free from injury  Note:   Patient educated on importance of utilizing call light to minimize the potential of injury from falls. Patient verbalizes understanding. Patient is very forgetful and using call light is continually reinforced. Patient is checked on frequently d/t not utilizing call light at times.         Problem: Skin Integrity  Goal: Risk for impaired skin integrity will decrease  Note:   Patient educated to change positions to minimize the potential of skin break down and possible further complications of open wounds due to pressure, when sitting or laying for periods over half of an hour. Patient engaged in education and verbalizes understanding, however pt is forgetful - pt is asked to turn on his own, as he is able if patient needs assistance moving staff assists to place pillows for pt.

## 2019-10-15 NOTE — PROGRESS NOTES
Patient awake throughout the night.  Patient is using call light appropriately. Reports he is unable to sleep.

## 2019-10-15 NOTE — REHAB-SLP IDT TEAM NOTE
Speech Therapy   Cognitive Linquistic Functions  Comprehension Initial:  4 - Minimal Assistance  Comprehension Current:  4 - Minimal Assistance   Comprehension Description:  Verbal cues  Expression Initial:  4 - Minimal Assistance  Expression Current:  4 - Minimal Assistance   Expression Description:  Verbal cueing  Social Interaction Initial:  5 - Stand-by Prompting/Supervision or Set-up  Social Interaction Current:  6 - Modified Independent   Social Interaction Description:  Increased time  Problem Solving Initial:  3 - Moderate Assistance  Problem Solving Current:  3 - Moderate Assistance   Problem Solving Description:  Verbal cueing, Therapy schedule, Bed/chair alarm, Increased time  Memory Initial:  3 - Moderate Assistance  Memory Current:  3 - Moderate Assistance   Memory Description:  Verbal cueing, Therapy schedule, Bed/chair alarm  Executive Functioning / Organization Initial:   NA  Executive Functioning / Organization Current:   NA   Executive Functioning Desciption:  NA  Swallowing  Swallowing Status:  Pt currently tolerating a dysphagia 2 and NTL diet, MBSS to be completed on 10/15   Orders Placed This Encounter   Procedures   • Diet Order Diabetic     Standing Status:   Standing     Number of Occurrences:   1     Order Specific Question:   Diet:     Answer:   Diabetic [3]     Order Specific Question:   Texture/Fiber modifications:     Answer:   Dysphagia 2(Pureed/Chopped)specify fluid consistency(question 6) [2]     Order Specific Question:   Consistency/Fluid modifications:     Answer:   Nectar Thick [2]     Comments:   meds crushed with puree     Behavior Modification Plan  Allow for rest time, Keep instructions simple/concrete, Redirect to task/topic, Provide reasonable choices and Analyze tasks (break down into smaller steps)  Assistive Technology  Memory aides: and Low tech: Calendar, planner, schedule, alarms/timers, pill organizer, post-it notes, lists  Family Training/Education:  To be completed    DC Recommendations:   At this time pt will likely need 24 hour spv at discharge   Strengths:  Motivated for self care and independence, Pleasant and cooperative and Willingly participates in therapeutic activities  Barriers:  Aspiration risk, Confused, Poor carryover of learning and Other: poor attention, poor problem solving, not oriented   # of short term goals set=6  # of short term goals met=2  Speech Therapy Problems     Problem: Expression STGs     Dates: Start: 10/12/19       Description:     Goal: STG-Within one week, patient will     Dates: Start: 10/12/19       Description: 1) Individualized goal:  Will produce prolonged voicing (e.g., hum, 'ah', frontal focus) with 80% accuracy and MOD A.   2) Interventions:  SLP Speech Language Treatment and SLP Group Treatment       Note:     Goal Note filed on 10/15/19 0754 by Prashanth Medina MS,CCC-SLP    Max A for voicing                         Problem: Memory STGs     Dates: Start: 10/12/19       Description:     Goal: STG-Within one week, patient will     Dates: Start: 10/12/19       Description: 1) Individualized goal:  Be oriented to date, time of day, location, and situation in 80% of opportunities with MOD A.   2) Interventions:  SLP Self Care / ADL Training , SLP Cognitive Skill Development and SLP Group Treatment       Note:     Goal Note filed on 10/15/19 0754 by Prashanth Medina MS,CCC-SLP    Pt requires max A for orientation                   Goal: STG-Within one week, patient will     Dates: Start: 10/12/19       Description: 1) Individualized goal:  Use external memory aids to recall novel information from [facility] stay with 80% accuracy and MOD A.   2) Interventions:  SLP Self Care / ADL Training , SLP Cognitive Skill Development and SLP Group Treatment       Note:     Goal Note filed on 10/15/19 0754 by Prashanth Medina MS,CCC-SLP    Continue to target                        Problem: Speech/Swallowing LTGs     Dates: Start: 10/11/19       Description:     Goal:  LTG-By discharge, patient will safely swallow     Dates: Start: 10/11/19       Description: 1) Individualized goal:  Regular textures and thin liquids without aspiration.    2) Interventions:  SLP Swallowing Dysfunction Treatment, SLP Oral Pharyngeal Evaluation, SLP Video Swallow Evaluation and SLP Group Treatment             Goal: LTG-By discharge, patient will solve basic problems     Dates: Start: 10/11/19       Description: 1) Individualized goal:  Marco Antonio for safe discharge home.    2) Interventions:  SLP Cognitive Skill Development and SLP Group Treatment                   Problem: Swallowing STGs     Dates: Start: 10/11/19       Description:     Goal: STG-Within one week, patient will     Dates: Start: 10/11/19       Description: 1) Individualized goal:  Tolerate dys2/NTL diet without overt s/s of aspiration over 3 meals.    2) Interventions:  SLP Swallowing Dysfunction Treatment and SLP Group Treatment       Note:     Goal Note filed on 10/15/19 0754 by Prashanth Medina MS,CCC-SLP    Pt demonstrates occasional coughing during dysphagia 2 and NTL meals                                Section completed by:  Prashanth Medina MS,CCC-SLP

## 2019-10-15 NOTE — CARE PLAN
Problem: Balance  Goal: STG-Within one week, patient will maintain static standing  Description  1) Individualized goal:  3 min in // bars with max A   2) Interventions:  PT Group Therapy, PT E Stim Attended, PT Gait Training, PT Therapeutic Exercises, PT Neuro Re-Ed/Balance, PT Aquatic Therapy, PT Therapeutic Activity, PT Manual Therapy and PT Wound Therapy     Outcome: PROGRESSING SLOWER THAN EXPECTED  Note:   Inconsistent performance, patient met this goal yesterday, but was unable to attain standing position in // bars today  Fatigue, weakness, distractible      Problem: Mobility Transfers  Goal: STG-Within one week, patient will perform bed mobility  Description  1) Individualized goal:  Mod A x1   2) Interventions: PT Group Therapy, PT E Stim Attended, PT Gait Training, PT Therapeutic Exercises, PT Neuro Re-Ed/Balance, PT Aquatic Therapy, PT Therapeutic Activity, PT Manual Therapy and PT Wound Therapy         Outcome: PROGRESSING SLOWER THAN EXPECTED  Note:   Inconsistent performance, Mod A achieved yesterday, 2 person today  Fatigues easily, poor sleep, generalized weakness, impaired motor planning.  Goal: STG-Within one week, patient will transfer bed to chair  Description  1) Individualized goal:  Max A x1 with slideboard   2) Interventions:  PT Group Therapy, PT E Stim Attended, PT Gait Training, PT Therapeutic Exercises, PT Neuro Re-Ed/Balance, PT Aquatic Therapy, PT Therapeutic Activity, PT Manual Therapy and PT Wound Therapy         Outcome: PROGRESSING SLOWER THAN EXPECTED  Note:   Inconsistent performance, achieved yesterday as reach pivot, 2 person slide board today  Fatigue, poor sleep, weakness, impaired motor planning     Problem: Mobility  Goal: STG-Within one week, patient will propel wheelchair household distances  Description  1) Individualized goal:  50 ft using BUEs, mod A   2) Interventions: PT Group Therapy, PT E Stim Attended, PT Gait Training, PT Therapeutic Exercises, PT Neuro  Re-Ed/Balance, PT Aquatic Therapy, PT Therapeutic Activity, PT Manual Therapy and PT Wound Therapy         Outcome: MET

## 2019-10-15 NOTE — REHAB-COLLABORATIVE ONGOING IDT TEAM NOTE
Weekly Interdisciplinary Team Conference Note    Weekly Interdisciplinary Team Conference # 1  Date:  10/15/2019    Clinicians present and reporting at team conference include the following:   MD: MARIA GUADALUPE Bella MD    RN:  Candace Morrow RN   PT:   Yessenia Car PT, DPT  OT:  Oneida Frank OTD, OTR/L   ST:  Prashanth Medina MS, CCC-SLP  CM:  Lois Hernandez LSW  REC:  None  RT:  None  RPh:  [facility]  Other:   None  All reporting clinicians have a working knowledge of this patient's plan of care.    Targeted DC Date:  10/28/2019     Medical    Patient Active Problem List    Diagnosis Date Noted   • B-cell lymphoma (HCC) 10/01/2019     Priority: High   • Metastasis to brain (HCC) 10/01/2019     Priority: High   • Neutropenia (HCC) 10/01/2019     Priority: High   • Intracranial mass 07/06/2019     Priority: High   • Myelopathy (HCC) 05/15/2016     Priority: High   • Thoracic stenosis 05/15/2016     Priority: High     Class: Acute   • Ileus (HCC) 05/15/2016     Priority: High     Class: Acute   • A-fib (HCC) 05/15/2016     Priority: High     Class: Chronic   • Hyponatremia 07/12/2019     Priority: Medium   • Hypertension 05/15/2016     Priority: Medium     Class: Chronic   • CAD (coronary artery disease) 05/15/2016     Priority: Medium     Class: Chronic   • Type 2 diabetes mellitus without complication, without long-term current use of insulin (HCC) 05/15/2016     Priority: Medium     Class: Acute   • Azotemia 05/15/2016     Priority: Medium     Class: Acute   • Chronic back pain 05/15/2016     Priority: Low     Class: Chronic   • Dyslipidemia 05/15/2016     Priority: Low     Class: Chronic   • Neurologic abnormality 10/12/2019   • Encephalopathy acute 10/10/2019   • Normocytic anemia 10/03/2019   • Dysphagia 10/01/2019   • Hypomagnesemia 07/24/2019   • Vitamin D insufficiency 07/18/2019   • Leukocytosis 07/18/2019   • 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   • History of prostate cancer 07/06/2019      Results     Procedure Component Value Ref Range Date/Time    ACCU-CHEK GLUCOSE [[id number]]  (Abnormal) Collected:  10/15/19 1109    Order Status:  Completed Lab Status:  Final result Updated:  10/15/19 1200     Glucose - Accu-Ck 166 65 - 99 mg/dL     ACCU-CHEK GLUCOSE [[id number]]  (Abnormal) Collected:  10/15/19 0756    Order Status:  Completed Lab Status:  Final result Updated:  10/15/19 1014     Glucose - Accu-Ck 112 65 - 99 mg/dL     ACCU-CHEK GLUCOSE [[id number]]  (Abnormal) Collected:  10/14/19 2017    Order Status:  Completed Lab Status:  Final result Updated:  10/14/19 2035     Glucose - Accu-Ck 183 65 - 99 mg/dL     ACCU-CHEK GLUCOSE [[id number]]  (Abnormal) Collected:  10/14/19 1736    Order Status:  Completed Lab Status:  Final result Updated:  10/14/19 1753     Glucose - Accu-Ck 161 65 - 99 mg/dL         Nursing  Diet/Nutrition:  Dysphagia II and Nectar Thick Liquids Diabetic  Medication Administration:  Crush all Medications in Puree  % consumed at meals in last 24 hours:  Consumed % of meals per documentation.  Meal/Snack Consumption for the past 24 hrs:   Oral Nutrition   10/14/19 1200 Lunch;Less than 25% Consumed   10/14/19 0900 Breakfast;Between % Consumed     Snack schedule:  None  Supplement:  Other: none  Appetite:  Fair  Fluid Intake/Output in past 24 hours: In: 420 [P.O.:420]  Out: 100  -   Admit Weight:  Weight: 83.5 kg (184 lb 1.4 oz)  Weight Last 7 Days   [83.1 kg (183 lb 3.2 oz)-83.5 kg (184 lb 1.4 oz)] 83.1 kg (183 lb 3.2 oz) (10/13 0900)    Pain Issues:    Location:  --  --         Severity:  Moderate you really have to ask patient specifically - he prefers not to take pain medications, however if you take the time pt will engage in conversation regarding pain  Scheduled pain medications:  None     PRN pain medications used in last 24 hours:  oxycodone immediate release (ROXICODONE)    Non Pharmacologic Interventions:  relaxation, repositioned and rest  Effectiveness of pain  management plan:  good=patient states acceptable comfort after interventions PATIENT WILL NOT COMPLAIN OF PAIN UNLESS HE IS ASKED    Bowel:    Bowel Assist Initial Score:  1 - Total Assistance  Bowel Assist Current Score:  1 - Total Assistance  Bowl Accidents in last 7 days:  2  Last bowel movement: 10/13/19  Stool Description: Large, Brown, Soft     Usual bowel pattern:  UNSURE OF PATIENTS NORM  Scheduled bowel medications:  senna-docusate (PERICOLACE or SENOKOT S)   PRN bowel medications used in last 24 hours:  None  Nursing Interventions:  Increased time, Scheduled medication, Supervision, Verbal cueing, Brief, Requires 2 people to assist  Effectiveness of bowel program:   fair=occasional unpredictable, incontinent emptying of bowel (less than 1 time day)  Bladder:    Bladder Assist Initial Score:  1 - Total Assistance  Bladder Assist Current Score:  1 - Total Assistance  Bladder Accidents in last 7 days:  0  PVR range for past 24-48 hours: -- ()  Intermittent Catheterization:  N/A  Medications affecting bladder:  None    Time void schedule/voiding pattern:   Interventions:  Increased time, Brief, Requires 2 people to assist, Verbal cueing  Effectiveness of bladder training:  NO BLADDER TRAINING AT THIS TIME    F    Diabetes:  Blood Sugar Frequency:  Other METFORMIN BEFORE BREAKFAST - INSULIN S/S AC-HS    Range of BS for last 48 hours:     Recent Labs     10/13/19  0815 10/13/19  1124 10/13/19  1738 10/14/19  0811 10/14/19  1110 10/14/19  1736 10/14/19  2017   POCGLUCOSE 115* 166* 160* 143* 167* 161* 183*     Scheduled diabetic medications:  metformin (GLUCOPHAGE)   Sliding scale usage in past 24 hours:  Yes  Total Short acting insulin in the past 24 hours:  Humulin r  Total Long acting insulin in the past 24 hours:  None    Wound:         Patient Lines/Drains/Airways Status    Active Current Wounds     None                   I  Sleep/wake cycle:   Average hours slept:  Sleeps less than 2 hours without waking  **PATIENT IS AWAKE ALL NIGHT LONG - DAY SHIFT INFORMS THAT HE IS VERY TIRED THROUGHOUT DAY - PT WAS GIVEN PRN tRAMADOL 50MG - AND WAS AWAKE ONE HOUR LATER -   Sleep medication usage:  Other TRAMADOL    Patient/Family Training/Education:  Aspiration Precautions, Diet/Nutrition, Fall Prevention, Medication Management, Safe Transfers, Safety and Skin Care  Discharge Supply Recommendations:  Glucometer and Strips ??  Strengths: Alert and orientedX3,  FAMILY SUPPORT,   Barriers:   Bladder incontinence, Bowel incontinence, Generalized weakness, Limited mobility, Pain poorly managed and Poor sleep pattern       Nursing Problems     Problem: Bowel/Gastric:     Description:     Goal: Normal bowel function is maintained or improved     Description:           Goal: Will not experience complications related to bowel motility     Description:                 Problem: Communication     Description:     Goal: The ability to communicate needs accurately and effectively will improve     Description:                 Problem: Discharge Barriers/Planning     Description:     Goal: Patient's continuum of care needs will be met     Description:                 Problem: GLYCEMIA IMBALANCE     Description:     Goal: Clinical indication of glycemia balance is achieved     Description:                 Problem: Infection     Description:     Goal: Will remain free from infection     Description:                 Problem: Knowledge Deficit     Description:     Goal: Knowledge of disease process/condition, treatment plan, diagnostic tests, and medications will improve     Description:           Goal: Knowledge of the prescribed therapeutic regimen will improve     Description:                 Problem: Pain Management     Description:     Goal: Pain level will decrease to patient's comfort goal     Description:     Flowsheet:     Taken at 10/13/19 9545    Pain Rating Scale (NPRS) 9 - Can't bear the pain, unable to do anything by  Lisha Trujillo,  JACKSON    Taken at 10/12/19 0838    Pain Rating Scale (NPRS) 7 - Focus of attention, prevents doing daily activities by  Lisha Trujillo R.N.                      Problem: Respiratory:     Description:     Goal: Respiratory status will improve     Description:                 Problem: Safety     Description:     Goal: Will remain free from injury     Description:           Goal: Will remain free from falls     Description:                 Problem: Skin Integrity     Description:     Goal: Risk for impaired skin integrity will decrease     Description:                 Problem: Urinary Elimination:     Description:     Goal: Ability to reestablish a normal urinary elimination pattern will improve     Description:                 Problem: Venous Thromboembolism (VTW)/Deep Vein Thrombosis (DVT) Prevention:     Description:     Goal: Patient will participate in Venous Thrombosis (VTE)/Deep Vein Thrombosis (DVT)Prevention Measures     Description:                        Long Term Goals:   At discharge patient will be able to function safely at home with support.    Section completed by:  Nadia Umaña R.N.           Mobility  Bed mobility:  2 person to mod A, inconsistent   Bed /Chair/Wheelchair Transfer Initial:  1 - Total Assistance  Bed /Chair/Wheelchair Transfer Current:  1 - Total Assistance   Bed/Chair/Wheelchair Transfer Description:  Adaptive equipment, Increased time, Set-up of equipment, Requires 2 people to assist(2 person slideboard due to fatigue, 2 person bed mobility )  Walk Initial:  0 - Not tested,unsafe activity  Walk Current:  0 - Not tested,unsafe activity   Walk Description:  Two hand rails, Extra time, Safety concerns, Requires incidental assist(CGA 10ft fwd, + 10 ft fwd/ bwd x 2 with wc follow in // bars)  Wheelchair Initial:  0 - Not tested,medical condition  Wheelchair Current:  2 - Max Assistance   Wheelchair Description:  Extra time(Min A to steer >50 ft indoors BUE, cues for  pathfinding)  Stairs Initial:  0 - Not tested,unsafe activity  Stairs Current: 0 - Not tested,unsafe activity   Stairs Description:    Patient/Family Training/Education: POC, CLOF, pressure relief   DME/DC Recommendations: TBD, variable performance, patient has FWW/ wc   Strengths:  Supportive family  Barriers:   Decreased endurance, Generalized weakness, Limited mobility, Pain poorly managed, Poor activity tolerance, Poor balance, Poor sleep pattern and Other: Distractible, variable performance, fatigues easily  # of short term goals set= 4  # of short term goals met= 3  Physical Therapy Problems     Problem: Balance     Dates: Start: 10/11/19       Description:     Goal: STG-Within one week, patient will maintain static standing     Dates: Start: 10/11/19       Description: 1) Individualized goal:  3 min in // bars with max A   2) Interventions:  PT Group Therapy, PT E Stim Attended, PT Gait Training, PT Therapeutic Exercises, PT Neuro Re-Ed/Balance, PT Aquatic Therapy, PT Therapeutic Activity, PT Manual Therapy and PT Wound Therapy       Note:     Goal Note filed on 10/15/19 1111 by Yessenia Car DPT    Inconsistent performance, patient met this goal yesterday, but was unable to attain standing position in // bars today  Fatigue, weakness, distractible                         Problem: Mobility     Dates: Start: 10/15/19       Description:     Goal: STG-Within one week, patient will propel wheelchair community     Dates: Start: 10/15/19       Description: 1) Individualized goal:  Patient will propel wc indoors >100 ft x 2 for inc endurance SPV  2) Interventions:  PT Group Therapy, PT E Stim Attended, PT Gait Training, PT Therapeutic Exercises, PT Neuro Re-Ed/Balance, PT Aquatic Therapy, PT Therapeutic Activity and PT Manual Therapy                   Problem: Mobility Transfers     Dates: Start: 10/11/19       Description:     Goal: STG-Within one week, patient will perform bed mobility     Dates: Start: 10/11/19        Description: 1) Individualized goal:  Mod A x1   2) Interventions: PT Group Therapy, PT E Stim Attended, PT Gait Training, PT Therapeutic Exercises, PT Neuro Re-Ed/Balance, PT Aquatic Therapy, PT Therapeutic Activity, PT Manual Therapy and PT Wound Therapy           Note:     Goal Note filed on 10/15/19 1111 by Yessenia Car, DPT    Inconsistent performance, Mod A achieved yesterday, 2 person today  Fatigues easily, poor sleep, generalized weakness, impaired motor planning.                  Goal: STG-Within one week, patient will transfer bed to chair     Dates: Start: 10/11/19       Description: 1) Individualized goal:  Max A x1 with slideboard   2) Interventions:  PT Group Therapy, PT E Stim Attended, PT Gait Training, PT Therapeutic Exercises, PT Neuro Re-Ed/Balance, PT Aquatic Therapy, PT Therapeutic Activity, PT Manual Therapy and PT Wound Therapy           Note:     Goal Note filed on 10/15/19 1111 by Yessenia Car, DPT    Inconsistent performance, achieved yesterday as reach pivot, 2 person slide board today  Fatigue, poor sleep, weakness, impaired motor planning                        Problem: PT-Long Term Goals     Dates: Start: 10/11/19       Description:     Goal: LTG-By discharge, patient will propel wheelchair     Dates: Start: 10/11/19       Description: 1) Individualized goal:  150 ft, spv   2) Interventions:  PT Group Therapy, PT E Stim Attended, PT Gait Training, PT Therapeutic Exercises, PT Neuro Re-Ed/Balance, PT Aquatic Therapy, PT Therapeutic Activity, PT Manual Therapy and PT Wound Therapy                 Goal: LTG-By discharge, patient will transfer one surface to another     Dates: Start: 10/11/19       Description: 1) Individualized goal:  Min A with LRAD   2) Interventions: PT Group Therapy, PT E Stim Attended, PT Gait Training, PT Therapeutic Exercises, PT Neuro Re-Ed/Balance, PT Aquatic Therapy, PT Therapeutic Activity, PT Manual Therapy and PT Wound Therapy                 Goal:  LTG-By discharge, patient will perform home exercise program     Dates: Start: 10/11/19       Description: 1) Individualized goal:  SBA with LE HEP with use of handout  2) Interventions: PT Group Therapy, PT E Stim Attended, PT Gait Training, PT Therapeutic Exercises, PT Neuro Re-Ed/Balance, PT Aquatic Therapy, PT Therapeutic Activity, PT Manual Therapy and PT Wound Therapy                 Goal: LTG-By discharge, patient will transfer in/out of a car     Dates: Start: 10/11/19       Description: 1) Individualized goal:  Min A with LRAD  2) Interventions: PT Group Therapy, PT E Stim Attended, PT Gait Training, PT Therapeutic Exercises, PT Neuro Re-Ed/Balance, PT Aquatic Therapy, PT Therapeutic Activity, PT Manual Therapy and PT Wound Therapy                               Section completed by:  Yessenia Car PT, DPT    Activities of Daily Living  Eating Initial:  4 - Minimal Assistance  Eating Current:  5 - Standby Prompting/Supervision or Set-up   Eating Description:  Increased time, Adaptive equipment, Modified diet, Verbal cueing(set up, enlarged handled spoon, extra time, intermittent cues for small bites and hard swallows)  Grooming Initial:  3 - Moderate Assistance  Grooming Current:  5 - Standby Prompting/Supervision or Set-up   Grooming Description:  Increased time, Supervision for safety, Verbal cueing, Initial preparation for task(SBA for verbal cues for pacing, initiation and termination)  Bathing Initial:  2 - Max Assistance  Bathing Current:  3 - Moderate Assistance   Bathing Description:  Grab bar, Tub bench, Hand rails, Hand held shower, Increased time, Supervision for safety, Verbal cueing, Initial preparation for task, Requires minimal contact(Mod A for feet and bottom, CGA for balance when standing)  Upper Body Dressing Initial:  3 - Moderate Assistance  Upper Body Dressing Current:  4 - Minimal Assistance   Upper Body Dressing Description:  Increased time(Min A to assist with pull down, verbal cues  for sequencing )  Lower Body Dressing Initial:  1 - Total Assistance  Lower Body Dressing Current:  1 - Total Assistance   Lower Body Dressing Description:  1 - Total Assistance  Toileting Initial:     Toileting Current:      Toileting Description:     Toilet Transfer Initial:  1 - Total Assistance  Toilet Transfer Current:  1 - Total Assistance   Toilet Transfer Description:  1 - Total Assistance  Tub / Shower Transfer Initial:  1 - Total Assistance  Tub / Shower Transfer Current:  2 - Max Assistance   Tub / Shower Transfer Description:  Increased time, Supervision for safety, Verbal cueing, Initial preparation for task, Assist with two limbs, Requires lift(Max A for lift and balance, tactile and verbal cues for sequencing steps. SPT)  IADL:  Therapy concentrated on ADLs and functional transfers   Family Training/Education:  Training on dressing AE   DME/DC Recommendations:  Reacher, dressing stick, sock aid, SC, grab bars in shower and by toilet     Strengths:  Able to follow instructions, Effective communication skills, Good insight into deficits/needs, Motivated for self care and independence, Pleasant and cooperative, Supportive family and Willingly participates in therapeutic activities  Barriers:  Limited mobility, Poor balance and Other: spasticity      # of short term goals set= 4    # of short term goals met= 1     Occupational Therapy Goals     Problem: Bathing     Dates: Start: 10/11/19       Description:     Goal: STG-Within one week, patient will bathe     Dates: Start: 10/11/19       Description: 1) Individualized Goal:  Mod A with AE/DME PRN  2) Interventions:  OT E Stim Attended, OT Group Therapy, OT Self Care/ADL, OT Cognitive Skill Dev, OT Community Reintegration, OT Manual Ther Technique, OT Neuro Re-Ed/Balance, OT Sensory Int Techniques, OT Therapeutic Activity and OT Evaluation                 Problem: Dressing     Dates: Start: 10/11/19       Description:     Goal: STG-Within one week, patient  will dress LB     Dates: Start: 10/11/19       Description: 1) Individualized Goal:  Mod A with AE/DME PRN  2) Interventions:  OT E Stim Attended, OT Group Therapy, OT Self Care/ADL, OT Cognitive Skill Dev, OT Community Reintegration, OT Manual Ther Technique, OT Neuro Re-Ed/Balance, OT Sensory Int Techniques, OT Therapeutic Activity and OT Evaluation                 Problem: Functional Transfers     Dates: Start: 10/11/19       Description:     Goal: STG-Within one week, patient will transfer to step in shower     Dates: Start: 10/11/19       Description: 1) Individualized Goal:  Max A with AE/DME PRN  2) Interventions:  OT E Stim Attended, OT Group Therapy, OT Self Care/ADL, OT Cognitive Skill Dev, OT Community Reintegration, OT Manual Ther Technique, OT Neuro Re-Ed/Balance, OT Sensory Int Techniques, OT Therapeutic Activity and OT Evaluation                 Problem: OT Long Term Goals     Dates: Start: 10/11/19       Description:     Goal: LTG-By discharge, patient will complete basic self care tasks     Dates: Start: 10/11/19       Description: 1) Individualized Goal:  Min A with AE/DME PRN  2) Interventions:  OT E Stim Attended, OT Group Therapy, OT Self Care/ADL, OT Cognitive Skill Dev, OT Community Reintegration, OT Manual Ther Technique, OT Neuro Re-Ed/Balance, OT Sensory Int Techniques, OT Therapeutic Activity and OT Evaluation           Goal: LTG-By discharge, patient will perform bathroom transfers     Dates: Start: 10/11/19       Description: 1) Individualized Goal:  Min A with AE/DME PRN  2) Interventions:  OT E Stim Attended, OT Group Therapy, OT Self Care/ADL, OT Cognitive Skill Dev, OT Community Reintegration, OT Manual Ther Technique, OT Neuro Re-Ed/Balance, OT Sensory Int Techniques, OT Therapeutic Activity and OT Evaluation                         Section completed by:  Leanne Marie, MS, OTR/L, CHT    Cognitive Linquistic Functions  Comprehension Initial:  4 - Minimal Assistance  Comprehension  Current:  4 - Minimal Assistance   Comprehension Description:  Verbal cues  Expression Initial:  4 - Minimal Assistance  Expression Current:  4 - Minimal Assistance   Expression Description:  Verbal cueing  Social Interaction Initial:  5 - Stand-by Prompting/Supervision or Set-up  Social Interaction Current:  6 - Modified Independent   Social Interaction Description:  Increased time  Problem Solving Initial:  3 - Moderate Assistance  Problem Solving Current:  3 - Moderate Assistance   Problem Solving Description:  Verbal cueing, Therapy schedule, Bed/chair alarm, Increased time  Memory Initial:  3 - Moderate Assistance  Memory Current:  3 - Moderate Assistance   Memory Description:  Verbal cueing, Therapy schedule, Bed/chair alarm  Executive Functioning / Organization Initial:   NA  Executive Functioning / Organization Current:   NA   Executive Functioning Desciption:  NA  Swallowing  Swallowing Status:  Pt currently tolerating a dysphagia 2 and NTL diet, MBSS to be completed on 10/15   Orders Placed This Encounter   Procedures   • Diet Order Diabetic     Standing Status:   Standing     Number of Occurrences:   1     Order Specific Question:   Diet:     Answer:   Diabetic [3]     Order Specific Question:   Texture/Fiber modifications:     Answer:   Dysphagia 2(Pureed/Chopped)specify fluid consistency(question 6) [2]     Order Specific Question:   Consistency/Fluid modifications:     Answer:   Nectar Thick [2]     Comments:   meds crushed with puree     Behavior Modification Plan  Allow for rest time, Keep instructions simple/concrete, Redirect to task/topic, Provide reasonable choices and Analyze tasks (break down into smaller steps)  Assistive Technology  Memory aides: and Low tech: Calendar, planner, schedule, alarms/timers, pill organizer, post-it notes, lists  Family Training/Education:  To be completed   DC Recommendations:   At this time pt will likely need 24 hour spv at discharge   Strengths:  Motivated for  self care and independence, Pleasant and cooperative and Willingly participates in therapeutic activities  Barriers:  Aspiration risk, Confused, Poor carryover of learning and Other: poor attention, poor problem solving, not oriented   # of short term goals set=6  # of short term goals met=2  Speech Therapy Problems     Problem: Expression STGs     Dates: Start: 10/12/19       Description:     Goal: STG-Within one week, patient will     Dates: Start: 10/12/19       Description: 1) Individualized goal:  Will produce prolonged voicing (e.g., hum, 'ah', frontal focus) with 80% accuracy and MOD A.   2) Interventions:  SLP Speech Language Treatment and SLP Group Treatment       Note:     Goal Note filed on 10/15/19 0754 by Prashanth Medina MS,CCC-SLP    Max A for voicing                         Problem: Memory STGs     Dates: Start: 10/12/19       Description:     Goal: STG-Within one week, patient will     Dates: Start: 10/12/19       Description: 1) Individualized goal:  Be oriented to date, time of day, location, and situation in 80% of opportunities with MOD A.   2) Interventions:  SLP Self Care / ADL Training , SLP Cognitive Skill Development and SLP Group Treatment       Note:     Goal Note filed on 10/15/19 0754 by Prashanth Medina MS,CCC-SLP    Pt requires max A for orientation                   Goal: STG-Within one week, patient will     Dates: Start: 10/12/19       Description: 1) Individualized goal:  Use external memory aids to recall novel information from [facility] stay with 80% accuracy and MOD A.   2) Interventions:  SLP Self Care / ADL Training , SLP Cognitive Skill Development and SLP Group Treatment       Note:     Goal Note filed on 10/15/19 0754 by Prashanth Medina MS,CCC-SLP    Continue to target                        Problem: Speech/Swallowing LTGs     Dates: Start: 10/11/19       Description:     Goal: LTG-By discharge, patient will safely swallow     Dates: Start: 10/11/19       Description: 1) Individualized  goal:  Regular textures and thin liquids without aspiration.    2) Interventions:  SLP Swallowing Dysfunction Treatment, SLP Oral Pharyngeal Evaluation, SLP Video Swallow Evaluation and SLP Group Treatment             Goal: LTG-By discharge, patient will solve basic problems     Dates: Start: 10/11/19       Description: 1) Individualized goal:  Marco Antonio for safe discharge home.    2) Interventions:  SLP Cognitive Skill Development and SLP Group Treatment                   Problem: Swallowing STGs     Dates: Start: 10/11/19       Description:     Goal: STG-Within one week, patient will     Dates: Start: 10/11/19       Description: 1) Individualized goal:  Tolerate dys2/NTL diet without overt s/s of aspiration over 3 meals.    2) Interventions:  SLP Swallowing Dysfunction Treatment and SLP Group Treatment       Note:     Goal Note filed on 10/15/19 0754 by Prashanth Medina MS,CCC-SLP    Pt demonstrates occasional coughing during dysphagia 2 and NTL meals                                Section completed by:  Prashanth Medina MS,CCC-SLP          REHAB-Pharmacy IDT Team Note by Osmany Morales RPH at 10/14/2019  1:11 PM  Version 1 of 1    Author:  Osmany oMrales RPH Service:  -- Author Type:  Pharmacist    Filed:  10/14/2019  1:12 PM Date of Service:  10/14/2019  1:11 PM Status:  Signed    :  Osmany Morales RPH (Pharmacist)         Pharmacy   Pharmacy  Antibiotics/Antifungals/Antivirals:  Medication:      Active Orders (From admission, onward)    None        Route:        NA  Stop Date:  NA  Reason:      NA  Antihypertensives/Cardiac:  Medication:    Orders (72h ago, onward)     Start     Ordered    10/12/19 0600  metoprolol (LOPRESSOR) tablet 25 mg  TWICE DAILY      10/11/19 1004    10/11/19 1200  DILTIAZem (CARDIZEM) tablet 90 mg  EVERY 6 HOURS      10/11/19 1004    10/10/19 2100  atorvastatin (LIPITOR) tablet 20 mg  NIGHTLY      10/10/19 1546    10/10/19 1546  hydrALAZINE (APRESOLINE) tablet 25 mg  EVERY 8 HOURS  PRN      10/10/19 1546              Patient Vitals for the past 24 hrs:   BP Pulse   10/14/19 0700 114/68 98   10/14/19 0516 124/90 91   10/13/19 2358 116/69 81   10/13/19 1950 124/80 80   10/13/19 1400 116/74 76     Anticoagulation:  Medication: Eliquis    Other key medications: A review of the medication list reveals no issues at this time. Patient is currently on antihypertensive(s). Recommend home blood pressure monitoring/recording if antihypertensive(s) regimen(s) continue. Patient is currently on diabetic medication(s) and/or Insulin(s). Recommend home blood glucose monitoring/recording if these regimen(s) continue.    Section completed by: Osmany Morales [facility][AW.1]     Attribution Key     AW.1 - Osmany Morales [facility] on 10/14/2019  1:11 PM                  DC Planning  DC destination/dispostion: Home with wife in [place], [place].  Referrals: None at this time.  DC Needs: HH vs Outpatient therapy, f/u with Dr. Ferrer and PCP  Barriers to discharge: Patient frequently fatigued.  Strengths: Supportive family.    Section completed by:  Lois Hernandez      Physician Summary  MARIA GUADALUPE Bella MD  participated and led team conference discussion.

## 2019-10-15 NOTE — THERAPY
Physical Therapy   Daily Treatment     Patient Name: Marcio More Jr.  Age:  73 y.o., Sex:  male  Medical Record #: [id number]  Today's Date: 10/15/2019     Precautions  Precautions: (P) Fall Risk, Other (See Comments)  Comments: (P) spasticity, poor motor planning, coccyx ulcer    Subjective    Patient refused toilet transfer, preferring urinal.      Objective       10/15/19 1401   Precautions   Precautions Fall Risk;Other (See Comments)   Comments spasticity, poor motor planning, coccyx ulcer   Bed Mobility    Supine to Sit Total Assist X 2   Scooting Total Assist X 2   Rolling Total Assist X 2 to Lt.;Total Assist X 2 to Rt.   Neuro-Muscular Treatments   Neuro-Muscular Treatments Tactile Cuing;Sequencing;Verbal Cuing;Weight Shift Right;Weight Shift Left;Postural Facilitation;Postural Changes;Facilitation   Comments Rolling strategies using bedding and railing during clothing and brief management, and during transition from supine to sit x 2 person.  Sequencing slide board transfer reviewed, with intermittent vc for set up.    Interdisciplinary Plan of Care Collaboration   IDT Collaboration with  Certified Nursing Assistant;Family / Caregiver;Nursing   Collaboration Comments POC, D/C- conference update, CLOF, incontinence with urinal, required new brief, meds provided by RN   PT Total Time Spent   PT Individual Total Time Spent (Mins) 30   PT Charge Group   PT Therapeutic Activities 2       FIM Bed/Chair/Wheelchair Transfers Score: 1 - Total Assistance  Bed/Chair/Wheelchair Transfers Description:  Increased time, Adaptive equipment, Set-up of equipment, Requires 2 people to assist(2 person slide board, 2 person bed mob)      Assessment    Patient's brief was soiled during attempt with urinal; requiring changing prior to getting OOB.  Patient continues to be limited by rigidity, poor motor planning, and seated posterior lean during transfers. Patient's performance is variable; requiring 2 person total assist  today, and 1 person moderate assist yesterday.    Plan    Reassess standing tolerance, gait in // bars, progress transfers and gait to FWW when appropriate, error on 2 person assist if inconsistencies continue. Review bed mobility sequencing, and transfer safety. HEP.

## 2019-10-15 NOTE — PROGRESS NOTES
0715: Bedside report received, assumed care for this patient.  Patient is A&O x 4.  VSS.  Communication board updated, call light and belongings are within reach.  Bed is in low position. Patient appears in no distress, and has no complaints of SOB and 4/10 of pain.  Will be medicated per MAR.  Plan of care discussed and agreed upon with patient.

## 2019-10-15 NOTE — THERAPY
"Occupational Therapy  Daily Treatment     Patient Name: Marcio More Jr.  Age:  73 y.o., Sex:  male  Medical Record #: [id number]  Today's Date: 10/15/2019     Precautions  Precautions: Fall Risk  Comments: spasticity, poor motor planning, coccyx ulcer    Safety   ADL Safety : Requires Physical Assist for Safety, Requires Cueing for Safety  Bathroom Safety: Requires Physical Assist for Safety, Requires Cuing for Safety  Comments: see FIM for oral care and eating    Subjective    Pt reported that he was very sleepy stating he had not slept all night due to spasms in his legs and low back. \"I am just so tired\" pt reported in response to cues for pacing.      Objective     10/15/19 0931   Precautions   Precautions Fall Risk   Comments spasticity, poor motor planning, coccyx ulcer   Sitting Upper Body Exercises   Upper Extremity Bike Level 2 Resistance   Other Exercise Pt completed 5 chair push-ups with Mod-Min a for lift in preparation for STS scooting forward in w/c    Comments Motomed: 16.43 minutes, 1.18 miles, 16 Kcal, average speed 26rpm, 0 spasms, symmetry 54L/46R   Balance   Sitting Balance (Static) Poor   Sitting Balance (Dynamic) Poor   Standing Balance (Static) Trace +   Standing Balance (Dynamic) Trace   Weight Shift Sitting Absent   Weight Shift Standing Poor   Skilled Intervention Facilitation;Tactile Cuing;Verbal Cuing;Sequencing   Comments Pt actively resisting weight bearing and lateral leaning while seated due to low back spastiicty and muscle guarding    Interdisciplinary Plan of Care Collaboration   IDT Collaboration with  Physician;Physical Therapist   Patient Position at End of Therapy Seated;Self Releasing Lap Belt Applied;Call Light within Reach   Collaboration Comments CLOF   OT Total Time Spent   OT Individual Total Time Spent (Mins) 60   OT Charge Group   OT Therapy Activity 4     Attempted SPT and pt unable to shift weight in w/c to scoot and unable to lift from seated position to stand " with FWW. Pt was able to partially stand, but unable to shift grasp from w/c to FWW. Pt participated in chair push-up in preparation for more independent transfers.     Assessment    Pt was lethargic during session, requiring multiple cues for pacing and attention to task. Pt continues to demonstrate spasticity in BLE, impacting independence with functional transfers and ADLs. Pt did not demonstrate spasticity in BUE during [employer] activity, however ROM was limited to 90 degrees shoulder flexion/abduction and 130 degrees of elbow flexion in BUE.     Plan    Continued OT for independence with ADLs (use of AE/DME), IADLs, functional transfers, spasticity management, and motor planning by repetition.

## 2019-10-15 NOTE — DISCHARGE PLANNING
JEANNA met with patient and family at bedside to provide conference updates. Family expressed that they have been trying to reach Dr. Ferrer's office regarding future plans for chemotherapy. Discussed patient's level of function when discharged previously compared to current level of function. Also discussed families ability to provide additional care if patient requires more assistance than previously. Family states they want patient to be comfortable while at home and will try to adjust their schedules accordingly to provide assistance as needed. Anticipate d/c home with  services. Discussed DME that may be beneficial including hospital bed with pressure relieving mattress and slide board. Estimated d/c date 10/28.

## 2019-10-15 NOTE — REHAB-PT IDT TEAM NOTE
Physical Therapy   Mobility  Bed mobility:  2 person to mod A, inconsistent   Bed /Chair/Wheelchair Transfer Initial:  1 - Total Assistance  Bed /Chair/Wheelchair Transfer Current:  1 - Total Assistance   Bed/Chair/Wheelchair Transfer Description:  Adaptive equipment, Increased time, Set-up of equipment, Requires 2 people to assist(2 person slideboard due to fatigue, 2 person bed mobility )  Walk Initial:  0 - Not tested,unsafe activity  Walk Current:  0 - Not tested,unsafe activity   Walk Description:  Two hand rails, Extra time, Safety concerns, Requires incidental assist(CGA 10ft fwd, + 10 ft fwd/ bwd x 2 with wc follow in // bars)  Wheelchair Initial:  0 - Not tested,medical condition  Wheelchair Current:  2 - Max Assistance   Wheelchair Description:  Extra time(Min A to steer >50 ft indoors BUE, cues for pathfinding)  Stairs Initial:  0 - Not tested,unsafe activity  Stairs Current: 0 - Not tested,unsafe activity   Stairs Description:    Patient/Family Training/Education: POC, CLOF, pressure relief   DME/DC Recommendations: TBD, variable performance, patient has FWW/ wc   Strengths:  Supportive family  Barriers:   Decreased endurance, Generalized weakness, Limited mobility, Pain poorly managed, Poor activity tolerance, Poor balance, Poor sleep pattern and Other: Distractible, variable performance, fatigues easily  # of short term goals set= 4  # of short term goals met= 3  Physical Therapy Problems     Problem: Balance     Dates: Start: 10/11/19       Description:     Goal: STG-Within one week, patient will maintain static standing     Dates: Start: 10/11/19       Description: 1) Individualized goal:  3 min in // bars with max A   2) Interventions:  PT Group Therapy, PT E Stim Attended, PT Gait Training, PT Therapeutic Exercises, PT Neuro Re-Ed/Balance, PT Aquatic Therapy, PT Therapeutic Activity, PT Manual Therapy and PT Wound Therapy       Note:     Goal Note filed on 10/15/19 1111 by Yessenia Car DPT     Inconsistent performance, patient met this goal yesterday, but was unable to attain standing position in // bars today  Fatigue, weakness, distractible                         Problem: Mobility     Dates: Start: 10/15/19       Description:     Goal: STG-Within one week, patient will propel wheelchair community     Dates: Start: 10/15/19       Description: 1) Individualized goal:  Patient will propel wc indoors >100 ft x 2 for inc endurance SPV  2) Interventions:  PT Group Therapy, PT E Stim Attended, PT Gait Training, PT Therapeutic Exercises, PT Neuro Re-Ed/Balance, PT Aquatic Therapy, PT Therapeutic Activity and PT Manual Therapy                   Problem: Mobility Transfers     Dates: Start: 10/11/19       Description:     Goal: STG-Within one week, patient will perform bed mobility     Dates: Start: 10/11/19       Description: 1) Individualized goal:  Mod A x1   2) Interventions: PT Group Therapy, PT E Stim Attended, PT Gait Training, PT Therapeutic Exercises, PT Neuro Re-Ed/Balance, PT Aquatic Therapy, PT Therapeutic Activity, PT Manual Therapy and PT Wound Therapy           Note:     Goal Note filed on 10/15/19 1111 by Yessenia Car, TAMIKO    Inconsistent performance, Mod A achieved yesterday, 2 person today  Fatigues easily, poor sleep, generalized weakness, impaired motor planning.                  Goal: STG-Within one week, patient will transfer bed to chair     Dates: Start: 10/11/19       Description: 1) Individualized goal:  Max A x1 with slideboard   2) Interventions:  PT Group Therapy, PT E Stim Attended, PT Gait Training, PT Therapeutic Exercises, PT Neuro Re-Ed/Balance, PT Aquatic Therapy, PT Therapeutic Activity, PT Manual Therapy and PT Wound Therapy           Note:     Goal Note filed on 10/15/19 1111 by CLIFTON LeosT    Inconsistent performance, achieved yesterday as reach pivot, 2 person slide board today  Fatigue, poor sleep, weakness, impaired motor planning                        Problem:  PT-Long Term Goals     Dates: Start: 10/11/19       Description:     Goal: LTG-By discharge, patient will propel wheelchair     Dates: Start: 10/11/19       Description: 1) Individualized goal:  150 ft, spv   2) Interventions:  PT Group Therapy, PT E Stim Attended, PT Gait Training, PT Therapeutic Exercises, PT Neuro Re-Ed/Balance, PT Aquatic Therapy, PT Therapeutic Activity, PT Manual Therapy and PT Wound Therapy                 Goal: LTG-By discharge, patient will transfer one surface to another     Dates: Start: 10/11/19       Description: 1) Individualized goal:  Min A with LRAD   2) Interventions: PT Group Therapy, PT E Stim Attended, PT Gait Training, PT Therapeutic Exercises, PT Neuro Re-Ed/Balance, PT Aquatic Therapy, PT Therapeutic Activity, PT Manual Therapy and PT Wound Therapy                 Goal: LTG-By discharge, patient will perform home exercise program     Dates: Start: 10/11/19       Description: 1) Individualized goal:  SBA with LE HEP with use of handout  2) Interventions: PT Group Therapy, PT E Stim Attended, PT Gait Training, PT Therapeutic Exercises, PT Neuro Re-Ed/Balance, PT Aquatic Therapy, PT Therapeutic Activity, PT Manual Therapy and PT Wound Therapy                 Goal: LTG-By discharge, patient will transfer in/out of a car     Dates: Start: 10/11/19       Description: 1) Individualized goal:  Min A with LRAD  2) Interventions: PT Group Therapy, PT E Stim Attended, PT Gait Training, PT Therapeutic Exercises, PT Neuro Re-Ed/Balance, PT Aquatic Therapy, PT Therapeutic Activity, PT Manual Therapy and PT Wound Therapy                               Section completed by:  Yessenia Car PT, DPT

## 2019-10-15 NOTE — THERAPY
Speech Language Pathology  Video Swallow     Patient Name:  Marcio More Jr.  AGE:  73 y.o., SEX:  male  Medical Record #:  [id number]  Today's Date: 10/15/2019     Objective       10/15/19 0831   History / Background Information   Prior Level of Function for Eating / Swallowing WFL, noted new difficulty swallowing when admitted to the hospital   Diagnosis Encephalopathy acute   Onset Date Of Dysphagia 10/1/19   Dysphagia Symptoms Warranting Video Swallow Occasional coughing at meals, pharyngeal dysphagia assessment    Dentition Intact   Procedure   Patient Seated in  wheelchair    Seated at (Degrees) 90   Views Completed Lateral   Fluoroscopy Time 207.4   Consistencies / Presentation Method   Consistencies / Presentation Method Tested   Puree Teaspoon   Nectar Thick Liquids Teaspoon;Cup   Thin Liquids Teaspoon;Cup   Mechanical Soft / Mixed Teaspoon   Oral Phase   Oral Phase X   Puree Delayed Oral Transit;Premature Spillage Into Valleculae   Nectar Thick Liquids Premature Spillage Into Pyriform Sinus   Thin Liquids Premature Spillage Into  Pyriform Sinus   Mechanical Soft / Mixed Premature Spillage Into Valleculea;Other (See Comments)  (prolonged mastication )   Pharyngeal Phase   Pharyngeal Phase X   Puree Residue In Pyriform Sinus   Thin Liquids Residue In Valleculae   Esophageal Phase   Esophageal Phase X   Vina Thick Esophageal Reflux   Thin Liquids Esophageal Reflux   Mechanical Soft / Mixed Esophageal Reflux   Compensatory Strategies Attempted   Compensatory Strategies Attempted Yes   Multiple Swallows Effective to clear mild oral and pharyngeal residue, not completed unless cued    Impression   Dysphagia Present Yes   Oral - Pharyngeal Mild Impairment   Prognosis   Prognosis for Improvement Good   Barriers to Improvement Poor cognition   Positive Indicators for Improvement No aspiration noted, able to follow cues   Recommendations   Diet / Liquid Recommendation Dysphagia III;Thin Liquid   Medication  Administration  Crush all Medications in Puree   Strategies / Precautions Small Bites;Small Sips;Clear Mouth Before Next Bite;Multiple Swallows;Sitting Upright at 90 Degrees while Eating;Stay Upright at Least 30 Minutes After Meals;Oral Care After Meals;Monitor Temperature and Lung Sounds   Interventions Dysphagia Therapy by SLP;Compensatory Safe Swallow Strategy Training;Therapeutic Dining Program for Meals;Patient / Caregiver Education / Training   SLP Contact Information (Name / Extension) Prashanth Medina MS, [facility]-SLP/ ex [id number]   Video Tape Number [id number]   SLP Total Time Spent   SLP Individual Total Time Spent (Mins) 30   Charge Group   SLP Video Swallow / FEES Videofluoroscopic Evaluation       Assessment    MBSS completed.  Pt trialed NTL (5 mL, 10 mL, cup sip), thin liquids (5 mL, 10 mL, cup sips), pudding (5 mL, 10 mL), mech soft textures and a barium capsule with a thin liquid wash.  Pt presented with mild oral-pharyngeal dysphagia characterized by the following: Delayed oral transit with puree (pt attempts to chew puree); prolonged mastication of mech soft textures; mild oral residue with all textures which is cleared with a second cued swallow; Premature spillage to the valleculae (puree, mech soft); premature spillage to the pyriforms with thin liquids and very mild vallecular residue with thin liquids.  Attempted a barium capsule with a thin liquid wash, pt held the capsule and liquids in his mouth without swallowing and eventually had to spit them out.  Pt attempts to chew medications when they are floated.      Plan    Recommend upgrading pt's diet to mech soft textures and thin liquids with medications crushed.

## 2019-10-15 NOTE — THERAPY
Physical Therapy   Daily Treatment     Patient Name: Marcio More Jr.  Age:  73 y.o., Sex:  male  Medical Record #: [id number]  Today's Date: 10/15/2019     Precautions  Precautions: (P) Fall Risk, Other (See Comments)  Comments: (P) spasticity, poor motor planning, coccyx ulcer    Subjective    Patient reported a lack of sleep last night. Patient agreed to attempt standing despite fatigue.      Objective       10/15/19 1031   Precautions   Precautions Fall Risk;Other (See Comments)   Comments spasticity, poor motor planning, coccyx ulcer   Bed Mobility    Sit to Supine Total Assist X 2   Sit to Stand Unable to Participate  (Attempted in // bars)   Scooting Total Assist X 2   Rolling Total Assist X 2 to Lt.   Neuro-Muscular Treatments   Neuro-Muscular Treatments Weight Shift Left;Weight Shift Right;Verbal Cuing;Tactile Cuing;Sequencing;Postural Facilitation;Postural Changes;Facilitation   Comments Attempt to  // bars; unable to scoot forward with assist, unable to unweight.  RoHo cushion filled.     Interdisciplinary Plan of Care Collaboration   IDT Collaboration with  Physician;Family / Caregiver;Certified Nursing Assistant;Therapy Tech   Patient Position at End of Therapy In Bed;Family / Friend in Room   Collaboration Comments POC, CLOF, performance declined from yesterday   PT Total Time Spent   PT Individual Total Time Spent (Mins) 30   PT Charge Group   PT Therapeutic Activities 2       FIM Bed/Chair/Wheelchair Transfers Score: 1 - Total Assistance  Bed/Chair/Wheelchair Transfers Description:  Increased time, Adaptive equipment, Set-up of equipment, Requires 2 people to assist(2 person slide board, 2 person bed mob)    FIM Walking Score:  0 - Not tested,unsafe activity  Walking Description:       FIM Wheelchair Score:  2 - Max Assistance  Wheelchair Description:  Extra time(Min A to steer >50 ft indoors BUE, cues for pathfinding)      Assessment    Patient's performance was limited and rigid,  requiring 2 people + slide board; as compared to yesterday's mod A amb in // bars, CGA STS in // bars, mod A reach pivot transfer, and mod A bed mob.  Patient was unable to weight shift to scoot, or stand.      Plan    Continue to progress pregait, standing tolerance, activity tolerance, OOB time, trial walker when appropriate, review bed mobility and transfers.  Error of 2 person assist due to inconsistencies.

## 2019-10-15 NOTE — THERAPY
Speech Language Pathology  Daily Treatment     Patient Name: Marcio More Jr.  Age:  73 y.o., Sex:  male  Medical Record #: [id number]  Today's Date: 10/14/2019     Subjective    Patient pleasant and cooperative.     Objective     10/14/19 0801   Dysphagia    Diet / Liquid Recommendation Nectar Thick Liquid;Dysphagia II   Nutritional Liquid Intake Rating Scale 2   Nutritional Food Intake Rating Scale 5   SLP Total Time Spent   SLP Individual Total Time Spent (Mins) 30   Charge Group   SLP Swallowing Dysfunction Treatment Swallowing Dysfunction Treatment     FIM Eating Score:  5 - Standby Prompting/Supervision or Set-up  Eating Description:  Modified diet, Increased time, Verbal cueing, Supervision for safety, Set-up of equipment or meal/tube feeding    FIM Comprehension Score:  4 - Minimal Assistance  Comprehension Description:  Verbal cues    FIM Expression Score:  4 - Minimal Assistance  Expression Description:  Verbal cueing    FIM Social Interaction Score:  6 - Modified Independent  Social Interaction Description:  Increased time    FIM Problem Solving Score:  3 - Moderate Assistance  Problem Solving Description:  Verbal cueing, Therapy schedule, Bed/chair alarm, Increased time    FIM Memory Score:  3 - Moderate Assistance  Memory Description:  Verbal cueing, Therapy schedule, Bed/chair alarm      Assessment    Patient assessed with dysphagia 1, nectar thick, and therapeutic trials of thin liquids.  Patient fed self.  Has tendency to take one bite of solid after another.  No cough or wet voice occurred with dysphagia 2 and nectar thick liquids.  Patient benefits from min to mod cues for slow rate of eating and double swallow.  Patient trialed with thin liquids, one sip at a time.  Swallow occurred with slight delay but no cough or wet voice.    Plan    Recommend MBSS.  Therapeutic trials of thin liquids and dysphaiga 3 textures.

## 2019-10-15 NOTE — PROGRESS NOTES
"Rehab Progress Note     Encounter Date: 10/14/2019    CC: Encephalopathy, confusion, weakness    Interval Events (Subjective)  Patient sitting up in room. He reports therapy is going OK. Denies pain at this time but had back pain throughout therapy. Patient reports he is tired and plans on napping this afternoon.  Patient with intermittent confusion, thinks he might be in senior living. Denies NVD.     Objective:  VITAL SIGNS: /74   Pulse 83   Temp 36.4 °C (97.6 °F) (Oral)   Resp 18   Ht 1.88 m (6' 2\")   Wt 83.1 kg (183 lb 3.2 oz)   SpO2 93%   BMI 23.52 kg/m²   Gen: NAD  Psych: Mood and affect appropriate  CV: RRR, no edema  Resp: CTAB, no upper airway sounds  Abd: NTND  Neuro: AOx2, following intermittent commands     Recent Results (from the past 72 hour(s))   ACCU-CHEK GLUCOSE    Collection Time: 10/11/19  5:15 PM   Result Value Ref Range    Glucose - Accu-Ck 210 (H) 65 - 99 mg/dL   ACCU-CHEK GLUCOSE    Collection Time: 10/11/19  8:30 PM   Result Value Ref Range    Glucose - Accu-Ck 187 (H) 65 - 99 mg/dL   ACCU-CHEK GLUCOSE    Collection Time: 10/12/19  7:45 AM   Result Value Ref Range    Glucose - Accu-Ck 140 (H) 65 - 99 mg/dL   ACCU-CHEK GLUCOSE    Collection Time: 10/12/19 11:14 AM   Result Value Ref Range    Glucose - Accu-Ck 170 (H) 65 - 99 mg/dL   ACCU-CHEK GLUCOSE    Collection Time: 10/12/19  5:25 PM   Result Value Ref Range    Glucose - Accu-Ck 194 (H) 65 - 99 mg/dL   ACCU-CHEK GLUCOSE    Collection Time: 10/12/19  8:18 PM   Result Value Ref Range    Glucose - Accu-Ck 183 (H) 65 - 99 mg/dL   ACCU-CHEK GLUCOSE    Collection Time: 10/13/19  8:15 AM   Result Value Ref Range    Glucose - Accu-Ck 115 (H) 65 - 99 mg/dL   ACCU-CHEK GLUCOSE    Collection Time: 10/13/19 11:24 AM   Result Value Ref Range    Glucose - Accu-Ck 166 (H) 65 - 99 mg/dL   ACCU-CHEK GLUCOSE    Collection Time: 10/13/19  5:38 PM   Result Value Ref Range    Glucose - Accu-Ck 160 (H) 65 - 99 mg/dL   ACCU-CHEK GLUCOSE    Collection Time: " 10/14/19  8:11 AM   Result Value Ref Range    Glucose - Accu-Ck 143 (H) 65 - 99 mg/dL   ACCU-CHEK GLUCOSE    Collection Time: 10/14/19 11:10 AM   Result Value Ref Range    Glucose - Accu-Ck 167 (H) 65 - 99 mg/dL       Current Facility-Administered Medications   Medication Frequency   • omeprazole (FIRST-OMEPRAZOLE) 2 mg/mL oral susp 40 mg DAILY   • phenylephrine-cocoa butter (PREPARATION H) suppository 1 Suppository Q6HRS PRN   • metFORMIN (GLUCOPHAGE) tablet 250 mg QDAY with Breakfast   • DILTIAZem (CARDIZEM) tablet 90 mg Q6HRS   • metoprolol (LOPRESSOR) tablet 25 mg TWICE DAILY   • apixaban (ELIQUIS) tablet 5 mg BID   • baclofen (LIORESAL) tablet 5 mg TID   • vitamin D (cholecalciferol) tablet 1,000 Units DAILY   • Respiratory Care per Protocol Continuous RT   • oxyCODONE immediate-release (ROXICODONE) tablet 2.5 mg Q3HRS PRN   • oxyCODONE immediate-release (ROXICODONE) tablet 5 mg Q3HRS PRN   • tramadol (ULTRAM) 50 MG tablet 50 mg Q4HRS PRN   • hydrALAZINE (APRESOLINE) tablet 25 mg Q8HRS PRN   • acetaminophen (TYLENOL) tablet 650 mg Q4HRS PRN   • senna-docusate (PERICOLACE or SENOKOT S) 8.6-50 MG per tablet 2 Tab BID    And   • polyethylene glycol/lytes (MIRALAX) PACKET 1 Packet QDAY PRN    And   • magnesium hydroxide (MILK OF MAGNESIA) suspension 30 mL QDAY PRN    And   • bisacodyl (DULCOLAX) suppository 10 mg QDAY PRN   • artificial tears ophthalmic solution 1 Drop PRN   • benzocaine-menthol (CEPACOL) lozenge 1 Lozenge Q2HRS PRN   • mag hydrox-al hydrox-simeth (MAALOX PLUS ES or MYLANTA DS) suspension 20 mL Q2HRS PRN   • ondansetron (ZOFRAN ODT) dispertab 4 mg 4X/DAY PRN    Or   • ondansetron (ZOFRAN) syringe/vial injection 4 mg 4X/DAY PRN   • traZODone (DESYREL) tablet 50 mg QHS PRN   • sodium chloride (OCEAN) 0.65 % nasal spray 2 Spray PRN   • insulin regular (HUMULIN R) injection 2-9 Units 4X/DAY ACHS    And   • glucose 4 g chewable tablet 16 g Q15 MIN PRN    And   • dextrose 50% (D50W) injection 50 mL Q15  MIN PRN   • allopurinol (ZYLOPRIM) tablet 300 mg DAILY   • atorvastatin (LIPITOR) tablet 20 mg Nightly   • ferrous sulfate tablet 325 mg DAILY   • loratadine (CLARITIN) tablet 10 mg DAILY   • predniSONE (DELTASONE) tablet 10 mg DAILY       Orders Placed This Encounter   Procedures   • Diet Order Diabetic     Standing Status:   Standing     Number of Occurrences:   1     Order Specific Question:   Diet:     Answer:   Diabetic [3]     Order Specific Question:   Texture/Fiber modifications:     Answer:   Dysphagia 2(Pureed/Chopped)specify fluid consistency(question 6) [2]     Order Specific Question:   Consistency/Fluid modifications:     Answer:   Nectar Thick [2]     Comments:   meds crushed with puree       Assessment:  Active Hospital Problems    Diagnosis   • *Encephalopathy acute   • B-cell lymphoma (HCC)   • Metastasis to brain (HCC)   • Atrial fibrillation (HCC) w Rapid Ventricular Response    • CAD (coronary artery disease)   • Hypertension   • Type 2 diabetes mellitus without complication, without long-term current use of insulin (HCC)   • Chronic back pain   • Dysphagia   • Primary cerebral lymphoma (HCC)       Medical Decision Making and Plan:  Encephalopathy - Patient with recent diagnosis of stage IV B cell lymphoma s/p R-CHOP on 9/23/19 now with diffuse weakness, confusion and dysphagia  -PT and OT for mobility and ADLs  -SLP for cognition      Dysphagia - Patient on dysphagia 2 with NTL on transfer.  -SLP for swallow     Spasticity - Restarted on Baclofen and increased prior to admission.  -Continue on Baclofen 10 mg TID. With AMS, will reduce to 5 mg TID. No improvement in cognition     B cell lymphoma - Underwent R Chop on 9/23/19.  Followed by Oncology. Recent right retroperitoneal mass s/p biopsy which is positive for B cell lymphoma  -Follow-up Heme/Onc  -Continue Prednisone 10 mg for 5 days - to complete 10/15/19     HTN/A Fib - Patient on Pradaxa.  Patient on Diltiazem 360 mg QHS, Metoprolol 25  mg XL daily   -Patient chewing medication, will switch medications to short acting. Diltiazem 90 mg q6 and metoprolol 25 mg BID     DM - Patient on SSI on transfer. Consult hospitalist. Started on Metformin 250 mg     Leukocytosis - B cell lymphoma on steroids. Check AM CBC - continued  -Consult hospitalist     Hx of Gout - Patient on Allopurinol 300 mg daily.     Vitamin D - 24 on admission, start on 1000 U for deficiency.      GI Ppx - Patient on Omeprazole 40 mg daily. While on steroids     DVT ppx - Patient on Pradaxa 150 mg QHS    Total time:  25 minutes.  I spent greater than 50% of the time for patient care, counseling, and coordination on this date, including unit/floor time, and face-to-face time with the patient as per interval events and assessment and plan above. Topics discussed included ongoing confusion, check UA, and ongoing blood sugar control on steroids.     Leticia Bella M.D.

## 2019-10-15 NOTE — PROGRESS NOTES
"Rehab Progress Note     Encounter Date: 10/15/2019    CC: Encephalopathy, confusion, weakness    Interval Events (Subjective)  Patient sitting up in room. He reports he is very tired in therapy. Discussed case with wife and daughter. They have questions about his encephalopathy worsening today. Discussed he reported earlier this morning to me that he slept 0 hours overnight. Discussed doing much better yesterday with therapy. Discussed would have IDT later today. Denies NVD. Denies SOB.     IDT Team Meeting 10/15/2019    ILeticia M.D., was present and led the interdisciplinary team conference on 10/15/2019.  I led the IDT conference and agree with the IDT conference documentation and plan of care as noted below.     RN:  Diet    % Meal     Pain    Sleep    Bowel Incontinent   Bladder Incontinent   In's & Out's    Very fatigued,    PT:  Bed Mobility totA   Transfers    Mobility maxA   Stairs    Doing well yesterday CGA-Sofie,, today completely fatigued    OT:  Eating    Grooming    Bathing modA   UB Dressing Sofie   LB Dressing totA   Toileting totA   Shower & Transfer    Limited by hip and knee flexion  Very variable  Guarding; perseverative movements    SLP:  Very confused in afternoon  O log - thinks he is in a [facility] in [facility] [place]   Difficult on all exams    CM:  Continues to work on disposition and DME needs.      DC/Disposition:  10/28/19    Objective:  VITAL SIGNS: /75   Pulse 76   Temp 36.4 °C (97.5 °F) (Oral)   Resp 18   Ht 1.88 m (6' 2\")   Wt 83.1 kg (183 lb 3.2 oz)   SpO2 96%   BMI 23.52 kg/m²   Gen: NAD  Psych: Mood and affect appropriate  CV: RRR, no edema  Resp: CTAB, no upper airway sounds  Abd: NTND  Neuro: AOx2, tired, very spastic in BLE    Recent Results (from the past 72 hour(s))   ACCU-CHEK GLUCOSE    Collection Time: 10/12/19  5:25 PM   Result Value Ref Range    Glucose - Accu-Ck 194 (H) 65 - 99 mg/dL   ACCU-CHEK GLUCOSE    Collection Time: 10/12/19  8:18 PM   Result " Value Ref Range    Glucose - Accu-Ck 183 (H) 65 - 99 mg/dL   ACCU-CHEK GLUCOSE    Collection Time: 10/13/19  8:15 AM   Result Value Ref Range    Glucose - Accu-Ck 115 (H) 65 - 99 mg/dL   ACCU-CHEK GLUCOSE    Collection Time: 10/13/19 11:24 AM   Result Value Ref Range    Glucose - Accu-Ck 166 (H) 65 - 99 mg/dL   ACCU-CHEK GLUCOSE    Collection Time: 10/13/19  5:38 PM   Result Value Ref Range    Glucose - Accu-Ck 160 (H) 65 - 99 mg/dL   ACCU-CHEK GLUCOSE    Collection Time: 10/14/19  8:11 AM   Result Value Ref Range    Glucose - Accu-Ck 143 (H) 65 - 99 mg/dL   ACCU-CHEK GLUCOSE    Collection Time: 10/14/19 11:10 AM   Result Value Ref Range    Glucose - Accu-Ck 167 (H) 65 - 99 mg/dL   ACCU-CHEK GLUCOSE    Collection Time: 10/14/19  5:36 PM   Result Value Ref Range    Glucose - Accu-Ck 161 (H) 65 - 99 mg/dL   ACCU-CHEK GLUCOSE    Collection Time: 10/14/19  8:17 PM   Result Value Ref Range    Glucose - Accu-Ck 183 (H) 65 - 99 mg/dL   ACCU-CHEK GLUCOSE    Collection Time: 10/15/19  7:56 AM   Result Value Ref Range    Glucose - Accu-Ck 112 (H) 65 - 99 mg/dL       Current Facility-Administered Medications   Medication Frequency   • omeprazole (FIRST-OMEPRAZOLE) 2 mg/mL oral susp 40 mg DAILY   • phenylephrine-cocoa butter (PREPARATION H) suppository 1 Suppository Q6HRS PRN   • metFORMIN (GLUCOPHAGE) tablet 250 mg QDAY with Breakfast   • DILTIAZem (CARDIZEM) tablet 90 mg Q6HRS   • metoprolol (LOPRESSOR) tablet 25 mg TWICE DAILY   • apixaban (ELIQUIS) tablet 5 mg BID   • baclofen (LIORESAL) tablet 5 mg TID   • vitamin D (cholecalciferol) tablet 1,000 Units DAILY   • Respiratory Care per Protocol Continuous RT   • oxyCODONE immediate-release (ROXICODONE) tablet 2.5 mg Q3HRS PRN   • oxyCODONE immediate-release (ROXICODONE) tablet 5 mg Q3HRS PRN   • tramadol (ULTRAM) 50 MG tablet 50 mg Q4HRS PRN   • hydrALAZINE (APRESOLINE) tablet 25 mg Q8HRS PRN   • acetaminophen (TYLENOL) tablet 650 mg Q4HRS PRN   • senna-docusate (PERICOLACE  or SENOKOT S) 8.6-50 MG per tablet 2 Tab BID    And   • polyethylene glycol/lytes (MIRALAX) PACKET 1 Packet QDAY PRN    And   • magnesium hydroxide (MILK OF MAGNESIA) suspension 30 mL QDAY PRN    And   • bisacodyl (DULCOLAX) suppository 10 mg QDAY PRN   • artificial tears ophthalmic solution 1 Drop PRN   • benzocaine-menthol (CEPACOL) lozenge 1 Lozenge Q2HRS PRN   • mag hydrox-al hydrox-simeth (MAALOX PLUS ES or MYLANTA DS) suspension 20 mL Q2HRS PRN   • ondansetron (ZOFRAN ODT) dispertab 4 mg 4X/DAY PRN    Or   • ondansetron (ZOFRAN) syringe/vial injection 4 mg 4X/DAY PRN   • traZODone (DESYREL) tablet 50 mg QHS PRN   • sodium chloride (OCEAN) 0.65 % nasal spray 2 Spray PRN   • insulin regular (HUMULIN R) injection 2-9 Units 4X/DAY ACHS    And   • glucose 4 g chewable tablet 16 g Q15 MIN PRN    And   • dextrose 50% (D50W) injection 50 mL Q15 MIN PRN   • allopurinol (ZYLOPRIM) tablet 300 mg DAILY   • atorvastatin (LIPITOR) tablet 20 mg Nightly   • ferrous sulfate tablet 325 mg DAILY   • loratadine (CLARITIN) tablet 10 mg DAILY       Orders Placed This Encounter   Procedures   • Diet Order Diabetic     Standing Status:   Standing     Number of Occurrences:   1     Order Specific Question:   Diet:     Answer:   Diabetic [3]     Order Specific Question:   Texture/Fiber modifications:     Answer:   Dysphagia 3(Mechanical Soft)specify fluid consistency(question 6) [3]     Order Specific Question:   Consistency/Fluid modifications:     Answer:   Thin Liquids [3]       Assessment:  Active Hospital Problems    Diagnosis   • *Encephalopathy acute   • B-cell lymphoma (HCC)   • Metastasis to brain (HCC)   • Atrial fibrillation (HCC) w Rapid Ventricular Response    • CAD (coronary artery disease)   • Hypertension   • Type 2 diabetes mellitus without complication, without long-term current use of insulin (HCC)   • Chronic back pain   • Dysphagia   • Primary cerebral lymphoma (HCC)       Medical Decision Making and  Plan:  Encephalopathy - Patient with recent diagnosis of stage IV B cell lymphoma s/p R-CHOP on 9/23/19 now with diffuse weakness, confusion and dysphagia  -PT and OT for mobility and ADLs  -SLP for cognition      Dysphagia - Patient on dysphagia 2 with NTL on transfer.  -SLP for swallow     Spasticity - Restarted on Baclofen and increased prior to admission.  -Continue on Baclofen 10 mg TID. With AMS, will reduce to 5 mg TID. No improvement in cognition     B cell lymphoma - Underwent R Chop on 9/23/19.  Followed by Oncology. Recent right retroperitoneal mass s/p biopsy which is positive for B cell lymphoma  -Follow-up Heme/Onc  -Continue Prednisone 10 mg for 5 days - to complete 10/15/19     HTN/A Fib - Patient on Pradaxa.  Patient on Diltiazem 360 mg QHS, Metoprolol 25 mg XL daily   -Patient chewing medication, will switch medications to short acting. Diltiazem 90 mg q6 and metoprolol 25 mg BID     DM - Patient on SSI on transfer. Consult hospitalist. Started on Metformin 250 mg     Leukocytosis - B cell lymphoma on steroids. Check AM CBC - continued  -Consult hospitalist     Insomnia - Very poor sleep at night. Start Melatonin 6 mg QHS    Hx of Gout - Patient on Allopurinol 300 mg daily.     Vitamin D - 24 on admission, start on 1000 U for deficiency.      GI Ppx - Patient on Omeprazole 40 mg daily. While on steroids     DVT ppx - Patient on Pradaxa 150 mg QHS    Total time:  37 minutes.  I spent greater than 50% of the time for patient care, counseling, and coordination on this date, including unit/floor time, and face-to-face time with the patient as per interval events and assessment and plan above. Topics discussed included discharge planning, increased confusion, and start Melatonin at night. Patient was discussed separately in IDT today; please see details above.    Leticia Bella M.D.

## 2019-10-15 NOTE — CARE PLAN
Problem: Swallowing STGs  Goal: STG-Within one week, patient will  Description  1) Individualized goal:  Tolerate dys2/NTL diet without overt s/s of aspiration over 3 meals.    2) Interventions:  SLP Swallowing Dysfunction Treatment and SLP Group Treatment     Outcome: NOT MET  Note:   Pt demonstrates occasional coughing during dysphagia 2 and NTL meals      Problem: Expression STGs  Goal: STG-Within one week, patient will  Description  1) Individualized goal:  Will produce prolonged voicing (e.g., hum, 'ah', frontal focus) with 80% accuracy and MOD A.   2) Interventions:  SLP Speech Language Treatment and SLP Group Treatment     Outcome: NOT MET  Note:   Max A for voicing      Problem: Memory STGs  Goal: STG-Within one week, patient will  Description  1) Individualized goal:  Be oriented to date, time of day, location, and situation in 80% of opportunities with MOD A.   2) Interventions:  SLP Self Care / ADL Training , SLP Cognitive Skill Development and SLP Group Treatment     Outcome: NOT MET  Note:   Pt requires max A for orientation   Goal: STG-Within one week, patient will  Description  1) Individualized goal:  Use external memory aids to recall novel information from [facility] stay with 80% accuracy and MOD A.   2) Interventions:  SLP Self Care / ADL Training , SLP Cognitive Skill Development and SLP Group Treatment     Outcome: NOT MET  Note:   Continue to target     Problem: Swallowing STGs  Goal: STG-Within one week, patient will  Description  1) Individualized goal:  Participate in a modified barium swallow study.   2) Interventions:  SLP Video Swallow Evaluation     Outcome: MET  Note:   MBSS completed on 10/15     Problem: Problem Solving STGs  Goal: STG-Within one week, patient will  Description  1) Individualized goal:  Complete the SCCAN with additional goals added as appropriate.    2) Interventions:  SLP Cognitive Skill Development     Outcome: MET  Note:   SCCAN completed

## 2019-10-15 NOTE — THERAPY
Speech Language Pathology  Daily Treatment     Patient Name: Marcio More Jr.  Age:  73 y.o., Sex:  male  Medical Record #: [id number]  Today's Date: 10/15/2019     Subjective    Patient was in room with family at time of ST. Was willing to participate bedside.      Objective       10/15/19 1132   Dysphagia    Diet / Liquid Recommendation Dysphagia III;Thin Liquid   Nutritional Liquid Intake Rating Scale 3   Nutritional Food Intake Rating Scale 5   SLP Total Time Spent   SLP Individual Total Time Spent (Mins) 30   Charge Group   SLP Swallowing Dysfunction Treatment Swallowing Dysfunction Treatment         Assessment    Patient was assessed with dysphagia II textures (declined dysphagia III tray at this time) and thin liquids. Patient with slow initiation of PO. Prolonged mastication, however no  overt s/sx of aspiration were noted. Minimal intake during session.      Plan    Continue to assess diet tolerance and reinforce use of safe swallow strategies in t-dine.

## 2019-10-16 NOTE — WOUND TEAM
"[facility]  Inpatient Services  Wound and Skin Care Progress Note    Admission Date: 10/10/2019       HPI, PMH, SH: Reviewed    Unit where seen by Wound Team: [id number]     WOUND FOLLOW UP/CONSULT RELATED TO:  Re evaluation of sacral wound     SUBJECTIVE:  \"It feels better, my wife put some salve on it\"      Self Report / Pain Level:  Some tenderness with care       OBJECTIVE:  increased maceration since photo possibly due to wife application of barrier paste    WOUND TYPE, LOCATION, CHARACTERISTICS (Pressure Injuries: location, stage, POA or date identified)    Pressure Injury 10/10/19 Sacrum;Coccyx stage 2 pressure injury POA (Active)   Wound Image   10/12/2019  8:00 PM   Pressure Injury Stage 2 10/16/2019  9:20 AM   State of Healing Early/partial granulation 10/16/2019  9:20 AM   Site Assessment Clean;Red 10/16/2019  9:20 AM   Loreta-wound Assessment Maceration 10/16/2019  9:20 AM   Margins Attached edges 10/16/2019  9:20 AM   Wound Length (cm) 2 cm 10/16/2019  9:20 AM   Wound Width (cm) 1 cm 10/16/2019  9:20 AM   Wound Depth (cm) 0.1 cm 10/16/2019  9:20 AM   Wound Surface Area (cm^2) 2 cm^2 10/16/2019  9:20 AM   Tunneling 0 cm 10/16/2019  9:20 AM   Undermining 0 cm 10/16/2019  9:20 AM   Closure Secondary intention 10/16/2019  9:20 AM   Drainage Amount None 10/16/2019  9:20 AM   Treatments Cleansed;Site care 10/16/2019  9:20 AM   Cleansing Normal Saline Irrigation 10/16/2019  9:20 AM   Periwound Protectant Not Applicable 10/16/2019  9:20 AM   Dressing Options Mepilex 10/16/2019  9:20 AM   Dressing Cleansing/Solutions Not Applicable 10/16/2019  9:20 AM   Dressing Changed Changed 10/16/2019  9:20 AM   Dressing Status Intact 10/16/2019  9:20 AM   Dressing Change Frequency Daily 10/16/2019  9:20 AM   NEXT Dressing Change  10/17/19 10/16/2019  9:20 AM   NEXT Weekly Photo (Inpatient Only) 10/19/19 10/16/2019  9:20 AM   WOUND NURSE ONLY - Odor None 10/16/2019  9:20 AM   WOUND NURSE ONLY - Exposed Structures " None 10/16/2019  9:20 AM   WOUND NURSE ONLY - Tissue Type and Percentage red 100% 10/16/2019  9:20 AM   WOUND NURSE ONLY - Time Spent with Patient (mins) 45 10/16/2019  9:20 AM      Lab Values:    Lab Results   Component Value Date/Time    WBC 12.6 (H) 10/16/2019 06:17 AM    RBC 4.23 (L) 10/16/2019 06:17 AM    HEMOGLOBIN 13.0 (L) 10/16/2019 06:17 AM    HEMATOCRIT 40.9 (L) 10/16/2019 06:17 AM        Lab Results   Component Value Date/Time    HBA1C 7.2 (H) 10/11/2019 05:51 AM           Culture:  NA      INTERVENTIONS BY WOUND TEAM:  Turned patient to side and removed mepilex noting increased maceration and paste residue on skin.  Using NS gauze removed loose skin and paste.  Measurements taken and replaced mepilex over wound.  Discussed with staff RN.    Dressing Selection:  mepilex         Interdisciplinary consultation: Patient, Bedside RN     EVALUATION: clean appearing stage 2 pressure injury that has increased maceration of mary wound skin due to paste under mepilex.  Hopefully will improve with mepilex only and continued off loading.    Factors affecting wound healing: DM, pressure    Goals: Steady decrease in wound area and depth weekly.    NURSING PLAN OF CARE ORDERS (X):    Dressing changes: See Dressing Care orders: X updated  Skin care: See Skin Care orders:   Rectal tube care: See Rectal Tube Care orders:   Other orders:      WOUND TEAM PLAN OF CARE (X):   NPWT change 3 x week:        Dressing changes by wound team:       Follow up as needed:   X weekly    Other (explain):     Anticipated discharge plans (X):   SNF:           Home Care:           Outpatient Wound Center:            Self Care:            Other:   TBD

## 2019-10-16 NOTE — THERAPY
Physical Therapy   Daily Treatment     Patient Name: Marcio More Jr.  Age:  73 y.o., Sex:  male  Medical Record #: [id number]  Today's Date: 10/16/2019     Precautions  Precautions: Fall Risk, Other (See Comments)  Comments: spasticity, poor motor planning, coccyx ulcer    Subjective    Patient acknowledged progress today.      Objective       10/16/19 1431   Precautions   Precautions Fall Risk;Other (See Comments)   Comments spasticity, poor motor planning, coccyx ulcer   Cognition    Speech/ Communication   (Speaks very softly)   New Learning Impaired   Attention Impaired   Sequencing Impaired   Initiation Impaired   Sitting Lower Body Exercises   Ankle Pumps 2 sets of 15;Bilateral   Marching 2 sets of 10   Bed Mobility    Sit to Supine Moderate Assist   Sit to Stand Minimal Assist  (with FWW, CGA-> SBA on // bars)   Scooting Moderate Assist   Rolling Moderate Assist to Lt.   Neuro-Muscular Treatments   Neuro-Muscular Treatments Weight Shift Right;Weight Shift Left;Verbal Cuing;Tactile Cuing;Sequencing;Postural Facilitation;Postural Changes;Facilitation   Comments Stepping fwd/ bwd 10x BLE.  Stepping fwd/ bwd with agility ladder for visual cuing for step length 10x BLE.  Divider added over agility ladder to improve BASSAM, stepping fwd/ bwd 10x BLE.  Gait through agility ladder 15 ft with 2 turns in // bars CGA/min A, emphasis on step length.    Interdisciplinary Plan of Care Collaboration   IDT Collaboration with  Nursing   Patient Position at End of Therapy In Bed   Collaboration Comments Meds provided post tx/ POC, CLOF    PT Total Time Spent   PT Individual Total Time Spent (Mins) 60   PT Charge Group   PT Gait Training 1   PT Neuromuscular Re-Education / Balance 2   PT Therapeutic Activities 1       FIM Bed/Chair/Wheelchair Transfers Score: 3 - Moderate Assistance  Bed/Chair/Wheelchair Transfers Description:  Adaptive equipment, Set-up of equipment, Assist with two limbs(Min A STS with FWW to stabilize,  CGA SPT with FWW, mod A for BLE assist into bed )    FIM Walking Score:  1 - Total Assistance  Walking Description:  Walker, Extra time, Requires incidental assist(Amb with FWW indoors 25 ft CGA, limited by fatigue)      Assessment    Patient progressed to ambulation with FWW and SPT with FWW.  Patient was able to improve step length and width in // bars during pregait, but no carryover was achieved outside of // bars.  Upright standing posture continues to be limited due to LBP.  Patient fatigues easily, demonstrated step to gait pattern, and narrow BASSAM. Environmental changes helped, but verbal cues do not.      Plan    Continue progression with FWW for gait and transfer.  Ongoing family education.  Initiate hands on family training if consistency is achieved in performance.  Trial U Step.

## 2019-10-16 NOTE — PROGRESS NOTES
Hospital Medicine Daily Progress Note      Chief Complaint:  Atrial Fibrillation  Hypertension  Diabetes  Leukocytosis    Interval History:  Pt denies F/C/N/V/D, abd pain, CP, or SOB.  UA results reviewed.    Review of Systems  Review of Systems   Constitutional: Negative for chills and fever.   HENT: Negative.    Eyes: Negative.    Respiratory: Negative for cough and shortness of breath.    Cardiovascular: Negative for chest pain and palpitations.   Gastrointestinal: Negative for abdominal pain, nausea and vomiting.   Skin: Negative for itching and rash.        Physical Exam  Temp:  [36.4 °C (97.6 °F)-37.1 °C (98.7 °F)] 37.1 °C (98.7 °F)  Pulse:  [60-86] 81  Resp:  [18] 18  BP: ()/(54-81) 104/75    Physical Exam   Constitutional: He is oriented to person, place, and time. No distress.   HENT:   Head: Normocephalic and atraumatic.   Right Ear: External ear normal.   Left Ear: External ear normal.   Prior crani scar   Eyes: Conjunctivae and EOM are normal. Right eye exhibits no discharge. Left eye exhibits no discharge.   Neck: Normal range of motion. Neck supple. No tracheal deviation present.   Cardiovascular: Normal rate, regular rhythm, S1 normal and S2 normal.   Pulmonary/Chest: No stridor. No respiratory distress. He has no wheezes. He has no rhonchi.   Decreased BS   Abdominal: Soft. Bowel sounds are normal. He exhibits no distension. There is no tenderness.   Musculoskeletal: He exhibits no edema or tenderness.   Neurological: He is alert and oriented to person, place, and time. No sensory deficit.   Skin: Skin is warm and dry. He is not diaphoretic. No cyanosis.   Vitals reviewed.      Fluids    Intake/Output Summary (Last 24 hours) at 10/17/2019 1242  Last data filed at 10/17/2019 0840  Gross per 24 hour   Intake 480 ml   Output --   Net 480 ml       Laboratory  Recent Labs     10/16/19  0617   WBC 12.6*   RBC 4.23*   HEMOGLOBIN 13.0*   HEMATOCRIT 40.9*   MCV 96.7   MCH 30.7   MCHC 31.8*   RDW 47.4    PLATELETCT 347   MPV 8.8*     Recent Labs     10/16/19  0617   SODIUM 139   POTASSIUM 3.7   CHLORIDE 102   CO2 27   GLUCOSE 102*   BUN 25*   CREATININE 0.89   CALCIUM 9.3                   Assessment/Plan  B-cell lymphoma (HCC)- (present on admission)  Assessment & Plan  Stage IV w/ h/o brain mass resection  S/P chemo on 9/23/19  Recent MRI showed no brain mass    A-fib (HCC)- (present on admission)  Assessment & Plan  S/P RVR at [facility]  On both Metoprolol and Diltiazem  Monitor HR  Anticoagulated on Eliquis    Type 2 diabetes mellitus without complication, without long-term current use of insulin (HCC)- (present on admission)  Assessment & Plan  HbA1c 7.2  Will increase SSI  Continue Metformin  Outpt meds include Metformin  mg daily    CAD (coronary artery disease)- (present on admission)  Assessment & Plan  On Eliquis, Lipitor, Metoprolol, and Diltiazem  Consider additon of ACE-I or ARB to maximize medical management    Hypertension- (present on admission)  Assessment & Plan  On Metoprolol and Diltiazem  Observe blood pressure trends    Leukocytosis- (present on admission)  Assessment & Plan  UA 20-50 WBC w/ moderate bacteria  Start empiric Cipro pending UCx  Check morning labs    Vitamin D insufficiency- (present on admission)  Assessment & Plan  Vit D level24  On supplementation    Full Code

## 2019-10-16 NOTE — CARE PLAN
Problem: Communication  Goal: The ability to communicate needs accurately and effectively will improve  Outcome: PROGRESSING AS EXPECTED  Note:   Pt talks quietly but is able to communicate his needs to staff effectively     Problem: Infection  Goal: Will remain free from infection  Outcome: PROGRESSING SLOWER THAN EXPECTED  Note:   Pt started on Cipro for UTI. C&S processing.

## 2019-10-16 NOTE — THERAPY
Speech Language Pathology  Daily Treatment     Patient Name: Marcio More Jr.  Age:  73 y.o., Sex:  male  Medical Record #: [id number]  Today's Date: 10/16/2019     Subjective    Patient was visited by Dr. Weber during session who reported new UTI and beginning ABtx.  Patient reported sleeping poorly.  Voice barely audible.   Attempted to urinate during session but was unable to go.     Objective       10/16/19 0801   Cognition   Attention to Task Moderate (3)   Simple Attention Minimal (4)   Simple Reasoning / Problem Solving Moderate (3)   Insight into Deficits Severe (2)   Functional Sequencing for ADL / IADLs Moderate (3)   Speech / Dysarthria   Speech / Dysarthria X   Intensity / Loudness Training Severe (2)   Voice   Voice X   Dysphagia    Diet / Liquid Recommendation Thin Liquid;Dysphagia III   Nutritional Liquid Intake Rating Scale 3   Nutritional Food Intake Rating Scale 5   Nursing Communication   (Recommended trial of medications whole with puree.)   SLP Total Time Spent   SLP Individual Total Time Spent (Mins) 60   Charge Group   SLP Swallowing Dysfunction Treatment Swallowing Dysfunction Treatment   SLP Cognitive Skill Development 1       FIM Eating Score:  5 - Standby Prompting/Supervision or Set-up  Eating Description:  Modified diet, Set-up of equipment or meal/tube feeding    FIM Comprehension Score:  5 - Stand-by Prompting/Supervision or Set-up  Comprehension Description:  Verbal cues    FIM Expression Score:  4 - Minimal Assistance  Expression Description:  Verbal cueing    FIM Social Interaction Score:  6 - Modified Independent  Social Interaction Description:  Increased time    FIM Problem Solving Score:  3 - Moderate Assistance  Problem Solving Description:  Verbal cueing, Therapy schedule, Increased time, Bed/chair alarm, Seat belt    FIM Memory Score:  3 - Moderate Assistance  Memory Description:  Verbal cueing, Therapy schedule      Assessment    Patient tolerated dysphagia 3 and thin  liquids with no overt s/sx of aspiration.  Benefits from mod cues to slow rate of intake.  Discussed with patient trial of regular vs. Regular chopped.   Patient indicated that he felt he could cut his own food. But did express that cups of liquid feel too heavy to lift and difficult to grasp.  He would benefit from a plastic mug ( vs. A heavy ceramic mug).  Recommend trial of regular textures and thin liquids for lunch on 10/16/19.  Recommend trial medications whole with puree.  Patient assisted to bathroom and to his bed during session.  He demonstrated ability to comprehend simple directions for transfer, however, fear of falling and impaired postural and motor control presented a major barrier to following through with directives.  Patient demonstrated severe rigidity in movement and retropulsion, during transfers.  Max A x 2 needed for toileting and bed transfer.  Patient needed mod A to recall new training and information.  Mod to max A needed for safety insight.  Patient was poorly stimuable to increase voice volume with max models and feedback given, today.  Plan    Target diet advancement, increase voice volume, recall, safety problem solving.

## 2019-10-16 NOTE — THERAPY
"Occupational Therapy  Daily Treatment     Patient Name: Marcio More Jr.  Age:  73 y.o., Sex:  male  Medical Record #: [id number]  Today's Date: 10/16/2019     Precautions  Precautions: (P) Fall Risk  Comments: (P) spasticity, poor motor planning, coccyx ulcer    Safety   ADL Safety : (P) Requires Physical Assist for Safety, Requires Cueing for Safety  Bathroom Safety: (P) Requires Physical Assist for Safety, Requires Cuing for Safety  Comments: see FIM for oral care and eating    Subjective    Pt was seated in w/c and agreeable to therapy. Pt requested to complete grooming and use toilet. \"I am moving better today.\"      Objective       10/16/19 1301   Precautions   Precautions Fall Risk   Comments spasticity, poor motor planning, coccyx ulcer   Safety    ADL Safety  Requires Physical Assist for Safety;Requires Cueing for Safety   Bathroom Safety Requires Physical Assist for Safety;Requires Cuing for Safety   Cognition    Level of Consciousness Alert   Ability To Follow Commands 2 Step   New Learning Impaired   Sequencing Impaired   Initiation Impaired   Comments requires minimal cues for sequencing, initation and termination of ADLs   Active ROM Upper Body   Comments BUE WFL this date   Strength Upper Body   Comments BUE WFL this date   Standing Lower Body Exercises   Marching 1 set of 10  (standing with FWW with CGA for balance)   Balance   Sitting Balance (Static) Fair -   Sitting Balance (Dynamic) Poor +   Standing Balance (Static) Trace +   Standing Balance (Dynamic) Trace   Weight Shift Sitting Poor   Weight Shift Standing Poor   Skilled Intervention Facilitation;Sequencing;Tactile Cuing;Verbal Cuing   Interdisciplinary Plan of Care Collaboration   IDT Collaboration with  Therapy Tech   Patient Position at End of Therapy Seated;Self Releasing Lap Belt Applied;Call Light within Reach   Collaboration Comments trasnfer assist   OT Total Time Spent   OT Individual Total Time Spent (Mins) 60   OT Charge " Group   OT Self Care / ADL 2   OT Therapy Activity 2       FIM Grooming Score:  5 - Standby Prompting/Supervision or Set-up  Grooming Description:  Supervision for safety, Verbal cueing, Increased time, Initial preparation for task(SBA for safety seated in w/c)    FIM Toileting:   Total Assist ( Max A x1  for clothing management and hygiene with Min A x 1 for balance)  Toileting Description:         FIM Toilet Transfer Score:  2 - Max Assistance  Toilet Transfer Description:  Grab bar, Increased time, Supervision for safety, Verbal cueing(Max A for balance and tactile cue to facilitate weight shifting and steps)    SPT w/c<>mat table wiith Mod A for balance using FWW X 2  STS with Min A suing FWW X 4    Assessment    Pt was more alert and participatory than previous therapy session. Pt demonstrated improved motor planning, strength, and endurance. Pt continues to demonstrate impaired standing balance, incontinence, and sequencing.     Plan       Continued OT for independence with ADLs (use of AE/DME), IADLs, functional transfers, spasticity management, and motor planning by repetition.

## 2019-10-17 NOTE — PROGRESS NOTES
"Rehab Progress Note     Encounter Date: 10/16/2019    CC: Encephalopathy, confusion, weakness    Interval Events (Subjective)  Patient sitting up in room. Finally able to collect UA yesterday and positive for UTI.  Patient reports he is feeling better after starting treatment. Per therapy he is improved. Unclear if UTI treatment vs amantadine.  Denies pain. Denies NVD.     IDT Team Meeting 10/15/2019  DC/Disposition:  10/28/19    Objective:  VITAL SIGNS: /70   Pulse 73   Temp 36.4 °C (97.6 °F) (Temporal)   Resp 18   Ht 1.88 m (6' 2\")   Wt 83.1 kg (183 lb 3.2 oz)   SpO2 98%   BMI 23.52 kg/m²   Gen: NAD  Psych: Mood and affect appropriate  CV: RRR, no edema  Resp: CTAB, no upper airway sounds  Abd: NTND  Neuro: AOx3, more initiation, less confusion    Recent Results (from the past 72 hour(s))   ACCU-CHEK GLUCOSE    Collection Time: 10/14/19  8:11 AM   Result Value Ref Range    Glucose - Accu-Ck 143 (H) 65 - 99 mg/dL   ACCU-CHEK GLUCOSE    Collection Time: 10/14/19 11:10 AM   Result Value Ref Range    Glucose - Accu-Ck 167 (H) 65 - 99 mg/dL   ACCU-CHEK GLUCOSE    Collection Time: 10/14/19  5:36 PM   Result Value Ref Range    Glucose - Accu-Ck 161 (H) 65 - 99 mg/dL   ACCU-CHEK GLUCOSE    Collection Time: 10/14/19  8:17 PM   Result Value Ref Range    Glucose - Accu-Ck 183 (H) 65 - 99 mg/dL   URINALYSIS    Collection Time: 10/15/19  7:45 AM   Result Value Ref Range    Color Yellow     Character Cloudy (A)     Specific Gravity >=1.030 <1.035    Ph 5.5 5.0 - 8.0    Glucose Negative Negative mg/dL    Ketones Negative Negative mg/dL    Protein Negative Negative mg/dL    Bilirubin Negative Negative    Urobilinogen, Urine 0.2 Negative    Nitrite Positive (A) Negative    Leukocyte Esterase Trace (A) Negative    Occult Blood Small (A) Negative    Micro Urine Req Microscopic    URINE MICROSCOPIC (W/UA)    Collection Time: 10/15/19  7:45 AM   Result Value Ref Range    WBC 20-50 (A) /hpf    RBC 2-5 (A) /hpf    Bacteria " Moderate (A) None /hpf    Epithelial Cells Few /hpf    Amorphous Crystal Present /hpf    Ca Oxalate Crystal Few /hpf   ACCU-CHEK GLUCOSE    Collection Time: 10/15/19  7:56 AM   Result Value Ref Range    Glucose - Accu-Ck 112 (H) 65 - 99 mg/dL   ACCU-CHEK GLUCOSE    Collection Time: 10/15/19 11:09 AM   Result Value Ref Range    Glucose - Accu-Ck 166 (H) 65 - 99 mg/dL   ACCU-CHEK GLUCOSE    Collection Time: 10/15/19  5:04 PM   Result Value Ref Range    Glucose - Accu-Ck 165 (H) 65 - 99 mg/dL   ACCU-CHEK GLUCOSE    Collection Time: 10/15/19  9:51 PM   Result Value Ref Range    Glucose - Accu-Ck 160 (H) 65 - 99 mg/dL   CBC WITHOUT DIFFERENTIAL    Collection Time: 10/16/19  6:17 AM   Result Value Ref Range    WBC 12.6 (H) 4.8 - 10.8 K/uL    RBC 4.23 (L) 4.70 - 6.10 M/uL    Hemoglobin 13.0 (L) 14.0 - 18.0 g/dL    Hematocrit 40.9 (L) 42.0 - 52.0 %    MCV 96.7 81.4 - 97.8 fL    MCH 30.7 27.0 - 33.0 pg    MCHC 31.8 (L) 33.7 - 35.3 g/dL    RDW 47.4 35.9 - 50.0 fL    Platelet Count 347 164 - 446 K/uL    MPV 8.8 (L) 9.0 - 12.9 fL   Basic Metabolic Panel    Collection Time: 10/16/19  6:17 AM   Result Value Ref Range    Sodium 139 135 - 145 mmol/L    Potassium 3.7 3.6 - 5.5 mmol/L    Chloride 102 96 - 112 mmol/L    Co2 27 20 - 33 mmol/L    Glucose 102 (H) 65 - 99 mg/dL    Bun 25 (H) 8 - 22 mg/dL    Creatinine 0.89 0.50 - 1.40 mg/dL    Calcium 9.3 8.5 - 10.5 mg/dL    Anion Gap 10.0 0.0 - 11.9   ESTIMATED GFR    Collection Time: 10/16/19  6:17 AM   Result Value Ref Range    GFR If African American >60 >60 mL/min/1.73 m 2    GFR If Non African American >60 >60 mL/min/1.73 m 2   ACCU-CHEK GLUCOSE    Collection Time: 10/16/19  7:37 AM   Result Value Ref Range    Glucose - Accu-Ck 115 (H) 65 - 99 mg/dL   ACCU-CHEK GLUCOSE    Collection Time: 10/16/19 11:36 AM   Result Value Ref Range    Glucose - Accu-Ck 151 (H) 65 - 99 mg/dL   ACCU-CHEK GLUCOSE    Collection Time: 10/16/19  5:16 PM   Result Value Ref Range    Glucose - Accu-Ck 155 (H)  65 - 99 mg/dL       Current Facility-Administered Medications   Medication Frequency   • lidocaine (LIDODERM) 5 % 1 Patch Q24HR   • [START ON 10/17/2019] metFORMIN (GLUCOPHAGE) tablet 250 mg BID WITH MEALS   • melatonin tablet 6 mg QHS   • amantadine (SYMMETREL) 50 MG/5ML syrup 100 mg BID   • insulin regular (HUMULIN R) injection 2-12 Units 4X/DAY ACHS    And   • glucose 4 g chewable tablet 16 g Q15 MIN PRN    And   • dextrose 50% (D50W) injection 50 mL Q15 MIN PRN   • ciprofloxacin (CIPRO) tablet 500 mg Q12HRS   • omeprazole (FIRST-OMEPRAZOLE) 2 mg/mL oral susp 40 mg DAILY   • phenylephrine-cocoa butter (PREPARATION H) suppository 1 Suppository Q6HRS PRN   • DILTIAZem (CARDIZEM) tablet 90 mg Q6HRS   • metoprolol (LOPRESSOR) tablet 25 mg TWICE DAILY   • apixaban (ELIQUIS) tablet 5 mg BID   • baclofen (LIORESAL) tablet 5 mg TID   • vitamin D (cholecalciferol) tablet 1,000 Units DAILY   • Respiratory Care per Protocol Continuous RT   • oxyCODONE immediate-release (ROXICODONE) tablet 2.5 mg Q3HRS PRN   • oxyCODONE immediate-release (ROXICODONE) tablet 5 mg Q3HRS PRN   • tramadol (ULTRAM) 50 MG tablet 50 mg Q4HRS PRN   • hydrALAZINE (APRESOLINE) tablet 25 mg Q8HRS PRN   • acetaminophen (TYLENOL) tablet 650 mg Q4HRS PRN   • senna-docusate (PERICOLACE or SENOKOT S) 8.6-50 MG per tablet 2 Tab BID    And   • polyethylene glycol/lytes (MIRALAX) PACKET 1 Packet QDAY PRN    And   • magnesium hydroxide (MILK OF MAGNESIA) suspension 30 mL QDAY PRN    And   • bisacodyl (DULCOLAX) suppository 10 mg QDAY PRN   • artificial tears ophthalmic solution 1 Drop PRN   • benzocaine-menthol (CEPACOL) lozenge 1 Lozenge Q2HRS PRN   • mag hydrox-al hydrox-simeth (MAALOX PLUS ES or MYLANTA DS) suspension 20 mL Q2HRS PRN   • ondansetron (ZOFRAN ODT) dispertab 4 mg 4X/DAY PRN    Or   • ondansetron (ZOFRAN) syringe/vial injection 4 mg 4X/DAY PRN   • traZODone (DESYREL) tablet 50 mg QHS PRN   • sodium chloride (OCEAN) 0.65 % nasal spray 2 Spray PRN    • allopurinol (ZYLOPRIM) tablet 300 mg DAILY   • atorvastatin (LIPITOR) tablet 20 mg Nightly   • ferrous sulfate tablet 325 mg DAILY   • loratadine (CLARITIN) tablet 10 mg DAILY       Orders Placed This Encounter   Procedures   • Diet Order Diabetic     Standing Status:   Standing     Number of Occurrences:   1     Order Specific Question:   Diet:     Answer:   Diabetic [3]     Order Specific Question:   Texture/Fiber modifications:     Answer:   Dysphagia 3(Mechanical Soft)specify fluid consistency(question 6) [3]     Order Specific Question:   Consistency/Fluid modifications:     Answer:   Thin Liquids [3]       Assessment:  Active Hospital Problems    Diagnosis   • *Encephalopathy acute   • B-cell lymphoma (HCC)   • Metastasis to brain (HCC)   • Atrial fibrillation (HCC) w Rapid Ventricular Response    • CAD (coronary artery disease)   • Hypertension   • Type 2 diabetes mellitus without complication, without long-term current use of insulin (HCC)   • Chronic back pain   • Dysphagia   • Primary cerebral lymphoma (HCC)       Medical Decision Making and Plan:  Encephalopathy - Patient with recent diagnosis of 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 Check UA, started on Amantadine.   -UA positive, started on Ciprofloxacin per Hospitalist, U Cx pending     Dysphagia - Patient on dysphagia 2 with NTL on transfer.  -SLP for swallow     Spasticity - Restarted on Baclofen and increased prior to admission.  -Continue on Baclofen 10 mg TID. With AMS, will reduce to 5 mg TID. No improvement in cognition     B cell lymphoma - Underwent R Chop on 9/23/19.  Followed by Oncology. Recent right retroperitoneal mass s/p biopsy which is positive for B cell lymphoma  -Follow-up Heme/Onc  -Continue Prednisone  10 mg for 5 days - completed. Follow-up Oncology     HTN/A Fib - Patient on Pradaxa.  Patient on Diltiazem 360 mg QHS, Metoprolol 25 mg XL daily   -Patient chewing medication, will switch medications to short acting. Diltiazem 90 mg q6 and metoprolol 25 mg BID     DM - Patient on SSI on transfer. Consult hospitalist. Started on Metformin 250 mg     Sacral wound - Present on admission, seen by wound care today. Appreciate recommendations. Recommending mepilex only    Leukocytosis - B cell lymphoma on steroids. Check AM CBC - continued  -Consult hospitalist     Insomnia - Very poor sleep at night. Start Melatonin 6 mg QHS    Hx of Gout - Patient on Allopurinol 300 mg daily.     Vitamin D - 24 on admission, start on 1000 U for deficiency.      GI Ppx - Patient on Omeprazole 40 mg daily. While on steroids     DVT ppx - Patient on Pradaxa 150 mg QHS    Total time:  27 minutes.  I spent greater than 50% of the time for patient care, counseling, and coordination on this date, including unit/floor time, and face-to-face time with the patient as per interval events and assessment and plan above. Topics discussed included UTI, started on Abx, improved mentation and on amantadine as well.     Leticia Bella M.D.

## 2019-10-17 NOTE — PROGRESS NOTES
Hospital Medicine Daily Progress Note      Chief Complaint:  Atrial Fibrillation  Hypertension  Diabetes  Leukocytosis    Interval History:  Blood pressure trending low; pt asymptomatic.    Review of Systems  Review of Systems   Constitutional: Negative for chills and fever.   HENT: Negative.    Eyes: Negative.    Respiratory: Negative for cough and shortness of breath.    Cardiovascular: Negative for chest pain and palpitations.   Gastrointestinal: Negative for abdominal pain, nausea and vomiting.   Skin: Negative for itching and rash.        Physical Exam  Temp:  [36.4 °C (97.6 °F)-37.1 °C (98.7 °F)] 37.1 °C (98.7 °F)  Pulse:  [60-86] 81  Resp:  [18] 18  BP: ()/(54-81) 104/75    Physical Exam   Constitutional: He is oriented to person, place, and time. No distress.   HENT:   Head: Normocephalic and atraumatic.   Right Ear: External ear normal.   Left Ear: External ear normal.   Prior crani scar   Eyes: Conjunctivae and EOM are normal. Right eye exhibits no discharge. Left eye exhibits no discharge.   Neck: Normal range of motion. Neck supple. No tracheal deviation present.   Cardiovascular: Normal rate, regular rhythm, S1 normal and S2 normal.   Pulmonary/Chest: No stridor. No respiratory distress. He has no wheezes. He has no rhonchi.   Decreased BS   Abdominal: Soft. Bowel sounds are normal. He exhibits no distension. There is no tenderness.   Musculoskeletal: He exhibits no edema or tenderness.   Neurological: He is alert and oriented to person, place, and time. No sensory deficit.   Skin: Skin is warm and dry. He is not diaphoretic. No cyanosis.   Vitals reviewed.      Fluids    Intake/Output Summary (Last 24 hours) at 10/17/2019 1252  Last data filed at 10/17/2019 0840  Gross per 24 hour   Intake 480 ml   Output --   Net 480 ml       Laboratory  Recent Labs     10/16/19  0617   WBC 12.6*   RBC 4.23*   HEMOGLOBIN 13.0*   HEMATOCRIT 40.9*   MCV 96.7   MCH 30.7   MCHC 31.8*   RDW 47.4   PLATELETCT 347    MPV 8.8*     Recent Labs     10/16/19  0617   SODIUM 139   POTASSIUM 3.7   CHLORIDE 102   CO2 27   GLUCOSE 102*   BUN 25*   CREATININE 0.89   CALCIUM 9.3                   Assessment/Plan  B-cell lymphoma (HCC)- (present on admission)  Assessment & Plan  Stage IV w/ h/o brain mass resection  S/P chemo on 9/23/19  Recent MRI showed no brain mass    A-fib (HCC)- (present on admission)  Assessment & Plan  S/P RVR at [facility]  On both Metoprolol and Diltiazem  Will try to taper off CCB  Continue to monitor HR  Anticoagulated on Eliquis    Azotemia- (present on admission)  Assessment & Plan  Encourage PO fluids  Follow renal function    Type 2 diabetes mellitus without complication, without long-term current use of insulin (HCC)- (present on admission)  Assessment & Plan  HbA1c 7.2  Continue Metformin and SSI  Outpt meds include Metformin  mg daily    CAD (coronary artery disease)- (present on admission)  Assessment & Plan  On Eliquis, Lipitor, Metoprolol, and Diltiazem  Consider additon of ACE-I or ARB to maximize medical management    Hypertension- (present on admission)  Assessment & Plan  On Metoprolol and Diltiazem  Will decrease Diltiazem for hypotensive trends    Leukocytosis- (present on admission)  Assessment & Plan  UA 20-50 WBC w/ moderate bacteria  Continue empiric Cipro pending UCx    Vitamin D insufficiency- (present on admission)  Assessment & Plan  Vit D level24  On supplementation    Full Code    Reviewed w/ RN and Dr. Bella

## 2019-10-17 NOTE — PROGRESS NOTES
"Rehab Progress Note     Encounter Date: 10/17/2019    CC: Encephalopathy, confusion, weakness    Interval Events (Subjective)  Patient sitting up in bed. He reports he is very tired and felt dizzy earlier. Per nursing he did have some low blood pressure with a drop from 150 to 97.  He reports he is feeling a little more awake now but still very tired. Discussed OK for medical hold this morning. Plan to schedule more therapies in the afternoon if possible. Denies NVD. Reports dysuria improving.     IDT Team Meeting 10/15/2019  DC/Disposition:  10/28/19    Objective:  VITAL SIGNS: /73   Pulse 82   Temp 36.7 °C (98.1 °F) (Temporal)   Resp 18   Ht 1.88 m (6' 2\")   Wt 83.1 kg (183 lb 3.2 oz)   SpO2 98%   BMI 23.52 kg/m²   Gen: NAD  Psych: Mood and affect appropriate  CV: RRR, no edema  Resp: CTAB, no upper airway sounds  Abd: NTND  Neuro: AOx3, fatigued but following commands    Recent Results (from the past 72 hour(s))   ACCU-CHEK GLUCOSE    Collection Time: 10/14/19  5:36 PM   Result Value Ref Range    Glucose - Accu-Ck 161 (H) 65 - 99 mg/dL   ACCU-CHEK GLUCOSE    Collection Time: 10/14/19  8:17 PM   Result Value Ref Range    Glucose - Accu-Ck 183 (H) 65 - 99 mg/dL   URINALYSIS    Collection Time: 10/15/19  7:45 AM   Result Value Ref Range    Color Yellow     Character Cloudy (A)     Specific Gravity >=1.030 <1.035    Ph 5.5 5.0 - 8.0    Glucose Negative Negative mg/dL    Ketones Negative Negative mg/dL    Protein Negative Negative mg/dL    Bilirubin Negative Negative    Urobilinogen, Urine 0.2 Negative    Nitrite Positive (A) Negative    Leukocyte Esterase Trace (A) Negative    Occult Blood Small (A) Negative    Micro Urine Req Microscopic    URINE MICROSCOPIC (W/UA)    Collection Time: 10/15/19  7:45 AM   Result Value Ref Range    WBC 20-50 (A) /hpf    RBC 2-5 (A) /hpf    Bacteria Moderate (A) None /hpf    Epithelial Cells Few /hpf    Amorphous Crystal Present /hpf    Ca Oxalate Crystal Few /hpf   URINE " CULTURE-EXISTING-LESS THAN 48 HOURS    Collection Time: 10/15/19  7:45 AM   Result Value Ref Range    Significant Indicator POS (POS)     Source UR     Site URINE, CLEAN CATCH     Culture Result Mixed skin ruth <10,000 cfu/mL (A)     Culture Result (A)      Lactose fermenting Gram negative alexei  >100,000 cfu/mL     ACCU-CHEK GLUCOSE    Collection Time: 10/15/19  7:56 AM   Result Value Ref Range    Glucose - Accu-Ck 112 (H) 65 - 99 mg/dL   ACCU-CHEK GLUCOSE    Collection Time: 10/15/19 11:09 AM   Result Value Ref Range    Glucose - Accu-Ck 166 (H) 65 - 99 mg/dL   ACCU-CHEK GLUCOSE    Collection Time: 10/15/19  5:04 PM   Result Value Ref Range    Glucose - Accu-Ck 165 (H) 65 - 99 mg/dL   ACCU-CHEK GLUCOSE    Collection Time: 10/15/19  9:51 PM   Result Value Ref Range    Glucose - Accu-Ck 160 (H) 65 - 99 mg/dL   CBC WITHOUT DIFFERENTIAL    Collection Time: 10/16/19  6:17 AM   Result Value Ref Range    WBC 12.6 (H) 4.8 - 10.8 K/uL    RBC 4.23 (L) 4.70 - 6.10 M/uL    Hemoglobin 13.0 (L) 14.0 - 18.0 g/dL    Hematocrit 40.9 (L) 42.0 - 52.0 %    MCV 96.7 81.4 - 97.8 fL    MCH 30.7 27.0 - 33.0 pg    MCHC 31.8 (L) 33.7 - 35.3 g/dL    RDW 47.4 35.9 - 50.0 fL    Platelet Count 347 164 - 446 K/uL    MPV 8.8 (L) 9.0 - 12.9 fL   Basic Metabolic Panel    Collection Time: 10/16/19  6:17 AM   Result Value Ref Range    Sodium 139 135 - 145 mmol/L    Potassium 3.7 3.6 - 5.5 mmol/L    Chloride 102 96 - 112 mmol/L    Co2 27 20 - 33 mmol/L    Glucose 102 (H) 65 - 99 mg/dL    Bun 25 (H) 8 - 22 mg/dL    Creatinine 0.89 0.50 - 1.40 mg/dL    Calcium 9.3 8.5 - 10.5 mg/dL    Anion Gap 10.0 0.0 - 11.9   ESTIMATED GFR    Collection Time: 10/16/19  6:17 AM   Result Value Ref Range    GFR If African American >60 >60 mL/min/1.73 m 2    GFR If Non African American >60 >60 mL/min/1.73 m 2   ACCU-CHEK GLUCOSE    Collection Time: 10/16/19  7:37 AM   Result Value Ref Range    Glucose - Accu-Ck 115 (H) 65 - 99 mg/dL   ACCU-CHEK GLUCOSE    Collection Time:  10/16/19 11:36 AM   Result Value Ref Range    Glucose - Accu-Ck 151 (H) 65 - 99 mg/dL   ACCU-CHEK GLUCOSE    Collection Time: 10/16/19  5:16 PM   Result Value Ref Range    Glucose - Accu-Ck 155 (H) 65 - 99 mg/dL   ACCU-CHEK GLUCOSE    Collection Time: 10/16/19  9:20 PM   Result Value Ref Range    Glucose - Accu-Ck 121 (H) 65 - 99 mg/dL   ACCU-CHEK GLUCOSE    Collection Time: 10/17/19  7:04 AM   Result Value Ref Range    Glucose - Accu-Ck 128 (H) 65 - 99 mg/dL   ACCU-CHEK GLUCOSE    Collection Time: 10/17/19 11:17 AM   Result Value Ref Range    Glucose - Accu-Ck 162 (H) 65 - 99 mg/dL       Current Facility-Administered Medications   Medication Frequency   • DILTIAZem (CARDIZEM) tablet 60 mg Q6HRS   • lidocaine (LIDODERM) 5 % 1 Patch Q24HR   • metFORMIN (GLUCOPHAGE) tablet 250 mg BID WITH MEALS   • melatonin tablet 6 mg QHS   • amantadine (SYMMETREL) 50 MG/5ML syrup 100 mg BID   • insulin regular (HUMULIN R) injection 2-12 Units 4X/DAY ACHS    And   • glucose 4 g chewable tablet 16 g Q15 MIN PRN    And   • dextrose 50% (D50W) injection 50 mL Q15 MIN PRN   • ciprofloxacin (CIPRO) tablet 500 mg Q12HRS   • omeprazole (FIRST-OMEPRAZOLE) 2 mg/mL oral susp 40 mg DAILY   • phenylephrine-cocoa butter (PREPARATION H) suppository 1 Suppository Q6HRS PRN   • metoprolol (LOPRESSOR) tablet 25 mg TWICE DAILY   • apixaban (ELIQUIS) tablet 5 mg BID   • baclofen (LIORESAL) tablet 5 mg TID   • vitamin D (cholecalciferol) tablet 1,000 Units DAILY   • Respiratory Care per Protocol Continuous RT   • oxyCODONE immediate-release (ROXICODONE) tablet 2.5 mg Q3HRS PRN   • oxyCODONE immediate-release (ROXICODONE) tablet 5 mg Q3HRS PRN   • tramadol (ULTRAM) 50 MG tablet 50 mg Q4HRS PRN   • hydrALAZINE (APRESOLINE) tablet 25 mg Q8HRS PRN   • acetaminophen (TYLENOL) tablet 650 mg Q4HRS PRN   • senna-docusate (PERICOLACE or SENOKOT S) 8.6-50 MG per tablet 2 Tab BID    And   • polyethylene glycol/lytes (MIRALAX) PACKET 1 Packet QDAY PRN    And   •  magnesium hydroxide (MILK OF MAGNESIA) suspension 30 mL QDAY PRN    And   • bisacodyl (DULCOLAX) suppository 10 mg QDAY PRN   • artificial tears ophthalmic solution 1 Drop PRN   • benzocaine-menthol (CEPACOL) lozenge 1 Lozenge Q2HRS PRN   • mag hydrox-al hydrox-simeth (MAALOX PLUS ES or MYLANTA DS) suspension 20 mL Q2HRS PRN   • ondansetron (ZOFRAN ODT) dispertab 4 mg 4X/DAY PRN    Or   • ondansetron (ZOFRAN) syringe/vial injection 4 mg 4X/DAY PRN   • traZODone (DESYREL) tablet 50 mg QHS PRN   • sodium chloride (OCEAN) 0.65 % nasal spray 2 Spray PRN   • allopurinol (ZYLOPRIM) tablet 300 mg DAILY   • atorvastatin (LIPITOR) tablet 20 mg Nightly   • ferrous sulfate tablet 325 mg DAILY   • loratadine (CLARITIN) tablet 10 mg DAILY       Orders Placed This Encounter   Procedures   • Diet Order Diabetic     Standing Status:   Standing     Number of Occurrences:   1     Order Specific Question:   Diet:     Answer:   Diabetic [3]     Order Specific Question:   Consistency/Fluid modifications:     Answer:   Thin Liquids [3]       Assessment:  Active Hospital Problems    Diagnosis   • *Encephalopathy acute   • B-cell lymphoma (HCC)   • Metastasis to brain (HCC)   • Atrial fibrillation (HCC) w Rapid Ventricular Response    • CAD (coronary artery disease)   • Hypertension   • Type 2 diabetes mellitus without complication, without long-term current use of insulin (HCC)   • Chronic back pain   • Dysphagia   • Primary cerebral lymphoma (HCC)       Medical Decision Making and Plan:  Encephalopathy - Patient with recent diagnosis of 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 Check UA, started on Amantadine.   -UA positive, started on Ciprofloxacin per Hospitalist, U Cx pending - Lactose+, gram-     Dysphagia -  Patient on dysphagia 2 with NTL on transfer.  -SLP for swallow     Spasticity - Restarted on Baclofen and increased prior to admission.  -Continue on Baclofen 10 mg TID. With AMS, will reduce to 5 mg TID. No improvement in cognition     B cell lymphoma - Underwent R Chop on 9/23/19.  Followed by Oncology. Recent right retroperitoneal mass s/p biopsy which is positive for B cell lymphoma  -Follow-up Heme/Onc  -Continue Prednisone 10 mg for 5 days - completed. Follow-up Oncology     HTN/A Fib - Patient on Pradaxa.  Patient on Diltiazem 360 mg QHS, Metoprolol 25 mg XL daily   -Patient chewing medication, will switch medications to short acting. Diltiazem 90 mg q6 and metoprolol 25 mg BID   -Hypotension on 10/17/19, discussed case with hospitalist and recommending decrease in Diltiazem    DM - Patient on SSI on transfer. Consult hospitalist. Started on Metformin 250 mg     Sacral wound - Present on admission, seen by wound care today. Appreciate recommendations. Recommending mepilex only    Leukocytosis - B cell lymphoma on steroids. Check AM CBC - continued  -Consult hospitalist     Insomnia - Very poor sleep at night. Start Melatonin 6 mg QHS    Hx of Gout - Patient on Allopurinol 300 mg daily.     Vitamin D - 24 on admission, start on 1000 U for deficiency.      GI Ppx - Patient on Omeprazole 40 mg daily. While on steroids     DVT ppx - Patient on Pradaxa 150 mg QHS    Total time:  25 minutes.  I spent greater than 50% of the time for patient care, counseling, and coordination on this date, including unit/floor time, and face-to-face time with the patient as per interval events and assessment and plan above. Topics discussed included orthostatic hypotension, fatigue, medical hold, and urine culture pending. Variable participation with therapies.     Leticia Bella M.D.

## 2019-10-17 NOTE — CARE PLAN
Problem: Safety  Goal: Will remain free from falls  Note:   Safety measures observed, call light  , bedside table within easy  ,needs anticipated, Encouraged to call for assistance at all times. Verbalized understanding . Pt free from fall and injury.     Problem: Skin Integrity  Goal: Risk for impaired skin integrity will decrease  Intervention: Assess risk factors for impaired skin integrity and/or pressure ulcers  Note:   Sacral area dressing intact , encouraged to turn and reposition to sides.     Problem: GLYCEMIA IMBALANCE  Goal: Clinical indication of glycemia balance is achieved  Note:   Finger stick monitored , 2100 hrs FS- 121 , no coverage given. No s/s of hypo and hyperglycemia noted.

## 2019-10-17 NOTE — THERAPY
Missed Therapy     Patient Name: Marcio More Jr.  Age:  73 y.o., Sex:  male  Medical Record #: [id number]  Today's Date: 10/17/2019    Discussed missed therapy with scheduling, PT, Dr. Bella       10/17/19 1310   Interdisciplinary Plan of Care Collaboration   IDT Collaboration with  Physical Therapist   Patient Position at End of Therapy In Bed;Call Light within Reach;Tray Table within Reach   Collaboration Comments re: hypotension, CLOF   Therapy Missed   Missed Therapy (Minutes) 30   Reason For Missed Therapy Medical - Patient on Hold from Therapy  (pt unable to stay awake, hypotension while seated)

## 2019-10-17 NOTE — THERAPY
Physical Therapy   Daily Treatment     Patient Name: Marcio More Jr.  Age:  73 y.o., Sex:  male  Medical Record #: [id number]  Today's Date: 10/17/2019     Precautions  Precautions: Fall Risk, Other (See Comments)  Comments: spasticity, poor motor planning, coccyx ulcer    Subjective    Pt was seated in w/c upon arrival and agreeable to treatment.  Pt reported intermittent dizziness and fatigue.      Objective       10/17/19 0831   Precautions   Precautions Fall Risk;Other (See Comments)   Vitals   Pulse 86   Patient BP Position Sitting   Blood Pressure  (!) 97/54   Room Air Oximetry 96   O2 (LPM) 0   O2 Delivery None (Room Air)   Bed Mobility    Sit to Supine Moderate Assist   Sit to Stand Moderate Assist  (to max A )   Interdisciplinary Plan of Care Collaboration   IDT Collaboration with  Nursing;Occupational Therapist   Patient Position at End of Therapy In Bed;Call Light within Reach;Tray Table within Reach;Phone within Reach   Collaboration Comments BP, vitals   PT Total Time Spent   PT Individual Total Time Spent (Mins) 60   PT Charge Group   PT Gait Training 1   PT Therapeutic Activities 3       FIM Bed/Chair/Wheelchair Transfers Score: 3 - Moderate Assistance  Bed/Chair/Wheelchair Transfers Description:  Adaptive equipment, Increased time, Supervision for safety, Verbal cueing, Set-up of equipment(Bed mobility with mod A; SPT with mod A)    FIM Walking Score:  1 - Total Assistance  Walking Description:  Extra time, Safety concerns, Verbal cueing, Supervision for safety(AMB x 5 feet with FWW and min A)    FIM Wheelchair: W/C mobility x 50 feet with min to mod A for steering    Attempted STS/transfer training with FWW; trialed x 1 with mod A, x 1 with CGA.  Attempted to get BP standing but pt with poor initiation to stand and pt returned to supine.      Assessment    Pt very limited this session secondary to sleepiness and intermittent dizziness.  Pt required frequent VCing for attention to task with pt  closing eyes regularly.  Pt with low BP during session: 97/54, 95/48 seated, 114/70 supine.  Pt returned to supine and RN aware of vitals.  Pt continues with poor anterior lean with STS, bradykinetic movement patterns, poor motor planning, and poor initiation.      Plan    Continue progression with FWW for gait and transfer.  Ongoing family education.  Initiate hands on family training if consistency is achieved in performance.  Trial U Step.

## 2019-10-17 NOTE — THERAPY
Occupational Therapy  Daily Treatment     Patient Name: Marcio More Jr.  Age:  73 y.o., Sex:  male  Medical Record #: [id number]  Today's Date: 10/17/2019     Precautions  Precautions: (P) Fall Risk, Other (See Comments)  Comments: (P) spasticity, poor motor planning, coccyx ulcer    Safety   ADL Safety : (P) Requires Physical Assist for Safety, Requires Cueing for Safety  Bathroom Safety: (P) Requires Physical Assist for Safety, Requires Cuing for Safety  Comments: see FIM for oral care and eating    Subjective    Pt was supine in bed and agreeable to therapy. Therapist was advised of previous orthostatic hypotension once pt was seated in chair. BP was monitored and reported to nurse.      Objective       10/17/19 1031   Precautions   Precautions Fall Risk;Other (See Comments)   Comments spasticity, poor motor planning, coccyx ulcer   Safety    ADL Safety  Requires Physical Assist for Safety;Requires Cueing for Safety   Bathroom Safety Requires Physical Assist for Safety;Requires Cuing for Safety   Vitals   Patient BP Position Sitting   Blood Pressure  112/65  (supine: 121/70; standing 100/64 - returned to supine )   Vitals Comments pt was asympomatic and alert   Cognition    Speech/ Communication   (softly speaking )   Level of Consciousness Alert   Ability To Follow Commands 2 Step   Attention Impaired   Sequencing Impaired   Initiation Impaired   Balance   Sitting Balance (Static) Fair   Sitting Balance (Dynamic) Fair -   Standing Balance (Static) Poor   Standing Balance (Dynamic) Trace +   Weight Shift Sitting Poor   Weight Shift Standing Poor   Skilled Intervention Postural Facilitation;Sequencing;Tactile Cuing;Verbal Cuing   Bed Mobility    Supine to Sit Moderate Assist   Sit to Supine Moderate Assist   Scooting Stand by Assist   Rolling Minimal Assist to Rt.   Skilled Intervention Sequencing;Tactile Cuing;Verbal Cuing;Facilitation   Interdisciplinary Plan of Care Collaboration   IDT Collaboration with   Physical Therapist;Nursing   Patient Position at End of Therapy In Bed;Call Light within Reach;Tray Table within Reach   Collaboration Comments CLOF, vitals during toileting    OT Total Time Spent   OT Individual Total Time Spent (Mins) 30   OT Charge Group   OT Self Care / ADL 2       FIM Toileting:  3 - Moderate Assistance  Toileting Description:  Grab bar, Increased time, Supervision for safety, Verbal cueing(Mod A to jose clothes over hips using grab bars)    FIM Bed/Chair/Wheelchair Transfers Score: 3 - Moderate Assistance  Bed/Chair/Wheelchair Transfers Description:  Increased time, Supervision for safety, Verbal cueing, Assist with two limbs(Bed rail Mod A for UP lift supine to sit and LB lift sit to supine )    FIM Toilet Transfer Score:  4 - Minimal Assistance  Toilet Transfer Description:  Grab bar, Increased time, Supervision for safety, Verbal cueing, Requires lift(Min A for lift and CGA for balance using FWW)    SPT bed<>w/c using FWW with Min A- CGA for balance  Assessment    Pt tolerated therapy session focused on functional transfers and toileting without complaint of fatigue, lightheadedness, or dizziness. Pt demonstrates increased independence with functional transfers using FWW.     Plan    Continued OT for independence with ADLs (use of AE/DME), IADLs, functional transfers, spasticity management, and motor planning by repetition.

## 2019-10-17 NOTE — THERAPY
Speech Language Pathology  Daily Treatment     Patient Name: Marcio More Jr.  Age:  73 y.o., Sex:  male  Medical Record #: [id number]  Today's Date: 10/17/2019     Subjective    Pt agreeable to transfer to [facility] for dysphagia intervention in therapeutic dining. Upon arrival to therapeutic dining, informed pt with low BP earlier, vitals checked 104/75 - pt cooperative and pleasant during session.      Objective     10/17/19 1134   Dysphagia    Other Treatments trials of regular textures   Diet / Liquid Recommendation Regular;Thin Liquid   Nutritional Liquid Intake Rating Scale 3   Nutritional Food Intake Rating Scale 7   Interdisciplinary Plan of Care Collaboration   IDT Collaboration with  Certified Nursing Assistant;Therapy Tech   Patient Position at End of Therapy Seated   Collaboration Comments CNA checking vitals, transfer of care to [place], [facility] therapy tech for today   SLP Total Time Spent   SLP Individual Total Time Spent (Mins) 30   Charge Group   SLP Swallowing Dysfunction Treatment Swallowing Dysfunction Treatment       Assessment    Pt tolerated regular texture trials (PB&J, fresh fruit) with no overt s/sx of pen/asp. Pt benefits from use of black handled plastic mugs with liquids/soups etc. Recommend advance to regular textures, pt to remain in therapeutic dining for 1 more day to ensure tolerance of advanced diet with d/c at that time as appropriate.     Plan    Advance to regular textures.

## 2019-10-17 NOTE — PROGRESS NOTES
Hospital Medicine Daily Progress Note      Chief Complaint:  Atrial Fibrillation  Hypertension  Diabetes  Leukocytosis    Interval History:  Pt seen and examined in [facility].  FSBS trending up.  Labs reviewed.    Review of Systems  Review of Systems   Constitutional: Negative for chills and fever.   HENT: Negative.    Eyes: Negative.    Respiratory: Negative for cough and shortness of breath.    Cardiovascular: Negative for chest pain and palpitations.   Gastrointestinal: Negative for abdominal pain, nausea and vomiting.   Skin: Negative for itching and rash.        Physical Exam  Temp:  [36.4 °C (97.6 °F)-37.1 °C (98.7 °F)] 37.1 °C (98.7 °F)  Pulse:  [60-86] 81  Resp:  [18] 18  BP: ()/(54-81) 104/75    Physical Exam   Constitutional: He is oriented to person, place, and time. No distress.   HENT:   Head: Normocephalic and atraumatic.   Right Ear: External ear normal.   Left Ear: External ear normal.   Prior crani scar   Eyes: Conjunctivae and EOM are normal. Right eye exhibits no discharge. Left eye exhibits no discharge.   Neck: Normal range of motion. Neck supple. No tracheal deviation present.   Cardiovascular: Normal rate, regular rhythm, S1 normal and S2 normal.   Pulmonary/Chest: No stridor. No respiratory distress. He has no wheezes. He has no rhonchi.   Decreased BS   Abdominal: Soft. Bowel sounds are normal. He exhibits no distension. There is no tenderness.   Musculoskeletal: He exhibits no edema or tenderness.   Neurological: He is alert and oriented to person, place, and time. No sensory deficit.   Skin: Skin is warm and dry. He is not diaphoretic. No cyanosis.   Vitals reviewed.      Fluids    Intake/Output Summary (Last 24 hours) at 10/17/2019 1249  Last data filed at 10/17/2019 0840  Gross per 24 hour   Intake 480 ml   Output --   Net 480 ml       Laboratory  Recent Labs     10/16/19  0617   WBC 12.6*   RBC 4.23*   HEMOGLOBIN 13.0*   HEMATOCRIT 40.9*   MCV 96.7   MCH 30.7   MCHC 31.8*   RDW 47.4    PLATELETCT 347   MPV 8.8*     Recent Labs     10/16/19  0617   SODIUM 139   POTASSIUM 3.7   CHLORIDE 102   CO2 27   GLUCOSE 102*   BUN 25*   CREATININE 0.89   CALCIUM 9.3                   Assessment/Plan  B-cell lymphoma (HCC)- (present on admission)  Assessment & Plan  Stage IV w/ h/o brain mass resection  S/P chemo on 9/23/19  Recent MRI showed no brain mass    A-fib (HCC)- (present on admission)  Assessment & Plan  S/P RVR at [facility]  On both Metoprolol and Diltiazem  Monitor HR  Anticoagulated on Eliquis    Azotemia- (present on admission)  Assessment & Plan  Encourage PO fluids  Follow renal function    Type 2 diabetes mellitus without complication, without long-term current use of insulin (HCC)- (present on admission)  Assessment & Plan  HbA1c 7.2  Increase Metformin  Continue SSI  Outpt meds include Metformin  mg daily    CAD (coronary artery disease)- (present on admission)  Assessment & Plan  On Eliquis, Lipitor, Metoprolol, and Diltiazem  Consider additon of ACE-I or ARB to maximize medical management    Hypertension- (present on admission)  Assessment & Plan  On Metoprolol and Diltiazem  Observe blood pressure trends    Leukocytosis- (present on admission)  Assessment & Plan  UA 20-50 WBC w/ moderate bacteria  Continue empiric Cipro pending UCx    Vitamin D insufficiency- (present on admission)  Assessment & Plan  Vit D level24  On supplementation    Full Code

## 2019-10-18 PROBLEM — N39.0 UTI (URINARY TRACT INFECTION): Status: ACTIVE | Noted: 2019-01-01

## 2019-10-18 NOTE — THERAPY
Speech Language Pathology  Daily Treatment     Patient Name: Marcio More Jr.  Age:  73 y.o., Sex:  male  Medical Record #: [id number]  Today's Date: 10/18/2019     Subjective    Pt pleasant and cooperative during tx.      Objective       10/18/19 0801   Dysphagia    Other Treatments use of swallow strategies.    Nutritional Liquid Intake Rating Scale 3   Nutritional Food Intake Rating Scale 7   SLP Total Time Spent   SLP Individual Total Time Spent (Mins) 30   Charge Group   SLP Swallowing Dysfunction Treatment Swallowing Dysfunction Treatment       FIM Eating Score:  6 - Modified Independent  Eating Description:  Increased time      Assessment    Pt assessed with regular textures and thin liquids.  Pt presented with cough x1 following regular textures, and cough x1 following thin liquid by cup.  Pt followed swallow strategies independently, by alternating bites and sips, and slow rate of intake.      Plan    Discontinue Tdine.  Continue regular textures and thin liquids.

## 2019-10-18 NOTE — PROGRESS NOTES
[facility] Medicine Daily Progress Note      Chief Complaint:  Atrial Fibrillation  Hypertension  Diabetes  Leukocytosis    Interval History:  Pt denies complaints but staff reports increased confusion, will check labs.    Review of Systems  Review of Systems   Constitutional: Negative for chills and fever.   HENT: Negative.    Eyes: Negative.    Respiratory: Negative for cough and shortness of breath.    Cardiovascular: Negative for chest pain and palpitations.   Gastrointestinal: Negative for abdominal pain, nausea and vomiting.   Skin: Negative for itching and rash.        Physical Exam  Temp:  [36.4 °C (97.6 °F)-37.1 °C (98.7 °F)] 37.1 °C (98.7 °F)  Pulse:  [65-92] 87  Resp:  [17-20] 20  BP: (103-128)/(59-88) 103/59  SpO2:  [96 %] 96 %    Physical Exam   Constitutional: He is oriented to person, place, and time. No distress.   HENT:   Head: Normocephalic and atraumatic.   Right Ear: External ear normal.   Left Ear: External ear normal.   Prior crani scar   Eyes: Conjunctivae and EOM are normal. Right eye exhibits no discharge. Left eye exhibits no discharge.   Neck: Normal range of motion. Neck supple. No tracheal deviation present.   Cardiovascular: Normal rate, regular rhythm, S1 normal and S2 normal.   Pulmonary/Chest: No stridor. No respiratory distress. He has no wheezes. He has no rhonchi.   Decreased BS   Abdominal: Soft. Bowel sounds are normal. He exhibits no distension. There is no tenderness.   Musculoskeletal: He exhibits no edema or tenderness.   Neurological: He is alert and oriented to person, place, and time. No sensory deficit.   Skin: Skin is warm and dry. He is not diaphoretic. No cyanosis.   Vitals reviewed.      Fluids    Intake/Output Summary (Last 24 hours) at 10/18/2019 1254  Last data filed at 10/18/2019 0820  Gross per 24 hour   Intake 1380 ml   Output 100 ml   Net 1280 ml       Laboratory  Recent Labs     10/16/19  0617   WBC 12.6*   RBC 4.23*   HEMOGLOBIN 13.0*   HEMATOCRIT 40.9*   MCV  96.7   MCH 30.7   MCHC 31.8*   RDW 47.4   PLATELETCT 347   MPV 8.8*     Recent Labs     10/16/19  0617   SODIUM 139   POTASSIUM 3.7   CHLORIDE 102   CO2 27   GLUCOSE 102*   BUN 25*   CREATININE 0.89   CALCIUM 9.3                   Assessment/Plan  B-cell lymphoma (HCC)- (present on admission)  Assessment & Plan  Stage IV w/ h/o brain mass resection  S/P chemo on 9/23/19  Recent MRI showed no brain mass    A-fib (HCC)- (present on admission)  Assessment & Plan  S/P RVR at [facility]  On both Metoprolol and Diltiazem  Will try to taper off CCB  Continue to monitor HR  Anticoagulated on Eliquis    Azotemia- (present on admission)  Assessment & Plan  Encourage PO fluids  Follow renal function  Check F/U labs in am    Type 2 diabetes mellitus without complication, without long-term current use of insulin (HCC)- (present on admission)  Assessment & Plan  HbA1c 7.2  Continue Metformin and SSI  Outpt meds include Metformin  mg daily    CAD (coronary artery disease)- (present on admission)  Assessment & Plan  On Eliquis, Lipitor, Metoprolol, and Diltiazem  Consider replacement of CCB w/ ACE-I or ARB to maximize medical management    Hypertension- (present on admission)  Assessment & Plan  On Metoprolol and Diltiazem  Continue to decrease Diltiazem for hypotensive trends    UTI (urinary tract infection)- (present on admission)  Assessment & Plan  UA 20-50 WBC w/ moderate bacteria  UCx >100,000 GNR  Continue empiric Cipro pending final C+S    Vitamin D insufficiency- (present on admission)  Assessment & Plan  Vit D level 24  On supplementation    Full Code

## 2019-10-18 NOTE — CARE PLAN
Problem: Communication  Goal: The ability to communicate needs accurately and effectively will improve  Outcome: PROGRESSING AS EXPECTED  Note:   Pt is able to communicate his needs effectively to staff.      Problem: Safety  Goal: Will remain free from injury  Outcome: PROGRESSING AS EXPECTED  Note:   Pt uses call light consistently for staff assistance. Pt has good safety awareness, no impulsivity observed.      Problem: Respiratory:  Goal: Respiratory status will improve  Outcome: PROGRESSING AS EXPECTED  Note:   Pt is on room air and respirations are WNL.      Problem: Pain Management  Goal: Pain level will decrease to patient's comfort goal  Outcome: PROGRESSING AS EXPECTED  Note:   No reports of pain at this time. Will continue to monitor through shift with hourly rounds.      Problem: Urinary Elimination:  Goal: Ability to reestablish a normal urinary elimination pattern will improve  Outcome: PROGRESSING SLOWER THAN EXPECTED  Note:   Pt had 2 bladder accidents this shift. + for UTI and taking Cipro.

## 2019-10-18 NOTE — THERAPY
"Physical Therapy   Daily Treatment     Patient Name: Marcio More Jr.  Age:  73 y.o., Sex:  male  Medical Record #: [id number]  Today's Date: 10/18/2019     Precautions  Precautions: Fall Risk, Other (See Comments)  Comments: spasticity, poor motor planning, coccyx ulcer    Subjective    Pt was seated in w/c upon arrival and agreeable to treatment.  Pt stated that he thought his wife was coming today to pick him up.  \"I want to go home.\"     Objective       10/18/19 1101   Precautions   Precautions Fall Risk;Other (See Comments)   Vitals   Pulse 87   Blood Pressure  103/59   Pulse Oximetry 96 %   O2 (LPM) 0   O2 Delivery None (Room Air)   Cognition    Safety Awareness Impaired   New Learning Impaired   Attention Impaired   Sequencing Impaired   Interdisciplinary Plan of Care Collaboration   IDT Collaboration with  Nursing;Occupational Therapist   Patient Position at End of Therapy Seated  (Pt taken to [facility] for lunch)   Collaboration Comments Pt with increased confusion today    PT Total Time Spent   PT Individual Total Time Spent (Mins) 30   PT Charge Group   PT Gait Training 1   PT Therapeutic Activities 1       FIM Walking Score:  1 - Total Assistance  Walking Description:  Extra time, Safety concerns, Verbal cueing, Supervision for safety, Walker(AMB x 40 feet, x 70 feet with FWW and CGA)    Assessment    Pt with increased confusion this session but was able to reorient to task.  Pt with improving initiation of movement compared to yesterday, but continues with poor anterior lean with STS, bradykinetic movement patterns, narrow Mariana, and B short step length.  Pt continues to require cueing for safety.     Plan    Continue to progress gait training and transfer training with FWW, trial step as appropriate, LE strengthening     "

## 2019-10-18 NOTE — THERAPY
Speech Language Pathology  Daily Treatment     Patient Name: Marcio More Jr.  Age:  73 y.o., Sex:  male  Medical Record #: [id number]  Today's Date: 10/18/2019     Subjective    Pt pleasant and cooperative.      Objective       10/18/19 1133   SLP Total Time Spent   SLP Individual Total Time Spent (Mins) 30   Charge Group   SLP Swallowing Dysfunction Treatment Swallowing Dysfunction Treatment       Assessment    Pt assessed with regular textures and thin liquids, tolerated well with no overt s/sx of asp/pen. Pt to be d/c from therapeutic dining with no further intervention required.     Plan    D/c t-dine

## 2019-10-18 NOTE — THERAPY
Occupational Therapy  Daily Treatment     Patient Name: Marcio More Jr.  Age:  73 y.o., Sex:  male  Medical Record #: [id number]  Today's Date: 10/18/2019     Precautions  Precautions: Fall Risk, Other (See Comments)  Comments: (P) spasticity, poor motor planning, coccyx ulcer    Safety   ADL Safety : (P) Requires Physical Assist for Safety, Requires Cueing for Safety  Bathroom Safety: (P) Requires Physical Assist for Safety, Requires Cuing for Safety  Comments: see FIM for oral care and eating    Subjective    Pt was seated in w/c, having just completed toileting with CNA. Per CNA, pt was incontinent to bowel. Pt was packing his belongings and reported that he spoke with his wife at 8: 30 am and she was driving from [place], [place] to pick him up for discharge this date. Therapist coordinated with  regarding discharge plans and communication with pt's spouse. Per spouse she is not coming to pick pt up and supports current discharge plan.      Objective       10/18/19 0901   Precautions   Precautions Fall Risk   Comments spasticity, poor motor planning, coccyx ulcer   Safety    ADL Safety  Requires Physical Assist for Safety;Requires Cueing for Safety   Bathroom Safety Requires Physical Assist for Safety;Requires Cuing for Safety   Cognition    Speech/ Communication   (softly speaking )   Orientation Level Not Oriented to Month;Not Oriented to Day   Level of Consciousness Alert   Ability To Follow Commands 2 Step   Sequencing Impaired   Balance   Standing Balance (Static) Poor -   Standing Balance (Dynamic) Poor -   Weight Shift Sitting Poor   Weight Shift Standing Poor   Skilled Intervention Postural Facilitation   Interdisciplinary Plan of Care Collaboration   IDT Collaboration with  Nursing;Physical Therapist;   Patient Position at End of Therapy Seated;Self Releasing Lap Belt Applied;Call Light within Reach;Tray Table within Reach  (RN present )   Collaboration Comments Discharge  planning, CLOF    OT Total Time Spent   OT Individual Total Time Spent (Mins) 60   OT Charge Group   OT Self Care / ADL 4       FIM Upper Body Dressin - Modified Independent  Upper Body Dressing Description:  (seated in w/c)    FIM Lower Body Dressing Score:  2 - Max Assistance  Lower Body Dressing Description:  Reacher, Dressing stick, Increased time, Supervision for safety, Verbal cueing, Requires minimal contact, Initial preparation for task, Requires assistance with closures(Max A for shoes and socks and Min A to assist wtih use of reacher : VC's for sequencing and placement. )    FIM Toileting:  3 - Moderate Assistance  Toileting Description:       FIM Toilet Transfer Score:  3 - Moderate Assistance  Toilet Transfer Description:  Grab bar, Increased time, Supervision for safety, Verbal cueing, Requires lift(Mod A per CNA)      Assessment    Pt demonstrated increased motor planning and pacing this date. Pt required only one verbal cue to initiate or terminate task, indicating increased motor planning and sequencing. Pt demonstrated use of AE for dressing. Pt was limited in participation in ADLs this session due to perseverative thought that he was discharging later in the day. Pt continues to progress in independence with ADLs when participating.      Plan    Continued OT for independence with ADLs (use of AE/DME), IADLs, functional transfers,and motor planning

## 2019-10-18 NOTE — THERAPY
"Physical Therapy   Daily Treatment     Patient Name: Marcio More Jr.  Age:  73 y.o., Sex:  male  Medical Record #: [id number]  Today's Date: 10/18/2019     Precautions  Precautions: Fall Risk, Other (See Comments)  Comments: spasticity, poor motor planning, coccyx ulcer    Subjective    Pt was seated in w/c upon arrival and agreeable to treatment.       Objective       10/18/19 1431   Precautions   Precautions Fall Risk;Other (See Comments)   Bed Mobility    Sit to Supine Moderate Assist   Sit to Stand Minimal Assist   Scooting Stand by Assist   Interdisciplinary Plan of Care Collaboration   Patient Position at End of Therapy In Bed;Call Light within Reach;Tray Table within Reach;Phone within Reach   PT Total Time Spent   PT Individual Total Time Spent (Mins) 30   PT Charge Group   PT Therapeutic Activities 2       FIM Bed/Chair/Wheelchair Transfers Score: 3 - Moderate Assistance  Bed/Chair/Wheelchair Transfers Description:  Adaptive equipment, Increased time, Supervision for safety, Set-up of equipment(Bed mobility with mod A; SPT with min A using FWW)    FIM Wheelchair Score:  2 - Max Assistance  Wheelchair Description:  Adaptive equipment, Extra time, Safety concerns, Supervision for safety, Verbal cueing, Requires incidental assist(x100 feet with BUE and mod A for steering)    FIM Stairs Score:     Stairs Description:   Up/down 4\" step in // bars with CGA x 4 reps      Assessment    Pt continues to demonstrate intermittent poor initiation and bradykinetic movement.      Plan    Continue to progress gait training and transfer training with FWW, progress to consecutive stair training, LE strengthening     "

## 2019-10-18 NOTE — PROGRESS NOTES
"Rehab Progress Note     Encounter Date: 10/18/2019    CC: Encephalopathy, confusion, weakness    Interval Events (Subjective)  Patient sitting up in room. Patient is more confused this morning, perseverative on going home. Discussed that there has been no change in the plan and will be staying here until 10/28/19.  Discussed with CM who verifies that family is in agreement with the plan. Patient denies pain. Denies NVD.     IDT Team Meeting 10/15/2019  DC/Disposition:  10/28/19    Objective:  VITAL SIGNS: BP (!) 98/65   Pulse 98   Temp 36.2 °C (97.1 °F) (Temporal)   Resp 18   Ht 1.88 m (6' 2\")   Wt 83.1 kg (183 lb 3.2 oz)   SpO2 96%   BMI 23.52 kg/m²   Gen: NAD  Psych: Mood and affect appropriate  CV: RRR, no edema  Resp: CTAB, no upper airway sounds  Abd: NTND  Neuro: AOx2, following simple commands    Recent Results (from the past 72 hour(s))   ACCU-CHEK GLUCOSE    Collection Time: 10/15/19  5:04 PM   Result Value Ref Range    Glucose - Accu-Ck 165 (H) 65 - 99 mg/dL   ACCU-CHEK GLUCOSE    Collection Time: 10/15/19  9:51 PM   Result Value Ref Range    Glucose - Accu-Ck 160 (H) 65 - 99 mg/dL   CBC WITHOUT DIFFERENTIAL    Collection Time: 10/16/19  6:17 AM   Result Value Ref Range    WBC 12.6 (H) 4.8 - 10.8 K/uL    RBC 4.23 (L) 4.70 - 6.10 M/uL    Hemoglobin 13.0 (L) 14.0 - 18.0 g/dL    Hematocrit 40.9 (L) 42.0 - 52.0 %    MCV 96.7 81.4 - 97.8 fL    MCH 30.7 27.0 - 33.0 pg    MCHC 31.8 (L) 33.7 - 35.3 g/dL    RDW 47.4 35.9 - 50.0 fL    Platelet Count 347 164 - 446 K/uL    MPV 8.8 (L) 9.0 - 12.9 fL   Basic Metabolic Panel    Collection Time: 10/16/19  6:17 AM   Result Value Ref Range    Sodium 139 135 - 145 mmol/L    Potassium 3.7 3.6 - 5.5 mmol/L    Chloride 102 96 - 112 mmol/L    Co2 27 20 - 33 mmol/L    Glucose 102 (H) 65 - 99 mg/dL    Bun 25 (H) 8 - 22 mg/dL    Creatinine 0.89 0.50 - 1.40 mg/dL    Calcium 9.3 8.5 - 10.5 mg/dL    Anion Gap 10.0 0.0 - 11.9   ESTIMATED GFR    Collection Time: 10/16/19  6:17 AM "   Result Value Ref Range    GFR If African American >60 >60 mL/min/1.73 m 2    GFR If Non African American >60 >60 mL/min/1.73 m 2   ACCU-CHEK GLUCOSE    Collection Time: 10/16/19  7:37 AM   Result Value Ref Range    Glucose - Accu-Ck 115 (H) 65 - 99 mg/dL   ACCU-CHEK GLUCOSE    Collection Time: 10/16/19 11:36 AM   Result Value Ref Range    Glucose - Accu-Ck 151 (H) 65 - 99 mg/dL   ACCU-CHEK GLUCOSE    Collection Time: 10/16/19  5:16 PM   Result Value Ref Range    Glucose - Accu-Ck 155 (H) 65 - 99 mg/dL   ACCU-CHEK GLUCOSE    Collection Time: 10/16/19  9:20 PM   Result Value Ref Range    Glucose - Accu-Ck 121 (H) 65 - 99 mg/dL   ACCU-CHEK GLUCOSE    Collection Time: 10/17/19  7:04 AM   Result Value Ref Range    Glucose - Accu-Ck 128 (H) 65 - 99 mg/dL   ACCU-CHEK GLUCOSE    Collection Time: 10/17/19 11:17 AM   Result Value Ref Range    Glucose - Accu-Ck 162 (H) 65 - 99 mg/dL   ACCU-CHEK GLUCOSE    Collection Time: 10/17/19  5:14 PM   Result Value Ref Range    Glucose - Accu-Ck 143 (H) 65 - 99 mg/dL   ACCU-CHEK GLUCOSE    Collection Time: 10/17/19  9:15 PM   Result Value Ref Range    Glucose - Accu-Ck 157 (H) 65 - 99 mg/dL   ACCU-CHEK GLUCOSE    Collection Time: 10/18/19  7:41 AM   Result Value Ref Range    Glucose - Accu-Ck 130 (H) 65 - 99 mg/dL   ACCU-CHEK GLUCOSE    Collection Time: 10/18/19 11:34 AM   Result Value Ref Range    Glucose - Accu-Ck 143 (H) 65 - 99 mg/dL       Current Facility-Administered Medications   Medication Frequency   • DILTIAZem (CARDIZEM) tablet 60 mg Q8HRS   • lidocaine (LIDODERM) 5 % 1 Patch Q24HR   • metFORMIN (GLUCOPHAGE) tablet 250 mg BID WITH MEALS   • melatonin tablet 6 mg QHS   • amantadine (SYMMETREL) 50 MG/5ML syrup 100 mg BID   • insulin regular (HUMULIN R) injection 2-12 Units 4X/DAY ACHS    And   • glucose 4 g chewable tablet 16 g Q15 MIN PRN    And   • dextrose 50% (D50W) injection 50 mL Q15 MIN PRN   • ciprofloxacin (CIPRO) tablet 500 mg Q12HRS   • omeprazole (FIRST-OMEPRAZOLE)  2 mg/mL oral susp 40 mg DAILY   • phenylephrine-cocoa butter (PREPARATION H) suppository 1 Suppository Q6HRS PRN   • metoprolol (LOPRESSOR) tablet 25 mg TWICE DAILY   • apixaban (ELIQUIS) tablet 5 mg BID   • baclofen (LIORESAL) tablet 5 mg TID   • vitamin D (cholecalciferol) tablet 1,000 Units DAILY   • Respiratory Care per Protocol Continuous RT   • oxyCODONE immediate-release (ROXICODONE) tablet 2.5 mg Q3HRS PRN   • oxyCODONE immediate-release (ROXICODONE) tablet 5 mg Q3HRS PRN   • tramadol (ULTRAM) 50 MG tablet 50 mg Q4HRS PRN   • hydrALAZINE (APRESOLINE) tablet 25 mg Q8HRS PRN   • acetaminophen (TYLENOL) tablet 650 mg Q4HRS PRN   • senna-docusate (PERICOLACE or SENOKOT S) 8.6-50 MG per tablet 2 Tab BID    And   • polyethylene glycol/lytes (MIRALAX) PACKET 1 Packet QDAY PRN    And   • magnesium hydroxide (MILK OF MAGNESIA) suspension 30 mL QDAY PRN    And   • bisacodyl (DULCOLAX) suppository 10 mg QDAY PRN   • artificial tears ophthalmic solution 1 Drop PRN   • benzocaine-menthol (CEPACOL) lozenge 1 Lozenge Q2HRS PRN   • mag hydrox-al hydrox-simeth (MAALOX PLUS ES or MYLANTA DS) suspension 20 mL Q2HRS PRN   • ondansetron (ZOFRAN ODT) dispertab 4 mg 4X/DAY PRN    Or   • ondansetron (ZOFRAN) syringe/vial injection 4 mg 4X/DAY PRN   • traZODone (DESYREL) tablet 50 mg QHS PRN   • sodium chloride (OCEAN) 0.65 % nasal spray 2 Spray PRN   • allopurinol (ZYLOPRIM) tablet 300 mg DAILY   • atorvastatin (LIPITOR) tablet 20 mg Nightly   • ferrous sulfate tablet 325 mg DAILY   • loratadine (CLARITIN) tablet 10 mg DAILY       Orders Placed This Encounter   Procedures   • Diet Order Diabetic     Standing Status:   Standing     Number of Occurrences:   1     Order Specific Question:   Diet:     Answer:   Diabetic [3]     Order Specific Question:   Consistency/Fluid modifications:     Answer:   Thin Liquids [3]       Assessment:  Active Hospital Problems    Diagnosis   • *Encephalopathy acute   • B-cell lymphoma (HCC)   •  Metastasis to brain (HCC)   • Atrial fibrillation (HCC) w Rapid Ventricular Response    • CAD (coronary artery disease)   • Hypertension   • Type 2 diabetes mellitus without complication, without long-term current use of insulin (HCC)   • Chronic back pain   • Dysphagia   • Primary cerebral lymphoma (HCC)       Medical Decision Making and Plan:  Encephalopathy - Patient with recent diagnosis of 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 Check UA, started on Amantadine.   -UA positive, started on Ciprofloxacin per Hospitalist, U Cx pending - Lactose+, gram- still pending final read    Dysphagia - Patient on dysphagia 2 with NTL on transfer.  -SLP for swallow     Spasticity - Restarted on Baclofen and increased prior to admission.  -Continue on Baclofen 10 mg TID. With AMS, will reduce to 5 mg TID. No improvement in cognition     B cell lymphoma - Underwent R Chop on 9/23/19.  Followed by Oncology. Recent right retroperitoneal mass s/p biopsy which is positive for B cell lymphoma  -Follow-up Heme/Onc  -Restart Prednisone 10 mg for confusion. Follow-up Oncology     HTN/A Fib - Patient on Pradaxa.  Patient on Diltiazem 360 mg QHS, Metoprolol 25 mg XL daily   -Patient chewing medication, will switch medications to short acting. Diltiazem 90 mg q6 and metoprolol 25 mg BID   -Hypotension on 10/17/19, discussed case with hospitalist and recommending decrease in Diltiazem    DM - Patient on SSI on transfer. Consult hospitalist. Started on Metformin 250 mg     Sacral wound - Present on admission, seen by wound care today. Appreciate recommendations. Recommending mepilex only    Leukocytosis - B cell lymphoma on steroids. Check AM CBC - continued  -Consult hospitalist     Insomnia - Very poor sleep at  night. Start Melatonin 6 mg QHS    Hx of Gout - Patient on Allopurinol 300 mg daily.     Vitamin D - 24 on admission, start on 1000 U for deficiency.      GI Ppx - Patient on Omeprazole 40 mg daily. While on steroids     DVT ppx - Patient on Pradaxa 150 mg QHS    Total time:  27 minutes.  I spent greater than 50% of the time for patient care, counseling, and coordination on this date, including unit/floor time, and face-to-face time with the patient as per interval events and assessment and plan above. Topics discussed included increasing confusion, restart prednisone and continue to monitor SBP.     Leticia Bella M.D.

## 2019-10-18 NOTE — DISCHARGE PLANNING
JEANNA notified by staff that patient has stated he is leaving today and his wife is on her way. JEANNA placed call to Mell to discuss. Mell states that this is inaccurate. She is unsure if she will be able to visit patient today as well. Mell acknowledges patient would like to d/c home she does not feel he is ready and services have not been arranged.   JEANNA met with patient at bedside. Patient states he is going home. He also states his daughter is here although she is not. Patient is confused at time of visit. Attempted to re-orient and discussed current plan of care including appt with Dr. Ferrer and Dr. Marley next week.

## 2019-10-19 NOTE — CARE PLAN
Problem: Safety  Goal: Will remain free from injury  Outcome: PROGRESSING AS EXPECTED  Note:   Call light with in reach. Redirection to use call light for assistance. Non skid socks. Upper siderails up x2 while in bed. No complains of pain. Turn and repositioned in bed. No respiratory distress. Encouraged increase fluids intake. Will continue to monitor.

## 2019-10-19 NOTE — THERAPY
Speech Language Pathology  Daily Treatment     Patient Name: Marcio More Jr.  Age:  73 y.o., Sex:  male  Medical Record #: [id number]  Today's Date: 10/19/2019     Subjective    Patient pleasant and cooperative.  Slow to process.     Objective       10/19/19 1401   Cognition   Simple Reasoning / Problem Solving Moderate (3)   Insight into Deficits Moderate (3)   Initiation Moderate (3)   Functional Sequencing for ADL / IADLs Moderate (3)   SLP Total Time Spent   SLP Individual Total Time Spent (Mins) 60   Charge Group   SLP Cognitive Skill Development 4       FIM Eating Score:  5 - Standby Prompting/Supervision or Set-up  Eating Description:       FIM Comprehension Score:  5 - Stand-by Prompting/Supervision or Set-up  Comprehension Description:  Verbal cues    FIM Expression Score:  4 - Minimal Assistance  Expression Description:  Verbal cueing    FIM Social Interaction Score:  6 - Modified Independent  Social Interaction Description:  Increased time    FIM Problem Solving Score:  3 - Moderate Assistance  Problem Solving Description:  Verbal cueing, Therapy schedule, Bed/chair alarm, Increased time, Seat belt    FIM Memory Score:  3 - Moderate Assistance  Memory Description:  Medication, Therapy schedule, Bed/chair alarm, Seat belt    Assessment    Patient worked on recall and problem solving in the context of transfer sequencing.  Patient was able to verbalize recall of transfer sequences with 70% acc.  He was able to identify barriers/Challenges within task and verbalized plan to overcome them with 75%.  However, needed max A, including visual and tactile cues to implement.  Patient needed mod-max cues to increase voice volume in short sentences.    Plan    Target recall, speech loudness, safety sequencing and problem solving.

## 2019-10-20 PROBLEM — E87.6 HYPOKALEMIA: Status: ACTIVE | Noted: 2019-01-01

## 2019-10-20 NOTE — PROGRESS NOTES
DATE OF SERVICE:  10/18/2019    The patient was seen for followup visit.  Patient still requires a great deal   of supervision.  Cognitively, he seems somewhat better than he did when he   first was admitted, but he still struggles in his attention, overall   comprehension, and insight.  The patient's antispasmodic, baclofen, has been   diminished somewhat, but he continues to complain of significant spasticity.       ____________________________________     KOMAL WARNER, PHD    ILA / KATIE    DD:  10/20/2019 12:45:45  DT:  10/20/2019 15:20:16    D#:  [id number]  Job#:  [id number]

## 2019-10-20 NOTE — PROGRESS NOTES
Hospital Medicine Daily Progress Note      Chief Complaint:  Atrial Fibrillation  Hypertension  Diabetes  Leukocytosis    Interval History:  No overnight events.    Review of Systems  Review of Systems   Constitutional: Negative for chills and fever.   HENT: Negative.    Eyes: Negative.    Respiratory: Negative for cough and shortness of breath.    Cardiovascular: Negative for chest pain and palpitations.   Gastrointestinal: Negative for abdominal pain, nausea and vomiting.   Skin: Negative for itching and rash.        Physical Exam  Temp:  [36 °C (96.8 °F)-36.6 °C (97.8 °F)] 36.5 °C (97.7 °F)  Pulse:  [65-96] 96  Resp:  [16-20] 16  BP: (101-134)/(62-82) 101/62  SpO2:  [95 %-97 %] 95 %    Physical Exam   Constitutional: He is oriented to person, place, and time. No distress.   HENT:   Head: Normocephalic and atraumatic.   Right Ear: External ear normal.   Left Ear: External ear normal.   Prior crani scar   Eyes: Conjunctivae and EOM are normal. Right eye exhibits no discharge. Left eye exhibits no discharge.   Neck: Normal range of motion. Neck supple. No tracheal deviation present.   Cardiovascular: Normal rate, regular rhythm, S1 normal and S2 normal.   Pulmonary/Chest: No stridor. No respiratory distress. He has no wheezes. He has no rhonchi.   Decreased BS   Abdominal: Soft. Bowel sounds are normal. He exhibits no distension. There is no tenderness.   Musculoskeletal: He exhibits no edema or tenderness.   Neurological: He is alert and oriented to person, place, and time. No sensory deficit.   Skin: Skin is warm and dry. He is not diaphoretic. No cyanosis.   Vitals reviewed.      Fluids    Intake/Output Summary (Last 24 hours) at 10/20/2019 1236  Last data filed at 10/20/2019 1221  Gross per 24 hour   Intake 380 ml   Output 300 ml   Net 80 ml       Laboratory  Recent Labs     10/19/19  0550   WBC 9.3   RBC 4.14*   HEMOGLOBIN 12.8*   HEMATOCRIT 39.7*   MCV 95.9   MCH 30.9   MCHC 32.2*   RDW 46.6   PLATELETCT 288    MPV 9.1     Recent Labs     10/19/19  0550   SODIUM 141   POTASSIUM 3.4*   CHLORIDE 105   CO2 29   GLUCOSE 110*   BUN 20   CREATININE 0.82   CALCIUM 9.1                   Assessment/Plan  B-cell lymphoma (HCC)- (present on admission)  Assessment & Plan  Stage IV w/ h/o brain mass resection  S/P chemo on 9/23/19  Now on Prednisone for confusion per Physiatry    A-fib (HCC)- (present on admission)  Assessment & Plan  S/P RVR at [facility]  On both Metoprolol and Diltiazem  Try to taper off CCB  Continue to monitor HR  Anticoagulated on Eliquis    Azotemia- (present on admission)  Assessment & Plan  Renal function improved w/ PO fluids    Type 2 diabetes mellitus without complication, without long-term current use of insulin ([facility])- (present on admission)  Assessment & Plan  HbA1c 7.2  Now on steroids, monitor for hyperglycemia  Continue Metformin and SSI  Outpt meds include Metformin  mg daily    CAD (coronary artery disease)- (present on admission)  Assessment & Plan  On Eliquis, Lipitor, Metoprolol, and Diltiazem  Consider replacement of CCB w/ ACE-I or ARB to maximize medical management    Hypertension- (present on admission)  Assessment & Plan  On Metoprolol and Diltiazem  CCD decreased for hypotensive trends    UTI (urinary tract infection)- (present on admission)  Assessment & Plan  UA 20-50 WBC w/ moderate bacteria  UCx >100,000 Enterobacter  WBC normalized on abx  Continue Cipro x 7 days    Vitamin D insufficiency- (present on admission)  Assessment & Plan  Vit D level 24  On supplementation    Full Code

## 2019-10-20 NOTE — PROGRESS NOTES
DATE OF SERVICE:  10/16/2019    The patient was seen for followup visit.  He continues to be compromised   cognitively.  He does not attend very well to his surroundings.  His insight   and judgment seemed poor.  His thoughts are impoverished, limited in scope,   slow and labored.  Patient was spoken to about his understanding of his   various therapies and what the team is trying to accomplish with him.       ____________________________________     KOMAL WARNER, PHD    ILA / KATIE    DD:  10/20/2019 12:35:42  DT:  10/20/2019 14:43:09    D#:  [id number]  Job#:  [id number]

## 2019-10-20 NOTE — CARE PLAN
Problem: Safety  Goal: Will remain free from injury  Outcome: PROGRESSING AS EXPECTED  Note:   Call light with in reach. Redirection to use call light for assistance. Non skid socks. Upper siderails up x2 while in bed. Bed alarm for safety awareness.     Problem: Infection  Goal: Will remain free from infection  Outcome: PROGRESSING AS EXPECTED  Note:   Continues ABT, no adverse reaction. Encouraged increase fluids intake. No complains of pain. No respiratory distress. Turn and repositioned in bed. Kept clean and dry. Will continue to monitor.

## 2019-10-20 NOTE — PROGRESS NOTES
Hospital Medicine Daily Progress Note      Chief Complaint:  Atrial Fibrillation  Hypertension  Diabetes  Leukocytosis    Interval History:  Pt now on Prednisone.  Labs reviewed.    Review of Systems  Review of Systems   Constitutional: Negative for chills and fever.   HENT: Negative.    Eyes: Negative.    Respiratory: Negative for cough and shortness of breath.    Cardiovascular: Negative for chest pain and palpitations.   Gastrointestinal: Negative for abdominal pain, nausea and vomiting.   Skin: Negative for itching and rash.        Physical Exam  Temp:  [36 °C (96.8 °F)-36.6 °C (97.8 °F)] 36.5 °C (97.7 °F)  Pulse:  [65-96] 96  Resp:  [16-20] 16  BP: (101-134)/(62-82) 101/62  SpO2:  [95 %-97 %] 95 %    Physical Exam   Constitutional: He is oriented to person, place, and time. No distress.   HENT:   Head: Normocephalic and atraumatic.   Right Ear: External ear normal.   Left Ear: External ear normal.   Prior crani scar   Eyes: Conjunctivae and EOM are normal. Right eye exhibits no discharge. Left eye exhibits no discharge.   Neck: Normal range of motion. Neck supple. No tracheal deviation present.   Cardiovascular: Normal rate, regular rhythm, S1 normal and S2 normal.   Pulmonary/Chest: No stridor. No respiratory distress. He has no wheezes. He has no rhonchi.   Decreased BS   Abdominal: Soft. Bowel sounds are normal. He exhibits no distension. There is no tenderness.   Musculoskeletal: He exhibits no edema or tenderness.   Neurological: He is alert and oriented to person, place, and time. No sensory deficit.   Skin: Skin is warm and dry. He is not diaphoretic. No cyanosis.   Vitals reviewed.      Fluids    Intake/Output Summary (Last 24 hours) at 10/20/2019 1233  Last data filed at 10/20/2019 1221  Gross per 24 hour   Intake 380 ml   Output 300 ml   Net 80 ml       Laboratory  Recent Labs     10/19/19  0550   WBC 9.3   RBC 4.14*   HEMOGLOBIN 12.8*   HEMATOCRIT 39.7*   MCV 95.9   MCH 30.9   MCHC 32.2*   RDW 46.6    PLATELETCT 288   MPV 9.1     Recent Labs     10/19/19  0550   SODIUM 141   POTASSIUM 3.4*   CHLORIDE 105   CO2 29   GLUCOSE 110*   BUN 20   CREATININE 0.82   CALCIUM 9.1                   Assessment/Plan  B-cell lymphoma (HCC)- (present on admission)  Assessment & Plan  Stage IV w/ h/o brain mass resection  S/P chemo on 9/23/19  Now on Prednisone for confusion per Physiatry    A-fib (HCC)- (present on admission)  Assessment & Plan  S/P RVR at [facility]  On both Metoprolol and Diltiazem  Try to taper off CCB  Continue to monitor HR  Anticoagulated on Eliquis    Azotemia- (present on admission)  Assessment & Plan  Renal function improved w/ PO fluids    Type 2 diabetes mellitus without complication, without long-term current use of insulin ([facility])- (present on admission)  Assessment & Plan  HbA1c 7.2  Continue Metformin and SSI  Outpt meds include Metformin  mg daily    CAD (coronary artery disease)- (present on admission)  Assessment & Plan  On Eliquis, Lipitor, Metoprolol, and Diltiazem  Consider replacement of CCB w/ ACE-I or ARB to maximize medical management    Hypertension- (present on admission)  Assessment & Plan  On Metoprolol and Diltiazem  CCD decreased for hypotensive trends    Hypokalemia  Assessment & Plan  Replete K+    UTI (urinary tract infection)- (present on admission)  Assessment & Plan  UA 20-50 WBC w/ moderate bacteria  UCx >100,000 Enterobacter  WBC normalized on abx  Continue Cipro x 7 days    Vitamin D insufficiency- (present on admission)  Assessment & Plan  Vit D level 24  On supplementation    Full Code

## 2019-10-20 NOTE — THERAPY
"Physical Therapy   Daily Treatment     Patient Name: Marcio More Jr.  Age:  73 y.o., Sex:  male  Medical Record #: [id number]  Today's Date: 10/20/2019     Precautions  Precautions: Fall Risk, Other (See Comments)  Comments: Spasticity, poor motor planning, coccyx ulcer    Subjective    Pt found laying in bed agreeable to PT session. States that he is \"tired and not had much of an appetite\".       Objective       10/20/19 1031   Precautions   Precautions Fall Risk;Other (See Comments)   Comments Spasticity, poor motor planning, coccyx ulcer   Vitals   Pulse 96   Patient BP Position Sitting   Blood Pressure  101/62   Pulse Oximetry 95 %   Vitals Comments PT monitored for signs and symptoms of dizziness, dyspnea, and nausea throughout therapy session. None noted.   Pain   Intervention Declines   Pain 0 - 10 Group   Therapist Pain Assessment 0;Post Activity Pain Same as Prior to Activity   Non Verbal Descriptors   Non Verbal Scale  Calm   Cognition    Speech/ Communication Delayed Responses  (whispers)   Orientation Level Not Oriented to Reason   Level of Consciousness Alert   Ability To Follow Commands 2 Step   Safety Awareness Impaired   New Learning Impaired   Attention Impaired   Sequencing Impaired   Initiation Impaired   Bed Mobility    Supine to Sit Moderate Assist   Sit to Supine Moderate Assist   Sit to Stand Contact Guard Assist   Scooting Maximal Assist   Rolling Moderate Assist to Lt.;Moderate Assist to Rt.   Neuro-Muscular Treatments   Neuro-Muscular Treatments Weight Shift Left;Weight Shift Right;Verbal Cuing;Sequencing;Tactile Cuing   Comments Facilitation of forward weight shift Max A during lateral scooting x5 right and left on elevated mat table. Sequencing sit to stand from elevated mat table with single UE CGA x5.    Interdisciplinary Plan of Care Collaboration   IDT Collaboration with  Nursing;Therapy Tech   Patient Position at End of Therapy Seated;Other (Comments)  (Cafe for lunch) "   Collaboration Comments RN arrived for med distribution at end of session; Tech present for transfers.   PT Total Time Spent   PT Individual Total Time Spent (Mins) 60   PT Charge Group   PT Neuromuscular Re-Education / Balance 2   PT Therapeutic Activities 2     Bed mobility: Reviewed log rolling supine <>sit x2 for sequencing lower extremities    Transfer training: Reviewed safetly with FWW during SPT with single upper extremity sequencing for STS    Education on pressure relief program every 20-30 minutes for promoting tissue healing and decreasing further pressure ulcers      FIM Bed/Chair/Wheelchair Transfers Score: 3 - Moderate Assistance  Bed/Chair/Wheelchair Transfers Description:  (Pt completed bed<>WC transfer ModA for bed mobility to sitting at EOB, CGA with SPT in FWW to wc)    FIM Wheelchair Score:  2 - Max Assistance  Wheelchair Description:  (PT propelled WC x100' from room to end of hallway with max verbal cues for sequencing BLEs and BUEs. Bradykentic and easily distractable throughout.)    FIM Stairs Score:  0 - Not tested,unsafe activity  Stairs Description:         Assessment    Pt was not Ox4 this session and demonstrated some confusion of sequencing transfers throughout session. He requires maximum verbal cue and physical assessed with forward weight shifting. Unable to perform scooting laterally of forward without assist this session.    Plan    Scooting education, review pressure reliefs in bed and in WC, transfer training, gait training, sequencing LEs

## 2019-10-20 NOTE — PROGRESS NOTES
DATE OF SERVICE:  10/15/2019    The patient remains diminished cognitively.  His insight is significantly   diminished.  He is not well oriented.  His thoughts are impoverished, slow and   labored.  The patient has been participating, but with a great deal of   supervision.       ____________________________________     KOMAL WARNER, PHD    ILA / KATIE    DD:  10/20/2019 12:17:09  DT:  10/20/2019 13:53:00    D#:  [id number]  Job#:  [id number]

## 2019-10-21 NOTE — CARE PLAN
Problem: Safety  Goal: Will remain free from injury  Outcome: PROGRESSING AS EXPECTED  Note:   Call light with in reach. Redirection to use call light for assistance. Non skid socks. Upper siderails up x2 while in bed. Bed alarm for safety awareness.     Problem: Infection  Goal: Will remain free from infection  Outcome: PROGRESSING AS EXPECTED  Note:   Continue ABT, no adverse reaction. Encouraged increase fluids intake. No complains of pain. No respiratory distress. Kept clean and dry. Will continue to monitor.

## 2019-10-21 NOTE — REHAB-PHARMACY IDT TEAM NOTE
Pharmacy   Pharmacy  Antibiotics/Antifungals/Antivirals:  Medication:      Active Orders (From admission, onward)    Ordered     Ordering Provider       Tue Oct 15, 2019  7:26 PM    10/15/19 1926  ciprofloxacin (CIPRO) tablet 500 mg  EVERY 12 HOURS      Tia Bowers M.D.        Route:        po  Stop Date: 10/22/2019  Reason: UTI  Antihypertensives/Cardiac:  Medication:    Orders (72h ago, onward)     Start     Ordered    10/18/19 2200  DILTIAZem (CARDIZEM) tablet 60 mg  EVERY 8 HOURS      10/18/19 1253    10/12/19 0600  metoprolol (LOPRESSOR) tablet 25 mg  TWICE DAILY      10/11/19 1004    10/10/19 2100  atorvastatin (LIPITOR) tablet 20 mg  NIGHTLY      10/10/19 1546    10/10/19 1546  hydrALAZINE (APRESOLINE) tablet 25 mg  EVERY 8 HOURS PRN      10/10/19 1546              Patient Vitals for the past 24 hrs:   BP Pulse   10/21/19 1400 108/70 100   10/21/19 0700 104/68 77   10/21/19 0521 128/82 67   10/21/19 0520 -- 67   10/20/19 2100 -- 65   10/20/19 1845 129/81 (!) 55     Anticoagulation:  Medication: Eliquis    Other key medications: A review of the medication list reveals no issues at this time. Patient is currently on antihypertensive(s). Recommend home blood pressure monitoring/recording if antihypertensive(s) regimen(s) continue. Patient is currently on diabetic medication(s) and/or Insulin(s). Recommend home blood glucose monitoring/recording if these regimen(s) continue.    Section completed by: Osmany Morales [facility]

## 2019-10-21 NOTE — CARE PLAN
Problem: Safety  Goal: Will remain free from injury  Outcome: PROGRESSING SLOWER THAN EXPECTED   Pt uses call light consistently and appropriately. Waits for assistance does not attempt self transfer this shift. Able to verbalize needs.   Problem: Infection  Goal: Will remain free from infection  Outcome: PROGRESSING AS EXPECTED   Patient in ABT therapy, no s/s of any adverse reaction noted. Will continue to monitor.   Problem: Pain Management  Goal: Pain level will decrease to patient's comfort goal  Outcome: PROGRESSING AS EXPECTED   Patient able to verbalize pain level and verbalize an acceptable level of pain.

## 2019-10-21 NOTE — CONSULTS
DATE OF SERVICE:  10/11/2019    BEHAVIORAL MEDICINE EVALUATION    BRIEF HISTORY OF PRESENTING COMPLAINTS:  Patient is a 73-year-old white    male who is referred for a behavioral medicine evaluation by Dr. Bella.  The patient was transferred to rehab from [facility] where he presented   on 10/01/2019 with increased weakness, left arm weakness and difficulty   swallowing.  Patient was recently started on baclofen 5 mg t.i.d.  He reports   slightly improved symptoms after starting baclofen.  His dose was increased to   10 mg t.i.d.  Patient was stabilized acutely and then sent to rehab to   address his general debility.    PAST MEDICAL HISTORY:  Significant for anesthesia, arthritis, atrial   fibrillation, back pain, blood clotting disorder, breath shortness, coronary   artery disease, prostate cancer, cataracts, dyslipidemia,   hypercholesterolemia, hypertension, ileus, myocardial infarct x3, renal   disorder, sleep apnea, snoring, urinary incontinence.    PSYCHOLOGICAL STATUS:  MENTAL STATUS EXAMINATION:  The patient is a well-nourished, medium built male   of tall stature who appeared much older than his stated age of 73.  At   presentation, the patient was alert.  He was sitting in a wheelchair next to   his bed when approached.  Patient oriented poorly to my presence.  Patient   seemed somewhat unkempt in appearance.  He was dressed in hospital garb.  His   manner of presentation was cooperative.    The patient was poorly oriented to time and place.  He said he thought he was   in a holding cell in [facility].  He did not know the month or year or the floor   he was on.  Patient's language was logical for the most part.  His speech was   very sparse and hesitant.  He was also dysarthric and had some trouble with   word finding ability.  Patient's concentration and memory functioning was   poor.    Patient's affect was very constricted, and almost blunted, stable, and mildly   intense.  He related poorly.  His  mood appeared anxious and somewhat   dysphoric, but appropriate to the context.    Patient appears to be experiencing some delirium.  He was convinced that he   was in [place].  Some of his thoughts were suspicious, almost paranoid nature.    The patient displayed no unusual pain or motor behavior during the interview.    SPECIFIC BEHAVIORAL COMPLAINTS:  Patient admitted to symptoms of generalized   mood disturbance.  He reported he recently has felt restless, worried, sad and   nervous.  His wife stated that he seems tense and sometimes it is difficult   motivating himself to do things.  Patient has been especially confused since   his baclofen dose was increased from 5 mg to 10 mg t.i.d.    The patient reported his sleep is restless.  His energy level is very low and   he is hardly eating anything.  He also is experiencing lower extremity pain   related to spasticity.    The patient and his wife denied that he is experiencing any interpersonal   discord or discomfort, marital strife or family disharmony.    There was also no evidence for ETOH or other drug abuse or any use of tobacco.    PSYCHIATRIC HISTORY:  The patient denied any history significant for   psychiatric disturbance or treatment including in or outpatient care.    PSYCHOMETRIC TESTING:  Patient was administered 2 psychometric tests and 2   screening instruments.  The PS/PC-R revealed mild symptoms of generalized mood   disturbance.  His CDR survey showed a significant decrease in attention and   some lethargy.  His thinking seemed impaired for reasoning, concrete, literal,   impoverished and limited in scope.  Patient showed problems with making   decisions and problem solving.  He was poorly oriented to time, place, and   date.  Patient's speech was sometimes rambling and was often slow and sparse.    He showed difficulty with word pronunciation and he spoke in incomplete   sentences.  There was also some evidence for word finding difficulty.     Moreover, the patient showed significant problems with his memory and deficits   in behavioral activation and basic self-care.  Finally, the patient admitted   to symptoms of generalized mood disturbance, loss of vigor, sleep disturbance,   diminished appetite and some pain.    The patient was screened for any elder abuse or risk of suicide.  There is no   strong evidence for either problem.    SOCIAL HISTORY:  Patient is retired and .  He and his wife live in   [place], [place].    IMPRESSION:  Delirium; adjustment difficulties.    RECOMMENDATIONS:  Patient's baclofen needs to be decreased.  He clearly is   overly sedated and delirious as a consequence of the increased baclofen.    Patient will be followed for changes in his cognitive status.       ____________________________________     KOMAL WARNER, PHD    ILA / KATIE    DD:  10/20/2019 16:20:46  DT:  10/20/2019 17:14:41    D#:  [id number]  Job#:  [id number]

## 2019-10-21 NOTE — THERAPY
Occupational Therapy  Daily Treatment     Patient Name: Marcio More Jr.  Age:  73 y.o., Sex:  male  Medical Record #: [id number]  Today's Date: 10/21/2019     Precautions  Precautions: (P) Fall Risk, Other (See Comments)  Comments: (P) Spasticity, poor motor planning, coccyx ulcer    Safety   ADL Safety : (P) Requires Physical Assist for Safety(standing activities)  Bathroom Safety: (P) Requires Physical Assist for Safety  Comments: see FIM for oral care and eating    Subjective    Pt was seated in w/c and agreeable to therapy. Pt initially declined a shower then agreed to a shower with minimal encouragement.      Objective       10/21/19 1001   Precautions   Precautions Fall Risk;Other (See Comments)   Comments Spasticity, poor motor planning, coccyx ulcer   Safety    ADL Safety  Requires Physical Assist for Safety  (standing activities)   Bathroom Safety Requires Physical Assist for Safety   Cognition    Level of Consciousness Alert   Ability To Follow Commands 3 Step   Active ROM Upper Body   Dominant Hand Right   Balance   Sitting Balance (Static) Fair   Sitting Balance (Dynamic) Fair -   Standing Balance (Static) Poor +   Standing Balance (Dynamic) Poor   Weight Shift Sitting Poor   Weight Shift Standing Poor   Skilled Intervention Facilitation;Sequencing;Verbal Cuing   Interdisciplinary Plan of Care Collaboration   IDT Collaboration with  Nursing;Therapy Tech   Patient Position at End of Therapy Seated;Self Releasing Lap Belt Applied;Call Light within Reach;Tray Table within Reach   Collaboration Comments dressing on coccyx, therapy tech available for transfer assist    OT Total Time Spent   OT Individual Total Time Spent (Mins) 60   OT Charge Group   OT Self Care / ADL 4           FIM Grooming Score:  5 - Standby Prompting/Supervision or Set-up  Grooming Description:  Increased time(SBA for set up to brush teeth and apply deodorant )    FIM Bathing Score:  4 - Minimal Assistance  Bathing Description:   Grab bar, Tub bench, Hand held shower, Increased time, Supervision for safety, Verbal cueing(CGA for standing balance and set-up)      FIM Upper Body Dressin - Modified Independent  Upper Body Dressing Description:  (seated in w/c )    FIM Lower Body Dressing Score:  3 - Moderate Assistance  Lower Body Dressing Description:  Reacher, Dressing stick, Increased time, Supervision for safety, Verbal cueing(Mod A to assist with donning socks as pt refused to use sock aid)    FIM Toiletin - Standby Prompting/Supervision or Set-up  Toileting Description:  Adaptive equipment, Grab bar, Increased time    FIM Toilet Transfer Score:  4 - Minimal Assistance  Toilet Transfer Description:  (Min A for lift toilet>FWW)    FIM Tub/Shower Transfers Score:  4 - Minimal Assistance  Tub/Shower Transfers Description:  Grab bar, Increased time, Verbal cueing(Min A for lift SC>FWW)    FWW<>toilet transfer with CGA-SBA for safety    Assessment    Pt participated in bathing, dressing, and grooming without complaint of pain or fatigue. Pt demonstrated increased independence with functional transfers due to a decrease in LB spasticity. Pt demonstrated increased independence with ADLs, requiring less physical assistance and fewer verbal cues to initiation, sequencing, and termination. Pt demonstrated increased sitting and standing balance this date. Pt did not verbalize any information regarding PLOF or discharge that would indicate confusion this date.     Plan    Continue OT to address ADL/IADL independence, functional transfers, use of AE/DME, standing and sitting balance, edurance

## 2019-10-21 NOTE — CARE PLAN
Problem: Safety  Goal: Will remain free from injury  Outcome: PROGRESSING AS EXPECTED   Pt uses call light consistently and appropriately. Waits for assistance does not attempt self transfer this shift. Able to verbalize needs.   Problem: Pain Management  Goal: Pain level will decrease to patient's comfort goal  Outcome: PROGRESSING AS EXPECTED   Patient able to verbalize pain level and verbalize an acceptable level of pain.   Problem: GLYCEMIA IMBALANCE  Goal: Clinical indication of glycemia balance is achieved  Outcome: PROGRESSING AS EXPECTED   Patient on FSBG  Ac/hs. Mantaned glucose level between defined ranges. Educated in s/s of hyper/hipoglycemia and diabetic diet.

## 2019-10-21 NOTE — THERAPY
Speech Language Pathology  Daily Treatment     Patient Name: Marcio More Jr.  Age:  73 y.o., Sex:  male  Medical Record #: [id number]  Today's Date: 10/21/2019     Subjective    Pt was willing to participate in ST this session      Objective       10/21/19 1401   SLP Total Time Spent   SLP Individual Total Time Spent (Mins) 60   Charge Group   SLP Cognitive Skill Development 4       Assessment    Pt required mod cues for safety awareness (reported he is able to walk on his own, not waiting for assistance before standing up from toilet) to transfer the the bathroom this session.  Pt required max A for voicing throughout this session.  O-log was completed, pt scored a 22/30.  Pt continues to think that we are [place], [place].  Pt demonstrated confusion about the purpose of his recent hospitalization (stated it was because of a recent back surgery, 1 month ago).  Pt completed an attention task requiring him to locate 1 target (letter K).  Pt was able to identify strategies to help with attention (going line by line with a piece of paper) independently.  Pt was able to locate 18/20 targets independently and required mod A for attention to locate the remaining 2 targets.      Plan    Continue O-log, targeting memory, attention and problem solving related to safety

## 2019-10-21 NOTE — PROGRESS NOTES
1900. received report from day shift nurse and assume patient care. Pt was up in wheelchair, calm and stable . Denies any pain at this time. No s/sx of distress. Safety precautions in place. Call light with in reach. Will continue to monitor.

## 2019-10-21 NOTE — PROGRESS NOTES
Hospital Medicine Daily Progress Note      Chief Complaint:  Atrial Fibrillation  Hypertension  Diabetes  Leukocytosis    Interval History:  No 24 hour events.    Review of Systems  Review of Systems   Constitutional: Negative for chills and fever.   HENT: Negative.    Eyes: Negative.    Respiratory: Negative for cough and shortness of breath.    Cardiovascular: Negative for chest pain and palpitations.   Gastrointestinal: Negative for abdominal pain, nausea and vomiting.   Skin: Negative for itching and rash.        Physical Exam  Temp:  [36.3 °C (97.4 °F)-36.8 °C (98.2 °F)] 36.8 °C (98.2 °F)  Pulse:  [] 100  Resp:  [17-18] 18  BP: (104-129)/(68-82) 108/70  SpO2:  [95 %-97 %] 96 %    Physical Exam   Constitutional: He is oriented to person, place, and time. No distress.   HENT:   Head: Normocephalic and atraumatic.   Right Ear: External ear normal.   Left Ear: External ear normal.   Prior crani scar   Eyes: Conjunctivae and EOM are normal. Right eye exhibits no discharge. Left eye exhibits no discharge.   Neck: Normal range of motion. Neck supple. No tracheal deviation present.   Cardiovascular: Normal rate, regular rhythm, S1 normal and S2 normal.   Pulmonary/Chest: No stridor. No respiratory distress. He has no wheezes. He has no rhonchi.   Decreased BS   Abdominal: Soft. Bowel sounds are normal. He exhibits no distension. There is no tenderness.   Musculoskeletal: He exhibits no edema or tenderness.   Neurological: He is alert and oriented to person, place, and time. No sensory deficit.   Skin: Skin is warm and dry. He is not diaphoretic. No cyanosis.   Vitals reviewed.      Fluids    Intake/Output Summary (Last 24 hours) at 10/21/2019 5312  Last data filed at 10/21/2019 1200  Gross per 24 hour   Intake 600 ml   Output --   Net 600 ml       Laboratory  Recent Labs     10/19/19  0550   WBC 9.3   RBC 4.14*   HEMOGLOBIN 12.8*   HEMATOCRIT 39.7*   MCV 95.9   MCH 30.9   MCHC 32.2*   RDW 46.6   PLATELETCT 288    MPV 9.1     Recent Labs     10/19/19  0550   SODIUM 141   POTASSIUM 3.4*   CHLORIDE 105   CO2 29   GLUCOSE 110*   BUN 20   CREATININE 0.82   CALCIUM 9.1                   Assessment/Plan  B-cell lymphoma (HCC)- (present on admission)  Assessment & Plan  Stage IV w/ h/o brain mass resection  S/P chemo on 9/23/19  Now on Prednisone for confusion per Physiatry    A-fib (HCC)- (present on admission)  Assessment & Plan  S/P RVR at [facility]  On both Metoprolol and Diltiazem  Try to taper off CCB  Continue to monitor HR  Anticoagulated on Eliquis    Azotemia- (present on admission)  Assessment & Plan  Renal function improved w/ PO fluids  Check F/U labs in am    Type 2 diabetes mellitus without complication, without long-term current use of insulin ([facility])- (present on admission)  Assessment & Plan  HbA1c 7.2  Now on steroids, monitor for hyperglycemia  Continue Metformin and SSI  Outpt meds include Metformin  mg daily    CAD (coronary artery disease)- (present on admission)  Assessment & Plan  On Eliquis, Lipitor, Metoprolol, and Diltiazem  Consider replacement of CCB w/ ACE-I or ARB to maximize medical management    Hypertension- (present on admission)  Assessment & Plan  On Metoprolol and Diltiazem  CCD decreased for hypotensive trends    UTI (urinary tract infection)- (present on admission)  Assessment & Plan  UA 20-50 WBC w/ moderate bacteria  UCx >100,000 Enterobacter  WBC normalized on abx  Continue Cipro x 7 days    Vitamin D insufficiency- (present on admission)  Assessment & Plan  Vit D level 24  On supplementation    Full Code

## 2019-10-21 NOTE — THERAPY
Speech Language Pathology  Daily Treatment     Patient Name: Marcio More Jr.  Age:  73 y.o., Sex:  male  Medical Record #: [id number]  Today's Date: 10/21/2019     Subjective      Patient pleasant and cooperative, but drowsy, and frequently falling asleep through out session.  Patient reported that he didn't sleep well because he was anxious to go home.     Objective       10/21/19 1101   Cognition   Attention to Task Minimal (4)   Prospective Memory Minimal (4)   Functional Memory Activities Minimal (4)   Simple Reasoning / Problem Solving Minimal (4)   Insight into Deficits Moderate (3)   Initiation Moderate (3)   Speech / Dysarthria   Intensity / Loudness Training Severe (2)   SLP Total Time Spent   SLP Individual Total Time Spent (Mins) 30   Charge Group   SLP Cognitive Skill Development 2       FIM Eating Score:  5 - Standby Prompting/Supervision or Set-up  Eating Description:       FIM Comprehension Score:  5 - Stand-by Prompting/Supervision or Set-up  Comprehension Description:  Verbal cues    FIM Expression Score:  4 - Minimal Assistance  Expression Description:  Verbal cueing    FIM Social Interaction Score:  6 - Modified Independent  Social Interaction Description:  Increased time, Verbal cues    FIM Problem Solving Score:  3 - Moderate Assistance  Problem Solving Description:  Verbal cueing, Bed/chair alarm, Increased time, Seat belt, Therapy schedule    FIM Memory Score:  4 - Minimal Assistance  Memory Description:  Verbal cueing, Therapy schedule, Bed/chair alarm, Seat belt    Assessment    Patient able to verbalize recall of sit to stand transfer, but needs max cues to implement.  Demonstrated insight into how improved positioning helps his ability to stand/sit.  However, his insight into his spouses ability to help him with transfers is impaired.  He feels he could go home now with his spouses assist.    Plan    Target recall of safety sequencing and safety planning and problem solving.

## 2019-10-21 NOTE — THERAPY
Physical Therapy   Daily Treatment     Patient Name: Marcio More Jr.  Age:  73 y.o., Sex:  male  Medical Record #: [id number]  Today's Date: 10/21/2019     Precautions  Precautions: Fall Risk, Other (See Comments)  Comments: Spasticity, poor motor planning, coccyx ulcer    Subjective    Patient acknowledged progress, and was aware of sacral integrity and safety with transfers.      Objective       10/21/19 0831   Precautions   Precautions Fall Risk;Other (See Comments)   Comments Spasticity, poor motor planning, coccyx ulcer   Cognition    Attention Impaired   Sequencing Impaired   Initiation Impaired   Comments Very slow processing and initiation after directions are provided   Bed Mobility    Supine to Sit Stand by Assist  (max A vc, extra time)   Sit to Supine Moderate Assist  (Min-mod A trial 1, SBA + set up trial 2, max A vc)   Sit to Stand Moderate Assist  (to stabilize upon standing, guide fwd wt shft)   Scooting Stand by Assist  (vc for sacral safety, unweighting/ tricep press up)   Rolling Supervised  (vc to use railings )   Neuro-Muscular Treatments   Neuro-Muscular Treatments Weight Shift Left;Weight Shift Right;Verbal Cuing;Tactile Cuing;Sequencing;Postural Facilitation;Postural Changes;Joint Approximation;Facilitation;Compensatory Strategies   Comments VC for sacral safety during scooting in bed to avoid shear force.  Introduction to lef  with training on use and sequencing.    Interdisciplinary Plan of Care Collaboration   IDT Collaboration with  Therapy Tech;Nursing;Physician   Patient Position at End of Therapy Seated;Call Light within Reach;Tray Table within Reach;Phone within Reach   Collaboration Comments POC, CLOF   PT Total Time Spent   PT Individual Total Time Spent (Mins) 60   PT Charge Group   PT Gait Training 1   PT Neuromuscular Re-Education / Balance 1   PT Therapeutic Activities 2       FIM Bed/Chair/Wheelchair Transfers Score: 3 - Moderate Assistance  Bed/Chair/Wheelchair  Transfers Description:  Adaptive equipment, Increased time, Verbal cueing, Set-up of equipment, Initial preparation for task(Mod A STS with FWW to stabilize, max A vc to sequence. CGA/min A SPT with FWW wc <> bed.  Bed mob with leg  min-mod A trial 1-> SBA  trial 2, with max A sequencing/vc)    FIM Walking Score:  2 - Max Assistance  Walking Description:  Walker, Extra time, Safety concerns, Requires incidental assist(CGA 30 ft + 100 ft indoors with FWW, step to, bradykinetic )    FIM Wheelchair Score:  5 - Standby Prompting/Supervision or Set-up  Wheelchair Description:  Extra time, Supervision for safety(150 ft BUE/BLE indoors SPV, vc for pathfinding and encouragement)      Assessment    Patient progressed to inc indep with bed mobility, after introducing leg  device and practice.  Patient continues to require extra time to complete tasks, and sequencing support for bed mobility and transfers.     Plan    Continue transfer training/ bed mobility with leg , gait tolerance, step length/ foot clearance, and safety.

## 2019-10-22 NOTE — PROGRESS NOTES
[facility] Medicine Daily Progress Note    Date of Service  10/22/2019    Chief Complaint:  Hypertension  Afib  Diabetes  Leukocytosis    Interval History:  No significant events or changes since last visit    Review of Systems  Review of Systems   Constitutional: Negative for chills and fever.   Respiratory: Negative for shortness of breath.    Cardiovascular: Negative for chest pain.   Gastrointestinal: Negative for abdominal pain, diarrhea, nausea and vomiting.   Psychiatric/Behavioral: The patient is not nervous/anxious.         Physical Exam  Temp:  [36.7 °C (98 °F)-37.1 °C (98.8 °F)] 37.1 °C (98.8 °F)  Pulse:  [] 63  Resp:  [17-18] 17  BP: (108-128)/(53-76) 128/53  SpO2:  [96 %-98 %] 98 %    Physical Exam   Constitutional: He is oriented to person, place, and time. He appears well-nourished.   HENT:   Head: Atraumatic.   Eyes: Pupils are equal, round, and reactive to light. Conjunctivae are normal.   Neck: Normal range of motion. Neck supple.   Cardiovascular: Normal rate, regular rhythm, S1 normal and S2 normal.   No murmur heard.  Pulmonary/Chest: Effort normal. He has no wheezes. He has no rhonchi. He has no rales.   Abdominal: Soft. He exhibits no distension. There is no tenderness.   Musculoskeletal: He exhibits no edema.   Neurological: He is alert and oriented to person, place, and time. No sensory deficit.   Skin: Skin is warm and dry. No rash noted. No cyanosis.   Psychiatric: He has a normal mood and affect. His behavior is normal.   Nursing note and vitals reviewed.      Fluids    Intake/Output Summary (Last 24 hours) at 10/22/2019 1219  Last data filed at 10/22/2019 1152  Gross per 24 hour   Intake 960 ml   Output --   Net 960 ml       Laboratory  Recent Labs     10/22/19  0601   WBC 9.6   RBC 3.95*   HEMOGLOBIN 12.6*   HEMATOCRIT 38.2*   MCV 96.7   MCH 31.9   MCHC 33.0*   RDW 47.8   PLATELETCT 245   MPV 9.4     Recent Labs     10/22/19  0601   SODIUM 140   POTASSIUM 3.6   CHLORIDE 106   CO2 24    GLUCOSE 127*   BUN 23*   CREATININE 0.85   CALCIUM 8.7                   Imaging    Assessment/Plan  B-cell lymphoma (HCC)- (present on admission)  Assessment & Plan  Stage IV  Hx of brain resection  S/P R-CHOP chemo on 9/23  Recent brain MRI showed no mass    A-fib (HCC)- (present on admission)  Assessment & Plan  HR ok  [place] [facility] [place] Cardizem: 90 mg qid --> 60 mg tid (10/18)  On Lopressor: 25 mg bid  Cont to monitor    Azotemia- (present on admission)  Assessment & Plan  Bun: 23 (10/22)  Encourging fluid intake  Monitor    Type 2 diabetes mellitus without complication, without long-term current use of insulin (HCC)- (present on admission)  Assessment & Plan  Hba1c: 7.2 (10/11)  BS: 102-173  On Metformin: 250 mg bid  Note: home meds include Metformin  mg daily  Cont to monitor    CAD (coronary artery disease)- (present on admission)  Assessment & Plan  On Eliquis  On Lipitor  On Metoprolol & Diltiazem    Hypertension- (present on admission)  Assessment & Plan  BP ok  On Cardizem: 90 mg qid --> 60 mg tid (10/18)  On Lopressor: 25 mg bid  Monitor    Neurologic abnormality  Assessment & Plan  [place] [facility] [place], confusion, difficulty swallowing  An MRI of the brain was unremarkable and did not show the previous mass  An MRI of the cervical spine and thoracic spine did not show any acute issues or metastasis (has DDD)    UTI (urinary tract infection)- (present on admission)  Assessment & Plan  UC --> Enterobacter  S/P Cipro    Vitamin D insufficiency- (present on admission)  Assessment & Plan  Vit D level 24  On supplementation

## 2019-10-22 NOTE — THERAPY
Physical Therapy   Daily Treatment     Patient Name: Marcio More Jr.  Age:  73 y.o., Sex:  male  Medical Record #: [id number]  Today's Date: 10/22/2019     Precautions  Precautions: (P) Fall Risk, Other (See Comments)  Comments: (P) Spasticity, poor motor planning, coccyx ulcer    Subjective    Patient was agreeable to transferring OOB, reporting that he needed to use the bathroom.      Objective       10/22/19 0831   Precautions   Precautions Fall Risk;Other (See Comments)   Comments Spasticity, poor motor planning, coccyx ulcer   Bed Mobility    Supine to Sit Contact Guard Assist  (CGA-SBA)   Sit to Stand Maximal Assist   Scooting Moderate Assist   Rolling Supervised  (railing for bed mob and donning pants)   Neuro-Muscular Treatments   Neuro-Muscular Treatments Weight Shift Right;Weight Shift Left;Verbal Cuing;Tactile Cuing;Sequencing;Postural Changes;Postural Facilitation;Joint Approximation;Facilitation;Compensatory Strategies   Comments Sup to sit with inc indep, but a significant amout of time to complete task, and step be step sequencing.  [employer] cushion refilled for enhanced pressure reduction.    Interdisciplinary Plan of Care Collaboration   IDT Collaboration with  Nursing;Therapy Tech   Patient Position at End of Therapy Seated  (handoff to nursing for toileting needs post tx)   Collaboration Comments POC, meds    PT Total Time Spent   PT Individual Total Time Spent (Mins) 30   PT Charge Group   PT Neuromuscular Re-Education / Balance 1   PT Therapeutic Activities 1       FIM Bed/Chair/Wheelchair Transfers Score: 2 - Max Assistance  Bed/Chair/Wheelchair Transfers Description:  Adaptive equipment, Increased time, Set-up of equipment, Verbal cueing, Initial preparation for task, Requires lift(SBA with max A vc to sequence, and extra time for sup to sit.  Max A STS to FWW, min A SPT with FWW to wc)      Assessment    Patient demonstrated inc indep with bed mobility, but continues to require significant  time to complete task, and max A vc to sequence each step. STS was max A due to impaired fwd wt shift and impaired motor planning.     Plan    Review bed mobility and sit <> stand sequencing with FWW, progress gait endurance, ROM.

## 2019-10-22 NOTE — CARE PLAN
Problem: Safety  Goal: Will remain free from injury  Outcome: PROGRESSING AS EXPECTED   Pt does not use call light for assistance, Impulsive and unsteady from transfers.  Patient encouraged to call for assistance and not attempt self transfer . Able to verbalize needs.   Problem: Infection  Goal: Will remain free from infection  Outcome: PROGRESSING AS EXPECTED   Patient in PO ABT therapy for UTI  Problem: Urinary Elimination:  Goal: Ability to reestablish a normal urinary elimination pattern will improve  Outcome: PROGRESSING AS EXPECTED   Patient voiding adequate amounts of clear, yellow urine. Denies dysuria and flank pain: afebrile. Will continue to monitor.  , no s/s of any adverse reaction noted. Will continue to monitor.

## 2019-10-22 NOTE — THERAPY
"Occupational Therapy  Daily Treatment     Patient Name: Marcio More Jr.  Age:  73 y.o., Sex:  male  Medical Record #: [id number]  Today's Date: 10/22/2019     Precautions  Precautions: (P) Fall Risk, Other (See Comments)  Comments: (P) coccyx ulcer    Safety   ADL Safety : (P) Requires Physical Assist for Safety, Requires Cueing for Safety  Bathroom Safety: (P) Requires Physical Assist for Safety, Requires Cuing for Safety  Comments: see FIM for oral care and eating    Subjective    Pt was seated in w/c with wife and daughter. Wife and daughter present during session for transfer training. \"I'm going, I like to go at my own pace.\" regarding the verbal cues from family members.      Objective       10/22/19 1231   Precautions   Precautions Fall Risk;Other (See Comments)   Comments coccyx ulcer   Safety    ADL Safety  Requires Physical Assist for Safety;Requires Cueing for Safety   Bathroom Safety Requires Physical Assist for Safety;Requires Cuing for Safety   IADL Treatments   Home Management Pt completed laundry task with FWW with SBA for safety. Pt required verbal cues to initiate tasks and additional processing time    Standing Lower Body Exercises   Marching   ( 4 reps of hip flexion to 90 with each BLE using FWW/SBA)   Balance   Sitting Balance (Static) Good   Sitting Balance (Dynamic) Fair   Standing Balance (Static) Fair -   Standing Balance (Dynamic) Fair -   Weight Shift Sitting Poor   Weight Shift Standing Fair   Skilled Intervention Verbal Cuing;Tactile Cuing   Bed Mobility    Supine to Sit Moderate Assist   Sit to Supine Moderate Assist   Scooting Stand by Assist   Rolling Minimal Assist to Rt.   Skilled Intervention Facilitation;Verbal Cuing;Sequencing   Interdisciplinary Plan of Care Collaboration   IDT Collaboration with  Family / Caregiver   Patient Position at End of Therapy Seated;Self Releasing Lap Belt Applied;Family / Friend in Room   Collaboration Comments Family training for " bed<>w/c<>mat table transfers with FWW   OT Total Time Spent   OT Individual Total Time Spent (Mins) 60   OT Charge Group   OT Therapy Activity 4       FIM Bed/Chair/Wheelchair Transfers Score: 3 - Moderate Assistance  Bed/Chair/Wheelchair Transfers Description:  Increased time, Supervision for safety, Assist with two limbs(Mod to lift BLE with sit>supine and Min A for supine>sit. CGA for sit >FWW>W/C SPT)    FIM Walking Score:  5 - Standby Prompting/Supervision or Set-up  Walking Description:  Extra time, Verbal cueing, Assist device/equipment(SBA with FWW with verbal cues to widen stance and pacing )    Pt walked 205 ft with FWW with CGA-SBA without a break.     Assessment    Pt demonstrated increased independence with functional transfers and mobility this date. Pt participated in transfer training, completing transfers with each his wife and daughter. Pt demonstrates improved ability to follow multi-step instructions with additional processing time. Pt continues to demonstrate poor balance and confusion regarding orientation.     Plan    Continue OT to address ADL/IADL independence, functional transfers, use of AE/DME, standing and sitting balance, edurance

## 2019-10-22 NOTE — CARE PLAN
Problem: Expression STGs  Goal: STG-Within one week, patient will  Description  1) Individualized goal:  Will produce prolonged voicing (e.g., hum, 'ah', frontal focus) with 80% accuracy and MOD A.   2) Interventions:  SLP Speech Language Treatment and SLP Group Treatment     Outcome: NOT MET  Note:   Mod-max A required (variable) for consistent voicing      Problem: Memory STGs  Goal: STG-Within one week, patient will  Description  1) Individualized goal:  Use external memory aids to recall novel information from H stay with 80% accuracy and MOD A.   2) Interventions:  SLP Self Care / ADL Training , SLP Cognitive Skill Development and SLP Group Treatment     Outcome: NOT MET  Note:   Max A required for memory      Problem: Speech/Swallowing LTGs  Goal: LTG-By discharge, patient will safely swallow  Description  1) Individualized goal:  Regular textures and thin liquids without aspiration.    2) Interventions:  SLP Swallowing Dysfunction Treatment, SLP Oral Pharyngeal Evaluation, SLP Video Swallow Evaluation and SLP Group Treatment     Outcome: MET  Note:   Pt is currently tolerating a regular texture diet and thin liquids      Problem: Swallowing STGs  Goal: STG-Within one week, patient will  Description  1) Individualized goal:  Tolerate dys2/NTL diet without overt s/s of aspiration over 3 meals.    2) Interventions:  SLP Swallowing Dysfunction Treatment and SLP Group Treatment     Outcome: MET  Note:   Pt is currently tolerating a regular texture diet and thin liquids      Problem: Memory STGs  Goal: STG-Within one week, patient will  Description  1) Individualized goal:  Be oriented to date, time of day, location, and situation in 80% of opportunities with MOD A.   2) Interventions:  SLP Self Care / ADL Training , SLP Cognitive Skill Development and SLP Group Treatment     Outcome: MET  Note:   Pt is currently oriented with mod A

## 2019-10-22 NOTE — CARE PLAN
Problem: Communication  Goal: The ability to communicate needs accurately and effectively will improve  Outcome: PROGRESSING SLOWER THAN EXPECTED  Note:   Pt mumbles and whispers and will need a little extra time to communicate his needs to staff effectively.     Problem: Safety  Goal: Will remain free from injury  Outcome: PROGRESSING SLOWER THAN EXPECTED  Note:   Pt is impulsive and a high fall risk. Pt in room close to nursing station with alarms in place for safety. Use of call light reinforced with each encounter.      Problem: Infection  Goal: Will remain free from infection  Outcome: PROGRESSING AS EXPECTED  Note:   Pt has 1 more dose of Cipro left to complete his treatment for UTI     Problem: Respiratory:  Goal: Respiratory status will improve  Outcome: PROGRESSING AS EXPECTED  Note:   Pt is on room air and respirations are WNL.      Problem: Urinary Elimination:  Goal: Ability to reestablish a normal urinary elimination pattern will improve  Outcome: PROGRESSING SLOWER THAN EXPECTED  Note:   Pt has been incontinent of bladder this shift. Staff assists with cleaning and changing briefs.

## 2019-10-22 NOTE — REHAB-CM IDT TEAM NOTE
Case Management    DC Planning  DC destination/dispostion:  Home to [place] with support of wife and daughters.   Referrals: None at this time.    DC Needs: Home health referral, DME- hospital bed with low air loss mattress, possibly wheelchair; Other DME TBD.      Barriers to discharge: Patient frequently demonstrates confusion.     Strengths: Supportive family who verbalizes their goal of supporting the patient and assisting to keep him comfortable within the home.     Section completed by:  Lois Hernandez

## 2019-10-22 NOTE — REHAB-PT IDT TEAM NOTE
Physical Therapy   Mobility  Bed mobility:  Mod A-SBA with leg   Bed /Chair/Wheelchair Transfer Initial:  1 - Total Assistance  Bed /Chair/Wheelchair Transfer Current:  3 - Moderate Assistance   Bed/Chair/Wheelchair Transfer Description:  Adaptive equipment, Increased time, Verbal cueing, Set-up of equipment, Initial preparation for task(Mod A STS with FWW to stabilize, max A vc to sequence. CGA/min A SPT with FWW wc <> bed.  Bed mob with leg  min-mod A trial 1-> SBA  trial 2, with max A sequencing/vc)  Walk Initial:  0 - Not tested,unsafe activity  Walk Current:  2 - Max Assistance   Walk Description:  Walker, Extra time, Safety concerns, Requires incidental assist(CGA 30 ft + 100 ft indoors with FWW, step to, bradykinetic )  Wheelchair Initial:  0 - Not tested,medical condition  Wheelchair Current:  5 - Standby Prompting/Supervision or Set-up   Wheelchair Description:  Extra time, Supervision for safety(150 ft BUE/BLE indoors SPV, vc for pathfinding and encouragement)  Stairs Initial:  0 - Not tested,unsafe activity  Stairs Current: 0 - Not tested,unsafe activity   Stairs Description:    Patient/Family Training/Education: Patient has been educated on use of leg  for bed mobility, safety with mobility, sequencing transfers, POC  DME/DC Recommendations: Patient has FWW/ wc, anticipate HH PT  Strengths:  Pleasant and cooperative, Supportive family and Willingly participates in therapeutic activities  Barriers:   Decreased endurance, Generalized weakness, Home accessibility, Limited mobility, Poor activity tolerance, Poor balance and Other: inconsistent performance, 3 steps to enter home ([facility])  # of short term goals set= 4  # of short term goals met= 4  Physical Therapy Problems     Problem: Mobility     Dates: Start: 10/21/19       Description:     Goal: STG-Within one week, patient will ambulate household distance     Dates: Start: 10/21/19       Description: 1) Individualized goal:  Patient will  amb with FWW over various surfaces 150 ft x 2 CGA-SBA consistently  2) Interventions:  PT Group Therapy, PT E Stim Attended, PT Gait Training, PT Therapeutic Exercises, PT Neuro Re-Ed/Balance, PT Aquatic Therapy, PT Therapeutic Activity and PT Manual Therapy                   Problem: Mobility Transfers     Dates: Start: 10/21/19       Description:     Goal: STG-Within one week, patient will transfer bed to chair     Dates: Start: 10/21/19       Description: 1) Individualized goal:  Patient will transfer CGA consistently with FWW STS/SPT, SBA bed mobility with leg   2) Interventions:  PT Group Therapy, PT E Stim Attended, PT Gait Training, PT Therapeutic Exercises, PT Neuro Re-Ed/Balance, PT Therapeutic Activity and PT Manual Therapy                   Problem: PT-Long Term Goals     Dates: Start: 10/11/19       Description:     Goal: LTG-By discharge, patient will propel wheelchair     Dates: Start: 10/11/19       Description: 1) Individualized goal:  150 ft, spv   2) Interventions:  PT Group Therapy, PT E Stim Attended, PT Gait Training, PT Therapeutic Exercises, PT Neuro Re-Ed/Balance, PT Aquatic Therapy, PT Therapeutic Activity, PT Manual Therapy and PT Wound Therapy                 Goal: LTG-By discharge, patient will transfer one surface to another     Dates: Start: 10/11/19       Description: 1) Individualized goal:  Min A with LRAD   2) Interventions: PT Group Therapy, PT E Stim Attended, PT Gait Training, PT Therapeutic Exercises, PT Neuro Re-Ed/Balance, PT Aquatic Therapy, PT Therapeutic Activity, PT Manual Therapy and PT Wound Therapy                 Goal: LTG-By discharge, patient will perform home exercise program     Dates: Start: 10/11/19       Description: 1) Individualized goal:  SBA with LE HEP with use of handout  2) Interventions: PT Group Therapy, PT E Stim Attended, PT Gait Training, PT Therapeutic Exercises, PT Neuro Re-Ed/Balance, PT Aquatic Therapy, PT Therapeutic Activity, PT Manual  Therapy and PT Wound Therapy                 Goal: LTG-By discharge, patient will transfer in/out of a car     Dates: Start: 10/11/19       Description: 1) Individualized goal:  Min A with LRAD  2) Interventions: PT Group Therapy, PT E Stim Attended, PT Gait Training, PT Therapeutic Exercises, PT Neuro Re-Ed/Balance, PT Aquatic Therapy, PT Therapeutic Activity, PT Manual Therapy and PT Wound Therapy                 Goal: LTG-By discharge, patient will ambulate     Dates: Start: 10/21/19       Description: 1) Individualized goal:  Patient will amb indoors >50 ft x 2 for household mobility CGA-SBA with FWW  2) Interventions:  PT Group Therapy, PT E Stim Attended, PT Gait Training, PT Therapeutic Exercises, PT Neuro Re-Ed/Balance, PT Aquatic Therapy, PT Therapeutic Activity and PT Manual Therapy                           Section completed by:  Yessenia Car PT, DPT

## 2019-10-22 NOTE — ASSESSMENT & PLAN NOTE
Pt presented to [facility] [place] [facility] with increased weakness, confusion, difficulty swallowing  An MRI of the brain was unremarkable and did not show the previous mass  An MRI of the cervical spine and thoracic spine did not show any acute issues or metastasis (has DDD)

## 2019-10-22 NOTE — CARE PLAN
Problem: Balance  Goal: STG-Within one week, patient will maintain static standing  Description  1) Individualized goal:  3 min in // bars with max A   2) Interventions:  PT Group Therapy, PT E Stim Attended, PT Gait Training, PT Therapeutic Exercises, PT Neuro Re-Ed/Balance, PT Aquatic Therapy, PT Therapeutic Activity, PT Manual Therapy and PT Wound Therapy     Outcome: MET     Problem: Mobility Transfers  Goal: STG-Within one week, patient will perform bed mobility  Description  1) Individualized goal:  Mod A x1   2) Interventions: PT Group Therapy, PT E Stim Attended, PT Gait Training, PT Therapeutic Exercises, PT Neuro Re-Ed/Balance, PT Aquatic Therapy, PT Therapeutic Activity, PT Manual Therapy and PT Wound Therapy         Outcome: MET  Goal: STG-Within one week, patient will transfer bed to chair  Description  1) Individualized goal:  Max A x1 with slideboard   2) Interventions:  PT Group Therapy, PT E Stim Attended, PT Gait Training, PT Therapeutic Exercises, PT Neuro Re-Ed/Balance, PT Aquatic Therapy, PT Therapeutic Activity, PT Manual Therapy and PT Wound Therapy         Outcome: MET     Problem: Mobility  Goal: STG-Within one week, patient will propel wheelchair community  Description  1) Individualized goal:  Patient will propel wc indoors >100 ft x 2 for inc endurance SPV  2) Interventions:  PT Group Therapy, PT E Stim Attended, PT Gait Training, PT Therapeutic Exercises, PT Neuro Re-Ed/Balance, PT Aquatic Therapy, PT Therapeutic Activity and PT Manual Therapy     Outcome: MET

## 2019-10-22 NOTE — REHAB-OT IDT TEAM NOTE
Occupational Therapy   Activities of Daily Living  Eating Initial:  4 - Minimal Assistance  Eating Current:  6 - Modified Independent   Eating Description:  Increased time  Grooming Initial:  3 - Moderate Assistance  Grooming Current:  5 - Standby Prompting/Supervision or Set-up   Grooming Description:  Increased time(SBA for set up to brush teeth and apply deodorant )  Bathing Initial:  2 - Max Assistance  Bathing Current:  4 - Minimal Assistance   Bathing Description:  Grab bar, Tub bench, Hand held shower, Increased time, Supervision for safety, Verbal cueing(CGA for standing balance and set-up)  Upper Body Dressing Initial:  3 - Moderate Assistance  Upper Body Dressing Current:  6 - Modified Independent   Upper Body Dressing Description:  (seated in w/c )  Lower Body Dressing Initial:  1 - Total Assistance  Lower Body Dressing Current:  3 - Moderate Assistance   Lower Body Dressing Description:  3 - Moderate Assistance  Toileting Initial:  3 - Moderate Assistance  Toileting Current:  5 - Standby Prompting/Supervision or Set-up   Toileting Description:  Adaptive equipment, Grab bar, Increased time  Toilet Transfer Initial:  1 - Total Assistance  Toilet Transfer Current:  4 - Minimal Assistance   Toilet Transfer Description:  4 - Minimal Assistance  Tub / Shower Transfer Initial:  1 - Total Assistance  Tub / Shower Transfer Current:  4 - Minimal Assistance   Tub / Shower Transfer Description:  Grab bar, Increased time, Verbal cueing(Min A for lift SC>FWW)  IADL:  Pt has completed laundry tasks   Family Training/Education:  Family training will occur prior to discharge. Family is in [place], [place]   DME/DC Recommendations:  TBD     Strengths:  Able to follow instructions, Adequate strength, Good carryover of learning, Making steady progress towards goals, Motivated for self care and independence and Willingly participates in therapeutic activities  Barriers:  Confused, Limited mobility and Poor balance     # of short  term goals set= 3    # of short term goals met= 3, revised goals     Occupational Therapy Goals     Problem: Bathing     Dates: Start: 10/11/19       Description:     Goal: STG-Within one week, patient will bathe     Dates: Start: 10/11/19       Description: 1) Individualized Goal:  Min A with AE/DME PRN  2) Interventions:  OT E Stim Attended, OT Group Therapy, OT Self Care/ADL, OT Cognitive Skill Dev, OT Community Reintegration, OT Manual Ther Technique, OT Neuro Re-Ed/Balance, OT Sensory Int Techniques, OT Therapeutic Activity and OT Evaluation                  Problem: Dressing     Dates: Start: 10/11/19       Description:     Goal: STG-Within one week, patient will dress LB     Dates: Start: 10/11/19       Description: 1) Individualized Goal:  Min A with AE/DME PRN  2) Interventions:  OT E Stim Attended, OT Group Therapy, OT Self Care/ADL, OT Cognitive Skill Dev, OT Community Reintegration, OT Manual Ther Technique, OT Neuro Re-Ed/Balance, OT Sensory Int Techniques, OT Therapeutic Activity and OT Evaluation                  Problem: Functional Transfers     Dates: Start: 10/11/19       Description:     Goal: STG-Within one week, patient will transfer to step in shower     Dates: Start: 10/11/19       Description: 1) Individualized Goal: SBA with AE/DME PRN  2) Interventions:  OT E Stim Attended, OT Group Therapy, OT Self Care/ADL, OT Cognitive Skill Dev, OT Community Reintegration, OT Manual Ther Technique, OT Neuro Re-Ed/Balance, OT Sensory Int Techniques, OT Therapeutic Activity and OT Evaluation                  Problem: OT Long Term Goals     Dates: Start: 10/11/19       Description:     Goal: LTG-By discharge, patient will complete basic self care tasks     Dates: Start: 10/11/19       Description: 1) Individualized Goal:  Min A with AE/DME PRN  2) Interventions:  OT E Stim Attended, OT Group Therapy, OT Self Care/ADL, OT Cognitive Skill Dev, OT Community Reintegration, OT Manual Ther Technique, OT Neuro  Re-Ed/Balance, OT Sensory Int Techniques, OT Therapeutic Activity and OT Evaluation           Goal: LTG-By discharge, patient will perform bathroom transfers     Dates: Start: 10/11/19       Description: 1) Individualized Goal:  Min A with AE/DME PRN  2) Interventions:  OT E Stim Attended, OT Group Therapy, OT Self Care/ADL, OT Cognitive Skill Dev, OT Community Reintegration, OT Manual Ther Technique, OT Neuro Re-Ed/Balance, OT Sensory Int Techniques, OT Therapeutic Activity and OT Evaluation                         Section completed by:  Leanne Marie MS, OTR/L, CHT

## 2019-10-22 NOTE — REHAB-NURSING IDT TEAM NOTE
Nursing   Nursing  Diet/Nutrition:  Diabetic and Thin Liquids  Medication Administration:  Crush all Medications in Puree  % consumed at meals in last 24 hours:  Consumed % of meals per documentation.  Meal/Snack Consumption for the past 24 hrs:   Oral Nutrition   10/21/19 1833 Dinner;Between 50-75% Consumed   10/21/19 1200 Lunch;Between % Consumed     Snack schedule:  HS  Supplement:  Boost Glucose Control  Appetite:  Fair  Admit Weight:  Weight: 83.5 kg (184 lb 1.4 oz)  Weight Last 7 Days   [85.2 kg (187 lb 14.4 oz)] 85.2 kg (187 lb 14.4 oz) (10/20 1500)    Pain Issues:    Location:  Back (10/21 2234)  Lower (10/21 2234)         Severity:  Mild   Scheduled pain medications: Baclofen + Lidoderm patches    PRN pain medications used in last 24 hours:  None   Non Pharmacologic Interventions:  distraction, emotional support, relaxation, repositioned and rest  Effectiveness of pain management plan:  good=patient states acceptable comfort after interventions    Bowel:    Bowel Assist Initial Score:  1 - Total Assistance  Bowel Assist Current Score:  1 - Total Assistance  Bowl Accidents in last 7 days:  2  Last bowel movement: 10/20/19  Stool Description: Large, Incontinence, Soft     Usual bowel pattern:  every other day  Scheduled bowel medications:  senna-docusate (PERICOLACE or SENOKOT S)   PRN bowel medications used in last 24 hours:  None  Nursing Interventions:  Increased time, PRN medication, Scheduled medication, Supervision, Verbal cueing, Positioning on commode/toilet, Brief  Effectiveness of bowel program:   fair=occasional unpredictable, incontinent emptying of bowel (less than 1 time day)     Bladder:    Bladder Assist Initial Score:  1 - Total Assistance  Bladder Assist Current Score:  1 - Total Assistance  Bladder Accidents in last 7 days:  4  Medications affecting bladder:  None    Time void schedule/voiding pattern:  Voiding every 2-4 hours  Interventions:  Increased time, Supervision, Brief,  Time void patient initiated(Pt had large bladder accident then finished the rest of his void on the toilet)  Effectiveness of bladder training:  Poor=Continues to have bladder accidents    Diabetes:  Blood Sugar Frequency:  Before meals and at bedtime    Range of BS for last 48 hours:     Recent Labs     10/20/19  0740 10/20/19  1126 10/20/19  1727 10/20/19  2012 10/21/19  0732 10/21/19  1059 10/21/19  1712 10/21/19  2232   POCGLUCOSE 105* 157* 165* 164* 102* 134* 173* 147*     Scheduled diabetic medications:  metformin (GLUCOPHAGE)   Sliding scale usage in past 24 hours:  Yes  Total Short acting insulin in the past 24 hours:  Humulin 2 units  Total Long acting insulin in the past 24 hours:  None    Wound:      Coccyx/Buttock Wound - excoriation without drainage. Dressed with Mepilex.          Interventions:  Check to make sure dressing is CDI qSHIFT. Change dressing PRN saturation.  Effectiveness of intervention:  wound is improving     Sleep/wake cycle:   Average hours slept:  Sleeps 3-4 hours without waking  Sleep medication usage:  None    Patient/Family Training/Education:  Aspiration Precautions, Bladder Management/Training, Bowel Management/Training, Diabetes Management, Diet/Nutrition, Fall Prevention, General Self Care, Medication Management, Pain Management, Safe Transfers, Safety, Skin Care and Wound Care  Discharge Supply Recommendations:  Glucometer and Strips, Blood Pressure Monitor and Wound Care Supplies  Strengths: Willingly participates in therapeutic activities   Barriers:   Aspiration risk, Bladder incontinence, Bowel incontinence, Confused, Fatigue, Generalized weakness, Impulsive, Limited mobility and Pressure ulcer       Nursing Problems     Problem: Bowel/Gastric:     Description:     Goal: Normal bowel function is maintained or improved     Description:           Goal: Will not experience complications related to bowel motility     Description:                 Problem: Communication      Description:     Goal: The ability to communicate needs accurately and effectively will improve     Description:                 Problem: Discharge Barriers/Planning     Description:     Goal: Patient's continuum of care needs will be met     Description:                 Problem: GLYCEMIA IMBALANCE     Description:     Goal: Clinical indication of glycemia balance is achieved     Description:                 Problem: Infection     Description:     Goal: Will remain free from infection     Description:                 Problem: Knowledge Deficit     Description:     Goal: Knowledge of disease process/condition, treatment plan, diagnostic tests, and medications will improve     Description:           Goal: Knowledge of the prescribed therapeutic regimen will improve     Description:                 Problem: Pain Management     Description:     Goal: Pain level will decrease to patient's comfort goal     Description:     Flowsheet:     Taken at 10/13/19 0740    Pain Rating Scale (NPRS) 9 - Can't bear the pain, unable to do anything by  Lisha Trujillo RMAU.    Taken at 10/12/19 0838    Pain Rating Scale (NPRS) 7 - Focus of attention, prevents doing daily activities by  Lisha Trujillo R.N.                      Problem: Respiratory:     Description:     Goal: Respiratory status will improve     Description:                 Problem: Safety     Description:     Goal: Will remain free from injury     Description:           Goal: Will remain free from falls     Description:                 Problem: Skin Integrity     Description:     Goal: Risk for impaired skin integrity will decrease     Description:                 Problem: Urinary Elimination:     Description:     Goal: Ability to reestablish a normal urinary elimination pattern will improve     Description:                 Problem: Venous Thromboembolism (VTW)/Deep Vein Thrombosis (DVT) Prevention:     Description:     Goal: Patient will participate in Venous  Thrombosis (VTE)/Deep Vein Thrombosis (DVT)Prevention Measures     Description:                        Long Term Goals:   At discharge patient will be able to function safely at home and in the community with support.    Section completed by:  Cole Ludwig R.N.

## 2019-10-22 NOTE — THERAPY
Speech Language Pathology  Daily Treatment     Patient Name: Marcio More Jr.  Age:  73 y.o., Sex:  male  Medical Record #: [id number]  Today's Date: 10/22/2019     Subjective    Pt was pleasant and cooperative during this ST session      Objective       10/22/19 0931   SLP Total Time Spent   SLP Individual Total Time Spent (Mins) 60   Charge Group   SLP Cognitive Skill Development 4       Assessment    O-log completed, pt scored a 25/30 (increased from 22/30 yesterday) and required cues to recall the date, the name of this hospital and the reason he is in the hospital.  Pt completed a sustained attention task requiring him to locate 1 target, but with the target switching 3 times during the task (locate E's, then N's, then S's).  Pt was able to complete this task independently with 3 missed targets requiring min cues to increase his accuracy to 100%.  When the task was increased to 2 targets at a time (I.e. Locate N's and S's, then R's and E's) pt missed 14 targets independently and required mod cues for attention to increase his accuracy to 100%.      Plan    Continue targeting attention, orientation and problem solving

## 2019-10-22 NOTE — THERAPY
Physical Therapy   Daily Treatment     Patient Name: Marcio More Jr.  Age:  73 y.o., Sex:  male  Medical Record #: [id number]  Today's Date: 10/22/2019     Precautions  Precautions: Fall Risk, Other (See Comments)  Comments: Spasticity, poor motor planning, coccyx ulcer    Subjective    Pt was sitting in w/c upon arrival and agreeable to tx. Pt requested to use restroom prior to tx. Stated his wife is getting dementia and will not be able to assist much; [place] with less acreage.      Objective       10/22/19 1101   Precautions   Precautions Fall Risk;Other (See Comments)   Comments Spasticity, poor motor planning, coccyx ulcer   Interdisciplinary Plan of Care Collaboration   Patient Position at End of Therapy Seated;Call Light within Reach;Tray Table within Reach;Phone within Reach   PT Total Time Spent   PT Individual Total Time Spent (Mins) 30   PT Charge Group   PT Therapeutic Activities 2       FIM Toilet Transfer Score:  5 - Standby Prompting/Supervision or Set-up  Toilet Transfer Description:  (grab bar, SPV, increased time, SPT)    FIM Wheelchair Score:  5 - Standby Prompting/Supervision or Set-up  Wheelchair Description:  Supervision for safety(B UE, bradykinetic, hypokinesia, VCs for obstacle avoidance 165ft x 1, level terrain)    Education on pressure relief when on toilet to prevent pressure injuries, education on use of call light, education on wheelchair mobility technique.    Pt required maxA for donning brief and pants; stated wife assists him with this at home.     Assessment    Pt with self limiting behaviors and demonstrates bradykinesia and hypokinesia    Plan    Review bed mobility and sit <> stand sequencing with FWW, progress gait endurance, ROM.

## 2019-10-22 NOTE — REHAB-COLLABORATIVE ONGOING IDT TEAM NOTE
Weekly Interdisciplinary Team Conference Note    Weekly Interdisciplinary Team Conference # 2  Date:  10/22/2019    Clinicians present and reporting at team conference include the following:   MD: MARIA GUADALUPE Bella MD    RN:  Whitney Garcia, BILLIE    PT:   Christine Blancas PT, DPT, NCS  OT:  Alto Frank OTD, OTR/L   ST:  Prashanth Medina MS, CCC-SLP  CM:  Lois Hernandez LSW  REC:  None  RT:  None  RPh:  Sofy Joseph [facility]  Other:   None  All reporting clinicians have a working knowledge of this patient's plan of care.    Targeted DC Date:  10/28/2019     Medical    Patient Active Problem List    Diagnosis Date Noted   • B-cell lymphoma (HCC) 10/01/2019     Priority: High   • Metastasis to brain (HCC) 10/01/2019     Priority: High   • Neutropenia (HCC) 10/01/2019     Priority: High   • Intracranial mass 07/06/2019     Priority: High   • Myelopathy (HCC) 05/15/2016     Priority: High   • Thoracic stenosis 05/15/2016     Priority: High     Class: Acute   • Ileus (HCC) 05/15/2016     Priority: High     Class: Acute   • A-fib (HCC) 05/15/2016     Priority: High     Class: Chronic   • Hyponatremia 07/12/2019     Priority: Medium   • Hypertension 05/15/2016     Priority: Medium     Class: Chronic   • CAD (coronary artery disease) 05/15/2016     Priority: Medium     Class: Chronic   • Type 2 diabetes mellitus without complication, without long-term current use of insulin (HCC) 05/15/2016     Priority: Medium     Class: Acute   • Azotemia 05/15/2016     Priority: Medium     Class: Acute   • Chronic back pain 05/15/2016     Priority: Low     Class: Chronic   • Dyslipidemia 05/15/2016     Priority: Low     Class: Chronic   • Hypokalemia 10/20/2019   • Neurologic abnormality 10/12/2019   • Encephalopathy acute 10/10/2019   • Normocytic anemia 10/03/2019   • Dysphagia 10/01/2019   • Hypomagnesemia 07/24/2019   • Vitamin D insufficiency 07/18/2019   • UTI (urinary tract infection) 07/18/2019   • 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    • History of prostate cancer 07/06/2019     Results     Procedure Component Value Ref Range Date/Time    ACCU-CHEK GLUCOSE [[id number]]  (Abnormal) Collected:  10/22/19 1126    Order Status:  Completed Lab Status:  Final result Updated:  10/22/19 1139     Glucose - Accu-Ck 119 65 - 99 mg/dL     ACCU-CHEK GLUCOSE [[id number]]  (Abnormal) Collected:  10/22/19 0715    Order Status:  Completed Lab Status:  Final result Updated:  10/22/19 0729     Glucose - Accu-Ck 114 65 - 99 mg/dL     ESTIMATED GFR [[id number]] Collected:  10/22/19 0601    Order Status:  Completed Lab Status:  Final result Updated:  10/22/19 0644     GFR If African American >60 >60 mL/min/1.73 m 2      GFR If Non African American >60 >60 mL/min/1.73 m 2     Basic Metabolic Panel [[id number]]  (Abnormal) Collected:  10/22/19 0601    Order Status:  Completed Lab Status:  Final result Updated:  10/22/19 0644    Specimen:  Blood      Sodium 140 135 - 145 mmol/L      Potassium 3.6 3.6 - 5.5 mmol/L      Chloride 106 96 - 112 mmol/L      Co2 24 20 - 33 mmol/L      Glucose 127 65 - 99 mg/dL      Bun 23 8 - 22 mg/dL      Creatinine 0.85 0.50 - 1.40 mg/dL      Calcium 8.7 8.5 - 10.5 mg/dL      Anion Gap 10.0 0.0 - 11.9     CBC WITHOUT DIFFERENTIAL [[id number]]  (Abnormal) Collected:  10/22/19 0601    Order Status:  Completed Lab Status:  Final result Updated:  10/22/19 0625    Specimen:  Blood      WBC 9.6 4.8 - 10.8 K/uL      RBC 3.95 4.70 - 6.10 M/uL      Hemoglobin 12.6 14.0 - 18.0 g/dL      Hematocrit 38.2 42.0 - 52.0 %      MCV 96.7 81.4 - 97.8 fL      MCH 31.9 27.0 - 33.0 pg      MCHC 33.0 33.7 - 35.3 g/dL      RDW 47.8 35.9 - 50.0 fL      Platelet Count 245 164 - 446 K/uL      MPV 9.4 9.0 - 12.9 fL     ACCU-CHEK GLUCOSE [[id number]]  (Abnormal) Collected:  10/21/19 2232    Order Status:  Completed Lab Status:  Final result Updated:  10/22/19 0032     Glucose - Accu-Ck 147 65 - 99 mg/dL     ACCU-CHEK GLUCOSE [[id number]]  (Abnormal) Collected:  10/21/19 0392     Order Status:  Completed Lab Status:  Final result Updated:  10/21/19 1802     Glucose - Accu-Ck 173 65 - 99 mg/dL         Nursing  Diet/Nutrition:  Diabetic and Thin Liquids  Medication Administration:  Crush all Medications in Puree  % consumed at meals in last 24 hours:  Consumed % of meals per documentation.  Meal/Snack Consumption for the past 24 hrs:   Oral Nutrition   10/21/19 1833 Dinner;Between 50-75% Consumed   10/21/19 1200 Lunch;Between % Consumed     Snack schedule:  HS  Supplement:  Boost Glucose Control  Appetite:  Fair  Admit Weight:  Weight: 83.5 kg (184 lb 1.4 oz)  Weight Last 7 Days   [85.2 kg (187 lb 14.4 oz)] 85.2 kg (187 lb 14.4 oz) (10/20 1500)    Pain Issues:    Location:  Back (10/21 2234)  Lower (10/21 2234)         Severity:  Mild   Scheduled pain medications: Baclofen + Lidoderm patches    PRN pain medications used in last 24 hours:  None   Non Pharmacologic Interventions:  distraction, emotional support, relaxation, repositioned and rest  Effectiveness of pain management plan:  good=patient states acceptable comfort after interventions    Bowel:    Bowel Assist Initial Score:  1 - Total Assistance  Bowel Assist Current Score:  1 - Total Assistance  Bowl Accidents in last 7 days:  2  Last bowel movement: 10/20/19  Stool Description: Large, Incontinence, Soft     Usual bowel pattern:  every other day  Scheduled bowel medications:  senna-docusate (PERICOLACE or SENOKOT S)   PRN bowel medications used in last 24 hours:  None  Nursing Interventions:  Increased time, PRN medication, Scheduled medication, Supervision, Verbal cueing, Positioning on commode/toilet, Brief  Effectiveness of bowel program:   fair=occasional unpredictable, incontinent emptying of bowel (less than 1 time day)     Bladder:    Bladder Assist Initial Score:  1 - Total Assistance  Bladder Assist Current Score:  1 - Total Assistance  Bladder Accidents in last 7 days:  4  Medications affecting bladder:  None     Time void schedule/voiding pattern:  Voiding every 2-4 hours  Interventions:  Increased time, Supervision, Brief, Time void patient initiated(Pt had large bladder accident then finished the rest of his void on the toilet)  Effectiveness of bladder training:  Poor=Continues to have bladder accidents    Diabetes:  Blood Sugar Frequency:  Before meals and at bedtime    Range of BS for last 48 hours:     Recent Labs     10/20/19  0740 10/20/19  1126 10/20/19  1727 10/20/19  2012 10/21/19  0732 10/21/19  1059 10/21/19  1712 10/21/19  2232   POCGLUCOSE 105* 157* 165* 164* 102* 134* 173* 147*     Scheduled diabetic medications:  metformin (GLUCOPHAGE)   Sliding scale usage in past 24 hours:  Yes  Total Short acting insulin in the past 24 hours:  Humulin 2 units  Total Long acting insulin in the past 24 hours:  None    Wound:      Coccyx/Buttock Wound - excoriation without drainage. Dressed with Mepilex.          Interventions:  Check to make sure dressing is CDI qSHIFT. Change dressing PRN saturation.  Effectiveness of intervention:  wound is improving     Sleep/wake cycle:   Average hours slept:  Sleeps 3-4 hours without waking  Sleep medication usage:  None    Patient/Family Training/Education:  Aspiration Precautions, Bladder Management/Training, Bowel Management/Training, Diabetes Management, Diet/Nutrition, Fall Prevention, General Self Care, Medication Management, Pain Management, Safe Transfers, Safety, Skin Care and Wound Care  Discharge Supply Recommendations:  Glucometer and Strips, Blood Pressure Monitor and Wound Care Supplies  Strengths: Willingly participates in therapeutic activities   Barriers:   Aspiration risk, Bladder incontinence, Bowel incontinence, Confused, Fatigue, Generalized weakness, Impulsive, Limited mobility and Pressure ulcer       Nursing Problems     Problem: Bowel/Gastric:     Description:     Goal: Normal bowel function is maintained or improved     Description:           Goal: Will not  experience complications related to bowel motility     Description:                 Problem: Communication     Description:     Goal: The ability to communicate needs accurately and effectively will improve     Description:                 Problem: Discharge Barriers/Planning     Description:     Goal: Patient's continuum of care needs will be met     Description:                 Problem: GLYCEMIA IMBALANCE     Description:     Goal: Clinical indication of glycemia balance is achieved     Description:                 Problem: Infection     Description:     Goal: Will remain free from infection     Description:                 Problem: Knowledge Deficit     Description:     Goal: Knowledge of disease process/condition, treatment plan, diagnostic tests, and medications will improve     Description:           Goal: Knowledge of the prescribed therapeutic regimen will improve     Description:                 Problem: Pain Management     Description:     Goal: Pain level will decrease to patient's comfort goal     Description:     Flowsheet:     Taken at 10/13/19 0740    Pain Rating Scale (NPRS) 9 - Can't bear the pain, unable to do anything by  Lisha Trujillo RDavidN.    Taken at 10/12/19 0838    Pain Rating Scale (NPRS) 7 - Focus of attention, prevents doing daily activities by  Lisha Trujillo RDavidN.                      Problem: Respiratory:     Description:     Goal: Respiratory status will improve     Description:                 Problem: Safety     Description:     Goal: Will remain free from injury     Description:           Goal: Will remain free from falls     Description:                 Problem: Skin Integrity     Description:     Goal: Risk for impaired skin integrity will decrease     Description:                 Problem: Urinary Elimination:     Description:     Goal: Ability to reestablish a normal urinary elimination pattern will improve     Description:                 Problem: Venous Thromboembolism  (VTW)/Deep Vein Thrombosis (DVT) Prevention:     Description:     Goal: Patient will participate in Venous Thrombosis (VTE)/Deep Vein Thrombosis (DVT)Prevention Measures     Description:                        Long Term Goals:   At discharge patient will be able to function safely at home and in the community with support.    Section completed by:  Cole Ludwig R.N.           Mobility  Bed mobility:  Mod A-SBA with leg   Bed /Chair/Wheelchair Transfer Initial:  1 - Total Assistance  Bed /Chair/Wheelchair Transfer Current:  3 - Moderate Assistance   Bed/Chair/Wheelchair Transfer Description:  Adaptive equipment, Increased time, Verbal cueing, Set-up of equipment, Initial preparation for task(Mod A STS with FWW to stabilize, max A vc to sequence. CGA/min A SPT with FWW wc <> bed.  Bed mob with leg  min-mod A trial 1-> SBA  trial 2, with max A sequencing/vc)  Walk Initial:  0 - Not tested,unsafe activity  Walk Current:  2 - Max Assistance   Walk Description:  Walker, Extra time, Safety concerns, Requires incidental assist(CGA 30 ft + 100 ft indoors with FWW, step to, bradykinetic )  Wheelchair Initial:  0 - Not tested,medical condition  Wheelchair Current:  5 - Standby Prompting/Supervision or Set-up   Wheelchair Description:  Extra time, Supervision for safety(150 ft BUE/BLE indoors SPV, vc for pathfinding and encouragement)  Stairs Initial:  0 - Not tested,unsafe activity  Stairs Current: 0 - Not tested,unsafe activity   Stairs Description:    Patient/Family Training/Education: Patient has been educated on use of leg  for bed mobility, safety with mobility, sequencing transfers, POC  DME/DC Recommendations: Patient has FWW/ wc, anticipate HH PT  Strengths:  Pleasant and cooperative, Supportive family and Willingly participates in therapeutic activities  Barriers:   Decreased endurance, Generalized weakness, Home accessibility, Limited mobility, Poor activity tolerance, Poor balance and Other:  inconsistent performance, 3 steps to enter home ([facility])  # of short term goals set= 4  # of short term goals met= 4  Physical Therapy Problems     Problem: Mobility     Dates: Start: 10/21/19       Description:     Goal: STG-Within one week, patient will ambulate household distance     Dates: Start: 10/21/19       Description: 1) Individualized goal:  Patient will amb with FWW over various surfaces 150 ft x 2 CGA-SBA consistently  2) Interventions:  PT Group Therapy, PT E Stim Attended, PT Gait Training, PT Therapeutic Exercises, PT Neuro Re-Ed/Balance, PT Aquatic Therapy, PT Therapeutic Activity and PT Manual Therapy                   Problem: Mobility Transfers     Dates: Start: 10/21/19       Description:     Goal: STG-Within one week, patient will transfer bed to chair     Dates: Start: 10/21/19       Description: 1) Individualized goal:  Patient will transfer CGA consistently with FWW STS/SPT, SBA bed mobility with leg   2) Interventions:  PT Group Therapy, PT E Stim Attended, PT Gait Training, PT Therapeutic Exercises, PT Neuro Re-Ed/Balance, PT Therapeutic Activity and PT Manual Therapy                   Problem: PT-Long Term Goals     Dates: Start: 10/11/19       Description:     Goal: LTG-By discharge, patient will propel wheelchair     Dates: Start: 10/11/19       Description: 1) Individualized goal:  150 ft, spv   2) Interventions:  PT Group Therapy, PT E Stim Attended, PT Gait Training, PT Therapeutic Exercises, PT Neuro Re-Ed/Balance, PT Aquatic Therapy, PT Therapeutic Activity, PT Manual Therapy and PT Wound Therapy                 Goal: LTG-By discharge, patient will transfer one surface to another     Dates: Start: 10/11/19       Description: 1) Individualized goal:  Min A with LRAD   2) Interventions: PT Group Therapy, PT E Stim Attended, PT Gait Training, PT Therapeutic Exercises, PT Neuro Re-Ed/Balance, PT Aquatic Therapy, PT Therapeutic Activity, PT Manual Therapy and PT Wound Therapy                  Goal: LTG-By discharge, patient will perform home exercise program     Dates: Start: 10/11/19       Description: 1) Individualized goal:  SBA with LE HEP with use of handout  2) Interventions: PT Group Therapy, PT E Stim Attended, PT Gait Training, PT Therapeutic Exercises, PT Neuro Re-Ed/Balance, PT Aquatic Therapy, PT Therapeutic Activity, PT Manual Therapy and PT Wound Therapy                 Goal: LTG-By discharge, patient will transfer in/out of a car     Dates: Start: 10/11/19       Description: 1) Individualized goal:  Min A with LRAD  2) Interventions: PT Group Therapy, PT E Stim Attended, PT Gait Training, PT Therapeutic Exercises, PT Neuro Re-Ed/Balance, PT Aquatic Therapy, PT Therapeutic Activity, PT Manual Therapy and PT Wound Therapy                 Goal: LTG-By discharge, patient will ambulate     Dates: Start: 10/21/19       Description: 1) Individualized goal:  Patient will amb indoors >50 ft x 2 for household mobility CGA-SBA with FWW  2) Interventions:  PT Group Therapy, PT E Stim Attended, PT Gait Training, PT Therapeutic Exercises, PT Neuro Re-Ed/Balance, PT Aquatic Therapy, PT Therapeutic Activity and PT Manual Therapy                           Section completed by:  Yessenia Car PT, DPT    Activities of Daily Living  Eating Initial:  4 - Minimal Assistance  Eating Current:  6 - Modified Independent   Eating Description:  Increased time  Grooming Initial:  3 - Moderate Assistance  Grooming Current:  5 - Standby Prompting/Supervision or Set-up   Grooming Description:  Increased time(SBA for set up to brush teeth and apply deodorant )  Bathing Initial:  2 - Max Assistance  Bathing Current:  4 - Minimal Assistance   Bathing Description:  Grab bar, Tub bench, Hand held shower, Increased time, Supervision for safety, Verbal cueing(CGA for standing balance and set-up)  Upper Body Dressing Initial:  3 - Moderate Assistance  Upper Body Dressing Current:  6 - Modified Independent   Upper Body  Dressing Description:  (seated in w/c )  Lower Body Dressing Initial:  1 - Total Assistance  Lower Body Dressing Current:  3 - Moderate Assistance   Lower Body Dressing Description:  3 - Moderate Assistance  Toileting Initial:  3 - Moderate Assistance  Toileting Current:  5 - Standby Prompting/Supervision or Set-up   Toileting Description:  Adaptive equipment, Grab bar, Increased time  Toilet Transfer Initial:  1 - Total Assistance  Toilet Transfer Current:  4 - Minimal Assistance   Toilet Transfer Description:  4 - Minimal Assistance  Tub / Shower Transfer Initial:  1 - Total Assistance  Tub / Shower Transfer Current:  4 - Minimal Assistance   Tub / Shower Transfer Description:  Grab bar, Increased time, Verbal cueing(Min A for lift SC>FWW)  IADL:  Pt has completed laundry tasks   Family Training/Education:  Family training will occur prior to discharge. Family is in [place], [place]   DME/DC Recommendations:  TBD     Strengths:  Able to follow instructions, Adequate strength, Good carryover of learning, Making steady progress towards goals, Motivated for self care and independence and Willingly participates in therapeutic activities  Barriers:  Confused, Limited mobility and Poor balance     # of short term goals set= 3    # of short term goals met= 3, revised goals     Occupational Therapy Goals     Problem: Bathing     Dates: Start: 10/11/19       Description:     Goal: STG-Within one week, patient will bathe     Dates: Start: 10/11/19       Description: 1) Individualized Goal:  Min A with AE/DME PRN  2) Interventions:  OT E Stim Attended, OT Group Therapy, OT Self Care/ADL, OT Cognitive Skill Dev, OT Community Reintegration, OT Manual Ther Technique, OT Neuro Re-Ed/Balance, OT Sensory Int Techniques, OT Therapeutic Activity and OT Evaluation                  Problem: Dressing     Dates: Start: 10/11/19       Description:     Goal: STG-Within one week, patient will dress LB     Dates: Start: 10/11/19        Description: 1) Individualized Goal:  Min A with AE/DME PRN  2) Interventions:  OT E Stim Attended, OT Group Therapy, OT Self Care/ADL, OT Cognitive Skill Dev, OT Community Reintegration, OT Manual Ther Technique, OT Neuro Re-Ed/Balance, OT Sensory Int Techniques, OT Therapeutic Activity and OT Evaluation                  Problem: Functional Transfers     Dates: Start: 10/11/19       Description:     Goal: STG-Within one week, patient will transfer to step in shower     Dates: Start: 10/11/19       Description: 1) Individualized Goal: SBA with AE/DME PRN  2) Interventions:  OT E Stim Attended, OT Group Therapy, OT Self Care/ADL, OT Cognitive Skill Dev, OT Community Reintegration, OT Manual Ther Technique, OT Neuro Re-Ed/Balance, OT Sensory Int Techniques, OT Therapeutic Activity and OT Evaluation                  Problem: OT Long Term Goals     Dates: Start: 10/11/19       Description:     Goal: LTG-By discharge, patient will complete basic self care tasks     Dates: Start: 10/11/19       Description: 1) Individualized Goal:  Min A with AE/DME PRN  2) Interventions:  OT E Stim Attended, OT Group Therapy, OT Self Care/ADL, OT Cognitive Skill Dev, OT Community Reintegration, OT Manual Ther Technique, OT Neuro Re-Ed/Balance, OT Sensory Int Techniques, OT Therapeutic Activity and OT Evaluation           Goal: LTG-By discharge, patient will perform bathroom transfers     Dates: Start: 10/11/19       Description: 1) Individualized Goal:  Min A with AE/DME PRN  2) Interventions:  OT E Stim Attended, OT Group Therapy, OT Self Care/ADL, OT Cognitive Skill Dev, OT Community Reintegration, OT Manual Ther Technique, OT Neuro Re-Ed/Balance, OT Sensory Int Techniques, OT Therapeutic Activity and OT Evaluation                         Section completed by:  Leanne Marie, MS, OTR/L, CHT    Cognitive Linquistic Functions  Comprehension Initial:  4 - Minimal Assistance  Comprehension Current:  5 - Stand-by Prompting/Supervision or  Set-up   Comprehension Description:  Verbal cues  Expression Initial:  4 - Minimal Assistance  Expression Current:  4 - Minimal Assistance   Expression Description:  Verbal cueing  Social Interaction Initial:  5 - Stand-by Prompting/Supervision or Set-up  Social Interaction Current:  6 - Modified Independent   Social Interaction Description:  Increased time, Verbal cues  Problem Solving Initial:  3 - Moderate Assistance  Problem Solving Current:  3 - Moderate Assistance   Problem Solving Description:  Verbal cueing, Bed/chair alarm, Increased time, Seat belt, Therapy schedule  Memory Initial:  3 - Moderate Assistance  Memory Current:  4 - Minimal Assistance   Memory Description:  Verbal cueing, Therapy schedule, Bed/chair alarm, Seat belt  Executive Functioning / Organization Initial:   NA  Executive Functioning / Organization Current:   Mod-Max A   Executive Functioning Desciption:  Verbal cues   Swallowing  Swallowing Status:  Pt is currently tolerating a regular texture and thin liquid diet, all swallowing goals met at this time   Orders Placed This Encounter   Procedures   • Diet Order Diabetic     Standing Status:   Standing     Number of Occurrences:   1     Order Specific Question:   Diet:     Answer:   Diabetic [3]     Order Specific Question:   Consistency/Fluid modifications:     Answer:   Thin Liquids [3]     Behavior Modification Plan  Keep instructions simple/concrete, Redirect to task/topic and Analyze tasks (break down into smaller steps)  Assistive Technology  Memory aides: and Low tech: Calendar, planner, schedule, alarms/timers, pill organizer, post-it notes, lists  Family Training/Education:  To be completed   DC Recommendations:   24 hour spv  Strengths:  Able to follow instructions, Pleasant and cooperative, Supportive family and Willingly participates in therapeutic activities  Barriers:  Confused, Poor carryover of learning and Other: poor attention   # of short term goals set=4  # of short  term goals met=2  Speech Therapy Problems     Problem: Expression STGs     Dates: Start: 10/12/19       Description:     Goal: STG-Within one week, patient will     Dates: Start: 10/12/19       Description: 1) Individualized goal:  Will produce prolonged voicing (e.g., hum, 'ah', frontal focus) with 80% accuracy and MOD A.   2) Interventions:  SLP Speech Language Treatment and SLP Group Treatment       Note:     Goal Note filed on 10/22/19 1042 by Prashanth Medina MS,CCC-SLP    Mod-max A required (variable) for consistent voicing                         Problem: Memory STGs     Dates: Start: 10/12/19       Description:     Goal: STG-Within one week, patient will     Dates: Start: 10/12/19       Description: 1) Individualized goal:  Use external memory aids to recall novel information from [facility] stay with 80% accuracy and MOD A.   2) Interventions:  SLP Self Care / ADL Training , SLP Cognitive Skill Development and SLP Group Treatment       Note:     Goal Note filed on 10/22/19 1042 by Prashanth Medina MS,CCC-SLP    Max A required for memory                   Goal: STG-Within one week, patient will     Dates: Start: 10/22/19       Description: 1) Individualized goal:  Complete alternating attention tasks given min A to achieve 80% accuracy or greater.   2) Interventions:  SLP Speech Language Treatment, SLP Self Care / ADL Training , SLP Cognitive Skill Development and SLP Group Treatment                   Problem: Problem Solving STGs     Dates: Start: 10/22/19       Description:     Goal: STG-Within one week, patient will     Dates: Start: 10/22/19       Description: 1) Individualized goal:  Achieve a score of 25/30 or greater on the O-log for 3 consecutive days.    2) Interventions:  SLP Speech Language Treatment, SLP Self Care / ADL Training , SLP Cognitive Skill Development and SLP Group Treatment                   Problem: Speech/Swallowing LTGs     Dates: Start: 10/11/19       Description:     Goal: LTG-By discharge,  patient will solve basic problems     Dates: Start: 10/11/19       Description: 1) Individualized goal:  Marco Antonio for safe discharge home.    2) Interventions:  SLP Cognitive Skill Development and SLP Group Treatment                          Section completed by:  Prsahanth Medina MS,CCC-SLP          REHAB-Pharmacy IDT Team Note by Osmany Morales RPH at 10/21/2019  2:46 PM  Version 1 of 1    Author:  Osmany Morales RPH Service:  -- Author Type:  Pharmacist    Filed:  10/21/2019  2:47 PM Date of Service:  10/21/2019  2:46 PM Status:  Signed    :  Osmany Morales RPH (Pharmacist)         Pharmacy   Pharmacy  Antibiotics/Antifungals/Antivirals:  Medication:      Active Orders (From admission, onward)    Ordered     Ordering Provider       [place] Oct 15, 2019  7:26 PM    10/15/19 1926  ciprofloxacin (CIPRO) tablet 500 mg  EVERY 12 HOURS      Tia Bowers M.D.        Route:        po  Stop Date: 10/22/2019  Reason: UTI  Antihypertensives/Cardiac:  Medication:    Orders (72h ago, onward)     Start     Ordered    10/18/19 2200  DILTIAZem (CARDIZEM) tablet 60 mg  EVERY 8 HOURS      10/18/19 1253    10/12/19 0600  metoprolol (LOPRESSOR) tablet 25 mg  TWICE DAILY      10/11/19 1004    10/10/19 2100  atorvastatin (LIPITOR) tablet 20 mg  NIGHTLY      10/10/19 1546    10/10/19 1546  hydrALAZINE (APRESOLINE) tablet 25 mg  EVERY 8 HOURS PRN      10/10/19 1546              Patient Vitals for the past 24 hrs:   BP Pulse   10/21/19 1400 108/70 100   10/21/19 0700 104/68 77   10/21/19 0521 128/82 67   10/21/19 0520 -- 67   10/20/19 2100 -- 65   10/20/19 1845 129/81 (!) 55     Anticoagulation:  Medication: Eliquis    Other key medications: A review of the medication list reveals no issues at this time. Patient is currently on antihypertensive(s). Recommend home blood pressure monitoring/recording if antihypertensive(s) regimen(s) continue. Patient is currently on diabetic medication(s) and/or Insulin(s). Recommend home blood glucose  monitoring/recording if these regimen(s) continue.    Section completed by: Osmany Morales [facility][AW.1]     Attribution Key     AW.1 - Osmany Morales [facility] on 10/21/2019  2:46 PM                  DC Planning  DC destination/dispostion:  Home to [place] with support of wife and daughters.   Referrals: None at this time.    DC Needs: Home health referral, DME- hospital bed with low air loss mattress, possibly wheelchair; Other DME TBD.      Barriers to discharge: Patient frequently demonstrates confusion.     Strengths: Supportive family who verbalizes their goal of supporting the patient and assisting to keep him comfortable within the home.     Section completed by:  Lois Hernandez      Physician Summary  MARIA GUADALUPE Bella MD  participated and led team conference discussion.

## 2019-10-22 NOTE — PROGRESS NOTES
"Rehab Progress Note     Encounter Date: 10/21/2019    CC: Encephalopathy, confusion, weakness    Interval Events (Subjective)  Patient sitting up in room. He reports he is doing OK. Patient continues to be confused and think that he is discharging this afternoon. Discussed ongoing therapy.  Per therapy team he is walking further.  Denies pain. Denies NVD.     IDT Team Meeting 10/15/2019  DC/Disposition:  10/28/19    Objective:  VITAL SIGNS: /70   Pulse 100   Temp 36.8 °C (98.2 °F) (Oral)   Resp 18   Ht 1.88 m (6' 2\")   Wt 85.2 kg (187 lb 14.4 oz)   SpO2 96%   BMI 24.12 kg/m²    Gen: NAD  Psych: Mood and affect appropriate  CV: RRR, no edema  Resp: CTAB, no upper airway sounds  Abd: NTND  Neuro: AOx3, perseverating on leaving    Recent Results (from the past 72 hour(s))   ACCU-CHEK GLUCOSE    Collection Time: 10/18/19  8:13 PM   Result Value Ref Range    Glucose - Accu-Ck 162 (H) 65 - 99 mg/dL   CBC WITHOUT DIFFERENTIAL    Collection Time: 10/19/19  5:50 AM   Result Value Ref Range    WBC 9.3 4.8 - 10.8 K/uL    RBC 4.14 (L) 4.70 - 6.10 M/uL    Hemoglobin 12.8 (L) 14.0 - 18.0 g/dL    Hematocrit 39.7 (L) 42.0 - 52.0 %    MCV 95.9 81.4 - 97.8 fL    MCH 30.9 27.0 - 33.0 pg    MCHC 32.2 (L) 33.7 - 35.3 g/dL    RDW 46.6 35.9 - 50.0 fL    Platelet Count 288 164 - 446 K/uL    MPV 9.1 9.0 - 12.9 fL   Basic Metabolic Panel    Collection Time: 10/19/19  5:50 AM   Result Value Ref Range    Sodium 141 135 - 145 mmol/L    Potassium 3.4 (L) 3.6 - 5.5 mmol/L    Chloride 105 96 - 112 mmol/L    Co2 29 20 - 33 mmol/L    Glucose 110 (H) 65 - 99 mg/dL    Bun 20 8 - 22 mg/dL    Creatinine 0.82 0.50 - 1.40 mg/dL    Calcium 9.1 8.5 - 10.5 mg/dL    Anion Gap 7.0 0.0 - 11.9   ESTIMATED GFR    Collection Time: 10/19/19  5:50 AM   Result Value Ref Range    GFR If African American >60 >60 mL/min/1.73 m 2    GFR If Non African American >60 >60 mL/min/1.73 m 2   ACCU-CHEK GLUCOSE    Collection Time: 10/19/19  7:42 AM   Result Value " Ref Range    Glucose - Accu-Ck 127 (H) 65 - 99 mg/dL   ACCU-CHEK GLUCOSE    Collection Time: 10/19/19 11:23 AM   Result Value Ref Range    Glucose - Accu-Ck 167 (H) 65 - 99 mg/dL   ACCU-CHEK GLUCOSE    Collection Time: 10/19/19  5:36 PM   Result Value Ref Range    Glucose - Accu-Ck 156 (H) 65 - 99 mg/dL   ACCU-CHEK GLUCOSE    Collection Time: 10/19/19  7:58 PM   Result Value Ref Range    Glucose - Accu-Ck 207 (H) 65 - 99 mg/dL   ACCU-CHEK GLUCOSE    Collection Time: 10/20/19  7:40 AM   Result Value Ref Range    Glucose - Accu-Ck 105 (H) 65 - 99 mg/dL   ACCU-CHEK GLUCOSE    Collection Time: 10/20/19 11:26 AM   Result Value Ref Range    Glucose - Accu-Ck 157 (H) 65 - 99 mg/dL   ACCU-CHEK GLUCOSE    Collection Time: 10/20/19  5:27 PM   Result Value Ref Range    Glucose - Accu-Ck 165 (H) 65 - 99 mg/dL   ACCU-CHEK GLUCOSE    Collection Time: 10/20/19  8:12 PM   Result Value Ref Range    Glucose - Accu-Ck 164 (H) 65 - 99 mg/dL   ACCU-CHEK GLUCOSE    Collection Time: 10/21/19  7:32 AM   Result Value Ref Range    Glucose - Accu-Ck 102 (H) 65 - 99 mg/dL   ACCU-CHEK GLUCOSE    Collection Time: 10/21/19 10:59 AM   Result Value Ref Range    Glucose - Accu-Ck 134 (H) 65 - 99 mg/dL   ACCU-CHEK GLUCOSE    Collection Time: 10/21/19  5:12 PM   Result Value Ref Range    Glucose - Accu-Ck 173 (H) 65 - 99 mg/dL       Current Facility-Administered Medications   Medication Frequency   • DILTIAZem (CARDIZEM) tablet 60 mg Q8HRS   • predniSONE (DELTASONE) tablet 10 mg DAILY   • lidocaine (LIDODERM) 5 % 1 Patch Q24HR   • metFORMIN (GLUCOPHAGE) tablet 250 mg BID WITH MEALS   • melatonin tablet 6 mg QHS   • amantadine (SYMMETREL) 50 MG/5ML syrup 100 mg BID   • insulin regular (HUMULIN R) injection 2-12 Units 4X/DAY ACHS    And   • glucose 4 g chewable tablet 16 g Q15 MIN PRN    And   • dextrose 50% (D50W) injection 50 mL Q15 MIN PRN   • ciprofloxacin (CIPRO) tablet 500 mg Q12HRS   • omeprazole (FIRST-OMEPRAZOLE) 2 mg/mL oral susp 40 mg DAILY    • phenylephrine-cocoa butter (PREPARATION H) suppository 1 Suppository Q6HRS PRN   • metoprolol (LOPRESSOR) tablet 25 mg TWICE DAILY   • apixaban (ELIQUIS) tablet 5 mg BID   • baclofen (LIORESAL) tablet 5 mg TID   • vitamin D (cholecalciferol) tablet 1,000 Units DAILY   • Respiratory Care per Protocol Continuous RT   • oxyCODONE immediate-release (ROXICODONE) tablet 2.5 mg Q3HRS PRN   • oxyCODONE immediate-release (ROXICODONE) tablet 5 mg Q3HRS PRN   • tramadol (ULTRAM) 50 MG tablet 50 mg Q4HRS PRN   • hydrALAZINE (APRESOLINE) tablet 25 mg Q8HRS PRN   • acetaminophen (TYLENOL) tablet 650 mg Q4HRS PRN   • senna-docusate (PERICOLACE or SENOKOT S) 8.6-50 MG per tablet 2 Tab BID    And   • polyethylene glycol/lytes (MIRALAX) PACKET 1 Packet QDAY PRN    And   • magnesium hydroxide (MILK OF MAGNESIA) suspension 30 mL QDAY PRN    And   • bisacodyl (DULCOLAX) suppository 10 mg QDAY PRN   • artificial tears ophthalmic solution 1 Drop PRN   • benzocaine-menthol (CEPACOL) lozenge 1 Lozenge Q2HRS PRN   • mag hydrox-al hydrox-simeth (MAALOX PLUS ES or MYLANTA DS) suspension 20 mL Q2HRS PRN   • ondansetron (ZOFRAN ODT) dispertab 4 mg 4X/DAY PRN    Or   • ondansetron (ZOFRAN) syringe/vial injection 4 mg 4X/DAY PRN   • traZODone (DESYREL) tablet 50 mg QHS PRN   • sodium chloride (OCEAN) 0.65 % nasal spray 2 Spray PRN   • allopurinol (ZYLOPRIM) tablet 300 mg DAILY   • atorvastatin (LIPITOR) tablet 20 mg Nightly   • ferrous sulfate tablet 325 mg DAILY   • loratadine (CLARITIN) tablet 10 mg DAILY       Orders Placed This Encounter   Procedures   • Diet Order Diabetic     Standing Status:   Standing     Number of Occurrences:   1     Order Specific Question:   Diet:     Answer:   Diabetic [3]     Order Specific Question:   Consistency/Fluid modifications:     Answer:   Thin Liquids [3]       Assessment:  Active Hospital Problems    Diagnosis   • *Encephalopathy acute   • B-cell lymphoma (HCC)   • Metastasis to brain (HCC)   • Atrial  fibrillation (HCC) w Rapid Ventricular Response    • CAD (coronary artery disease)   • Hypertension   • Type 2 diabetes mellitus without complication, without long-term current use of insulin (HCC)   • Chronic back pain   • Dysphagia   • Primary cerebral lymphoma (HCC)       Medical Decision Making and Plan:  Encephalopathy - Patient with recent diagnosis of 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 Check UA, started on Amantadine.   -UA positive, started on Ciprofloxacin per Hospitalist, U Cx pending - Enterobacter, susc to Ciprofloxacin    Dysphagia - Patient on dysphagia 2 with NTL on transfer.  -SLP for swallow     Spasticity - Restarted on Baclofen and increased prior to admission.  -Continue on Baclofen 10 mg TID. With AMS, will reduce to 5 mg TID. No improvement in cognition     B cell lymphoma - Underwent R Chop on 9/23/19.  Followed by Oncology. Recent right retroperitoneal mass s/p biopsy which is positive for B cell lymphoma  -Follow-up Heme/Onc  -Discontinue Prednisone. Follow-up Oncology     HTN/A Fib - Patient on Pradaxa.  Patient on Diltiazem 360 mg QHS, Metoprolol 25 mg XL daily   -Patient chewing medication, will switch medications to short acting. Diltiazem 90 mg q6 and metoprolol 25 mg BID   -Hypotension on 10/17/19, discussed case with hospitalist and recommending decrease in Diltiazem    DM - Patient on SSI on transfer. Consult hospitalist. Started on Metformin 250 mg     Sacral wound - Present on admission, seen by wound care today. Appreciate recommendations. Recommending mepilex only    Leukocytosis - B cell lymphoma on steroids. Check AM CBC - continued  -Consult hospitalist     Insomnia - Very poor sleep at night. Start Melatonin 6 mg QHS    Hx of Gout - Patient on  Allopurinol 300 mg daily.     Vitamin D - 24 on admission, start on 1000 U for deficiency.      GI Ppx - Patient on Omeprazole 40 mg daily. While on steroids     DVT ppx - Patient on Pradaxa 150 mg QHS    Total time:  27 minutes.  I spent greater than 50% of the time for patient care, counseling, and coordination on this date, including unit/floor time, and face-to-face time with the patient as per interval events and assessment and plan above. Topics discussed included discharge planning, confusion, U Cx with enterobacter and discontinue Prednisone.     Leticia Bella M.D.

## 2019-10-22 NOTE — REHAB-SLP IDT TEAM NOTE
Speech Therapy   Cognitive Linquistic Functions  Comprehension Initial:  4 - Minimal Assistance  Comprehension Current:  5 - Stand-by Prompting/Supervision or Set-up   Comprehension Description:  Verbal cues  Expression Initial:  4 - Minimal Assistance  Expression Current:  4 - Minimal Assistance   Expression Description:  Verbal cueing  Social Interaction Initial:  5 - Stand-by Prompting/Supervision or Set-up  Social Interaction Current:  6 - Modified Independent   Social Interaction Description:  Increased time, Verbal cues  Problem Solving Initial:  3 - Moderate Assistance  Problem Solving Current:  3 - Moderate Assistance   Problem Solving Description:  Verbal cueing, Bed/chair alarm, Increased time, Seat belt, Therapy schedule  Memory Initial:  3 - Moderate Assistance  Memory Current:  4 - Minimal Assistance   Memory Description:  Verbal cueing, Therapy schedule, Bed/chair alarm, Seat belt  Executive Functioning / Organization Initial:   NA  Executive Functioning / Organization Current:   Mod-Max A   Executive Functioning Desciption:  Verbal cues   Swallowing  Swallowing Status:  Pt is currently tolerating a regular texture and thin liquid diet, all swallowing goals met at this time   Orders Placed This Encounter   Procedures   • Diet Order Diabetic     Standing Status:   Standing     Number of Occurrences:   1     Order Specific Question:   Diet:     Answer:   Diabetic [3]     Order Specific Question:   Consistency/Fluid modifications:     Answer:   Thin Liquids [3]     Behavior Modification Plan  Keep instructions simple/concrete, Redirect to task/topic and Analyze tasks (break down into smaller steps)  Assistive Technology  Memory aides: and Low tech: Calendar, planner, schedule, alarms/timers, pill organizer, post-it notes, lists  Family Training/Education:  To be completed   DC Recommendations:   24 hour spv  Strengths:  Able to follow instructions, Pleasant and cooperative, Supportive family and Willingly  participates in therapeutic activities  Barriers:  Confused, Poor carryover of learning and Other: poor attention   # of short term goals set=4  # of short term goals met=2  Speech Therapy Problems     Problem: Expression STGs     Dates: Start: 10/12/19       Description:     Goal: STG-Within one week, patient will     Dates: Start: 10/12/19       Description: 1) Individualized goal:  Will produce prolonged voicing (e.g., hum, 'ah', frontal focus) with 80% accuracy and MOD A.   2) Interventions:  SLP Speech Language Treatment and SLP Group Treatment       Note:     Goal Note filed on 10/22/19 1042 by Prashanth Medina MS,CCC-SLP    Mod-max A required (variable) for consistent voicing                         Problem: Memory STGs     Dates: Start: 10/12/19       Description:     Goal: STG-Within one week, patient will     Dates: Start: 10/12/19       Description: 1) Individualized goal:  Use external memory aids to recall novel information from [facility] stay with 80% accuracy and MOD A.   2) Interventions:  SLP Self Care / ADL Training , SLP Cognitive Skill Development and SLP Group Treatment       Note:     Goal Note filed on 10/22/19 1042 by Prashanth Medina MS,CCC-SLP    Max A required for memory                   Goal: STG-Within one week, patient will     Dates: Start: 10/22/19       Description: 1) Individualized goal:  Complete alternating attention tasks given min A to achieve 80% accuracy or greater.   2) Interventions:  SLP Speech Language Treatment, SLP Self Care / ADL Training , SLP Cognitive Skill Development and SLP Group Treatment                   Problem: Problem Solving STGs     Dates: Start: 10/22/19       Description:     Goal: STG-Within one week, patient will     Dates: Start: 10/22/19       Description: 1) Individualized goal:  Achieve a score of 25/30 or greater on the O-log for 3 consecutive days.    2) Interventions:  SLP Speech Language Treatment, SLP Self Care / ADL Training , SLP Cognitive Skill  Development and SLP Group Treatment                   Problem: Speech/Swallowing LTGs     Dates: Start: 10/11/19       Description:     Goal: LTG-By discharge, patient will solve basic problems     Dates: Start: 10/11/19       Description: 1) Individualized goal:  Marco Antonio for safe discharge home.    2) Interventions:  SLP Cognitive Skill Development and SLP Group Treatment                          Section completed by:  Prashanth Medina MS,CCC-SLP

## 2019-10-23 NOTE — CARE PLAN
Problem: Safety  Goal: Will remain free from falls  Intervention: Implement fall precautions  Note:   Use of call light encouraged to make needs known.Bed in low position, non skid socks/shoes on when out of bed.Alarms in place for safety.Call light within reach.Will continue to monitor and assess needs and safety.     Problem: Pain Management  Goal: Pain level will decrease to patient's comfort goal  Note:   Pt denies any pain or discomfort at this time.Repositioned with pillows for comfort.Will continue to monitor and assess pain level and medicate as needed.     Problem: GLYCEMIA IMBALANCE  Goal: Clinical indication of glycemia balance is achieved  Intervention: MONITOR BLOOD GLUCOSE LEVELS AS ORDERED  Note:   FSBS 100 at hs, no coverage needed.Snack given.Will continue to monitor and assess for s/sx of hyper/hypoglycemia.

## 2019-10-23 NOTE — THERAPY
"Physical Therapy   Daily Treatment     Patient Name: Marcio More Jr.  Age:  73 y.o., Sex:  male  Medical Record #: [id number]  Today's Date: 10/23/2019     Precautions  Precautions: Fall Risk, Other (See Comments)  Comments: Coccyx ulcer    Subjective    Pt was seated in w/c upon arrival and agreeable to treatment.  Pt reported fatigue but no pain after not sleeping d/t bladder issues last night.      Objective       10/23/19 1401   Precautions   Precautions Fall Risk;Other (See Comments)   Bed Mobility    Supine to Sit Stand by Assist   Sit to Stand Contact Guard Assist   Scooting Stand by Assist   Interdisciplinary Plan of Care Collaboration   IDT Collaboration with  Nursing;Occupational Therapist   Patient Position at End of Therapy Seated;Self Releasing Lap Belt Applied;Call Light within Reach;Tray Table within Reach;Phone within Reach   Collaboration Comments CLOF   PT Total Time Spent   PT Individual Total Time Spent (Mins) 60   PT Charge Group   PT Gait Training 2   PT Therapeutic Activities 2       FIM Bed/Chair/Wheelchair Transfers Score: 4 - Minimal Assistance  Bed/Chair/Wheelchair Transfers Description:  Increased time, Supervision for safety, Adaptive equipment, Verbal cueing, Set-up of equipment(Bed mobility with SBA; SPT with FWW and CGA)    FIM Toilet Transfer Score:  5 - Standby Prompting/Supervision or Set-up  Toilet Transfer Description:  Grab bar, Supervision for safety, Verbal cueing, Set-up of equipment, Increased time    FIM Walking Score:  4 - Minimal Assistance  Walking Description:  Extra time, Safety concerns, Verbal cueing, Supervision for safety(AMB x 150 feet, x 75 feet with FWW and CGA)    FIM Stairs Score:  2 - Max Assistance  Stairs Description:  Extra time, Safety concerns, Verbal cueing, Supervision for safety, Requires incidental assist, Ascends/descends 4 to 11 steps(Up/down 6 4\" steps with BHR and min A/CGA)      Assessment    Pt continues to require significant VCing for " sequencing with all mobility.  Pt continues to present with bradykinetic walking pattern with narrow Mariana and B short step length.     Plan    Review bed mobility and sit <> stand sequencing with FWW, progress gait endurance, ROM.  Continue stair training.

## 2019-10-23 NOTE — PROGRESS NOTES
Pt c/o bladder pressure and pain like his bladder was so full that he needed to void even with guy in. Pt bladder scanned for 0ml. Pt urine clear without blood clots or cloudiness. Spoke with MD and orders to remove guy received. Guy discontinued with minimal discomfort. Pt states he has a little stinging right now but resolving.     Will encourage pt to void and monitor for signs of retention.

## 2019-10-23 NOTE — THERAPY
"Occupational Therapy  Daily Treatment     Patient Name: Marcio More Jr.  Age:  73 y.o., Sex:  male  Medical Record #: [id number]  Today's Date: 10/23/2019     Precautions  Precautions: (P) Fall Risk, Other (See Comments)  Comments: (P) Coccyx ulcer    Safety   ADL Safety : (P) Requires Supervision for Safety, Requires Physical Assist for Safety  Bathroom Safety: (P) Requires Supervision for Safety, Requires Physical Assist for Safety  Comments: see FIM for oral care and eating    Subjective    Pt was supine in bed and agreeable to therapy. \" I am happier than I was yesterday.\" \"I can take a shower if you want me to.\"     Objective       10/23/19 1031   Precautions   Precautions Fall Risk;Other (See Comments)   Comments Coccyx ulcer   Safety    ADL Safety  Requires Supervision for Safety;Requires Physical Assist for Safety   Bathroom Safety Requires Supervision for Safety;Requires Physical Assist for Safety   Cognition    Orientation Level Not Oriented to Day  (Oriented to place and reason )   Level of Consciousness Alert   Ability To Follow Commands 3 Step   Initiation Impaired   Balance   Sitting Balance (Static) Good   Sitting Balance (Dynamic) Fair   Standing Balance (Static) Fair -   Standing Balance (Dynamic) Fair -   Weight Shift Sitting Poor   Weight Shift Standing Fair   Skilled Intervention Verbal Cuing;Sequencing   Bed Mobility    Supine to Sit Supervised   Scooting Supervised   Rolling Supervised   Skilled Intervention Verbal Cuing   Interdisciplinary Plan of Care Collaboration   IDT Collaboration with  Nursing   Patient Position at End of Therapy Seated;Self Releasing Lap Belt Applied  (with RN present )   Collaboration Comments dressing, UA collection   OT Total Time Spent   OT Individual Total Time Spent (Mins) 60   OT Charge Group   OT Self Care / ADL 4       FIM Eating Score:  6 - Modified Independent  Eating Description:  Increased time    FIM Grooming Score:  5 - Standby " "Prompting/Supervision or Set-up  Grooming Description:       FIM Bathing Score:  4 - Minimal Assistance  Bathing Description:  Grab bar, Tub bench, Hand held shower(CGA with standing tasks and verbal cues to initiate UB bathing )    FIM Upper Body Dressin - Minimal Assistance  Upper Body Dressing Description:  (Min A to thread over head due to pt urgent need to use urinal )    FIM Lower Body Dressing Score:  2 - Max Assistance  Lower Body Dressing Description:  Reacher(Max A to assist BLE threading, guiding reacher)    FIM Toileting Body Dressin - Standby Prompting/Supervision or Set-up  Toileting Description:       FIM Bed/Chair/Wheelchair Transfers Score: 4 - Minimal Assistance  Bed/Chair/Wheelchair Transfers Description:  Requires lift    FIM Toilet Transfer Score:  5 - Standby Prompting/Supervision or Set-up  Toilet Transfer Description:  Grab bar, Supervision for safety(SBA using FWW )    FIM Tub/Shower Transfers Score:  5 - Standby Prompting/Supervision or Set-up  Tub/Shower Transfers Description:  Increased time, Supervision for safety, Verbal cueing(SBA using FWW with step at edge to simulate 4\" step into shower at home)      Assessment    Although pt reported urgent need for urinal, he was unable to position the urinal in time causing partial incontinence. Pt was agreeable to shower for therapy due to incontinence. Pt demonstrates increased independence with functional transfers using FWW and increased time for processing, however has demonstrated variable independence with ADLs due to refusal to use AE and variability in motor planning. Pt demonstrated increased orientation this date, identifying person, place, and reason. Pt was not oriented to day.     Plan    Continue OT to address ADL/IADL independence, functional transfers, use of AE/DME, standing and sitting balance, edurance    "

## 2019-10-23 NOTE — PROGRESS NOTES
[facility] Medicine Daily Progress Note    Date of Service  10/23/2019    Chief Complaint:  Hypertension  Afib  Diabetes  Leukocytosis    Interval History:  No significant events or changes since last visit    Review of Systems  Review of Systems   Constitutional: Negative for fever.   Eyes: Negative for blurred vision.   Respiratory: Negative for shortness of breath.    Cardiovascular: Negative for palpitations.   Gastrointestinal: Negative for nausea and vomiting.   Neurological: Negative for dizziness and headaches.   Psychiatric/Behavioral: Negative for hallucinations.        Physical Exam  Temp:  [36.2 °C (97.1 °F)-37 °C (98.6 °F)] 36.2 °C (97.1 °F)  Pulse:  [70-90] 90  Resp:  [18-20] 20  BP: (118-140)/(70-82) 140/70  SpO2:  [98 %] 98 %    Physical Exam   HENT:   Mouth/Throat: Oropharynx is clear and moist.   Eyes: No scleral icterus.   Cardiovascular: Normal rate and regular rhythm.   No murmur heard.  Pulmonary/Chest: Effort normal and breath sounds normal. No stridor.   Abdominal: Soft. He exhibits no distension. There is no tenderness.   Musculoskeletal: He exhibits no edema.   Skin: Skin is warm and dry. No rash noted. He is not diaphoretic.   Psychiatric: His behavior is normal.   Nursing note and vitals reviewed.      Fluids    Intake/Output Summary (Last 24 hours) at 10/23/2019 1026  Last data filed at 10/23/2019 0700  Gross per 24 hour   Intake 720 ml   Output 550 ml   Net 170 ml       Laboratory  Recent Labs     10/22/19  0601 10/23/19  0542   WBC 9.6 10.7   RBC 3.95* 4.02*   HEMOGLOBIN 12.6* 12.7*   HEMATOCRIT 38.2* 38.5*   MCV 96.7 95.8   MCH 31.9 31.6   MCHC 33.0* 33.0*   RDW 47.8 48.3   PLATELETCT 245 260   MPV 9.4 9.1     Recent Labs     10/22/19  0601   SODIUM 140   POTASSIUM 3.6   CHLORIDE 106   CO2 24   GLUCOSE 127*   BUN 23*   CREATININE 0.85   CALCIUM 8.7                   Imaging    Assessment/Plan  B-cell lymphoma (HCC)- (present on admission)  Assessment & Plan  Stage IV  Hx of brain  resection  S/P R-CHOP chemo on 9/23  Recent brain MRI showed no mass    A-fib (HCC)- (present on admission)  Assessment & Plan  HR ok  [place] [facility] [place] Cardizem: 90 mg qid --> 60 mg tid (10/18)  On Lopressor: 25 mg bid  Cont to monitor    Azotemia- (present on admission)  Assessment & Plan  Bun: 23 (10/22)  Encourging fluid intake  Monitor    Diabetes mellitus with hyperglycemia, without long-term current use of insulin (HCC)- (present on admission)  Assessment & Plan  Hba1c: 7.2 (10/11)  BS: 100-173  On Metformin: 250 mg bid  Will decrease SS coverage for better BS eval  Note: home meds include Metformin  mg daily  Cont to monitor    CAD (coronary artery disease)- (present on admission)  Assessment & Plan  On Eliquis  On Lipitor  On Metoprolol & Diltiazem    Hypertension- (present on admission)  Assessment & Plan  BP ok  On Cardizem: 90 mg qid --> 60 mg tid (10/18)  On Lopressor: 25 mg bid  Monitor    Neurologic abnormality  Assessment & Plan  [place] [facility] [place] weakness, confusion, difficulty swallowing  An MRI of the brain was unremarkable and did not show the previous mass  An MRI of the cervical spine and thoracic spine did not show any acute issues or metastasis (has DDD)    UTI (urinary tract infection)- (present on admission)  Assessment & Plan  UC --> Enterobacter  S/P Cipro    Vitamin D insufficiency- (present on admission)  Assessment & Plan  Vit D: 24  On supplements

## 2019-10-23 NOTE — THERAPY
Recreational Therapy  Daily Treatment     Patient Name: Marcio More Jr.  AGE:  73 y.o., SEX:  male  Medical Record #: [id number]  Today's Date: 10/22/2019       Subjective    Agreeable to session.        Objective       10/22/19 1801   Functional Ability Status - Cognitive   Attention Span Remains on Task   Comprehension Follows Three Step Commands   Judgment Able to Make Independent Decisions   Functional Ability Status - Emotional    Affect Appropriate   Mood Appropriate   Behavior Appropriate   Skilled Intervention    Skilled Intervention Relaxation / Coping Skills;Group Participation   Skilled Intervention Comments Art group with chalf pastels   Interdisciplinary Plan of Care Collaboration   IDT Collaboration with  Other (See Comments)  (vounteer and artist)   Patient Position at End of Therapy In Bed;Tray Table within Reach;Call Light within Reach   Collaboration Comments Artist and volunteer assisted in facilitating the art group.    Treatment Time   Total Time Spent (mins) 60   Procedural Tracking   Procedural Tracking Group Treatment;Leisure Skills Development       Assessment    Pt learned how to create a work of art using chalk pastels with his peers. Pt was given information on different art techniques and mediums.    Plan    Follow up with patient to see if they are interested in continuing this leisure activity when they return to their community.

## 2019-10-23 NOTE — THERAPY
"Speech Language Pathology  Daily Treatment     Patient Name: Marcio More Jr.  Age:  73 y.o., Sex:  male  Medical Record #: [id number]  Today's Date: 10/23/2019     Subjective    Patient initially refused session. C/o pain and \"pressure\" from catheter. After pain mediations and bladder scan, was willing to participate bedside.      Objective       10/23/19 0902   Cognition   Attention to Task Moderate (3)   Simple Attention Moderate (3)   Written Sequencing Moderate (3)   Functional Sequencing for ADL / IADLs Moderate (3)   SLP Total Time Spent   SLP Individual Total Time Spent (Mins) 60   Charge Group   SLP Cognitive Skill Development 4       FIM Eating Score:     Eating Description:       FIM Comprehension Score:  5 - Stand-by Prompting/Supervision or Set-up  Comprehension Description:       FIM Expression Score:  4 - Minimal Assistance  Expression Description:       FIM Social Interaction Score:  6 - Modified Independent  Social Interaction Description:       FIM Problem Solving Score:  3 - Moderate Assistance  Problem Solving Description:       FIM Memory Score:     Memory Description:         Assessment    Patient completed 4 step written sequencing tasks. Required significant extra time and frequent redirections d/t pain. Eventually completed with 60% accuracy.     Plan    Continue to target basic attention and sequencing.     "

## 2019-10-23 NOTE — DISCHARGE PLANNING
JEANNA spoke with patient's wife Mell via phone to provide team conference updates. Discussed that patient has made progress overall however is requiring mod-max A for transfers at this time. Mell voiced concern regarding her ability to care for patient in the home if he requires mod A. Discussed private caregivers. Inquired about patient's wishes for care moving forward. She believes pt would want to come home. She is not sure that patient will be agreeable to therapy once home. Discussed the support of home health versus hospice and that hospice means no aggressive life-saving measures would be taken but the focus would instead be on keeping patient comfortable in the home. Mell endorses that this may be more in line with patient's wishes but also verbalizes that she would like to see mobility his improve. She is not sure the benefits/ risks to further chemotherapy.Her understanding from the appointment with Dr. Ferrer is that patient needs to be more mobile prior to starting more chemotherapy. They are to follow up in 4 weeks. Mell requested meeting with Dr. Bella on Friday to discuss more about patient's condition and benefit of additional rehab. Discussed with Mell Palliative Care and how a meeting with palliative care team may be beneficial as she considers next steps. She is agreeable to conference with palliative care as well and requests to meet at 1400 on Friday.

## 2019-10-23 NOTE — PROGRESS NOTES
0055 Pt is incontinent of bloody urine, diaper is saturated with blood.Pt denies any pain or discomfort on urination.VS as follows, BP: 120/74, P: 79, T: 97.7 and O2 sat: 95%.Notified MD Fatima of the situation .Order received to do bladder scan and insert guy catheter, if there is resistance send pt to the ED.Charge nurse aware.  0105 Bladder scan 75.Inserted guy catheter using aseptic technique.Guy bag with 100 cc bloody urine upon insertion.Will continue to monitor.  0330 Emptied 250 ml bloody urine from guy bag.  0340 Urine is clearing to yellow urine.Will continue to monitor.

## 2019-10-24 NOTE — PROGRESS NOTES
"Rehab Progress Note     Encounter Date: 10/24/2019    CC: Encephalopathy, confusion, weakness    Interval Events (Subjective)  Patient sitting up in room. He reports he is doing OK. He reports that therapy is going well. Patient having occasional pains but currently controlled. Used Oxycodone overnight. Denies NVD.     IDT Team Meeting 10/22/2019  DC/Disposition:  10/28/19    Objective:  VITAL SIGNS: /80   Pulse 83   Temp 36.5 °C (97.7 °F) (Temporal)   Resp 20   Ht 1.88 m (6' 2\")   Wt 85.2 kg (187 lb 14.4 oz)   SpO2 96%   BMI 24.12 kg/m²    Gen: NAD  Psych: Mood and affect appropriate  CV: RRR, no edema  Resp: CTAB, no upper airway sounds  Abd: NTND  Neuro: AOx3, ambulating slowly with FWW   Unchanged from 10/23/19    Recent Results (from the past 72 hour(s))   ACCU-CHEK GLUCOSE    Collection Time: 10/21/19  5:12 PM   Result Value Ref Range    Glucose - Accu-Ck 173 (H) 65 - 99 mg/dL   ACCU-CHEK GLUCOSE    Collection Time: 10/21/19 10:32 PM   Result Value Ref Range    Glucose - Accu-Ck 147 (H) 65 - 99 mg/dL   CBC WITHOUT DIFFERENTIAL    Collection Time: 10/22/19  6:01 AM   Result Value Ref Range    WBC 9.6 4.8 - 10.8 K/uL    RBC 3.95 (L) 4.70 - 6.10 M/uL    Hemoglobin 12.6 (L) 14.0 - 18.0 g/dL    Hematocrit 38.2 (L) 42.0 - 52.0 %    MCV 96.7 81.4 - 97.8 fL    MCH 31.9 27.0 - 33.0 pg    MCHC 33.0 (L) 33.7 - 35.3 g/dL    RDW 47.8 35.9 - 50.0 fL    Platelet Count 245 164 - 446 K/uL    MPV 9.4 9.0 - 12.9 fL   Basic Metabolic Panel    Collection Time: 10/22/19  6:01 AM   Result Value Ref Range    Sodium 140 135 - 145 mmol/L    Potassium 3.6 3.6 - 5.5 mmol/L    Chloride 106 96 - 112 mmol/L    Co2 24 20 - 33 mmol/L    Glucose 127 (H) 65 - 99 mg/dL    Bun 23 (H) 8 - 22 mg/dL    Creatinine 0.85 0.50 - 1.40 mg/dL    Calcium 8.7 8.5 - 10.5 mg/dL    Anion Gap 10.0 0.0 - 11.9   ESTIMATED GFR    Collection Time: 10/22/19  6:01 AM   Result Value Ref Range    GFR If African American >60 >60 mL/min/1.73 m 2    GFR If Non " African American >60 >60 mL/min/1.73 m 2   ACCU-CHEK GLUCOSE    Collection Time: 10/22/19  7:15 AM   Result Value Ref Range    Glucose - Accu-Ck 114 (H) 65 - 99 mg/dL   ACCU-CHEK GLUCOSE    Collection Time: 10/22/19 11:26 AM   Result Value Ref Range    Glucose - Accu-Ck 119 (H) 65 - 99 mg/dL   ACCU-CHEK GLUCOSE    Collection Time: 10/22/19  5:16 PM   Result Value Ref Range    Glucose - Accu-Ck 154 (H) 65 - 99 mg/dL   ACCU-CHEK GLUCOSE    Collection Time: 10/22/19  8:33 PM   Result Value Ref Range    Glucose - Accu-Ck 100 (H) 65 - 99 mg/dL   CBC WITH DIFFERENTIAL    Collection Time: 10/23/19  5:42 AM   Result Value Ref Range    WBC 10.7 4.8 - 10.8 K/uL    RBC 4.02 (L) 4.70 - 6.10 M/uL    Hemoglobin 12.7 (L) 14.0 - 18.0 g/dL    Hematocrit 38.5 (L) 42.0 - 52.0 %    MCV 95.8 81.4 - 97.8 fL    MCH 31.6 27.0 - 33.0 pg    MCHC 33.0 (L) 33.7 - 35.3 g/dL    RDW 48.3 35.9 - 50.0 fL    Platelet Count 260 164 - 446 K/uL    MPV 9.1 9.0 - 12.9 fL    Neutrophils-Polys 73.60 (H) 44.00 - 72.00 %    Lymphocytes 8.10 (L) 22.00 - 41.00 %    Monocytes 14.30 (H) 0.00 - 13.40 %    Eosinophils 2.40 0.00 - 6.90 %    Basophils 0.90 0.00 - 1.80 %    Immature Granulocytes 0.70 0.00 - 0.90 %    Nucleated RBC 0.00 /100 WBC    Neutrophils (Absolute) 7.85 (H) 1.82 - 7.42 K/uL    Lymphs (Absolute) 0.87 (L) 1.00 - 4.80 K/uL    Monos (Absolute) 1.53 (H) 0.00 - 0.85 K/uL    Eos (Absolute) 0.26 0.00 - 0.51 K/uL    Baso (Absolute) 0.10 0.00 - 0.12 K/uL    Immature Granulocytes (abs) 0.08 0.00 - 0.11 K/uL    NRBC (Absolute) 0.00 K/uL   ACCU-CHEK GLUCOSE    Collection Time: 10/23/19  7:11 AM   Result Value Ref Range    Glucose - Accu-Ck 103 (H) 65 - 99 mg/dL   ACCU-CHEK GLUCOSE    Collection Time: 10/23/19 11:38 AM   Result Value Ref Range    Glucose - Accu-Ck 152 (H) 65 - 99 mg/dL   ACCU-CHEK GLUCOSE    Collection Time: 10/23/19  5:46 PM   Result Value Ref Range    Glucose - Accu-Ck 119 (H) 65 - 99 mg/dL   ACCU-CHEK GLUCOSE    Collection Time: 10/23/19   8:27 PM   Result Value Ref Range    Glucose - Accu-Ck 119 (H) 65 - 99 mg/dL   URINALYSIS    Collection Time: 10/24/19 12:30 AM   Result Value Ref Range    Color Yellow     Character Clear     Specific Gravity 1.009 <1.035    Ph 6.5 5.0 - 8.0    Glucose Negative Negative mg/dL    Ketones Negative Negative mg/dL    Protein Negative Negative mg/dL    Bilirubin Negative Negative    Urobilinogen, Urine 0.2 Negative    Nitrite Negative Negative    Leukocyte Esterase Trace (A) Negative    Occult Blood Large (A) Negative    Micro Urine Req Microscopic    URINE MICROSCOPIC (W/UA)    Collection Time: 10/24/19 12:30 AM   Result Value Ref Range    WBC 2-5 (A) /hpf    RBC 10-20 (A) /hpf    Bacteria Negative None /hpf    Epithelial Cells Negative /hpf    Hyaline Cast 0-2 /lpf   ACCU-CHEK GLUCOSE    Collection Time: 10/24/19  7:15 AM   Result Value Ref Range    Glucose - Accu-Ck 102 (H) 65 - 99 mg/dL       Current Facility-Administered Medications   Medication Frequency   • insulin regular (HUMULIN R) injection 2-10 Units BID INSULIN    And   • glucose 4 g chewable tablet 16 g Q15 MIN PRN    And   • dextrose 50% (D50W) injection 50 mL Q15 MIN PRN   • [START ON 10/25/2019] metoprolol (LOPRESSOR) tablet 50 mg TWICE DAILY   • lidocaine (LIDODERM) 5 % 1 Patch Q24HR   • DILTIAZem (CARDIZEM) tablet 60 mg Q8HRS   • metFORMIN (GLUCOPHAGE) tablet 250 mg BID WITH MEALS   • melatonin tablet 6 mg QHS   • amantadine (SYMMETREL) 50 MG/5ML syrup 100 mg BID   • omeprazole (FIRST-OMEPRAZOLE) 2 mg/mL oral susp 40 mg DAILY   • phenylephrine-cocoa butter (PREPARATION H) suppository 1 Suppository Q6HRS PRN   • metoprolol (LOPRESSOR) tablet 25 mg TWICE DAILY   • apixaban (ELIQUIS) tablet 5 mg BID   • baclofen (LIORESAL) tablet 5 mg TID   • vitamin D (cholecalciferol) tablet 1,000 Units DAILY   • Respiratory Care per Protocol Continuous RT   • oxyCODONE immediate-release (ROXICODONE) tablet 2.5 mg Q3HRS PRN   • oxyCODONE immediate-release (ROXICODONE)  tablet 5 mg Q3HRS PRN   • tramadol (ULTRAM) 50 MG tablet 50 mg Q4HRS PRN   • hydrALAZINE (APRESOLINE) tablet 25 mg Q8HRS PRN   • acetaminophen (TYLENOL) tablet 650 mg Q4HRS PRN   • senna-docusate (PERICOLACE or SENOKOT S) 8.6-50 MG per tablet 2 Tab BID    And   • polyethylene glycol/lytes (MIRALAX) PACKET 1 Packet QDAY PRN    And   • magnesium hydroxide (MILK OF MAGNESIA) suspension 30 mL QDAY PRN    And   • bisacodyl (DULCOLAX) suppository 10 mg QDAY PRN   • artificial tears ophthalmic solution 1 Drop PRN   • benzocaine-menthol (CEPACOL) lozenge 1 Lozenge Q2HRS PRN   • mag hydrox-al hydrox-simeth (MAALOX PLUS ES or MYLANTA DS) suspension 20 mL Q2HRS PRN   • ondansetron (ZOFRAN ODT) dispertab 4 mg 4X/DAY PRN    Or   • ondansetron (ZOFRAN) syringe/vial injection 4 mg 4X/DAY PRN   • traZODone (DESYREL) tablet 50 mg QHS PRN   • sodium chloride (OCEAN) 0.65 % nasal spray 2 Spray PRN   • allopurinol (ZYLOPRIM) tablet 300 mg DAILY   • atorvastatin (LIPITOR) tablet 20 mg Nightly   • ferrous sulfate tablet 325 mg DAILY   • loratadine (CLARITIN) tablet 10 mg DAILY       Orders Placed This Encounter   Procedures   • Diet Order Diabetic     Standing Status:   Standing     Number of Occurrences:   1     Order Specific Question:   Diet:     Answer:   Diabetic [3]     Order Specific Question:   Consistency/Fluid modifications:     Answer:   Thin Liquids [3]       Assessment:  Active Hospital Problems    Diagnosis   • *Encephalopathy acute   • B-cell lymphoma (HCC)   • Metastasis to brain (HCC)   • Atrial fibrillation (HCC) w Rapid Ventricular Response    • CAD (coronary artery disease)   • Hypertension   • Type 2 diabetes mellitus without complication, without long-term current use of insulin (HCC)   • Chronic back pain   • Dysphagia   • Primary cerebral lymphoma (HCC)       Medical Decision Making and Plan:  Encephalopathy - Patient with recent diagnosis of stage IV B cell lymphoma s/p R-CHOP on 9/23/19 now with diffuse  weakness, confusion and dysphagia  -PT and OT for mobility and ADLs  -SLP for cognition   -Started on Amantadine 100 mg BID, monitor for improvement    AMS - decreased endurance and worsened spasticity. Check UA, started on Amantadine. UA positive, started on Ciprofloxacin per Hospitalist, U Cx pending - Enterobacter, susc to Ciprofloxacin. Completed.     Dysphagia - Patient on dysphagia 2 with NTL on transfer.  -SLP for swallow - advanced to regulars with thins.      Spasticity - Restarted on Baclofen and increased prior to admission.  -Continue on Baclofen 10 mg TID. With AMS, will reduce to 5 mg TID. No improvement in cognition. Trial of stopping baclofen.      B cell lymphoma - Underwent R Chop on 9/23/19.  Followed by Oncology. Recent right retroperitoneal mass s/p biopsy which is positive for B cell lymphoma  -Follow-up Heme/Onc  -Discontinue Prednisone. Follow-up Oncology     HTN/A Fib - Patient on Pradaxa.  Patient on Diltiazem 360 mg QHS, Metoprolol 25 mg XL daily   -Patient chewing medication, will switch medications to short acting. Diltiazem 90 mg q6 and metoprolol 25 mg BID   -Hypotension on 10/17/19, discussed case with hospitalist and recommending decrease in Diltiazem    DM - Patient on SSI on transfer. Consult hospitalist. Started on Metformin 250 mg     Sacral wound - Present on admission, seen by wound care today. Appreciate recommendations. Recommending mepilex only    Leukocytosis - B cell lymphoma on steroids. Check AM CBC - continued  -Consult hospitalist     Insomnia - Very poor sleep at night. Start Melatonin 6 mg QHS    Hx of Gout - Patient on Allopurinol 300 mg daily.     Vitamin D - 24 on admission, start on 1000 U for deficiency.      GI Ppx - Patient on Omeprazole 40 mg daily. While on steroids     DVT ppx - Patient on Pradaxa 150 mg QHS    Dispo - Wife would like goals of care conversation. Consulted palliative, arranged for meeting on Friday at 2PM.     Total time:  26 minutes.  I  spent greater than 50% of the time for patient care, counseling, and coordination on this date, including unit/floor time, and face-to-face time with the patient as per interval events and assessment and plan above. Topics discussed included improving daily with toleration of therapy, discontinue Baclofen, and monitor for increased tone in LE.     Leticia Bella M.D.

## 2019-10-24 NOTE — THERAPY
"Occupational Therapy  Daily Treatment     Patient Name: Marcio More Jr.  Age:  73 y.o., Sex:  male  Medical Record #: [id number]  Today's Date: 10/24/2019     Precautions  Precautions: Fall Risk, Other (See Comments)  Comments: coccyx    Safety   ADL Safety : Requires Physical Assist for Safety, Requires Cueing for Safety  Bathroom Safety: Requires Physical Assist for Safety, Requires Cuing for Safety  Comments: see FIM for oral care and eating    Subjective    Pt was seated in w/c in dining room and agreeable to therapy. \" I would like to do something sitting first.\"      Objective       10/24/19 0831   Precautions   Precautions Fall Risk;Other (See Comments)   Comments coccyx   Safety    ADL Safety  Requires Physical Assist for Safety;Requires Cueing for Safety   Bathroom Safety Requires Physical Assist for Safety;Requires Cuing for Safety   Cognition    Speech/ Communication   (whispers)   Orientation Level Oriented x 4   Level of Consciousness Alert   Sitting Upper Body Exercises   Upper Extremity Bike Level 3 Resistance  (Motomed 10 minutes, 1.06 miles, 11 kcal, average speed of 41)   Comments symmetry: 47Left/ 53Right   Balance   Sitting Balance (Static) Good   Sitting Balance (Dynamic) Fair   Interdisciplinary Plan of Care Collaboration   IDT Collaboration with  Physical Therapist   Patient Position at End of Therapy Seated;Self Releasing Lap Belt Applied;Call Light within Reach;Tray Table within Reach   Collaboration Comments CLOF   OT Total Time Spent   OT Individual Total Time Spent (Mins) 60   OT Charge Group   OT Self Care / ADL 2   OT Therapy Activity 2       FIM Grooming Score:  6 - Modified Independent  Grooming Description:  (seated in w/c)    FIM Toileting:  3 - Moderate Assistance  Toileting Description:  Grab bar(Mod A for hygiene)    FIM Toilet Transfer Score:  5 - Standby Prompting/Supervision or Set-up  Toilet Transfer Description:  Supervision for safety, Grab bar    Pt completed PCV " tree design with 4 colors and 14 pieces while standing with FWW. Pt only used FWW for balance twice while standing for 10 minutes.     Assessment    Pt demonstrated increased activity tolerance and independence with functional transfers this date. Pt continues to increase rate of movement and processing, requiring less time to complete tasks. Pt is limited by impaired balance and endurance.     Plan    Continue OT to address ADL/IADL independence, functional transfers, use of AE/DME (pt refuses to use sock aid), standing and sitting balance, edurance. Per family report pt enjoys drawing and art

## 2019-10-24 NOTE — PROGRESS NOTES
"Rehab Progress Note     Encounter Date: 10/23/2019    CC: Encephalopathy, confusion, weakness    Interval Events (Subjective)  Patient seen in room. Per discussion with patient and wife, it sounds like they are not recommending any more chemotherapy at this time. They have follow-up appointment in 4 weeks to discuss the care in the future. Discussed goals with wife and she has some concerns about him going home and being safe.  She understands he really wants to go home badly but she understands she will need help. Discussed palliative consult and they are in agreement.     IDT Team Meeting 10/22/2019  DC/Disposition:  10/28/19    Objective:  VITAL SIGNS: /80   Pulse 83   Temp 36.5 °C (97.7 °F) (Temporal)   Resp 20   Ht 1.88 m (6' 2\")   Wt 85.2 kg (187 lb 14.4 oz)   SpO2 96%   BMI 24.12 kg/m²    Gen: NAD  Psych: Mood and affect appropriate  CV: RRR, no edema  Resp: CTAB, no upper airway sounds  Abd: NTND  Neuro: AOx3, ambulating slowly with FWW     Recent Results (from the past 72 hour(s))   ACCU-CHEK GLUCOSE    Collection Time: 10/21/19  5:12 PM   Result Value Ref Range    Glucose - Accu-Ck 173 (H) 65 - 99 mg/dL   ACCU-CHEK GLUCOSE    Collection Time: 10/21/19 10:32 PM   Result Value Ref Range    Glucose - Accu-Ck 147 (H) 65 - 99 mg/dL   CBC WITHOUT DIFFERENTIAL    Collection Time: 10/22/19  6:01 AM   Result Value Ref Range    WBC 9.6 4.8 - 10.8 K/uL    RBC 3.95 (L) 4.70 - 6.10 M/uL    Hemoglobin 12.6 (L) 14.0 - 18.0 g/dL    Hematocrit 38.2 (L) 42.0 - 52.0 %    MCV 96.7 81.4 - 97.8 fL    MCH 31.9 27.0 - 33.0 pg    MCHC 33.0 (L) 33.7 - 35.3 g/dL    RDW 47.8 35.9 - 50.0 fL    Platelet Count 245 164 - 446 K/uL    MPV 9.4 9.0 - 12.9 fL   Basic Metabolic Panel    Collection Time: 10/22/19  6:01 AM   Result Value Ref Range    Sodium 140 135 - 145 mmol/L    Potassium 3.6 3.6 - 5.5 mmol/L    Chloride 106 96 - 112 mmol/L    Co2 24 20 - 33 mmol/L    Glucose 127 (H) 65 - 99 mg/dL    Bun 23 (H) 8 - 22 mg/dL    " Creatinine 0.85 0.50 - 1.40 mg/dL    Calcium 8.7 8.5 - 10.5 mg/dL    Anion Gap 10.0 0.0 - 11.9   ESTIMATED GFR    Collection Time: 10/22/19  6:01 AM   Result Value Ref Range    GFR If African American >60 >60 mL/min/1.73 m 2    GFR If Non African American >60 >60 mL/min/1.73 m 2   ACCU-CHEK GLUCOSE    Collection Time: 10/22/19  7:15 AM   Result Value Ref Range    Glucose - Accu-Ck 114 (H) 65 - 99 mg/dL   ACCU-CHEK GLUCOSE    Collection Time: 10/22/19 11:26 AM   Result Value Ref Range    Glucose - Accu-Ck 119 (H) 65 - 99 mg/dL   ACCU-CHEK GLUCOSE    Collection Time: 10/22/19  5:16 PM   Result Value Ref Range    Glucose - Accu-Ck 154 (H) 65 - 99 mg/dL   ACCU-CHEK GLUCOSE    Collection Time: 10/22/19  8:33 PM   Result Value Ref Range    Glucose - Accu-Ck 100 (H) 65 - 99 mg/dL   CBC WITH DIFFERENTIAL    Collection Time: 10/23/19  5:42 AM   Result Value Ref Range    WBC 10.7 4.8 - 10.8 K/uL    RBC 4.02 (L) 4.70 - 6.10 M/uL    Hemoglobin 12.7 (L) 14.0 - 18.0 g/dL    Hematocrit 38.5 (L) 42.0 - 52.0 %    MCV 95.8 81.4 - 97.8 fL    MCH 31.6 27.0 - 33.0 pg    MCHC 33.0 (L) 33.7 - 35.3 g/dL    RDW 48.3 35.9 - 50.0 fL    Platelet Count 260 164 - 446 K/uL    MPV 9.1 9.0 - 12.9 fL    Neutrophils-Polys 73.60 (H) 44.00 - 72.00 %    Lymphocytes 8.10 (L) 22.00 - 41.00 %    Monocytes 14.30 (H) 0.00 - 13.40 %    Eosinophils 2.40 0.00 - 6.90 %    Basophils 0.90 0.00 - 1.80 %    Immature Granulocytes 0.70 0.00 - 0.90 %    Nucleated RBC 0.00 /100 WBC    Neutrophils (Absolute) 7.85 (H) 1.82 - 7.42 K/uL    Lymphs (Absolute) 0.87 (L) 1.00 - 4.80 K/uL    Monos (Absolute) 1.53 (H) 0.00 - 0.85 K/uL    Eos (Absolute) 0.26 0.00 - 0.51 K/uL    Baso (Absolute) 0.10 0.00 - 0.12 K/uL    Immature Granulocytes (abs) 0.08 0.00 - 0.11 K/uL    NRBC (Absolute) 0.00 K/uL   ACCU-CHEK GLUCOSE    Collection Time: 10/23/19  7:11 AM   Result Value Ref Range    Glucose - Accu-Ck 103 (H) 65 - 99 mg/dL   ACCU-CHEK GLUCOSE    Collection Time: 10/23/19 11:38 AM    Result Value Ref Range    Glucose - Accu-Ck 152 (H) 65 - 99 mg/dL   ACCU-CHEK GLUCOSE    Collection Time: 10/23/19  5:46 PM   Result Value Ref Range    Glucose - Accu-Ck 119 (H) 65 - 99 mg/dL   ACCU-CHEK GLUCOSE    Collection Time: 10/23/19  8:27 PM   Result Value Ref Range    Glucose - Accu-Ck 119 (H) 65 - 99 mg/dL   URINALYSIS    Collection Time: 10/24/19 12:30 AM   Result Value Ref Range    Color Yellow     Character Clear     Specific Gravity 1.009 <1.035    Ph 6.5 5.0 - 8.0    Glucose Negative Negative mg/dL    Ketones Negative Negative mg/dL    Protein Negative Negative mg/dL    Bilirubin Negative Negative    Urobilinogen, Urine 0.2 Negative    Nitrite Negative Negative    Leukocyte Esterase Trace (A) Negative    Occult Blood Large (A) Negative    Micro Urine Req Microscopic    URINE MICROSCOPIC (W/UA)    Collection Time: 10/24/19 12:30 AM   Result Value Ref Range    WBC 2-5 (A) /hpf    RBC 10-20 (A) /hpf    Bacteria Negative None /hpf    Epithelial Cells Negative /hpf    Hyaline Cast 0-2 /lpf   ACCU-CHEK GLUCOSE    Collection Time: 10/24/19  7:15 AM   Result Value Ref Range    Glucose - Accu-Ck 102 (H) 65 - 99 mg/dL       Current Facility-Administered Medications   Medication Frequency   • insulin regular (HUMULIN R) injection 2-10 Units BID INSULIN    And   • glucose 4 g chewable tablet 16 g Q15 MIN PRN    And   • dextrose 50% (D50W) injection 50 mL Q15 MIN PRN   • [START ON 10/25/2019] metoprolol (LOPRESSOR) tablet 50 mg TWICE DAILY   • lidocaine (LIDODERM) 5 % 1 Patch Q24HR   • DILTIAZem (CARDIZEM) tablet 60 mg Q8HRS   • metFORMIN (GLUCOPHAGE) tablet 250 mg BID WITH MEALS   • melatonin tablet 6 mg QHS   • amantadine (SYMMETREL) 50 MG/5ML syrup 100 mg BID   • omeprazole (FIRST-OMEPRAZOLE) 2 mg/mL oral susp 40 mg DAILY   • phenylephrine-cocoa butter (PREPARATION H) suppository 1 Suppository Q6HRS PRN   • metoprolol (LOPRESSOR) tablet 25 mg TWICE DAILY   • apixaban (ELIQUIS) tablet 5 mg BID   • baclofen  (LIORESAL) tablet 5 mg TID   • vitamin D (cholecalciferol) tablet 1,000 Units DAILY   • Respiratory Care per Protocol Continuous RT   • oxyCODONE immediate-release (ROXICODONE) tablet 2.5 mg Q3HRS PRN   • oxyCODONE immediate-release (ROXICODONE) tablet 5 mg Q3HRS PRN   • tramadol (ULTRAM) 50 MG tablet 50 mg Q4HRS PRN   • hydrALAZINE (APRESOLINE) tablet 25 mg Q8HRS PRN   • acetaminophen (TYLENOL) tablet 650 mg Q4HRS PRN   • senna-docusate (PERICOLACE or SENOKOT S) 8.6-50 MG per tablet 2 Tab BID    And   • polyethylene glycol/lytes (MIRALAX) PACKET 1 Packet QDAY PRN    And   • magnesium hydroxide (MILK OF MAGNESIA) suspension 30 mL QDAY PRN    And   • bisacodyl (DULCOLAX) suppository 10 mg QDAY PRN   • artificial tears ophthalmic solution 1 Drop PRN   • benzocaine-menthol (CEPACOL) lozenge 1 Lozenge Q2HRS PRN   • mag hydrox-al hydrox-simeth (MAALOX PLUS ES or MYLANTA DS) suspension 20 mL Q2HRS PRN   • ondansetron (ZOFRAN ODT) dispertab 4 mg 4X/DAY PRN    Or   • ondansetron (ZOFRAN) syringe/vial injection 4 mg 4X/DAY PRN   • traZODone (DESYREL) tablet 50 mg QHS PRN   • sodium chloride (OCEAN) 0.65 % nasal spray 2 Spray PRN   • allopurinol (ZYLOPRIM) tablet 300 mg DAILY   • atorvastatin (LIPITOR) tablet 20 mg Nightly   • ferrous sulfate tablet 325 mg DAILY   • loratadine (CLARITIN) tablet 10 mg DAILY       Orders Placed This Encounter   Procedures   • Diet Order Diabetic     Standing Status:   Standing     Number of Occurrences:   1     Order Specific Question:   Diet:     Answer:   Diabetic [3]     Order Specific Question:   Consistency/Fluid modifications:     Answer:   Thin Liquids [3]       Assessment:  Active Hospital Problems    Diagnosis   • *Encephalopathy acute   • B-cell lymphoma (HCC)   • Metastasis to brain (HCC)   • Atrial fibrillation (HCC) w Rapid Ventricular Response    • CAD (coronary artery disease)   • Hypertension   • Type 2 diabetes mellitus without complication, without long-term current use of  insulin (HCC)   • Chronic back pain   • Dysphagia   • Primary cerebral lymphoma (HCC)       Medical Decision Making and Plan:  Encephalopathy - Patient with recent diagnosis of 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 Check UA, started on Amantadine.   -UA positive, started on Ciprofloxacin per Hospitalist, U Cx pending - Enterobacter, susc to Ciprofloxacin.     Dysphagia - Patient on dysphagia 2 with NTL on transfer.  -SLP for swallow     Spasticity - Restarted on Baclofen and increased prior to admission.  -Continue on Baclofen 10 mg TID. With AMS, will reduce to 5 mg TID. No improvement in cognition     B cell lymphoma - Underwent R Chop on 9/23/19.  Followed by Oncology. Recent right retroperitoneal mass s/p biopsy which is positive for B cell lymphoma  -Follow-up Heme/Onc  -Discontinue Prednisone. Follow-up Oncology     HTN/A Fib - Patient on Pradaxa.  Patient on Diltiazem 360 mg QHS, Metoprolol 25 mg XL daily   -Patient chewing medication, will switch medications to short acting. Diltiazem 90 mg q6 and metoprolol 25 mg BID   -Hypotension on 10/17/19, discussed case with hospitalist and recommending decrease in Diltiazem    DM - Patient on SSI on transfer. Consult hospitalist. Started on Metformin 250 mg     Sacral wound - Present on admission, seen by wound care today. Appreciate recommendations. Recommending mepilex only    Leukocytosis - B cell lymphoma on steroids. Check AM CBC - continued  -Consult hospitalist     Insomnia - Very poor sleep at night. Start Melatonin 6 mg QHS    Hx of Gout - Patient on Allopurinol 300 mg daily.     Vitamin D - 24 on admission, start on 1000 U for deficiency.      GI Ppx - Patient on Omeprazole 40 mg daily. While on steroids     DVT ppx - Patient on  Pradaxa 150 mg QHS    Dispo - Wife would like goals of care conversation. Consulted palliative, arranged for meeting on Friday at 2PM.     Total time:  36 minutes.  I spent greater than 50% of the time for patient care, counseling, and coordination on this date, including unit/floor time, and face-to-face time with the patient as per interval events and assessment and plan above. Topics discussed included discharge planning, palliative consult and goals of care.     Leticia Bella M.D.

## 2019-10-24 NOTE — CARE PLAN
Problem: Communication  Goal: The ability to communicate needs accurately and effectively will improve  Outcome: PROGRESSING SLOWER THAN EXPECTED  Note:   Pt having some intermittent confusion more than his normal per report from staff. Pt able to verbalize date (saw on tray paper), location, and name without prompting. Pt whispers when he talks and doesn't talk very much much. Will continue to monitor     Problem: Urinary Elimination:  Goal: Ability to reestablish a normal urinary elimination pattern will improve  Outcome: PROGRESSING SLOWER THAN EXPECTED  Intervention: Assess and monitor for signs and symptoms of urinary retention  Note:   Ortiz removed this morning per MD order, pt has since been able to void multiple times but is voiding less than 10ml and having incontinence about every 2 hours before toileting. Pt bladder scanned for retention and scanned for 0ml x2 different times per CNA. Still attempting to obtain a UA. Will continue to try and get a continent void. Pt unable to be ICP'd to gain sample due to frequency of incontinence and lack of bladder filling before cathing.

## 2019-10-24 NOTE — THERAPY
Speech Language Pathology  Daily Treatment     Patient Name: Marcio More Jr.  Age:  73 y.o., Sex:  male  Medical Record #: [id number]  Today's Date: 10/24/2019     Subjective    Pt was pleasant and cooperative during this ST session      Objective       10/24/19 1401   SLP Total Time Spent   SLP Individual Total Time Spent (Mins) 30   Charge Group   SLP Cognitive Skill Development 2       Assessment    Pt completed a 2 step following written directions task independently to achieve 38% accuracy.  Pt required min-mod cues for attention to increase this to 100%.      Plan    Continue targeting attention and problem solving

## 2019-10-24 NOTE — THERAPY
Physical Therapy   Daily Treatment     Patient Name: Marcio More Jr.  Age:  73 y.o., Sex:  male  Medical Record #: [id number]  Today's Date: 10/24/2019     Precautions  Precautions: Fall Risk, Other (See Comments)  Comments: coccyx    Subjective    Pt was seated in w/c upon arrival and agreeable to treatment.  Pt reported continued urgency issues with urination.      Objective       10/24/19 1431   Precautions   Precautions Fall Risk;Other (See Comments)   Bed Mobility    Supine to Sit Stand by Assist   Sit to Supine Stand by Assist   Sit to Stand Minimal Assist   Scooting Stand by Assist   Rolling Supervised   Interdisciplinary Plan of Care Collaboration   Patient Position at End of Therapy Seated;Call Light within Reach;Tray Table within Reach;Phone within Reach   PT Total Time Spent   PT Individual Total Time Spent (Mins) 60   PT Charge Group   PT Gait Training 1   PT Therapeutic Activities 3       FIM Bed/Chair/Wheelchair Transfers Score: 4 - Minimal Assistance  Bed/Chair/Wheelchair Transfers Description:  Increased time, Adaptive equipment, Supervision for safety, Set-up of equipment, Verbal cueing(Bed mobility with SBA; SPT with FWW and CGA on regular queen sized bed)    FIM Toilet Transfer Score:  5 - Standby Prompting/Supervision or Set-up  Toilet Transfer Description:  Grab bar, Supervision for safety, Verbal cueing, Set-up of equipment    FIM Walking Score:  4 - Minimal Assistance  Walking Description:  Assist device/equipment, Extra time, Safety concerns, Verbal cueing, Supervision for safety, Walker(AMB x 175 feet with FWW and CGA with VCing for safety with FWW and increased step length and B hip flexion)    Discussion with pt's spouse to set up of family training for pt's D/C (currently scheduled for Monday 10/28 on day of discharge).  Also verified with pt's spouse home set up and safety, review of current stair set up (pt has ramp to enter house), and safety concerns.      Assessment    Pt  continues to be very bradykinetic with movement with extra time needed to process commands.  Pt has demonstrated decreased variability in performance with mobility this week, but can occasionally require min-mod A with STS with transfers due to poor sequencing/motor planning.      Plan    Continue to progress gait training with FWW, STS/transfer training with FWW with focus on sequencing and safety, stair training (pt has one step to get into walk in shower).  Complete D/C FIM and IRF EDEL items Shaquille 10/27/19 in preparation for D/C on Monday 10/28/19.

## 2019-10-24 NOTE — CARE PLAN
Problem: Pain Management  Goal: Pain level will decrease to patient's comfort goal  Note:   Pt denies any pain or discomfort at this time.Repositioned with pillows for comfort.Will continue to monitor and assess pain level and medicate as needed.     Problem: Urinary Elimination:  Goal: Ability to reestablish a normal urinary elimination pattern will improve  Intervention: Assess and monitor for signs and symptoms of urinary retention  Note:   Pt remains incontinent of bladder.PVR's 0-15.No hematuria noted at this time.Will continue to monitor.

## 2019-10-24 NOTE — PROGRESS NOTES
Hospital Medicine Daily Progress Note    Date of Service  10/24/2019    Chief Complaint:  Hypertension  Afib  Diabetes  Leukocytosis    Interval History:  No significant events or changes since last visit    Review of Systems  Review of Systems   Constitutional: Negative for fever.   Eyes: Negative for blurred vision.   Respiratory: Negative for cough.    Cardiovascular: Negative for chest pain.   Gastrointestinal: Negative for diarrhea.   Musculoskeletal: Negative for joint pain.   Neurological: Negative for dizziness.   Psychiatric/Behavioral: The patient is not nervous/anxious.         Physical Exam  Temp:  [36.4 °C (97.6 °F)-36.6 °C (97.9 °F)] 36.4 °C (97.6 °F)  Pulse:  [69-95] 75  Resp:  [18-20] 18  BP: (105-128)/(60-80) 128/75  SpO2:  [96 %] 96 %    Physical Exam   Constitutional: No distress.   HENT:   Mouth/Throat: No oropharyngeal exudate.   Eyes: EOM are normal.   Neck: No JVD present.   Cardiovascular: Normal rate and regular rhythm.   No murmur heard.  Pulmonary/Chest: Effort normal and breath sounds normal.   Abdominal: Soft. Bowel sounds are normal.   Musculoskeletal: He exhibits no edema.   Skin: Skin is warm and dry. No rash noted.   Psychiatric: His behavior is normal.   Nursing note and vitals reviewed.      Fluids    Intake/Output Summary (Last 24 hours) at 10/24/2019 1116  Last data filed at 10/24/2019 0830  Gross per 24 hour   Intake 800 ml   Output --   Net 800 ml       Laboratory  Recent Labs     10/22/19  0601 10/23/19  0542   WBC 9.6 10.7   RBC 3.95* 4.02*   HEMOGLOBIN 12.6* 12.7*   HEMATOCRIT 38.2* 38.5*   MCV 96.7 95.8   MCH 31.9 31.6   MCHC 33.0* 33.0*   RDW 47.8 48.3   PLATELETCT 245 260   MPV 9.4 9.1     Recent Labs     10/22/19  0601   SODIUM 140   POTASSIUM 3.6   CHLORIDE 106   CO2 24   GLUCOSE 127*   BUN 23*   CREATININE 0.85   CALCIUM 8.7                   Imaging    Assessment/Plan  B-cell lymphoma (HCC)- (present on admission)  Assessment & Plan  Stage IV  Hx of brain  resection  S/P R-CHOP chemo on 9/23  Recent brain MRI showed no mass    A-fib (HCC)- (present on admission)  Assessment & Plan  HR ok  S/P RVR at [facility]  On Cardizem: 90 mg qid --> 60 mg tid (10/18) --> will d/c (last dose on 10/24)  On Lopressor: 25 mg bid --> will increase to 50 mg bid (starting 10/25)  Will try to get on one med before discharge  Cont to monitor    Azotemia- (present on admission)  Assessment & Plan  Bun: 23 (10/22)  Encourging fluid intake  Monitor    Diabetes mellitus with hyperglycemia, without long-term current use of insulin (HCC)- (present on admission)  Assessment & Plan  Hba1c: 7.2 (10/11)  BS: 100-154  On Metformin: 250 mg bid  Will change accuchecks to bid (10/24)  Note: home meds include Metformin  mg daily  Cont to monitor    CAD (coronary artery disease)- (present on admission)  Assessment & Plan  On Eliquis  On Lipitor  On Metoprolol & Diltiazem    Hypertension- (present on admission)  Assessment & Plan  BP ok  On Cardizem: 90 mg qid --> 60 mg tid (10/18) --> will d/c (last dose on 10/24)  On Lopressor: 25 mg bid --> will increase to 50 mg bid (starting 10/25)  Will try to get on one med before discharge  Monitor    Neurologic abnormality  Assessment & Plan  Pt presented to [facility] with increased weakness, confusion, difficulty swallowing  An MRI of the brain was unremarkable and did not show the previous mass  An MRI of the cervical spine and thoracic spine did not show any acute issues or metastasis (has DDD)    UTI (urinary tract infection)- (present on admission)  Assessment & Plan  UC --> Enterobacter  S/P Cipro    Vitamin D insufficiency- (present on admission)  Assessment & Plan  Vit D: 24  On supplements

## 2019-10-24 NOTE — PALLIATIVE CARE
"Palliative Care follow-up  Consult received and EMR reviewed; notes reflect wife's desire to meet Friday at 1400. Call placed to Mell at [phone] and explained the role of this RN. She confirmed her desire to meet Friday at 1400 with the MD and PC team. She asked to speak in the waiting area prior because she is worried that Richie will become \"upset if he think's I'm suggesting he stay any longer.\" PC RN validated her concerns and offered to meet in the rehab lobby prior to the meeting. She was provided with this RN's number in the event she needs to change the time of the meeting given she is coming from [place].    Updated: PC RN Cheri who will be attending the meeting; Dr. Bella (via TT)    Plan: meeting Friday at 1400    Thank you for allowing Palliative Care to participate in this patient's care. Please feel free to call [phone] with any questions or concerns.    "

## 2019-10-25 NOTE — CARE PLAN
Problem: Communication  Goal: The ability to communicate needs accurately and effectively will improve  Outcome: PROGRESSING AS EXPECTED  Intervention: Educate patient and significant other/support system about the plan of care, procedures, treatments, medications and allow for questions  Note:   Plan of care for the day discussed this morning with pt. All questions and concerns answered at this time. Pt reports sleeping well last night. Will continue to monitor\       Problem: Skin Integrity  Goal: Risk for impaired skin integrity will decrease  Outcome: PROGRESSING AS EXPECTED  Intervention: Implement precautions to protect skin integrity in collaboration with the interdisciplinary team  Note:   WC at bedside. Spoke with her about pts coccyx. Meplex applied and old scab on coccyx present. Staff to manage meplex and q2hr turn. Pt on low airloss mattress. Will continue to monitor

## 2019-10-25 NOTE — THERAPY
"Speech Language Pathology  Daily Treatment     Patient Name: Marcio More Jr.  Age:  73 y.o., Sex:  male  Medical Record #: [id number]  Today's Date: 10/25/2019     Subjective    Pt was wheeling his way down the hallway and when asked where he was going pt said \"It's dinner time, I'm going to the dining room\".       Objective       10/25/19 1431   SLP Total Time Spent   SLP Individual Total Time Spent (Mins) 30   Charge Group   SLP Cognitive Skill Development 2         Assessment    Pt completed 1 step written directions with mod cues for attention to achieve 100% accuracy.  Pt was also able to complete sorting tasks (cards by suit and then cards by suit with face cards in a separate pile) with min A required for attention to achieve 100% accuracy.      Plan    Continue O-log, target attention and memory     "

## 2019-10-25 NOTE — THERAPY
"Physical Therapy   Daily Treatment     Patient Name: Marcio More Jr.  Age:  73 y.o., Sex:  male  Medical Record #: [id number]  Today's Date: 10/25/2019     Precautions  Precautions: Fall Risk  Comments: coccyx wound    Subjective    Pt seated in room, reports need to use restroom.      Objective       10/25/19 0931   Precautions   Precautions Fall Risk   Comments coccyx wound   Sitting Lower Body Exercises   Sit to Stand 2 sets of 10  (1x10 @ 21\" mat, 1x10 @ 22\" mat, SBA)   Interdisciplinary Plan of Care Collaboration   IDT Collaboration with  Nursing   Patient Position at End of Therapy Seated   Collaboration Comments RN administered medications during PT session    PT Total Time Spent   PT Individual Total Time Spent (Mins) 60   PT Charge Group   PT Therapeutic Exercise 1   PT Therapeutic Activities 3     Attempted 3rd set of STS from 20\" mat, pt able to complete 5x with min A, unable to complete without physical assist.    FIM Toiletin - Standby Prompting/Supervision or Set-up  Toileting Description:  Grab bar, Increased time, Supervision for safety(SBA for toileting (bladder) with extra time to complete )    FIM Toilet Transfer Score:  5 - Standby Prompting/Supervision or Set-up  Toilet Transfer Description:  Grab bar, Increased time, Supervision for safety, Set-up of equipment(WC > standing at toilet with grab bar, spv)    FIM Walking Score:  4 - Minimal Assistance  Walking Description:  Extra time, Verbal cueing, Walker(240 ft with FWW, SBA- CGA )      Assessment    Pt participatory with session. Limited with last sit <> stand set by mental fatigue/ motor planning.    Plan    Continue to progress gait training with FWW, STS/transfer training with FWW with focus on sequencing and safety, stair training (pt has one step to get into walk in shower).  Complete D/C FIM and IRF EDEL items Shaquille 10/27/19 in preparation for D/C on Monday 10/28/19    "

## 2019-10-25 NOTE — CARE PLAN
Problem: Safety  Goal: Will remain free from injury  Intervention: Educate patient and significant other/support system about adaptive mobility strategies and safe transfers  Note:   Per report confusion/impulsive at times. Bed in low position, call light within reach, bed alarm activated, room near nurses station.     Problem: Bowel/Gastric:  Goal: Normal bowel function is maintained or improved  Intervention: Educate patient and significant other/support system about diet, fluid intake, medications and activity to promote bowel function  Note:   Scheduled bowel meds administered at hs. Regular bm's noted. No s/s of distress.

## 2019-10-25 NOTE — WOUND TEAM
"[facility]  Inpatient Services  Wound and Skin Care Progress Note    HPI, PMH, SH: Reviewed    WOUND TEAM FOLLOW UP: Scheduled follow up on coccyx pressure injury  Unit where seen by Wound Team:  27-2      SUBJECTIVE: \"I can stand.\"    Self Report / Pain Level: 0/10    OBJECTIVE: wound open to air    WOUND TYPE, LOCATION, CHARACTERISTICS:     Pressure Injury 10/10/19 Sacrum;Coccyx stage 2 pressure injury POA (Active)   Wound Image      Pressure Injury Stage 2    State of Healing Other (Comment);Healing ridge    Site Assessment Brown    Loreta-wound Assessment Blanchable erythema;Other (Comment)    Margins Attached edges    Wound Length (cm) 1 cm    Wound Width (cm) 1 cm    Wound Depth (cm) 0 cm    Wound Surface Area (cm^2) 1 cm^2    Tunneling 0 cm    Undermining 0 cm    Closure None    Drainage Amount None    Treatments Site care    Cleansing Normal Saline Irrigation    Periwound Protectant Not Applicable    Dressing Options Mepilex    Dressing Cleansing/Solutions Not Applicable    Dressing Changed Changed    Dressing Status Clean;Dry;Intact    Dressing Change Frequency Every 72 hrs    NEXT Dressing Change  10/27/19    NEXT Weekly Photo (Inpatient Only) 10/26/19    WOUND NURSE ONLY - Odor None    WOUND NURSE ONLY - Exposed Structures None    WOUND NURSE ONLY - Tissue Type and Percentage 100% brown        INTERVENTIONS BY WOUND TEAM: Patient able to stand for assessment. Brown scab to coccyx, surrounding skin with blanching erythema and a little peeling skin. Patient assisted to bathroom, when done assisted back to bed. Discussed wound with RN, wound therapy is placement of sacral mepilex with no barrier cream under it per the Dressing Care order in place.     Interdisciplinary consultation: Patient and nursing.    EVALUATION AND PROGRESS OF WOUND(S): Sacral mepilex is designed to redistribute the forces of friction and shear which protects tissue over bony prominences from these     Rationale for " changes in Plan of Care: No changes    Factors affecting wound healing: Age, pressure.  Goals: Wound will continue to decrease in size.     NURSING PLAN OF CARE:    Dressing changes: Continue previous Dressing Care orders:    X    See new Dressing Care orders:       Skin care: See Skin Care orders:        Rectal tube care: See Rectal Tube Care orders:      Other orders:           WOUND TEAM PLAN OF CARE (X):   NPWT change 3 x week:        Dressing changes:       Follow up as needed:  X weekly     Other:

## 2019-10-25 NOTE — PROGRESS NOTES
"Rehab Progress Note     Encounter Date: 10/25/2019    CC: Encephalopathy, confusion, weakness    Interval Events (Subjective)  Patient sitting up in room. Denies NVD. He reports therapy is going well and he is ready to go home. Discussed with patient that we have a conference to discuss with his family later this afternoon. Discussed results of conference with him afterwards.     Had family conference this afternoon with OT, SLP, and Palliative RN. Discussed progress and goals. Discussed medical updates. Discussed medications. Family would like a few more days then home with home health.  They would like to extend stay until Friday, which I have passed on to  that is OK.     IDT Team Meeting 10/22/2019  DC/Disposition:  10/28/19    Objective:  VITAL SIGNS: /69   Pulse 75   Temp 36.3 °C (97.3 °F) (Temporal)   Resp 18   Ht 1.88 m (6' 2\")   Wt 85.2 kg (187 lb 14.4 oz)   SpO2 96%   BMI 24.12 kg/m²    Gen: NAD  Psych: Mood and affect appropriate  CV: RRR, no edema  Resp: CTAB, no upper airway sounds  Abd: NTND  Neuro: AOx2, following simple commands  Unchanged from 10/24/19    Recent Results (from the past 72 hour(s))   ACCU-CHEK GLUCOSE    Collection Time: 10/22/19  5:16 PM   Result Value Ref Range    Glucose - Accu-Ck 154 (H) 65 - 99 mg/dL   ACCU-CHEK GLUCOSE    Collection Time: 10/22/19  8:33 PM   Result Value Ref Range    Glucose - Accu-Ck 100 (H) 65 - 99 mg/dL   CBC WITH DIFFERENTIAL    Collection Time: 10/23/19  5:42 AM   Result Value Ref Range    WBC 10.7 4.8 - 10.8 K/uL    RBC 4.02 (L) 4.70 - 6.10 M/uL    Hemoglobin 12.7 (L) 14.0 - 18.0 g/dL    Hematocrit 38.5 (L) 42.0 - 52.0 %    MCV 95.8 81.4 - 97.8 fL    MCH 31.6 27.0 - 33.0 pg    MCHC 33.0 (L) 33.7 - 35.3 g/dL    RDW 48.3 35.9 - 50.0 fL    Platelet Count 260 164 - 446 K/uL    MPV 9.1 9.0 - 12.9 fL    Neutrophils-Polys 73.60 (H) 44.00 - 72.00 %    Lymphocytes 8.10 (L) 22.00 - 41.00 %    Monocytes 14.30 (H) 0.00 - 13.40 %    Eosinophils 2.40 0.00 " - 6.90 %    Basophils 0.90 0.00 - 1.80 %    Immature Granulocytes 0.70 0.00 - 0.90 %    Nucleated RBC 0.00 /100 WBC    Neutrophils (Absolute) 7.85 (H) 1.82 - 7.42 K/uL    Lymphs (Absolute) 0.87 (L) 1.00 - 4.80 K/uL    Monos (Absolute) 1.53 (H) 0.00 - 0.85 K/uL    Eos (Absolute) 0.26 0.00 - 0.51 K/uL    Baso (Absolute) 0.10 0.00 - 0.12 K/uL    Immature Granulocytes (abs) 0.08 0.00 - 0.11 K/uL    NRBC (Absolute) 0.00 K/uL   ACCU-CHEK GLUCOSE    Collection Time: 10/23/19  7:11 AM   Result Value Ref Range    Glucose - Accu-Ck 103 (H) 65 - 99 mg/dL   ACCU-CHEK GLUCOSE    Collection Time: 10/23/19 11:38 AM   Result Value Ref Range    Glucose - Accu-Ck 152 (H) 65 - 99 mg/dL   ACCU-CHEK GLUCOSE    Collection Time: 10/23/19  5:46 PM   Result Value Ref Range    Glucose - Accu-Ck 119 (H) 65 - 99 mg/dL   ACCU-CHEK GLUCOSE    Collection Time: 10/23/19  8:27 PM   Result Value Ref Range    Glucose - Accu-Ck 119 (H) 65 - 99 mg/dL   URINALYSIS    Collection Time: 10/24/19 12:30 AM   Result Value Ref Range    Color Yellow     Character Clear     Specific Gravity 1.009 <1.035    Ph 6.5 5.0 - 8.0    Glucose Negative Negative mg/dL    Ketones Negative Negative mg/dL    Protein Negative Negative mg/dL    Bilirubin Negative Negative    Urobilinogen, Urine 0.2 Negative    Nitrite Negative Negative    Leukocyte Esterase Trace (A) Negative    Occult Blood Large (A) Negative    Micro Urine Req Microscopic    URINE MICROSCOPIC (W/UA)    Collection Time: 10/24/19 12:30 AM   Result Value Ref Range    WBC 2-5 (A) /hpf    RBC 10-20 (A) /hpf    Bacteria Negative None /hpf    Epithelial Cells Negative /hpf    Hyaline Cast 0-2 /lpf   ACCU-CHEK GLUCOSE    Collection Time: 10/24/19  7:15 AM   Result Value Ref Range    Glucose - Accu-Ck 102 (H) 65 - 99 mg/dL   ACCU-CHEK GLUCOSE    Collection Time: 10/24/19 10:46 AM   Result Value Ref Range    Glucose - Accu-Ck 137 (H) 65 - 99 mg/dL   ACCU-CHEK GLUCOSE    Collection Time: 10/24/19  5:21 PM   Result Value  Ref Range    Glucose - Accu-Ck 110 (H) 65 - 99 mg/dL   ACCU-CHEK GLUCOSE    Collection Time: 10/25/19  7:52 AM   Result Value Ref Range    Glucose - Accu-Ck 128 (H) 65 - 99 mg/dL       Current Facility-Administered Medications   Medication Frequency   • insulin regular (HUMULIN R) injection 2-10 Units BID INSULIN    And   • glucose 4 g chewable tablet 16 g Q15 MIN PRN    And   • dextrose 50% (D50W) injection 50 mL Q15 MIN PRN   • metoprolol (LOPRESSOR) tablet 50 mg TWICE DAILY   • lidocaine (LIDODERM) 5 % 1 Patch Q24HR   • metFORMIN (GLUCOPHAGE) tablet 250 mg BID WITH MEALS   • melatonin tablet 6 mg QHS   • amantadine (SYMMETREL) 50 MG/5ML syrup 100 mg BID   • omeprazole (FIRST-OMEPRAZOLE) 2 mg/mL oral susp 40 mg DAILY   • phenylephrine-cocoa butter (PREPARATION H) suppository 1 Suppository Q6HRS PRN   • apixaban (ELIQUIS) tablet 5 mg BID   • vitamin D (cholecalciferol) tablet 1,000 Units DAILY   • Respiratory Care per Protocol Continuous RT   • oxyCODONE immediate-release (ROXICODONE) tablet 2.5 mg Q3HRS PRN   • oxyCODONE immediate-release (ROXICODONE) tablet 5 mg Q3HRS PRN   • tramadol (ULTRAM) 50 MG tablet 50 mg Q4HRS PRN   • hydrALAZINE (APRESOLINE) tablet 25 mg Q8HRS PRN   • acetaminophen (TYLENOL) tablet 650 mg Q4HRS PRN   • senna-docusate (PERICOLACE or SENOKOT S) 8.6-50 MG per tablet 2 Tab BID    And   • polyethylene glycol/lytes (MIRALAX) PACKET 1 Packet QDAY PRN    And   • magnesium hydroxide (MILK OF MAGNESIA) suspension 30 mL QDAY PRN    And   • bisacodyl (DULCOLAX) suppository 10 mg QDAY PRN   • artificial tears ophthalmic solution 1 Drop PRN   • benzocaine-menthol (CEPACOL) lozenge 1 Lozenge Q2HRS PRN   • mag hydrox-al hydrox-simeth (MAALOX PLUS ES or MYLANTA DS) suspension 20 mL Q2HRS PRN   • ondansetron (ZOFRAN ODT) dispertab 4 mg 4X/DAY PRN    Or   • ondansetron (ZOFRAN) syringe/vial injection 4 mg 4X/DAY PRN   • traZODone (DESYREL) tablet 50 mg QHS PRN   • sodium chloride (OCEAN) 0.65 % nasal spray  2 Spray PRN   • allopurinol (ZYLOPRIM) tablet 300 mg DAILY   • atorvastatin (LIPITOR) tablet 20 mg Nightly   • ferrous sulfate tablet 325 mg DAILY       Orders Placed This Encounter   Procedures   • Diet Order Diabetic     Standing Status:   Standing     Number of Occurrences:   1     Order Specific Question:   Diet:     Answer:   Diabetic [3]     Order Specific Question:   Consistency/Fluid modifications:     Answer:   Thin Liquids [3]       Assessment:  Active Hospital Problems    Diagnosis   • *Encephalopathy acute   • B-cell lymphoma (HCC)   • Metastasis to brain (HCC)   • Atrial fibrillation (HCC) w Rapid Ventricular Response    • CAD (coronary artery disease)   • Hypertension   • Type 2 diabetes mellitus without complication, without long-term current use of insulin (HCC)   • Chronic back pain   • Dysphagia   • Primary cerebral lymphoma (HCC)       Medical Decision Making and Plan:  Encephalopathy - Patient with recent diagnosis of 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 Check UA, started on Amantadine. UA positive, started on Ciprofloxacin per Hospitalist, U Cx pending - Enterobacter, susc to Ciprofloxacin. Completed.     Dysphagia - Patient on dysphagia 2 with NTL on transfer.  -SLP for swallow - advanced to regulars with thins.      Spasticity - Restarted on Baclofen and increased prior to admission.  -Continue on Baclofen 10 mg TID. With AMS, will reduce to 5 mg TID. No improvement in cognition. Trial of stopping baclofen - no increased rigidity     B cell lymphoma - Underwent R Chop on 9/23/19.  Followed by Oncology. Recent right retroperitoneal mass s/p biopsy which is positive for B cell lymphoma  -Follow-up Heme/Onc  -Discontinue Prednisone. Follow-up Oncology     HTN/A Fib -  Patient on Pradaxa.  Patient on Diltiazem 360 mg QHS, Metoprolol 25 mg XL daily   -Patient chewing medication, will switch medications to short acting. Diltiazem 90 mg q6 and metoprolol 25 mg BID   -Hypotension on 10/17/19, discussed case with hospitalist and recommending decrease in Diltiazem    DM - Patient on SSI on transfer. Consult hospitalist. Started on Metformin 250 mg     Sacral wound - Present on admission, seen by wound care today. Appreciate recommendations. Recommending mepilex only    Leukocytosis - B cell lymphoma on steroids. Check AM CBC - continued  -Consult hospitalist     Insomnia - Very poor sleep at night. Start Melatonin 6 mg QHS    Hx of Gout - Patient on Allopurinol 300 mg daily.     Vitamin D - 24 on admission, start on 1000 U for deficiency.      GI Ppx - Patient on Omeprazole 40 mg daily. Discontinue now that off of steroids.     DVT ppx - Patient on Pradaxa 150 mg QHS    Dispo - Wife would like goals of care conversation. Consulted palliative, arranged for meeting on Friday at 2PM. Extended stay until 11/1/19, will go home with HH. Do not want Hospice at this time.     Total time:  35 minutes.  I spent greater than 50% of the time for patient care, counseling, and coordination on this date, including unit/floor time, and face-to-face time with the patient as per interval events and assessment and plan above. Topics discussed included discharge planning, plan for home health, improving endurance, and discontinue prilosec.     Leticia Bella M.D.    Additional Face to Face time: 1355 to 1435, face to face family meeting. Had family conference this afternoon with OT, SLP, and Palliative RN. Discussed progress and goals. Discussed medical updates. Discussed medications. Family goal for him to go home with home health. Would put off Hospice until follow-up with Oncology

## 2019-10-25 NOTE — CONSULTS
"Reason for PC Consult: Advance Care Planning    Consulted by:   Dr. Bella    Assessment:  General:   73 year old male admitted to [facility] on 10/10/19. 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 Patient had been treated for increased weakness, fatigue, left arm weakness, and difficulty swallowing.     Dyspnea: No, 96% on RA  Last BM: 10/25/19    Pain: Yes, Chronic back pain  Depression: Mood appropriate for situation    Dementia: No(Short term memory impairment)      Spiritual:  Is Nondenominational or spirituality important for coping with this illness? No, Family declined visit from   Has a  or spiritual provider visit been requested? No    Palliative Performance Scale: 60%    Advance Directive: None on File   DPOA: None on File, NATE More ([phone])    POLST: None on File    Code Status: Full     Outcome:  PC RN with Dr. Bella, Occupational therapy and speech therapy met with wife Mell, daughter Mary Jane and Daughter Aissatou (via phone) in conference room. Rehab team discussed pt's current progress.    PC RN explored family's thoughts and feelings about pt's cancer diagnosis. Family verbalized taking it \"day by day\" and wanting to see what \"his new normal is and what he will be like at home\". PC RN inquired about their conversations about further treatment and if treatment is not helpful and/or hospice is recommended. Aissatou verbalized they haven't discussed it, and it is not the right time now to discuss it because patient is still recovering and the neurosurgeon told them to let pt have 3 to 6 months of recovery before pt is at his new normal. Family feels pt is doing well and getting better and are hopeful with the improvements he has been having at rehab.     PC RN explored if pt and family had discussed end of life or hospice care. Family " "stated \"we aren't blind to that but we just don't know where he is or what level of recovery he will be at. He has a lot more time\". PC RN encouraged family to discuss options such as hospice incase further treatment is not an option or if he would not tolerate treatment. Family declined to discuss hospice care at home, they wish to \"take it day by day, week by week\".    PC RN discussed code status, AD and POLST form. Family reports he hasn't completed one before, due to pt's current short term memory and cognition issues he is unable to complete one at this time. Discussed CPR and intubation, family verbalized understanding and would like to discuss privately.     PC RN provided contact card to family, encouraged them to call with any questions or concerns.     Active listening, education, validation, normalization, therapeutic touch, and emotional support provided throughout encounter.    Updated:   PC Team    Plan:   Continue GOC discussion, family wanting to continue with aggressive treatment/therapies.       Thank you for allowing Palliative Care to participate in this patient's care. Please feel free to call [phone] with any questions or concerns.  "

## 2019-10-25 NOTE — THERAPY
Speech Language Pathology  Daily Treatment     Patient Name: Marcio More Jr.  Age:  73 y.o., Sex:  male  Medical Record #: [id number]  Today's Date: 10/25/2019     Subjective    Pt was pleasant and cooperative this ST session      Objective       10/25/19 0901   SLP Total Time Spent   SLP Individual Total Time Spent (Mins) 30   Charge Group   SLP Cognitive Skill Development 2       Assessment    O-log completed, pt scored a 19/30 (decreased from a 25/30 3 days ago).  Continued targeting 2 step written directions, pt demonstrated increased confusion and difficulty maintaining attention.  Max A required for pt to follow 2 step written directions, max cues still required when reduced to 1 step written directions.  Very slow processing noted today with difficulty initiating tasks.      Plan    Continue targeting basic attention

## 2019-10-25 NOTE — PROGRESS NOTES
Received patient during shift change, report rec'd from day shift RN. Resting in bed, VS stable on room air. Per report incontinent of B&B, min assist for transfers. A&O x 3, able to make needs known. Bed in low position, call light within reach.

## 2019-10-25 NOTE — PROGRESS NOTES
Hospital Medicine Daily Progress Note    Date of Service  10/25/2019    Chief Complaint:  Hypertension  Afib  Diabetes  Leukocytosis    Interval History:  No significant events or changes since last visit    Review of Systems  Review of Systems   Constitutional: Negative for chills and fever.   Respiratory: Negative for shortness of breath.    Cardiovascular: Negative for chest pain.   Gastrointestinal: Negative for abdominal pain, diarrhea, nausea and vomiting.   Psychiatric/Behavioral: The patient is not nervous/anxious.         Physical Exam  Temp:  [36.5 °C (97.7 °F)-36.8 °C (98.2 °F)] 36.8 °C (98.2 °F)  Pulse:  [68-85] 68  Resp:  [15-20] 15  BP: (122-138)/(73-88) 134/73  SpO2:  [96 %] 96 %    Physical Exam   Constitutional: He appears well-nourished.   HENT:   Head: Atraumatic.   Eyes: Pupils are equal, round, and reactive to light. Conjunctivae are normal.   Neck: Normal range of motion. Neck supple.   Cardiovascular: Normal rate and regular rhythm.   Pulmonary/Chest: Effort normal and breath sounds normal.   Abdominal: Soft. Bowel sounds are normal.   Skin: Skin is warm and dry.   Psychiatric: His behavior is normal.   Nursing note and vitals reviewed.      Fluids    Intake/Output Summary (Last 24 hours) at 10/25/2019 1122  Last data filed at 10/25/2019 0900  Gross per 24 hour   Intake 660 ml   Output --   Net 660 ml       Laboratory  Recent Labs     10/23/19  0542   WBC 10.7   RBC 4.02*   HEMOGLOBIN 12.7*   HEMATOCRIT 38.5*   MCV 95.8   MCH 31.6   MCHC 33.0*   RDW 48.3   PLATELETCT 260   MPV 9.1                       Imaging    Assessment/Plan  B-cell lymphoma (HCC)- (present on admission)  Assessment & Plan  Stage IV  Hx of brain resection  S/P R-CHOP chemo on 9/23  Recent brain MRI showed no mass    A-fib (HCC)- (present on admission)  Assessment & Plan  HR ok  [place] [facility]  Off Cardizem: 90 mg qid --> 60 mg tid (10/18) --> d/c'd (last dose on 10/24)  On Lopressor: 25 mg bid --> 50 mg bid (10/25)  Cont  to monitor    Azotemia- (present on admission)  Assessment & Plan  Bun: 23 (10/22)  Encourging fluid intake  Monitor    Diabetes mellitus with hyperglycemia, without long-term current use of insulin (HCC)- (present on admission)  Assessment & Plan  Hba1c: 7.2 (10/11)  BS: 100-154  On Metformin: 250 mg bid  Now on accuchecks bid (10/24)  Note: home meds include Metformin  mg daily  Cont to monitor    CAD (coronary artery disease)- (present on admission)  Assessment & Plan  On Eliquis  On Lipitor  Off Cardizem (10/24)  On Metoprolol    Hypertension- (present on admission)  Assessment & Plan  BP ok  Off Cardizem: 90 mg qid --> 60 mg tid (10/18) --> d/c'd (last dose on 10/24)  On Lopressor: 25 mg bid --> 50 mg bid (10/25)  Monitor    Neurologic abnormality  Assessment & Plan  Pt presented to [facility] with increased weakness, confusion, difficulty swallowing  An MRI of the brain was unremarkable and did not show the previous mass  An MRI of the cervical spine and thoracic spine did not show any acute issues or metastasis (has DDD)    UTI (urinary tract infection)- (present on admission)  Assessment & Plan  UC --> Enterobacter  S/P Cipro    Vitamin D insufficiency- (present on admission)  Assessment & Plan  Vit D: 24  On supplements

## 2019-10-25 NOTE — THERAPY
Occupational Therapy  Daily Treatment     Patient Name: Marcio More Jr.  Age:  73 y.o., Sex:  male  Medical Record #: [id number]  Today's Date: 10/25/2019     Precautions  Precautions: (P) Fall Risk  Comments: (P) coccyx wound    Safety   ADL Safety : Requires Physical Assist for Safety, Requires Cueing for Safety  Bathroom Safety: Requires Physical Assist for Safety, Requires Cuing for Safety  Comments: see FIM for oral care and eating    Subjective    Patient agreeable to shower after toileting.     Objective       10/25/19 1301   Precautions   Precautions Fall Risk   Comments coccyx wound   Cognition    Safety Awareness Impaired   New Learning Impaired   Attention Impaired   Sequencing Impaired   Initiation Impaired   Interdisciplinary Plan of Care Collaboration   Patient Position at End of Therapy Seated;Self Releasing Lap Belt Applied;Call Light within Reach;Tray Table within Reach   OT Total Time Spent   OT Individual Total Time Spent (Mins) 60   OT Charge Group   OT Self Care / ADL 4       FIM Bathing Score:  4 - Minimal Assistance  Bathing Description:       FIM Upper Body Dressin - Standby Prompting/Supervision or Set-up  Upper Body Dressing Description:  Supervision for safety, Set-up of equipment(setup/sba to don/doff pullover)    FIM Lower Body Dressing Score:  3 - Moderate Assistance  Lower Body Dressing Description:  Increased time, Supervision for safety, Verbal cueing, Set-up of equipment, Initial preparation for task(assist /  tasks to don/doff shoes w/ laces, socks, pants)    FIM Tub/Shower Transfers Score:  4 - Minimal Assistance  Tub/Shower Transfers Description:  Grab bar, Adaptive equipment, Supervision for safety, Verbal cueing, Set-up of equipment, Initial preparation for task, Requires lift(min lifting assist off shower bench with grab bar and cues)      Assessment    Patient performs bathing and transfers at a min A level, UB dressing setup/SBA and LB dressing with mod A.   Requires mod/max cues to initiate and be thorough with bathing.      Plan    Continue OT to address ADL/IADL independence, functional transfers, use of AE/DME (pt refuses to use sock aid), standing and sitting balance, edurance. Per family report pt enjoys drawing and art

## 2019-10-26 NOTE — PROGRESS NOTES
DATE OF SERVICE:  10/25/2019    No significant changes noted in the patient's condition since he was last   seen.  He has made some progress, but it has been slow.  His cognitive status   has improved.  He is somewhat more alert.  He remains oriented to the date and   sometimes to the time.  His thinking is labored and slow.  His insight is   diminished.  The patient was spoken to about his understanding of his various   therapies.       ____________________________________     KOMAL WARNER, PHD    ILA / KATIE    DD:  10/26/2019 12:19:08  DT:  10/26/2019 16:03:20    D#:  [id number]  Job#:  [id number]

## 2019-10-26 NOTE — CARE PLAN
Problem: Communication  Goal: The ability to communicate needs accurately and effectively will improve  10/26/2019 0115 by Ismael Parra RDavidNDavid  Outcome: PROGRESSING AS EXPECTED  Note:   Patient able to verbalize needs.  Will continue to monitor.      Problem: Safety  Goal: Will remain free from injury  10/26/2019 0115 by Ismael Parra RMAU.  Outcome: PROGRESSING AS EXPECTED  Note:   Pt uses call light consistently and appropriately. Waits for assistance does not attempt self transfer this shift. Able to verbalize needs.       Problem: Bowel/Gastric:  Goal: Normal bowel function is maintained or improved  Outcome: PROGRESSING AS EXPECTED  Note:   Patient having regular bowel movements; last BM 10/25.  Denies s/s constipation; bowel meds available if needed.  Will continue to monitor.

## 2019-10-26 NOTE — PROGRESS NOTES
DATE OF SERVICE:  10/23/2019    The patient has made some gains since he was last seen.  He is somewhat more   alert and cognizant overall.  He still is disoriented precisely to the date   and sometimes to the time.  He understands he is in the hospital.  The patient   has been compliant.  He is meeting all of his goals.  His thinking is still   impoverished, slow, labored, and limited in scope.       ____________________________________     KOMAL WARNER, PHD    ILA / KATIE    DD:  10/26/2019 12:01:46  DT:  10/26/2019 13:16:40    D#:  [id number]  Job#:  [id number]

## 2019-10-26 NOTE — PROGRESS NOTES
Hospital Medicine Daily Progress Note    Date of Service  10/26/2019    Chief Complaint:  Hypertension  Afib  Diabetes  Leukocytosis    Interval History:  No significant events or changes since last visit    Review of Systems  Review of Systems   Constitutional: Negative for fever.   Eyes: Negative for blurred vision.   Respiratory: Negative for shortness of breath.    Cardiovascular: Negative for palpitations.   Gastrointestinal: Negative for nausea and vomiting.   Neurological: Negative for dizziness and headaches.   Psychiatric/Behavioral: Negative for hallucinations.        Physical Exam  Temp:  [36.3 °C (97.3 °F)-36.4 °C (97.5 °F)] 36.4 °C (97.5 °F)  Pulse:  [] 72  Resp:  [18-20] 20  BP: (110-122)/(68-69) 122/68  SpO2:  [98 %] 98 %    Physical Exam   HENT:   Mouth/Throat: Oropharynx is clear and moist.   Eyes: No scleral icterus.   Cardiovascular: Normal rate and regular rhythm.   Pulmonary/Chest: Effort normal. No stridor. He has no wheezes. He has no rales.   Abdominal: Soft. Bowel sounds are normal.   Skin: Skin is warm and dry. He is not diaphoretic.   Psychiatric: His behavior is normal.   Nursing note and vitals reviewed.      Fluids    Intake/Output Summary (Last 24 hours) at 10/26/2019 1036  Last data filed at 10/26/2019 0800  Gross per 24 hour   Intake 936 ml   Output --   Net 936 ml       Laboratory                        Imaging    Assessment/Plan  B-cell lymphoma (HCC)- (present on admission)  Assessment & Plan  Stage IV  Hx of brain resection  S/P R-CHOP chemo on 9/23  Recent brain MRI showed no mass    A-fib (HCC)- (present on admission)  Assessment & Plan  HR ok  S/P RVR at [facility]  Off Cardizem (last dose on 10/24)  On Lopressor: 25 mg bid --> 50 mg bid (10/25)  Cont to monitor    Azotemia- (present on admission)  Assessment & Plan  Bun: 23 (10/22)  Encourging fluid intake  Monitor    Diabetes mellitus with hyperglycemia, without long-term current use of insulin (HCC)- (present on  admission)  Assessment & Plan  Hba1c: 7.2 (10/11)  BS: ok for a while  On Metformin: 250 mg bid  Now on accuchecks bid (10/24) --> will d/c (10/26)  Note: home meds include Metformin  mg daily  Cont to monitor    CAD (coronary artery disease)- (present on admission)  Assessment & Plan  On Eliquis  On Lipitor  Off Cardizem (10/24)  On Metoprolol    Hypertension- (present on admission)  Assessment & Plan  BP ok  Off Cardizem (last dose on 10/24)  On Lopressor: 25 mg bid --> 50 mg bid (10/25)  Monitor    Neurologic abnormality  Assessment & Plan  Pt presented to [facility] [place] [place] with increased weakness, confusion, difficulty swallowing  An MRI of the brain was unremarkable and did not show the previous mass  An MRI of the cervical spine and thoracic spine did not show any acute issues or metastasis (has DDD)    UTI (urinary tract infection)- (present on admission)  Assessment & Plan  UC --> Enterobacter  S/P Cipro    Vitamin D insufficiency- (present on admission)  Assessment & Plan  Vit D: 24  On supplements

## 2019-10-26 NOTE — PROGRESS NOTES
Patient care assumed. Report received from Noc JENNIFER Dorantes. Patient is alert and calm, resting in bed. Call light and bedside table within reach. Will continue to monitor.

## 2019-10-26 NOTE — CARE PLAN
Problem: Safety  Goal: Will remain free from injury  Intervention: Educate patient and significant other/support system about adaptive mobility strategies and safe transfers  Note:   Patient is alert, can be impulsive and needs frequent reminders to use the call light when assistance is needed. Patient has alarms set on bed and w/c, frequently rounded on to make sure needs are being met.     Problem: Pain Management  Goal: Pain level will decrease to patient's comfort goal  Intervention: Follow pain managment plan developed in collaboration with patient and Interdisciplinary Team  Note:   Patient able to verbalize pain level and verbalize an acceptable level of pain.

## 2019-10-27 NOTE — THERAPY
Physical Therapy   Daily Treatment     Patient Name: Marcio More Jr.  Age:  73 y.o., Sex:  male  Medical Record #: [id number]  Today's Date: 10/27/2019     Precautions  Precautions: Fall Risk, Other (See Comments)  Comments: coccyx wound    Subjective    Spouse reported that after speaking with the physician, the patient will remain at rehab until Friday. She reported that she will reschedule family training for later this week, and does not plan to be here tomorrow. Patient was agreeable to interventions.      Objective       10/27/19 1301   Precautions   Precautions Fall Risk;Other (See Comments)   Comments coccyx wound   Sitting Lower Body Exercises   Sitting Lower Body Exercises   (SBA + vc, with use of handout)   Ankle Pumps 1 set of 15;Bilateral   Hip Abduction 1 set of 15;Bilateral;Medium Resistance Theraband   Hip Adduction 1 set of 15;Bilateral  (pillow squeezes )   Long Arc Quad 1 set of 15;Bilateral   Marching Reciprocal;1 set of 15   Hamstring Curl 1 set of 15;Bilateral   Bed Mobility    Sit to Supine Contact Guard Assist   Sit to Stand Moderate Assist  (Min-mod A based of surface, and availablity of armrests)   Scooting Moderate Assist  (Min-mod, use of leg , and sequencing vc)   Rolling Supervised  (use of bed rails)   Neuro-Muscular Treatments   Neuro-Muscular Treatments Weight Shift Right;Weight Shift Left;Verbal Cuing;Tactile Cuing;Sequencing;Postural Facilitation;Postural Changes;Facilitation   Comments STS from mat table (no armrests) to FWW mod A for fwd wt shift and stability upon standing.  Education with patient and spouse on STS sequencing, with demonstration.  Education on choosing chair at home that are firm, tall, and with armrest. Demonstration on sequencing and wt shifting.    Interdisciplinary Plan of Care Collaboration   IDT Collaboration with  Family / Caregiver   Patient Position at End of Therapy In Bed;Family / Friend in Room   Collaboration Comments Spouse reported an  extension to D/C date to Friday. Spouse reported that she will be in for family training later in the week now. Review of HEP and handout.    PT Total Time Spent   PT Individual Total Time Spent (Mins) 60   PT Charge Group   PT Gait Training 1   PT Therapeutic Exercise 1   PT Neuromuscular Re-Education / Balance 1   PT Therapeutic Activities 1       FIM Bed/Chair/Wheelchair Transfers Score: 4 - Minimal Assistance  Bed/Chair/Wheelchair Transfers Description:  (CGA/min A STS with FWW, SPT CGA with FWW, Max A vc to sequence sit to sup with SBA. )    FIM Walking Score:  4 - Minimal Assistance  Walking Description:  Extra time, Safety concerns, Verbal cueing, Requires incidental assist, Walker(>150 ft indoors with FWW CGA/min A, max A vc for upright posture, proximity to FWW, and foot clearance)    FIM Wheelchair Score:  5 - Standby Prompting/Supervision or Set-up  Wheelchair Description:  Supervision for safety, Safety concerns(>150 ft SPV, with mod A vc to steer, very bradykinietic, BLE/ BUE,  vc for inc strokes, and larger steps)    FIM Stairs Score:  0 - Not tested,unsafe activity  Stairs Description:         Assessment    Patient was minimally able to self correct deviations with cueing and facilitation for step clearance, standing posture, and sequencing of transfers.       Plan    Hands on family training with spouse, car transfer training, sit <> stand training for sequencing, review standard bed, prepare for D/C Friday. Review home safety/ fall prevention.

## 2019-10-27 NOTE — CARE PLAN
Problem: Bowel/Gastric:  Goal: Normal bowel function is maintained or improved  Intervention: Educate patient and significant other/support system about diet, fluid intake, medications and activity to promote bowel function  Note:   Patient given bowel meds this am. Patient has had multiple bowel movements today. Started formed and now loose. Will pass on in report to Noc RN to hold bowel meds.     Problem: Skin Integrity  Goal: Risk for impaired skin integrity will decrease  Intervention: Assess risk factors for impaired skin integrity and/or pressure ulcers  Note:   Patient had a shower today with OT. Mepilex changed on coccyx. Coccyx appears to be healing.

## 2019-10-27 NOTE — PROGRESS NOTES
Received patient during shift change, report rec'd from day shift RN. Sitting up in w/c, VS stable on room air. Incontinent of B&B, min-mod assist for transfers. A&O x 2-3, able to make needs known. Call light within reach.

## 2019-10-27 NOTE — PROGRESS NOTES
Hospital Medicine Daily Progress Note    Date of Service  10/27/2019    Chief Complaint:  Hypertension  Afib  Diabetes  Leukocytosis    Interval History:  No significant events or changes since last visit    Review of Systems  Review of Systems   Constitutional: Negative for fever.   Eyes: Negative for blurred vision.   Respiratory: Negative for cough.    Cardiovascular: Negative for chest pain.   Gastrointestinal: Negative for diarrhea.   Musculoskeletal: Negative for joint pain.   Neurological: Negative for dizziness.   Psychiatric/Behavioral: The patient is not nervous/anxious.         Physical Exam  Temp:  [36.3 °C (97.3 °F)-36.6 °C (97.9 °F)] 36.3 °C (97.3 °F)  Pulse:  [54-94] 54  Resp:  [17-18] 17  BP: (106-115)/(70-75) 106/70  SpO2:  [94 %] 94 %    Physical Exam   Constitutional: No distress.   HENT:   Mouth/Throat: No oropharyngeal exudate.   Eyes: EOM are normal.   Neck: No JVD present.   Cardiovascular: Normal rate and regular rhythm.   Pulmonary/Chest: Effort normal. He has no wheezes. He has no rales.   Abdominal: Soft. He exhibits no distension. There is no tenderness.   Skin: Skin is warm and dry.   Psychiatric: His behavior is normal.   Nursing note and vitals reviewed.      Fluids    Intake/Output Summary (Last 24 hours) at 10/27/2019 1120  Last data filed at 10/27/2019 0842  Gross per 24 hour   Intake 480 ml   Output --   Net 480 ml       Laboratory                        Imaging    Assessment/Plan  B-cell lymphoma (HCC)- (present on admission)  Assessment & Plan  Stage IV  Hx of brain resection  S/P R-CHOP chemo on 9/23  Recent brain MRI showed no mass    A-fib (HCC)- (present on admission)  Assessment & Plan  HR a little labile but ok  S/P RVR at [facility]  Off Cardizem (last dose on 10/24)  On Lopressor: 25 mg bid --> 50 mg bid (10/25)  Cont to monitor    Azotemia- (present on admission)  Assessment & Plan  Bun: 23 (10/22)  Encourging fluid intake  Monitor    Diabetes mellitus with hyperglycemia,  without long-term current use of insulin (HCC)- (present on admission)  Assessment & Plan  Hba1c: 7.2 (10/11)  BS: ok for a while  On Metformin: 250 mg bid  Off accuchecks (10/26)  Note: home meds include Metformin  mg daily  Cont to monitor    CAD (coronary artery disease)- (present on admission)  Assessment & Plan  On Eliquis  On Lipitor  Off Cardizem (10/24)  On Metoprolol    Hypertension- (present on admission)  Assessment & Plan  BP ok  Off Cardizem (last dose on 10/24)  On Lopressor: 25 mg bid --> 50 mg bid (10/25)  Monitor    Neurologic abnormality  Assessment & Plan  Pt presented to [facility] with increased weakness, confusion, difficulty swallowing  An MRI of the brain was unremarkable and did not show the previous mass  An MRI of the cervical spine and thoracic spine did not show any acute issues or metastasis (has DDD)    UTI (urinary tract infection)- (present on admission)  Assessment & Plan  UC --> Enterobacter  S/P Cipro    Vitamin D insufficiency- (present on admission)  Assessment & Plan  Vit D: 24  On supplements

## 2019-10-27 NOTE — THERAPY
Speech Language Pathology  Daily Treatment     Patient Name: Marcio More Jr.  Age:  73 y.o., Sex:  male  Medical Record #: [id number]  Today's Date: 10/27/2019     Subjective    Pt pleasant and cooperative. Fatigued.      Objective       10/27/19 1431   Cognition   Attention to Task Moderate (3)   Simple Attention Moderate (3)   Simple Reasoning / Problem Solving Minimal (4)   Insight into Deficits Moderate (3)   Written Sequencing Moderate (3)   Interdisciplinary Plan of Care Collaboration   IDT Collaboration with  Nursing   Patient Position at End of Therapy In Bed;Bed Alarm On;Call Light within Reach;Tray Table within Reach   Collaboration Comments CLOF   SLP Total Time Spent   SLP Individual Total Time Spent (Mins) 60   Charge Group   SLP Cognitive Skill Development 4       FIM Comprehension Score:  5 - Stand-by Prompting/Supervision or Set-up  Comprehension Description:  Verbal cues    FIM Expression Score:  4 - Minimal Assistance  Expression Description:  Verbal cueing    FIM Social Interaction Score:  6 - Modified Independent  Social Interaction Description:  Increased time, Verbal cues    FIM Problem Solving Score:  3 - Moderate Assistance  Problem Solving Description:  Verbal cueing, Bed/chair alarm, Increased time, Seat belt, Therapy schedule    FIM Memory Score:  4 - Minimal Assistance  Memory Description:  Verbal cueing, Bed/chair alarm, Seat belt, Therapy schedule      Assessment    Pt completed two part written directions with 50% acc IND, increased to 5/8 w/ MOD cues. Functional problem solving- MIN cues to ID problem in picture- 80% acc MOD cues to generate logical solution- 65% acc. Increased processing time.      Plan    Continue to address problem solving. Family education.

## 2019-10-27 NOTE — CARE PLAN
Problem: Safety  Goal: Will remain free from injury  Intervention: Educate patient and significant other/support system about adaptive mobility strategies and safe transfers  Note:   Confusion noted, frequent reorientation to place, situation required. Bed in low position, call light within reach, bed alarm activated, room near nurses station.     Problem: Bowel/Gastric:  Goal: Normal bowel function is maintained or improved  Intervention: Educate patient and significant other/support system about diet, fluid intake, medications and activity to promote bowel function  Note:   Scheduled bowel meds administered at hs. No s/s of distress.

## 2019-10-27 NOTE — THERAPY
"Occupational Therapy  Daily Treatment     Patient Name: Marcio More Jr.  Age:  73 y.o., Sex:  male  Medical Record #: [id number]  Today's Date: 10/27/2019     Precautions  Precautions: (P) Fall Risk  Comments: (P) coccyx wound     Safety   ADL Safety : (P) Requires Physical Assist for Safety  Bathroom Safety: (P) Requires Cuing for Safety, Requires Physical Assist for Safety  Comments: see FIM for oral care and eating    Subjective    Pt was seated in w/c in dining room and agreeable to therapy. Pt reported increased back pain this date. Pt presented as confused making statements inconsistent with current situation such as \" I will be starting chemotherapy tomorrow \" and  \" I need to go home and clean my room.\"      Objective       10/27/19 0831   Precautions   Precautions Fall Risk   Comments coccyx wound    Safety    ADL Safety  Requires Physical Assist for Safety   Bathroom Safety Requires Cuing for Safety;Requires Physical Assist for Safety   Pain 0 - 10 Group   Location Back   Location Orientation Left   Pain Rating Scale (NPRS) 5   Description Aching   Comfort Goal Perform Activity   Therapist Pain Assessment Post Activity Pain Same as Prior to Activity  (nurse notified. Lidocane patch given )   Cognition    Speech/ Communication Delayed Responses  (whisper )   Orientation Level Not Oriented to Day   Level of Consciousness Alert   Ability To Follow Commands 2 Step   Safety Awareness Impaired   New Learning Impaired   Attention Impaired   Sequencing Impaired   Initiation Impaired   Balance   Sitting Balance (Static) Good   Sitting Balance (Dynamic) Fair   Standing Balance (Static) Fair -   Standing Balance (Dynamic) Poor +   Weight Shift Sitting Poor   Weight Shift Standing Poor   Bed Mobility    Sit to Supine Stand by Assist   Interdisciplinary Plan of Care Collaboration   IDT Collaboration with  Nursing   Patient Position at End of Therapy Seated;Self Releasing Lap Belt Applied  (CNA present ) "   Collaboration Comments dressing    OT Total Time Spent   OT Individual Total Time Spent (Mins) 60   OT Charge Group   OT Self Care / ADL 4       FIM Eating Score:  6 - Modified Independent  Eating Description:  Increased time    FIM Grooming Score:  5 - Standby Prompting/Supervision or Set-up  Grooming Description:  (SBA for set-up)    FIM Bathing Score:  3 - Moderate Assistance  Bathing Description:  Grab bar, Tub bench, Hand held shower, Increased time, Supervision for safety, Verbal cueing(Mod A for feet )    FIM Upper Body Dressin - Standby Prompting/Supervision or Set-up  Upper Body Dressing Description:  (SBa for verbal cues for sequencing )    FIM Lower Body Dressing Score:  3 - Moderate Assistance  Lower Body Dressing Description:  Reacher, Increased time, Supervision for safety, Verbal cueing(Mod A to jose socks and assist with shoes. Pt able to jose brief and pants with reacher )    FIM Toileting Body Dressing:  3 - Moderate Assistance  Toileting Description:       FIM Bed/Chair/Wheelchair Transfers Score: 5 - Standby Prompting/Supervision or Set-up  Bed/Chair/Wheelchair Transfers Description:  Increased time, Supervision for safety    FIM Toilet Transfer Score:  5 - Standby Prompting/Supervision or Set-up  Toilet Transfer Description:  Grab bar, Increased time, Supervision for safety, Verbal cueing    FIM Tub/Shower Transfers Score:  5 - Standby Prompting/Supervision or Set-up  Tub/Shower Transfers Description:  Grab bar, Increased time, Supervision for safety, Verbal cueing      Assessment    Pt participated in showering, grooming, and dressing with increased need for assistance this date. Pt presented as confused this date, impacting his initiation of tasks. Pt demonstrated impaired learning retention when using AE for dressing. Pt barriers include cognitive function, balance, impaired motor planning, and poor insight into deficits.     Plan    D/C tomorrow

## 2019-10-27 NOTE — PROGRESS NOTES
Patient care assumed. Report received from [facility] JENNIFER Sood. Patient is alert and calm, resting in bed. Call light and bedside table within reach. Will continue to monitor.

## 2019-10-28 PROBLEM — R31.9 HEMATURIA: Status: ACTIVE | Noted: 2019-01-01

## 2019-10-28 NOTE — THERAPY
"Physical Therapy   Daily Treatment     Patient Name: Marcio More Jr.  Age:  73 y.o., Sex:  male  Medical Record #: [id number]  Today's Date: 10/28/2019     Precautions  Precautions: (P) Fall Risk, Other (See Comments)  Comments: (P) coccyx wound    Subjective    Patient was agreeable to go outside for car transfer, then reported needing to use the bathroom. When asked to trial stairs after step ups, patient reported, \"Maybe after breakfast\".     Objective       10/28/19 1531   Precautions   Precautions Fall Risk;Other (See Comments)   Comments coccyx wound   Sitting Lower Body Exercises   Sit to Stand 2 sets of 10  (7\" step in // bars CGA)   Bed Mobility    Sit to Supine Stand by Assist   Sit to Stand   (Min-mod A sit to stand for fwd wt shift/ sequencing)   Scooting Supervised   Rolling Supervised   Neuro-Muscular Treatments   Neuro-Muscular Treatments Weight Shift Right;Weight Shift Left;Verbal Cuing;Tactile Cuing;Sequencing;Postural Changes;Postural Facilitation;Facilitation   Comments Toilet transfer SPT min-mod A for fwd wt shift off toilet, min A SPT with grab bar. In room amb with FWW 30 ft to bathroom CGA/min A to organize transfer to toilet.  Seated balance on toilet SPV, able to perform hygiene, but needed assist managing jeans.    PT Total Time Spent   PT Individual Total Time Spent (Mins) 30   PT Charge Group   PT Neuromuscular Re-Education / Balance 1   PT Therapeutic Activities 1       FIM Bed/Chair/Wheelchair Transfers Score: 4 - Minimal Assistance  Bed/Chair/Wheelchair Transfers Description:  (SBA bed mobility, Min A to guide fwd weight shift to stand with FWW, CGA SPT with FWW)    FIM Stairs Score:  0 - Not tested, patient refused  Stairs Description:         Assessment    Patient demonstrated improved sequencing and foot clearance during 7\" step ups, but refused progressing to stair during this session, suggesting fatigue. Patient continues to struggle with transfer sequencing, particularly " sit to stand, due to difficulty recalling hand placement and shifting weight forward.     Plan    Continue transfer training for STS, assess car transfer, hands on family training with spouse, gait endurance/ foot clearance, reassess stair safety.

## 2019-10-28 NOTE — REHAB-PHARMACY IDT TEAM NOTE
Pharmacy   Pharmacy  Antibiotics/Antifungals/Antivirals:  Medication:      Active Orders (From admission, onward)    None        Route:        NA  Stop Date:  NA  Reason:      NA  Antihypertensives/Cardiac:  Medication:    Orders (72h ago, onward)     Start     Ordered    10/25/19 0600  metoprolol (LOPRESSOR) tablet 50 mg  TWICE DAILY      10/24/19 1119    10/10/19 2100  atorvastatin (LIPITOR) tablet 20 mg  NIGHTLY      10/10/19 1546    10/10/19 1546  hydrALAZINE (APRESOLINE) tablet 25 mg  EVERY 8 HOURS PRN      10/10/19 1546              Patient Vitals for the past 24 hrs:   BP Pulse   10/28/19 1300 110/73 90   10/28/19 0700 106/71 89   10/28/19 0518 127/76 85   10/28/19 0516 127/76 85   10/27/19 1845 124/84 82     Anticoagulation:  Medication: Eliquis    Other key medications: A review of the medication list reveals no issues at this time. Patient is currently on antihypertensive(s). Recommend home blood pressure monitoring/recording if antihypertensive(s) regimen(s) continue. Patient is currently on diabetic medication(s) and/or Insulin(s). Recommend home blood glucose monitoring/recording if these regimen(s) continue.    Section completed by: Osmany Morales [facility]

## 2019-10-28 NOTE — PROGRESS NOTES
Received patient during shift change, report rec'd from day shift RN. Resting in bed, VS stable on room air. Incontinent of B&B, mod assist for transfers. A&O x 2, able to make needs known. Bed in low position, call light within reach.

## 2019-10-28 NOTE — THERAPY
Occupational Therapy  Daily Treatment     Patient Name: Marcio More Jr.  Age:  73 y.o., Sex:  male  Medical Record #: [id number]  Today's Date: 10/28/2019     Precautions  Precautions: (P) Fall Risk, Other (See Comments)  Comments: (P) coccyx wound    Safety   ADL Safety : (P) Requires Cueing for Safety, Requires Physical Assist for Safety  Bathroom Safety: (P) Requires Supervision for Safety, Requires Physical Assist for Safety  Comments: see FIM for oral care and eating    Subjective    Pt was seated in w/c and concerned his cell phone had been replaced. Therapist brought pt to his room and showed him where his phone was and pt was agreeable to therapy.      Objective       10/28/19 1301   Precautions   Precautions Fall Risk;Other (See Comments)   Comments coccyx wound   Safety    ADL Safety  Requires Cueing for Safety;Requires Physical Assist for Safety   Bathroom Safety Requires Supervision for Safety;Requires Physical Assist for Safety   Cognition    Orientation Level Not Oriented to Place;Not Oriented to Day   Level of Consciousness Alert   Safety Awareness Impaired;Impulsive   IADL Treatments   Home Management Pt stood with CGA to switch laundry from washer to dryer.    Balance   Sitting Balance (Static) Good   Sitting Balance (Dynamic) Fair +   Standing Balance (Static) Fair   Standing Balance (Dynamic) Fair -   Weight Shift Sitting Poor   Weight Shift Standing Fair   Skilled Intervention Verbal Cuing   Interdisciplinary Plan of Care Collaboration   Patient Position at End of Therapy Seated;Self Releasing Lap Belt Applied;Call Light within Reach   OT Total Time Spent   OT Individual Total Time Spent (Mins) 30   OT Charge Group   OT Therapy Activity 2     Pt walked pushing a shopping cart with CGA to locate and obtain 10 cones throughout the therapy gym     Assessment    Pt demonstrated increased balance and activity tolerance this date. Pt demonstrated sit>stand without a device with Min A.  Pt  barrier include confusion and disorientation, impaired motor planning, impaired balance, and poor insight to deficits.    Plan    Continue OT to address ADL/IADL independence, functional transfers, use of AE/DME, standing and sitting balance, edurance. Per family report pt enjoys drawing and art

## 2019-10-28 NOTE — PROGRESS NOTES
"Rehab Progress Note     Encounter Date: 10/28/2019    CC: Encephalopathy, confusion, weakness    Interval Events (Subjective)  Patient sitting up in room. He reports therapy is going well. Denies pain. Reports he is frustrated that he could not go home today.  Discussed that he and his family had agreed he needed a few more days so that he could go home safely. Denies NVD. Denies SOB.     IDT Team Meeting 10/22/2019  DC/Disposition:  10/28/19    Objective:  VITAL SIGNS: /73   Pulse 90   Temp 36.4 °C (97.6 °F) (Oral)   Resp 18   Ht 1.88 m (6' 2\")   Wt 86 kg (189 lb 9.5 oz)   SpO2 92%   BMI 24.34 kg/m²    Gen: NAD  Psych: Mood and affect appropriate  CV: RRR, no edema  Resp: CTAB, no upper airway sounds  Abd: NTND  Neuro: AOx2, pushing wheelchair BUE    Recent Results (from the past 72 hour(s))   ACCU-CHEK GLUCOSE    Collection Time: 10/25/19  5:28 PM   Result Value Ref Range    Glucose - Accu-Ck 143 (H) 65 - 99 mg/dL   ACCU-CHEK GLUCOSE    Collection Time: 10/26/19  7:31 AM   Result Value Ref Range    Glucose - Accu-Ck 127 (H) 65 - 99 mg/dL       Current Facility-Administered Medications   Medication Frequency   • glucose 4 g chewable tablet 16 g Q15 MIN PRN    And   • dextrose 50% (D50W) injection 50 mL Q15 MIN PRN   • metoprolol (LOPRESSOR) tablet 50 mg TWICE DAILY   • lidocaine (LIDODERM) 5 % 1 Patch Q24HR   • metFORMIN (GLUCOPHAGE) tablet 250 mg BID WITH MEALS   • melatonin tablet 6 mg QHS   • amantadine (SYMMETREL) 50 MG/5ML syrup 100 mg BID   • phenylephrine-cocoa butter (PREPARATION H) suppository 1 Suppository Q6HRS PRN   • apixaban (ELIQUIS) tablet 5 mg BID   • vitamin D (cholecalciferol) tablet 1,000 Units DAILY   • Respiratory Care per Protocol Continuous RT   • oxyCODONE immediate-release (ROXICODONE) tablet 2.5 mg Q3HRS PRN   • oxyCODONE immediate-release (ROXICODONE) tablet 5 mg Q3HRS PRN   • tramadol (ULTRAM) 50 MG tablet 50 mg Q4HRS PRN   • hydrALAZINE (APRESOLINE) tablet 25 mg Q8HRS PRN "   • acetaminophen (TYLENOL) tablet 650 mg Q4HRS PRN   • senna-docusate (PERICOLACE or SENOKOT S) 8.6-50 MG per tablet 2 Tab BID    And   • polyethylene glycol/lytes (MIRALAX) PACKET 1 Packet QDAY PRN    And   • magnesium hydroxide (MILK OF MAGNESIA) suspension 30 mL QDAY PRN    And   • bisacodyl (DULCOLAX) suppository 10 mg QDAY PRN   • artificial tears ophthalmic solution 1 Drop PRN   • benzocaine-menthol (CEPACOL) lozenge 1 Lozenge Q2HRS PRN   • mag hydrox-al hydrox-simeth (MAALOX PLUS ES or MYLANTA DS) suspension 20 mL Q2HRS PRN   • ondansetron (ZOFRAN ODT) dispertab 4 mg 4X/DAY PRN    Or   • ondansetron (ZOFRAN) syringe/vial injection 4 mg 4X/DAY PRN   • traZODone (DESYREL) tablet 50 mg QHS PRN   • sodium chloride (OCEAN) 0.65 % nasal spray 2 Spray PRN   • allopurinol (ZYLOPRIM) tablet 300 mg DAILY   • atorvastatin (LIPITOR) tablet 20 mg Nightly   • ferrous sulfate tablet 325 mg DAILY       Orders Placed This Encounter   Procedures   • Diet Order Diabetic     Standing Status:   Standing     Number of Occurrences:   1     Order Specific Question:   Diet:     Answer:   Diabetic [3]     Order Specific Question:   Consistency/Fluid modifications:     Answer:   Thin Liquids [3]       Assessment:  Active Hospital Problems    Diagnosis   • *Encephalopathy acute   • B-cell lymphoma (HCC)   • Metastasis to brain (HCC)   • Atrial fibrillation (HCC) w Rapid Ventricular Response    • CAD (coronary artery disease)   • Hypertension   • Type 2 diabetes mellitus without complication, without long-term current use of insulin (HCC)   • Chronic back pain   • Dysphagia   • Primary cerebral lymphoma (HCC)       Medical Decision Making and Plan:  Encephalopathy - Patient with recent diagnosis of stage IV B cell lymphoma s/p R-CHOP on 9/23/19 now with diffuse weakness, confusion and dysphagia  -PT and OT for mobility and ADLs  -SLP for cognition   -Started on Amantadine 100 mg BID, monitor for improvement    AMS - decreased endurance  and worsened spasticity. Check UA, started on Amantadine. UA positive, started on Ciprofloxacin per Hospitalist, U Cx pending - Enterobacter, susc to Ciprofloxacin. Completed.     Dysphagia - Patient on dysphagia 2 with NTL on transfer.  -SLP for swallow - advanced to regulars with thins.      Spasticity - Restarted on Baclofen and increased prior to admission.  -Continue on Baclofen 10 mg TID. With AMS, will reduce to 5 mg TID. No improvement in cognition. Trial of stopping baclofen - no increased rigidity     B cell lymphoma - Underwent R Chop on 9/23/19.  Followed by Oncology. Recent right retroperitoneal mass s/p biopsy which is positive for B cell lymphoma  -Follow-up Heme/Onc  -Discontinue Prednisone. Follow-up Oncology     HTN/A Fib - Patient on Pradaxa.  Patient on Diltiazem 360 mg QHS, Metoprolol 25 mg XL daily   -Patient chewing medication, will switch medications to short acting. Diltiazem 90 mg q6 and metoprolol 25 mg BID   -Hypotension on 10/17/19, discussed case with hospitalist and recommending decrease in Diltiazem. Per hospitalist only need Metoprolol at discr    DM - Patient on SSI on transfer. Consult hospitalist. Started on Metformin 250 mg     Sacral wound - Present on admission, seen by wound care today. Appreciate recommendations. Recommending mepilex only    Leukocytosis - B cell lymphoma on steroids. Check AM CBC - continued  -Consult hospitalist. UTI per above. Completed     Insomnia - Very poor sleep at night. Start Melatonin 6 mg QHS    Hx of Gout - Patient on Allopurinol 300 mg daily.     Vitamin D - 24 on admission, start on 1000 U for deficiency.      GI Ppx - Patient on Omeprazole 40 mg daily. Discontinue now that off of steroids.     DVT ppx - Patient on Eliquis 5 mg BID    Dispo - Wife would like goals of care conversation. Consulted palliative, arranged for meeting on Friday at 2PM. Extended stay until 11/1/19, will go home with HH. Do not want Hospice at this time.     Total  time:  25 minutes.  I spent greater than 50% of the time for patient care, counseling, and coordination on this date, including unit/floor time, and face-to-face time with the patient as per interval events and assessment and plan above. Topics discussed included discharge planning, discharge medications, and improving cognition slowly.     Leticia Bella M.D.

## 2019-10-28 NOTE — CARE PLAN
Problem: Safety  Goal: Will remain free from injury  Outcome: PROGRESSING SLOWER THAN EXPECTED   Pt does not use call light for assistance, Impulsive and unsteady from transfers.  Patient encouraged to call for assistance and not attempt self transfer . Able to verbalize needs.   Problem: Pain Management  Goal: Pain level will decrease to patient's comfort goal  Outcome: PROGRESSING AS EXPECTED   Patient reports satisfactory pain control and decrease intensity after pharmacological pain management.   Problem: Urinary Elimination:  Goal: Ability to reestablish a normal urinary elimination pattern will improve  Outcome: PROGRESSING SLOWER THAN EXPECTED   Patient voiding small amounts of hematuria, Doctor Agney informed, no new orders given.  Denies flank pain or dysuria; afebrile.  Will continue to monitor.

## 2019-10-28 NOTE — THERAPY
Physical Therapy   Daily Treatment     Patient Name: Marcio More Jr.  Age:  73 y.o., Sex:  male  Medical Record #: [id number]  Today's Date: 10/28/2019     Precautions  Precautions: Fall Risk, Other (See Comments)  Comments: coccyx wound    Subjective    Patient was agreeable to tx. No complaints.     Objective       10/28/19 1231   Precautions   Precautions Fall Risk;Other (See Comments)   Comments coccyx wound   Bed Mobility    Sit to Stand Minimal Assist  (min A vc to sequencing, and guide fwd wt shift)   Neuro-Muscular Treatments   Neuro-Muscular Treatments Weight Shift Left;Weight Shift Right;Verbal Cuing;Tactile Cuing;Sequencing;Postural Facilitation;Postural Changes   Comments Handwashing seated in wc set up. Cues and facilitation for dynamic standing posture and foot clearance.  VC for improved wheelchair stride/ revolution, with no change observed.     Interdisciplinary Plan of Care Collaboration   IDT Collaboration with  Occupational Therapist;Nursing   Patient Position at End of Therapy Seated   Collaboration Comments POC, sample collected/ blood in urine, Handoff to OT   PT Total Time Spent   PT Individual Total Time Spent (Mins) 30   PT Charge Group   PT Gait Training 1   PT Therapeutic Activities 1       FIM Walking Score:  2 - Max Assistance  Walking Description:  Walker, Extra time, Safety concerns(Min A, particularly when returning to wc after gait, 100 ft x 2, shuffled )    FIM Wheelchair Score:  2 - Max Assistance  Wheelchair Description:  Extra time, Adaptive equipment(100 ft, very slowly, small strokes, BLE/BUE indoors )      Assessment    Patient demonstrated poor foot clearance during gait training, and fwd trunk lean, despite cues and facilitation. Patient performed wc mobility very slowly, with small revolution/ strokes with BUE, and short step length with BLE. Patient demonstrates impaired motor planning returning to seated position post- ambulation, and sequencing sit to  stand.    Plan    Continue gait safety, transfer sequencing, postural awareness, reassess stairs, hands on family training with spouse, D/C Friday.

## 2019-10-28 NOTE — PROGRESS NOTES
Hospital Medicine Daily Progress Note    Date of Service  10/28/2019    Chief Complaint:  Hypertension  Afib  Diabetes  Leukocytosis    Interval History:  No significant events or changes since last visit    Review of Systems  Review of Systems   Constitutional: Negative for chills and fever.   Respiratory: Negative for shortness of breath.    Cardiovascular: Negative for chest pain.   Gastrointestinal: Negative for abdominal pain, diarrhea, nausea and vomiting.   Psychiatric/Behavioral: The patient is not nervous/anxious.         Physical Exam  Temp:  [36.2 °C (97.1 °F)-36.6 °C (97.9 °F)] 36.3 °C (97.3 °F)  Pulse:  [82-89] 89  Resp:  [18] 18  BP: (106-127)/(71-84) 106/71  SpO2:  [90 %-94 %] 90 %    Physical Exam   Constitutional: No distress.   HENT:   Mouth/Throat: No oropharyngeal exudate.   Eyes: EOM are normal.   Neck: No JVD present.   Cardiovascular: Normal rate and regular rhythm.   Pulmonary/Chest: Effort normal. He has no wheezes. He has no rales.   Abdominal: Soft. He exhibits no distension. There is no tenderness.   Skin: Skin is warm and dry.   Psychiatric: His behavior is normal.   Nursing note and vitals reviewed.      Fluids    Intake/Output Summary (Last 24 hours) at 10/28/2019 1057  Last data filed at 10/28/2019 0800  Gross per 24 hour   Intake 960 ml   Output --   Net 960 ml       Laboratory                        Imaging    Assessment/Plan  B-cell lymphoma (HCC)- (present on admission)  Assessment & Plan  Stage IV  Hx of brain resection  S/P R-CHOP chemo on 9/23  Recent brain MRI showed no mass    A-fib (HCC)- (present on admission)  Assessment & Plan  HR ok  S/P RVR at [facility]  Off Cardizem (last dose on 10/24)  On Lopressor: 25 mg bid --> 50 mg bid (10/25)  Cont to monitor    Azotemia- (present on admission)  Assessment & Plan  Bun: 23 (10/22)  Encourging fluid intake  Monitor    Diabetes mellitus with hyperglycemia, without long-term current use of insulin (HCC)- (present on admission)  Assessment  & Plan  Hba1c: 7.2 (10/11)  On Metformin: 250 mg bid  Off accuchecks (10/26)  Note: home meds include Metformin  mg daily  Cont to monitor    CAD (coronary artery disease)- (present on admission)  Assessment & Plan  On Eliquis  On Lipitor  Off Cardizem  On Metoprolol    Hypertension- (present on admission)  Assessment & Plan  BP ok  Off Cardizem (last dose on 10/24)  On Lopressor: 25 mg bid --> 50 mg bid (10/25)  Monitor    Neurologic abnormality  Assessment & Plan  Pt presented to [facility] [place] with increased weakness, confusion, difficulty swallowing  An MRI of the brain was unremarkable and did not show the previous mass  An MRI of the cervical spine and thoracic spine did not show any acute issues or metastasis (has DDD)    UTI (urinary tract infection)- (present on admission)  Assessment & Plan  UC --> Enterobacter  S/P Cipro    Vitamin D insufficiency- (present on admission)  Assessment & Plan  Vit D: 24  On supplements

## 2019-10-28 NOTE — THERAPY
"Speech Language Pathology  Daily Treatment     Patient Name: Marcio More Jr.  Age:  73 y.o., Sex:  male  Medical Record #: [id number]  Today's Date: 10/28/2019     Subjective    Pt in his room with all belongings packed up stating he is leaving today.  Pt reminded that he will be at the rehab hospital until 11/1, pt stated \"We'll see about that\".  Pt willing to participate in ST      Objective       10/28/19 1001   SLP Total Time Spent   SLP Individual Total Time Spent (Mins) 30   Charge Group   SLP Cognitive Skill Development 2       Assessment    Completed 4 step written sequencing tasks.  Pt required mod cues to remain awake and alert during this session and mod-max cues for problem solving to complete sequencing tasks.  Pt frequently adding steps that were not present to the sequencing tasks and perseverated on previous sequencing tasks.      Plan    Continue targeting orientation, problem solving, attention     "

## 2019-10-28 NOTE — CARE PLAN
Problem: Safety  Goal: Will remain free from injury  Intervention: Educate patient and significant other/support system about adaptive mobility strategies and safe transfers  Note:   Confusion noted, impulsive at times. Bed in low position, call light within reach, bed alarm activated, room near nurses station.     Problem: Skin Integrity  Goal: Risk for impaired skin integrity will decrease  Intervention: Assess risk factors for impaired skin integrity and/or pressure ulcers  Note:   Frequently incontinent. Assisted to toilet prior to hs. Mepilex in place to sacrum. No new skin issues noted.

## 2019-10-28 NOTE — REHAB-OT IDT TEAM NOTE
Occupational Therapy   Activities of Daily Living  Eating Initial:  4 - Minimal Assistance  Eating Current:  6 - Modified Independent   Eating Description:  Increased time  Grooming Initial:  3 - Moderate Assistance  Grooming Current:  5 - Standby Prompting/Supervision or Set-up   Grooming Description:  (SBA for set-up)  Bathing Initial:  2 - Max Assistance  Bathing Current:  3 - Moderate Assistance   Bathing Description:  Grab bar, Tub bench, Hand held shower, Increased time, Supervision for safety, Verbal cueing(Mod A for feet )  Upper Body Dressing Initial:  3 - Moderate Assistance  Upper Body Dressing Current:  5 - Standby Prompting/Supervision or Set-up   Upper Body Dressing Description:  (SBa for verbal cues for sequencing )  Lower Body Dressing Initial:  1 - Total Assistance  Lower Body Dressing Current:  3 - Moderate Assistance   Lower Body Dressing Description:  3 - Moderate Assistance  Toileting Initial:  3 - Moderate Assistance  Toileting Current:  5 - Standby Prompting/Supervision or Set-up   Toileting Description:  Grab bar, Increased time, Supervision for safety(SBA for toileting (bladder) with extra time to complete )  Toilet Transfer Initial:  1 - Total Assistance  Toilet Transfer Current:  5 - Standby Prompting/Supervision or Set-up   Toilet Transfer Description:  5 - Standby Prompting/Supervision or Set-up  Tub / Shower Transfer Initial:  1 - Total Assistance  Tub / Shower Transfer Current:  5 - Standby Prompting/Supervision or Set-up   Tub / Shower Transfer Description:  Grab bar, Increased time, Supervision for safety, Verbal cueing  IADL:  Pt has completed laundry tasks with SBA    Family Training/Education:  Family training to daughter and wife for bed>w/c transfers   DME/DC Recommendations:  Shower Chair;Grab Bars In Shower / Tub;Grab Bars By Toilet, reacher, dressing stick  Home health OT    Strengths:  Able to follow instructions, Adequate strength, Effective communication skills, Making  steady progress towards goals, Supportive family and Willingly participates in therapeutic activities  Barriers:  Confused, Generalized weakness, Limited mobility, Poor activity tolerance and Poor insight/denial of deficits     # of short term goals set= 3    # of short term goals met= 1     Occupational Therapy Goals     Problem: Bathing     Dates: Start: 10/11/19       Description:     Goal: STG-Within one week, patient will bathe     Dates: Start: 10/11/19       Description: 1) Individualized Goal:  Min A with AE/DME PRN  2) Interventions:  OT E Stim Attended, OT Group Therapy, OT Self Care/ADL, OT Cognitive Skill Dev, OT Community Reintegration, OT Manual Ther Technique, OT Neuro Re-Ed/Balance, OT Sensory Int Techniques, OT Therapeutic Activity and OT Evaluation                   Problem: Dressing     Dates: Start: 10/11/19       Description:     Goal: STG-Within one week, patient will dress LB     Dates: Start: 10/11/19       Description: 1) Individualized Goal:  Min A with AE/DME PRN  2) Interventions:  OT E Stim Attended, OT Group Therapy, OT Self Care/ADL, OT Cognitive Skill Dev, OT Community Reintegration, OT Manual Ther Technique, OT Neuro Re-Ed/Balance, OT Sensory Int Techniques, OT Therapeutic Activity and OT Evaluation                   Problem: OT Long Term Goals     Dates: Start: 10/11/19       Description:     Goal: LTG-By discharge, patient will complete basic self care tasks     Dates: Start: 10/11/19       Description: 1) Individualized Goal:  Min A with AE/DME PRN  2) Interventions:  OT E Stim Attended, OT Group Therapy, OT Self Care/ADL, OT Cognitive Skill Dev, OT Community Reintegration, OT Manual Ther Technique, OT Neuro Re-Ed/Balance, OT Sensory Int Techniques, OT Therapeutic Activity and OT Evaluation            Goal: LTG-By discharge, patient will perform bathroom transfers     Dates: Start: 10/11/19       Description: 1) Individualized Goal:  Min A with AE/DME PRN  2) Interventions:  OT E  Stim Attended, OT Group Therapy, OT Self Care/ADL, OT Cognitive Skill Dev, OT Community Reintegration, OT Manual Ther Technique, OT Neuro Re-Ed/Balance, OT Sensory Int Techniques, OT Therapeutic Activity and OT Evaluation                          Section completed by:  Leanne Marie MS, OTR/L, CHT

## 2019-10-28 NOTE — THERAPY
Speech Language Pathology  Daily Treatment     Patient Name: Marcio More Jr.  Age:  73 y.o., Sex:  male  Medical Record #: [id number]  Today's Date: 10/28/2019     Subjective    Pt very fatigued, requesting pain medications for back pain.  Pt willing to participate in ST     Objective       10/28/19 1331   SLP Total Time Spent   SLP Individual Total Time Spent (Mins) 30   Charge Group   SLP Cognitive Skill Development 2       Assessment    O-log completed with a score of 22/30 (increased from 15/30).  Continued 4 step written sequencing tasks with mod A required to keep pt awake and for problem solving.  Pt was able to transfer back into bed at the end of this session with CGA.      Plan    Continue O-log and targeting attention and problem solving

## 2019-10-28 NOTE — THERAPY
"Occupational Therapy  Daily Treatment     Patient Name: Marcio More Jr.  Age:  73 y.o., Sex:  male  Medical Record #: [id number]  Today's Date: 10/28/2019     Precautions  Precautions: (P) Fall Risk, Other (See Comments)  Comments: (P) coccyx wound    Safety   ADL Safety : (P) Requires Cueing for Safety, Requires Physical Assist for Safety  Bathroom Safety: (P) Requires Physical Assist for Safety, Requires Cuing for Safety  Comments: see FIM for oral care and eating    Subjective    Pt was confused this date. Pt was seated in w/c at window and reported \" I am watching the sunset waiting for the horses to walk by.\" When told he was not leaving today pt stated \"I need to get home to help Mell, I need to mow and clean out the stables.\"      Objective       10/28/19 1031   Precautions   Precautions Fall Risk;Other (See Comments)   Comments coccyx wound   Safety    ADL Safety  Requires Cueing for Safety;Requires Physical Assist for Safety   Bathroom Safety Requires Physical Assist for Safety;Requires Cuing for Safety   Cognition    Orientation Level Not Oriented to Place;Not Oriented to Day   Level of Consciousness Alert   Ability To Follow Commands 2 Step   IADL Treatments   Home Management Pt compelted laundry task with SBA for safety. Pt did not require any verbal cues during this avtivity.    Balance   Sitting Balance (Static) Good   Sitting Balance (Dynamic) Fair +   Standing Balance (Static) Poor +   Standing Balance (Dynamic) Poor +   Weight Shift Sitting Poor   Weight Shift Standing Poor   Skilled Intervention Verbal Cuing   Interdisciplinary Plan of Care Collaboration   IDT Collaboration with  Nursing   Patient Position at End of Therapy Seated;Self Releasing Lap Belt Applied;Call Light within Reach   Collaboration Comments incontinence and blood in urine    OT Total Time Spent   OT Individual Total Time Spent (Mins) 45   OT Charge Group   OT Self Care / ADL 2   OT Therapy Activity 1       FIM Toileting: "  3 - Moderate Assistance  Toileting Description:       FIM Toilet Transfer Score:  5 - Standby Prompting/Supervision or Set-up  Toilet Transfer Description:  Grab bar, Increased time, Supervision for safety    Sit to stand from w/c to washing machine with SBA for safety    Assessment    Pt participated in laundry task with SBA for safety, not requiring any verbal cues for initiation or sequencing this date. Pt reported he needed to use the bathroom, however, was incontinent to urine by the time he reached the bathroom. Pt demonstrated increased independence with functional transfers this date. Pt barrier include confusion and disorientation, limited activity tolerance, impaired motor planning, impaired balance, and poor insight to deficits.     Plan    Continue OT to address ADL/IADL independence, functional transfers, use of AE/DME, standing and sitting balance, edurance. Per family report pt enjoys drawing and art

## 2019-10-29 NOTE — THERAPY
"Speech Language Pathology  Daily Treatment     Patient Name: Marcio More Jr.  Age:  73 y.o., Sex:  male  Medical Record #: [id number]  Today's Date: 10/29/2019     Subjective    Pt was in his room wheeling himself to the bathroom stating \"I need to pee, I was just going to go really quick\".       Objective       10/29/19 1301   SLP Total Time Spent   SLP Individual Total Time Spent (Mins) 30   Charge Group   SLP Cognitive Skill Development 2       Assessment    Pt attempting to use the bathroom without assistance at the beginning of this session.  Pt educated on the importance of calling for assistance before completing any transfers.  Pt benefited from min cues during transfers for safety (cues to lock the brakes) and min cues for placement of hands when standing up (pt attempting to pull himself up using the walker).      Plan    Continue targeting O-log, attention, problem solving     "

## 2019-10-29 NOTE — REHAB-COLLABORATIVE ONGOING IDT TEAM NOTE
Weekly Interdisciplinary Team Conference Note    Weekly Interdisciplinary Team Conference # 3  Date:  10/29/2019    Clinicians present and reporting at team conference include the following:   MD: MARIA GUADALUPE Bella MD    RN:  Kailash Inman RN    PT:   Yessenia Car PT, DPT  OT:  [place] Frank OTD, OTR/L   ST:  Prashanth Medina MS, CCC-SLP  CM:  Sara White RN [place] [facility]  REC:  None  RT:  None  RPh:  Sofy Joseph RP  Other:   None  All reporting clinicians have a working knowledge of this patient's plan of care.    Targeted DC Date:  11/1/19     Medical    Patient Active Problem List    Diagnosis Date Noted   • B-cell lymphoma (HCC) 10/01/2019     Priority: High   • Metastasis to brain (HCC) 10/01/2019     Priority: High   • Neutropenia (HCC) 10/01/2019     Priority: High   • Intracranial mass 07/06/2019     Priority: High   • Myelopathy (HCC) 05/15/2016     Priority: High   • Thoracic stenosis 05/15/2016     Priority: High     Class: Acute   • Ileus (HCC) 05/15/2016     Priority: High     Class: Acute   • A-fib (HCC) 05/15/2016     Priority: High     Class: Chronic   • Hyponatremia 07/12/2019     Priority: Medium   • Hypertension 05/15/2016     Priority: Medium     Class: Chronic   • CAD (coronary artery disease) 05/15/2016     Priority: Medium     Class: Chronic   • Diabetes mellitus with hyperglycemia, without long-term current use of insulin (HCC) 05/15/2016     Priority: Medium     Class: Acute   • Azotemia 05/15/2016     Priority: Medium     Class: Acute   • Chronic back pain 05/15/2016     Priority: Low     Class: Chronic   • Dyslipidemia 05/15/2016     Priority: Low     Class: Chronic   • Hematuria 10/28/2019   • Hypokalemia 10/20/2019   • Neurologic abnormality 10/12/2019   • Encephalopathy acute 10/10/2019   • Normocytic anemia 10/03/2019   • Dysphagia 10/01/2019   • Hypomagnesemia 07/24/2019   • Vitamin D insufficiency 07/18/2019   • UTI (urinary tract infection) 07/18/2019   • 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 **        Nursing  Diet/Nutrition:  Regular and Thin Liquids   Medication Administration:  Crush all Medications in Puree we crush meds because he holds them in his mouth and does not swallow them for a very long time  % consumed at meals in last 24 hours:  Consumed % of meals per documentation.  Meal/Snack Consumption for the past 24 hrs:   Oral Nutrition   10/28/19 1200 Lunch;Between 50-75% Consumed   10/28/19 0800 Breakfast;Between % Consumed     Snack schedule:  None  Supplement:  Other: none  Appetite:  Fair  Fluid Intake/Output in past 24 hours: In: 360 [P.O.:360]  Out: -   Admit Weight:  Weight: 83.5 kg (184 lb 1.4 oz)  Weight Last 7 Days   [86 kg (189 lb 9.5 oz)] 86 kg (189 lb 9.5 oz) (10/27 1000)    Pain Issues:    Location:  --  --         Severity:  Denies   Scheduled pain medications:  none  PRN pain medications used in last 24 hours:  oxycodone immediate release (ROXICODONE)    Non Pharmacologic Interventions:  warm blanket, relaxation and repositioned  Effectiveness of pain management plan:  fair=improved comfort with interventions but does not always meet goal - patient does not complain of pain - and denies at times, you will see him grimace -     Bowel:    Bowel Assist Initial Score:  1 - Total Assistance  Bowel Assist Current Score:  1 - Total Assistance  Bowl Accidents in last 7 days:  0  Last bowel movement: 10/27/19  Stool Description: Large, Loose     Usual bowel pattern:  every 2-3 days  Scheduled bowel medications:  senna-docusate (PERICOLACE or SENOKOT S)   PRN bowel medications used in last 24 hours:  None  Nursing Interventions:  Increased time, PRN medication, Scheduled medication, Supervision, Verbal cueing, Positioning on commode/toilet, Brief  Effectiveness of bowel program:   No bowel program  Bladder:    Bladder Assist Initial Score:  1 - Total  Assistance  Bladder Assist Current Score:  1 - Total Assistance  Bladder Accidents in last 7 days:  2  PVR range for past 24-48 hours: -- ()  Intermittent Catheterization:  n/a  Medications affecting bladder:      Time void schedule/voiding pattern:  Prn - incontinent at times  Interventions:  Increased time, Medication, Supervision, Verbal cueing, Urinal  Effectiveness of bladder training:  Poor= > 1 incontinent emptying of bladder    Sleep/wake cycle:   Average hours slept:  Sleeps 4-6 hours without waking  Sleep medication usage:  None    Patient/Family Training/Education:  Bladder Management/Training, Bowel Management/Training, Fall Prevention, General Self Care, Medication Management, Safe Transfers, Safety and Skin Care  Discharge Supply Recommendations:  None at this time.  Strengths: Willingly participates in therapeutic activities, Supportive family and Pleasant and cooperative   Barriers:   Confused at times - rigid / spastic muscles          Long Term Goals:   At discharge patient will be able to function safely at home with support.    Section completed by:  Nadia Umaña R.N.           Mobility  Bed mobility:  Mod A + extra time, impaired sequencing   Bed /Chair/Wheelchair Transfer Initial:  1 - Total Assistance  Bed /Chair/Wheelchair Transfer Current:  1 - Total Assistance   Bed/Chair/Wheelchair Transfer Description:  (Poor sequencing, attempted to sequence for 20 min, Mod A sup to sit, 2 person STS with FWW, SPT to wc min A.  )  Walk Initial:  0 - Not tested,unsafe activity  Walk Current:  4 - Minimal Assistance   Walk Description:  Walker, Requires incidental assist, Safety concerns, Extra time(Step to, shuffled, impaired posture, vc for proximity to walker, indoors 150 ft)  Wheelchair Initial:  0 - Not tested,medical condition  Wheelchair Current:  2 - Max Assistance   Wheelchair Description:  Extra time(Short step lenth, short UE strokes, poor efficiency, limited by fatigue, indoors, 50  "ft)  Stairs Initial:  0 - Not tested,unsafe activity  Stairs Current: 0 - Not tested,unsafe activity   Stairs Description: Extra time, Safety concerns, Verbal cueing, Supervision for safety, Requires incidental assist, Ascends/descends 4 to 11 steps(Up/down 6 4\" steps with BHR and min A/CGA)  Patient/Family Training/Education: Daughter and spouse trained in transfers. Family educated on safety, POC, gait belt, CLOF.  DME/DC Recommendations:  No DME recommended; patient has FWW and wc.   Strengths:  Pleasant and cooperative and Willingly participates in therapeutic activities  Barriers:   Decreased endurance, Generalized weakness, Limited mobility, Poor activity tolerance, Poor balance and Other: Inconsistent performance, impaired sequencing   # of short term goals set= 2  # of short term goals met= 0  Physical Therapy Problems     Problem: Mobility     Dates: Start: 10/21/19       Description:     Goal: STG-Within one week, patient will ambulate household distance     Dates: Start: 10/21/19       Description: 1) Individualized goal:  Patient will amb with FWW over various surfaces 150 ft x 2 CGA-SBA consistently  2) Interventions:  PT Group Therapy, PT E Stim Attended, PT Gait Training, PT Therapeutic Exercises, PT Neuro Re-Ed/Balance, PT Aquatic Therapy, PT Therapeutic Activity and PT Manual Therapy       Note:     Goal Note filed on 10/29/19 1218 by Yessenia Car DPT    Min A-CGA                        Problem: Mobility Transfers     Dates: Start: 10/21/19       Description:     Goal: STG-Within one week, patient will transfer bed to chair     Dates: Start: 10/21/19       Description: 1) Individualized goal:  Patient will transfer CGA consistently with FWW STS/SPT, SBA bed mobility with leg   2) Interventions:  PT Group Therapy, PT E Stim Attended, PT Gait Training, PT Therapeutic Exercises, PT Neuro Re-Ed/Balance, PT Therapeutic Activity and PT Manual Therapy       Note:     Goal Note filed on 10/29/19 " 1218 by Yessenia Car, DPT    Inconsistent performance, 2 person STS, to min/mod of 1  Impaired sequencing                           Problem: PT-Long Term Goals     Dates: Start: 10/11/19       Description:     Goal: LTG-By discharge, patient will propel wheelchair     Dates: Start: 10/11/19       Description: 1) Individualized goal:  150 ft, spv   2) Interventions:  PT Group Therapy, PT E Stim Attended, PT Gait Training, PT Therapeutic Exercises, PT Neuro Re-Ed/Balance, PT Aquatic Therapy, PT Therapeutic Activity, PT Manual Therapy and PT Wound Therapy                 Goal: LTG-By discharge, patient will transfer one surface to another     Dates: Start: 10/11/19       Description: 1) Individualized goal:  Min A with LRAD   2) Interventions: PT Group Therapy, PT E Stim Attended, PT Gait Training, PT Therapeutic Exercises, PT Neuro Re-Ed/Balance, PT Aquatic Therapy, PT Therapeutic Activity, PT Manual Therapy and PT Wound Therapy                 Goal: LTG-By discharge, patient will perform home exercise program     Dates: Start: 10/11/19       Description: 1) Individualized goal:  SBA with LE HEP with use of handout  2) Interventions: PT Group Therapy, PT E Stim Attended, PT Gait Training, PT Therapeutic Exercises, PT Neuro Re-Ed/Balance, PT Aquatic Therapy, PT Therapeutic Activity, PT Manual Therapy and PT Wound Therapy                 Goal: LTG-By discharge, patient will transfer in/out of a car     Dates: Start: 10/11/19       Description: 1) Individualized goal:  Min A with LRAD  2) Interventions: PT Group Therapy, PT E Stim Attended, PT Gait Training, PT Therapeutic Exercises, PT Neuro Re-Ed/Balance, PT Aquatic Therapy, PT Therapeutic Activity, PT Manual Therapy and PT Wound Therapy                 Goal: LTG-By discharge, patient will ambulate     Dates: Start: 10/21/19       Description: 1) Individualized goal:  Patient will amb indoors >50 ft x 2 for household mobility CGA-SBA with FWW  2) Interventions:  PT  Group Therapy, PT E Stim Attended, PT Gait Training, PT Therapeutic Exercises, PT Neuro Re-Ed/Balance, PT Aquatic Therapy, PT Therapeutic Activity and PT Manual Therapy                           Section completed by:  Yessenia Car PT, DPT    Activities of Daily Living  Eating Initial:  4 - Minimal Assistance  Eating Current:  6 - Modified Independent   Eating Description:  Increased time  Grooming Initial:  3 - Moderate Assistance  Grooming Current:  5 - Standby Prompting/Supervision or Set-up   Grooming Description:  (SBA for set-up)  Bathing Initial:  2 - Max Assistance  Bathing Current:  3 - Moderate Assistance   Bathing Description:  Grab bar, Tub bench, Hand held shower, Increased time, Supervision for safety, Verbal cueing(Mod A for feet )  Upper Body Dressing Initial:  3 - Moderate Assistance  Upper Body Dressing Current:  5 - Standby Prompting/Supervision or Set-up   Upper Body Dressing Description:  (SBa for verbal cues for sequencing )  Lower Body Dressing Initial:  1 - Total Assistance  Lower Body Dressing Current:  3 - Moderate Assistance   Lower Body Dressing Description:  3 - Moderate Assistance  Toileting Initial:  3 - Moderate Assistance  Toileting Current:  5 - Standby Prompting/Supervision or Set-up   Toileting Description:  Grab bar, Increased time, Supervision for safety(SBA for toileting (bladder) with extra time to complete )  Toilet Transfer Initial:  1 - Total Assistance  Toilet Transfer Current:  5 - Standby Prompting/Supervision or Set-up   Toilet Transfer Description:  5 - Standby Prompting/Supervision or Set-up  Tub / Shower Transfer Initial:  1 - Total Assistance  Tub / Shower Transfer Current:  5 - Standby Prompting/Supervision or Set-up   Tub / Shower Transfer Description:  Grab bar, Increased time, Supervision for safety, Verbal cueing  IADL:  Pt has completed laundry tasks with SBA    Family Training/Education:  Family training to daughter and wife for bed>w/c transfers   DME/DC  Recommendations:  Shower Chair;Grab Bars In Shower / Tub;Grab Bars By Toilet, reacher, dressing stick  Home health OT    Strengths:  Able to follow instructions, Adequate strength, Effective communication skills, Making steady progress towards goals, Supportive family and Willingly participates in therapeutic activities  Barriers:  Confused, Generalized weakness, Limited mobility, Poor activity tolerance and Poor insight/denial of deficits     # of short term goals set= 3    # of short term goals met= 1     Occupational Therapy Goals     Problem: Bathing     Dates: Start: 10/11/19       Description:     Goal: STG-Within one week, patient will bathe     Dates: Start: 10/11/19       Description: 1) Individualized Goal:  Min A with AE/DME PRN  2) Interventions:  OT E Stim Attended, OT Group Therapy, OT Self Care/ADL, OT Cognitive Skill Dev, OT Community Reintegration, OT Manual Ther Technique, OT Neuro Re-Ed/Balance, OT Sensory Int Techniques, OT Therapeutic Activity and OT Evaluation                   Problem: Dressing     Dates: Start: 10/11/19       Description:     Goal: STG-Within one week, patient will dress LB     Dates: Start: 10/11/19       Description: 1) Individualized Goal:  Min A with AE/DME PRN  2) Interventions:  OT E Stim Attended, OT Group Therapy, OT Self Care/ADL, OT Cognitive Skill Dev, OT Community Reintegration, OT Manual Ther Technique, OT Neuro Re-Ed/Balance, OT Sensory Int Techniques, OT Therapeutic Activity and OT Evaluation                   Problem: OT Long Term Goals     Dates: Start: 10/11/19       Description:     Goal: LTG-By discharge, patient will complete basic self care tasks     Dates: Start: 10/11/19       Description: 1) Individualized Goal:  Min A with AE/DME PRN  2) Interventions:  OT E Stim Attended, OT Group Therapy, OT Self Care/ADL, OT Cognitive Skill Dev, OT Community Reintegration, OT Manual Ther Technique, OT Neuro Re-Ed/Balance, OT Sensory Int Techniques, OT Therapeutic  Activity and OT Evaluation            Goal: LTG-By discharge, patient will perform bathroom transfers     Dates: Start: 10/11/19       Description: 1) Individualized Goal:  Min A with AE/DME PRN  2) Interventions:  OT E Stim Attended, OT Group Therapy, OT Self Care/ADL, OT Cognitive Skill Dev, OT Community Reintegration, OT Manual Ther Technique, OT Neuro Re-Ed/Balance, OT Sensory Int Techniques, OT Therapeutic Activity and OT Evaluation                          Section completed by:  Leanne Marie, MS, OTR/L, CHT    Cognitive Linquistic Functions  Comprehension Initial:  4 - Minimal Assistance  Comprehension Current:  4 - Minimal Assistance   Comprehension Description:  Verbal cues  Expression Initial:  4 - Minimal Assistance  Expression Current:  3 - Moderate Assistance   Expression Description:  Verbal cueing  Social Interaction Initial:  5 - Stand-by Prompting/Supervision or Set-up  Social Interaction Current:  5 - Stand-by Prompting/Supervision or Set-up   Social Interaction Description:  Increased time, Verbal cues  Problem Solving Initial:  3 - Moderate Assistance  Problem Solving Current:  3 - Moderate Assistance   Problem Solving Description:  Verbal cueing, Increased time  Memory Initial:  3 - Moderate Assistance  Memory Current:  3 - Moderate Assistance   Memory Description:  Verbal cueing, Therapy schedule, Bed/chair alarm, Seat belt  Executive Functioning / Organization Initial:   NA  Executive Functioning / Organization Current:   NA   Executive Functioning Desciption:  NA  Swallowing  Swallowing Status:  Not following for dysphagia management   Orders Placed This Encounter   Procedures   • Diet Order Diabetic     Standing Status:   Standing     Number of Occurrences:   1     Order Specific Question:   Diet:     Answer:   Diabetic [3]     Order Specific Question:   Consistency/Fluid modifications:     Answer:   Thin Liquids [3]     Behavior Modification Plan  Allow for rest time, Keep instructions  simple/concrete, Redirect to task/topic and Analyze tasks (break down into smaller steps)  Assistive Technology  Memory aides: and Low tech: Calendar, planner, schedule, alarms/timers, pill organizer, post-it notes, lists  Family Training/Education:  Ongoing with pt's family when they are present   DC Recommendations:   24 hour spv   Strengths:  Supportive family and Willingly participates in therapeutic activities  Barriers:  Confused, Poor activity tolerance, Poor carryover of learning and Other: poor attention, problem solving  # of short term goals set=3  # of short term goals met=0  Speech Therapy Problems     Problem: Expression STGs     Dates: Start: 10/12/19       Description:     Goal: STG-Within one week, patient will     Dates: Start: 10/12/19       Description: 1) Individualized goal:  Will produce prolonged voicing (e.g., hum, 'ah', frontal focus) with 80% accuracy and MOD A.   2) Interventions:  SLP Speech Language Treatment and SLP Group Treatment       Note:     Goal Note filed on 10/29/19 1110 by Prashanth Medina MS,CCC-SLP    Max A required for consistent voicing                         Problem: Memory STGs     Dates: Start: 10/12/19       Description:     Goal: STG-Within one week, patient will     Dates: Start: 10/22/19       Description: 1) Individualized goal:  Complete alternating attention tasks given min A to achieve 80% accuracy or greater.   2) Interventions:  SLP Speech Language Treatment, SLP Self Care / ADL Training , SLP Cognitive Skill Development and SLP Group Treatment       Note:     Goal Note filed on 10/29/19 1110 by Prashanth Medina MS,CCC-SLP    Mod-max A required to complete alternating attention task                         Problem: Problem Solving STGs     Dates: Start: 10/22/19       Description:     Goal: STG-Within one week, patient will     Dates: Start: 10/22/19       Description: 1) Individualized goal:  Achieve a score of 25/30 or greater on the O-log for 3 consecutive  days.    2) Interventions:  SLP Speech Language Treatment, SLP Self Care / ADL Training , SLP Cognitive Skill Development and SLP Group Treatment       Note:     Goal Note filed on 10/29/19 1110 by Prashanth Medina MS,CCC-SLP    Pt unable to achieve a score of 25/30 for more than 1 day                         Problem: Speech/Swallowing LTGs     Dates: Start: 10/11/19       Description:     Goal: LTG-By discharge, patient will solve basic problems     Dates: Start: 10/11/19       Description: 1) Individualized goal:  Marco Antonio for safe discharge home.    2) Interventions:  SLP Cognitive Skill Development and SLP Group Treatment                          Section completed by:  Prashanth Medina MS,CCC-SLP          REHAB-Pharmacy IDT Team Note by Osmany Morales RPH at 10/28/2019  4:57 PM  Version 1 of 1    Author:  Osmany Morales RPH Service:  -- Author Type:  Pharmacist    Filed:  10/28/2019  4:58 PM Date of Service:  10/28/2019  4:57 PM Status:  Signed    :  Osmany Morales RPH (Pharmacist)         Pharmacy[AW.1]   Pharmacy  Antibiotics/Antifungals/Antivirals:  Medication:      Active Orders (From admission, onward)    None        Route:        NA  Stop Date:  NA  Reason:      NA  Antihypertensives/Cardiac:  Medication:    Orders (72h ago, onward)     Start     Ordered    10/25/19 0600  metoprolol (LOPRESSOR) tablet 50 mg  TWICE DAILY      10/24/19 1119    10/10/19 2100  atorvastatin (LIPITOR) tablet 20 mg  NIGHTLY      10/10/19 1546    10/10/19 1546  hydrALAZINE (APRESOLINE) tablet 25 mg  EVERY 8 HOURS PRN      10/10/19 1546              Patient Vitals for the past 24 hrs:   BP Pulse   10/28/19 1300 110/73 90   10/28/19 0700 106/71 89   10/28/19 0518 127/76 85   10/28/19 0516 127/76 85   10/27/19 1845 124/84 82     Anticoagulation:  Medication: Eliquis    Other key medications: A review of the medication list reveals no issues at this time. Patient is currently on antihypertensive(s). Recommend home blood pressure  monitoring/recording if antihypertensive(s) regimen(s) continue. Patient is currently on diabetic medication(s) and/or Insulin(s). Recommend home blood glucose monitoring/recording if these regimen(s) continue.    Section completed by: Osmany Morales [facility][AW.2]     Attribution Abraham     AW.1 - Osmany Morales [facility] on 10/28/2019  4:58 PM   AW.2 - Osmany Morales [facility] on 10/28/2019  4:57 PM                  DC Planning  DC destination/dispostion:  Home to [place] with support of wife and daughters.     Referrals: None at this time.     DC Needs: Home health referral, DME- hospital bed with low air loss mattress, possibly wheelchair; Other DME TBD.      Barriers to discharge: Patient frequently demonstrates confusion.      Strengths: Supportive family who verbalizes their goal of supporting the patient and assisting to keep him comfortable within the home.      Section completed by:   Sara White RN [facility]       Physician Summary  MARIA GUADALUPE Bella MD  participated and led team conference discussion.

## 2019-10-29 NOTE — THERAPY
Speech Language Pathology  Daily Treatment     Patient Name: Marcio More Jr.  Age:  73 y.o., Sex:  male  Medical Record #: [id number]  Today's Date: 10/29/2019     Subjective    Pt very fatigued this session, willing to participate but required max cues to remain awake      Objective       10/29/19 0931   SLP Total Time Spent   SLP Individual Total Time Spent (Mins) 30   Charge Group   SLP Cognitive Skill Development 2         Assessment    Pt significantly more fatigued than yesterdays ST session.  Max cues required for pt to remain awake and to use louder voicing.  Pt was responding to questions so quietly at times that he was unintelligible.  O-log completed, pt scored a 16/30 (Decreased from 22/30 on 10/28) and reported that we were in [place] and that we are [facility] in a [facility].  Pt brought back to his room and transferred into bed d/t increased fatigue level.       Plan    Continue O-log, targeting attention and problem solving

## 2019-10-29 NOTE — THERAPY
"Physical Therapy   Daily Treatment     Patient Name: Marcio More Jr.  Age:  73 y.o., Sex:  male  Medical Record #: [id number]  Today's Date: 10/29/2019     Precautions  Precautions: Fall Risk, Other (See Comments)  Comments: coccyx wound    Subjective    Patient reported fatigue, and \"I can't\" when trying to transfer sup to sit. Patient was agreeable to interventions. Patient reported sleeping well last night.     Objective       10/29/19 1031   Precautions   Precautions Fall Risk;Other (See Comments)   Comments coccyx wound   Cognition    Safety Awareness Impaired   New Learning Impaired   Attention Impaired   Sequencing Impaired   Initiation Impaired   Comments Distractible, fatigued   Bed Mobility    Supine to Sit Moderate Assist  (HOBE)   Sit to Stand Total Assist X 2  (Total A x 2 OOB with FWW.  Min A with FWW from wc.)   Scooting Moderate Assist  (EOB)   Neuro-Muscular Treatments   Neuro-Muscular Treatments Weight Shift Left;Weight Shift Right;Verbal Cuing;Tactile Cuing;Sequencing;Postural Changes;Postural Facilitation;Facilitation   Comments Sequencing cues for bed mobility, STS, and toilet transfer safety.  Cues and facilitation for upright standing during gait with FWW.  Toilet transfer CGA/SBA + set up with grab bar.    Interdisciplinary Plan of Care Collaboration   IDT Collaboration with  Certified Nursing Assistant;Physician   Patient Position at End of Therapy Seated  (lunch room)   Collaboration Comments POC, inconsistent performance, assist OOB with FWW.   PT Total Time Spent   PT Individual Total Time Spent (Mins) 60   PT Charge Group   PT Gait Training 1   PT Neuromuscular Re-Education / Balance 1   PT Therapeutic Activities 2       FIM Bed/Chair/Wheelchair Transfers Score: 1 - Total Assistance  Bed/Chair/Wheelchair Transfers Description:  (Poor sequencing, attempted to sequence for 20 min, Mod A sup to sit, 2 person STS with FWW, SPT to wc min A.  )    FIM Walking Score:  4 - Minimal " Assistance  Walking Description:  Walker, Requires incidental assist, Safety concerns, Extra time(Step to, shuffled, impaired posture, vc for proximity to walker, indoors 150 ft)    FIM Wheelchair Score:  2 - Max Assistance  Wheelchair Description:  Extra time(Short step lenth, short UE strokes, poor efficiency, limited by fatigue, indoors, 50 ft)    FIM Stairs Score:  0 - Not tested,unsafe activity  Stairs Description:         Assessment    Patient was limited by fatigue, and impaired motor planning.  Patient was provided with extra time to complete tasks, but was unable to sequence bed mobility and transfer OOB.  Eventually, a second person was utilized to assist OOB into standing with FWW.  Once set up in bathroom, patient was better able to motor planning steps to sitting on toilet.     Plan    Reschedule hands on family training for car and and gait with FWW, continue functional mobility with FWW, assess car transfer with practice vehicle, review bed mobility sequencing.

## 2019-10-29 NOTE — REHAB-NURSING IDT TEAM NOTE
Nursing   Nursing  Diet/Nutrition:  Regular and Thin Liquids   Medication Administration:  Crush all Medications in Puree we crush meds because he holds them in his mouth and does not swallow them for a very long time  % consumed at meals in last 24 hours:  Consumed % of meals per documentation.  Meal/Snack Consumption for the past 24 hrs:   Oral Nutrition   10/28/19 1200 Lunch;Between 50-75% Consumed   10/28/19 0800 Breakfast;Between % Consumed     Snack schedule:  None  Supplement:  Other: none  Appetite:  Fair  Fluid Intake/Output in past 24 hours: In: 360 [P.O.:360]  Out: -   Admit Weight:  Weight: 83.5 kg (184 lb 1.4 oz)  Weight Last 7 Days   [86 kg (189 lb 9.5 oz)] 86 kg (189 lb 9.5 oz) (10/27 1000)    Pain Issues:    Location:  --  --         Severity:  Denies   Scheduled pain medications:  none  PRN pain medications used in last 24 hours:  oxycodone immediate release (ROXICODONE)    Non Pharmacologic Interventions:  warm blanket, relaxation and repositioned  Effectiveness of pain management plan:  fair=improved comfort with interventions but does not always meet goal - patient does not complain of pain - and denies at times, you will see him grimace -     Bowel:    Bowel Assist Initial Score:  1 - Total Assistance  Bowel Assist Current Score:  1 - Total Assistance  Bowl Accidents in last 7 days:  0  Last bowel movement: 10/27/19  Stool Description: Large, Loose     Usual bowel pattern:  every 2-3 days  Scheduled bowel medications:  senna-docusate (PERICOLACE or SENOKOT S)   PRN bowel medications used in last 24 hours:  None  Nursing Interventions:  Increased time, PRN medication, Scheduled medication, Supervision, Verbal cueing, Positioning on commode/toilet, Brief  Effectiveness of bowel program:   No bowel program  Bladder:    Bladder Assist Initial Score:  1 - Total Assistance  Bladder Assist Current Score:  1 - Total Assistance  Bladder Accidents in last 7 days:  2  PVR range for past 24-48  hours: -- ()  Intermittent Catheterization:  n/a  Medications affecting bladder:      Time void schedule/voiding pattern:  Prn - incontinent at times  Interventions:  Increased time, Medication, Supervision, Verbal cueing, Urinal  Effectiveness of bladder training:  Poor= > 1 incontinent emptying of bladder    Sleep/wake cycle:   Average hours slept:  Sleeps 4-6 hours without waking  Sleep medication usage:  None    Patient/Family Training/Education:  Bladder Management/Training, Bowel Management/Training, Fall Prevention, General Self Care, Medication Management, Safe Transfers, Safety and Skin Care  Discharge Supply Recommendations:  None at this time.  Strengths: Willingly participates in therapeutic activities, Supportive family and Pleasant and cooperative   Barriers:   Confused at times - rigid / spastic muscles          Long Term Goals:   At discharge patient will be able to function safely at home with support.    Section completed by:  Nadia Umaña R.N.

## 2019-10-29 NOTE — REHAB-CM IDT TEAM NOTE
Case Management    DC Planning  DC destination/dispostion:  Home to [place] with support of wife and daughters.     Referrals: None at this time.     DC Needs: Home health referral, DME- hospital bed with low air loss mattress, possibly wheelchair; Other DME TBD.      Barriers to discharge: Patient frequently demonstrates confusion.      Strengths: Supportive family who verbalizes their goal of supporting the patient and assisting to keep him comfortable within the home.      Section completed by:  Sara White RN CCM

## 2019-10-29 NOTE — PROGRESS NOTES
"Rehab Progress Note     Encounter Date: 10/29/2019    CC: Encephalopathy, confusion, weakness    Interval Events (Subjective)  Patient sitting up in wheelchair. He reports he is doing well.  He is again perseverating on discharge. Discussed that we will be planning on discharge Friday. Discussed that we will have IDT later to discuss how much help he may need. His family still needs to be trained as he has increased needs.  Discussed at IDT and recommending hospital bed possibly for coccyx wound. Denies NVD. Denies SOB.     IDT Team Meeting 10/29/2019    ILeticia M.D., was present and led the interdisciplinary team conference on 10/29/2019.  I led the IDT conference and agree with the IDT conference documentation and plan of care as noted below.     RN:  Diet Regular   % Meal     Pain    Sleep    Bowel Continent   Bladder Incontinent   In's & Out's    Limited by waxing and waning   Pressure sore on coccyx  Fall risk; does not use call light     PT: Limited processing  Bed Mobility    Transfers SBA to modA    Mobility SBA to maxA   Stairs    Waxes and wanes    OT:  Eating    Grooming    Bathing modA   UB Dressing    LB Dressing modA   Toileting SBA   Shower & Transfer SBA     SLP:  Worse cognition this week  modA for problem solving  modA for Memory     CM:  Continues to work on disposition and DME needs.      DC/Disposition:  11/1/19  Needs hospital bed possibly    Objective:  VITAL SIGNS: /78   Pulse 82   Temp 36.4 °C (97.5 °F) (Oral)   Resp 18   Ht 1.88 m (6' 2\")   Wt 86 kg (189 lb 9.5 oz)   SpO2 92%   BMI 24.34 kg/m²    Gen: NAD  Psych: Mood and affect appropriate  CV: RRR, no edema  Resp: CTAB, no upper airway sounds  Abd: NTND  Neuro: AOx2, pushing wheelchair BUE  Unchanged from 10/28/19    No results found for this or any previous visit (from the past 72 hour(s)).    Current Facility-Administered Medications   Medication Frequency   • glucose 4 g chewable tablet 16 g Q15 MIN PRN "    And   • dextrose 50% (D50W) injection 50 mL Q15 MIN PRN   • metoprolol (LOPRESSOR) tablet 50 mg TWICE DAILY   • lidocaine (LIDODERM) 5 % 1 Patch Q24HR   • metFORMIN (GLUCOPHAGE) tablet 250 mg BID WITH MEALS   • melatonin tablet 6 mg QHS   • amantadine (SYMMETREL) 50 MG/5ML syrup 100 mg BID   • phenylephrine-cocoa butter (PREPARATION H) suppository 1 Suppository Q6HRS PRN   • apixaban (ELIQUIS) tablet 5 mg BID   • vitamin D (cholecalciferol) tablet 1,000 Units DAILY   • Respiratory Care per Protocol Continuous RT   • oxyCODONE immediate-release (ROXICODONE) tablet 2.5 mg Q3HRS PRN   • oxyCODONE immediate-release (ROXICODONE) tablet 5 mg Q3HRS PRN   • tramadol (ULTRAM) 50 MG tablet 50 mg Q4HRS PRN   • hydrALAZINE (APRESOLINE) tablet 25 mg Q8HRS PRN   • acetaminophen (TYLENOL) tablet 650 mg Q4HRS PRN   • senna-docusate (PERICOLACE or SENOKOT S) 8.6-50 MG per tablet 2 Tab BID    And   • polyethylene glycol/lytes (MIRALAX) PACKET 1 Packet QDAY PRN    And   • magnesium hydroxide (MILK OF MAGNESIA) suspension 30 mL QDAY PRN    And   • bisacodyl (DULCOLAX) suppository 10 mg QDAY PRN   • artificial tears ophthalmic solution 1 Drop PRN   • benzocaine-menthol (CEPACOL) lozenge 1 Lozenge Q2HRS PRN   • mag hydrox-al hydrox-simeth (MAALOX PLUS ES or MYLANTA DS) suspension 20 mL Q2HRS PRN   • ondansetron (ZOFRAN ODT) dispertab 4 mg 4X/DAY PRN    Or   • ondansetron (ZOFRAN) syringe/vial injection 4 mg 4X/DAY PRN   • traZODone (DESYREL) tablet 50 mg QHS PRN   • sodium chloride (OCEAN) 0.65 % nasal spray 2 Spray PRN   • allopurinol (ZYLOPRIM) tablet 300 mg DAILY   • atorvastatin (LIPITOR) tablet 20 mg Nightly   • ferrous sulfate tablet 325 mg DAILY       Orders Placed This Encounter   Procedures   • Diet Order Diabetic     Standing Status:   Standing     Number of Occurrences:   1     Order Specific Question:   Diet:     Answer:   Diabetic [3]     Order Specific Question:   Consistency/Fluid modifications:     Answer:   Thin  Liquids [3]       Assessment:  Active Hospital Problems    Diagnosis   • *Encephalopathy acute   • B-cell lymphoma (HCC)   • Metastasis to brain (HCC)   • Atrial fibrillation (HCC) w Rapid Ventricular Response    • CAD (coronary artery disease)   • Hypertension   • Type 2 diabetes mellitus without complication, without long-term current use of insulin (HCC)   • Chronic back pain   • Dysphagia   • Primary cerebral lymphoma (HCC)       Medical Decision Making and Plan:  Encephalopathy - Patient with recent diagnosis of 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 Check UA, started on Amantadine. UA positive, started on Ciprofloxacin per Hospitalist, U Cx pending - Enterobacter, susc to Ciprofloxacin. Completed.     Dysphagia - Patient on dysphagia 2 with NTL on transfer.  -SLP for swallow - advanced to regulars with thins.      Spasticity - Restarted on Baclofen and increased prior to admission.  -Continue on Baclofen 10 mg TID. With AMS, will reduce to 5 mg TID. No improvement in cognition. Trial of stopping baclofen - no increased rigidity     B cell lymphoma - Underwent R Chop on 9/23/19.  Followed by Oncology. Recent right retroperitoneal mass s/p biopsy which is positive for B cell lymphoma  -Follow-up Heme/Onc  -Discontinue Prednisone. Follow-up Oncology     HTN/A Fib - Patient on Pradaxa.  Patient on Diltiazem 360 mg QHS, Metoprolol 25 mg XL daily   -Patient chewing medication, will switch medications to short acting. Diltiazem 90 mg q6 and metoprolol 25 mg BID   -Hypotension on 10/17/19, discussed case with hospitalist and recommending decrease in Diltiazem. Per hospitalist only need Metoprolol at discahrge    DM - Patient on SSI on transfer. Consult hospitalist. Started on Metformin 250  mg     Sacral wound - Present on admission, seen by wound care today. Appreciate recommendations. Recommending mepilex only  The patient has a medical condition that requires positioning of the body in ways not feasible with an ordinary bed in order to alleviate pain and reduce risk of skin breakdown.  The need for these changes in body position are frequent and immediate.  The medical condition for which this bed is being prescribed include    Leukocytosis - B cell lymphoma on steroids. Check AM CBC - continued  -Consult hospitalist. UTI per above. Completed     Insomnia - Very poor sleep at night. Start Melatonin 6 mg QHS    Hx of Gout - Patient on Allopurinol 300 mg daily.     Vitamin D - 24 on admission, start on 1000 U for deficiency.      GI Ppx - Patient on Omeprazole 40 mg daily. Discontinue now that off of steroids.     DVT ppx - Patient on Eliquis 5 mg BID    Dispo - Wife would like goals of care conversation. Consulted palliative, arranged for meeting on Friday at 2PM. Extended stay until 11/1/19, will go home with HH. Family does not want home health. They need family training prior to discharge. Patient would benefit from hospital bed for wounds.     Total time:  38 minutes.  I spent greater than 50% of the time for patient care, counseling, and coordination on this date, including unit/floor time, and face-to-face time with the patient as per interval events and assessment and plan above. Topics discussed included discharge planning, DME needs and coccyx wound. Patient was discussed separately in IDT today; please see details above.    Leticia Bella M.D.

## 2019-10-29 NOTE — REHAB-SLP IDT TEAM NOTE
Speech Therapy   Cognitive Linquistic Functions  Comprehension Initial:  4 - Minimal Assistance  Comprehension Current:  4 - Minimal Assistance   Comprehension Description:  Verbal cues  Expression Initial:  4 - Minimal Assistance  Expression Current:  3 - Moderate Assistance   Expression Description:  Verbal cueing  Social Interaction Initial:  5 - Stand-by Prompting/Supervision or Set-up  Social Interaction Current:  5 - Stand-by Prompting/Supervision or Set-up   Social Interaction Description:  Increased time, Verbal cues  Problem Solving Initial:  3 - Moderate Assistance  Problem Solving Current:  3 - Moderate Assistance   Problem Solving Description:  Verbal cueing, Increased time  Memory Initial:  3 - Moderate Assistance  Memory Current:  3 - Moderate Assistance   Memory Description:  Verbal cueing, Therapy schedule, Bed/chair alarm, Seat belt  Executive Functioning / Organization Initial:   NA  Executive Functioning / Organization Current:   NA   Executive Functioning Desciption:  NA  Swallowing  Swallowing Status:  Not following for dysphagia management   Orders Placed This Encounter   Procedures   • Diet Order Diabetic     Standing Status:   Standing     Number of Occurrences:   1     Order Specific Question:   Diet:     Answer:   Diabetic [3]     Order Specific Question:   Consistency/Fluid modifications:     Answer:   Thin Liquids [3]     Behavior Modification Plan  Allow for rest time, Keep instructions simple/concrete, Redirect to task/topic and Analyze tasks (break down into smaller steps)  Assistive Technology  Memory aides: and Low tech: Calendar, planner, schedule, alarms/timers, pill organizer, post-it notes, lists  Family Training/Education:  Ongoing with pt's family when they are present   DC Recommendations:   24 hour spv   Strengths:  Supportive family and Willingly participates in therapeutic activities  Barriers:  Confused, Poor activity tolerance, Poor carryover of learning and Other: poor  attention, problem solving  # of short term goals set=3  # of short term goals met=0  Speech Therapy Problems     Problem: Expression STGs     Dates: Start: 10/12/19       Description:     Goal: STG-Within one week, patient will     Dates: Start: 10/12/19       Description: 1) Individualized goal:  Will produce prolonged voicing (e.g., hum, 'ah', frontal focus) with 80% accuracy and MOD A.   2) Interventions:  SLP Speech Language Treatment and SLP Group Treatment       Note:     Goal Note filed on 10/29/19 1110 by Prashanth Medina MS,CCC-SLP    Max A required for consistent voicing                         Problem: Memory STGs     Dates: Start: 10/12/19       Description:     Goal: STG-Within one week, patient will     Dates: Start: 10/22/19       Description: 1) Individualized goal:  Complete alternating attention tasks given min A to achieve 80% accuracy or greater.   2) Interventions:  SLP Speech Language Treatment, SLP Self Care / ADL Training , SLP Cognitive Skill Development and SLP Group Treatment       Note:     Goal Note filed on 10/29/19 1110 by Prashanth Medina MS,CCC-SLP    Mod-max A required to complete alternating attention task                         Problem: Problem Solving STGs     Dates: Start: 10/22/19       Description:     Goal: STG-Within one week, patient will     Dates: Start: 10/22/19       Description: 1) Individualized goal:  Achieve a score of 25/30 or greater on the O-log for 3 consecutive days.    2) Interventions:  SLP Speech Language Treatment, SLP Self Care / ADL Training , SLP Cognitive Skill Development and SLP Group Treatment       Note:     Goal Note filed on 10/29/19 1110 by Prashanth Medina MS,CCC-SLP    Pt unable to achieve a score of 25/30 for more than 1 day                         Problem: Speech/Swallowing LTGs     Dates: Start: 10/11/19       Description:     Goal: LTG-By discharge, patient will solve basic problems     Dates: Start: 10/11/19       Description: 1) Individualized  goal:  [place]co Antonio for safe discharge home.    2) Interventions:  SLP Cognitive Skill Development and SLP Group Treatment                          Section completed by:  Prashanth Medina MS,CCC-SLP

## 2019-10-29 NOTE — THERAPY
Physical Therapy   Daily Treatment     Patient Name: Marcio More Jr.  Age:  73 y.o., Sex:  male  Medical Record #: [id number]  Today's Date: 10/29/2019     Precautions  Precautions: (P) Fall Risk, Other (See Comments)  Comments: (P) coccyx wound      Pt attended a 60 minute Safe Skin education group targeting prevention and treatment of pressure injuries. Pt was alert during group training session.      Reason for group therapy: To Increase Active Participation through Peer Pressure; To Enhance Motivation; To Increase Patient Interaction during Physical Recovery; To Facilitate Goal Discussion    Other treatments provided: Group therapy topic: Post session, pt indicated good level of knowledge of pressure injury risk factors and was able questions about skin care risks or prevention. Pt was educated on pressure injury risk factors via auditory and visual modalities in addition to demonstration, practice and teach back methods. Topics reviewed included risk factors for pressure injuries, staging of pressure injuries, staging pressure injuries and prevention of pressure injuries. Patient was encouraged to ask questions, did so, and all questions were answered to patient’s satisfaction. Patient was counseled on the importance of active communication with interdisciplinary team to address risk factors and prevent pressure injuries. Pt benefited from participating in this skin education class as evidenced by verbalizing improved understanding of prevention of pressure injuries and modifications to mitigate risk factors.      Assessment:   Pt benefited from participating in the Safe Skin education class and demonstrated increased understanding of controllable risk factors as evidenced by participation in group Safe Skin class and ability to demonstrate adequate pressure relief techniques.      Plan:   Continue ongoing education to prevent pressure injuries including assessment of areas at high risk and strategies to  mitigate this risk. Assess for carryover of education provided in Safe Skin class.           10/29/19 1401   Precautions   Precautions Fall Risk;Other (See Comments)   Comments coccyx wound   Interdisciplinary Plan of Care Collaboration   Patient Position at End of Therapy Seated;Self Releasing Lap Belt Applied;Call Light within Reach;Tray Table within Reach;Phone within Reach   PT Total Time Spent   PT Group Total Time Spent (Mins) 60   PT Charge Group   PT Group Therapy Group Activities

## 2019-10-29 NOTE — CARE PLAN
Problem: Mobility  Goal: STG-Within one week, patient will ambulate household distance  Description  1) Individualized goal:  Patient will amb with FWW over various surfaces 150 ft x 2 CGA-SBA consistently  2) Interventions:  PT Group Therapy, PT E Stim Attended, PT Gait Training, PT Therapeutic Exercises, PT Neuro Re-Ed/Balance, PT Aquatic Therapy, PT Therapeutic Activity and PT Manual Therapy     Outcome: NOT MET  Note:   Min A-CGA     Problem: Mobility Transfers  Goal: STG-Within one week, patient will transfer bed to chair  Description  1) Individualized goal:  Patient will transfer CGA consistently with FWW STS/SPT, SBA bed mobility with leg   2) Interventions:  PT Group Therapy, PT E Stim Attended, PT Gait Training, PT Therapeutic Exercises, PT Neuro Re-Ed/Balance, PT Therapeutic Activity and PT Manual Therapy     Outcome: NOT MET  Note:   Inconsistent performance, 2 person STS, to min/mod of 1  Impaired sequencing

## 2019-10-29 NOTE — THERAPY
"Occupational Therapy  Daily Treatment     Patient Name: Marcio More Jr.  Age:  73 y.o., Sex:  male  Medical Record #: [id number]  Today's Date: 10/29/2019     Precautions  Precautions: (P) Fall Risk  Comments: coccyx wound    Safety   ADL Safety : (P) Requires Physical Assist for Safety  Bathroom Safety: (P) Requires Physical Assist for Safety  Comments: see FIM for oral care and eating    Subjective    Pt was seated in w/c completing breakfast and agreeable to therapy. \" I hope today is shower day.\"   Pitting edema observed in BLE while dressing and showering.      Objective       10/29/19 0831   Precautions   Precautions Fall Risk   Safety    ADL Safety  Requires Physical Assist for Safety   Bathroom Safety Requires Physical Assist for Safety   Cognition    Orientation Level Not Oriented to Day;Not Oriented to Place   Level of Consciousness Alert   Safety Awareness Impaired;Impulsive   New Learning Impaired   Sequencing Impaired   Balance   Sitting Balance (Static) Fair   Sitting Balance (Dynamic) Fair   Standing Balance (Static) Poor +   Standing Balance (Dynamic) Poor   Weight Shift Sitting Poor   Weight Shift Standing Poor   Skilled Intervention Verbal Cuing   Interdisciplinary Plan of Care Collaboration   Patient Position at End of Therapy Seated;Self Releasing Lap Belt Applied;Call Light within Reach   OT Total Time Spent   OT Individual Total Time Spent (Mins) 60   OT Charge Group   OT Self Care / ADL 4       FIM Eating Score:  6 - Modified Independent  Eating Description:       FIM Grooming Score:  5 - Standby Prompting/Supervision or Set-up  Grooming Description:  Verbal cueing(verbal cues for sequencing and initiation )    FIM Bathing Score:  4 - Minimal Assistance  Bathing Description:  Grab bar, Tub bench, Hand held shower(CGA for standing )    FIM Upper Body Dressin - Standby Prompting/Supervision or Set-up  Upper Body Dressing Description:  Verbal cueing(verbal cues for sequencing )    FIM " Lower Body Dressing Score:  2 - Max Assistance  Lower Body Dressing Description:  Reacher, Dressing stick, Increased time, Supervision for safety, Verbal cueing(Max A for socks and shoes. Maximal verbal cues for AE use )    FIM Toileting Body Dressing:  3 - Moderate Assistance  Toileting Description:  Grab bar, Increased time    FIM Bed/Chair/Wheelchair Transfers Score: 5 - Standby Prompting/Supervision or Set-up  Bed/Chair/Wheelchair Transfers Description:       FIM Toilet Transfer Score:  4 - Minimal Assistance  Toilet Transfer Description:  Grab bar, Increased time, Supervision for safety, Verbal cueing, Requires lift(Min A for lift )    FIM Tub/Shower Transfers Score:  4 - Minimal Assistance  Tub/Shower Transfers Description:  Grab bar, Increased time, Supervision for safety, Verbal cueing(CGA for balance using FWW )      Assessment    Pt participated in toileting, showering, dressing and grooming this date requiring more assistance than last session due to increased confusion and impaired balance. Pt barriers include limited carry over of learning, confusion, impaired balance, limited mobility, delayed motor planning, and impaired endurance.     Plan    Continue OT to address ADL/IADL independence, functional transfers, use of AE/DME, standing and sitting balance, edurance. Family training for ADLs and transfers prior to discharge.  Per family report pt enjoys drawing and art.

## 2019-10-29 NOTE — CARE PLAN
Problem: Expression STGs  Goal: STG-Within one week, patient will  Description  1) Individualized goal:  Will produce prolonged voicing (e.g., hum, 'ah', frontal focus) with 80% accuracy and MOD A.   2) Interventions:  SLP Speech Language Treatment and SLP Group Treatment     Outcome: NOT MET  Note:   Max A required for consistent voicing      Problem: Memory STGs  Goal: STG-Within one week, patient will  Description  1) Individualized goal:  Complete alternating attention tasks given min A to achieve 80% accuracy or greater.   2) Interventions:  SLP Speech Language Treatment, SLP Self Care / ADL Training , SLP Cognitive Skill Development and SLP Group Treatment     Outcome: NOT MET  Note:   Mod-max A required to complete alternating attention task      Problem: Problem Solving STGs  Goal: STG-Within one week, patient will  Description  1) Individualized goal:  Achieve a score of 25/30 or greater on the O-log for 3 consecutive days.    2) Interventions:  SLP Speech Language Treatment, SLP Self Care / ADL Training , SLP Cognitive Skill Development and SLP Group Treatment     Outcome: NOT MET  Note:   Pt unable to achieve a score of 25/30 for more than 1 day      Problem: Memory STGs  Goal: STG-Within one week, patient will  Description  1) Individualized goal:  Use external memory aids to recall novel information from [facility] stay with 80% accuracy and MOD A.   2) Interventions:  SLP Self Care / ADL Training , SLP Cognitive Skill Development and SLP Group Treatment     Outcome: DISCHARGED-GOAL NOT MET  Note:   Pt unable to record information in a daily memory notebook at this time d/t severity of cognitive deficits

## 2019-10-29 NOTE — REHAB-PT IDT TEAM NOTE
"Physical Therapy   Mobility  Bed mobility:  Mod A + extra time, impaired sequencing   Bed /Chair/Wheelchair Transfer Initial:  1 - Total Assistance  Bed /Chair/Wheelchair Transfer Current:  1 - Total Assistance   Bed/Chair/Wheelchair Transfer Description:  (Poor sequencing, attempted to sequence for 20 min, Mod A sup to sit, 2 person STS with FWW, SPT to wc min A.  )  Walk Initial:  0 - Not tested,unsafe activity  Walk Current:  4 - Minimal Assistance   Walk Description:  Walker, Requires incidental assist, Safety concerns, Extra time(Step to, shuffled, impaired posture, vc for proximity to walker, indoors 150 ft)  Wheelchair Initial:  0 - Not tested,medical condition  Wheelchair Current:  2 - Max Assistance   Wheelchair Description:  Extra time(Short step lenth, short UE strokes, poor efficiency, limited by fatigue, indoors, 50 ft)  Stairs Initial:  0 - Not tested,unsafe activity  Stairs Current: 0 - Not tested,unsafe activity   Stairs Description: Extra time, Safety concerns, Verbal cueing, Supervision for safety, Requires incidental assist, Ascends/descends 4 to 11 steps(Up/down 6 4\" steps with BHR and min A/CGA)  Patient/Family Training/Education: Daughter and spouse trained in transfers. Family educated on safety, POC, gait belt, CLOF.  DME/DC Recommendations:  No DME recommended; patient has FWW and wc.   Strengths:  Pleasant and cooperative and Willingly participates in therapeutic activities  Barriers:   Decreased endurance, Generalized weakness, Limited mobility, Poor activity tolerance, Poor balance and Other: Inconsistent performance, impaired sequencing   # of short term goals set= 2  # of short term goals met= 0  Physical Therapy Problems     Problem: Mobility     Dates: Start: 10/21/19       Description:     Goal: STG-Within one week, patient will ambulate household distance     Dates: Start: 10/21/19       Description: 1) Individualized goal:  Patient will amb with FWW over various surfaces 150 ft x 2 " CGA-SBA consistently  2) Interventions:  PT Group Therapy, PT E Stim Attended, PT Gait Training, PT Therapeutic Exercises, PT Neuro Re-Ed/Balance, PT Aquatic Therapy, PT Therapeutic Activity and PT Manual Therapy       Note:     Goal Note filed on 10/29/19 1218 by Yessenia Car, DPT    Min A-CGA                        Problem: Mobility Transfers     Dates: Start: 10/21/19       Description:     Goal: STG-Within one week, patient will transfer bed to chair     Dates: Start: 10/21/19       Description: 1) Individualized goal:  Patient will transfer CGA consistently with FWW STS/SPT, SBA bed mobility with leg   2) Interventions:  PT Group Therapy, PT E Stim Attended, PT Gait Training, PT Therapeutic Exercises, PT Neuro Re-Ed/Balance, PT Therapeutic Activity and PT Manual Therapy       Note:     Goal Note filed on 10/29/19 1218 by Yessenia Car, DPT    Inconsistent performance, 2 person STS, to min/mod of 1  Impaired sequencing                           Problem: PT-Long Term Goals     Dates: Start: 10/11/19       Description:     Goal: LTG-By discharge, patient will propel wheelchair     Dates: Start: 10/11/19       Description: 1) Individualized goal:  150 ft, spv   2) Interventions:  PT Group Therapy, PT E Stim Attended, PT Gait Training, PT Therapeutic Exercises, PT Neuro Re-Ed/Balance, PT Aquatic Therapy, PT Therapeutic Activity, PT Manual Therapy and PT Wound Therapy                 Goal: LTG-By discharge, patient will transfer one surface to another     Dates: Start: 10/11/19       Description: 1) Individualized goal:  Min A with LRAD   2) Interventions: PT Group Therapy, PT E Stim Attended, PT Gait Training, PT Therapeutic Exercises, PT Neuro Re-Ed/Balance, PT Aquatic Therapy, PT Therapeutic Activity, PT Manual Therapy and PT Wound Therapy                 Goal: LTG-By discharge, patient will perform home exercise program     Dates: Start: 10/11/19       Description: 1) Individualized goal:  SBA with LE HEP  with use of handout  2) Interventions: PT Group Therapy, PT E Stim Attended, PT Gait Training, PT Therapeutic Exercises, PT Neuro Re-Ed/Balance, PT Aquatic Therapy, PT Therapeutic Activity, PT Manual Therapy and PT Wound Therapy                 Goal: LTG-By discharge, patient will transfer in/out of a car     Dates: Start: 10/11/19       Description: 1) Individualized goal:  Min A with LRAD  2) Interventions: PT Group Therapy, PT E Stim Attended, PT Gait Training, PT Therapeutic Exercises, PT Neuro Re-Ed/Balance, PT Aquatic Therapy, PT Therapeutic Activity, PT Manual Therapy and PT Wound Therapy                 Goal: LTG-By discharge, patient will ambulate     Dates: Start: 10/21/19       Description: 1) Individualized goal:  Patient will amb indoors >50 ft x 2 for household mobility CGA-SBA with FWW  2) Interventions:  PT Group Therapy, PT E Stim Attended, PT Gait Training, PT Therapeutic Exercises, PT Neuro Re-Ed/Balance, PT Aquatic Therapy, PT Therapeutic Activity and PT Manual Therapy                           Section completed by:  Yessenia Car PT, DPT

## 2019-10-29 NOTE — DISCHARGE PLANNING
DME referral sent to [facility].    HH referral sent to [facility] per choice form.  Awaiting responses.

## 2019-10-29 NOTE — DISCHARGE PLANNING
Called patient's wife to update on team conference discussion.  Patient was in therapy.  Provided her with recommendations for home health and dme.  We have ordered hospital bed and mattress per our discussion and referred patient to [facility].  Will follow for d/c on Friday.

## 2019-10-30 NOTE — THERAPY
Occupational Therapy  Daily Treatment     Patient Name: Marcio More Jr.  Age:  73 y.o., Sex:  male  Medical Record #: [id number]  Today's Date: 10/30/2019     Precautions  Precautions: (P) Fall Risk  Comments: coccyx wound    Safety   ADL Safety : (P) Requires Physical Assist for Safety, Requires Cueing for Safety  Bathroom Safety: (P) Requires Physical Assist for Safety, Requires Cuing for Safety  Comments: see FIM for oral care and eating    Subjective    Pt was seated in w/c with wife and daughter present. Family training was completed with wife and daughter.      Objective       10/30/19 1231   Precautions   Precautions Fall Risk   Safety    ADL Safety  Requires Physical Assist for Safety;Requires Cueing for Safety   Bathroom Safety Requires Physical Assist for Safety;Requires Cuing for Safety   Cognition    Level of Consciousness Alert   Ability To Follow Commands 2 Step   New Learning Impaired   Attention Impaired   Sequencing Impaired   Initiation Impaired   Balance   Sitting Balance (Static) Fair   Sitting Balance (Dynamic) Fair   Standing Balance (Static) Poor +   Standing Balance (Dynamic) Poor   Weight Shift Sitting Poor   Weight Shift Standing Poor   Skilled Intervention Facilitation;Sequencing;Verbal Cuing   Interdisciplinary Plan of Care Collaboration   IDT Collaboration with  Family / Caregiver;Physical Therapist   Patient Position at End of Therapy Seated;Family / Friend in Room   Collaboration Comments Family training for ADLs   OT Total Time Spent   OT Individual Total Time Spent (Mins) 30   OT Charge Group   OT Therapy Activity 2     Pt participated in family training, completing a w/c>bed>BCS transfer using FWW with CGA for balance and verbal cues for sequencing. Pt declined demonstration of toilet transfer or LB dressing. Wife and daughter stated they had been transferring him prior to admission with decreased mobility and feel confident to transfer him to/from the toilet.   Therapist  educated family on the processing time necessary and strategies to assist in allowing processing time and motor planning. Therapist educated family on fall prevention strategies including a mode of communication as the pt's voice is too soft to be relied upon to alert a family member of a need for assistance. The family was educated on the pt's impulsivity and strategies to manage impulsive behaviors combined with slow motor processing. Therapist discussed using timed bowel attempts to reduce bowel incontinence.       Assessment    Pt participated in family training session and verbalized understanding of the reason for the training. Pt's family was receptive to education and participated in transfers.     Plan    Continue OT to address ADL/IADL independence, functional transfers, use of AE/DME, standing and sitting balance, edurance, safety awareness, and IADLs ( Per family report pt enjoys drawing and art.)

## 2019-10-30 NOTE — PROGRESS NOTES
"Rehab Progress Note     Encounter Date: 10/30/2019    CC: Encephalopathy, confusion, weakness    Interval Events (Subjective)  Patient sitting up in chair. He reports therapy is going well. Denies NVD. Patient's wife and daughter came in for training today. They report that they can take him home at this level of burden. Discussed probable slow decline but they are still declining hospice. Ongoing work with CM for hospital bed which he still requires.     IDT Team Meeting 10/29/2019  DC/Disposition:  11/1/19  Needs hospital bed     Objective:  VITAL SIGNS: /72   Pulse 84   Temp 36.4 °C (97.5 °F) (Oral)   Resp 20   Ht 1.88 m (6' 2\")   Wt 86 kg (189 lb 9.5 oz)   SpO2 93%   BMI 24.34 kg/m²    Gen: NAD  Psych: Mood and affect appropriate  CV: RRR, no edema  Resp: CTAB, no upper airway sounds  Abd: NTND  Neuro: AOx2, following simple commands    Recent Results (from the past 72 hour(s))   CBC WITHOUT DIFFERENTIAL    Collection Time: 10/30/19  5:46 AM   Result Value Ref Range    WBC 7.3 4.8 - 10.8 K/uL    RBC 4.08 (L) 4.70 - 6.10 M/uL    Hemoglobin 12.9 (L) 14.0 - 18.0 g/dL    Hematocrit 39.6 (L) 42.0 - 52.0 %    MCV 97.1 81.4 - 97.8 fL    MCH 31.6 27.0 - 33.0 pg    MCHC 32.6 (L) 33.7 - 35.3 g/dL    RDW 48.5 35.9 - 50.0 fL    Platelet Count 190 164 - 446 K/uL    MPV 9.3 9.0 - 12.9 fL   Basic Metabolic Panel    Collection Time: 10/30/19  5:46 AM   Result Value Ref Range    Sodium 142 135 - 145 mmol/L    Potassium 3.7 3.6 - 5.5 mmol/L    Chloride 104 96 - 112 mmol/L    Co2 30 20 - 33 mmol/L    Glucose 100 (H) 65 - 99 mg/dL    Bun 13 8 - 22 mg/dL    Creatinine 0.81 0.50 - 1.40 mg/dL    Calcium 8.8 8.5 - 10.5 mg/dL    Anion Gap 8.0 0.0 - 11.9   ESTIMATED GFR    Collection Time: 10/30/19  5:46 AM   Result Value Ref Range    GFR If African American >60 >60 mL/min/1.73 m 2    GFR If Non African American >60 >60 mL/min/1.73 m 2       Current Facility-Administered Medications   Medication Frequency   • metoprolol " (LOPRESSOR) tablet 25 mg TWICE DAILY   • lisinopril (PRINIVIL) tablet 5 mg Q DAY   • glucose 4 g chewable tablet 16 g Q15 MIN PRN    And   • dextrose 50% (D50W) injection 50 mL Q15 MIN PRN   • lidocaine (LIDODERM) 5 % 1 Patch Q24HR   • metFORMIN (GLUCOPHAGE) tablet 250 mg BID WITH MEALS   • melatonin tablet 6 mg QHS   • amantadine (SYMMETREL) 50 MG/5ML syrup 100 mg BID   • phenylephrine-cocoa butter (PREPARATION H) suppository 1 Suppository Q6HRS PRN   • apixaban (ELIQUIS) tablet 5 mg BID   • vitamin D (cholecalciferol) tablet 1,000 Units DAILY   • Respiratory Care per Protocol Continuous RT   • oxyCODONE immediate-release (ROXICODONE) tablet 2.5 mg Q3HRS PRN   • oxyCODONE immediate-release (ROXICODONE) tablet 5 mg Q3HRS PRN   • tramadol (ULTRAM) 50 MG tablet 50 mg Q4HRS PRN   • hydrALAZINE (APRESOLINE) tablet 25 mg Q8HRS PRN   • acetaminophen (TYLENOL) tablet 650 mg Q4HRS PRN   • senna-docusate (PERICOLACE or SENOKOT S) 8.6-50 MG per tablet 2 Tab BID    And   • polyethylene glycol/lytes (MIRALAX) PACKET 1 Packet QDAY PRN    And   • magnesium hydroxide (MILK OF MAGNESIA) suspension 30 mL QDAY PRN    And   • bisacodyl (DULCOLAX) suppository 10 mg QDAY PRN   • artificial tears ophthalmic solution 1 Drop PRN   • benzocaine-menthol (CEPACOL) lozenge 1 Lozenge Q2HRS PRN   • mag hydrox-al hydrox-simeth (MAALOX PLUS ES or MYLANTA DS) suspension 20 mL Q2HRS PRN   • ondansetron (ZOFRAN ODT) dispertab 4 mg 4X/DAY PRN    Or   • ondansetron (ZOFRAN) syringe/vial injection 4 mg 4X/DAY PRN   • traZODone (DESYREL) tablet 50 mg QHS PRN   • sodium chloride (OCEAN) 0.65 % nasal spray 2 Spray PRN   • allopurinol (ZYLOPRIM) tablet 300 mg DAILY   • atorvastatin (LIPITOR) tablet 20 mg Nightly   • ferrous sulfate tablet 325 mg DAILY       Orders Placed This Encounter   Procedures   • Diet Order Diabetic     Standing Status:   Standing     Number of Occurrences:   1     Order Specific Question:   Diet:     Answer:   Diabetic [3]     Order  Specific Question:   Consistency/Fluid modifications:     Answer:   Thin Liquids [3]       Assessment:  Active Hospital Problems    Diagnosis   • *Encephalopathy acute   • B-cell lymphoma (HCC)   • Metastasis to brain (HCC)   • Atrial fibrillation (HCC) w Rapid Ventricular Response    • CAD (coronary artery disease)   • Hypertension   • Type 2 diabetes mellitus without complication, without long-term current use of insulin (HCC)   • Chronic back pain   • Dysphagia   • Primary cerebral lymphoma (HCC)       Medical Decision Making and Plan:  Encephalopathy - Patient with recent diagnosis of 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 Check UA, started on Amantadine. UA positive, started on Ciprofloxacin per Hospitalist, U Cx pending - Enterobacter, susc to Ciprofloxacin. Completed.     Dysphagia - Patient on dysphagia 2 with NTL on transfer.  -SLP for swallow - advanced to regulars with thins.      Spasticity - Restarted on Baclofen and increased prior to admission.  -Continue on Baclofen 10 mg TID. With AMS, will reduce to 5 mg TID. No improvement in cognition. Trial of stopping baclofen - no increased rigidity     B cell lymphoma - Underwent R Chop on 9/23/19.  Followed by Oncology. Recent right retroperitoneal mass s/p biopsy which is positive for B cell lymphoma  -Follow-up Heme/Onc  -Discontinue Prednisone. Follow-up Oncology     HTN/A Fib - Patient on Pradaxa.  Patient on Diltiazem 360 mg QHS, Metoprolol 25 mg XL daily   -Patient chewing medication, will switch medications to short acting. Diltiazem 90 mg q6 and metoprolol 25 mg BID   -Hypotension on 10/17/19, discussed case with hospitalist and recommending decrease in Diltiazem. Per hospitalist only need Metoprolol at discahrge    DM -  Patient on SSI on transfer. Consult hospitalist. Started on Metformin 250 mg     Sacral wound - Present on admission, seen by wound care today. Appreciate recommendations. Recommending mepilex only  The patient has a medical condition that requires positioning of the body in ways not feasible with an ordinary bed in order to alleviate pain and reduce risk of skin breakdown.  The need for these changes in body position are frequent and immediate.  The medical condition for which this bed is being prescribed include    Leukocytosis - B cell lymphoma on steroids. Check AM CBC - continued  -Consult hospitalist. UTI per above. Completed     Insomnia - Very poor sleep at night. Start Melatonin 6 mg QHS    Hx of Gout - Patient on Allopurinol 300 mg daily.     Vitamin D - 24 on admission, start on 1000 U for deficiency.      GI Ppx - Patient on Omeprazole 40 mg daily. Discontinue now that off of steroids.     DVT ppx - Patient on Eliquis 5 mg BID    Dispo - Wife would like goals of care conversation. Consulted palliative, arranged for meeting on Friday at 2PM. Extended stay until 11/1/19, will go home with HH. Family does not want hospice at this time. Patient would benefit from hospital bed for wounds. Family training completed on 10/30/19.     Total time:  27 minutes.  I spent greater than 50% of the time for patient care, counseling, and coordination on this date, including unit/floor time, and face-to-face time with the patient as per interval events and assessment and plan above. Topics discussed included discharge planning, working on DME, and family training. Discussed about hospice again but family declining.     Leticia Bella M.D.

## 2019-10-30 NOTE — PROGRESS NOTES
Hospital Medicine Daily Progress Note      Chief Complaint:  Atrial Fibrillation  Hypertension  Diabetes  Leukocytosis    Interval History:  Pt resting comfortably.  No overnight issues reported.    Review of Systems  Review of Systems   Constitutional: Negative for chills and fever.   HENT: Negative.    Eyes: Negative.    Respiratory: Negative for cough and shortness of breath.    Cardiovascular: Negative for chest pain and palpitations.   Gastrointestinal: Negative for abdominal pain, nausea and vomiting.   Skin: Negative for itching and rash.        Physical Exam  Temp:  [36.4 °C (97.5 °F)-36.4 °C (97.6 °F)] 36.4 °C (97.6 °F)  Pulse:  [82-88] 84  Resp:  [18] 18  BP: (110-122)/(71-78) 118/78  SpO2:  [92 %] 92 %    Physical Exam   Constitutional: No distress.   HENT:   Head: Normocephalic and atraumatic.   Right Ear: External ear normal.   Left Ear: External ear normal.   Prior crani scar   Eyes: Conjunctivae and EOM are normal. Right eye exhibits no discharge. Left eye exhibits no discharge.   Neck: Normal range of motion. Neck supple. No tracheal deviation present.   Cardiovascular: Normal rate, regular rhythm, S1 normal and S2 normal.   Pulmonary/Chest: No stridor. No respiratory distress. He has no wheezes. He has no rhonchi.   Decreased BS   Abdominal: Soft. Bowel sounds are normal. He exhibits no distension. There is no tenderness.   Musculoskeletal: He exhibits no edema or tenderness.   Neurological: No sensory deficit.   Skin: Skin is warm and dry. He is not diaphoretic. No cyanosis.   Vitals reviewed.      Fluids    Intake/Output Summary (Last 24 hours) at 10/29/2019 1830  Last data filed at 10/29/2019 1800  Gross per 24 hour   Intake 1200 ml   Output --   Net 1200 ml       Laboratory                        Assessment/Plan  B-cell lymphoma (HCC)- (present on admission)  Assessment & Plan  Stage IV w/ h/o brain mass resection  S/P chemo on 9/23/19  S/P Prednisone for confusion per Physiatry    A-fib (HCC)-  (present on admission)  Assessment & Plan  S/P RVR at [facility]  On Metoprolol for rate control  Now off Diltiazem  Anticoagulated on Eliquis    Azotemia- (present on admission)  Assessment & Plan  Encourage PO fluids  Check F/U labs in am    Diabetes mellitus with hyperglycemia, without long-term current use of insulin (HCC)- (present on admission)  Assessment & Plan  HbA1c 7.2  Continue Metformin  Now off FSBS and SSI  Outpt meds include Metformin  mg daily    CAD (coronary artery disease)- (present on admission)  Assessment & Plan  On Eliquis, Lipitor, and Metoprolol  Now off Diltiazem  Will start Lisinopril to maximize medical management    Hypertension- (present on admission)  Assessment & Plan  Observe blood pressure trends on Metoprolol  Now off CCB    UTI (urinary tract infection)- (present on admission)  Assessment & Plan  UA 20-50 WBC w/ moderate bacteria  UCx >100,000 Enterobacter  WBC normalized on abx  Completed Cipro x 7 days    Vitamin D insufficiency- (present on admission)  Assessment & Plan  Vit D level 24  On supplementation    Full Code

## 2019-10-30 NOTE — CARE PLAN
Problem: Safety  Goal: Will remain free from falls  Intervention: Implement fall precautions  Note:   Use of call light encouraged to make needs known.Impulsive per report.Bed in low position, non skid socks/shoes on when out of bed.Alarms and hourly rounding in place for safety.Pt's room is close to the nursing station.Call light within reach.Will continue to monitor and assess needs and safety.     Problem: Pain Management  Goal: Pain level will decrease to patient's comfort goal  Note:   Pt denies any pain or discomfort.Repositioned with pillows for comfort.Will continue to monitor and assess pain level and medicate as needed.

## 2019-10-30 NOTE — THERAPY
Speech Language Pathology  Daily Treatment     Patient Name: Marcio More Jr.  Age:  73 y.o., Sex:  male  Medical Record #: [id number]  Today's Date: 10/30/2019     Subjective    Patient was in room with family and nursing. Family reports that patient is very fatigued, but was willing to participate.      Objective       10/30/19 1402   Cognition   Attention to Task Moderate (3)   Simple Attention Moderate (3)   SLP Total Time Spent   SLP Individual Total Time Spent (Mins) 60   Charge Group   SLP Cognitive Skill Development 4           Assessment    Initiated SCCAN, however due to fatigue (patient frequently falling asleep during tasks) and bathroom breaks, was not completed in entirety. With curent completed score, patient demonstrated an improvement in expression, orientation, and memory.     Plan    Complete remainder of SCCAN.

## 2019-10-30 NOTE — DISCHARGE PLANNING
Met with patient's wife and reviewed plans.  All questions answered.  Discussed delivery of hospital bed on Friday am from [facility].  Follow up appointments have been made.  Will follow for d/c on Friday.

## 2019-10-30 NOTE — THERAPY
Occupational Therapy  Daily Treatment     Patient Name: Marcio More Jr.  Age:  73 y.o., Sex:  male  Medical Record #: [id number]  Today's Date: 10/30/2019     Precautions  Precautions: Fall Risk  Comments: coccyx wound    Safety   ADL Safety : Requires Physical Assist for Safety, Requires Cueing for Safety  Bathroom Safety: Requires Physical Assist for Safety, Requires Cuing for Safety  Comments: see FIM for oral care and eating    Subjective    Pt was seated in w/c and agreeable to therapy to practice bed mobility and bed<>W/C transfer.     Objective       10/30/19 0901   Precautions   Precautions Fall Risk   Safety    ADL Safety  Requires Physical Assist for Safety;Requires Cueing for Safety   Bathroom Safety Requires Physical Assist for Safety;Requires Cuing for Safety   Cognition    Safety Awareness Impaired   New Learning Impaired   Attention Impaired   Sequencing Impaired   Initiation Impaired   Balance   Sitting Balance (Static) Fair   Sitting Balance (Dynamic) Fair   Standing Balance (Static) Poor   Standing Balance (Dynamic) Poor   Weight Shift Sitting Poor   Weight Shift Standing Poor   Skilled Intervention Facilitation;Verbal Cuing   Bed Mobility    Supine to Sit Maximal Assist   Sit to Supine Moderate Assist   Scooting Minimal Assist   Rolling Maximum Assist to Lt.   Skilled Intervention Facilitation;Verbal Cuing;Sequencing   Interdisciplinary Plan of Care Collaboration   IDT Collaboration with  Nursing;Physical Therapist   Patient Position at End of Therapy Seated;Self Releasing Lap Belt Applied;Call Light within Reach   Collaboration Comments CLOF   OT Total Time Spent   OT Individual Total Time Spent (Mins) 30   OT Charge Group   OT Self Care / ADL 1   OT Therapy Activity 1       FIM Toiletin - Max Assistance  Toileting Description:  Grab bar, Increased time, Supervision for safety, Verbal cueing    FIM Bed/Chair/Wheelchair Transfers Score: 2 - Max Assistance  Bed/Chair/Wheelchair  Transfers Description:  Increased time, Supervision for safety, Verbal cueing, Requires lift(Max A to roll and Max A for lift sit>stand)    FIM Toilet Transfer Score:  3 - Moderate Assistance  Toilet Transfer Description:  Grab bar, Increased time, Supervision for safety, Verbal cueing, Requires lift      Assessment    Pt participated in bed<>w/c<> toilet transfers with Max A. Pt repeatedly told therapist he could not roll to the left because he would fall. Pt rolled to the R with Min A and then was able to roll to the L with Max A. Pt barriers include inconsistent performance with functional transfers and ADL tasks, confusion, muscle guarding.     Plan    Continue OT to address ADL/IADL independence, functional transfers, use of AE/DME, standing and sitting balance, edurance. Family training for ADLs and transfers this date.

## 2019-10-30 NOTE — PROGRESS NOTES
[facility] Medicine Daily Progress Note      Chief Complaint:  Atrial Fibrillation  Hypertension  Diabetes  Leukocytosis    Interval History:  Pt seen and examined in dining room.  No complaints.  Labs reviewed.    Review of Systems  Review of Systems   Constitutional: Negative for chills and fever.   HENT: Negative.    Eyes: Negative.    Respiratory: Negative for cough and shortness of breath.    Cardiovascular: Negative for chest pain and palpitations.   Gastrointestinal: Negative for abdominal pain, nausea and vomiting.   Skin: Negative for itching and rash.        Physical Exam  Temp:  [36.4 °C (97.5 °F)-36.5 °C (97.7 °F)] 36.4 °C (97.5 °F)  Pulse:  [77-97] 84  Resp:  [18-20] 20  BP: (110-140)/(70-78) 110/72  SpO2:  [92 %-97 %] 93 %    Physical Exam   Constitutional: No distress.   HENT:   Head: Normocephalic and atraumatic.   Right Ear: External ear normal.   Left Ear: External ear normal.   Prior crani scar   Eyes: Conjunctivae and EOM are normal. Right eye exhibits no discharge. Left eye exhibits no discharge.   Neck: Normal range of motion. Neck supple. No tracheal deviation present.   Cardiovascular: Normal rate, regular rhythm, S1 normal and S2 normal.   Pulmonary/Chest: No stridor. No respiratory distress. He has no wheezes. He has no rhonchi.   Decreased BS   Abdominal: Soft. Bowel sounds are normal. He exhibits no distension. There is no tenderness.   Musculoskeletal: He exhibits no edema or tenderness.   Neurological: No sensory deficit.   awake   Skin: Skin is warm and dry. He is not diaphoretic. No cyanosis.   Vitals reviewed.      Fluids    Intake/Output Summary (Last 24 hours) at 10/30/2019 1631  Last data filed at 10/30/2019 0826  Gross per 24 hour   Intake 840 ml   Output --   Net 840 ml       Laboratory  Recent Labs     10/30/19  0546   WBC 7.3   RBC 4.08*   HEMOGLOBIN 12.9*   HEMATOCRIT 39.6*   MCV 97.1   MCH 31.6   MCHC 32.6*   RDW 48.5   PLATELETCT 190   MPV 9.3     Recent Labs      10/30/19  0546   SODIUM 142   POTASSIUM 3.7   CHLORIDE 104   CO2 30   GLUCOSE 100*   BUN 13   CREATININE 0.81   CALCIUM 8.8                   Assessment/Plan  B-cell lymphoma (HCC)- (present on admission)  Assessment & Plan  Stage IV w/ h/o brain mass resection  S/P chemo on 9/23/19  S/P Prednisone for confusion per Physiatry    A-fib (HCC)- (present on admission)  Assessment & Plan  S/P RVR at [facility]  On Metoprolol for rate control  Now off Diltiazem  Anticoagulated on Eliquis    Azotemia- (present on admission)  Assessment & Plan  Renal function improving w/ PO fluids    Diabetes mellitus with hyperglycemia, without long-term current use of insulin ([facility])- (present on admission)  Assessment & Plan  HbA1c 7.2  Continue Metformin  Now off FSBS and SSI  Outpt meds include Metformin  mg daily    CAD (coronary artery disease)- (present on admission)  Assessment & Plan  On Eliquis, Lipitor, and Metoprolol  Now off Diltiazem  Started Lisinopril to maximize medical management  Tolerating ACE-I thus far    Hypertension- (present on admission)  Assessment & Plan  Observe blood pressure trends on Metoprolol  Now off CCB    UTI (urinary tract infection)- (present on admission)  Assessment & Plan  UA 20-50 WBC w/ moderate bacteria  UCx >100,000 Enterobacter  WBC normalized on abx  Completed Cipro x 7 days    Vitamin D insufficiency- (present on admission)  Assessment & Plan  Vit D level 24  On supplementation    Full Code

## 2019-10-30 NOTE — THERAPY
Physical Therapy   Daily Treatment     Patient Name: Marcio More Jr.  Age:  73 y.o., Sex:  male  Medical Record #: [id number]  Today's Date: 10/30/2019     Precautions  Precautions: Fall Risk  Comments: coccyx wound    Subjective    Pt was seated EOB upon arrival and agreeable to treatment.  Pt's daughter and spouse present for family training.      Objective       10/30/19 1301   Precautions   Precautions Fall Risk   Bed Mobility    Supine to Sit Minimal Assist   Sit to Supine Minimal Assist   Sit to Stand Minimal Assist   Scooting Stand by Assist   Rolling Supervised   Interdisciplinary Plan of Care Collaboration   IDT Collaboration with  Family / Caregiver   Patient Position at End of Therapy In Bed;Call Light within Reach;Tray Table within Reach;Phone within Reach;Family / Friend in Room   Collaboration Comments Family training   PT Total Time Spent   PT Individual Total Time Spent (Mins) 60   PT Charge Group   PT Therapeutic Activities 4       FIM Bed/Chair/Wheelchair Transfers Score: 4 - Minimal Assistance  Bed/Chair/Wheelchair Transfers Description:  Adaptive equipment, Increased time, Supervision for safety, Set-up of equipment, Verbal cueing(Bed mobility with min A; SPT with FWW and CGA)    Family training completed with pt's spouse and daughter on bed mobility, transfers using FWW, and car transfers.  Discussion of POC, D/C planning.     Assessment    Pt's spouse and daughter verbalized all education.  Pt's spouse demonstrated good safety and body mechanics when assisting pt with min VCing.      Plan    Complete D/C FIM and IRF EDEL items in preparation for D/C on Friday 11/1/19.

## 2019-10-31 NOTE — THERAPY
"Physical Therapy   Daily Treatment     Patient Name: Marcio More Jr.  Age:  73 y.o., Sex:  male  Medical Record #: [id number]  Today's Date: 10/31/2019     Precautions  Precautions: Fall Risk  Comments: coccyx wound    Subjective    Pt seated in cafeteria, agreeable to PT      Objective       10/31/19 0831   Precautions   Precautions Fall Risk   Comments coccyx wound   Interdisciplinary Plan of Care Collaboration   IDT Collaboration with  Physical Therapist   Patient Position at End of Therapy In Bed;Call Light within Reach;Tray Table within Reach   Collaboration Comments primary PT regarding CLOF and DC plan   PT Total Time Spent   PT Individual Total Time Spent (Mins) 60   PT Charge Group   PT Therapeutic Activities 4     Pt education and information packet issued on fall risk and fall recovery.     Pt issued hand-out for seated LE HEP.     FIM Bed/Chair/Wheelchair Transfers Score: 4 - Minimal Assistance  Bed/Chair/Wheelchair Transfers Description:  Increased time, Set-up of equipment, Adaptive equipment(WC > bed, CGA with FWW, SBA with extra time and verbal cuing for bed mobility)    FIM Walking Score:  4 - Minimal Assistance  Walking Description:  Extra time, Requires incidental assist, Walker(220 ft with FWW and CGA)    FIM Wheelchair Score:  2 - Max Assistance  Wheelchair Description:  Extra time, Supervision for safety(75 ft with excessive time to complete, using UEs and LEs )    FIM Stairs Score:  2 - Max Assistance  Stairs Description:  Extra time, Verbal cueing, Hand rails, Ascends/descends 4 to 11 steps(4 6\" steps with 2 HRs and min A for balance)      Assessment    Pt motivated and fully participatory this session. Extra time required to complete all functional activities this session.     Plan    Pt to DC home tomorrow    "

## 2019-10-31 NOTE — THERAPY
Occupational Therapy  Daily Treatment     Patient Name: Marcio More Jr.  Age:  73 y.o., Sex:  male  Medical Record #: [id number]  Today's Date: 10/31/2019     Precautions  Precautions: (P) Fall Risk  Comments: coccyx wound    Safety   ADL Safety : (P) Requires Physical Assist for Safety, Requires Cueing for Safety  Bathroom Safety: (P) Requires Physical Assist for Safety, Requires Cuing for Safety  Comments: see FIM for oral care and eating    Subjective    Pt was supine asleep in bed. Pt was easily aroused and agreeable to therapy.      Objective       10/31/19 1001   Precautions   Precautions Fall Risk   Safety    ADL Safety  Requires Physical Assist for Safety;Requires Cueing for Safety   Bathroom Safety Requires Physical Assist for Safety;Requires Cuing for Safety   Cognition    Level of Consciousness Alert   Safety Awareness Impaired   Sequencing Impaired   Initiation Impaired   Balance   Sitting Balance (Static) Poor +   Sitting Balance (Dynamic) Poor   Standing Balance (Static) Poor +   Standing Balance (Dynamic) Poor   Weight Shift Sitting Poor   Weight Shift Standing Poor   Skilled Intervention Facilitation;Verbal Cuing   Bed Mobility    Supine to Sit Moderate Assist   Sit to Supine Moderate Assist   Scooting Stand by Assist   Rolling Supervised   Skilled Intervention Facilitation;Verbal Cuing   Interdisciplinary Plan of Care Collaboration   Patient Position at End of Therapy Seated;Self Releasing Lap Belt Applied;Call Light within Reach   OT Total Time Spent   OT Individual Total Time Spent (Mins) 30   OT Charge Group   OT Self Care / ADL 2       FIM Lower Body Dressing Score:  3 - Moderate Assistance  Lower Body Dressing Description:  (Mod A to jose new brief )    FIM Toiletin - Max Assistance  Toileting Description:       FIM Bed/Chair/Wheelchair Transfers Score: 4 - Minimal Assistance  Bed/Chair/Wheelchair Transfers Description:  Increased time, Verbal cueing, Requires lift(Lift for UB  supine to sit and LB sit to supine )    FIM Toilet Transfer Score:  5 - Standby Prompting/Supervision or Set-up  Toilet Transfer Description:  Grab bar, Increased time, Supervision for safety, Verbal cueing(using FWW )  Bed<>FWW with CGA for balance    Pt walked bed<>bathroom with CGA using FWW     Assessment    Pt completed bed mobility and toileting tasks with varying independence this date. Pt was able to roll in bed with SBA, however required heavy cueing to complete log roll and demonstrated poor balance EOB. Pt and therapist discussed option of external catheter for nocturnal incontinence at home and pt reported he would rather get up and use the bathroom himself, demonstrating poor safety awareness and insight into deficits.     Plan     Continue OT to address ADL independence, functional transfers, use of AE/DME, standing and sitting balance, edurance.

## 2019-10-31 NOTE — DISCHARGE PLANNING
Per [facility] at [facility], referral accepted.  They will contact patient's wife to schedule delivery of hospital bed.  CM notified.

## 2019-10-31 NOTE — PROGRESS NOTES
Hospital Medicine Daily Progress Note      Chief Complaint:  Atrial Fibrillation  Hypertension  Diabetes  Leukocytosis    Interval History:  Pt c/o left leg twitching, will defer to Physiatry.    Review of Systems  Review of Systems   Constitutional: Negative for chills and fever.   HENT: Negative.    Eyes: Negative.    Respiratory: Negative for cough and shortness of breath.    Cardiovascular: Negative for chest pain and palpitations.   Gastrointestinal: Negative for abdominal pain, nausea and vomiting.   Skin: Negative for itching and rash.        Physical Exam  Temp:  [36.4 °C (97.5 °F)-37 °C (98.6 °F)] 37 °C (98.6 °F)  Pulse:  [80-84] 80  Resp:  [18-20] 18  BP: (110-140)/(60-87) 123/80  SpO2:  [93 %-96 %] 94 %    Physical Exam   Constitutional: No distress.   HENT:   Head: Normocephalic and atraumatic.   Right Ear: External ear normal.   Left Ear: External ear normal.   Prior crani scar   Eyes: Conjunctivae and EOM are normal. Right eye exhibits no discharge. Left eye exhibits no discharge.   Neck: Normal range of motion. Neck supple. No tracheal deviation present.   Cardiovascular: Normal rate, regular rhythm, S1 normal and S2 normal.   Pulmonary/Chest: No stridor. No respiratory distress. He has no wheezes. He has no rhonchi.   Decreased BS   Abdominal: Soft. Bowel sounds are normal. He exhibits no distension. There is no tenderness.   Musculoskeletal: He exhibits no edema or tenderness.   Neurological: He is alert. No sensory deficit.   awake   Skin: Skin is warm and dry. He is not diaphoretic. No cyanosis.   Vitals reviewed.      Fluids    Intake/Output Summary (Last 24 hours) at 10/31/2019 0945  Last data filed at 10/31/2019 0845  Gross per 24 hour   Intake 900 ml   Output --   Net 900 ml       Laboratory  Recent Labs     10/30/19  0525   WBC 7.3   RBC 4.08*   HEMOGLOBIN 12.9*   HEMATOCRIT 39.6*   MCV 97.1   MCH 31.6   MCHC 32.6*   RDW 48.5   PLATELETCT 190   MPV 9.3     Recent Labs     10/30/19  0814    SODIUM 142   POTASSIUM 3.7   CHLORIDE 104   CO2 30   GLUCOSE 100*   BUN 13   CREATININE 0.81   CALCIUM 8.8                   Assessment/Plan  B-cell lymphoma (HCC)- (present on admission)  Assessment & Plan  Stage IV w/ h/o brain mass resection  S/P chemo on 9/23/19  S/P Prednisone for confusion per Physiatry    A-fib (HCC)- (present on admission)  Assessment & Plan  S/P RVR at [facility]  On Metoprolol for rate control  Anticoagulated on Eliquis    Azotemia- (present on admission)  Assessment & Plan  Renal function improved w/ PO fluids    Diabetes mellitus with hyperglycemia, without long-term current use of insulin ([facility])- (present on admission)  Assessment & Plan  HbA1c 7.2  Continue Metformin  Outpt meds include Metformin  mg daily    CAD (coronary artery disease)- (present on admission)  Assessment & Plan  On maximal medical management w/ Eliquis, Lipitor, Metoprolol, and Lisinopril    Hypertension- (present on admission)  Assessment & Plan  Blood pressure controlled on Metoprolol and Lisinopril    UTI (urinary tract infection)- (present on admission)  Assessment & Plan  UA 20-50 WBC w/ moderate bacteria  UCx >100,000 Enterobacter  Completed Cipro x 7 days    Vitamin D insufficiency- (present on admission)  Assessment & Plan  Vit D level 24  On supplementation    Full Code

## 2019-10-31 NOTE — DISCHARGE PLANNING
Flowsheets      10/31/19  1229   Discharge Instructions - Completed by Case Mgmt   Discharge Location Home with Home Health   Agency Name / Address / Phone [facility] at [phone]. (they will call you to schedule visits)   Home Health Registered Nurse; Home Health Aide; Occupational Therapist; Physical Therapist   [phone]   Medical Equipment Ordered Hospital Bed and special mattress            Follow-up With  Details  Why  Contact Info   Jeni Armando N.P. (Primary Care)  On 11/11/2019 Monday at 2:00 pm (records will be sent)  [address]  [place] [address]  [phone]     Patt Ferrer M.D. (Oncology)  On 11/19/2019 Tuesday at 2:15 pm  [address] [place] [address]  [phone]           HAYES Tavarez (Urology)  On 11/13/2019 Weds at 2:00 pm  [address] [place] [address]  [phone]

## 2019-10-31 NOTE — THERAPY
Speech Language Pathology  Daily Treatment     Patient Name: Marcio More Jr.  Age:  73 y.o., Sex:  male  Medical Record #: [id number]  Today's Date: 10/31/2019     Subjective    Pt pleasant and cooperative.     Objective       10/31/19 1033   SCCAN (Scales of Cognitive and Communicative Ability for Neurorehabilitation)   Oral Expression - Raw Score 15   Oral Expression - Scale Performance Score 79   Orientation - Raw Score 9   Orientation - Scale Performance Score 75   Memory - Raw Score 9   Memory - Scale Performance Score 47   Speech Comprehension - Raw Score 11   Speech Comprehension - Scale Performance Score 85   Reading Comprehension - Raw Score 9   Reading Comprehension - Scale Performance Score 75   Writing - Raw Score 5   Writing - Scale Performance Score 71   Attention - Raw Score 6   Attention - Scale Performance Score 38   Problem Solving - Raw Score 12   Problem Solving - Scale Performance Score 52   SCCAN Total Raw Score 62   SCCAN Degree of Severity Moderate Impairment   SLP Total Time Spent   SLP Individual Total Time Spent (Mins) 60   Charge Group   SLP Cognitive Skill Development 4       Assessment    SCCAN completed with raw score of 62 achieved indicating MODERATE cognitive impairments, results reviewed with pt at this time. Pt completed o-log with score of 13 achieved on this date. Attention addressed with visual scanning, pt completed single target with 100% accuracy however once two targets were introduced pt completed with 60% accuracy indep and required MOD A to complete with 100% accuracy.     Plan    Pt to d/c  Tomorrow with family support.

## 2019-10-31 NOTE — CARE PLAN
Problem: Infection  Goal: Will remain free from infection  Intervention: Assess signs and symptoms of infection  Note:   Pt is calm, comfortable and no sign of acute distress noted.Will continue to monitor.     Problem: Urinary Elimination:  Goal: Ability to reestablish a normal urinary elimination pattern will improve  Intervention: Assist patient to sit on commode or toilet for voiding  Note:   Pt remains incontinent of bladder.Will continue to monitor.

## 2019-10-31 NOTE — THERAPY
"Occupational Therapy  Daily Treatment     Patient Name: Marcio More Jr.  Age:  73 y.o., Sex:  male  Medical Record #: [id number]  Today's Date: 10/31/2019     Precautions  Precautions: (P) Fall Risk  Comments: coccyx wound    Safety   ADL Safety : Requires Physical Assist for Safety, Requires Cueing for Safety  Bathroom Safety: Requires Physical Assist for Safety, Requires Cuing for Safety  Comments: see FIM for oral care and eating    Subjective    \" I am stuck.\"   patient found in public bathroom area. W/c stuck in stall.  Needing to use toilet.     Presented with bowel incontinence      Objective       10/31/19 0814   Precautions   Precautions Fall Risk   Interdisciplinary Plan of Care Collaboration   Patient Position at End of Therapy Seated  (in dining room for breakfast )   OT Total Time Spent   OT Individual Total Time Spent (Mins) 15   OT Charge Group   OT Self Care / ADL 1       FIM Toiletin - Total Assistance  Toileting Description:  (following bowel incon. )    FIM Toilet Transfer Score:  4 - Minimal Assistance  Toilet Transfer Description:         Assessment     required assist following bowel incon.  Decreased cognition?  This patient unfamiliar to this therapist     Plan        "

## 2019-10-31 NOTE — THERAPY
"Occupational Therapy  Daily Treatment     Patient Name: Marcio More Jr.  Age:  73 y.o., Sex:  male  Medical Record #: [id number]  Today's Date: 10/31/2019     Precautions  Precautions: (P) Fall Risk  Comments: coccyx wound    Safety   ADL Safety : (P) Requires Supervision for Safety  Bathroom Safety: (P) Requires Supervision for Safety  Comments: see FIM for oral care and eating    Subjective    Pt was supine and agreeable to therapy. \" I just went in my pants, I need a shower.\"      Objective       10/31/19 1431   Precautions   Precautions Fall Risk   Safety    ADL Safety  Requires Supervision for Safety   Bathroom Safety Requires Supervision for Safety   Cognition    Orientation Level Not Oriented to Day;Not Oriented to Place   Level of Consciousness Alert   Ability To Follow Commands 2 Step   Safety Awareness Impaired   New Learning Impaired   Attention Impaired   Sequencing Impaired   Initiation Impaired   Balance   Sitting Balance (Static) Fair   Sitting Balance (Dynamic) Fair -   Standing Balance (Static) Fair   Standing Balance (Dynamic) Fair -   Weight Shift Sitting Poor   Weight Shift Standing Poor   Bed Mobility    Supine to Sit Moderate Assist   Sit to Supine Moderate Assist   Scooting Moderate Assist   Rolling Moderate Assist to Rt.   Skilled Intervention Facilitation;Sequencing;Verbal Cuing   Interdisciplinary Plan of Care Collaboration   IDT Collaboration with  Certified Nursing Assistant   Patient Position at End of Therapy In Bed;Bed Alarm On;Call Light within Reach;Tray Table within Reach   Collaboration Comments Shower   OT Total Time Spent   OT Individual Total Time Spent (Mins) 60   OT Charge Group   OT Self Care / ADL 4       FIM Eating Score:  6 - Modified Independent  Eating Description:  Increased time    FIM Grooming Score:  5 - Standby Prompting/Supervision or Set-up  Grooming Description:  Increased time    FIM Bathing Score:  4 - Minimal Assistance  Bathing Description:       FIM " Upper Body Dressin - Modified Independent  Upper Body Dressing Description:  Increased time    FIM Lower Body Dressing Score:  3 - Moderate Assistance  Lower Body Dressing Description:  Reacher, Dressing stick(Mod A for socks )    FIM Toileting Body Dressin - Max Assistance  Toileting Description:  Grab bar, Increased time    FIM Bed/Chair/Wheelchair Transfers Score: 4 - Minimal Assistance  Bed/Chair/Wheelchair Transfers Description:  Increased time, Verbal cueing, Requires lift(Lift for UB supine to sit and LB sit to supine )    FIM Toilet Transfer Score:  5 - Standby Prompting/Supervision or Set-up  Toilet Transfer Description:  Grab bar, Increased time(Using FWW)    FIM Tub/Shower Transfers Score:  5 - Standby Prompting/Supervision or Set-up  Tub/Shower Transfers Description:  Supervision for safety, Increased time, Grab bar      Assessment    Pt participated in toileting,  shower, dressing, and grooming with decreased independence due to poor motor planning this session.     Plan    D/C tomorrow

## 2019-10-31 NOTE — CARE PLAN
Problem: PT-Long Term Goals  Goal: LTG-By discharge, patient will propel wheelchair  Description  1) Individualized goal:  150 ft, spv   2) Interventions:  PT Group Therapy, PT E Stim Attended, PT Gait Training, PT Therapeutic Exercises, PT Neuro Re-Ed/Balance, PT Aquatic Therapy, PT Therapeutic Activity, PT Manual Therapy and PT Wound Therapy         Outcome: NOT MET     Problem: PT-Long Term Goals  Goal: LTG-By discharge, patient will transfer one surface to another  Description  1) Individualized goal:  Min A with LRAD   2) Interventions: PT Group Therapy, PT E Stim Attended, PT Gait Training, PT Therapeutic Exercises, PT Neuro Re-Ed/Balance, PT Aquatic Therapy, PT Therapeutic Activity, PT Manual Therapy and PT Wound Therapy         Outcome: MET  Goal: LTG-By discharge, patient will perform home exercise program  Description  1) Individualized goal:  SBA with LE HEP with use of handout  2) Interventions: PT Group Therapy, PT E Stim Attended, PT Gait Training, PT Therapeutic Exercises, PT Neuro Re-Ed/Balance, PT Aquatic Therapy, PT Therapeutic Activity, PT Manual Therapy and PT Wound Therapy         Outcome: MET  Goal: LTG-By discharge, patient will transfer in/out of a car  Description  1) Individualized goal:  Min A with LRAD  2) Interventions: PT Group Therapy, PT E Stim Attended, PT Gait Training, PT Therapeutic Exercises, PT Neuro Re-Ed/Balance, PT Aquatic Therapy, PT Therapeutic Activity, PT Manual Therapy and PT Wound Therapy         Outcome: MET  Goal: LTG-By discharge, patient will ambulate  Description  1) Individualized goal:  Patient will amb indoors >50 ft x 2 for household mobility CGA-SBA with FWW  2) Interventions:  PT Group Therapy, PT E Stim Attended, PT Gait Training, PT Therapeutic Exercises, PT Neuro Re-Ed/Balance, PT Aquatic Therapy, PT Therapeutic Activity and PT Manual Therapy     Outcome: MET

## 2019-11-01 PROBLEM — N39.0 UTI (URINARY TRACT INFECTION): Status: RESOLVED | Noted: 2019-01-01 | Resolved: 2019-01-01

## 2019-11-01 PROBLEM — E87.6 HYPOKALEMIA: Status: RESOLVED | Noted: 2019-01-01 | Resolved: 2019-01-01

## 2019-11-01 PROBLEM — R29.818 NEUROLOGIC ABNORMALITY: Status: RESOLVED | Noted: 2019-01-01 | Resolved: 2019-01-01

## 2019-11-01 PROBLEM — E55.9 VITAMIN D INSUFFICIENCY: Status: RESOLVED | Noted: 2019-01-01 | Resolved: 2019-01-01

## 2019-11-01 NOTE — PROGRESS NOTES
At 1640, primary RN notified Charge RN of new onset of hematuria x2 in moderate amounts. Charge RN contacted Dr. Bella via tiger text at 1729 to update of condition. No new orders received. Continue to monitor status and update on-call MD of any changes.

## 2019-11-01 NOTE — DISCHARGE SUMMARY
Rehab Discharge Summary    Admission Date: 10/10/2019    Discharge Date: 11/1/2019    Attending Provider: Leticia Bella MD/PhD    Admission Diagnosis:   Active Hospital Problems    Diagnosis   • B-cell lymphoma (HCC)   • A-fib (HCC)   • CAD (coronary artery disease)   • Hypertension   • Diabetes mellitus with hyperglycemia, without long-term current use of insulin (HCC)   • Hematuria       Discharge Diagnosis:  Active Hospital Problems    Diagnosis   • B-cell lymphoma (HCC)   • A-fib (HCC)   • CAD (coronary artery disease)   • Hypertension   • Diabetes mellitus with hyperglycemia, without long-term current use of insulin (HCC)   • Hematuria       HPI per H&P:  The patient is a 73 y.o. right hand dominant male with a past medical history of high-grade B-cell lymphoma stage IV under the care of Dr. Ferrer, type 2 diabetes, atrial fibrillation, coronary artery disease, hypertension;  who presented on 10/1/2019 11:57 AM with increased weakness, fatigue left arm weakness and difficulty swallowing.  Patient underwent acute inpatient rehabilitation in July 2019 for new B cell lymphoma in brain after resection on 7/10/19.  Patient recently had peritoneal mass biopsied, and a port placed.  Post R-CHOP chemo on 9/23.  Work-up has been limited due to agitation, nausea, vomiting, and atrial fibrillation with RVR.  Recent MRI brain on 10/3 is negative for brain mets. Chart review consult by Dr. Harrell on 10/07 reflects initiation of baclofen 5 mg 3 times daily,  the patient currently reports slightly improved symptoms after starting baclofen last night. Patient has since been increased on Baclofen to 10 mg TID.       Patient was admitted to [facility] on 10/10/2019.     Hospital Course by Problem List:  Encephalopathy - Patient with recent diagnosis of stage IV B cell lymphoma s/p R-CHOP on 9/23/19 now with diffuse weakness, confusion and dysphagia. Patient underwent acute inpatient rehabilitation  from 10/10/19 to 11/1/19 with good improvement in mobility and ADLs, patient still with cognitive deficits.   -Started on Amantadine 100 mg BID (0800 and 1300)     AMS - decreased endurance and worsened spasticity. Check UA, started on Amantadine. UA positive, started on Ciprofloxacin per Hospitalist, U Cx pending - Enterobacter, susc to Ciprofloxacin. Completed.      Dysphagia - Patient on dysphagia 2 with NTL on transfer. SLP for swallow - advanced to regulars with thins.      Spasticity - Restarted on Baclofen and increased prior to admission. Titrated off as no improvement. Improved cognition off of Baclofen.     B cell lymphoma - Underwent R Chop on 9/23/19.  Followed by Oncology. Recent right retroperitoneal mass s/p biopsy which is positive for B cell lymphoma  -Follow-up Heme/Onc  -Discontinue Prednisone. Follow-up Oncology     HTN/A Fib - Patient on Pradaxa.  Patient on Diltiazem 360 mg QHS, Metoprolol 25 mg XL daily. Patient chewing medication, will switch medications to short acting. Hypotension on 10/17/19, discussed case with hospitalist and recommending titrate off of Diltiazem. Per hospitalist only need Metoprolol at discharge  -Continue Metoprolol 25 mg BID      DM - Patient on SSI on transfer. Consult hospitalist.  Patient with GI side effects from short acting metformin  -Continue Metformin 500 mg SR     Sacral wound - Present on admission, seen by wound care today. Appreciate recommendations. Recommending mepilex only  The patient has a medical condition that requires positioning of the body in ways not feasible with an ordinary bed in order to alleviate pain and reduce risk of skin breakdown.  The need for these changes in body position are frequent and immediate.  The medical condition for which this bed is being prescribed include     Leukocytosis - B cell lymphoma on steroids. Check AM CBC - continued  -Consult hospitalist. UTI per above. Completed     Hematuria - Referral to  Urology     Insomnia - Very poor sleep at night. Start Melatonin 6 mg QHS     Hx of Gout - Patient on Allopurinol 300 mg daily.      Vitamin D - 24 on admission, start on 1000 U for deficiency.       GI Ppx - Patient on Omeprazole 40 mg daily. Discontinue now that off of steroids.     DVT ppx - Patient on Eliquis 5 mg BID     Dispo - Wife would like goals of care conversation. Consulted palliative, arranged for meeting on Friday 10/25 at 2PM. Extended stay until 19, will go home with HH. Family does not want hospice at this time. Patient would benefit from hospital bed for wounds. Family training completed on 10/30/19.      Functional Status at Discharge  Eatin - Modified Independent  Eating Description:  Increased time  Groomin - Standby Prompting/Supervision or Set-up  Grooming Description:  Increased time  Bathin - Minimal Assistance  Bathing Description:  Grab bar, Tub bench, Hand held shower(Min A for bottom )  Upper Body Dressin - Modified Independent  Upper Body Dressing Description:  Increased time  Lower Body Dressing:  3 - Moderate Assistance  Lower Body Dressing Description:  3 - Moderate Assistance  Discharge Location : Home  Patient Discharging with Assist of: Family   Level of Supervision Required: 24 Hour Supervision(Due to intermittent confusion )  Recommended Equipment for Discharge: Front-Wheeled Walker;Grab Bars by Toilet;Grab Bars in Tub / Shower;Shower Chair;Hand Held Shower Head  Recommended Services Upon Discharge: Home Health Occupational Therapy  Walk:  4 - Minimal Assistance  Distance Walked:  Walks a minimum of 150 feet  Walk Description:  Extra time, Requires incidental assist, Walker(220 ft with FWW and CGA)  Wheelchair:  2 - Max Assistance  Distance Propelled:  Propels  feet   Wheelchair Description:  Extra time, Supervision for safety(75 ft with excessive time to complete, using UEs and LEs )  Stairs 2 - Max Assistance  Stairs DescriptionExtra time,  "Verbal cueing, Hand rails, Ascends/descends 4 to 11 steps(4 6\" steps with 2 HRs and min A for balance)  Discharge Location: Home  Patient Discharging with Assist of: Family;Spouse / Significant Other  Level of Supervision Required Upon Discharge: Twenty Four Hour Supervision  Recommended Equipment for Discharge: Front-Wheeled Walker;Manual Wheelchair  Recommeded Services Upon Discharge: Home Health Physical Therapy;Outpatient Physical Therapy  Long Term Goals Met: 4  Long Term Goals Not Met: 1  Reason(s) for Goals Not Met: For w/c goal, pt is unable to propel 150 feet secondary to intermittent confusion and poor motor planning  Criteria for Termination of Services: Maximum Function Achieved for Inpatient Rehabilitation  Comprehension Mode:  Auditory  Comprehension:  4 - Minimal Assistance  Comprehension Description:  Verbal cues  Expression Mode:  Vocal  Expression:  3 - Moderate Assistance  Expression Description:  Verbal cueing  Social Interaction:  5 - Stand-by Prompting/Supervision or Set-up  Social Interaction Description:  Increased time, Verbal cues  Problem Solving:  3 - Moderate Assistance  Problem Solving Description:  Verbal cueing, Increased time  Memory:  3 - Moderate Assistance  Memory Description:  Verbal cueing, Therapy schedule, Bed/chair alarm, Seat belt       ILeticia M.D., personally performed a complete drug regimen review and no potential clinically significant medication issues were identified.   Discharge Medication:     Medication List      START taking these medications      Instructions   amantadine 50 MG/5ML Syrp  Commonly known as:  SYMMETREL  Notes to patient:  Increase alertness   Doctor's comments:  Twice daily, 0800 and 1300  Take 10 mL by mouth 2 Times a Day.  Dose:  100 mg     lisinopril 5 MG Tabs  Start taking on:  11/2/2019  Commonly known as:  PRINIVIL   Take 1 Tab by mouth every day.  Dose:  5 mg     metoprolol 25 MG Tabs  Commonly known as:  LOPRESSOR   Take " 1 Tab by mouth 2 Times a Day.  Dose:  25 mg        CHANGE how you take these medications      Instructions   atorvastatin 20 MG Tabs  What changed:  when to take this  Commonly known as:  LIPITOR   Take 1 Tab by mouth every bedtime.  Dose:  20 mg        CONTINUE taking these medications      Instructions   allopurinol 300 MG Tabs  Commonly known as:  ZYLOPRIM   Take 1 Tab by mouth every day.  Dose:  300 mg     dabigatran 150 MG Caps capsule  Commonly known as:  PRADAXA  Notes to patient:  Blood thinner    Take 150 mg by mouth every bedtime.  Dose:  150 mg     metFORMIN  MG Tb24  Commonly known as:  GLUCOPHAGE XR   Take 1 Tab by mouth every day.  Dose:  500 mg     therapeutic multivitamin-minerals Tabs   Take 1 Tab by mouth every bedtime.  Dose:  1 Tab     TURMERIC PO   Take 20 mL by mouth 3 times a day.  Dose:  20 mL        STOP taking these medications    acetaminophen 500 MG Tabs  Commonly known as:  TYLENOL     ALOXI 0.25 MG/5ML injection  Generic drug:  palonosetron     Aprepitant 130 MG/18ML Emul     baclofen 10 MG Tabs  Commonly known as:  LIORESAL     CYCLOPHOSPHAMIDE IV     dexamethasone 10 MG/ML Soln  Commonly known as:  DECADRON     diltiazem  MG ER capsule  Commonly known as:  CARDIZEM CD     DIPHENHYDRAMINE HCL INJ     docusate sodium 100 MG Caps  Commonly known as:  COLACE     DOXORUBICIN HCL IV     ferrous sulfate 325 (65 Fe) MG tablet     loratadine 10 MG Tabs  Commonly known as:  CLARITIN     metoprolol SR 25 MG Tb24  Commonly known as:  TOPROL XL     omeprazole 40 MG delayed-release capsule  Commonly known as:  PRILOSEC     ondansetron 8 MG Tbdp  Commonly known as:  ZOFRAN ODT     pegfilgrastim 6 MG/0.6ML Sosy injection  Commonly known as:  NEULASTA     predniSONE 10 MG Tabs  Commonly known as:  DELTASONE     RITUXIMAB IV     vinCRIStine 1 MG/ML Soln  Commonly known as:  ONCOVIN            Discharge Diet:  Regular    Discharge Activity:  As tolerated     Disposition:  Patient to  discharge home with family support and community resources.     Equipment:  FWW    Follow-up & Discharge Instructions:  Follow up with your primary care provider (PCP) within 7-10 days of discharge to review your medications and take over your care.     If you develop chest pain, fever, chills, change in neurologic function (weakness, sensation changes, vision changes), or other concerning sxs, seek immediate medical attention or call 911.      Condition on Discharge:  Fair    More than 37 minutes was spent on discharging this patient, including face-to-face time, prescription management, and the dictation of this note.    Leticia Bella M.D.    Date of Service: 11/1/2019

## 2019-11-01 NOTE — PROGRESS NOTES
"Rehab Progress Note     Encounter Date: 10/31/2019    CC: Encephalopathy, confusion, weakness    Interval Events (Subjective)  Patient sitting up in hallway. He reports he is doing well. Denies NVD. Denies SOB. He has questions about what time he will leave tomorrow. He has also had recurrent hematuria. Discussed referral to Urology. Denies dysuria.    IDT Team Meeting 10/29/2019  DC/Disposition:  11/1/19  Needs hospital bed     Objective:  VITAL SIGNS: /79   Pulse 83   Temp 36.7 °C (98 °F) (Oral)   Resp 18   Ht 1.88 m (6' 2\")   Wt 86 kg (189 lb 9.5 oz)   SpO2 96%   BMI 24.34 kg/m²    Gen: NAD  Psych: Mood and affect appropriate  CV: RRR, no edema  Resp: CTAB, no upper airway sounds  Abd: NTND  Neuro: AOx2, following simple commands  Unchanged from 10/30/19    Recent Results (from the past 72 hour(s))   CBC WITHOUT DIFFERENTIAL    Collection Time: 10/30/19  5:46 AM   Result Value Ref Range    WBC 7.3 4.8 - 10.8 K/uL    RBC 4.08 (L) 4.70 - 6.10 M/uL    Hemoglobin 12.9 (L) 14.0 - 18.0 g/dL    Hematocrit 39.6 (L) 42.0 - 52.0 %    MCV 97.1 81.4 - 97.8 fL    MCH 31.6 27.0 - 33.0 pg    MCHC 32.6 (L) 33.7 - 35.3 g/dL    RDW 48.5 35.9 - 50.0 fL    Platelet Count 190 164 - 446 K/uL    MPV 9.3 9.0 - 12.9 fL   Basic Metabolic Panel    Collection Time: 10/30/19  5:46 AM   Result Value Ref Range    Sodium 142 135 - 145 mmol/L    Potassium 3.7 3.6 - 5.5 mmol/L    Chloride 104 96 - 112 mmol/L    Co2 30 20 - 33 mmol/L    Glucose 100 (H) 65 - 99 mg/dL    Bun 13 8 - 22 mg/dL    Creatinine 0.81 0.50 - 1.40 mg/dL    Calcium 8.8 8.5 - 10.5 mg/dL    Anion Gap 8.0 0.0 - 11.9   ESTIMATED GFR    Collection Time: 10/30/19  5:46 AM   Result Value Ref Range    GFR If African American >60 >60 mL/min/1.73 m 2    GFR If Non African American >60 >60 mL/min/1.73 m 2       Current Facility-Administered Medications   Medication Frequency   • metoprolol (LOPRESSOR) tablet 25 mg TWICE DAILY   • lisinopril (PRINIVIL) tablet 5 mg Q DAY   • " glucose 4 g chewable tablet 16 g Q15 MIN PRN    And   • dextrose 50% (D50W) injection 50 mL Q15 MIN PRN   • lidocaine (LIDODERM) 5 % 1 Patch Q24HR   • metFORMIN (GLUCOPHAGE) tablet 250 mg BID WITH MEALS   • melatonin tablet 6 mg QHS   • amantadine (SYMMETREL) 50 MG/5ML syrup 100 mg BID   • phenylephrine-cocoa butter (PREPARATION H) suppository 1 Suppository Q6HRS PRN   • apixaban (ELIQUIS) tablet 5 mg BID   • vitamin D (cholecalciferol) tablet 1,000 Units DAILY   • Respiratory Care per Protocol Continuous RT   • oxyCODONE immediate-release (ROXICODONE) tablet 2.5 mg Q3HRS PRN   • oxyCODONE immediate-release (ROXICODONE) tablet 5 mg Q3HRS PRN   • tramadol (ULTRAM) 50 MG tablet 50 mg Q4HRS PRN   • hydrALAZINE (APRESOLINE) tablet 25 mg Q8HRS PRN   • acetaminophen (TYLENOL) tablet 650 mg Q4HRS PRN   • senna-docusate (PERICOLACE or SENOKOT S) 8.6-50 MG per tablet 2 Tab BID    And   • polyethylene glycol/lytes (MIRALAX) PACKET 1 Packet QDAY PRN    And   • magnesium hydroxide (MILK OF MAGNESIA) suspension 30 mL QDAY PRN    And   • bisacodyl (DULCOLAX) suppository 10 mg QDAY PRN   • artificial tears ophthalmic solution 1 Drop PRN   • benzocaine-menthol (CEPACOL) lozenge 1 Lozenge Q2HRS PRN   • mag hydrox-al hydrox-simeth (MAALOX PLUS ES or MYLANTA DS) suspension 20 mL Q2HRS PRN   • ondansetron (ZOFRAN ODT) dispertab 4 mg 4X/DAY PRN    Or   • ondansetron (ZOFRAN) syringe/vial injection 4 mg 4X/DAY PRN   • traZODone (DESYREL) tablet 50 mg QHS PRN   • sodium chloride (OCEAN) 0.65 % nasal spray 2 Spray PRN   • allopurinol (ZYLOPRIM) tablet 300 mg DAILY   • atorvastatin (LIPITOR) tablet 20 mg Nightly       Orders Placed This Encounter   Procedures   • Diet Order Diabetic     Standing Status:   Standing     Number of Occurrences:   1     Order Specific Question:   Diet:     Answer:   Diabetic [3]     Order Specific Question:   Consistency/Fluid modifications:     Answer:   Thin Liquids [3]       Assessment:  Active Hospital  Problems    Diagnosis   • *Encephalopathy acute   • B-cell lymphoma (HCC)   • Metastasis to brain (HCC)   • Atrial fibrillation (HCC) w Rapid Ventricular Response    • CAD (coronary artery disease)   • Hypertension   • Type 2 diabetes mellitus without complication, without long-term current use of insulin (HCC)   • Chronic back pain   • Dysphagia   • Primary cerebral lymphoma (HCC)       Medical Decision Making and Plan:  Encephalopathy - Patient with recent diagnosis of 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 Check UA, started on Amantadine. UA positive, started on Ciprofloxacin per Hospitalist, U Cx pending - Enterobacter, susc to Ciprofloxacin. Completed.     Dysphagia - Patient on dysphagia 2 with NTL on transfer.  -SLP for swallow - advanced to regulars with thins.      Spasticity - Restarted on Baclofen and increased prior to admission. Titrated off as no improvement. Improved cognition     B cell lymphoma - Underwent R Chop on 9/23/19.  Followed by Oncology. Recent right retroperitoneal mass s/p biopsy which is positive for B cell lymphoma  -Follow-up Heme/Onc  -Discontinue Prednisone. Follow-up Oncology     HTN/A Fib - Patient on Pradaxa.  Patient on Diltiazem 360 mg QHS, Metoprolol 25 mg XL daily   -Patient chewing medication, will switch medications to short acting. Diltiazem 90 mg q6 and metoprolol 25 mg BID   -Hypotension on 10/17/19, discussed case with hospitalist and recommending decrease in Diltiazem. Per hospitalist only need Metoprolol at discahrge    DM - Patient on SSI on transfer. Consult hospitalist. Started on Metformin 250 mg     Sacral wound - Present on admission, seen by wound care today. Appreciate recommendations. Recommending mepilex only  The patient has a  medical condition that requires positioning of the body in ways not feasible with an ordinary bed in order to alleviate pain and reduce risk of skin breakdown.  The need for these changes in body position are frequent and immediate.  The medical condition for which this bed is being prescribed include    Leukocytosis - B cell lymphoma on steroids. Check AM CBC - continued  -Consult hospitalist. UTI per above. Completed     Hematuria - Referral to Urology    Insomnia - Very poor sleep at night. Start Melatonin 6 mg QHS    Hx of Gout - Patient on Allopurinol 300 mg daily.     Vitamin D - 24 on admission, start on 1000 U for deficiency.      GI Ppx - Patient on Omeprazole 40 mg daily. Discontinue now that off of steroids.     DVT ppx - Patient on Eliquis 5 mg BID    Dispo - Wife would like goals of care conversation. Consulted palliative, arranged for meeting on Friday at 2PM. Extended stay until 11/1/19, will go home with HH. Family does not want hospice at this time. Patient would benefit from hospital bed for wounds. Family training completed on 10/30/19.     Total time:  25 minutes.  I spent greater than 50% of the time for patient care, counseling, and coordination on this date, including unit/floor time, and face-to-face time with the patient as per interval events and assessment and plan above. Topics discussed included discharge planning, hematuria with negative UA, and referral to Urology.     Leticia Bella M.D.

## 2019-11-01 NOTE — PROGRESS NOTES
Hospital Medicine Daily Progress Note      Chief Complaint:  Atrial Fibrillation  Hypertension  Diabetes  Leukocytosis    Interval History:  Pt getting ready for discharge today.  No new complaints.    Review of Systems  Review of Systems   Constitutional: Negative for chills and fever.   HENT: Negative.    Eyes: Negative.    Respiratory: Negative for cough and shortness of breath.    Cardiovascular: Negative for chest pain and palpitations.   Gastrointestinal: Negative for abdominal pain, nausea and vomiting.   Skin: Negative for itching and rash.        Physical Exam  Temp:  [36.7 °C (98 °F)-37 °C (98.6 °F)] 37 °C (98.6 °F)  Pulse:  [82-97] 82  Resp:  [18] 18  BP: (126-136)/(79-86) 131/86  SpO2:  [96 %] 96 %    Physical Exam   Constitutional: He is oriented to person, place, and time. No distress.   HENT:   Head: Normocephalic and atraumatic.   Right Ear: External ear normal.   Left Ear: External ear normal.   Prior crani scar   Eyes: Conjunctivae and EOM are normal. Right eye exhibits no discharge. Left eye exhibits no discharge.   Neck: Normal range of motion. Neck supple. No tracheal deviation present.   Cardiovascular: Normal rate, regular rhythm, S1 normal and S2 normal.   Pulmonary/Chest: No stridor. No respiratory distress. He has no wheezes. He has no rhonchi.   Decreased BS   Abdominal: Soft. Bowel sounds are normal. He exhibits no distension. There is no tenderness.   Musculoskeletal: He exhibits no edema or tenderness.   Neurological: He is alert and oriented to person, place, and time. No sensory deficit.   Skin: Skin is warm and dry. He is not diaphoretic. No cyanosis.   Vitals reviewed.      Fluids    Intake/Output Summary (Last 24 hours) at 11/1/2019 0924  Last data filed at 11/1/2019 0900  Gross per 24 hour   Intake 700 ml   Output --   Net 700 ml       Laboratory  Recent Labs     10/30/19  0546   WBC 7.3   RBC 4.08*   HEMOGLOBIN 12.9*   HEMATOCRIT 39.6*   MCV 97.1   MCH 31.6   MCHC 32.6*   RDW  48.5   PLATELETCT 190   MPV 9.3     Recent Labs     10/30/19  0546   SODIUM 142   POTASSIUM 3.7   CHLORIDE 104   CO2 30   GLUCOSE 100*   BUN 13   CREATININE 0.81   CALCIUM 8.8                   Assessment/Plan  B-cell lymphoma (HCC)- (present on admission)  Assessment & Plan  Stage IV w/ h/o brain mass resection  S/P chemo on 9/23/19  S/P Prednisone for confusion per Physiatry    A-fib (HCC)- (present on admission)  Assessment & Plan  S/P RVR at [facility]  On Metoprolol for rate control  Anticoagulated on Eliquis    Azotemia- (present on admission)  Assessment & Plan  Renal function improved w/ PO fluids    Diabetes mellitus with hyperglycemia, without long-term current use of insulin ([facility])- (present on admission)  Assessment & Plan  HbA1c 7.2  Continue Metformin  Outpt meds include Metformin  mg daily    CAD (coronary artery disease)- (present on admission)  Assessment & Plan  On maximal medical management w/ Eliquis, Lipitor, Metoprolol, and Lisinopril    Hypertension- (present on admission)  Assessment & Plan  Blood pressure controlled on Metoprolol and Lisinopril    UTI (urinary tract infection)- (present on admission)  Assessment & Plan  UA 20-50 WBC w/ moderate bacteria  UCx >100,000 Enterobacter  Completed Cipro x 7 days    Vitamin D insufficiency- (present on admission)  Assessment & Plan  Vit D level 24  On supplementation    Full Code    Patient seen and examined.  Chart reviewed.  Assessment and Plan as above.  No changes today.

## 2019-11-01 NOTE — DISCHARGE PLANNING
Case management Summary:   Met with patient prior to discharge and spoke with his wife per phone.Reviewed all follow up appointments.   Referral made to [facility] and they are have accepted referral and are ready to follow.  [employer] has delivered hospital bed to patient and will be delivering special mattress when approved.    During hospitalization, I have provided support and education and have been available for questions and information during hours of operation, communicated with therapy team and MD along with providing links/resources  to outside services.    Patient verbalizes agreement with all plans and has an understanding of the next steps within the post acute services.     CASE MANAGEMENT PLAN OF CARE   Individualized Goals:   1.  Patient hopes to regain some strength.  2.  I will confirm if he will need home health versus outpatient therapy.  3.  I will follow to see if he will need a w/c for home.    Outcome:   1. Partially met: patient has waxed and wained with his mobility.  2. Met: Home health referred and ready to follow.  3. Met: patient has a w/c already.

## 2019-11-01 NOTE — DISCHARGE INSTRUCTIONS
[facility] NURSING DISCHARGE INSTRUCTIONS    Blood Pressure : 132/79  Weight: 86 kg (189 lb 9.5 oz)  Nursing recommendations for Richie More Jr. at time of discharge are as follows:  Client verbalized understanding of all discharge instructions and prescriptions.     Review all your home medications and newly ordered medications with your doctor and/or pharmacist. Follow medication instructions as directed by your doctor and/or pharmacist.    Pain Management:   Discharge Pain Medication Instructions:  Comfort Goal: Comfort at Rest, Comfort with Movement, Sleep Comfortably  Notify your primary care provider if pain is unrelieved with these measures, if the pain is new, or increased in intensity.    Discharge Skin Characteristics:    Discharge Skin Exam:       Skin / Wound Care Instructions: Please contact your primary care physician for any change in skin integrity.     If You Have Surgical Incisions / Wounds:  Monitor surgical site(s) for signs of increased swelling, redness or symptoms of drainage from the site or fever as this could indicate signs and symptoms of infection. If these symptoms are noted, notifiy your primary care provider.      Discharge Safety Instructions: Should Not Be Left Alone In The House     Discharge Safety Concerns: Weakness, Unsteady Gait  The interdisciplinary team has made recommendation that you should have adult supervision in the house due to weakness and unsteady gait  Anti-embolic stockings are required during the day and off at night to increase circulation to the lower extremities.    Discharge Diet:       Discharge Liquids:    Discharge Bowel Function:    Please contact your primary care physician for any changes in bowel habits.  Discharge Bowel Program:    Discharge Bladder Function:    Discharge Urinary Devices:        Nursing Discharge Plan:   Influenza Vaccine Indication: Possibly indicated: Contact facility to determine if vaccine previously  given(pt unreliable historian)    Case Management Discharge Instructions:   Discharge Location: Home with Home Health  Agency Name/Address/Phone: [facility] at [phone].  (they will call you to schedule visits)  Home Health: Registered Nurse, Home Health Aide, Occupational Therapist, Physical Therapist  Outpatient Services:    DME Provider/Phone: [facility] at [phone]  Medical Equipment Ordered: Hospital Bed, Other   Prescription Faxed to:        Discharge Medication Instructions:  Below are the medications your physician expects you to take upon discharge:      Occupational Therapy Discharge Instructions for Marcio More     10/27/2019    Level of Assist Required for Eating: Able to Complete Eating without Assist  Level of Assist Required for Grooming: Able to Complete Grooming without Assist  Level of Assist Required for Dressing: Requires Physical Assist with Dressing  Equipment for Dressing: Long Handled Shoe Horn, Dressing Stick, Reacher  Level of Assist Required for Toileting: Requires Supervision with Toileting  Level of Assist Required for Toilet Transfer: Requires Supervision with Toilet Transfer  Equipment for Toilet Transfer: Grab Bars by Toilet  Level of Assist Required for Bathing: Requires Supervision with Bathing  Equipment for Bathing: Shower Chair, Grab Bars in Tub / Shower, Hand Held Shower Head, Long Handled Sponge  Level of Assist Required for Shower Transfer: Requires Supervision with Shower Transfer  Equipment for Shower Transfer: Grab Bars in Tub / Shower, Shower Chair  Level of Assist Required for Home Mgmt: Requires Supervision with Home Management  Level of Assist Required for Meal Prep: Requires Supervision with Meal Preparation  Driving: May not Drive, Please Contact Physician for Further Information  Home Exercise Program: None Issued  Richie, you have been great to work with. I wish you many more beautiful days! Leanne Marie OTBRYAN, OTR/L      Physical Therapy Discharge Instructions for Richie More JrDavid    10/31/2019    Level of Assist Required for Ambulation: Supervision on Flat Surfaces, Supervision on Curbs, Supervision on Stairs  Distance Patient May Ambulate: Up to 150 feet or more as tolerated  Device Recommended for Ambulation: Front-Wheeled Walker  Level of Assist Required to Propel Wheelchair: Supervision  Level of Assist Required for Transfers: Supervision  Device Recommended for Transfers: Front-Wheeled Walker  Home Exercise Program: Refer to Home Exercise Program Handout for Details    It was great working with you Richie!  Take care!  Lou Suarez PT DPT      Hypertension  Hypertension is another name for high blood pressure. High blood pressure forces your heart to work harder to pump blood. A blood pressure reading has two numbers, which includes a higher number over a lower number (example: 110/72).  Follow these instructions at home:  · Have your blood pressure rechecked by your doctor.  · Only take medicine as told by your doctor. Follow the directions carefully. The medicine does not work as well if you skip doses. Skipping doses also puts you at risk for problems.  · Do not smoke.  · Monitor your blood pressure at home as told by your doctor.  Contact a doctor if:  · You think you are having a reaction to the medicine you are taking.  · You have repeat headaches or feel dizzy.  · You have puffiness (swelling) in your ankles.  · You have trouble with your vision.  Get help right away if:  · You get a very bad headache and are confused.  · You feel weak, numb, or faint.  · You get chest or belly (abdominal) pain.  · You throw up (vomit).  · You cannot breathe very well.  This information is not intended to replace advice given to you by your health care provider. Make sure you discuss any questions you have with your health care provider.  Document Released: 06/05/2009 Document Revised: 05/25/2017 Document Reviewed:  10/10/2014  [employer] Interactive Patient Education © 2017 [employer]      Atrial Fibrillation  Introduction  Atrial fibrillation is a type of heartbeat that is irregular or fast (rapid). If you have this condition, your heart keeps quivering in a weird (chaotic) way. This condition can make it so your heart cannot pump blood normally. Having this condition gives a person more risk for stroke, heart failure, and other heart problems. There are different types of atrial fibrillation. Talk with your doctor to learn about the type that you have.  Follow these instructions at home:  · Take over-the-counter and prescription medicines only as told by your doctor.  · If your doctor prescribed a blood-thinning medicine, take it exactly as told. Taking too much of it can cause bleeding. If you do not take enough of it, you will not have the protection that you need against stroke and other problems.  · Do not use any tobacco products. These include cigarettes, chewing tobacco, and e-cigarettes. If you need help quitting, ask your doctor.  · If you have apnea (obstructive sleep apnea), manage it as told by your doctor.  · Do not drink alcohol.  · Do not drink beverages that have caffeine. These include coffee, soda, and tea.  · Maintain a healthy weight. Do not use diet pills unless your doctor says they are safe for you. Diet pills may make heart problems worse.  · Follow diet instructions as told by your doctor.  · Exercise regularly as told by your doctor.  · Keep all follow-up visits as told by your doctor. This is important.  Contact a doctor if:  · You notice a change in the speed, rhythm, or strength of your heartbeat.  · You are taking a blood-thinning medicine and you notice more bruising.  · You get tired more easily when you move or exercise.  Get help right away if:  · You have pain in your chest or your belly (abdomen).  · You have sweating or weakness.  · You feel sick to your stomach (nauseous).  · You notice  blood in your throw up (vomit), poop (stool), or pee (urine).  · You are short of breath.  · You suddenly have swollen feet and ankles.  · You feel dizzy.  · Your suddenly get weak or numb in your face, arms, or legs, especially if it happens on one side of your body.  · You have trouble talking, trouble understanding, or both.  · Your face or your eyelid droops on one side.  These symptoms may be an emergency. Do not wait to see if the symptoms will go away. Get medical help right away. Call your local emergency services (911 in the [place] Do not drive yourself to the hospital.   This information is not intended to replace advice given to you by your health care provider. Make sure you discuss any questions you have with your health care provider.  Document Released: 09/26/2009 Document Revised: 05/25/2017 Document Reviewed: 04/13/2016  © 2017 [employer]    Coronary Artery Disease, Male  Coronary artery disease (CAD) is a process in which the blood vessels of the heart (coronary arteries) become narrow or blocked. The narrowing or blockage can lead to decreased blood flow to the heart muscle (angina). Prolonged reduced blood flow can cause a heart attack (myocardial infarction, MI). Because CAD is the leading cause of death in men, it is important to understand what causes this condition and how it is treated.  What are the causes?  Atherosclerosis is the cause of CAD. This is the buildup of fat and cholesterol (plaque) on the inside of the arteries. Over time, the plaque may narrow or block the artery, and this will lessen blood flow to the heart. Plaque can also become weak and break off within a coronary artery to form a clot and cause a sudden blockage.  What increases the risk?  Many risk factors increase your chances of getting CAD, including:  · High cholesterol levels.  · High blood pressure (hypertension).  · Tobacco use.  · Diabetes.  · Age. Men over age 45 are at a greater risk of CAD.  · Gender. Men often  develop CAD earlier in life than women.  · Family history of CAD.  · Obesity.  · Lack of exercise.  · A diet high in saturated fats.  What are the signs or symptoms?  Many people do not experience any symptoms during the early stages of CAD. As the condition progresses, symptoms may include:  · Chest pain.  ¨ The pain can be described as a crushing or squeezing in the chest, or a tightness, pressure, fullness, or heaviness in the chest.  ¨ The pain can last more than a few minutes or can stop and recur.  · Pain in the arms, neck, jaw, or back.  · Unexplained heartburn or indigestion.  · Shortness of breath.  · Nausea.  · Sudden cold sweats.  Less common symptoms of CAD in men can include:  · Fatigue.  · Unexplained feelings of nervousness or anxiety.  · Weakness.  · Diarrhea.  · Sudden light-headedness.  How is this diagnosed?  Tests to diagnose CAD may include:  · ECG (electrocardiogram).  · Exercise stress test. This looks for signs of blockage when the heart is being exercised.  · Pharmacologic stress test. This test looks for signs of blockage when the heart is being stressed with a medicine.  · Blood tests.  · Coronary angiogram. This is a procedure to look at the coronary arteries to see if there is any blockage.  How is this treated?  The treatment of CAD may include the following:  · Healthy behavioral changes to reduce or control risk factors.  · Medicine.  · Coronary stenting. A stent helps to keep an artery open.  · Coronary angioplasty. This procedure widens a narrowed or blocked artery.  · Coronary artery bypass surgery. This will allow your blood to pass the blockage (bypass) to reach your heart.  Follow these instructions at home:  · Take medicines only as directed by your health care provider.  · Do not take the following medicines unless your health care provider approves:  ¨ Nonsteroidal anti-inflammatory drugs (NSAIDs), such as ibuprofen, naproxen, or celecoxib.  ¨ Vitamin supplements that contain  vitamin A, vitamin E, or both.  · Manage other health conditions such as hypertension and diabetes as directed by your health care provider.  · Follow a heart-healthy diet. A dietitian can help to educate you about healthy food options and changes.  · Use healthy cooking methods such as roasting, grilling, broiling, baking, poaching, steaming, or stir-frying. Talk to a dietitian to learn more about healthy cooking methods.  · Follow an exercise program approved by your health care provider.  · Maintain a healthy weight. Lose weight as approved by your health care provider.  · Plan rest periods when fatigued.  · Learn to manage stress.  · Do not use any tobacco products, including cigarettes, chewing tobacco, or electronic cigarettes. If you need help quitting, ask your health care provider.  · If you drink alcohol, and your health care provider approves, limit your alcohol intake to no more than 1 drink per day. One drink equals 12 ounces of beer, 5 ounces of wine, or 1½ ounces of hard liquor.  · Stop illegal drug use.  · Your health care provider may ask you to monitor your blood pressure. A blood pressure reading consists of a higher number over a lower number, such as 110 over 72, which is written as 110/72. Ideally, your blood pressure should be:  ¨ Below 140/90 if you have no other medical conditions.  ¨ Below 130/80 if you have diabetes or kidney disease.  · Keep all follow-up visits as directed by your health care provider. This is important.  Get help right away if:  · You have pain in your chest, neck, arm, jaw, stomach, or back that lasts more than a few minutes, is recurring, or is unrelieved by taking medicine under your tongue (sublingual nitroglycerin).  · You have profuse sweating without cause.  · You have unexplained:  ¨ Heartburn or indigestion.  ¨ Shortness of breath or difficulty breathing.  ¨ Nausea or vomiting.  ¨ Fatigue.  ¨ Feelings of nervousness or anxiety.  ¨ Weakness.  ¨ Diarrhea.  · You  "have sudden light-headedness or dizziness.  · You faint.  These symptoms may represent a serious problem that is an emergency. Do not wait to see if the symptoms will go away. Get medical help right away. Call your local emergency services (911 in the [place] Do not drive yourself to the hospital.   This information is not intended to replace advice given to you by your health care provider. Make sure you discuss any questions you have with your health care provider.  Document Released: 07/15/2015 Document Revised: 05/25/2017 Document Reviewed: 04/21/2015  [employer] Interactive Patient Education © 2017 [employer]    Prevent Falls in Your Home    \"Falling once doubles your chance of falling again\"        -Center for Disease Control and Prevention    Falls in the home can lead to serious injury (fractures, brain injuries), hospitalizations, increased medical costs, and could even be fatal.  The good news is, there are many precautions you can take to avoid falls in your home and help keep you safe:     · If prescribed an assistive device (walker, crutches), use as instructed by the healthcare provider\"   · Remove any tripping hazards from your home, including loose cords, throw rugs and clutter  · Keep a nightlight on in dark (hallways, bathrooms, etc)   · Get up slowly, to make sure you feel okay before getting up  · Be aware of any side effects of your medications: some medications may make you dizzy  · Place a non-skid rubber mat in your shower or tub-consider a shower bench or chair if unsteady on your feet  · Wear supportive shoes or non-skid socks when moving around  · Start an exercise program once approved by your provider.  If you are feeling weak following a hospital stay, talk to your doctor about home health or outpatient therapy programs designed to help rebuild your strength and endurance    Depression / Suicide Risk    As you are discharged from this [facility] facility, it is important to learn how " to keep safe from harming yourself.    Recognize the warning signs:  · Abrupt changes in personality, positive or negative- including increase in energy   · Giving away possessions  · Change in eating patterns- significant weight changes-  positive or negative  · Change in sleeping patterns- unable to sleep or sleeping all the time   · Unwillingness or inability to communicate  · Depression  · Unusual sadness, discouragement and loneliness  · Talk of wanting to die  · Neglect of personal appearance   · Rebelliousness- reckless behavior  · Withdrawal from people/activities they love  · Confusion- inability to concentrate     If you or a loved one observes any of these behaviors or has concerns about self-harm, here's what you can do:  · Talk about it- your feelings and reasons for harming yourself  · Remove any means that you might use to hurt yourself (examples: pills, rope, extension cords, firearm)  · Get professional help from the community (Mental Health, Substance Abuse, psychological counseling)  · Do not be alone:Call your Safe Contact- someone whom you trust who will be there for you.  · Call your local CRISIS HOTLINE 962-4427 or 207-563-3074  · Call your local Children's Mobile Crisis Response Team [place] (234) 941-4805 or www.[employer]  · Call the toll free National Suicide Prevention Hotlines   · National Suicide Prevention Lifeline 904-041-VEAK (1794)  · National Hope Line Network 800-SUICIDE (348-1758)

## 2019-11-01 NOTE — CARE PLAN
Problem: Safety  Goal: Will remain free from injury  Outcome: PROGRESSING SLOWER THAN EXPECTED  Note:   Pt is impulsive. Failed to use call light to ask to get up from bed during shift. Will continue to monitor.        Problem: Urinary Elimination:  Goal: Ability to reestablish a normal urinary elimination pattern will improve  Outcome: PROGRESSING SLOWER THAN EXPECTED  Note:   Pt had blood in urine at 2130. When pt voided at 2330 no blood was observed. No fever or c/o pain. MD and charge nurse notified. Will continue to monitor.

## 2019-11-01 NOTE — PROGRESS NOTES
Patient discharged to home per order.  Reviewed all discharge instructions, appointments, discharge medications, and wound care instructions with patient and his daugther; they verbalize understanding.  Education provided in discharge instructions about HTN, fall risk and Home Safety and Fall Prevention. Discharge paperwork completed; signed copies in chart.  Patient has education binder and all belongings; signed copy in chart.  Pt alert, calm, stable; no change in status from morning assessment.  Patient left facility at about 1600 accompanied by staff; escorted to car by staff.  Have enjoyed working with this pleasant patient.

## 2019-11-04 PROBLEM — G40.901 STATUS EPILEPTICUS (HCC): Status: ACTIVE | Noted: 2019-01-01

## 2019-11-04 PROBLEM — J96.01 ACUTE RESPIRATORY FAILURE WITH HYPOXIA (HCC): Status: ACTIVE | Noted: 2019-01-01

## 2019-11-04 NOTE — ED PROVIDER NOTES
ED PROVIDER NOTE     Scribed for Andrey Evans M.D. by Aureliano Gilbert. 11/4/2019, 12:12 AM.    CHIEF COMPLAINT  Seizure    HPI    Primary care provider: Patt Ferrer M.D.  Means of arrival: [facility]  History obtained from: EMS, spouse  History limited by: altered mentation    Marcio More Jr. is a 73 y.o. male with a history of brain mass s/p resection found to be lymphoma who presents with seizures onset today. The patient was brought in to this facility as a direct Care Flight patient. Per EMS, patient's GCS was 13 on scene. Per the patient's wife, the patient has no history of prior seizures. She notes that patient's tumor resected earlier this year, and he was found to have non-Hodgkin's lymphoma. He currently receives chemotherapy for his tumor, 1st infusion several weeks ago. Medics gave versed after 2nd seizure noted en route. Now somnolent.     Further history limited by altered mentation.    REVIEW OF SYSTEMS  Neurological: Positive for altered mental status and seizure like activity.     Further review of systems limited by altered mentation    PAST MEDICAL HISTORY   has a past medical history of Anesthesia, Arthritis, Atrial fibrillation (HCC), Back pain, Blood clotting disorder (HCC) (1999), Breath shortness, CAD (coronary artery disease), Cancer (HCC) (2016), Cataract, Dyslipidemia, High cholesterol, Hypertension, Ileus (HCC), Myocardial infarct (HCC) (1999,2006), Renal disorder, Sleep apnea, Snoring, and Urinary incontinence.    PAST FAMILY HISTORY  None noted    SOCIAL HISTORY  Social History     Tobacco Use   • Smoking status: Never Smoker   • Smokeless tobacco: Never Used   Substance and Sexual Activity   • Alcohol use: Yes     Comment: 1 drink a month   • Drug use: No   • Sexual activity: None noted       SURGICAL HISTORY   has a past surgical history that includes lumbar laminectomy diskectomy (N/A, 11/26/2018); laminotomy (Left, 11/26/2018); foraminotomy (Left, 11/26/2018);  other neurological surg (2016); other neurological surg; craniotomy (Right, 7/10/2019); appendectomy (2002); other cardiac surgery (1999,2002,2006); dx bone marrow aspirations (Left, 8/1/2019); and dx bone marrow biopsies (Left, 8/1/2019).    CURRENT MEDICATIONS  Current Outpatient Medications:   •  allopurinol (ZYLOPRIM) 300 MG Tab, Take 1 Tab by mouth every day., Disp: 30 Tab, Rfl: 2  •  atorvastatin (LIPITOR) 20 MG Tab, Take 1 Tab by mouth every bedtime., Disp: 90 Tab, Rfl: 2  •  metFORMIN ER (GLUCOPHAGE XR) 500 MG TABLET SR 24 HR, Take 1 Tab by mouth every day., Disp: 30 Tab, Rfl: 2  •  amantadine (SYMMETREL) 50 MG/5ML Syrup, Take 10 mL by mouth 2 Times a Day., Disp: 1 Bottle, Rfl: 2  •  lisinopril (PRINIVIL) 5 MG Tab, Take 1 Tab by mouth every day., Disp: 30 Tab, Rfl: 2  •  metoprolol (LOPRESSOR) 25 MG Tab, Take 1 Tab by mouth 2 Times a Day., Disp: 60 Tab, Rfl: 2  •  TURMERIC PO, Take 20 mL by mouth 3 times a day., Disp: , Rfl:   •  dabigatran (PRADAXA) 150 MG Cap capsule, Take 150 mg by mouth every bedtime., Disp: , Rfl:   •  therapeutic multivitamin-minerals (THERAGRAN-M) Tab, Take 1 Tab by mouth every bedtime., Disp: , Rfl:       ALLERGIES  Allergies   Allergen Reactions   • Gabapentin Rash     Broke out in a rash on R bicep       PHYSICAL EXAM  VITAL SIGNS: /80   Pulse (!) 101   Temp 36.2 °C (97.1 °F) (Temporal)   Resp 16   Ht 1.829 m (6')   Wt 86.2 kg (190 lb)   SpO2 92%   BMI 25.77 kg/m²    Pulse ox interpretation: On supplemental oxygen, I interpret this pulse ox as low-normal.  Constitutional: Chronically ill appearing, lying on stretcher.  HEENT: No hemotympanum, sun signs, or raccoon eyes. Very dry mucous membranes.  Eyes:  EOMI. Normal sclerae.  PERRLA 3-2  Neck: Supple, nontender.  Chest/Pulmonary: Diminished at bases, no wheezing  Cardiovascular: Fast irregularly irregular rhythm.  Abdomen: Soft, nontender; no rebound, guarding, or masses.  Back: No CVA or midline tenderness.    Musculoskeletal: No deformity or edema.  Neuro: Alert, minimally verbal, weak in all extremities. Can nod yes/no.   Psych: Flat affect but cooperative  Skin: Warm and dry.     DIAGNOSTIC STUDIES / PROCEDURES    LABS & EKG  Results for orders placed or performed during the hospital encounter of 11/04/19   CBC WITH DIFFERENTIAL   Result Value Ref Range    WBC 8.4 4.8 - 10.8 K/uL    RBC 4.12 (L) 4.70 - 6.10 M/uL    Hemoglobin 13.3 (L) 14.0 - 18.0 g/dL    Hematocrit 40.0 (L) 42.0 - 52.0 %    MCV 97.1 81.4 - 97.8 fL    MCH 32.3 27.0 - 33.0 pg    MCHC 33.3 (L) 33.7 - 35.3 g/dL    RDW 50.5 (H) 35.9 - 50.0 fL    Platelet Count 157 (L) 164 - 446 K/uL    MPV 9.4 9.0 - 12.9 fL    Neutrophils-Polys 76.50 (H) 44.00 - 72.00 %    Lymphocytes 7.50 (L) 22.00 - 41.00 %    Monocytes 10.50 0.00 - 13.40 %    Eosinophils 4.30 0.00 - 6.90 %    Basophils 0.70 0.00 - 1.80 %    Immature Granulocytes 0.50 0.00 - 0.90 %    Nucleated RBC 0.00 /100 WBC    Neutrophils (Absolute) 6.44 1.82 - 7.42 K/uL    Lymphs (Absolute) 0.63 (L) 1.00 - 4.80 K/uL    Monos (Absolute) 0.88 (H) 0.00 - 0.85 K/uL    Eos (Absolute) 0.36 0.00 - 0.51 K/uL    Baso (Absolute) 0.06 0.00 - 0.12 K/uL    Immature Granulocytes (abs) 0.04 0.00 - 0.11 K/uL    NRBC (Absolute) 0.00 K/uL   COMP METABOLIC PANEL   Result Value Ref Range    Sodium 141 135 - 145 mmol/L    Potassium 3.6 3.6 - 5.5 mmol/L    Chloride 104 96 - 112 mmol/L    Co2 22 20 - 33 mmol/L    Anion Gap 15.0 (H) 0.0 - 11.9    Glucose 152 (H) 65 - 99 mg/dL    Bun 29 (H) 8 - 22 mg/dL    Creatinine 1.21 0.50 - 1.40 mg/dL    Calcium 9.8 8.5 - 10.5 mg/dL    AST(SGOT) 21 12 - 45 U/L    ALT(SGPT) 31 2 - 50 U/L    Alkaline Phosphatase 97 30 - 99 U/L    Total Bilirubin 0.4 0.1 - 1.5 mg/dL    Albumin 3.9 3.2 - 4.9 g/dL    Total Protein 6.4 6.0 - 8.2 g/dL    Globulin 2.5 1.9 - 3.5 g/dL    A-G Ratio 1.6 g/dL   LACTIC ACID   Result Value Ref Range    Lactic Acid 7.3 (HH) 0.5 - 2.0 mmol/L   URINALYSIS CULTURE, IF INDICATED    Result Value Ref Range    Color Yellow     Character Clear     Specific Gravity 1.024 <1.035    Ph 5.0 5.0 - 8.0    Glucose Negative Negative mg/dL    Ketones 15 (A) Negative mg/dL    Protein Negative Negative mg/dL    Bilirubin Negative Negative    Urobilinogen, Urine 0.2 Negative    Nitrite Negative Negative    Leukocyte Esterase Negative Negative    Occult Blood Moderate (A) Negative    Micro Urine Req Microscopic    PROTHROMBIN TIME (INR)   Result Value Ref Range    PT 15.0 (H) 12.0 - 14.6 sec    INR 1.15 (H) 0.87 - 1.13   APTT   Result Value Ref Range    APTT 27.7 24.7 - 36.0 sec   TROPONIN   Result Value Ref Range    Troponin T 19 6 - 19 ng/L   MAGNESIUM   Result Value Ref Range    Magnesium 1.8 1.5 - 2.5 mg/dL   URIC ACID   Result Value Ref Range    Uric Acid 4.1 2.5 - 8.3 mg/dL   ESTIMATED GFR   Result Value Ref Range    GFR If African American >60 >60 mL/min/1.73 m 2    GFR If Non African American 58 (A) >60 mL/min/1.73 m 2   Triglycerides Starting now and then Every 3 Days   Result Value Ref Range    Triglycerides 130 0 - 149 mg/dL   LACTIC ACID   Result Value Ref Range    Lactic Acid 2.6 (H) 0.5 - 2.0 mmol/L   URINE MICROSCOPIC (W/UA)   Result Value Ref Range    WBC 0-2 (A) /hpf    RBC 2-5 (A) /hpf    Bacteria Negative None /hpf    Epithelial Cells Negative /hpf    Hyaline Cast 0-2 /lpf   ACCU-CHEK GLUCOSE   Result Value Ref Range    Glucose - Accu-Ck 120 (H) 65 - 99 mg/dL   EKG   Result Value Ref Range    Report       [facility].    Test Date:  2019  Pt Name:    BUZZ RODARTE               Department: ER  MRN:        [id number]                      Room:       R114  Gender:     Male                         Technician: [id number]  :        1945                   Requested By:ANDREY NANCE  Order #:    [id number]                    Reading MD: Andrey Nance MD    Measurements  Intervals                                Axis  Rate:       99                            P:  NC:                                      QRS:        34  QRSD:       90                           T:          266  QT:         356  QTc:        457    Interpretive Statements  ATRIAL FIBRILLATION  ABERRANT COMPLEX, POSSIBLY SUPRAVENTRICULAR  ANTERIOR INFARCT, OLD  BORDERLINE T ABNORMALITIES, INFERIOR LEADS  Compared to ECG 10/02/2019 10:37:44  Aberrant conduction of supraventricular beat(s) now present  T-wave abnormality now present  Myocardial infarct finding still present  No STEMI or st rain  Electronically Signed On 11-4-2019 6:52:17 PST by Andrey Evans MD     ISTAT ARTERIAL BLOOD GAS   Result Value Ref Range    Ph 7.492 7.400 - 7.500    Pco2 31.0 26.0 - 37.0 mmHg    Po2 103 (H) 64 - 87 mmHg    Tco2 25 20 - 33 mmol/L    S02 99 93 - 99 %    Hco3 23.7 17.0 - 25.0 mmol/L    BE 1 -4 - 3 mmol/L    Body Temp 98.1 F degrees    O2 Therapy 50 %    iPF Ratio 206     Ph Temp Dannielle 7.496 7.400 - 7.500    Pco2 Temp Co 30.6 26.0 - 37.0 mmHg    Po2 Temp Cor 102 (H) 64 - 87 mmHg    Specimen Arterial     Action Range Triggered NO     Inst. Qualified Patient YES        RADIOLOGY  DX-CHEST-PORTABLE (1 VIEW)   Final Result         1.  Pulmonary vascular congestion, similar to prior.      CT-HEAD W/O   Final Result         1.  No acute intracranial abnormality is identified, there are nonspecific white matter changes, commonly associated with small vessel ischemic disease.  Associated mild cerebral atrophy is noted.   2.  Low-density right holohemispheric subdural fluid collection, likely chronic subdural hematoma or hygroma. Stable.   3.  Atherosclerosis.      DX-PELVIS-1 OR 2 VIEWS   Final Result         1.  No acute traumatic bony injury.   2.  Atherosclerosis      DX-CHEST-PORTABLE (1 VIEW)   Final Result         1.  Pulmonary vascular congestion   2.  Cardiomegaly      MR-BRAIN-WITH & W/O    (Results Pending)       PROCEDURES  Intubation Procedure Note    Indication: impending respiratory failure    Consent: The  daughters and wife were counseled regarding the procedure, its indications, risks, potential complications and alternatives, and any questions were answered. Consent was obtained to proceed. Timeout called prior to initiation of procedure with RT, RN, and pharmacist at bedside.     Medications Used: rocuronium 100 mg intravenously and etomidate 20 mg intravenously    Procedure: The patient was placed in the appropriate position. Cricoid pressure was not required.  Intubation was performed by direct laryngoscopy using a glidescope 4 blade, and an 8.0 cuffed endotracheal tube.  The cuff was then inflated and the tube was secured appropriately at a distance of 25 cm to the dental ridge.  Initial confirmation of placement included bilateral breath sounds.  A chest x-ray to verify correct placement of the tube showed appropriate tube position.    The patient tolerated the procedure well.     Complications: None      COURSE & MEDICAL DECISION MAKING    This is a 73 y.o. male who presents with first time seizures, 2 so far, somnolent after versed.    Differential Diagnosis includes but is not limited to:  seizure, status epilepticus, malignancy, hemorrhage    ED Course:    12:12 AM - Patient seen at bedside. Ordered for labs and imaging to evaluate the patient's symptoms and rule out any emergent processes. The patient will receive IV fluids for concerns of dehydration and NPO in case a surgical process is present. He has very dry mucous membranes and appears dehydrated.     12:21 AM - Spoke with patient's wife over the phone, who gave me a more detailed history at this time. See HPI for details.    1:21 AM - RN called to inform me that the patient was seizing at bedside. Approved the administration of Ativan 1 mg for the patient's seizing.    1:25 AM - Patient seen at bedside. Discussed the plan of care with the patient's family, including the possibility of intubation if he seizes again.    1:47 AM Paged for Neurosurgery  at this time. Head CT shows fluid collection. Labs thankfully stable aside from lactic acidosis. Doubt sepsis, lactic acid level likely high due to seizures.    1:59 AM Spoke with Dr. Orozco, Neurosurgery, about the patient's condition. He states that his team will follow up. He also recommends neurology consultation and advises no surgical intervention is needed.    2:00 AM - Paged for Neurosurgery and Hospitalist at this time.    2:03 AM - Spoke with Dr. Bob, Neurology, about the patient's condition. He informed me that he will see the patient in the morning. Agrees with plan for airway protection if needed, MRI w/ and w/o, stat EEG.     2:09 AM - Spoke with Dr. Barcenas, Hospitalist, about the patient's condition. He kindly agrees to admit the patient. He advises that he would prefer that the patient is intubated.    2:25 AM - I updated the family on the plan of care, including admission, brain MRI, EEG, and intubation to protect the patient's airway. He's had some gurgling breath sounds, worry for airway compromise or possible subclinical status epilepticus. I answered all their questions regarding the patient's care. Patient's family verbalizes understanding and agreement to this plan of care.     2:40 AM - Preparation for intubation began at this time.    2:52 AM - Intubation performed at this time. See procedure note for details.    2:58 AM - Paged for Pulmonary at this time.     3:11 AM - Spoke with Dr. Medrano, Pulmonary, about the patient's condition. He will kindly consult.    3:15 AM - I updated the family on the successful intubation, and informed them that Dr. Medrano will consult. Lactic acid level downtrending, thus I feel he is having a very positive response to parenteral rehydration.     Upon my evaluation, this patient had a high probability of imminent or life-threatening deterioration due to status epilepticus.     I personally provided 50 minutes of total critical care time outside of time spent  on separately billable/documented procedures. This required my direct attention, intervention, and management which included the following:  -review of laboratory data  -review of radiology studies  -discussion with consultants: neurology, neurosurgery, critical care, hospitalist, pharmacist  -discussion with family of patient  -monitoring for potential decompensation with frequent reassessments  -vent management  -multiple antiseizure parenteral medications    Medications   acetaminophen (TYLENOL) tablet 650 mg (has no administration in time range)   ondansetron (ZOFRAN) syringe/vial injection 4 mg (has no administration in time range)   ondansetron (ZOFRAN ODT) dispertab 4 mg (has no administration in time range)   senna-docusate (PERICOLACE or SENOKOT S) 8.6-50 MG per tablet 2 Tab (has no administration in time range)     And   polyethylene glycol/lytes (MIRALAX) PACKET 1 Packet (has no administration in time range)     And   magnesium hydroxide (MILK OF MAGNESIA) suspension 30 mL (has no administration in time range)     And   bisacodyl (DULCOLAX) suppository 10 mg (has no administration in time range)   lactated ringers infusion ( Intravenous New Bag 11/4/19 0439)   propofol (DIPRIVAN) injection (30 mcg/kg/min × 86.2 kg Intravenous Rate Change 11/4/19 0643)   LORazepam (ATIVAN) injection 4 mg (has no administration in time range)   levETIRAcetam (KEPPRA) 1,000 mg in  mL IVPB (0 mg Intravenous Stopped 11/4/19 0643)   Respiratory Care per Protocol (has no administration in time range)   ipratropium-albuterol (DUONEB) nebulizer solution (has no administration in time range)   famotidine (PEPCID) tablet 20 mg ( Enteral Tube See Alternative 11/4/19 0644)     Or   famotidine (PEPCID) injection 20 mg (20 mg Intravenous Given 11/4/19 0644)   MD Alert...ICU Electrolyte Replacement per Pharmacy (has no administration in time range)   lidocaine (XYLOCAINE) 1 % injection 1-2 mL (has no administration in time range)    enoxaparin (LOVENOX) inj 40 mg (40 mg Subcutaneous Given 11/4/19 0644)   fentaNYL (SUBLIMAZE) injection 25 mcg (has no administration in time range)     Or   fentaNYL (SUBLIMAZE) injection 50 mcg (has no administration in time range)     Or   fentaNYL (SUBLIMAZE) injection 100 mcg (has no administration in time range)   insulin regular (HUMULIN R) injection 2-9 Units (0 Units Subcutaneous Dose not Required 11/4/19 0615)     And   glucose 4 g chewable tablet 16 g (has no administration in time range)     And   DEXTROSE 10% BOLUS 250 mL (has no administration in time range)   LR infusion (bolus) (0 mL Intravenous Stopped 11/4/19 0217)   levETIRAcetam (KEPPRA) 1,500 mg in  mL IVPB (0 mg Intravenous Stopped 11/4/19 0130)   LORazepam (ATIVAN) injection 1 mg (1 mg Intravenous Given 11/4/19 0128)   levETIRAcetam (KEPPRA) 3,000 mg in  mL IVPB (0 mg Intravenous Stopped 11/4/19 0328)   etomidate (AMIDATE) injection 20 mg (20 mg Intravenous Given 11/4/19 0307)   rocuronium (ZEMURON) injection 100 mg (100 mg Intravenous Given 11/4/19 0308)   fentaNYL (SUBLIMAZE) injection 50 mcg (50 mcg Intravenous Given 11/4/19 0313)     FINAL IMPRESSION  1. Status epilepticus (HCC)    2. B-cell lymphoma, unspecified B-cell lymphoma type, unspecified body region (HCC)    3. Metastasis to brain (HCC)    4. Chronic atrial fibrillation    5. Coronary artery disease involving native heart without angina pectoris, unspecified vessel or lesion type    6. Type 2 diabetes mellitus with hyperglycemia, without long-term current use of insulin (HCC)    7. Acute respiratory failure with hypoxia (HCC)    8. Encephalopathy acute    9. Oropharyngeal dysphagia    10. Dehydration    11. Lactic acidosis        -ADMIT-      Pertinent Labs & Imaging studies reviewed and verified by myself, as well as nursing notes and the patient's past medical, family, and social histories (See chart for details).    Results, exam findings, clinical impression,  presumed diagnosis, treatment options, and plan for admission were discussed with the family, and they verbalized understanding, agreed with, and appreciated the plan of care.    IAureliano (De[place]), am scribing for, and in the presence of, Andrey Evans M.D..    Electronically signed by: Aureliano Gilbert ([place]), 11/4/2019    IAndrey M.D. personally performed the services described in this documentation, as scribed by Aureliano Gilbert in my presence, and it is both accurate and complete.    Portions of this record were made with voice recognition software.  Despite my review, spelling/grammar/context errors may still remain.  Interpretation of this chart should be taken in this context.  C    The note accurately reflects work and decisions made by me.  Andrey Evans  11/4/2019  6:58 AM

## 2019-11-04 NOTE — CONSULTS
Critical Care Consultation    Date of consult: 11/4/2019    Referring Physician  Andrey Evans M.D.    Reason for Consultation  Status epilepticus, intubated in ED    History of Presenting Illness  73 y.o. male who presented 11/4/2019 with status epilepticus.  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  He underwent a right-sided craniotomy and tumor was resected on July 5, 2019 by Dr Ruffin.  He subsequently started chemotherapy in September and is followed by Dr Ferrer.  He also has a history of atrial fibrillation, chronic anticoagulation with Pradaxa and diabetes.  He has been recovering at skilled nursing facility and then recently just came back home.  He was in his usual state of health with no acute illnesses.  According to his daughter he developed sudden full-body seizures and EMS was activated.  There is report of another seizure on route in the air transport.  He had another seizure in the ED and was intubated for airway protection.  He has been loaded with 4.5 g of Keppra.  Neurology was consulted and continuous EEG was ordered.  Neurosurgery was consulted by the ER physician and notified of his current condition.  MRI of the brain was also ordered.  He has not had any prior seizures and was not on seizure prophylaxis.  Family denies fevers, chills, headache or recent illness.    Code Status  Full Code    Review of Systems  Review of Systems   Unable to perform ROS: Intubated       Past Medical History   has a past medical history of Anesthesia, Arthritis, Atrial fibrillation (HCC), Back pain, Blood clotting disorder (HCC) (1999), Breath shortness, CAD (coronary artery disease), Cancer (HCC) (2016), Cataract, Dyslipidemia, High cholesterol, Hypertension, Ileus (HCC), Myocardial infarct (HCC) (1999,2006), Renal disorder, Sleep apnea, Snoring, and Urinary incontinence.    Surgical History   has a past surgical history that includes lumbar laminectomy diskectomy (N/A,  11/26/2018); laminotomy (Left, 11/26/2018); foraminotomy (Left, 11/26/2018); other neurological surg (2016); other neurological surg; craniotomy (Right, 7/10/2019); appendectomy (2002); other cardiac surgery (1999,2002,2006); pr dx bone marrow aspirations (Left, 8/1/2019); and pr dx bone marrow biopsies (Left, 8/1/2019).    Family History  family history is not on file.    Social History   reports that he has never smoked. He has never used smokeless tobacco. He reports current alcohol use. He reports that he does not use drugs.    Medications  Home Medications    **Home medications have not yet been reviewed for this encounter**     Current Outpatient Medications:   •  allopurinol (ZYLOPRIM) 300 MG Tab, Take 1 Tab by mouth every day., Disp: 30 Tab, Rfl: 2  •  atorvastatin (LIPITOR) 20 MG Tab, Take 1 Tab by mouth every bedtime., Disp: 90 Tab, Rfl: 2  •  metFORMIN ER (GLUCOPHAGE XR) 500 MG TABLET SR 24 HR, Take 1 Tab by mouth every day., Disp: 30 Tab, Rfl: 2  •  amantadine (SYMMETREL) 50 MG/5ML Syrup, Take 10 mL by mouth 2 Times a Day., Disp: 1 Bottle, Rfl: 2  •  lisinopril (PRINIVIL) 5 MG Tab, Take 1 Tab by mouth every day., Disp: 30 Tab, Rfl: 2  •  metoprolol (LOPRESSOR) 25 MG Tab, Take 1 Tab by mouth 2 Times a Day., Disp: 60 Tab, Rfl: 2  •  TURMERIC PO, Take 20 mL by mouth 3 times a day., Disp: , Rfl:   •  dabigatran (PRADAXA) 150 MG Cap capsule, Take 150 mg by mouth every bedtime., Disp: , Rfl:   •  therapeutic multivitamin-minerals (THERAGRAN-M) Tab, Take 1 Tab by mouth every bedtime., Disp: , Rfl:   Current Facility-Administered Medications   Medication Dose Route Frequency Provider Last Rate Last Dose   • propofol (DIPRIVAN) injection  0-80 mcg/kg/min Intravenous Continuous Andrey Evans M.D. 10.3 mL/hr at 11/04/19 0306 20 mcg/kg/min at 11/04/19 0306   • acetaminophen (TYLENOL) tablet 650 mg  650 mg Oral Q6HRS PRN Yahir Barcenas M.D.       • ondansetron (ZOFRAN) syringe/vial injection 4 mg  4 mg  Intravenous Q4HRS PRN Yahir Barcenas M.D.       • ondansetron (ZOFRAN ODT) dispertab 4 mg  4 mg Oral Q4HRS PRN Yahir Barcenas M.D.       • senna-docusate (PERICOLACE or SENOKOT S) 8.6-50 MG per tablet 2 Tab  2 Tab Oral BID Yahir Barcenas M.D.        And   • polyethylene glycol/lytes (MIRALAX) PACKET 1 Packet  1 Packet Oral QDAY PRN Yahir Barcenas M.D.        And   • magnesium hydroxide (MILK OF MAGNESIA) suspension 30 mL  30 mL Oral QDAY PRN Yahir Barcenas M.D.        And   • bisacodyl (DULCOLAX) suppository 10 mg  10 mg Rectal QDAY PRN Yahir Barcenas M.D.       • lactated ringers infusion   Intravenous Continuous Yahir Barcenas M.D.       • insulin regular (HUMULIN R) injection 1-6 Units  1-6 Units Subcutaneous Q6HRS Yahir Barcenas M.D.        And   • glucose 4 g chewable tablet 16 g  16 g Oral Q15 MIN PRN Yahir Barcenas M.D.        And   • DEXTROSE 10% BOLUS 250 mL  250 mL Intravenous Q15 MIN PRN Yahir Barcenas M.D.           Allergies  Allergies   Allergen Reactions   • Gabapentin Rash     Broke out in a rash on R bicep       Vital Signs last 24 hours  Temp:  [36.2 °C (97.1 °F)] 36.2 °C (97.1 °F)  Pulse:  [100-127] 127  Resp:  [14-20] 14  BP: (124-147)/() 147/106  SpO2:  [92 %-100 %] 100 %    Physical Exam  Physical Exam  Constitutional:       Comments: Intubated, minimally responsive on full ventilator support   HENT:      Head: Normocephalic and atraumatic.      Mouth/Throat:      Mouth: Mucous membranes are moist.   Eyes:      Pupils: Pupils are equal, round, and reactive to light.   Cardiovascular:      Comments: Atrial fibrillation, rate 110/120  Pulmonary:      Breath sounds: No wheezing.      Comments: Intubated, full ventilator support  Abdominal:      General: There is distension.      Palpations: Abdomen is soft.   Musculoskeletal:         General: No deformity.      Right lower leg: No edema.   Skin:     General: Skin is warm and dry.      Capillary Refill: Capillary refill takes less  than 2 seconds.   Neurological:      Comments: Obtunded, no response to verbal or tactile stimuli, weakly withdrawals upper and lower extremities and spontaneously moves left lower extremity         Fluids    Intake/Output Summary (Last 24 hours) at 11/4/2019 0418  Last data filed at 11/4/2019 0217  Gross per 24 hour   Intake 1100 ml   Output --   Net 1100 ml       Laboratory  Recent Results (from the past 48 hour(s))   CBC WITH DIFFERENTIAL    Collection Time: 11/04/19 12:20 AM   Result Value Ref Range    WBC 8.4 4.8 - 10.8 K/uL    RBC 4.12 (L) 4.70 - 6.10 M/uL    Hemoglobin 13.3 (L) 14.0 - 18.0 g/dL    Hematocrit 40.0 (L) 42.0 - 52.0 %    MCV 97.1 81.4 - 97.8 fL    MCH 32.3 27.0 - 33.0 pg    MCHC 33.3 (L) 33.7 - 35.3 g/dL    RDW 50.5 (H) 35.9 - 50.0 fL    Platelet Count 157 (L) 164 - 446 K/uL    MPV 9.4 9.0 - 12.9 fL    Neutrophils-Polys 76.50 (H) 44.00 - 72.00 %    Lymphocytes 7.50 (L) 22.00 - 41.00 %    Monocytes 10.50 0.00 - 13.40 %    Eosinophils 4.30 0.00 - 6.90 %    Basophils 0.70 0.00 - 1.80 %    Immature Granulocytes 0.50 0.00 - 0.90 %    Nucleated RBC 0.00 /100 WBC    Neutrophils (Absolute) 6.44 1.82 - 7.42 K/uL    Lymphs (Absolute) 0.63 (L) 1.00 - 4.80 K/uL    Monos (Absolute) 0.88 (H) 0.00 - 0.85 K/uL    Eos (Absolute) 0.36 0.00 - 0.51 K/uL    Baso (Absolute) 0.06 0.00 - 0.12 K/uL    Immature Granulocytes (abs) 0.04 0.00 - 0.11 K/uL    NRBC (Absolute) 0.00 K/uL   COMP METABOLIC PANEL    Collection Time: 11/04/19 12:20 AM   Result Value Ref Range    Sodium 141 135 - 145 mmol/L    Potassium 3.6 3.6 - 5.5 mmol/L    Chloride 104 96 - 112 mmol/L    Co2 22 20 - 33 mmol/L    Anion Gap 15.0 (H) 0.0 - 11.9    Glucose 152 (H) 65 - 99 mg/dL    Bun 29 (H) 8 - 22 mg/dL    Creatinine 1.21 0.50 - 1.40 mg/dL    Calcium 9.8 8.5 - 10.5 mg/dL    AST(SGOT) 21 12 - 45 U/L    ALT(SGPT) 31 2 - 50 U/L    Alkaline Phosphatase 97 30 - 99 U/L    Total Bilirubin 0.4 0.1 - 1.5 mg/dL    Albumin 3.9 3.2 - 4.9 g/dL    Total Protein  6.4 6.0 - 8.2 g/dL    Globulin 2.5 1.9 - 3.5 g/dL    A-G Ratio 1.6 g/dL   LACTIC ACID    Collection Time: 19 12:20 AM   Result Value Ref Range    Lactic Acid 7.3 (HH) 0.5 - 2.0 mmol/L   PROTHROMBIN TIME (INR)    Collection Time: 19 12:20 AM   Result Value Ref Range    PT 15.0 (H) 12.0 - 14.6 sec    INR 1.15 (H) 0.87 - 1.13   APTT    Collection Time: 19 12:20 AM   Result Value Ref Range    APTT 27.7 24.7 - 36.0 sec   TROPONIN    Collection Time: 19 12:20 AM   Result Value Ref Range    Troponin T 19 6 - 19 ng/L   MAGNESIUM    Collection Time: 19 12:20 AM   Result Value Ref Range    Magnesium 1.8 1.5 - 2.5 mg/dL   URIC ACID    Collection Time: 19 12:20 AM   Result Value Ref Range    Uric Acid 4.1 2.5 - 8.3 mg/dL   ESTIMATED GFR    Collection Time: 19 12:20 AM   Result Value Ref Range    GFR If African American >60 >60 mL/min/1.73 m 2    GFR If Non African American 58 (A) >60 mL/min/1.73 m 2   EKG    Collection Time: 19 12:34 AM   Result Value Ref Range    Report       [facility] Emergency Dept.    Test Date:  2019  Pt Name:    BUZZ RODARTE               Department: ER  MRN:        [id number]                      Room:        02  Gender:     Male                         Technician: [id number]  :        1945                   Requested By:JATINDER NANCE  Order #:    [id number]                    Reading MD:    Measurements  Intervals                                Axis  Rate:       99                           P:  WI:                                      QRS:        34  QRSD:       90                           T:          266  QT:         356  QTc:        457    Interpretive Statements  ATRIAL FIBRILLATION  ABERRANT COMPLEX, POSSIBLY SUPRAVENTRICULAR  ANTERIOR INFARCT, OLD  BORDERLINE T ABNORMALITIES, INFERIOR LEADS  Compared to ECG 10/02/2019 10:37:44  Aberrant conduction of supraventricular beat(s) now present  T-wave abnormality now  present  Myocardial infarct finding still present     URINALYSIS CULTURE, IF INDICATED    Collection Time: 11/04/19  3:00 AM   Result Value Ref Range    Color Yellow     Character Clear     Specific Gravity 1.024 <1.035    Ph 5.0 5.0 - 8.0    Glucose Negative Negative mg/dL    Ketones 15 (A) Negative mg/dL    Protein Negative Negative mg/dL    Bilirubin Negative Negative    Urobilinogen, Urine 0.2 Negative    Nitrite Negative Negative    Leukocyte Esterase Negative Negative    Occult Blood Moderate (A) Negative    Micro Urine Req Microscopic    URINE MICROSCOPIC (W/UA)    Collection Time: 11/04/19  3:00 AM   Result Value Ref Range    WBC 0-2 (A) /hpf    RBC 2-5 (A) /hpf    Bacteria Negative None /hpf    Epithelial Cells Negative /hpf    Hyaline Cast 0-2 /lpf       Imaging  DX-CHEST-PORTABLE (1 VIEW)   Final Result         1.  Pulmonary vascular congestion, similar to prior.      CT-HEAD W/O   Final Result         1.  No acute intracranial abnormality is identified, there are nonspecific white matter changes, commonly associated with small vessel ischemic disease.  Associated mild cerebral atrophy is noted.   2.  Low-density right holohemispheric subdural fluid collection, likely chronic subdural hematoma or hygroma. Stable.   3.  Atherosclerosis.      DX-PELVIS-1 OR 2 VIEWS   Final Result         1.  No acute traumatic bony injury.   2.  Atherosclerosis      DX-CHEST-PORTABLE (1 VIEW)   Final Result         1.  Pulmonary vascular congestion   2.  Cardiomegaly      MR-BRAIN-WITH & W/O    (Results Pending)       Assessment/Plan  * Status epilepticus (HCC)  Assessment & Plan  Multiple recurrent generalized seizures prehospital and in ED  Status post Keppra load 4.5 g  Continue Keppra 1 g every 12  Reviewed with neurology, continuous EEG ordered  Intubated for airway protection, continue propofol for the time being, no evidence of current generalized seizure    Acute respiratory failure with hypoxia (HCC)  Assessment &  Plan  Intubated in ED for airway protection in the setting of status epilepticus 11/4  Continue full ventilator support, SAT/SBT when appropriate    A-fib (HCC)- (present on admission)  Assessment & Plan  Rate control, hold Xarelto for the time being, prophylactic dose Lovenox    Diabetes mellitus with hyperglycemia, without long-term current use of insulin (HCC)- (present on admission)  Assessment & Plan  Hold metformin  sliding scale insulin    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 Ferrer    Dyslipidemia- (present on admission)  Assessment & Plan  By history      Discussed patient condition and risk of morbidity and/or mortality with RN, RT, Pharmacy and Neurology.    The patient remains critically ill.  Critical care time = 50 minutes in directly providing and coordinating critical care and extensive data review.  No time overlap and excludes procedures.

## 2019-11-04 NOTE — ASSESSMENT & PLAN NOTE
Intubated in ED for airway protection in the setting of status epilepticus 11/4  Continue full ventilator support, SAT/SBT when appropriate

## 2019-11-04 NOTE — CONSULTS
Brief Neurology Note    The patient's chart has been reviewed but not evaluated at bedside. 1st time sz. Hx brain mass.    rec mri brain w and w/o contrast  Continue keppra  eeg    To be seen in AM with formal neuro consult and recs to follow    Georgi Bob MD  Director, [facility], [facility]  Neurohospitalist, [facility]  Clinical  of Neurology, [employer] [facility] [employer]  (t) [phone]  (f) [phone]

## 2019-11-04 NOTE — CONSULTS
DATE OF SERVICE:  11/04/2019    NEUROSURGICAL CONSULTATION    HISTORY OF PRESENT ILLNESS:  Patient is a very pleasant 73-year-old male well   known to me.  I performed a posterior thoracic decompression and fusion along   with a lumbar decompression several years ago and then he was diagnosed with   lymphoma and I did a right-sided craniotomy.  He has been in and out of the   hospital.  He is status post chemotherapy with Dr. Ferrer, apparently he had   a generalized tonic-clonic seizure.  He was GCS of 13 when [facility] saw him   en route.  He was given Versed after a second seizure noted en route and then   he was intubated upon presentation to [facility] to protect his airway.    PAST MEDICAL HISTORY:  Arthritis, atrial fibrillation, blood clotting   disorder, shortness of breath, coronary artery disease, non-Hodgkin's   lymphoma, cataracts, dyslipidemia, high cholesterol, hypertension, ileus, MI,   sleep apnea, urinary incontinence.    FAMILY HISTORY:  Noncontributory.    SOCIAL HISTORY:  No smoking.  Occasional ETOH.  No illicits.    PAST SURGICAL HISTORY:  As above and then appendectomy, cardiac surgery, bone   marrow aspirate.    MEDICATIONS AT HOME:  Allopurinol, atorvastatin, metformin, amantadine,   lisinopril, metoprolol, turmeric, Pradaxa, MVI.    ALLERGIES:  GABAPENTIN, WHICH CAUSES RASH.    PHYSICAL EXAMINATION:  GENERAL:  Intubated, sedated on propofol, only off for about 30 seconds.  No   eye opening.  HEENT:  Pupils are equal, round, reactive to light.  Extraocular muscles   intact.  Tongue midline.    NEUROLOGIC:  Face is grossly symmetric.  He is attempting to follow commands.    He is moving all 4 spontaneously.    IMAGING:  A CT scan of the brain, noncontrast, at 1:00 a.m. shows evidence of   prior right-sided frontal craniotomy and resection cavity with what appears to   be a right-sided frontoparietal chronic subdural versus subdural hygroma.    There is minimal mass effect.  There is no shift.   He has some atrophy as   well.  MRI of the brain with and without contrast on 8:15 this morning   redemonstrates right-sided frontoparietal, he has some nodular enhancement in   the resection cavity just posterior to it highly suggestive of recurrent   tumor.    PLAN:  Per nursing, he is wide awake and moving everything and following   commands very briskly when he is off of the propofol, so we will let him wean   off the propofol and extubate him as tolerated.  At this point, he does not   need any neurosurgical involvement.  We will get a repeat scan in 2 weeks or   so.  Neurosurgery will sign off at this point given the fact that he probably   will be ambulatory as soon as he wakes up.  I do not believe he needs any   Lovenox or prophylactic anticoagulation.  Please call with questions.  We will   sign off.       ____________________________________     DIAN JANE MD    CPD / NTS    DD:  11/04/2019 10:07:56  DT:  11/04/2019 11:22:19    D#:  [id number]  Job#:  [id number]

## 2019-11-04 NOTE — PROCEDURES
VIDEO ELECTROENCEPHALOGRAM REPORT      Referring provider: Dr. Evans.     DOS: 11/4/2019 (total recording of 25 minutes).     INDICATION:  Marcio More Jr. 73 y.o. male presenting with seizure.    CURRENT ANTIEPILEPTIC REGIMEN: Levetiracetam.    TECHNIQUE: 30 channel video electroencephalogram (EEG) was performed in accordance with the international 10-20 system. The study was reviewed in bipolar and referential montages. The recording examined the patient during wakeful and drowsy state(s).     DESCRIPTION OF THE RECORD:  During the wakefulness, the background showed a symmetrical 8 Hz alpha activity posteriorly with amplitude of 70 mV.  There was reactivity to eye closure/opening.  A normal anterior-posterior gradient was noted with faster beta frequencies seen anteriorly.  During drowsiness, theta/delta frequencies were seen.    ACTIVATION PROCEDURES:   Not performed.    ICTAL AND/OR INTERICTAL FINDINGS:   There are frequent, intermittently periodic (periodic lateralized discharges) in the left frontocentral electrodes.  No seizures captured.    EKG: sampling of the EKG recording demonstrated sinus rhythm.     EVENTS: None.    INTERPRETATION:  This is an abnormal video EEG recording in the awake and drowsy states. There are frequent, intermittently periodic sharps (periodic lateralized discharges) captured by the left frontocentral electrodes.  The findings significantly increase risk for seizures, and suggest underlying large area of cortical irritability and structural abnormality.  No seizures were captured during the study.  Clinical and radiological correlation is recommended.      Heladio Shaw MD   Epilepsy and Neurodiagnostics.   Clinical  of Neurology [facility].   Diplomate in Neurology, Epilepsy, and Electrodiagnostic Medicine.   Office: [phone]  Fax: [phone]

## 2019-11-04 NOTE — ASSESSMENT & PLAN NOTE
Right-sided brain mass resected 7/19, ongoing chemotherapy with first treatment September 2019 directed by Dr. Ferrer

## 2019-11-04 NOTE — ASSESSMENT & PLAN NOTE
Hx of craniotomy  Hx of chemo with R-CHOP by Dr Mc  11/8 palliative care and myself met with the patient's wife as well as a cousin at bedside.  Wife was okay with moving forward with comfort measures.  Comfort measures have been initiated.  Hospice order previously placed

## 2019-11-04 NOTE — ED TRIAGE NOTES
Pt to rm 2 per care flight from scene call, pt had witnessed seizure activity with fall pta, pt hx brain cancer/sz's, meds admin pta for sz, gcs 13 prior to versed admin, vss, resp even/unlabored

## 2019-11-04 NOTE — PROGRESS NOTES
2 RN Skin Check completed by BILLIE Smith  Devices in place-ETT secured with stacy, OG, BP cuff, o2 probe, cardiac monitor, SCDs, bilateral soft wrist restraints, PIV x2   Skin assessed under devices- all  Confirmed pressure found on- sacrum/coccyx  New Potential breakdown found on- sacrum/coccyx; pictures taken, wound consult placed  The following interventions in place- Q2 turning, repositioning of equipment, pillows for support/positioning

## 2019-11-04 NOTE — PROGRESS NOTES
Patient transported to MRI with this RN, RT and transport tech.  Patient on Propofol throughout procedure.  Had to increase dose to 60mcg/kg/min, which in turn dropped his BP to 70/40.  Propofol stopped.    0845-Patient transported back to room.  Proprofol remains off at this time.   Ramonita Diez R.N.

## 2019-11-04 NOTE — CARE PLAN
Problem: Safety - Medical Restraint  Goal: Remains free of injury from restraints (Restraint for Interference with Medical Device)  Description  INTERVENTIONS:  1. Determine that other, less restrictive measures have been tried or would not be effective before applying the restraint  2. Evaluate the patient's condition at the time of restraint application  3. Inform patient/family regarding the reason for restraint  4. Q2H: Monitor safety, psychosocial status, comfort, nutrition and hydration  Outcome: PROGRESSING AS EXPECTED  Goal: Free from restraint(s) (Restraint for Interference with Medical Device)  Description  INTERVENTIONS:  1. ONCE/SHIFT or MINIMUM Q12H: Assess and document the continuing need for restraints  2. Q24H: Continued use of restraint requires LIP to perform face to face examination and written order  3. Identify and implement measures to help patient regain control  Outcome: PROGRESSING AS EXPECTED     Problem: Communication  Goal: The ability to communicate needs accurately and effectively will improve  Outcome: PROGRESSING AS EXPECTED    PT unable to communicate at this time. Family at bedside.

## 2019-11-04 NOTE — RESPIRATORY CARE
Adult Ventilation Update    Total Vent Days: 1    Patient Lines/Drains/Airways Status    Active Airway     Name: Placement date: Placement time: Site: Days:    Airway ETT Oral 8.0  11/04/19   0306   Oral  less than 1                 CMV 18 500 +8 50%

## 2019-11-04 NOTE — PROGRESS NOTES
Hospital Neurology Progress Note:     Interval History: No acute events overnight. Unable to obtain ROS due to intubation.    Objective:   Vitals:    11/04/19 0900 11/04/19 1000 11/04/19 1025 11/04/19 1100   BP: 118/68 129/80  146/87   Pulse: 85 92  96   Resp: 17 18  18   Temp:       TempSrc:       SpO2: 100% 100% 100% 100%   Weight:       Height:           Labs:     Lab Results   Component Value Date/Time    PROTHROMBTM 15.0 (H) 11/04/2019 12:20 AM    INR 1.15 (H) 11/04/2019 12:20 AM      Lab Results   Component Value Date/Time    WBC 8.4 11/04/2019 12:20 AM    RBC 4.12 (L) 11/04/2019 12:20 AM    HEMOGLOBIN 13.3 (L) 11/04/2019 12:20 AM    HEMATOCRIT 40.0 (L) 11/04/2019 12:20 AM    MCV 97.1 11/04/2019 12:20 AM    MCH 32.3 11/04/2019 12:20 AM    MCHC 33.3 (L) 11/04/2019 12:20 AM    MPV 9.4 11/04/2019 12:20 AM    NEUTSPOLYS 76.50 (H) 11/04/2019 12:20 AM    LYMPHOCYTES 7.50 (L) 11/04/2019 12:20 AM    MONOCYTES 10.50 11/04/2019 12:20 AM    EOSINOPHILS 4.30 11/04/2019 12:20 AM    BASOPHILS 0.70 11/04/2019 12:20 AM    ANISOCYTOSIS 1+ 10/04/2019 03:53 AM      Lab Results   Component Value Date/Time    SODIUM 141 11/04/2019 12:20 AM    POTASSIUM 3.6 11/04/2019 12:20 AM    CHLORIDE 104 11/04/2019 12:20 AM    CO2 22 11/04/2019 12:20 AM    GLUCOSE 152 (H) 11/04/2019 12:20 AM    BUN 29 (H) 11/04/2019 12:20 AM    CREATININE 1.21 11/04/2019 12:20 AM      Lab Results   Component Value Date/Time    TRIGLYCERIDE 130 11/04/2019 04:20 AM       Lab Results   Component Value Date/Time    ALKPHOSPHAT 97 11/04/2019 12:20 AM    ASTSGOT 21 11/04/2019 12:20 AM    ALTSGPT 31 11/04/2019 12:20 AM    TBILIRUBIN 0.4 11/04/2019 12:20 AM        Imaging/Testing:   MRI of the brain with and without contrast 11/4/2019 was personally reviewed which showed T2 white matter hyperintensities in the bifrontal head regions likely secondary to radiation treatment.  Also small foci of enhancement seen in previous neoplastic bed on the right frontal head region  and the left frontal lobe parenchyma.    CT head without contrast on 11/4/2019 reviewed in chart.    EEG on 11/4/2019 was personally reviewed which showed slowing over the left frontal head region along with epileptiform discharges which at times became periodic however did not organize into seizure activity.    Physical Exam:     General: Intubated 73-year-old male in no acute distress.  Cardio: Normal S1/S2. No peripheral edema.   Pulm: CTAX2. No respiratory distress.   Skin: Warm, dry, no rashes or lesions   Psychiatric: Appropriate affect. No active psychosis.  HEENT: Atraumatic head, normal sclera and conjunctiva, moist oral mucosa. No lid lag.  Abdomen: Soft, non tender. No masses or hepatosplenomegaly.    Neurologic:  Mental Status: Obtunded.  Unable to arouse.  Unable to follow commands.  No speech or language.  Cranial Nerves: Pupils equal round reactive to light.  Unable to assess extraocular movements.  Face appears to be symmetric around ET tube.  Motor: Withdraws to noxious stimuli in all 4 extremities.  Reflexes: Upgoing toes bilaterally.  Coordination: Unable to assess.  Sensation: Withdraws to noxious stimuli in all 4 extremities.  Gait/Station: Deferred    Assessment/Plan:    Marcio More Jr. is a 73 y.o. male with history of non-Hodgkin's lymphoma with previous right frontal lobe resection presenting to the hospital for seizure and consulted for seizure.  The patient has remained intubated and sedated.  When off sedation he is able to withdraw to all extremities however he does not respond to commands.  MRI showed increased neoplastic burden as well as bifrontal T2 hyperintensities in the white matter likely secondary to radiation changes.  In this regard, it is likely that seizure is secondary to either previous tumor resection or new neoplastic burden.    Plan:  1.  EEG  2.  Initiate Vimpat.  200 mg IV load one time, 100 mill grams twice daily maintenance.  3.  Continue Keppra 1000 mg  twice a day  4.  Continue off sedation as much as possible  5.  Consider oncology consult  6.  We will continue to follow  7.  Plan discussed with consulting physician and patient's nurse.    This patient is critically ill secondary to seizure activity requiring intubation as well as increased risk of subsequent seizure activity as demonstrated by EEG.  35 minutes of critical care time provided which consisted of reviewing images, EEG, and management of antiseizure medications.    Peter Arboleda M.D., Diplomat of the American Board of Psychiatry and Neurology  Diplomat of [employer] Epilepsy Subspecialty   Assistant Clinical Professor, [facility] Neurology Consultant

## 2019-11-04 NOTE — ED NOTES
Assist RN:  Pt had tonic clonic seizure witnessed by RN. Notified ERP of seizure and received order for Ativan 1mg IVP, administered as ordered.

## 2019-11-04 NOTE — H&P
Hospital Medicine History & Physical Note    Date of Service  11/4/2019    Primary Care Physician  Patt Ferrer M.D.    Consultants  Critical care Dr. Medrano    Code Status  Full code    Chief Complaint  Seizure    History of Presenting Illness  73 y.o. 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  The patient was noted by his family members to have full body seizures, and he was subsequently transferred to this facility for higher level of care.  In the emergency department he continued to have seizures, he cannot provide a history on his own at this time.  Out of concern for airway protection, he was eventually intubated.    Review of Systems  Review of Systems   Unable to perform ROS: Medical condition       Past Medical History   has a past medical history of Anesthesia, Arthritis, Atrial fibrillation (HCC), Back pain, Blood clotting disorder (HCC) (1999), Breath shortness, CAD (coronary artery disease), Cancer (HCC) (2016), Cataract, Dyslipidemia, High cholesterol, Hypertension, Ileus (HCC), Myocardial infarct (HCC) (1999,2006), Renal disorder, Sleep apnea, Snoring, and Urinary incontinence.    Surgical History   has a past surgical history that includes lumbar laminectomy diskectomy (N/A, 11/26/2018); laminotomy (Left, 11/26/2018); foraminotomy (Left, 11/26/2018); other neurological surg (2016); other neurological surg; craniotomy (Right, 7/10/2019); appendectomy (2002); other cardiac surgery (1999,2002,2006); pr dx bone marrow aspirations (Left, 8/1/2019); and pr dx bone marrow biopsies (Left, 8/1/2019).     Family History  Patient unable to provide at this time.    Social History   reports that he has never smoked. He has never used smokeless tobacco. He reports current alcohol use. He reports that he  does not use drugs.    Allergies  Allergies   Allergen Reactions   • Gabapentin Rash     Broke out in a rash on R bicep       Medications  Prior to Admission Medications   Prescriptions Last Dose Informant Patient Reported? Taking?   TURMERIC PO  Family Member Yes No   Sig: Take 20 mL by mouth 3 times a day.   allopurinol (ZYLOPRIM) 300 MG Tab   No No   Sig: Take 1 Tab by mouth every day.   amantadine (SYMMETREL) 50 MG/5ML Syrup   No No   Sig: Take 10 mL by mouth 2 Times a Day.   atorvastatin (LIPITOR) 20 MG Tab   No No   Sig: Take 1 Tab by mouth every bedtime.   dabigatran (PRADAXA) 150 MG Cap capsule  Family Member Yes No   Sig: Take 150 mg by mouth every bedtime.   lisinopril (PRINIVIL) 5 MG Tab   No No   Sig: Take 1 Tab by mouth every day.   metFORMIN ER (GLUCOPHAGE XR) 500 MG TABLET SR 24 HR   No No   Sig: Take 1 Tab by mouth every day.   metoprolol (LOPRESSOR) 25 MG Tab   No No   Sig: Take 1 Tab by mouth 2 Times a Day.   therapeutic multivitamin-minerals (THERAGRAN-M) Tab  Family Member Yes No   Sig: Take 1 Tab by mouth every bedtime.      Facility-Administered Medications: None       Physical Exam  Temp:  [36.2 °C (97.1 °F)] 36.2 °C (97.1 °F)  Pulse:  [100-127] 127  Resp:  [14-20] 14  BP: (124-147)/() 147/106  SpO2:  [92 %-100 %] 100 %    Physical Exam  Vitals signs and nursing note reviewed.   Constitutional:       General: He is not in acute distress.     Appearance: Normal appearance. He is not ill-appearing.   HENT:      Head: Normocephalic and atraumatic.      Nose: Nose normal.      Mouth/Throat:      Mouth: Mucous membranes are moist.      Pharynx: Oropharynx is clear. No oropharyngeal exudate or posterior oropharyngeal erythema.   Eyes:      Extraocular Movements: Extraocular movements intact.      Conjunctiva/sclera: Conjunctivae normal.      Pupils: Pupils are equal, round, and reactive to light.   Neck:      Musculoskeletal: Normal range of motion and neck supple. No muscular tenderness.    Cardiovascular:      Rate and Rhythm: Normal rate and regular rhythm.      Pulses: Normal pulses.      Heart sounds: Normal heart sounds. No murmur. No friction rub. No gallop.    Pulmonary:      Effort: Pulmonary effort is normal. No respiratory distress.      Breath sounds: Normal breath sounds. No wheezing, rhonchi or rales.   Abdominal:      General: Abdomen is flat. Bowel sounds are normal. There is no distension.      Palpations: Abdomen is soft. There is no mass.      Tenderness: There is no tenderness.   Musculoskeletal: Normal range of motion.         General: No swelling or deformity.   Lymphadenopathy:      Cervical: No cervical adenopathy.   Skin:     General: Skin is warm and dry.   Neurological:      General: No focal deficit present.      Mental Status: He is alert.      Cranial Nerves: No cranial nerve deficit.      Motor: No weakness.      Gait: Gait normal.   Psychiatric:         Mood and Affect: Mood normal.         Behavior: Behavior normal.         Laboratory:  Recent Labs     11/04/19 0020   WBC 8.4   RBC 4.12*   HEMOGLOBIN 13.3*   HEMATOCRIT 40.0*   MCV 97.1   MCH 32.3   MCHC 33.3*   RDW 50.5*   PLATELETCT 157*   MPV 9.4     Recent Labs     11/04/19 0020   SODIUM 141   POTASSIUM 3.6   CHLORIDE 104   CO2 22   GLUCOSE 152*   BUN 29*   CREATININE 1.21   CALCIUM 9.8     Recent Labs     11/04/19 0020   ALTSGPT 31   ASTSGOT 21   ALKPHOSPHAT 97   TBILIRUBIN 0.4   GLUCOSE 152*     Recent Labs     11/04/19 0020   APTT 27.7   INR 1.15*     No results for input(s): NTPROBNP in the last 72 hours.      Recent Labs     11/04/19 0020   TROPONINT 19       Urinalysis:    No results found     Imaging:  CT-HEAD W/O   Final Result         1.  No acute intracranial abnormality is identified, there are nonspecific white matter changes, commonly associated with small vessel ischemic disease.  Associated mild cerebral atrophy is noted.   2.  Low-density right holohemispheric subdural fluid collection, likely  chronic subdural hematoma or hygroma. Stable.   3.  Atherosclerosis.      DX-PELVIS-1 OR 2 VIEWS   Final Result         1.  No acute traumatic bony injury.   2.  Atherosclerosis      DX-CHEST-PORTABLE (1 VIEW)   Final Result         1.  Pulmonary vascular congestion   2.  Cardiomegaly      MR-BRAIN-WITH & W/O    (Results Pending)   DX-CHEST-PORTABLE (1 VIEW)    (Results Pending)   DX-CHEST-PORTABLE (1 VIEW)    (Results Pending)         Assessment/Plan:  I anticipate this patient will require at least two midnights for appropriate medical management, necessitating inpatient admission.    * Status epilepticus (HCC)  Assessment & Plan  Will need close monitoring, neurology consultation.  No surgical intervention per neurosurgical consultation.  Admit to ICU.  Continuous EEG monitoring.    A-fib (HCC)- (present on admission)  Assessment & Plan  Paroxysmal, on rate control and anticoagulated.    Diabetes mellitus with hyperglycemia, without long-term current use of insulin (HCC)- (present on admission)  Assessment & Plan  Hold oral medication regimen in favor of correction dose insulin therapy.    Acute respiratory failure with hypoxia (HCC)  Assessment & Plan  Now status post intubation with mechanical ventilation.  Monitor.  Intensivist for orders.    Normocytic anemia- (present on admission)  Assessment & Plan  Suspect due to chronic inflammation, monitor, transfuse as needed for hemoglobin less than 7 g/Felipa.    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  On treatment regimen as an outpatient, and status post tumor removal.    Dyslipidemia- (present on admission)  Assessment & Plan  Continue statin therapy.  Monitor.        VTE prophylaxis: SCD

## 2019-11-04 NOTE — ASSESSMENT & PLAN NOTE
DC supplemental oxygen  Comfort care order set initiated  Aspiration risk. family aware. okay to feed family/patient so desires

## 2019-11-04 NOTE — RESPIRATORY CARE
Extubation    Cuff leak noted yes    Stridor present no     FiO2%: 30 % (11/04/19 1000)  O2 (LPM): 3 (11/04/19 1120)     Patient toleration good  RCP Complete? In progress  Events/Summary/Plan: Patient extubated per Dr. Holm and placed on 3L NC; RN at bedside. (11/04/19 1120)

## 2019-11-04 NOTE — PROGRESS NOTES
Pt admitted this morning by Dr prince withseizures intubated for airway protection. Discussed with Dr Mihai merchant phafrances and nursing staff. Plan is for extubation this morning

## 2019-11-04 NOTE — ED NOTES
Pt is sleeping, he wakes easily to voice, does not answer questions, unable to assess if the pt is oriented. Pt's O2 sat is 98% on 2L, gurgling can heard with inspiration.

## 2019-11-05 NOTE — CONSULTS
DATE OF SERVICE:  11/04/2019    HEMATOLOGY-ONCOLOGY CONSULTATION    ADMITTING PHYSICIAN:  Yahir Barcenas MD    REQUESTING PHYSICIAN:  Ramses Nolasco MD    CONSULTING PHYSICIAN:  Patt Ferrer MD    REASON FOR CONSULTATION:  Patient with a history of aggressive diffuse large B   cell lymphoma, now being admitted status post seizure activity.    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  FISH studies were negative.  Staging workup with   a bone marrow biopsy was negative.  PET scan showed a right retroperitoneal   mass, which was biopsied, came back as metastatic lymphoma.  The patient had   one round of R-CHOP on 09/23/2019 followed by prolonged hospitalization with   chronic pain syndrome, failure to thrive, and then was transferred to [facility] and he was recently discharged home.  He presented to   the hospital again with witnessed seizure activity.  On presentation to the   ER, he had a CT scan of the brain that showed no acute intracranial   abnormalities identified.  There are nonspecific white matter changes commonly   associated with small vessel ischemic disease.  Low density right   holohemispheric subdural fluid collection, likely chronic subdural hematoma or   hygroma.  The patient had MRI of the brain that showed moderate-sized   irregular area of enhancement involving the right-sided basal ganglia   immediately posterior to the resection site, 1 cm ovoid region of nodular   enhancement involving the gray white matter junction in the left anterior   superior frontal lobe.  Subtle linear areas of enhancement in the left   posterior frontal periventricular white matter.  A 9-mm ovoid area of nodular   enhancement adjacent to the dura in the right anterior lateral frontal lobe,   all these areas are suspicious for recurrent neoplasm.   Moderate large   right-sided holohemispheric subdural hygroma unchanged from previous exam.    Extensive T2 signal intensity.  Marked age-related cerebral atrophy.  Mild   periventricular and pontine white matter changes consistent with chronic   microvascular ischemic changes.  Patient was initially intubated, he just got   extubated and is able to be doing okay.  He is alert and oriented to person.    He is lying comfortably in bed.  He is able to protect his airway.    PAST MEDICAL HISTORY:  1.  Diffuse large B cell lymphoma, CNS and abdominal.  2.  Failure to thrive.  3.  Coronary artery disease.  4.  Seizure activity.  5.  Hypertension.  6.  Myocardial infarct.  7.  Sleep apnea.  8.  Urinary incontinence.    PAST SURGICAL HISTORY:  Lumbar laminectomy, craniotomy and resection of the   brain mass, appendectomy, cardiac surgery, and bone marrow aspiration.    ALLERGIES:  GABAPENTIN.    MEDICATIONS:  Currently, he is on Vimpat, famotidine, Lovenox, Keppra,   fentanyl patch, and lorazepam.    REVIEW OF SYSTEMS:  Unable to obtain due to patient being lethargic and   drowsy.    FAMILY HISTORY:  Noncontributory.    SOCIAL HISTORY:  No history of any smoking.  No heavy alcohol intake.  No   drugs.  He is  and lives with his wife.  He has 3 children, 2 daughters   and 1 son.  Daughter, Aissatou, is very involved in his care.  Lives in   [place].    PHYSICAL EXAMINATION:  GENERAL:  He is alert and oriented to person.  VITAL SIGNS:  Temperature is 37.3, pulse 100, respiratory rate 18, blood   pressure 115/79, and on room air saturating about 96%.  HEENT:  Pupils are equal and reactive to light.  Extraocular muscles are   intact.  Anicteric.  CHEST:  Clear to auscultation bilaterally symmetrical.  CARDIAC:  S1 and S2 heard.  Regular rate and rhythm.  ABDOMEN:  Soft, nontender, and nondistended.  Positive bowel sounds.  EXTREMITIES:  Positive chronic 1+ edema bilaterally.  No cyanosis or clubbing.  PSYCHIATRY  Mood  is appropriate.    LABORATORY DATA:  WBC count is 8.4, hemoglobin 13.2, and platelet count is   157.    Sodium is 141, potassium 3.6, BUN 29, and creatinine 1.21.    Normal LFTs.    Magnesium is 1.8 and uric acid 4.1.    ASSESSMENT AND PLAN:  The patient is a 73-year-old male who was diagnosed with   diffuse large B cell lymphoma, stage IV with CNS involvement and lymph node   received one dose of chemotherapy, R-CHOP on 09/23/2019 followed by prolonged   hospitalization for failure to thrive, chronic pain syndrome and since then   his treatment has been on hold since his functional status never improved.    Now admitted last night with seizure activity needing intubation and has been   extubated, and is able to protect airway.  Based on the MRI, there is a   concern about recurrent tumor and disease progression.  I had a long   discussion with his daughter, Aissatou, and explained to her that he is not a   candidate for systemic chemotherapy especially with poor functional status and   continued decline.  The best option would be comfort care.  Aissatou did mention   that the family is meeting tonight and will make final decision.   He is made DNR/DNI today .    Please call if any questions arise .        ____________________________________     Patt Ferrer MD SR / KATIE    DD:  11/04/2019 17:16:44  DT:  11/04/2019 20:29:56    D#:  [id number]  Job#:  [id number]

## 2019-11-05 NOTE — CONSULTS
Reason for PC Consult: Advance Care Planning    Consulted by: Dr. Holm    Assessment:  General: 73 year old male admitted from home 11/4/19 with seizures. 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 Patient was seen by PC at [facility] and ultimately discharged home with his family. Pt was status epilepticus and control has been achieved with Vimpat and Keppra. Recommendation from all teams is for comfort/hospice    Social: Pt resided in [place] with his wife. He has 2 daughters local and the other in [facility]    Consults: oncology, PMA, neurology, neurosurgery    Dyspnea: No-    Last BM: 11/05/19-    Pain: No-    Depression: Unable to determine-    Dementia: Unable to Determine;       Spiritual:  Is Moravian or spirituality important for coping with this illness? Yes- order placed  Has a  or spiritual provider visit been requested? Yes    Palliative Performance Scale: 30%    Advance Directive: None-    DPOA: No- NOK is wife Mell   POLST: No-      Code Status: DNR-      Outcome:  PC RN met with Richie's wife, 3 daughters, BS RN and MD in the conference room. MD had explained the medical situation, reviewed labs/tests, and answered all questions. They were at agreement for DNR/DNI and focusing on comfort, but wished to continue control of the seizures while planning for next steps. PC RN again explained the role of comfort care and hospice, noting the lack of hospice support in [place] and what this patchwork of care must look like to be successful in that area, including a PCP to prescribe, home health involvement, and 24/7 family/social caregiving support in the home. Explored pt's values, beliefs, and preferences in order to identify GOC, and while they believe Richie would want to be at home, they question how feasible this is and how safe it is. PC RN expressed similar  concern and needing to ensure his seizures were well managed before leaving at all, regardless of the location. They also understand that there's no guarantee he won't continue to have more seizures as his condition worsens closer to [facility].     PC RN explained all avenues for comfort/hospice, including home, at a facility, and inpatient comfort care with evaluation by [facility]; they expressed understanding and hope at this time to medically manage his condition, have SLP evaluate to determine if he can take PO medication and make more decisions at that point. They agree that they do not want more things done to him but are not quite ready for comfort measures. They do say that no escalation of care is appropriate. All questions answered and support provided. They are in agreement with a  visit as well. While talking to Richie's family, PC RN provided therapeutic communication, including open ended questions, reflective listening, and normalization of thought and feelings throughout encounter, as well as reinforced his goals of care. PC contact information provided to the family and encouraged to call with any questions or concerns.     Updated: RN present; CM; Dr. Bean    Plan: seizure management, SLP eval; transition to cc/hospice when family ready.  order placed and Jamie notified    Thank you for allowing Palliative Care to participate in this patient's care. Please feel free to call [phone] with any questions or concerns.

## 2019-11-05 NOTE — PROGRESS NOTES
ICU progress note.    Patient intubated because of status seizures.  Patient loaded with Keppra.  MRI obtained which shows highly suspicious lesions for cancer recurrence in the site of the old lymphoma.  EEG shows irritated brain but no overt seizure activity during the time of the EEG was being performed.  Clinically the patient woke up and I was able to follow some simple commands intermittently.  Patient had adequate cough and gag.  Patient was thus given by me a trial of extubation.  Post extubation patient is a respiratory status that appears okay control secretions adequate cough and gag.  Patient will state his name and says yes but does not consistently follow commands and does not vocalize otherwise.  We consulted oncology who states that they had recommended a month ago to family that change in focus to hospice and palliative care.  I went over the patient closely with nursing staff and we reviewed that there is no further seizure activity patient's airway is okay vital signs remain unremarkable.  We continue titrated care.  However, I then did have had a detailed conversation with the family by phone.  The family lives in [place] including the [place] with the wife as well as the daughter.  I I passed on the recommendations from oncology and told him the results of the EEG and MRI.  Family request DNR/DNI status as well as so they would like some support and help and looking at hospice options.  My specific therapies were assessing airway respiratory status neurologic status coordination of care between neurology, neurosurgery and oncology as well as critical care critical care team including respiratory therapy pharmacy nursing and hospitalist.  I also spent 20 minutes on the phone with medical decision making the family.    Critical care 60 minutes

## 2019-11-05 NOTE — THERAPY
"  Speech Language Therapy Clinical Swallow Evaluation completed.  Functional Status: Pt on D3 thins, upgraded from D2 NTL, 10/15 per rehab notes. Pt with moderate cognitive deficits per SLP notes while being txd at rehab. 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 Pt approx 50% following simple oral motor directives with model. Mild to moderate slowness in his responses. No marked facial asymmetry. Pt with low intensity of voice which family stating is his baseline following his initial hospitalization. Pt given 1/3 tsp sips of NTL water from the spoon. Pt with variable triggering of his first swallow from 10-20 seconds. Cued 2nd swallow to greater than 1 minute. Pt with weak spontaneous throat clearing following limited thickened cold water. Pt then with eyes closed. When asked if he wanted additional thick water or applesauce, pt verbalized \"no.\" Pt seems to fatigue easily. Family/pt educ provided regarding NPO at this time and SLP will reassess in the AM. Family may prefer to give pt small sips of thickened cold water-SLP recommended that they inform nursing if this is their preference. Family is also asking for a lower language/cog eval with consideration how much pt is able to understand and communicate. Hospice is being discussed per nurs and Palliative care has met with pt/family.   Recommendations - Diet:  NPO                          Strategies: to be determined Medication Administration:  nonoral  Plan of Care: Will benefit from Speech Therapy 3 times per week  Post-Acute Therapy: dependent on POC. Thanks, Mauricio    See \"Rehab Therapy-Acute\" Patient Summary Report for complete documentation.   "

## 2019-11-05 NOTE — DISCHARGE PLANNING
Care Transition Team Assessment  In the case of an emergency, pt's legal NOK is wife Mell     RNCM met with pt's wife at bedside and obtained the information used in this assessment. Pt verified accuracy of facesheet. Pt lives in a single story home with wife..  Pt uses [facility] in [place]. Prior to current hospitalization, pt was  Needing help with  ADLS/IADLS, was recently discharged from the rehab hospital.  Pt has a good support system 3 adult daughters at bedside. Pt denies any hx of substance use and denies any dx of mh. Discharge plans being discussed: hospice/home with HH/comfort care. Palliative Care has also met with family. Family discussing plan of care.    Information Source:spouse  Orientation : Disoriented to Event, Disoriented to Place, Disoriented to Time  Information Given By: Spouse  Informant's Name: (Mell)  Who is responsible for making decisions for patient? : Patient         Elopement Risk  Legal Hold: No  Ambulatory or Self Mobile in Wheelchair: No-Not an Elopement Risk  Elopement Risk: Not at Risk for Elopement    Interdisciplinary Discharge Planning  Does Admitting Nurse Feel This Could be a Complex Discharge?: Yes  Primary Care Physician: (Mary Presley)  Lives with - Patient's Self Care Capacity: Spouse  Patient or legal guardian wants to designate a caregiver (see row info): Yes  Caregiver name: Mell More  Caregiver relationship to patient: Wife  Caregiver contact info: [phone]  ([facility] [place]) Authorization for Release of Health Information has been completed: Yes  Support Systems: Family Member(s)  Housing / Facility: 1 Story House  Do You Take your Prescribed Medications Regularly: Yes  Mobility Issues: Yes  Prior Services: Continuous (24 Hour) Care Giving Per Service  Durable Medical Equipment: Walker    Discharge Preparedness  What is your plan after discharge?: Uncertain - pending medical team collaboration  What are your discharge supports?: Child,  Spouse  Prior Functional Level: Bladder Incontinence, Bowel Incontinence, Needs Assist with Activities of Daily Living, Needs Assist with Medication Management, Uses Walker  Difficulity with ADLs: Bathing, Dressing, Toileting, Walking  Difficulity with IADLs: Cooking, Driving, Keeping track of finances, Laundry, Managing medication, Shopping    Functional Assesment  Prior Functional Level: Bladder Incontinence, Bowel Incontinence, Needs Assist with Activities of Daily Living, Needs Assist with Medication Management, Uses Walker    Finances  Financial Barriers to Discharge: No  Prescription Coverage: Yes    Vision / Hearing Impairment  Vision Impairment : No  Hearing Impairment : No              Domestic Abuse  Have you ever been the victim of abuse or violence?: No    Psychological Assessment  History of Substance Abuse: None  History of Psychiatric Problems: No         Anticipated Discharge Information  Anticipated discharge disposition: Discharge needs currently unknown

## 2019-11-05 NOTE — PROGRESS NOTES
Neurosurgery Progress Note    Subjective:  Extubated yest    Exam:  Opens eyes spont  Perrl, conjugate  F/c x4    BP  Min: 118/68  Max: 166/104  Pulse  Av.2  Min: 76  Max: 109  Resp  Av.3  Min: 7  Max: 26  Temp  Av.1 °C (98.8 °F)  Min: 36.6 °C (97.9 °F)  Max: 38 °C (100.4 °F)  Monitored Temp 2  Av.9 °C (98.4 °F)  Min: 36.6 °C (97.9 °F)  Max: 37.2 °C (99 °F)  SpO2  Av.6 %  Min: 91 %  Max: 100 %    No data recorded    Recent Labs     19  0020 19  0300   WBC 8.4 7.8   RBC 4.12* 3.88*   HEMOGLOBIN 13.3* 12.3*   HEMATOCRIT 40.0* 37.5*   MCV 97.1 96.6   MCH 32.3 31.7   MCHC 33.3* 32.8*   RDW 50.5* 50.2*   PLATELETCT 157* 154*   MPV 9.4 9.4     Recent Labs     19  0020 19  0300   SODIUM 141 142   POTASSIUM 3.6 3.6   CHLORIDE 104 109   CO2 22 26   GLUCOSE 152* 105*   BUN 29* 15   CREATININE 1.21 0.88   CALCIUM 9.8 9.0     Recent Labs     19  0020   APTT 27.7   INR 1.15*           Intake/Output       19 - 1959 19 - 19 0659       Total  Total       Intake    I.V.  704  600 1304  100  -- 100    Magnesium Sulfate Volume 50 -- 50 -- -- --    Propofol Volume 54 -- 54 -- -- --    Volume (mL) (lactated ringers infusion)  100 -- 100    IV Piggyback  420  200 620  --  -- --    Volume (mL) (levETIRAcetam (KEPPRA) 1,000 mg in  mL IVPB) 100 200 300 -- -- --    Volume (mL) (potassium chloride (KCL) ivpb 10 mEq) 200 -- 200 -- -- --    Volume (mL) (lacosamide (VIMPAT) 200 mg in  mL ivpb) 120 -- 120 -- -- --    Total Intake 0425 067 6124 100 -- 100       Output    Urine  625  675 1300  --  -- --    Number of Times Voided 2 x -- 2 x -- -- --    Output (mL) ([REMOVED] Urethral Catheter) 375 -- 375 -- -- --    Output (mL) (Condom Urinary Catheter) 250 675 925 -- -- --    Total Output  -- -- --       Net I/O     499 125 624 100 -- 100            Intake/Output Summary (Last 24  hours) at 11/5/2019 0834  Last data filed at 11/5/2019 0800  Gross per 24 hour   Intake 1791.1 ml   Output 1150 ml   Net 641.1 ml            • acetaminophen  650 mg Q6HRS PRN   • ondansetron  4 mg Q4HRS PRN   • ondansetron  4 mg Q4HRS PRN   • senna-docusate  2 Tab BID    And   • polyethylene glycol/lytes  1 Packet QDAY PRN    And   • magnesium hydroxide  30 mL QDAY PRN    And   • bisacodyl  10 mg QDAY PRN   • LR   Continuous   • propofol  0-80 mcg/kg/min Continuous   • LORazepam  4 mg Q10 MIN PRN   • levETIRAcetam (KEPPRA) IV  1,000 mg Q12HRS   • Respiratory Care per Protocol   Continuous RT   • ipratropium-albuterol  3 mL Q2HRS PRN (RT)   • MD Alert...Adult ICU Electrolyte Replacement per Pharmacy   PHARMACY TO DOSE   • lidocaine  1-2 mL Q30 MIN PRN   • enoxaparin  40 mg DAILY   • fentaNYL  25 mcg Q HOUR PRN    Or   • fentaNYL  50 mcg Q HOUR PRN    Or   • fentaNYL  100 mcg Q HOUR PRN   • insulin regular  2-9 Units Q6HRS    And   • glucose  16 g Q15 MIN PRN    And   • dextrose 10% bolus  250 mL Q15 MIN PRN   • lacosamide (VIMPAT) ivpb  100 mg Q12HRS       Assessment and Plan:  Hospital day #2 b-cell lymphoma  Exam improved  sz tx per neurology  Agree with Dr. Ferrer- would pursue hospice/comfort care  No neurosurgical issues  Will sign off; please call with questions

## 2019-11-05 NOTE — WOUND TEAM
"[facility]  Inpatient Services  Initial Wound and Skin Care Evaluation    Admission Date:  11/4/2019   HPI, PMH, SH: Reviewed  Unit where seen by Wound Team: R114/[id number]    WOUND CONSULT RELATED TO: sacrum, R shoulder    SUBJECTIVE:  \"Okay.\"  talks softly, recently extubated    Self Report / Pain Level: no c/o pain      OBJECTIVE: on JAYLENE bed, mepilex drsg in place, stiff with turning  WOUND TYPE, LOCATION, CHARACTERISTICS (Pressure ulcers: location, stage, POA or date identified)    Pressure Injury 11/04/19 Coccyx;Sacrum (Active)         Pressure Injury Stage 2    Site Assessment Red    Loreta-wound Assessment Pink    Margins Defined edges    Wound Length (cm) 4.5 cm 11/4/2019  1:00 PM   Wound Width (cm) 2 cm 11/4/2019  1:00 PM   Wound Depth (cm) 0.1 cm 11/4/2019  1:00 PM   Wound Surface Area (cm^2) 9 cm^2 11/4/2019  1:00 PM   Tunneling 0 cm 11/4/2019  1:00 PM   Undermining 0 cm 11/4/2019  1:00 PM   Drainage Amount None    Cleansing Normal Saline Irrigation    Dressing Options Mepilex    Dressing Status Intact    Dressing Change Frequency Every 72 hrs    NEXT Dressing Change  11/07/19    NEXT Weekly Photo (Inpatient Only) 11/11/19    Odor None     Exposed Structures None     Tissue Type and Percentage 100% red/pink with dry yellow crust            Wound 11/04/19 Partial Thickness Wound Shoulder Right posterior (Active)         Site Assessment Red    Loreta-wound Assessment Intact    Margins Defined edges    Wound Length (cm) 1.5 cm 11/4/2019  1:00 PM   Wound Width (cm) 1.5 cm 11/4/2019  1:00 PM   Wound Depth (cm) 0.1 cm 11/4/2019  1:00 PM   Wound Surface Area (cm^2) 2.25 cm^2 11/4/2019  1:00 PM   Tunneling 0 cm 11/4/2019  1:00 PM   Undermining 0 cm 11/4/2019  1:00 PM   Drainage Amount None    Treatments Cleansed    Cleansing Normal Saline Irrigation    Periwound Protectant Not Applicable    Dressing Options Adhesive Foam    Dressing Cleansing/Solutions Not Applicable    Dressing Changed New    Dressing " Status Intact    Dressing Change Frequency Every 72 hrs    NEXT Dressing Change  11/07/19    NEXT Weekly Photo (Inpatient Only) 11/11/19    Odor None     Exposed Structures None     Tissue Type and Percentage 100% red      Vascular:  Wounds not r/t vascular problem    Lab Values:    WBC:       Lab Results   Component Value Date/Time    WBC 8.4 11/04/2019 12:20 AM    RBC 4.12 (L) 11/04/2019 12:20 AM      AIC:      Lab Results   Component Value Date/Time    HBA1C 7.2 (H) 10/11/2019 05:51 AM          Culture:   na    INTERVENTIONS BY WOUND TEAM:pt turned to L side, peeled back mepilex to assess skin, cleaned with NS, dried. Small piece of yellow crust removed revealing red rissue. Reapplied mepilex. Linens changed, pt repositioned. R shoulder, cleaned wound with NS, dried. Applied adhesive foam.   Coccyx;Sacrum-Dressing Options: Mepilex  Shoulder Right posterior-Dressing Options: Adhesive Foam    Interdisciplinary consultation:   With Nursing;With Patient     EVALUATION: pt has partial thickness wound to posterior R shoulder. He has a POA St 2 pressure injury to sacrococcygeal area, peeled off some of the yellow crust revealing red tissue. Mepilex for protection.     Factors affecting wound healing: decreased mobility, DM, anemia, status epilepticus, B cell lymphoma, CAD  Goals:  Steady decrease in wound area and depth weekly      NURSING PLAN OF CARE ORDERS (X):    Dressing changes: See Dressing Care orders:   x  Skin care: See Skin Care orders: x  Rectal tube care: See Rectal Tube Care orders:   Other orders:    RSKIN: CURRENT (X) ORDERED (O)  Q shift Isra:  X  Q shift pressure point assessments:  X  Pressure redistribution mattress            JAYLENE     x     Bariatric JAYLENE        Bariatric foam           Heel float boots         Float Heels off Bed with Pillows    x              Barrier Cream         Barrier paste        Sacral silicone dressing   x     Silicone O2 tubing         Anchorfast         Cannula fixation  Device (Tender )          Gray Foam Ear protectors           Trach with split foam               Waffle cushion        Waffle Overlay         Rectal tube or BMS         Antifungal tx   Interdry         Reposition q 2 hours   x    Up to chair        Ambulate      PT/OT        Dietician        Diabetes Education      PO    TF     TPN     NPO 1  # days   Other     WOUND TEAM PLAN OF CARE (X):   NPWT change 3 x week:        Dressing changes by wound team:       Follow up as needed: x      Other (explain):    Anticipated discharge plans (X):  SNF:           Home Care:           Outpatient Wound Center:            Self Care:            Other: unsure of needs @ this time

## 2019-11-05 NOTE — RESPIRATORY CARE
COPD EDUCATION by COPD CLINICAL EDUCATOR  11/5/2019 at 6:49 AM by Foreign Russo     Patient reviewed by COPD education team. Patient does not have a history or diagnosis of COPD and is a non-smoker, therefore does not qualify for the COPD program.

## 2019-11-05 NOTE — PROGRESS NOTES
Hospital Medicine Daily Progress Note    Date of Service  11/5/2019    Chief Complaint  Seizure    Hospital Course    73 y.o. male w/ worsening CNS lymphoma admitted 11/4/2019 with seizure        Interval Problem Update  Patient surrounded by family today.  He is awake and nods to questions.  Per Rn and family he is more alert today.  He remains lethargic and slow to follow.    I met with three daughters and the wife.  We reviewed MRI, CT's and labs.  I also reviewed neurosurgery and oncology notes.  I asked what their expectations were for the patient and they all agree that they know he has a terminal process and only want him to be comfortable.  They are hoping to have him seizure free.  The wife was considering taking him home to [facility] but she was alerted there is no Hospice in their small town.  Palliative care later met with family as well.  Hospice was discussed.  Family hopeful he becomes more alert for now but do not want him uncomfortable should he decline or have pain.  They all agreed to DNR/DNI.  FACE to FACE with family in End of Life discussion 40 minutes.    Consultants/Specialty  Intensivist    Code Status  DNR/DNI    Disposition      Review of Systems  Review of Systems   Unable to perform ROS: Mental acuity        Physical Exam  Temp:  [36.6 °C (97.9 °F)-38 °C (100.4 °F)] 36.7 °C (98.1 °F)  Pulse:  [] 98  Resp:  [14-26] 20  BP: (126-152)/(74-98) 152/88  SpO2:  [91 %-98 %] 96 %    Physical Exam  Constitutional:       Appearance: Normal appearance. He is normal weight. He is not ill-appearing or diaphoretic.   HENT:      Head: Normocephalic and atraumatic.      Nose: Nose normal. No congestion.      Mouth/Throat:      Mouth: Mucous membranes are moist.      Pharynx: No oropharyngeal exudate.   Eyes:      General:         Right eye: No discharge.         Left eye: No discharge.      Extraocular Movements: Extraocular movements intact.      Conjunctiva/sclera: Conjunctivae normal.   Neck:       Vascular: No carotid bruit.   Cardiovascular:      Rate and Rhythm: Normal rate and regular rhythm.      Pulses:           Radial pulses are 2+ on the right side and 2+ on the left side.        Dorsalis pedis pulses are 2+ on the right side and 2+ on the left side.      Heart sounds: No murmur.   Pulmonary:      Effort: Pulmonary effort is normal. No respiratory distress.      Breath sounds: Normal breath sounds. No wheezing or rales.   Abdominal:      General: Abdomen is flat. Bowel sounds are normal. There is no distension.      Palpations: Abdomen is soft.      Tenderness: There is no tenderness. There is no guarding.   Musculoskeletal:         General: No swelling or tenderness.   Lymphadenopathy:      Cervical: No cervical adenopathy.   Skin:     General: Skin is warm.   Neurological:      Cranial Nerves: Cranial nerve deficit present.      Motor: Weakness present.   Psychiatric:         Attention and Perception: Attention normal.         Speech: He is noncommunicative.         Behavior: Behavior is slowed.         Cognition and Memory: Cognition is impaired.         Fluids    Intake/Output Summary (Last 24 hours) at 11/5/2019 1750  Last data filed at 11/5/2019 1629  Gross per 24 hour   Intake 1550 ml   Output 925 ml   Net 625 ml       Laboratory  Recent Labs     11/04/19  0020 11/05/19  0300   WBC 8.4 7.8   RBC 4.12* 3.88*   HEMOGLOBIN 13.3* 12.3*   HEMATOCRIT 40.0* 37.5*   MCV 97.1 96.6   MCH 32.3 31.7   MCHC 33.3* 32.8*   RDW 50.5* 50.2*   PLATELETCT 157* 154*   MPV 9.4 9.4     Recent Labs     11/04/19  0020 11/05/19  0300   SODIUM 141 142   POTASSIUM 3.6 3.6   CHLORIDE 104 109   CO2 22 26   GLUCOSE 152* 105*   BUN 29* 15   CREATININE 1.21 0.88   CALCIUM 9.8 9.0     Recent Labs     11/04/19  0020   APTT 27.7   INR 1.15*         Recent Labs     11/04/19  0420   TRIGLYCERIDE 130       Imaging  DX-CHEST-PORTABLE (1 VIEW)   Final Result         1.  Stable pulmonary vascular congestion.   2.  Cardiomegaly       MR-BRAIN-WITH & W/O   Final Result         1.  Previous right frontal craniotomy with moderate sized postsurgical defect in the right anterior frontal lobe.      2.  Moderate sized irregular area of enhancement involving the right-sided basal ganglia immediately posterior to the resection site. 1 cm ovoid region of nodular enhancement involving the gray-white junction in the left anterior superior frontal lobe.    Subtle linear areas of enhancement in the left posterior frontal periventricular white matter. 9 mm ovoid area of nodular enhancement adjacent to the dura in the right anterior lateral frontal lobe. All of these areas are suspicious for recurrent    neoplasm.      3.  Moderately large right-sided holohemispheric subdural hygroma unchanged from previous exam of 10/3/2019 and causing effacement of the adjacent cortical sulci and gyri.      4.  Extensive confluent increased T2 signal intensity in the bifrontal periventricular white matter likely postirradiation change.      5.  Marked age-related cerebral atrophy.      6.  Mild periventricular and pontine white matter changes consistent with chronic microvascular ischemic gliosis.      DX-CHEST-PORTABLE (1 VIEW)   Final Result         1.  Pulmonary vascular congestion, similar to prior.      CT-HEAD W/O   Final Result         1.  No acute intracranial abnormality is identified, there are nonspecific white matter changes, commonly associated with small vessel ischemic disease.  Associated mild cerebral atrophy is noted.   2.  Low-density right holohemispheric subdural fluid collection, likely chronic subdural hematoma or hygroma. Stable.   3.  Atherosclerosis.      DX-PELVIS-1 OR 2 VIEWS   Final Result         1.  No acute traumatic bony injury.   2.  Atherosclerosis      DX-CHEST-PORTABLE (1 VIEW)   Final Result         1.  Pulmonary vascular congestion   2.  Cardiomegaly           Assessment/Plan  * Status epilepticus (HCC)  Assessment & Plan  Abnormal EEG  reviewed.  Keppra & lacosamide  Concern from CNS lymphoma vs prior surgery   Neurochecks and seizure precautions      Acute respiratory failure with hypoxia (HCC)  Assessment & Plan  Now status post intubation with mechanical ventilation  Aspiration precautions  RT per protocol  Encourage deep inspiratory efforts  Speech therapy    A-fib (HCC)- (present on admission)  Assessment & Plan  Hx of paroxysmal Afib.  Prior to admit was on pradaxa and metoprolol  Monitor on telemetry for now and w/ monitor of vitals.  Hold anticoagulation due to size of CVA and fear hemorrhagic transformation    Diabetes mellitus with hyperglycemia, without long-term current use of insulin (HCC)- (present on admission)  Assessment & Plan  Glycohemogloban: 7.2 in past month  Monitor accuchecks and cover with SSI.    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    Dyslipidemia- (present on admission)  Assessment & Plan  Continue statin therapy.  Monitor.       VTE prophylaxis: Enoxaparin

## 2019-11-05 NOTE — CARE PLAN
Problem: Safety  Goal: Will remain free from falls  Outcome: PROGRESSING AS EXPECTED  Intervention: Implement fall precautions  Flowsheets  Taken 11/4/2019 2023  Bed Alarm: Yes - Alarm On  Taken 11/4/2019 2000  Environmental Precautions: Treaded Slipper Socks on Patient;Personal Belongings, Wastebasket, Call Bell etc. in Easy Reach;Report Given to Other Health Care Providers Regarding Fall Risk;Bed in Low Position;Communication Sign for Patients & Families;Mobility Assessed & Appropriate Sign Placed     Problem: Infection  Goal: Will remain free from infection  Outcome: PROGRESSING AS EXPECTED     Problem: Communication  Goal: The ability to communicate needs accurately and effectively will improve  Outcome: PROGRESSING SLOWER THAN EXPECTED  Intervention: Reorient patient to environment as needed  Flowsheets (Taken 11/4/2019 2023)  Oriented to:: All of the Following : Location of Bathroom, Visiting Policy, Unit Routine, Call Light and Bedside Controls, Bedside Rail Policy, Smoking Policy, Rights and Responsibilities, Bedside Report, and Patient Education Notebook  Note:   Oriented to self only  Intervention: Educate patient and significant other/support system about the plan of care, procedures, treatments, medications and allow for questions  Flowsheets (Taken 11/4/2019 2023)  Pt & Family Have Been Educated on Methods Available to Report Concerns Related to Care, Treatment, Services, and Patient Safety Issues: Yes     Problem: Skin Integrity  Goal: Risk for impaired skin integrity will decrease  Outcome: PROGRESSING SLOWER THAN EXPECTED  Intervention: Implement precautions to protect skin integrity in collaboration with the interdisciplinary team  Flowsheets (Taken 11/4/2019 2000)  Skin Preventative Measures: Pillows in Use to Float Heels;Pillows in Use for Support / Positioning  Bed Types: Low Air Loss Mattress  Friction Interventions: Draw Sheet / Pad Used for Repositioning  PT / OT Involved in Care: Order has  been Placed  Moisturizers: Barrier Cream;Moisturizer   Activity : Bed;Range of motion  Note:   Wound care involved. Stage 2 PU on coccyx, appropriate protocols in place

## 2019-11-05 NOTE — PROGRESS NOTES
DATE OF SERVICE:  10/29/2019    The patient is doing fairly well at followup.  He still has significant   cognitive problems, however.  His thinking is impoverished and slow.  He is   not making very good decisions.  He still is not precisely oriented to the   date.  His thinking also seems labored and sluggish at times.  The patient was   spoken to about his general understanding of the team's goals as he nears his   discharge date.       ____________________________________     KOMAL WARNER, PHD    ILA / KATIE    DD:  11/04/2019 13:39:20  DT:  11/05/2019 10:16:06    D#:  [id number]  Job#:  [id number]

## 2019-11-05 NOTE — PROGRESS NOTES
DATE OF SERVICE:  10/30/2019    No significant change was noted in the patient's condition since he was last   seen.  He continues to show significant cognitive problems.  He is more alert   and attentive than he was when he first was admitted, but his thoughts are   still very impoverished, slow, and labored.  The patient was spoken to about   general aspects related to his overall rehab.       ____________________________________     KOMAL WARNER, PHD    ILA / KATIE    DD:  11/04/2019 13:55:59  DT:  11/05/2019 10:44:33    D#:  [id number]  Job#:  [id number]

## 2019-11-06 NOTE — PROGRESS NOTES
Hospital Medicine Daily Progress Note    Date of Service  11/6/2019    Chief Complaint  Seizure    Hospital Course    73 y.o. male w/ worsening CNS lymphoma admitted 11/4/2019 with seizure        Interval Problem Update  More visibly awake.  Delayed speech unaware of being in the hospital.  Unaware of city.    No significant changes overnight per discussion with ICU RN  Family not available at bedside when I evaluated patient in a.m.  Discussed later in day with palliative care RN and she had met with family to discuss hospice/comfort care.  Await further decision making from family.  Patient failed second speech swallow eval    Consultants/Specialty  Intensivist    Code Status  DNR/DNI    Disposition  Transfer to neurology floor    Review of Systems  Review of Systems   Unable to perform ROS: Mental acuity        Physical Exam  Temp:  [36.3 °C (97.4 °F)-37.7 °C (99.9 °F)] 37.7 °C (99.9 °F)  Pulse:  [] 106  Resp:  [16-19] 16  BP: (147-154)/() 154/86  SpO2:  [94 %-95 %] 95 %    Physical Exam  Constitutional:       Appearance: Normal appearance. He is normal weight. He is not ill-appearing or diaphoretic.   HENT:      Head: Normocephalic and atraumatic.      Nose: Nose normal.      Mouth/Throat:      Mouth: Mucous membranes are dry.      Pharynx: No oropharyngeal exudate.   Eyes:      General:         Right eye: No discharge.         Left eye: No discharge.      Extraocular Movements: Extraocular movements intact.      Conjunctiva/sclera: Conjunctivae normal.   Neck:      Musculoskeletal: No muscular tenderness.   Cardiovascular:      Rate and Rhythm: Normal rate and regular rhythm.      Pulses:           Radial pulses are 2+ on the right side and 2+ on the left side.        Dorsalis pedis pulses are 2+ on the right side and 2+ on the left side.      Heart sounds: No murmur.   Pulmonary:      Effort: Pulmonary effort is normal. No respiratory distress.      Breath sounds: Normal breath sounds. No wheezing  or rales.   Abdominal:      General: Abdomen is flat. Bowel sounds are normal. There is no distension.      Palpations: Abdomen is soft.      Tenderness: There is no tenderness. There is no guarding.   Musculoskeletal:         General: No swelling or tenderness.   Lymphadenopathy:      Cervical: No cervical adenopathy.   Skin:     General: Skin is warm.   Neurological:      Cranial Nerves: Cranial nerve deficit present.      Motor: Weakness present.   Psychiatric:         Attention and Perception: Attention normal.         Speech: He is noncommunicative.         Behavior: Behavior is slowed.         Cognition and Memory: Cognition is impaired.         Fluids    Intake/Output Summary (Last 24 hours) at 11/6/2019 1720  Last data filed at 11/6/2019 1600  Gross per 24 hour   Intake 1500 ml   Output 2000 ml   Net -500 ml       Laboratory  Recent Labs     11/04/19  0020 11/05/19  0300 11/06/19  0520   WBC 8.4 7.8 6.2   RBC 4.12* 3.88* 4.28*   HEMOGLOBIN 13.3* 12.3* 13.8*   HEMATOCRIT 40.0* 37.5* 43.3   MCV 97.1 96.6 101.2*   MCH 32.3 31.7 32.2   MCHC 33.3* 32.8* 31.9*   RDW 50.5* 50.2* 52.8*   PLATELETCT 157* 154* 122*   MPV 9.4 9.4 9.1     Recent Labs     11/04/19  0020 11/05/19  0300 11/06/19  0520   SODIUM 141 142 138   POTASSIUM 3.6 3.6 4.0   CHLORIDE 104 109 108   CO2 22 26 19*   GLUCOSE 152* 105* 83   BUN 29* 15 11   CREATININE 1.21 0.88 0.73   CALCIUM 9.8 9.0 8.6     Recent Labs     11/04/19  0020   APTT 27.7   INR 1.15*         Recent Labs     11/04/19  0420   TRIGLYCERIDE 130       Imaging  DX-CHEST-PORTABLE (1 VIEW)   Final Result         1.  Stable pulmonary vascular congestion.   2.  Cardiomegaly      MR-BRAIN-WITH & W/O   Final Result         1.  Previous right frontal craniotomy with moderate sized postsurgical defect in the right anterior frontal lobe.      2.  Moderate sized irregular area of enhancement involving the right-sided basal ganglia immediately posterior to the resection site. 1 cm ovoid region  of nodular enhancement involving the gray-white junction in the left anterior superior frontal lobe.    Subtle linear areas of enhancement in the left posterior frontal periventricular white matter. 9 mm ovoid area of nodular enhancement adjacent to the dura in the right anterior lateral frontal lobe. All of these areas are suspicious for recurrent    neoplasm.      3.  Moderately large right-sided holohemispheric subdural hygroma unchanged from previous exam of 10/3/2019 and causing effacement of the adjacent cortical sulci and gyri.      4.  Extensive confluent increased T2 signal intensity in the bifrontal periventricular white matter likely postirradiation change.      5.  Marked age-related cerebral atrophy.      6.  Mild periventricular and pontine white matter changes consistent with chronic microvascular ischemic gliosis.      DX-CHEST-PORTABLE (1 VIEW)   Final Result         1.  Pulmonary vascular congestion, similar to prior.      CT-HEAD W/O   Final Result         1.  No acute intracranial abnormality is identified, there are nonspecific white matter changes, commonly associated with small vessel ischemic disease.  Associated mild cerebral atrophy is noted.   2.  Low-density right holohemispheric subdural fluid collection, likely chronic subdural hematoma or hygroma. Stable.   3.  Atherosclerosis.      DX-PELVIS-1 OR 2 VIEWS   Final Result         1.  No acute traumatic bony injury.   2.  Atherosclerosis      DX-CHEST-PORTABLE (1 VIEW)   Final Result         1.  Pulmonary vascular congestion   2.  Cardiomegaly           Assessment/Plan  * Status epilepticus (HCC)  Assessment & Plan  Abnormal EEG reviewed.  Keppra & lacosamide  Concern from CNS lymphoma vs prior surgery   Neurochecks and seizure precautions      Acute respiratory failure with hypoxia (HCC)  Assessment & Plan  Now status post intubation with mechanical ventilation  Aspiration precautions  RT per protocol  Encourage deep inspiratory  efforts  Patient failed swallow evaluation x2 with speech therapy    A-fib (HCC)- (present on admission)  Assessment & Plan  Hx of paroxysmal Afib.  Prior to admit was on pradaxa and metoprolol  Monitor on telemetry for now and w/ monitor of vitals.  Hold anticoagulation     Diabetes mellitus with hyperglycemia, without long-term current use of insulin (HCC)- (present on admission)  Assessment & Plan  Glycohemogloban: 7.2 in past month  Monitor accuchecks and cover with SSI.    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  Family looking into options for hospice at home versus at the facility.  Discussed with palliative care RN    Dyslipidemia- (present on admission)  Assessment & Plan  Continue statin therapy.  Monitor.       VTE prophylaxis: Enoxaparin

## 2019-11-06 NOTE — PROGRESS NOTES
Neurosurgery Progress Note    Subjective:  No events    Exam:  Opens eyes spont  Oriented x1  Perrl, conjugate  Face symm  F/c brisly x4      BP  Min: 147/91  Max: 152/97  Pulse  Av  Min: 71  Max: 98  Resp  Av.4  Min: 17  Max: 20  Temp  Av.5 °C (97.7 °F)  Min: 36.3 °C (97.4 °F)  Max: 36.8 °C (98.2 °F)  SpO2  Av.8 %  Min: 94 %  Max: 96 %    No data recorded    Recent Labs     19  0020 19  0300 19  0520   WBC 8.4 7.8 6.2   RBC 4.12* 3.88* 4.28*   HEMOGLOBIN 13.3* 12.3* 13.8*   HEMATOCRIT 40.0* 37.5* 43.3   MCV 97.1 96.6 101.2*   MCH 32.3 31.7 32.2   MCHC 33.3* 32.8* 31.9*   RDW 50.5* 50.2* 52.8*   PLATELETCT 157* 154* 122*   MPV 9.4 9.4 9.1     Recent Labs     19  0020 19  0300 19  0520   SODIUM 141 142 138   POTASSIUM 3.6 3.6 4.0   CHLORIDE 104 109 108   CO2 22 26 19*   GLUCOSE 152* 105* 83   BUN 29* 15 11   CREATININE 1.21 0.88 0.73   CALCIUM 9.8 9.0 8.6     Recent Labs     19  0020   APTT 27.7   INR 1.15*           Intake/Output       19 07 - 1959 19 07 - 19 0659       Total  Total       Intake    I.V.  650  600 1250  100  -- 100    Magnesium Sulfate Volume 50 -- 50 -- -- --    Volume (mL) (lactated ringers infusion)  100 -- 100    IV Piggyback  300  100 400  100  -- 100    Volume (mL) (levETIRAcetam (KEPPRA) 1,000 mg in  mL IVPB) 100 100 200 100 -- 100    Volume (mL) (potassium chloride (KCL) ivpb 10 mEq) 200 -- 200 -- -- --    Total Intake  200 -- 200       Output    Urine  650  1350   --  -- --    Output (mL) (Condom Urinary Catheter) 650 1350  -- -- --    Stool  --  -- --  --  -- --    Number of Times Stooled 1 x -- 1 x 1 x -- 1 x    Total Output 650 1350  -- -- --       Net I/O     300 -650 -350 200 -- 200            Intake/Output Summary (Last 24 hours) at 2019 0923  Last data filed at 2019 0800  Gross per 24 hour   Intake 1750  ml   Output 2000 ml   Net -250 ml            • acetaminophen  650 mg Q6HRS PRN   • ondansetron  4 mg Q4HRS PRN   • ondansetron  4 mg Q4HRS PRN   • senna-docusate  2 Tab BID    And   • polyethylene glycol/lytes  1 Packet QDAY PRN    And   • magnesium hydroxide  30 mL QDAY PRN    And   • bisacodyl  10 mg QDAY PRN   • LR   Continuous   • LORazepam  4 mg Q10 MIN PRN   • levETIRAcetam (KEPPRA) IV  1,000 mg Q12HRS   • Respiratory Care per Protocol   Continuous RT   • ipratropium-albuterol  3 mL Q2HRS PRN (RT)   • MD Alert...Adult ICU Electrolyte Replacement per Pharmacy   PHARMACY TO DOSE   • enoxaparin  40 mg DAILY   • insulin regular  2-9 Units Q6HRS    And   • glucose  16 g Q15 MIN PRN    And   • dextrose 10% bolus  250 mL Q15 MIN PRN   • lacosamide (VIMPAT) ivpb  100 mg Q12HRS       Assessment and Plan:  Hospital day #3 b-cell lymphoma  Exam stable  sz tx per neurology  Agree with Dr. Ferrer- would pursue hospice/comfort care  No neurosurgical issues

## 2019-11-06 NOTE — CARE PLAN
Problem: Safety - Medical Restraint  Goal: Remains free of injury from restraints (Restraint for Interference with Medical Device)  Description  INTERVENTIONS:  1. Determine that other, less restrictive measures have been tried or would not be effective before applying the restraint  2. Evaluate the patient's condition at the time of restraint application  3. Inform patient/family regarding the reason for restraint  4. Q2H: Monitor safety, psychosocial status, comfort, nutrition and hydration  Outcome: PROGRESSING AS EXPECTED  Goal: Free from restraint(s) (Restraint for Interference with Medical Device)  Description  INTERVENTIONS:  1. ONCE/SHIFT or MINIMUM Q12H: Assess and document the continuing need for restraints  2. Q24H: Continued use of restraint requires LIP to perform face to face examination and written order  3. Identify and implement measures to help patient regain control  Outcome: PROGRESSING AS EXPECTED     Problem: Communication  Goal: The ability to communicate needs accurately and effectively will improve  Outcome: PROGRESSING AS EXPECTED     Problem: Safety  Goal: Will remain free from injury  Outcome: PROGRESSING AS EXPECTED  Goal: Will remain free from falls  Outcome: PROGRESSING AS EXPECTED     Problem: Infection  Goal: Will remain free from infection  Outcome: PROGRESSING AS EXPECTED

## 2019-11-06 NOTE — THERAPY
"Speech Language Therapy dysphagia treatment completed.   Functional Status:  Per nurs, coughing following oral care and did require oral suction. Pt awake. Low intensity of voice. Weak throat clearing and inconsistent coughing to directive with model. He does not initiate speech but answer simple personal questions with 1-2 word responses such as his name, where his last name originates from, and his favorite town in [place] (\"[place]\"). Pt follows directives for 4/4 oral motor directives with mild slowness. Pt given 1/3 tsp amounts of thickened cold water. Pt with variable triggering of the swallow from approx 10 seconds to greater than 1 minute with one swallow triggered. Pt did not respond to directive for a 2nd swallow. Pt with weak spontaneous throat clearing. When asked if it was hard to swallow, pt responded \"yes.\" SLP collaborated with nurs and provided educ to pt's family regarding pt currently at high risk for aspiration. If preference for care is for oral intake despite risks, 1/3 tsp amounts of thickened liquid recommended as tolerated.   Recommendations: see above.  Plan of Care: Will benefit from Speech Therapy 3 times per week  Post-Acute Therapy: possible Hospice. Thanks, Mauricio    See \"Rehab Therapy-Acute\" Patient Summary Report for complete documentation.     "

## 2019-11-06 NOTE — PALLIATIVE CARE
Spiritual Care Note    Patient Information     Patient's Name: Marcio More Jr.   MRN: [id number]    YOB: 1945   Age and Gender: 73 y.o. male   Service Area: [facility]   Room (and Bed): [facility]   Ethnicity or Nationality: white   Primary Language: English   Druze/Spiritual Preference: Restoration   Place of Residence: [place], [place]   Code Status: DNAR/DNI    Date of Admission: 11/4/2019   Length of Stay: 1 day        Spiritual Care Provider Information:  Title of Spiritual Care Provider:    Name of Spiritual Care Provider:   SHAYY Mosley  Phone Number:     [phone]   E-Mail:       [email or web address]  Total time:      10 minutes      Spiritual Screen Results:  Is your spiritual health or inner well-being important to you as you cope with your medical condition?: Yes  Would you like to receive a visit from our Spiritual Care team or your own Scientologist or spiritual leader?: Yes  Was spiritual care education provided to the patient?: Yes    Cultural/Spiritual Needs During Care:   Familial  Sources of Hope/Machelle:     Family, Companionship   PC Sprituality screening comment:   order placed      Encounter/Request Information  Visited With:      Patient  Nature of the Visit:     Follow-up, On shift  Continue Visiting:     Yes  Next Follow-up Date:     11/06/19  Crisis Visit:      Critical care  Referral From/Origin of Request:   [facility] nursing (palliative care)      Religous Needs/Values  Druze Needs:     Prayer      Spiritual Assessment   Observations/Symptoms:    Pt lying in bed, alert, pleasant,appeared weak, spoke in mumble.  Interacton/Conversation:    Pt asked me to pray with him.  Assessment:      Need  Need:       Seeking Spiritual Assistance and Support  Interventions:      compassionate presence, emotional support, prayer  Outcomes:      Connectedness with God, Spiritual Comfort  Plan:       continue visiting

## 2019-11-07 NOTE — PROGRESS NOTES
Patient: Duane E Pagelsdorf Date: 10/05//2017   : 1933 Attending: Alexx Maher MD   84 year old male      Chief Complaint: overdose    Subjective: Sitting in bed, Comfortable.    Vitals were reviewed  Physical Exam:   General:  No acute distress  Cardiovascular:  S1, S2, RRR  Respiratory: CTA.  No wheezes, rales or rhonchi.  Gastrointestinal:  Soft, ND, NT, BS+  Neuro:   AO x 3, no focal deficits  Psychiatric:   Cooperative.  Appropriate mood & affect.  Normal judgment.  Extremities: L. BKA, dressing in place      Laboratory Results:   All new pertinent labs/imaging reviewed    Imaging: No results found.      Assessment/Plans/Recommendations:  Intentional Overdose:Suspected diltiazem overdose, mood has been stable with no further concerns about self-harm   Depression  Chronic diastolic/systolic heart failure EF 32%-Compensated at this time  Minimal elevation in creatinine which appears to be stable  Mitral regurgitation  Tricuspid regurgitation  LV thrombus  Paroxysmal atrial fibrillation  PAD s/p L. BKA  Chronic anemia  Hypothyroidism  BPH  Debility    Plan:  Patient remains in good mood no thoughts of self-harm anymore  mirtazapine. Heart rate stable, TVP discontinued 09/10    Has been difficult to place at rehabilitation facilities due to concerns with the risks associated due to the presentation,  patient's cognitive capacity limits him to care for himself safely at home.        ASA, beta blocker, ACE inhibitor,  monitor creatinine   INR therapeutic, cont coumadin, dosing per pharmacy  -cont ASA, no statin due to allergy, -HRs stable, cont BB  -H/H stable, monitor  -cont levothyroxine  -cont flomax  -cont PT/OT      Patient medically stable for d/c at this time, attempts have been made to have him transfer to [facility] unfortunately that has been significant difficulty with this plan     On repeat assessments with OT / PT patient requires  24-hour assistance at home    Prophylaxis: Mechanical and  Report given to BLILIE Tapia.  Ramonita Diez R.N.     coumadin  Code Status: [facility]    D/w patient, RN  Alexx Maher MD  [phone]  Between 7 pm to 7 am for any questions or concerns please page on call hospitalist on [phone]

## 2019-11-07 NOTE — PALLIATIVE CARE
"Palliative Care follow-up  PC RN revisited with Richie's family at ; present was his wife, 2 dtrs, and ALLA Bautista. Dtr Aissatou expressed that they are trying to arrange some support at home and are hopeful they may be able to get him there. PC RN reviewed the events of today, including SLP recommendations. They understand he is at high risk for aspiration and currently not allowed anything by mouth. Aissatou expressed concern that the lack of his Parkinson's medications is \"making it worse.\" She recognizes he seems \"more stiff\" and attributes this to the medication. Michele inquired about the hydration, and this RN noted he is getting IV hydration but no nutrition, and spoke about comfort care/hospice allowing him to have pleasure feeds. Aissatou was signaling for this RN to not talk about this topic any longer and states \"we'll be here tomorrow if you'd like to come by then.\" Plan made to f/u with the MD and revisit tomorrow.     Updated:  RN/MD    Plan: revisit Thursday    Thank you for allowing Palliative Care to participate in this patient's care. Please feel free to call [phone] with any questions or concerns.  "

## 2019-11-07 NOTE — CARE PLAN
Problem: Skin Integrity  Goal: Risk for impaired skin integrity will decrease  Outcome: PROGRESSING AS EXPECTED  Pt monitored for incontinence and q2 hour turns in practice.     Problem: Mobility  Goal: Risk for activity intolerance will decrease  Outcome: NOT MET     Problem: Pain Management  Goal: Pain level will decrease to patient's comfort goal  Outcome: PROGRESSING AS EXPECTED

## 2019-11-07 NOTE — DOCUMENTATION QUERY
[facility]                                                                       Query Response Note      PATIENT:               BUZZ RODARTE  ACCT #:                  [id number]  MRN:                     [id number]  :                      1945  ADMIT DATE:       2019 12:05 AM  DISCH DATE:          RESPONDING  PROVIDER #:        [id number]           QUERY TEXT:    Stage 2 pressure injury coccyx, sacrum POA is documented in the  Wound Team Progress Note.  Can you clarify if you agree with this assessment?     NOTE:  If an appropriate response is not listed below, please respond with a new note.      The patient's Clinical Indicators include:  POA stage 2 pressure injury to sacrococcygeal area  Risk Factors: decreased mobility DM, anemia, lymphoma, CAD  Treatment: wound team eval, cleansed with NS, mepilex dressing, low air loss mattress, reposition q 2 hours  Options provided:   -- Agree with Wound Care RN assessment   -- Disagree with Wound Care RN assessment   -- Unable to determine      Query created by: Krystyna De Guzman on 2019 11:23 AM    RESPONSE TEXT:    Agree with Wound Care RN assessment          Electronically signed by:  PAMELA MANCUSO 2019 5:14 PM

## 2019-11-07 NOTE — PROGRESS NOTES
Report received. Patient transferred to bed from [facility] via slide board. Patient alert at this time. Mouth breathing. Voice is weak and quiet. Oriented to self, place but thinks date is 2003.     2 RN skin check complete with BILLIE Eagle.   Devices in place PIV 20g right hand, SCDs, condom catheter.  Skin assessed under devices yes.  Confirmed pressure ulcers found on coccyx/sacrum.  New potential pressure ulcers noted on NA.   Wound consult placed NA.  The following interventions in place position q2h, frequent incontinence checks, barrier cream, float heels with pillows. mepilex to coccyx    Elbows CDI without redness. Small abrasion to right elbow, dry  Bilat heels CDI without redness  Testicles with redness, blanching and moist. Cream applied

## 2019-11-07 NOTE — PROGRESS NOTES
Assumed care at 0700, report received from NOC RN.  Pt A&O x 2--disoriented to situation and place, states pain is in legs--unable to rate, however states it feels like cramping, elevation and repositioning performed. Bed locked, 3 rails up, bed in lowest position. Call light in place, belongings at bedside, no needs at this time and hourly rounding in place.  Palliative RN discussed POC with family. Pt is still NPO d/t failing swallow evaluation by SLP  Yesterday. Discussed with MD, palliative RN, and family. Awaiting decision from family regarding comfort care.

## 2019-11-07 NOTE — PROGRESS NOTES
[facility] Medicine Daily Progress Note    Date of Service  11/7/2019    Chief Complaint  Seizure    Hospital Course    73 y.o. male w/ worsening CNS lymphoma admitted 11/4/2019 with seizure        Interval Problem Update  Awake.    Aware he is in a hospital.  Aware he lives in [place]  Thinks he is in [place].  Soft speech.  Moves extremities.  Care discussed with Rn early am.  Await family direction for discharge.    Consultants/Specialty  Intensivist    Code Status  DNR/DNI    Disposition  Await family input for SNF vs home with hospice.    Review of Systems  Review of Systems   Unable to perform ROS: Mental acuity        Physical Exam  Temp:  [36.8 °C (98.3 °F)-37.8 °C (100 °F)] 37.8 °C (100 °F)  Pulse:  [] 94  Resp:  [16-20] 20  BP: (139-154)/(83-98) 139/88  SpO2:  [94 %-95 %] 94 %    Physical Exam  Vitals signs reviewed.   Constitutional:       Appearance: Normal appearance. He is normal weight. He is not diaphoretic.   HENT:      Head: Normocephalic and atraumatic.      Nose: Nose normal.      Mouth/Throat:      Mouth: Mucous membranes are dry.      Pharynx: No oropharyngeal exudate.   Eyes:      General: No scleral icterus.        Right eye: No discharge.         Left eye: No discharge.      Extraocular Movements: Extraocular movements intact.      Conjunctiva/sclera: Conjunctivae normal.      Pupils: Pupils are equal, round, and reactive to light.   Neck:      Musculoskeletal: No muscular tenderness.   Cardiovascular:      Rate and Rhythm: Normal rate and regular rhythm.      Pulses:           Radial pulses are 2+ on the right side and 2+ on the left side.      Heart sounds: No murmur.   Pulmonary:      Effort: Pulmonary effort is normal. No respiratory distress.      Breath sounds: Normal breath sounds. No wheezing or rales.   Abdominal:      General: Abdomen is flat. Bowel sounds are normal. There is no distension.      Palpations: Abdomen is soft.      Tenderness: There is no tenderness. There is  no guarding.   Musculoskeletal:         General: No swelling or tenderness.   Lymphadenopathy:      Cervical: No cervical adenopathy.   Skin:     General: Skin is warm.   Neurological:      Mental Status: He is alert.      Cranial Nerves: Cranial nerve deficit present.      Motor: Weakness present.   Psychiatric:         Attention and Perception: Attention normal.         Speech: He is noncommunicative.         Behavior: Behavior is slowed.         Cognition and Memory: Cognition is impaired.         Fluids    Intake/Output Summary (Last 24 hours) at 11/7/2019 1012  Last data filed at 11/7/2019 0625  Gross per 24 hour   Intake 1100 ml   Output 1150 ml   Net -50 ml       Laboratory  Recent Labs     11/05/19  0300 11/06/19  0520 11/07/19  0233   WBC 7.8 6.2 7.7   RBC 3.88* 4.28* 4.22*   HEMOGLOBIN 12.3* 13.8* 13.3*   HEMATOCRIT 37.5* 43.3 39.5*   MCV 96.6 101.2* 93.6   MCH 31.7 32.2 31.5   MCHC 32.8* 31.9* 33.7   RDW 50.2* 52.8* 46.4   PLATELETCT 154* 122* 145*   MPV 9.4 9.1 9.0     Recent Labs     11/05/19  0300 11/06/19 0520 11/07/19  0233   SODIUM 142 138 140   POTASSIUM 3.6 4.0 3.9   CHLORIDE 109 108 104   CO2 26 19* 24   GLUCOSE 105* 83 101*   BUN 15 11 15   CREATININE 0.88 0.73 0.70   CALCIUM 9.0 8.6 8.8                   Imaging  MR-BRAIN-WITH & W/O   Final Result         1.  Previous right frontal craniotomy with moderate sized postsurgical defect in the right anterior frontal lobe.      2.  Moderate sized irregular area of enhancement involving the right-sided basal ganglia immediately posterior to the resection site. 1 cm ovoid region of nodular enhancement involving the gray-white junction in the left anterior superior frontal lobe.    Subtle linear areas of enhancement in the left posterior frontal periventricular white matter. 9 mm ovoid area of nodular enhancement adjacent to the dura in the right anterior lateral frontal lobe. All of these areas are suspicious for recurrent    neoplasm.      3.   Moderately large right-sided holohemispheric subdural hygroma unchanged from previous exam of 10/3/2019 and causing effacement of the adjacent cortical sulci and gyri.      4.  Extensive confluent increased T2 signal intensity in the bifrontal periventricular white matter likely postirradiation change.      5.  Marked age-related cerebral atrophy.      6.  Mild periventricular and pontine white matter changes consistent with chronic microvascular ischemic gliosis.      DX-CHEST-PORTABLE (1 VIEW)   Final Result         1.  Pulmonary vascular congestion, similar to prior.      CT-HEAD W/O   Final Result         1.  No acute intracranial abnormality is identified, there are nonspecific white matter changes, commonly associated with small vessel ischemic disease.  Associated mild cerebral atrophy is noted.   2.  Low-density right holohemispheric subdural fluid collection, likely chronic subdural hematoma or hygroma. Stable.   3.  Atherosclerosis.      DX-PELVIS-1 OR 2 VIEWS   Final Result         1.  No acute traumatic bony injury.   2.  Atherosclerosis      DX-CHEST-PORTABLE (1 VIEW)   Final Result         1.  Pulmonary vascular congestion   2.  Cardiomegaly           Assessment/Plan  * Status epilepticus (HCC)  Assessment & Plan  Abnormal EEG reviewed.  Keppra & lacosamide  Concern from CNS lymphoma vs prior surgery   Neurochecks and seizure precautions  11/7 Replacing magnesium to keep serum level >2    Acute respiratory failure with hypoxia ([facility])  Assessment & Plan  Aspiration precautions  RT per protocol  Encourage deep inspiratory efforts  Patient failed swallow evaluation x2 with speech therapy  PT/OT  Likely to need SNF with hospice.    A-fib ([facility])- (present on admission)  Assessment & Plan  Hx of paroxysmal Afib.  Prior to admit was on pradaxa and metoprolol  Monitor vitals.  Hold anticoagulation as unable to take orals     Diabetes mellitus with hyperglycemia, without long-term current use of insulin (HCC)-  (present on admission)  Assessment & Plan  Glycohemogloban: 7.2 in past month  Monitor accuchecks and cover with SSI.  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  Family looking into options for hospice at home versus at the facility.  Discussed with palliative care RN    Dyslipidemia- (present on admission)  Assessment & Plan  Continue statin therapy.  Monitor.       VTE prophylaxis: Enoxaparin

## 2019-11-07 NOTE — PROGRESS NOTES
Received change of shift report from day-shift RN and assumed care of patient. Assessment performed. GIO pt not following commands. Patient sleeping, easy to awaken. PIV infusing LR @ 50 ml/h. Condom cath replaced. Q2h turns in practice. Wound care performed, pictures uploaded. Patient call light within reach, personal possessions nearby, bed in low position and locked, hourly rounding in practice, and non-skid socks in place.

## 2019-11-07 NOTE — CARE PLAN
Problem: Safety - Medical Restraint  Goal: Remains free of injury from restraints (Restraint for Interference with Medical Device)  Description  INTERVENTIONS:  1. Determine that other, less restrictive measures have been tried or would not be effective before applying the restraint  2. Evaluate the patient's condition at the time of restraint application  3. Inform patient/family regarding the reason for restraint  4. Q2H: Monitor safety, psychosocial status, comfort, nutrition and hydration  Outcome: PROGRESSING AS EXPECTED  Flowsheets (Taken 11/7/2019 5344)  Addressed this shift: Remains free of injury from restraints (restraint for interference with medical device): Inform patient/family regarding the reason for restraint; Every 2 hours: Monitor safety, psychosocial status, comfort, nutrition and hydration  Note:   Q2h checks being done.     Problem: Mobility  Goal: Risk for activity intolerance will decrease  Outcome: PROGRESSING AS EXPECTED  Note:   Pt worked with OT to stand and edge of bed. Tolerated well.

## 2019-11-08 NOTE — PROGRESS NOTES
Patient family at bedside. Patient responds to questions though nonverbally. Discussed POC. Patient resting comfortably w/ no s/s of distress. Continue hourly rounding.

## 2019-11-08 NOTE — PALLIATIVE CARE
"Palliative Care follow-up  Appreciate call from BS RN noting family planning to leave soon. PC RN spoke with Dr. Bean and revisited Mell and Aissatou at . PC RN spoke directly with Mell about the plan. She inquired about his \"status\" and PC asked if she was referring to comfort focused treatment, to which she nodded \"yes.\" PC RN recalled that this decision has not yet been made, and explained the role of CC, noting no real change in his medications but allowing safe, pleasure feeds if he were alert. She expressed worry about taking him home, and PC expressed that with the IV seizure medications, inability to take things by mouth and lack of hospice in [place], going home may be far fetched. PC RN explained that she could stay with him and do the best to \"bring home to him.\" PC RN recalled how Richie was able to be home after rehab for a period of time, and the strides she made to make that possible. PC RN acknowledged always wanting to be sure they are doing everything possible to give him the best shot and recognized that they had. She states \"I just want him comfortable.\" Aissatou agrees and states that in the morning that the plan will be for comfort care. PC expressed that Richie could move to a private room and arrangements could be made for family to stay. They were appreciative. No questions/needs at this time.    Updated: IGOR WISE/Dr. Bean/PC RN Cheri    Plan: f/u Friday; likely transition to CC    Thank you for allowing Palliative Care to participate in this patient's care. Please feel free to call [phone] with any questions or concerns.  "

## 2019-11-08 NOTE — PALLIATIVE CARE
"Palliative Care follow-up  PC visited with wife, 2 daughters and ALLA at bedside. Family happy to see him alert and acknowledging them, but that \"he just went right back to sleep.\" They denied having any direction in his plan and are looking forward to more family coming to visit today. They again asked about eating and whether SLP would be back to visit. PC RN noted that another SLP evaluation would likely not yield any different result and further evaluation at this point was not planned. They were interested in seeing \"how he is today and go from there.\" PC RN provided support and offered to check in later.     Updated: BS RN- requested call before family plans to leave today    Plan: f/u later    Thank you for allowing Palliative Care to participate in this patient's care. Please feel free to call [phone] with any questions or concerns.  "

## 2019-11-08 NOTE — CARE PLAN
Problem: Venous Thromboembolism (VTW)/Deep Vein Thrombosis (DVT) Prevention:  Goal: Patient will participate in Venous Thrombosis (VTE)/Deep Vein Thrombosis (DVT)Prevention Measures  Outcome: PROGRESSING AS EXPECTED  Intervention: Ensure patient wears graduated elastic stockings (SHAMA hose) and/or SCDs, if ordered, when in bed or chair (Remove at least once per shift for skin check)  Note:   Pt SCD's on and pt also on Lovenox.     Problem: Communication  Goal: The ability to communicate needs accurately and effectively will improve  Outcome: PROGRESSING SLOWER THAN EXPECTED  Intervention: [place] patient and significant other/support system to call light to alert staff of needs  Note:   Pt confused at times. Pt reoriented with each encounter.

## 2019-11-08 NOTE — DISCHARGE PLANNING
Anticipated Discharge Disposition: TBD    Action: LSW met with pt's wife and cousin at bedside to discuss hospice. Wife requested to postpone conversation until her daughters are in town. Plan to discuss on Sunday.    Barriers to Discharge: None    Plan: Follow up with family on Sunday.

## 2019-11-08 NOTE — CARE PLAN
Problem: Skin Integrity  Goal: Risk for impaired skin integrity will decrease  Outcome: PROGRESSING AS EXPECTED   Patient repositioned for comfort and heels floated. Mepilex in place.   Problem: Pain Management  Goal: Pain level will decrease to patient's comfort goal  Outcome: PROGRESSING AS EXPECTED   Patient repositioned for comfort. Offered heat/ice pack.

## 2019-11-08 NOTE — PROGRESS NOTES
Received change of shift report from day-shift RN and assumed care of patient. Assessment performed. Pt A&Ox3, disoriented to place. Q2h turns in practice. Patient comfortably resting in bed. Patient call light within reach, personal possessions nearby, bed in low position and locked, hourly rounding in practice, and non-skid socks in place.

## 2019-11-09 NOTE — PROGRESS NOTES
Hospital Medicine Daily Progress Note    Date of Service  11/8/2019    Chief Complaint  Seizure    Hospital Course    73 y.o. male w/ worsening CNS lymphoma admitted 11/4/2019 with seizure        Interval Problem Update  Awake.  But more groggy this morning.  I initially saw the patient in the morning and discussed with the floor nurse.  The nurse alerted me when the patient's wife and a cousin were at bedside.  I went up and discussed further care with him and she was okay with discontinuation of fluids.  Palliative met with the wife later and decision had been made to initiate comfort measures.  Later when I evaluated patient with the patient's wife present he was more awake but still quite confused.  Speech was very soft he states that he was in the hospital but unsure of what city.  He appeared in no distress although RN states when he is moved he has discomfort.  I had previously placed hospice orders.  Comfort care order set initiated.      Consultants/Specialty  Intensivist    Code Status  DNR/DNI    Disposition  Await family input for SNF vs home with hospice.    Review of Systems  Review of Systems   Unable to perform ROS: Mental acuity        Physical Exam  Temp:  [37.2 °C (98.9 °F)-37.7 °C (99.9 °F)] 37.7 °C (99.9 °F)  Pulse:  [] 113  Resp:  [18-24] 22  BP: (151-157)/() 152/90  SpO2:  [91 %-93 %] 91 %    Physical Exam  Vitals signs reviewed.   Constitutional:       Appearance: Normal appearance. He is not diaphoretic.   HENT:      Head: Normocephalic and atraumatic.      Nose: Nose normal.      Mouth/Throat:      Mouth: Mucous membranes are dry.   Eyes:      General: No scleral icterus.        Right eye: No discharge.         Left eye: No discharge.      Extraocular Movements: Extraocular movements intact.      Conjunctiva/sclera: Conjunctivae normal.   Neck:      Musculoskeletal: Neck supple. No muscular tenderness.   Cardiovascular:      Rate and Rhythm: Normal rate and regular rhythm.       Pulses:           Radial pulses are 2+ on the right side and 2+ on the left side.      Heart sounds: No murmur.   Pulmonary:      Effort: Pulmonary effort is normal. No respiratory distress.      Breath sounds: Normal breath sounds. No wheezing or rales.   Abdominal:      General: Abdomen is flat. Bowel sounds are normal. There is no distension.      Palpations: Abdomen is soft.      Tenderness: There is no tenderness.   Musculoskeletal:         General: No swelling or tenderness.   Skin:     General: Skin is warm.   Neurological:      Mental Status: He is alert.      Cranial Nerves: Cranial nerve deficit present.      Motor: Weakness present.   Psychiatric:         Attention and Perception: Attention normal.         Speech: He is noncommunicative.         Behavior: Behavior is slowed.         Cognition and Memory: Cognition is impaired.         Fluids    Intake/Output Summary (Last 24 hours) at 11/8/2019 1827  Last data filed at 11/8/2019 1447  Gross per 24 hour   Intake 700 ml   Output 950 ml   Net -250 ml       Laboratory  Recent Labs     11/06/19  0520 11/07/19  0233   WBC 6.2 7.7   RBC 4.28* 4.22*   HEMOGLOBIN 13.8* 13.3*   HEMATOCRIT 43.3 39.5*   .2* 93.6   MCH 32.2 31.5   MCHC 31.9* 33.7   RDW 52.8* 46.4   PLATELETCT 122* 145*   MPV 9.1 9.0     Recent Labs     11/06/19  0520 11/07/19  0233   SODIUM 138 140   POTASSIUM 4.0 3.9   CHLORIDE 108 104   CO2 19* 24   GLUCOSE 83 101*   BUN 11 15   CREATININE 0.73 0.70   CALCIUM 8.6 8.8                   Imaging  MR-BRAIN-WITH & W/O   Final Result         1.  Previous right frontal craniotomy with moderate sized postsurgical defect in the right anterior frontal lobe.      2.  Moderate sized irregular area of enhancement involving the right-sided basal ganglia immediately posterior to the resection site. 1 cm ovoid region of nodular enhancement involving the gray-white junction in the left anterior superior frontal lobe.    Subtle linear areas of enhancement in  the left posterior frontal periventricular white matter. 9 mm ovoid area of nodular enhancement adjacent to the dura in the right anterior lateral frontal lobe. All of these areas are suspicious for recurrent    neoplasm.      3.  Moderately large right-sided holohemispheric subdural hygroma unchanged from previous exam of 10/3/2019 and causing effacement of the adjacent cortical sulci and gyri.      4.  Extensive confluent increased T2 signal intensity in the bifrontal periventricular white matter likely postirradiation change.      5.  Marked age-related cerebral atrophy.      6.  Mild periventricular and pontine white matter changes consistent with chronic microvascular ischemic gliosis.      DX-CHEST-PORTABLE (1 VIEW)   Final Result         1.  Pulmonary vascular congestion, similar to prior.      CT-HEAD W/O   Final Result         1.  No acute intracranial abnormality is identified, there are nonspecific white matter changes, commonly associated with small vessel ischemic disease.  Associated mild cerebral atrophy is noted.   2.  Low-density right holohemispheric subdural fluid collection, likely chronic subdural hematoma or hygroma. Stable.   3.  Atherosclerosis.      DX-PELVIS-1 OR 2 VIEWS   Final Result         1.  No acute traumatic bony injury.   2.  Atherosclerosis      DX-CHEST-PORTABLE (1 VIEW)   Final Result         1.  Pulmonary vascular congestion   2.  Cardiomegaly           Assessment/Plan  * Status epilepticus (HCC)  Assessment & Plan  Family request antiseizure medications continued  Ativan as needed    Acute respiratory failure with hypoxia (HCC)  Assessment & Plan  DC supplemental oxygen  Comfort care order set initiated  Aspiration risk. family aware. 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  Wife was okay with moving forward with comfort measures.  Comfort measures have been initiated.  Hospice order previously placed    Dyslipidemia- (present on admission)  Assessment & Plan  Comfort measures       VTE prophylaxis: Enoxaparin

## 2019-11-09 NOTE — PROGRESS NOTES
Hospital Medicine Daily Progress Note    Date of Service  11/9/2019    Chief Complaint  Seizure    Hospital Course    73 y.o. male w/ worsening CNS lymphoma admitted 11/4/2019 with seizure        Interval Problem Update  Awake.  But more groggy this morning.  I initially saw the patient in the morning and discussed with the floor nurse.  The nurse alerted me when the patient's wife and a cousin were at bedside.  I went up and discussed further care with him and she was okay with discontinuation of fluids.  Palliative met with the wife later and decision had been made to initiate comfort measures.  Later when I evaluated patient with the patient's wife present he was more awake but still quite confused.  Speech was very soft he states that he was in the hospital but unsure of what city.  He appeared in no distress although RN states when he is moved he has discomfort.  I had previously placed hospice orders.  Comfort care order set initiated.  11/9- no event. Somnolent/ lethargic. Barely arousal to stimuli. Some rigidity to possible still ongoing seizure. Continue comfort care     Consultants/Specialty  Intensivist    Code Status  DNR/DNI    Disposition  Await family input for SNF vs home with hospice.    Review of Systems  Review of Systems   Unable to perform ROS: Mental acuity        Physical Exam  Temp:  [37.7 °C (99.9 °F)-37.8 °C (100 °F)] 37.8 °C (100 °F)  Pulse:  [113-114] 114  Resp:  [18-22] 18  BP: (144-152)/(89-90) 144/89  SpO2:  [91 %-94 %] 94 %    Physical Exam  Vitals signs reviewed.   Constitutional:       Appearance: Normal appearance. He is not diaphoretic.   HENT:      Head: Normocephalic and atraumatic.      Nose: Nose normal.      Mouth/Throat:      Mouth: Mucous membranes are dry.   Eyes:      General: No scleral icterus.        Right eye: No discharge.         Left eye: No discharge.      Extraocular Movements: Extraocular movements intact.      Conjunctiva/sclera: Conjunctivae normal.   Neck:       Musculoskeletal: Neck supple. No muscular tenderness.   Cardiovascular:      Rate and Rhythm: Normal rate and regular rhythm.      Pulses:           Radial pulses are 2+ on the right side and 2+ on the left side.      Heart sounds: No murmur.   Pulmonary:      Effort: Pulmonary effort is normal. No respiratory distress.      Breath sounds: Normal breath sounds. No wheezing or rales.   Abdominal:      General: Abdomen is flat. Bowel sounds are normal. There is no distension.      Palpations: Abdomen is soft.      Tenderness: There is no tenderness.   Musculoskeletal:         General: No swelling or tenderness.   Skin:     General: Skin is warm.   Neurological:      Mental Status: He is alert.      Cranial Nerves: Cranial nerve deficit present.      Motor: Weakness present.   Psychiatric:         Attention and Perception: Attention normal.         Speech: He is noncommunicative.         Behavior: Behavior is slowed.         Cognition and Memory: Cognition is impaired.         Fluids    Intake/Output Summary (Last 24 hours) at 11/9/2019 1248  Last data filed at 11/9/2019 0501  Gross per 24 hour   Intake 318 ml   Output 400 ml   Net -82 ml       Laboratory  Recent Labs     11/07/19  0233   WBC 7.7   RBC 4.22*   HEMOGLOBIN 13.3*   HEMATOCRIT 39.5*   MCV 93.6   MCH 31.5   MCHC 33.7   RDW 46.4   PLATELETCT 145*   MPV 9.0     Recent Labs     11/07/19  0233   SODIUM 140   POTASSIUM 3.9   CHLORIDE 104   CO2 24   GLUCOSE 101*   BUN 15   CREATININE 0.70   CALCIUM 8.8                   Imaging  MR-BRAIN-WITH & W/O   Final Result         1.  Previous right frontal craniotomy with moderate sized postsurgical defect in the right anterior frontal lobe.      2.  Moderate sized irregular area of enhancement involving the right-sided basal ganglia immediately posterior to the resection site. 1 cm ovoid region of nodular enhancement involving the gray-white junction in the left anterior superior frontal lobe.    Subtle linear areas of  enhancement in the left posterior frontal periventricular white matter. 9 mm ovoid area of nodular enhancement adjacent to the dura in the right anterior lateral frontal lobe. All of these areas are suspicious for recurrent    neoplasm.      3.  Moderately large right-sided holohemispheric subdural hygroma unchanged from previous exam of 10/3/2019 and causing effacement of the adjacent cortical sulci and gyri.      4.  Extensive confluent increased T2 signal intensity in the bifrontal periventricular white matter likely postirradiation change.      5.  Marked age-related cerebral atrophy.      6.  Mild periventricular and pontine white matter changes consistent with chronic microvascular ischemic gliosis.      DX-CHEST-PORTABLE (1 VIEW)   Final Result         1.  Pulmonary vascular congestion, similar to prior.      CT-HEAD W/O   Final Result         1.  No acute intracranial abnormality is identified, there are nonspecific white matter changes, commonly associated with small vessel ischemic disease.  Associated mild cerebral atrophy is noted.   2.  Low-density right holohemispheric subdural fluid collection, likely chronic subdural hematoma or hygroma. Stable.   3.  Atherosclerosis.      DX-PELVIS-1 OR 2 VIEWS   Final Result         1.  No acute traumatic bony injury.   2.  Atherosclerosis      DX-CHEST-PORTABLE (1 VIEW)   Final Result         1.  Pulmonary vascular congestion   2.  Cardiomegaly           Assessment/Plan  * Status epilepticus (HCC)  Assessment & Plan  Family request antiseizure medications continued  Ativan as needed    Acute respiratory failure with hypoxia (HCC)  Assessment & Plan  DC supplemental oxygen  Comfort care order set initiated  Aspiration risk. family aware. 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  Wife was okay with moving forward with comfort measures.  Comfort measures have been initiated.  Hospice order previously placed    Dyslipidemia- (present on admission)  Assessment & Plan  Comfort measures       VTE prophylaxis: Enoxaparin    Continue comfort care/ hospice measurement. No need for home meds. Will not change any of current symptoms

## 2019-11-09 NOTE — PROGRESS NOTES
Received change of shift report from day-shift RN and assumed care of patient. Assessment performed. Patient is alert and oriented x4, requesting jello and orange juice. Pt provided with snacks. Pt comfort care. Pt denies any discomfort. Patient call light within reach, personal possessions nearby, bed in low position and locked, hourly rounding in practice, seizure precautions in place, and non-skid socks in place.

## 2019-11-09 NOTE — THERAPY
Speech Therapy Contact Note:  Spoke with MD who reported the patient is transitioning to comfort care and may eat for pleasure/quality of life. No further SLP services needed at this time. Defer diet to MD.

## 2019-11-09 NOTE — PALLIATIVE CARE
Palliative Care follow-up  PC RN met with wife and cousin in family waiting room. Discussed pt's current clinical picture, wife verbalized being ready for comfort focused care now.     Discussed hospice at home vs SNF vs comfort care. Mell expressed not being able to physically care for pt at home, but would like to discuss it with her daughters who are coming tomorrow. Mell verbalized agreement with having [facility] evaluate pt for services and determine his needs at home.    Obtained hospice choice for [facility], faxed to [facility].    Active listening, education, validation, normalization, therapeutic touch, and emotional support provided.     Updated:   Dr. Bean, Charge RN    Plan:   Comfort focused care now, [facility] to evaluate pt for services.     Recommendations: I recommend a hospice consult.    Thank you for allowing Palliative Care to participate in this patient's care. Please feel free to call [phone] with any questions or concerns.

## 2019-11-09 NOTE — CARE PLAN
Problem: Communication  Goal: The ability to communicate needs accurately and effectively will improve  Outcome: PROGRESSING SLOWER THAN EXPECTED  Intervention: Educate patient and significant other/support system about the plan of care, procedures, treatments, medications and allow for questions  Note:   Pt has slowed, soft speech. Pt is able to make needs known.

## 2019-11-09 NOTE — CARE PLAN
Problem: Communication  Goal: The ability to communicate needs accurately and effectively will improve  Outcome: PROGRESSING SLOWER THAN EXPECTED  Intervention: Develop alternate methods of communication with patient and significant other/support system  Note:   Pt has low voice, need quiet environment to hear him.     Problem: Skin Integrity  Goal: Risk for impaired skin integrity will decrease  Outcome: PROGRESSING SLOWER THAN EXPECTED  Intervention: Implement precautions to protect skin integrity in collaboration with the interdisciplinary team  Note:   Pt turning q2h.

## 2019-11-10 NOTE — HOSPICE
ATTN: Provider/Care management team/Nurses    RE: Referral to [facility]    Thank you for the referral to [facility] for the patient listed above. We have made contact with the patient. To access [facility] documentation, click on Chart Review and then click onto Encounters (left top corner of screen).      If you have any questions or concerns regarding the patient’s transition to [facility], please do not hesitate to contact us at [phone].     We look forward to collaborating with you,  [facility] Care Team

## 2019-11-10 NOTE — CARE PLAN
Problem: Communication  Goal: The ability to communicate needs accurately and effectively will improve  Outcome: NOT MET     Problem: Pain Management  Goal: Pain level will decrease to patient's comfort goal  Outcome: PROGRESSING AS EXPECTED

## 2019-11-10 NOTE — PROGRESS NOTES
Hospital Medicine Daily Progress Note    Date of Service  11/10/2019    Chief Complaint  Seizure    Hospital Course    73 y.o. male w/ worsening CNS lymphoma admitted 11/4/2019 with seizure        Interval Problem Update  Awake.  But more groggy this morning.  I initially saw the patient in the morning and discussed with the floor nurse.  The nurse alerted me when the patient's wife and a cousin were at bedside.  I went up and discussed further care with him and she was okay with discontinuation of fluids.  Palliative met with the wife later and decision had been made to initiate comfort measures.  Later when I evaluated patient with the patient's wife present he was more awake but still quite confused.  Speech was very soft he states that he was in the hospital but unsure of what city.  He appeared in no distress although RN states when he is moved he has discomfort.  I had previously placed hospice orders.  Comfort care order set initiated.  11/9- no event. Somnolent/ lethargic. Barely arousal to stimuli. Some rigidity to possible still ongoing seizure. Continue comfort care   11/10- sleeping, more comfortable today. Discuss with his sister. Hospice transfer pending     Consultants/Specialty  Intensivist    Code Status  DNR/DNI    Disposition  Await family input for SNF vs home with hospice.    Review of Systems  Review of Systems   Unable to perform ROS: Mental acuity        Physical Exam  Temp:  [36.4 °C (97.5 °F)-37.3 °C (99.2 °F)] 37.3 °C (99.2 °F)  Pulse:  [] 145  Resp:  [13-18] 13  BP: (138-142)/(82-95) 138/95  SpO2:  [91 %-92 %] 91 %    Physical Exam  Vitals signs reviewed.   Constitutional:       Appearance: Normal appearance. He is not diaphoretic.   HENT:      Head: Normocephalic and atraumatic.      Nose: Nose normal.      Mouth/Throat:      Mouth: Mucous membranes are dry.   Eyes:      General: No scleral icterus.        Right eye: No discharge.         Left eye: No discharge.      Extraocular  Movements: Extraocular movements intact.      Conjunctiva/sclera: Conjunctivae normal.   Neck:      Musculoskeletal: Neck supple. No muscular tenderness.   Cardiovascular:      Rate and Rhythm: Normal rate and regular rhythm.      Pulses:           Radial pulses are 2+ on the right side and 2+ on the left side.      Heart sounds: No murmur.   Pulmonary:      Effort: Pulmonary effort is normal. No respiratory distress.      Breath sounds: Normal breath sounds. No wheezing or rales.   Abdominal:      General: Abdomen is flat. Bowel sounds are normal. There is no distension.      Palpations: Abdomen is soft.      Tenderness: There is no tenderness.   Musculoskeletal:         General: No swelling or tenderness.   Skin:     General: Skin is warm.   Neurological:      Mental Status: He is alert.      Cranial Nerves: Cranial nerve deficit present.      Motor: Weakness present.   Psychiatric:         Attention and Perception: Attention normal.         Speech: He is noncommunicative.         Behavior: Behavior is slowed.         Cognition and Memory: Cognition is impaired.         Fluids    Intake/Output Summary (Last 24 hours) at 11/10/2019 1344  Last data filed at 11/10/2019 0745  Gross per 24 hour   Intake 150 ml   Output --   Net 150 ml       Laboratory                        Imaging  MR-BRAIN-WITH & W/O   Final Result         1.  Previous right frontal craniotomy with moderate sized postsurgical defect in the right anterior frontal lobe.      2.  Moderate sized irregular area of enhancement involving the right-sided basal ganglia immediately posterior to the resection site. 1 cm ovoid region of nodular enhancement involving the gray-white junction in the left anterior superior frontal lobe.    Subtle linear areas of enhancement in the left posterior frontal periventricular white matter. 9 mm ovoid area of nodular enhancement adjacent to the dura in the right anterior lateral frontal lobe. All of these areas are  suspicious for recurrent    neoplasm.      3.  Moderately large right-sided holohemispheric subdural hygroma unchanged from previous exam of 10/3/2019 and causing effacement of the adjacent cortical sulci and gyri.      4.  Extensive confluent increased T2 signal intensity in the bifrontal periventricular white matter likely postirradiation change.      5.  Marked age-related cerebral atrophy.      6.  Mild periventricular and pontine white matter changes consistent with chronic microvascular ischemic gliosis.      DX-CHEST-PORTABLE (1 VIEW)   Final Result         1.  Pulmonary vascular congestion, similar to prior.      CT-HEAD W/O   Final Result         1.  No acute intracranial abnormality is identified, there are nonspecific white matter changes, commonly associated with small vessel ischemic disease.  Associated mild cerebral atrophy is noted.   2.  Low-density right holohemispheric subdural fluid collection, likely chronic subdural hematoma or hygroma. Stable.   3.  Atherosclerosis.      DX-PELVIS-1 OR 2 VIEWS   Final Result         1.  No acute traumatic bony injury.   2.  Atherosclerosis      DX-CHEST-PORTABLE (1 VIEW)   Final Result         1.  Pulmonary vascular congestion   2.  Cardiomegaly           Assessment/Plan  * Status epilepticus (HCC)  Assessment & Plan  Family request antiseizure medications continued  Ativan as needed    Acute respiratory failure with hypoxia (HCC)  Assessment & Plan  DC supplemental oxygen  Comfort care order set initiated  Aspiration risk. family aware. 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  Wife was okay with moving forward with comfort measures.  Comfort measures have been initiated.  Hospice order previously placed    Dyslipidemia- (present on admission)  Assessment & Plan  Comfort measures       VTE prophylaxis: Enoxaparin    Continue comfort care/ hospice measurement. No need for home meds. Will not change any of current symptoms

## 2019-11-10 NOTE — DISCHARGE PLANNING
Received Choice form at 2720  Agency/Facility Name: [facility]  Referral sent per Choice form @ 2807

## 2019-11-10 NOTE — PROGRESS NOTES
Received change of shift report from day-shift RN and assumed care of patient. Assessment performed. Patient sleeping. Pt's sister Kim Shrestha at bedside  Patient call light within reach, personal possessions nearby, bed in low position and locked, hourly rounding in practice, seizure precautions in place, and non-skid socks in place.

## 2019-11-11 NOTE — CARE PLAN
Problem: Pain Management  Goal: Pain level will decrease to patient's comfort goal  Outcome: PROGRESSING AS EXPECTED  Intervention: Educate and implement non-pharmacologic comfort measures. Examples: relaxation, distration, play therapy, activity therapy, massage, etc.  Flowsheets (Taken 11/11/2019 0143)  Intervention: Medication (see MAR)  Note:   Frequent pain reassessment, reduced unnecessary triggers for pain, PRN pain medication available if needed     Problem: Communication  Goal: The ability to communicate needs accurately and effectively will improve  Outcome: PROGRESSING SLOWER THAN EXPECTED  Note:   Pt continues to remain minimally verbal, allow time to respond, family at bedside to help with communication

## 2019-11-11 NOTE — PROGRESS NOTES
Received bedside report and assumed pt care at 0700. Pt resting with no signs of acute distress. VSS. Moved pt to low air mattress today. Family at bedside. Hossein with hospice will re-attempt consultation tomorrow when the pt's daughters are present.

## 2019-11-11 NOTE — CARE PLAN
Problem: Communication  Goal: The ability to communicate needs accurately and effectively will improve  Outcome: PROGRESSING SLOWER THAN EXPECTED  Intervention: Use communication aids and/or /Language Line as appropriate  Note:   Difficult to assess pain or pt needs. Pt is nonverbal. Working with family and pt to ensure that needs are met.

## 2019-11-11 NOTE — DISCHARGE SUMMARY
Discharge Summary    CHIEF COMPLAINT ON ADMISSION  No chief complaint on file.      Reason for Admission  Comfort Care     Admission Date  11/11/2019    CODE STATUS  DNAR/DNI    HPI & HOSPITAL COURSE  This is a 73 y.o. male past medical history non-hodgkin lymphoma status post resection 07/2019, chemotherapy, atrial fibrillation, rate control, hypertension, coronary artery disease, history of MI (99/2006), seizure disorder presented with full body seizures witnessed by his family at home after recent discharge 11/1 from rehab. He was intubated to protect airway.  Brain MRI demonstrates right-sided frontoparietal, he has some nodular enhancement in the resection cavity just posterior to it highly suggestive of recurrent tumor. Neurosurgery was consulted and no further surgery was recommended. Neurology was consulted and he was started on keppra. Dr. Ferrer was consulted and recommended hospice/comfort care and family agreed with that. He was extubated and comfort care measurement were started      Therefore, he is discharged in fair and stable condition to hospice. Inpatient     The patient met 2-midnight criteria for an inpatient stay at the time of discharge.    Discharge Date  11/11/2019    FOLLOW UP ITEMS POST DISCHARGE  None     DISCHARGE DIAGNOSES  Active Problems:    * No active hospital problems. *  Resolved Problems:    * No resolved hospital problems. *      FOLLOW UP  No future appointments.  No follow-up provider specified.    MEDICATIONS ON DISCHARGE     Medication List      ASK your doctor about these medications      Instructions   allopurinol 300 MG Tabs  Commonly known as:  ZYLOPRIM   Take 1 Tab by mouth every day.  Dose:  300 mg     amantadine 50 MG/5ML Syrp  Commonly known as:  SYMMETREL   Doctor's comments:  Twice daily, 0800 and 1300  Take 10 mL by mouth 2 Times a Day.  Dose:  100 mg     atorvastatin 20 MG Tabs  Commonly known as:  LIPITOR   Take 1 Tab by mouth every bedtime.  Dose:  20 mg      dabigatran 150 MG Caps capsule  Commonly known as:  PRADAXA   Take 150 mg by mouth every bedtime.  Dose:  150 mg     lisinopril 5 MG Tabs  Commonly known as:  PRINIVIL   Take 1 Tab by mouth every day.  Dose:  5 mg     metFORMIN  MG Tb24  Commonly known as:  GLUCOPHAGE XR   Take 1 Tab by mouth every day.  Dose:  500 mg     metoprolol 25 MG Tabs  Commonly known as:  LOPRESSOR   Take 1 Tab by mouth 2 Times a Day.  Dose:  25 mg     therapeutic multivitamin-minerals Tabs   Take 1 Tab by mouth every bedtime.  Dose:  1 Tab     TURMERIC PO   Take 20 mL by mouth 3 times a day.  Dose:  20 mL            Allergies  Allergies   Allergen Reactions   • Gabapentin Rash     Broke out in a rash on R bicep       DIET  No orders of the defined types were placed in this encounter.      ACTIVITY  As tolerated.  Weight bearing as tolerated    CONSULTATIONS  Neurology   Oncology   Neurosurgery     PROCEDURES  Intubation   EEG     LABORATORY  Lab Results   Component Value Date    SODIUM 140 11/07/2019    POTASSIUM 3.9 11/07/2019    CHLORIDE 104 11/07/2019    CO2 24 11/07/2019    GLUCOSE 101 (H) 11/07/2019    BUN 15 11/07/2019    CREATININE 0.70 11/07/2019        Lab Results   Component Value Date    WBC 7.7 11/07/2019    HEMOGLOBIN 13.3 (L) 11/07/2019    HEMATOCRIT 39.5 (L) 11/07/2019    PLATELETCT 145 (L) 11/07/2019        Total time of the discharge process exceeds 33 minutes.

## 2019-11-11 NOTE — PROGRESS NOTES
PT stable throughout the shift. Wife at bedside, Denies pain using head gesture. No PRN required. Will continue to monitor

## 2019-11-11 NOTE — DISCHARGE SUMMARY
This version of the note has been redacted during the course of a chart correction case. If you need access to the original text of this version of the note, please contact the Health Information Management department at [phone].

## 2019-11-11 NOTE — DISCHARGE PLANNING
Anticipated Discharge Disposition: TBD    Action: [facility] completed assessment and pt qualifies for [facility]. Spouse requested to discuss with family prior to admission to [facility].     Barriers to Discharge: None    Plan: Await family decision regarding [facility].

## 2019-11-11 NOTE — PROGRESS NOTES
Received bedside report and assumed pt care at 0700. Pt resting with no signs of acute distress. VSS. Nonverbal. Pt is more awake and alert today answering questions with head nod and whisper. Family at bedside. Frequent suction required as pt cannot manage own secretions. Skin/wound care performed at family request along with turning and repositioning. Low air loss mattress in use. Hospice conference conducted with family.

## 2019-11-12 NOTE — PROGRESS NOTES
Assumed patient care. Report received from sarah Tapia shift RN. Patient non-verbal. Nods in response to questions. Repositioned q2h. Linen clean/dry. Condom cath in use. Family at bedside. Hospice RN rounding with pt and family at this time.

## 2019-11-12 NOTE — H&P
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   He was admitted to [facility] 11/4/2019 for seizures at home and in ER, and was intubated for airway protection. MRI indicates recurrent frontal lobe tumor s/p resection recently. Neurosurgery was consulted and no further surgery was recommended. Neurology was consulted and pt was started on IV keppra for seizure prevention. Pt was extubated and Dr. Ferrer was consulted; he recommended hospice/comfort care and family agreed with this plan of care. Pt is admitted to hospice general inpatient status.          Review of Systems:     ROS Pt unable to answer questions well.     Physical Exam:  Physical Exam   Constitutional: He is well-developed, well-nourished, and in no distress.   Pt is lying in semi-Clifford's position, awake, makes eye contact and mostly inaudible words. Reports pain in hands and back with careful questioning. Forehead is slightly warm to touch.    Eyes: Right eye exhibits no discharge. Left eye exhibits no discharge. No scleral icterus.   Neck: Normal range of motion.   Cardiovascular:   afib 120s. 1+ radial and pedal pulses, slow capillary refill in extremities.   Pulmonary/Chest:   Diminished breath sounds, lungs clear and faintly audible oral/bronchial secretions. Pt in NAD. Shallow inspiratory effort. On room air.    Abdominal: Soft. Bowel sounds are normal. He exhibits no distension. There is no tenderness.   Genitourinary:    Genitourinary Comments: Condom cath to gravity bag with 200  concentrated clear UOP. No hematuria.     Musculoskeletal:      Comments: Expresses pain with movement of extremities. PIV RAC. No active ROM.    Neurological: He is alert.   Pt is awake, makes eye contact, tries to follow commands such as hand  but very weak. States his age is 47.   Skin: Skin is dry. He is not diaphoretic. No pallor.   Skin temp is normal except forehead and hands are distinctly warmer. No rash on extremities. Unstageable wound on coccyx with mepilex and some dry discharge on dressing.    Vitals reviewed.    Past Medical History: HX MI 1999 & 2006; HTN, WALTER, dyslipidemia, arthritis, prostate cancer 2016    Past Surgical History: HX R frontal lobe craniotomy; cardiac stents 1999 & 2006; multiple back surgeries, BM aspiration/BX 8/2019; appendectomy 2002.    Current Outpatient Medications:  Home Medications    **Home medications have not yet been reviewed for this encounter**       Medication Allergy/Sensitivities:  Allergies   Allergen Reactions   • Gabapentin Rash     Broke out in a rash on R bicep     Family History: Not on file    Social History:  Smoking: Never  Alcohol: once monthly  Illicit drugs: NO  Living situation:     Vitals:  Weight/BMI: Body mass index is 25.23 kg/m².  /85   Pulse 72   Temp 36.8 °C (98.2 °F) (Temporal)   Resp 17   Ht 1.829 m (6')   Wt 84.4 kg (186 lb)   SpO2 92%   BMI 25.23 kg/m²   Vitals:    11/11/19 1100 11/11/19 1900 11/12/19 0753   BP:  142/94 137/85   Pulse:  (!) 134 72   Resp:  18 17   Temp:  36.1 °C (97 °F) 36.8 °C (98.2 °F)   TempSrc:  Temporal Temporal   SpO2:  91% 92%   Weight: 84.4 kg (186 lb)     Height: 1.829 m (6')       Oxygen Therapy:  Pulse Oximetry: 92 %, O2 (LPM): 0, O2 Delivery: None (Room Air)    Lab Data Review: No results found for this or any previous visit (from the past 24 hour(s)).    Imaging/Procedures Review:    No orders to display      Problem List:  1. Seizures   2. Cerebral lymphoma w craniotomy  3. Generalized  pain   4. Status epilepticus  5. Respiratory failure w hypoxia at admission to [facility]  6. Atrial fibrillation, rate-controlled  7. DMII  8. CAD hx MI  9. Normocytic anemia  10. Hospice general inpatient DNR/DNI    Discussion: This patient requires a high level of nursing care for pain control and symptom management. He and family agree to hospice treatment plan of care. The patient is being admitted to hospice general inpatient to manage seizures and symptoms of pain and dyspnea. He will receive IV Keppra and Vimpat for neurological treatment, IV Toradol at wife's request and prn meds for anxiety/restlessness/nausea. The patient will be followed by [facility] team on a daily basis to assess for symptom control as well as appropriateness for GIP level of care. All findings have been reviewed with hospice medical director.

## 2019-11-12 NOTE — CARE PLAN
Problem: Pain Management  Goal: Pain level will decrease to patient's comfort goal  Outcome: PROGRESSING AS EXPECTED  Intervention: Educate and implement non-pharmacologic comfort measures. Examples: relaxation, distration, play therapy, activity therapy, massage, etc.  Flowsheets (Taken 11/12/2019 0338)  Intervention: Medication (see MAR)  Note:   Monitor non-verbal indicators of pain, able to verbalize pain intermittently, administer pain medication as appropriate     Problem: Skin Integrity  Goal: Risk for impaired skin integrity will decrease  Outcome: PROGRESSING SLOWER THAN EXPECTED  Note:   Pt on comfort care, turn for comfort, monitor skin integrity and prevent provide pillow support

## 2019-11-12 NOTE — PROGRESS NOTES
Pt was comfortable and slept throughout the night shift. Pt was able to answer questions using single words. Lower back pain in control with Toradol as ordered. Pt observed to be sleeping comfortably after Toradol administration. No significant event perspired.

## 2019-11-12 NOTE — CARE PLAN
Problem: Safety  Goal: Will remain free from injury  Outcome: PROGRESSING AS EXPECTED     Problem: Pain Management  Goal: Pain level will decrease to patient's comfort goal  Outcome: PROGRESSING AS EXPECTED

## 2019-11-13 NOTE — CARE PLAN
Problem: Infection  Goal: Will remain free from infection  Outcome: PROGRESSING AS EXPECTED  Note:   Hygiene care provided when awake, standard precaution maintained     Problem: Pain Management  Goal: Pain level will decrease to patient's comfort goal  Flowsheets (Taken 11/13/2019 0337)  Non Verbal Scale : Calm  Fuchs-Navarro Scale : 2   Pain Rating Scale (NPRS): 1  Note:   Administered pain medication routinely when facial grimace presents, reassess pain frequently when awake

## 2019-11-13 NOTE — PROGRESS NOTES
Pt stable, denied pain. Toradol given once due to facial grimace, assumed pain. Agrees to be turned q2hr when awake. Condom catheter in place. Family at bedside. No significant event transpired during the shift.

## 2019-11-13 NOTE — CARE PLAN
Problem: Pain Management  Goal: Pain level will decrease to patient's comfort goal  Outcome: PROGRESSING AS EXPECTED       Patient denies pain thus far.

## 2019-11-14 NOTE — CARE PLAN
"  Problem: Pain Management  Goal: Pain level will decrease to patient's comfort goal  Outcome: PROGRESSING AS EXPECTED       Patient responds shaking his head \"no\" when asked if he is in pain.   "

## 2019-11-14 NOTE — PROGRESS NOTES
2 RN skin check completed with Donna RN  Devices in place: Nasal Cannula.  Skin assessed under devices: Yes.  Confirmed pressure ulcers: None.  New potential pressure ulcers noted? none. Wound consult placed? No. Photo uploaded? no.   The following interventions are in place: padding around NC.

## 2019-11-15 NOTE — PROGRESS NOTES
· 2 RN skin check complete with Eboni WISE  · Devices in place n/a.  · Skin assessed under devices n/a.  · Confirmed pressure ulcers found on sacrum.  · New potential pressure ulcers noted on n/a. Wound consult placed? Wound consulted. Photo uploaded? no.   · The following interventions are in place Q2H turns, low air loss mattress, pillows in use for support/positioning, mepilex to sacrum.

## 2019-11-15 NOTE — PROGRESS NOTES
Received report from night RN, assumed care at 0700. Pt in-patient hospice, comfort measures in place. Difficult to assess orientation, pt has whispered voice, able to squeeze hand for yes/no answers. Port flushed, moderate resistance, very slight drawback of blood. Q2H turns in place, mepilex to sacrum. POC discussed, communication board updated. Call light in reach, hourly rounding in place.

## 2019-11-15 NOTE — PROGRESS NOTES
Previous port access not drawing blood per previous day RN.     Port heparinized with 300units and de accessed per policy. New port access established. Initial flush difficult, drawing back very small amounts of blood. Will need MD order to alteplace port.

## 2019-11-16 NOTE — CARE PLAN
Problem: Safety  Goal: Will remain free from falls  Note:   Bed alarm in use, bed in lowest and locked position, non-skid sock sin place.

## 2019-11-16 NOTE — CARE PLAN
Problem: Communication  Goal: The ability to communicate needs accurately and effectively will improve  Outcome: PROGRESSING AS EXPECTED  Intervention: [place] patient and significant other/support system to call light to alert staff of needs  Note:   Pt has had improvement to communication. Decreased obtundation. Pt is able to make needs known and denies any at this time.

## 2019-11-16 NOTE — PROGRESS NOTES
Received bedside report and assumed pt care at 0700. Pt resting with no signs of acute distress. No c/o pain. VSS. Pt is alert and requesting coffee. Assessment complete. 6 family members at bedside. Updated whiteboard and discussed plan of care with pt and family members. Bed locked and in lowest position. Fall precautions in place. Call light within reach. No further needs at this time. Hourly rounding in place.

## 2019-11-17 NOTE — PROGRESS NOTES
Assumed care of patient from Jorge WISE at 1900. Pt was resting in bed with no signs of distress. Bed in lowest locked position, bed alarm activated. Call light in reach.

## 2019-11-18 NOTE — PROGRESS NOTES
Assumed care of the patient from Fadia WISE at 1900. The patient was resting in bed with no signs of distress. Assessment completed, VSS, A/Ox4. The bed was in the lowest locked position with the head elevated 30 degrees, call light in reach. Hourly rounding in place.

## 2019-11-18 NOTE — PROGRESS NOTES
Assumed care at 0700, report received from NOC RN.  Pt A&O x 4--answered all questions appropriately this am in a soft-spoken voice, states pain is in legs, unable to rate pain on scale of 1-10--medication given per MAR.  Bed locked, 2 rails up, bed in lowest position. Call light in place, belongings at bedside, no needs at this time and hourly rounding in place.  Pt is on comfort care--oral care, VS, and turns completed. Wife updated. Hospice RN Alysa saw pt and spoke to wife on phone. IV anti seizure medications switched to oral.

## 2019-11-18 NOTE — CARE PLAN
Problem: Safety  Goal: Will remain free from falls  Outcome: PROGRESSING AS EXPECTED  Intervention: Implement fall precautions  Flowsheets  Taken 11/18/2019 0429  Bed Alarm: Yes - Alarm On  Taken 11/17/2019 2010  Environmental Precautions: Personal Belongings, Wastebasket, Call Bell etc. in Easy Reach;Treaded Slipper Socks on Patient     Problem: Pain Management  Goal: Pain level will decrease to patient's comfort goal  Outcome: PROGRESSING AS EXPECTED  Flowsheets (Taken 11/18/2019 0429)  Non Verbal Scale : Calm  Note:   No signs of pain noted. No complaints of pain.

## 2019-11-18 NOTE — CARE PLAN
Problem: Pain Management  Goal: Pain level will decrease to patient's comfort goal  Outcome: PROGRESSING AS EXPECTED  Note:   Pt given PRN pain medication upon request. Hourly rounding in place.      Problem: Skin Integrity  Goal: Risk for impaired skin integrity will decrease  Outcome: PROGRESSING SLOWER THAN EXPECTED  Note:   Q2h turns in place, pt on low airloss mattress, and mepilex on sacrum.

## 2019-11-19 NOTE — PROGRESS NOTES
Assumed care of pt @ 1900. Disoriented to time. [place] & voice very hoarse, difficult to hear. In pt Hospice, Q2 hr turns. Incontinent of both & bladder, condom cath in place. Had very small smear 11/18. Port flushed with no blood return. Lesvia with Hospice care team paged & per MD Flood ok to not have blood return as long as port is flushing well. No new orders rcvd. PU on sacrum. Cleansed & applied hydrocolloid dressing. Photos taken & wound consult ordered. Need clarification/ orders on how to care for wound. On low air loss mattress. I applied float boots as BLE have decreased tone. 2 RN skin check done. Suction & O2 set up to complete seizure precautions. Pt reports pain in throat & mid upper chest. Medicated with 5mg IV morphine per pt request. Pt having a hard time swallowing thin liquids, changed diet to nectar thick & mechanical soft. High fall risk interventions in place. Bed alarm on. Bed locked & in low position. Hourly rounding in place as pt does not call for assistance.

## 2019-11-19 NOTE — WOUND TEAM
[facility]  Inpatient Services  Wound and Skin Care Progress Note     Admission Date:  11/4/2019, inpatient Hospice admit date 11/12/19   HPI, PMH, SH: Reviewed  Unit where seen by Wound Team: R [id number]     WOUND CONSULT RELATED TO: reassessment of sacrum        Self Report / Pain Level: no c/o pain      OBJECTIVE: on [place] bed with JAYLENE overlay, thin hydrocolloid in place, bilateral heel float boots in place    WOUND TYPE, LOCATION, CHARACTERISTICS (Pressure ulcers: location, stage, POA or date identified)       Pressure Injury 11/04/19 Sacrum (Active)   Wound Image      Pressure Injury Stage 2    State of Healing Non-healing    Site Assessment Pink;Red;Bleeding;Yellow    Loreta-wound Assessment Blanchable erythema    Margins Unattached edges    Wound Length (cm) 2 cm    Wound Width (cm) 4 cm    Wound Depth (cm) 0.2 cm    Wound Surface Area (cm^2) 8 cm^2    Tunneling 0 cm    Undermining 0 cm    Closure Adhesive bandage    Drainage Amount Scant    Drainage Description Sanguineous    Non-staged Wound Description Not applicable    Treatments Cleansed    Cleansing Normal Saline Irrigation    Periwound Protectant Skin Protectant wipes to Periwound    Dressing Options Hydrocolloid Thin;Mepilex    Dressing Cleansing/Solutions Not Applicable    Dressing Changed New    Dressing Status Clean;Dry;Intact    Dressing Change Frequency Every 72 hrs    NEXT Dressing Change  11/22/19    NEXT Weekly Photo (Inpatient Only) 11/27/19    WOUND NURSE ONLY - Odor None    WOUND NURSE ONLY - Exposed Structures None    WOUND NURSE ONLY - Tissue Type and Percentage mixture of red, pink and yellow tissue, some bleeding        Vascular:  Wounds not r/t vascular problem     Lab Values:     WBC:                                               Lab Results   Component Value Date/Time     WBC 8.4 11/04/2019 12:20 AM     RBC 4.12 (L) 11/04/2019 12:20 AM      AIC:                                           Lab Results   Component Value  Date/Time     HBA1C 7.2 (H) 10/11/2019 05:51 AM                                Culture:                                   na     INTERVENTIONS BY WOUND TEAM: wound consult placed for reassessment of sacral pressure injury present on admission. Patient assisted to turn to right side, dressing removed, skin cleansed with NS and gauze and then measured and photographed. Thin hydrocolloid cut to fit over open wound, then covered with sacral mepilex. Patient positioned to turn to left side with pillows.     Interdisciplinary consultation:   With Nursing;With Patient      EVALUATION: Patient originally seen by wound team on admission to RICU on 11/4 and pressure injury assessed at that time and staged as a 2. Patient has not left hospital since the 4th but was transitioned to inpatient hospice and and new admit and encounter was started on 11/11. When patient was essentially discharged and readmitted to hospice all previous wound care orders were discontinued. Pressure injury appears to be worsening as patient is now on comfort care and is actively dying. 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 Thin hydrocolloid for autlolytic debridement of slough and sacral mepilex to reduce pressure, friction and shear        Goals:  Steady decrease in wound area and depth weekly        NURSING PLAN OF CARE ORDERS (X):     Dressing changes: See Dressing Care orders:   x  Skin care: See Skin Care orders:   Rectal tube care: See Rectal Tube Care orders:   Other orders:      WOUND TEAM PLAN OF CARE (X):   NPWT change 3 x week:        Dressing changes by wound team:       Follow up as needed: x      Other (explain):     Anticipated discharge plans (X):  SNF:           Home Care:           Outpatient Wound Center:            Self Care:            Other: patient admitted to inpatient hospice

## 2019-11-19 NOTE — CARE PLAN
Problem: Safety  Goal: Will remain free from injury  Outcome: PROGRESSING AS EXPECTED     Problem: Pain Management  Goal: Pain level will decrease to patient's comfort goal  Outcome: PROGRESSING AS EXPECTED     Problem: Infection  Goal: Will remain free from infection  Outcome: PROGRESSING AS EXPECTED

## 2019-11-19 NOTE — CARE PLAN
Problem: Pain Management  Goal: Pain level will decrease to patient's comfort goal  Outcome: PROGRESSING AS EXPECTED  Intervention: Follow pain managment plan developed in collaboration with patient and Interdisciplinary Team  Note:   Pain reported in throat & neck, medicated with IV morphine per MAR. Gave partial dose per pt request. Pain adequately managed.      Problem: Skin Integrity  Goal: Risk for impaired skin integrity will decrease  Outcome: PROGRESSING AS EXPECTED  Intervention: Implement precautions to protect skin integrity in collaboration with the interdisciplinary team  Note:   2 RN skin check. Wound consult & photo taken. PU on sacrum cleansed & new dressing applied. Q2hr turns. Low air loss mattress.

## 2019-11-19 NOTE — PROGRESS NOTES
2 RN skin check complete.   Devices in place NA.  Skin assessed under devices NA.  Confirmed pressure ulcers found on Sacrum.  New potential pressure ulcers noted on NA. Wound consult placed Yes .  The following interventions in place:  -Low air loss mattress  - Q2hr turns  -Float boots placed  -Hydrolcolloid dressing placed  -Barrier cream

## 2019-11-19 NOTE — PROGRESS NOTES
Patient arrived to floor from [facility], 5th floor. Bed alarm placed, patient oriented to room. No needs at this time.

## 2019-11-20 NOTE — PROGRESS NOTES
Shift Summary    Assumed care of patient at 0700  Patient AOx 3 Disoriented to time  Ambulatory status: Bedridden  IV access: Port. No blood return. drsg CDI.   WC in to assess coccyx wound - Hydrocolloid dressing and mepilex. See orders.   q2h turning implemented.   Condom cath in place. No BM today.

## 2019-11-20 NOTE — PROGRESS NOTES
Family meeting with wife Mell, daughter Sherlyn, Abram Altamirano, Hospice SW, Emili Starr, RN Hospice nurse. Patient no longer able to swallow adequately. He is having increased pain and family agrees to IV fentanyl as he had some anxiety to other opioids. The patient will need to stay on GIP as he is at high risk for seizure activity and cannot satisfactorily take oral medications. He is not eating or taking much fluid at this time. The wife is hoping to get a cot ion the room so that she can stay with him. Hospice team to continue to support.

## 2019-11-20 NOTE — PROGRESS NOTES
Assumed care of pt @ 1900. Disoriented to time. Seizure precautions in place. In pt hospice. Q2hr turns. Denies pain, N/V. Port flushed with no blood return, MD aware. Incontinent of both bowel & bladder, condom cath in place. Seen by wound today, new mepilex placed. Switch PO Keppra to IVPB as pt is unable to swallow safely. Comfort measures in place. Bed locked & in low position. Hourly rounding in place.

## 2019-11-20 NOTE — CARE PLAN
Problem: Safety  Goal: Will remain free from falls  Outcome: PROGRESSING AS EXPECTED   No falls/injuries. Frame alarm in place. Family at bedside.    Problem: Pain Management  Goal: Pain level will decrease to patient's comfort goal  Outcome: PROGRESSING AS EXPECTED   Pt rates pain 6/10 at this time, but is currently refusing medication.

## 2019-11-20 NOTE — CARE PLAN
Problem: Safety  Goal: Will remain free from falls  Outcome: PROGRESSING AS EXPECTED     Problem: Pain Management  Goal: Pain level will decrease to patient's comfort goal  Intervention: Follow pain managment plan developed in collaboration with patient and Interdisciplinary Team  Note:   Denies pain. Has PRN pain medications ordered, will use as needed.      Problem: Skin Integrity  Goal: Risk for impaired skin integrity will decrease  Outcome: PROGRESSING AS EXPECTED  Intervention: Implement precautions to protect skin integrity in collaboration with the interdisciplinary team  Note:   Wound came to consult today. Dressing to be changed Q72hrs. Q2hr turns. Float boots in use. Pillows used for positioning.

## 2019-11-20 NOTE — PROGRESS NOTES
Pt requires nectar thick liquids as he is at risk for aspirating. Called pharmacy to clarify if Keppra oral solution is able to be mixed with nectar thick liquids, pharmacy does not recommend. Paged on call Hospice nurse Lesvia for IV keppra orders.     [id number]- [facility] call back from MD Flood. Ok to put in IV orders if necessary. Pharmacy called and able to switch medication route without new order. Medication to be tubed up to floor.

## 2019-11-21 NOTE — PROGRESS NOTES
Assumed care of pt @ 1900. Disoriented to time. Seizure precautions in place. In pt hospice. Q2hr turns. Pain in lower back medicating with Fentanyl per MAR. Port flushed with no blood return, MD aware. Incontinent of both bowel & bladder, condom cath in place. Pt asked for milk. Unable to hold cup & drink, 1:1 assist. Drank 75%. Comfort measures in place. Bed locked & in low position. Hourly rounding in place.

## 2019-11-21 NOTE — CARE PLAN
Problem: Pain Management  Goal: Pain level will decrease to patient's comfort goal  Outcome: PROGRESSING AS EXPECTED  Intervention: Follow pain managment plan developed in collaboration with patient and Interdisciplinary Team  Note:   Pain managed with Fentanyl as ordered per MAR.      Problem: Skin Integrity  Goal: Risk for impaired skin integrity will decrease  Outcome: PROGRESSING AS EXPECTED  Intervention: Implement precautions to protect skin integrity in collaboration with the interdisciplinary team  Note:   Q2hr turns. Pillows in use for support. Heel float boots on. Low air loss mattress.

## 2019-11-21 NOTE — CARE PLAN
Problem: Infection  Goal: Will remain free from infection  Outcome: PROGRESSING AS EXPECTED   No s/sx of infection. Afebrile; VSS.    Problem: Communication  Goal: The ability to communicate needs accurately and effectively will improve  Outcome: PROGRESSING SLOWER THAN EXPECTED   Pt u/a to communicate needs without being asked directly. Rounding q1h and PRN to ensure needs are met.

## 2019-11-22 NOTE — CARE PLAN
Problem: Communication  Goal: The ability to communicate needs accurately and effectively will improve  Outcome: PROGRESSING AS EXPECTED   Pt able to communicate need for pain medications. Wife at the bedside to assist w/ communicating of needs.    Problem: Infection  Goal: Will remain free from infection  Outcome: PROGRESSING AS EXPECTED  No s/sx of infection at this time.

## 2019-11-22 NOTE — CARE PLAN
Problem: Safety  Goal: Will remain free from falls  Outcome: PROGRESSING AS EXPECTED  Intervention: Implement fall precautions  Flowsheets  Taken 11/21/2019 2120  Environmental Precautions: Treaded Slipper Socks on Patient;Personal Belongings, Wastebasket, Call Bell etc. in Easy Reach;Transferred to Stronger Side;Report Given to Other Health Care Providers Regarding Fall Risk;Bed in Low Position;Communication Sign for Patients & Families;Mobility Assessed & Appropriate Sign Placed  Taken 11/22/2019 0110  Chair/Bed Strip Alarm: Yes - Alarm On     Problem: Infection  Goal: Will remain free from infection  Outcome: PROGRESSING AS EXPECTED  Intervention: Assess signs and symptoms of infection  Note:   Vital signs q 24 hours. Pt is afebrile.

## 2019-11-22 NOTE — PROGRESS NOTES
/93   Pulse (!) 108   Temp 37.4 °C (99.3 °F) (Temporal)   Resp 18   Ht 1.829 m (6')   Wt 80 kg (176 lb 5.9 oz)   SpO2 95%   BMI 23.92 kg/m²   Received report and assumed care of pt at 1900. Pt is A&O x3. Pt disoriented to time. Port is patent and infusing. Family is at bedside. Q 2 hour turns are in place. Seizure precautions are in place. POC discussed and updated. Bed is in lowest position and call light is within reach. Hourly rounding.

## 2019-11-23 NOTE — PROGRESS NOTES
/93   Pulse (!) 108   Temp 37.4 °C (99.3 °F) (Temporal)   Resp 18   Ht 1.829 m (6')   Wt 80 kg (176 lb 5.9 oz)   SpO2 95%   BMI 23.92 kg/m²   Received report and assumed care of pt at 1900. Pt is A&O x3. Pt disoriented to time. Condom cath in place and draining virginia colored urine. Family at bedside. Education provided to family about comfort care treatments. Q 2 hour turns in place. Pt denies pain or nausea. Bed is in lowest position and hourly rounding is in place.

## 2019-11-23 NOTE — CARE PLAN
Problem: Safety  Goal: Will remain free from falls  Outcome: PROGRESSING AS EXPECTED  Intervention: Implement fall precautions  Flowsheets  Taken 11/22/2019 1950  Environmental Precautions: Treaded Slipper Socks on Patient;Personal Belongings, Wastebasket, Call Bell etc. in Easy Reach;Transferred to Stronger Side;Bed in Low Position;Report Given to Other Health Care Providers Regarding Fall Risk;Communication Sign for Patients & Families;Mobility Assessed & Appropriate Sign Placed  Taken 11/23/2019 0012  Chair/Bed Strip Alarm: Yes - Alarm On     Problem: Pain Management  Goal: Pain level will decrease to patient's comfort goal  Outcome: PROGRESSING AS EXPECTED

## 2019-11-23 NOTE — CARE PLAN
Problem: Safety  Goal: Will remain free from falls  Outcome: PROGRESSING AS EXPECTED   No falls/injuries. Bed alarm in place; wife at bedside.     Problem: Pain Management  Goal: Pain level will decrease to patient's comfort goal  Outcome: PROGRESSING AS EXPECTED   Pt able to verbalize need for pain meds when asked directly. He is also able to state pain.

## 2019-11-24 NOTE — HOSPICE
Hospice RN here to see pt today. Pt is much more comfortable with increased medications that were ordered this am. Hospice team will continue to follow daily. Please call [phone] if you have any questions or concerns.

## 2019-11-24 NOTE — FLOWSHEET NOTE
Respiratory Therapy Update                       Cough: Congested;Productive;Strong (11/24/19 1515)  Sputum Amount: Small;Moderate (11/24/19 1515)  Sputum Color: White (11/24/19 1515)                     O2 (LPM): 0 (11/24/19 1515)  O2 Daily Delivery Respiratory : Room Air with O2 Available (11/24/19 1515)    Breath Sounds  RUL Breath Sounds: Diminished (11/24/19 1515)  RML Breath Sounds: Diminished (11/24/19 1515)  RLL Breath Sounds: Diminished (11/24/19 1515)  JUWAN Breath Sounds: Diminished (11/24/19 1515)  LLL Breath Sounds: Diminished (11/24/19 1515)        Events/Summary/Plan: RN asked for assistance suctioning pt.  Assisted RN with suctioning pt.  Pt. tolerated fair and stated he was more comfortable aftterward. (11/24/19 1515)

## 2019-11-24 NOTE — CARE PLAN
Problem: Safety  Goal: Will remain free from injury  Outcome: PROGRESSING AS EXPECTED  Goal: Will remain free from falls  Outcome: PROGRESSING AS EXPECTED  Intervention: Implement fall precautions  Flowsheets (Taken 11/24/2019 1030)  Bed Alarm: Yes - Alarm On  Environmental Precautions: Treaded Slipper Socks on Patient; Personal Belongings, Wastebasket, Call Bell etc. in Easy Reach; Bed in Low Position; Communication Sign for Patients & Families  Chair/Bed Strip Alarm: Yes - Alarm On     Problem: Skin Integrity  Goal: Risk for impaired skin integrity will decrease  Outcome: PROGRESSING AS EXPECTED  Intervention: Implement precautions to protect skin integrity in collaboration with the interdisciplinary team  Flowsheets (Taken 11/24/2019 1029)  Skin Preventative Measures: Pillows in Use for Support / Positioning; Heel Float Boots in Use ; Waffle Cushion

## 2019-11-24 NOTE — PROGRESS NOTES
/97   Pulse (!) 103   Temp 36.6 °C (97.9 °F) (Temporal)   Resp 14   Ht 1.829 m (6')   Wt 80 kg (176 lb 5.9 oz)   SpO2 95%   BMI 23.92 kg/m²   Received report and assumed care of pt at 1900. Pt is A&O x4. Pt denies nausea or SOB. Port flushes and is infusing with no blood return. Heel boots off D/T pt request. Q 2 hour turns are in place. Bed is in lowest position and call light is within reach. Hourly rounding is in place.

## 2019-11-24 NOTE — CARE PLAN
Problem: Safety  Goal: Will remain free from falls  Outcome: PROGRESSING AS EXPECTED  Intervention: Implement fall precautions  Flowsheets  Taken 11/23/2019 2050  Environmental Precautions: Treaded Slipper Socks on Patient;Personal Belongings, Wastebasket, Call Bell etc. in Easy Reach;Transferred to Stronger Side;Report Given to Other Health Care Providers Regarding Fall Risk;Bed in Low Position;Communication Sign for Patients & Families;Mobility Assessed & Appropriate Sign Placed  Taken 11/23/2019 2319  Chair/Bed Strip Alarm: Yes - Alarm On     Problem: Pain Management  Goal: Pain level will decrease to patient's comfort goal  Outcome: PROGRESSING AS EXPECTED

## 2019-11-25 NOTE — CARE PLAN
Problem: Pain Management  Goal: Pain level will decrease to patient's comfort goal  Outcome: PROGRESSING AS EXPECTED  Note:   Utilizing nonverbal indicators for pain assessment. Currently resting quietly in bed with no notable grimacing.      Problem: Skin Integrity  Goal: Risk for impaired skin integrity will decrease  Outcome: PROGRESSING AS EXPECTED  Note:   Q2 turns no evidence of new breakdown.

## 2019-11-25 NOTE — CARE PLAN
Problem: Communication  Goal: The ability to communicate needs accurately and effectively will improve  Outcome: PROGRESSING AS EXPECTED     Problem: Safety  Goal: Will remain free from injury  Outcome: PROGRESSING AS EXPECTED  Goal: Will remain free from falls  Outcome: PROGRESSING AS EXPECTED     Problem: Pain Management  Goal: Pain level will decrease to patient's comfort goal  Outcome: PROGRESSING AS EXPECTED     Problem: Infection  Goal: Will remain free from infection  Outcome: PROGRESSING AS EXPECTED     Problem: Skin Integrity  Goal: Risk for impaired skin integrity will decrease  Outcome: PROGRESSING AS EXPECTED   COMFORT CARE WITH INPATIENT HOSPICE(Hospice team will continue to follow daily. Please call [phone] if you have any questions or concerns)  FALL, ASPIRATION, AND SEIZURE PRECATIONS

## 2019-11-25 NOTE — PROGRESS NOTES
Received bedside report and assumed care of patient at 0700.  Patient is nonverbal.   Pain controlled with fentanyl.  Ativan and eye drops also given for comfort. Patient turned Q2 hours. Condom catheter in place. Bed is in the lowest position. Bed alarm is on. Call light is in reach.  Family at bedside. All questions and concerns answered.

## 2019-11-25 NOTE — PROGRESS NOTES
RE: BEDSIDE REPORT  REPORT ACCEPTED AND CARE ASSUMED  PLAN: CONT. INPATIENT HOSPICE; SEIZURE PRECAUTIONS ONGOING WITH IV VIMPAT INFUSING VIA RIGHT CHEST PORT. PAIN MANAGEMENT.    FALLS PRECAUTIONS  AND  BED ALARM WITH HOURLY ROUNDING ONGOING. BED IN LOCKED/LOW POSITION. CALL LIGHT WITHIN REACH.

## 2019-11-26 NOTE — CARE PLAN
Problem: Communication  Goal: The ability to communicate needs accurately and effectively will improve  Outcome: PROGRESSING AS EXPECTED     Problem: Safety  Goal: Will remain free from injury  Outcome: PROGRESSING AS EXPECTED  Goal: Will remain free from falls  Outcome: PROGRESSING AS EXPECTED     Problem: Pain Management  Goal: Pain level will decrease to patient's comfort goal  Outcome: PROGRESSING AS EXPECTED     Problem: Infection  Goal: Will remain free from infection  Outcome: PROGRESSING AS EXPECTED     Problem: Skin Integrity  Goal: Risk for impaired skin integrity will decrease  Outcome: PROGRESSING AS EXPECTED    INPATIENT HOSPICE/COMFORT CARE ONGOING

## 2019-11-26 NOTE — PROGRESS NOTES
Received bedside report and assumed care of patient at 0700.  Patient is A&Ox1.   Pain controlled with fentanyl. Ativan and robinul also given for comfort. Mouth suctioned numerous times. Patient turned Q2 hours. Sore on bottom remains unhealed.  Mepilex changed. Condom catheter in place and replaced this morning. Port patent and flushes without resistance.  Bed is in the lowest position. Bed alarm is on. Call light is in reach.  Family at bedside. All questions and concerns answered.

## 2019-12-02 NOTE — DISCHARGE SUMMARY
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 Seizures   2. Cerebral lymphoma w craniotomy  3. Generalized pain   4. Status epilepticus  5. Respiratory failure w hypoxia at admission to [facility]  6. Atrial fibrillation, rate-controlled  7. DMII  8. CAD hx MI  9. Normocytic anemia  10. 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   He was admitted to [facility] 11/4/2019 for seizures at home and in ER, and was intubated for airway protection. MRI indicates recurrent frontal lobe tumor s/p resection recently. Neurosurgery was consulted and no further surgery was recommended. Neurology was consulted and pt was started on IV keppra for seizure prevention. Pt was extubated and Dr. Ferrer was consulted; he recommended hospice/comfort care and family agreed with this plan of care. Pt is admitted to hospice general inpatient status. The patient required IV Keppra and IV Vimpat to manage seizures and was never able to take oral. He required increasing doses of IV Fentanyl and IV lorazepam to manage his symptoms. He was comfortable at the time of his passing. His family will be [facility].    Death Date: 11/26/19   Death Time: 1730         Pronounced By (RN1): Jackie Varghese RN  Pronounced By (RN2): Alma Calvo

## 2020-01-07 NOTE — PROGRESS NOTES
The patient was on Hospice GIP with the following diagnosis: CNS Lymphoma and presented with seizures which were extremely difficult to manage.The patient does not have epilepsy, only seizures due to lymphoma
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
## 2020-07-27 NOTE — THERAPY
"Speech Language Therapy dysphagia treatment completed.   Functional Status:  Patient awake but slightly confused. GARRETT has improved compared to yesterday and he is able to have conversations and follow directions. Swallow reassessment completed. He tolerated ice chips, nectar thick liquid, puree and soft solids without overt signs of aspiration. Vocal changes noted x2 with thick liquids and patient stated \"They told me to swallow twice\" (previous tx at [facility]). Thin liquids via cup results in throat clearing and coughing 2 out of 5 trials. Patient reports his swallow and voice are not at baseline yet.     Recommendations: Recommend Dysphagia 2, nectar thick liquids. Meds floated in puree. Patient will need assistance with meal tray set up.     Plan of Care: Will benefit from Speech Therapy 3 times per week  Post-Acute Therapy: Recommend inpatient transitional care services for continued speech therapy services.        See \"Rehab Therapy-Acute\" Patient Summary Report for complete documentation.     " Good

## 2020-10-20 NOTE — PROGRESS NOTES
Patient care assumed. Report received from day RN. Patient is alert and calm, resting in bed. Call light and bedside table within reach.   none

## 2021-08-21 NOTE — THERAPY
"Speech Language Pathology  Daily Treatment     Patient Name: Marcio More Jr.  Age:  73 y.o., Sex:  male  Medical Record #: [id number]  Today's Date: 10/17/2019     Subjective    Pt willing to participate in ST session      Objective       10/17/19 1401   SLP Total Time Spent   SLP Individual Total Time Spent (Mins) 30   Charge Group   SLP Cognitive Skill Development 2       Assessment    Pt adamant at the beginning of this session that his wife was just in the room and wanted this therapist to find her.  Pt's nurse and CNA reported that they had not seen pt's wife today.  Therapy session targeted functional transfers.  Pt was able to transfer from the bed to the wheelchair and then to the toilet with the use of the walker from the bed and the grab bar to the toilet with CGA and min-mod cues for safety (locking brakes, using walker during transfer, not bending over to pull up pants).  Pt was oriented to this building (stated he was in rehab), but was unable to recall the city he [place]      Plan    Continue targeting orientation, memory and problem solving     " normal saline

## 2021-08-31 NOTE — CARE PLAN
Problem: Communication  Goal: The ability to communicate needs accurately and effectively will improve  Outcome: PROGRESSING AS EXPECTED  Pt is able to voice his needs/feelings at this time.    Problem: Safety  Goal: Will remain free from falls  Outcome: PROGRESSING AS EXPECTED  Bed locked in lowest position, bed alarm on. Call light and personal belongings within reach. Hourly rounding.       Tumor Depth: Less than 6mm from granular layer and no invasion beyond the subcutaneous fat

## 2021-09-19 NOTE — OR NURSING
1503 Pt over from IR post right retroperitoneal mass biopsy. Site is CDI and soft, no signs of bleeding. Pt awake and alert, denies pain or nausea. VSS.  1510 family to bedside  1520 tolerating orals, box lunch provided  1635 Criteria met  1645 Discharge instructions provided to pt and daughter. Discussed diet, activity, follow up, medications and symptom management. Pt and daughter state understanding. Pt and daughter state all questions have been answered. Copy of discharge provided to pt.   1655 Right retroperitoneal mass biopsy. Site is CDI and soft, no signs of bleeding.  1700 Pt wheeled off unit by RN with all belongings without incident   
Assume care for pt in pre-op. Patient allergies and NPO status verified. Belongings with fmaily. Patient verbalizes understanding of pain scale, expected course of stay and plan of care. Surgical site verified with patient. IV access established. Blood samples sent to lab for pt/inr and platelet. Call light within reach. No further needs at this time. Hourly rounding in place.   
Spontaneous, unlabored and symmetrical

## 2021-12-24 NOTE — FACE TO FACE
Face to Face Supporting Documentation - Home Health    The encounter with this patient was in whole or in part the primary reason for home health admission.    Date of encounter:   Patient:                    MRN:                       YOB: 2021  Marcio More Jr.  [id number]  1945     Home health to see patient for:  Skilled Nursing care for assessment, interventions & education, Physical Therapy evaluation and treatment and Occupational therapy evaluation and treatment    Skilled need for:  Comment: Skilled/PT/OT    Skilled nursing interventions to include:  Comment: Skilled/PT/OT    Homebound status evidenced by:  Need the aid of supportive devices such as crutches, canes, wheelchairs or walkers. Leaving home requires a considerable and taxing effort. There is a normal inability to leave the home.    Community Physician to provide follow up care: Patt Ferrer M.D.     Optional Interventions? No      I certify the face to face encounter for this home health care referral meets the CMS requirements and the encounter/clinical assessment with the patient was, in whole, or in part, for the medical condition(s) listed above, which is the primary reason for home health care. Based on my clinical findings: the service(s) are medically necessary, support the need for home health care, and the homebound criteria are met.  I certify that this patient has had a face to face encounter by myself.  Michelle Wagner M.D. - NPI: [id number]    
No

## 2022-05-27 NOTE — PROGRESS NOTES
OB ED Provider Note    Name: Brennan Verma MRN: [id number]     YOB: 1998  Age: 30 y.o. Sex: female      Subjective:     Reason for Presentation to [place] ED:  nausea and vomiting    History of Present Illness: Ms. Liyah Duran is a 30 y.o.  at 30w6d gestation with Estimated Date of Delivery: 22. Her current obstetrical history is significant for rescue cerclage placed at 22 weeks. Otherwise uncomplicated pregnancy. Prenatal records reviewed. Woke up this morning with upset stomach, then vomiting about every 20 minutes. Was coming to hospital this morning anyway because her 1year-old is having ENT surgery. Had 1 hr gtt yesterday. Result was normal. She did not throw up or feel nauseated at that time. She reports good fetal movement. She denies vaginal bleeding. She denies leakage of fluid. She reports contractions: none. OB History    Para Term  AB Living   2 1 1 0 0 1   SAB IAB Ectopic Molar Multiple Live Births             1      # Outcome Date GA Lbr Zak/2nd Weight Sex Delivery Anes PTL Lv   2 Current            1 Term 19 37w1d 13:48 / 01:19 6 lb 8.6 oz (2.965 kg) M Vag-Spont EPI N VIKA     Past Medical History:   Diagnosis Date    Anxiety     Chlamydia     Depression     medication 2017-2017. doing well off meds    Headache     Postpartum depression     Scoliosis      History reviewed. No pertinent surgical history. Social History     Occupational History    Not on file   Tobacco Use    Smoking status: Former Smoker     Packs/day: 0.25    Smokeless tobacco: Never Used    Tobacco comment: Quit smoking: none since +pt   Substance and Sexual Activity    Alcohol use: No    Drug use: Not Currently    Sexual activity: Not on file        No Known Allergies  Prior to Admission medications    Medication Sig Start Date End Date Taking?  Authorizing Provider   Prenatal Vit w/Fa-Fzmizmpxa-PJ (PNV PO) Take by mouth    Historical Provider, MD Pt back from MRI, placed on the monitor, CHG bath given, and the pt was assisted with two RN's to the chair.    acetaminophen (TYLENOL) 325 MG tablet Take 1,000 mg by mouth every 4 hours as needed    Ar Automatic Reconciliation   calcium carbonate (OS-JORGE) 1250 (500 Ca) MG chewable tablet Take 1 tablet by mouth daily    Ar Automatic Reconciliation   progesterone (PROMETRIUM) 200 MG CAPS capsule Insert capsule nightly into vagina until 36 weeks 4/6/22   Ar Automatic Reconciliation          Objective:     Vitals:  /76   Pulse (!) 107   Temp 98.2 °F (36.8 °C) (Oral)   Resp 18   Ht 5' 7\" (1.702 m)   Wt 205 lb (93 kg)   LMP 11/07/2021   SpO2 99%   BMI 32.11 kg/m²          Wt Readings from Last 1 Encounters:   05/27/22 205 lb (93 kg)       Physical Exam:  Vital signs normal  General Appearance:  in no acute distress, but is vomiting intermittently  FHTs: Reassuring/appropriate for gestational age  Uterine Activity:  Contractions None    Lab/Data Review: Labs from yesterday's office visit  Recent Results (from the past 24 hour(s))   AMB POC OB URINE DIP    Collection Time: 05/26/22 10:32 AM   Result Value Ref Range    Glucose, Urine, POC Negative Negative    Protein, Urine, POC Trace Negative   Hemoglobin    Collection Time: 05/26/22 10:51 AM   Result Value Ref Range    Hemoglobin 12.1 11.7 - 15.4 g/dL   Glucose tolerance, 1 hour    Collection Time: 05/26/22 10:51 AM   Result Value Ref Range    Glucose, 1 hour 94 MG/DL       Assessment:  28w5d gestation  Nausea & vomiting x 1 day  Obstetrical history significant for rescue cerclage placed at 22 weeks  Reassuring fetal status      Plan:   Will check BMP, CBC, LFTs  IV hydrate  Anti-emetic

## 2022-12-13 NOTE — THERAPY
Speech Language Pathology  Daily Treatment     Patient Name: Marcio More  Age:  73 y.o., Sex:  male  Medical Record #: [id number]  Today's Date: 7/17/2019     Subjective    Patient was in room at time of St. Was willing to participate.      Objective       07/17/19 0902   Dysphagia    Diet / Liquid Recommendation Dysphagia II;Fossil Thick Liquid   Nutritional Liquid Intake Rating Scale 2   Nutritional Food Intake Rating Scale 5   SLP Total Time Spent   SLP Individual Total Time Spent (Mins) 30   Charge Group   SLP Swallowing Dysfunction Treatment Swallowing Dysfunction Treatment       FIM Eating Score:  5 - Standby Prompting/Supervision or Set-up  Eating Description:         Assessment    Provided education regarding upcoming MBSS. Patient asked appropriate questions and verbalized understanding.     Plan    MBSS to be completed.      Partial Purse String (Simple) Text: Given the location of the defect and the characteristics of the surrounding skin a simple purse string closure was deemed most appropriate.  Undermining was performed circumfirentially around the surgical defect.  A purse string suture was then placed and tightened. Wound tension only allowed a partial closure of the circular defect.

## 2023-06-25 NOTE — PROGRESS NOTES
Neurosurgery Progress Note    Subjective:  No events    Exam:    A&O x3, GCS 15  PERRL, EOMI  Face symm, tongue midline  ISAAC with FS, no drift  Inc c/d/i        Pulse  Av.6  Min: 71  Max: 108  Resp  Av.8  Min: 10  Max: 22  Temp  Av.4 °C (99.4 °F)  Min: 37 °C (98.6 °F)  Max: 37.9 °C (100.2 °F)  Monitored Temp 2  Av.7 °C (99.9 °F)  Min: 37.6 °C (99.7 °F)  Max: 37.8 °C (100 °F)  SpO2  Av.3 %  Min: 91 %  Max: 99 %    No Data Recorded    Recent Labs      07/10/19   0213  07/11/19   0450  07/12/19   0430   WBC  11.3*  15.9*  19.8*   RBC  4.56*  4.21*  4.56*   HEMOGLOBIN  14.8  13.2*  14.4   HEMATOCRIT  43.8  40.7*  44.0   MCV  96.1  96.7  96.5   MCH  32.5  31.4  31.6   MCHC  33.8  32.4*  32.7*   RDW  49.6  49.5  49.4   PLATELETCT  169  150*  160*   MPV  9.9  9.7  10.1     Recent Labs      07/10/19   0212  07/11/19   0450  07/12/19   0430   SODIUM  137  137  132*   POTASSIUM  4.2  4.6  4.7   CHLORIDE  106  107  102   CO2  24  22  24   GLUCOSE  229*  228*  257*   BUN  39*  33*  31*   CREATININE  0.84  0.98  0.82   CALCIUM  9.5  8.7  9.5     Recent Labs      07/10/19   0212   APTT  25.6   INR  1.15*           Intake/Output       19 - 19 - 19 Total  Total       Intake    P.O.  320  830 1150  --  -- --    P.O.  -- -- --    I.V.  271.7  -- 271.7  --  -- --    Volume (mL) (0.9 % NaCl with KCl 20 mEq infusion) 271.7 -- 271.7 -- -- --    Other  120  -- 120  --  -- --    Medications (PO/Enteral Liquids) 120 -- 120 -- -- --    IV Piggyback  0  -- 0  --  -- --    Volume (mL) (levETIRAcetam (KEPPRA) 500 mg in  mL IVPB) 0 -- 0 -- -- --    Volume (mL) (ceFAZolin in dextrose (ANCEF) IVPB premix 2 g) 0 -- 0 -- -- --    Volume (mL) (levETIRAcetam (KEPPRA) 500 mg in  mL IVPB) 0 -- 0 -- -- --    Total Intake 711.7 830 1541.7 -- -- --       Output    Urine  1450  1500 2950  --  -- --    Number of Times  Voided 3 x -- 3 x -- -- --    Straight Catheter -- 1500 1500 -- -- --    Urine Void (mL) 500 -- 500 -- -- --    Output (mL) ([REMOVED] Urethral Catheter Latex 16 Fr.) 950 -- 950 -- -- --    Total Output 1450 1500 2950 -- -- --       Net I/O     -738.3 -670 -1408.3 -- -- --            Intake/Output Summary (Last 24 hours) at 07/12/19 0745  Last data filed at 07/12/19 0600   Gross per 24 hour   Intake          1541.67 ml   Output             2950 ml   Net         -1408.33 ml            • dexamethasone  6 mg Q6HRS   • insulin glargine  10 Units Q EVENING   • insulin regular  3-14 Units 4X/DAY ACHS    And   • glucose  16 g Q15 MIN PRN    And   • dextrose 10% bolus  250 mL Q15 MIN PRN   • levETIRAcetam (KEPPRA) IV  500 mg Q12HRS   • Pharmacy Consult Request  1 Each PHARMACY TO DOSE   • MD ALERT...DO NOT ADMINISTER NSAIDS or ASPIRIN unless ORDERED By Neurosurgery  1 Each PRN   • ondansetron  4 mg Q4HRS PRN   • diphenhydrAMINE  25 mg Q6HRS PRN   • scopolamine  1 Patch Q72HRS PRN   • hydrALAZINE  10 mg Q HOUR PRN   • docusate sodium  100 mg BID   • senna-docusate  1 Tab Nightly   • senna-docusate  1 Tab Q24HRS PRN   • polyethylene glycol/lytes  1 Packet BID PRN   • magnesium hydroxide  30 mL QDAY PRN   • bisacodyl  10 mg Q24HRS PRN   • fleet  1 Each Once PRN   • 0.9 % NaCl with KCl 20 mEq 1,000 mL   Continuous   • oxyCODONE immediate release  10 mg Q3HRS PRN   • oxyCODONE immediate-release  5 mg Q3HRS PRN   • artificial tears  2 Drop Q8HRS   • acetaminophen  650 mg Q4HRS PRN   • atorvastatin  20 mg QHS   • therapeutic multivitamin-minerals  1 Tab DAILY   • metoprolol  25 mg TWICE DAILY       Assessment and Plan:  Hospital day #8 right frontal mass, POD #2 right frontal crani for tumor    Path high grade b-cell lymphoma- I called med onc consult  No ASA or anticoagulants   Keppra  decadron taper  Pt/ot/mobilize  NADINE  Q 4 hour neuro checks  Postop mri looks good  Transfer to floor        Patient informed/Family informed

## 2024-01-01 NOTE — PROGRESS NOTES
Patient arrived via gurney with RT, ACLS RN, and CCT. Tolerated transport well with no problems. All belongings with patient/family at bedside.  Oriented to unit and surroundings. Welcome packet given. All questions answered.    Screen#: [id number]  Screen Date: 2024  Screen Comment: drawn @00:55

## 2024-04-12 NOTE — PROGRESS NOTES
"Rehab Progress Note     Encounter Date: 10/22/2019    CC: Encephalopathy, confusion, weakness    Interval Events (Subjective)  Patient sitting up in room. He reports he remembers that he was not going to go home today but does not remember when he is going home. Discussed it is written every day on the board.  Discussed that we would have another meeting this afternoon to discuss. He reports he looks forward to going home next week. Denies NVD. Reports he thinks he walked further with therapy.     IDT Team Meeting 10/22/2019    Leticia CONKLIN M.D., was present and led the interdisciplinary team conference on 10/22/2019.  I led the IDT conference and agree with the IDT conference documentation and plan of care as noted below.     RN:  Diet Regular   % Meal     Pain No pain   Sleep    Bowel Continent   Bladder Incontinent last night   In's & Out's      PT:  Bed Mobility    Transfers CGA - variable   Mobility > 200 feet SBA   Stairs    Home accessibility  With a lot of acreage and snowing  Bradykinetic; poor motor planning  4WW, HH    OT: All of his goals met short term  Eating    Grooming    Bathing Sofie   UB Dressing    LB Dressing modA   Toileting Sofie   Shower & Transfer Sofie   Very poor processing  SBA with walking just now    SLP:  Long time for processing  Easily over stimulated  Hypophonic  Orientation improved, variable    CM:  Continues to work on disposition and DME needs.      DC/Disposition:  10/28/19    Objective:  VITAL SIGNS: /53   Pulse 63   Temp 37.1 °C (98.8 °F) (Temporal)   Resp 17   Ht 1.88 m (6' 2\")   Wt 85.2 kg (187 lb 14.4 oz)   SpO2 98%   BMI 24.12 kg/m²    Gen: NAD  Psych: Mood and affect appropriate  CV: RRR, no edema  Resp: CTAB, no upper airway sounds  Abd: NTND  Neuro: AOx3, no longer perseverative on leaving, following commands at wheelchair level    Recent Results (from the past 72 hour(s))   ACCU-CHEK GLUCOSE    Collection Time: 10/19/19  5:36 PM   Result Value " Ref Range    Glucose - Accu-Ck 156 (H) 65 - 99 mg/dL   ACCU-CHEK GLUCOSE    Collection Time: 10/19/19  7:58 PM   Result Value Ref Range    Glucose - Accu-Ck 207 (H) 65 - 99 mg/dL   ACCU-CHEK GLUCOSE    Collection Time: 10/20/19  7:40 AM   Result Value Ref Range    Glucose - Accu-Ck 105 (H) 65 - 99 mg/dL   ACCU-CHEK GLUCOSE    Collection Time: 10/20/19 11:26 AM   Result Value Ref Range    Glucose - Accu-Ck 157 (H) 65 - 99 mg/dL   ACCU-CHEK GLUCOSE    Collection Time: 10/20/19  5:27 PM   Result Value Ref Range    Glucose - Accu-Ck 165 (H) 65 - 99 mg/dL   ACCU-CHEK GLUCOSE    Collection Time: 10/20/19  8:12 PM   Result Value Ref Range    Glucose - Accu-Ck 164 (H) 65 - 99 mg/dL   ACCU-CHEK GLUCOSE    Collection Time: 10/21/19  7:32 AM   Result Value Ref Range    Glucose - Accu-Ck 102 (H) 65 - 99 mg/dL   ACCU-CHEK GLUCOSE    Collection Time: 10/21/19 10:59 AM   Result Value Ref Range    Glucose - Accu-Ck 134 (H) 65 - 99 mg/dL   ACCU-CHEK GLUCOSE    Collection Time: 10/21/19  5:12 PM   Result Value Ref Range    Glucose - Accu-Ck 173 (H) 65 - 99 mg/dL   ACCU-CHEK GLUCOSE    Collection Time: 10/21/19 10:32 PM   Result Value Ref Range    Glucose - Accu-Ck 147 (H) 65 - 99 mg/dL   CBC WITHOUT DIFFERENTIAL    Collection Time: 10/22/19  6:01 AM   Result Value Ref Range    WBC 9.6 4.8 - 10.8 K/uL    RBC 3.95 (L) 4.70 - 6.10 M/uL    Hemoglobin 12.6 (L) 14.0 - 18.0 g/dL    Hematocrit 38.2 (L) 42.0 - 52.0 %    MCV 96.7 81.4 - 97.8 fL    MCH 31.9 27.0 - 33.0 pg    MCHC 33.0 (L) 33.7 - 35.3 g/dL    RDW 47.8 35.9 - 50.0 fL    Platelet Count 245 164 - 446 K/uL    MPV 9.4 9.0 - 12.9 fL   Basic Metabolic Panel    Collection Time: 10/22/19  6:01 AM   Result Value Ref Range    Sodium 140 135 - 145 mmol/L    Potassium 3.6 3.6 - 5.5 mmol/L    Chloride 106 96 - 112 mmol/L    Co2 24 20 - 33 mmol/L    Glucose 127 (H) 65 - 99 mg/dL    Bun 23 (H) 8 - 22 mg/dL    Creatinine 0.85 0.50 - 1.40 mg/dL    Calcium 8.7 8.5 - 10.5 mg/dL    Anion Gap 10.0  0.0 - 11.9   ESTIMATED GFR    Collection Time: 10/22/19  6:01 AM   Result Value Ref Range    GFR If African American >60 >60 mL/min/1.73 m 2    GFR If Non African American >60 >60 mL/min/1.73 m 2   ACCU-CHEK GLUCOSE    Collection Time: 10/22/19  7:15 AM   Result Value Ref Range    Glucose - Accu-Ck 114 (H) 65 - 99 mg/dL   ACCU-CHEK GLUCOSE    Collection Time: 10/22/19 11:26 AM   Result Value Ref Range    Glucose - Accu-Ck 119 (H) 65 - 99 mg/dL       Current Facility-Administered Medications   Medication Frequency   • DILTIAZem (CARDIZEM) tablet 60 mg Q8HRS   • lidocaine (LIDODERM) 5 % 1 Patch Q24HR   • metFORMIN (GLUCOPHAGE) tablet 250 mg BID WITH MEALS   • melatonin tablet 6 mg QHS   • amantadine (SYMMETREL) 50 MG/5ML syrup 100 mg BID   • insulin regular (HUMULIN R) injection 2-12 Units 4X/DAY ACHS    And   • glucose 4 g chewable tablet 16 g Q15 MIN PRN    And   • dextrose 50% (D50W) injection 50 mL Q15 MIN PRN   • omeprazole (FIRST-OMEPRAZOLE) 2 mg/mL oral susp 40 mg DAILY   • phenylephrine-cocoa butter (PREPARATION H) suppository 1 Suppository Q6HRS PRN   • metoprolol (LOPRESSOR) tablet 25 mg TWICE DAILY   • apixaban (ELIQUIS) tablet 5 mg BID   • baclofen (LIORESAL) tablet 5 mg TID   • vitamin D (cholecalciferol) tablet 1,000 Units DAILY   • Respiratory Care per Protocol Continuous RT   • oxyCODONE immediate-release (ROXICODONE) tablet 2.5 mg Q3HRS PRN   • oxyCODONE immediate-release (ROXICODONE) tablet 5 mg Q3HRS PRN   • tramadol (ULTRAM) 50 MG tablet 50 mg Q4HRS PRN   • hydrALAZINE (APRESOLINE) tablet 25 mg Q8HRS PRN   • acetaminophen (TYLENOL) tablet 650 mg Q4HRS PRN   • senna-docusate (PERICOLACE or SENOKOT S) 8.6-50 MG per tablet 2 Tab BID    And   • polyethylene glycol/lytes (MIRALAX) PACKET 1 Packet QDAY PRN    And   • magnesium hydroxide (MILK OF MAGNESIA) suspension 30 mL QDAY PRN    And   • bisacodyl (DULCOLAX) suppository 10 mg QDAY PRN   • artificial tears ophthalmic solution 1 Drop PRN   •  benzocaine-menthol (CEPACOL) lozenge 1 Lozenge Q2HRS PRN   • mag hydrox-al hydrox-simeth (MAALOX PLUS ES or MYLANTA DS) suspension 20 mL Q2HRS PRN   • ondansetron (ZOFRAN ODT) dispertab 4 mg 4X/DAY PRN    Or   • ondansetron (ZOFRAN) syringe/vial injection 4 mg 4X/DAY PRN   • traZODone (DESYREL) tablet 50 mg QHS PRN   • sodium chloride (OCEAN) 0.65 % nasal spray 2 Spray PRN   • allopurinol (ZYLOPRIM) tablet 300 mg DAILY   • atorvastatin (LIPITOR) tablet 20 mg Nightly   • ferrous sulfate tablet 325 mg DAILY   • loratadine (CLARITIN) tablet 10 mg DAILY       Orders Placed This Encounter   Procedures   • Diet Order Diabetic     Standing Status:   Standing     Number of Occurrences:   1     Order Specific Question:   Diet:     Answer:   Diabetic [3]     Order Specific Question:   Consistency/Fluid modifications:     Answer:   Thin Liquids [3]       Assessment:  Active Hospital Problems    Diagnosis   • *Encephalopathy acute   • B-cell lymphoma (HCC)   • Metastasis to brain (HCC)   • Atrial fibrillation (HCC) w Rapid Ventricular Response    • CAD (coronary artery disease)   • Hypertension   • Type 2 diabetes mellitus without complication, without long-term current use of insulin (HCC)   • Chronic back pain   • Dysphagia   • Primary cerebral lymphoma (HCC)       Medical Decision Making and Plan:  Encephalopathy - Patient with recent diagnosis of 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 Check UA, started on Amantadine.   -UA positive, started on Ciprofloxacin per Hospitalist, U Cx pending - Enterobacter, susc to Ciprofloxacin    Dysphagia - Patient on dysphagia 2 with NTL on transfer.  -SLP for swallow     Spasticity - Restarted on Baclofen and increased prior to admission.  -Continue on Baclofen 10 mg  TID. With AMS, will reduce to 5 mg TID. No improvement in cognition     B cell lymphoma - Underwent R Chop on 9/23/19.  Followed by Oncology. Recent right retroperitoneal mass s/p biopsy which is positive for B cell lymphoma  -Follow-up Heme/Onc  -Discontinue Prednisone. Follow-up Oncology     HTN/A Fib - Patient on Pradaxa.  Patient on Diltiazem 360 mg QHS, Metoprolol 25 mg XL daily   -Patient chewing medication, will switch medications to short acting. Diltiazem 90 mg q6 and metoprolol 25 mg BID   -Hypotension on 10/17/19, discussed case with hospitalist and recommending decrease in Diltiazem    DM - Patient on SSI on transfer. Consult hospitalist. Started on Metformin 250 mg     Sacral wound - Present on admission, seen by wound care today. Appreciate recommendations. Recommending mepilex only    Leukocytosis - B cell lymphoma on steroids. Check AM CBC - continued  -Consult hospitalist     Insomnia - Very poor sleep at night. Start Melatonin 6 mg QHS    Hx of Gout - Patient on Allopurinol 300 mg daily.     Vitamin D - 24 on admission, start on 1000 U for deficiency.      GI Ppx - Patient on Omeprazole 40 mg daily. While on steroids     DVT ppx - Patient on Pradaxa 150 mg QHS    Total time:  35 minutes.  I spent greater than 50% of the time for patient care, counseling, and coordination on this date, including unit/floor time, and face-to-face time with the patient as per interval events and assessment and plan above. Topics discussed included cognition remains good off steroids, continue amantadine and monitor initiation. Patient was discussed separately in IDT today; please see details above.    Leticia Bella M.D.       Detail Level: Detailed

## 2024-04-22 NOTE — CARE PLAN
Problem: Safety  Goal: Will remain free from injury  Outcome: PROGRESSING AS EXPECTED  Note:   Reinforced safety precautions to pt. Encouraged pt to use call light when assistance is needed, call light is within easy reach, bed is locked and at lowest position.       Problem: Pain Management  Goal: Pain level will decrease to patient's comfort goal  Outcome: PROGRESSING AS EXPECTED  Note:   Patient denied any pain or discomfort during shift.       None

## 2025-06-06 NOTE — PROGRESS NOTES
MOBILITY NOTE    Surgery patient?: Yes  Date of surgery: 7/10/19  Ambulated 50 ft on day of surgery? (N/A if today is not date of surgery): N/A  Number of times ambulated 50 feet or greater today: 2  Patient has been up to chair, edge of bed or HOB 90 degrees for all meals?: Yes  Goal met? (goal is ambulating at least 50 feet 2 times on day shift, one time on night shift): Yes  If patient did not meet mobility goal, why?:N/A      Patient not a tobacco user, no education provided at this time.

## (undated) DEVICE — PACK NEURO - (2EA/CA)

## (undated) DEVICE — MASK ANESTHESIA ADULT  - (100/CA)

## (undated) DEVICE — FORCEP BIPOLAR ISOCOOL 8.5 1.0MM TIP"

## (undated) DEVICE — GLOVE BIOGEL SZ 7 SURGICAL PF LTX - (50PR/BX 4BX/CA)

## (undated) DEVICE — SENSOR SPO2 NEO LNCS ADHESIVE (20/BX) SEE USER NOTES

## (undated) DEVICE — PACK CRANI - (1EA/CA)

## (undated) DEVICE — GLOVE BIOGEL INDICATOR SZ 6.5 SURGICAL PF LTX - (50PR/BX 4BX/CA)

## (undated) DEVICE — DRAPE MEDI-SLUSH STERILE  - (40/CA)

## (undated) DEVICE — GLOVE BIOGEL PI ORTHO SZ 6 SURGICAL PF LF (40PR/BX)

## (undated) DEVICE — INTRAOP NEURO IN OR 1:1 PER 15 MIN

## (undated) DEVICE — LACTATED RINGERS INJ 1000 ML - (14EA/CA 60CA/PF)

## (undated) DEVICE — SCALP CLIP RANEY 20-1037 (10EA/PK 20PK/CA)

## (undated) DEVICE — SPANDAGE CHEST SZ10 25YDS - STRETCH (21 EA/CA 1RL/CA)

## (undated) DEVICE — BOVIE BLADE COATED &INSULATED - 25/PK

## (undated) DEVICE — CON SEDATION EA ADDL 15 MIN

## (undated) DEVICE — TRAY MULTI-LUMEN 7FR PRESSURE W/MAX BARRIER AND BIOPATCH - (5/CA)

## (undated) DEVICE — NEPTUNE 4 PORT MANIFOLD - (20/PK)

## (undated) DEVICE — KIT ROOM DECONTAMINATION

## (undated) DEVICE — PAD LAP STERILE 18 X 18 - (5/PK 40PK/CA)

## (undated) DEVICE — CONTAINER SPECIMEN BAG OR - STERILE 4 OZ W/LID (100EA/CA)

## (undated) DEVICE — DRESSING XEROFORM 1X8 - (50/BX 4BX/CA)

## (undated) DEVICE — CHLORAPREP 26 ML APPLICATOR - ORANGE TINT(25/CA)

## (undated) DEVICE — CATHETER IV 14 GA X 2 ---SURG.& SDS ONLY---(200EA/CA)

## (undated) DEVICE — GLOVE BIOGEL PI INDICATOR SZ 7.0 SURGICAL PF LF - (50/BX 4BX/CA)

## (undated) DEVICE — SLEEVE, VASO, THIGH, MED

## (undated) DEVICE — ELECTRODE DUAL RETURN W/ CORD - (50/PK)

## (undated) DEVICE — SPONGE GAUZE STER 4X4 8-PL - (2/PK 50PK/BX 12BX/CS)

## (undated) DEVICE — KIT EVACUATER 3 SPRING PVC LF 1/8 DRAIN SIZE (10EA/CA)"

## (undated) DEVICE — SUTURE 1 VICRYL PLUS CTX - 8 X 18 INCH (12/BX)

## (undated) DEVICE — SODIUM CHL. INJ. 0.9% 500ML (24EA/CA 50CA/PF)

## (undated) DEVICE — TUBING CLEARLINK DUO-VENT - C-FLO (48EA/CA)

## (undated) DEVICE — SPONGE GAUZESTER 4 X 4 4PLY - (128PK/CA)

## (undated) DEVICE — BLADE CLIPPER FITS 2501 CLIPPER (BLUE)  (20EA/CA)

## (undated) DEVICE — SUTURE 0 VICRYL PLUS CT-2 - 8 X 18 INCH (12/BX)

## (undated) DEVICE — SODIUM CHL IRRIGATION 0.9% 1000ML (12EA/CA)

## (undated) DEVICE — SOD. CHL 10CC SYRINGE PREFILL - W/10 CC (30/BX)

## (undated) DEVICE — DRAPE MICROSCOPE X-LONG (10EA/CA)

## (undated) DEVICE — COVER LIGHT HANDLE FLEXIBLE - SOFT (2EA/PK 80PK/CA)

## (undated) DEVICE — SUTURE 4-0 MONOCRYL PLUS PS-2 - 27 INCH (36/BX)

## (undated) DEVICE — STOCKINGTHIGH HIGH LARGE REG - (6PR/BX)

## (undated) DEVICE — DRAPE 36X28IN RAD CARM BND BG - (25/CA) O

## (undated) DEVICE — SUCTION INSTRUMENT YANKAUER BULBOUS TIP W/O VENT (50EA/CA)

## (undated) DEVICE — CANISTER SUCTION 3000ML MECHANICAL FILTER AUTO SHUTOFF MEDI-VAC NONSTERILE LF DISP  (40EA/CA)

## (undated) DEVICE — SYRINGE SAFETY 10 ML 18 GA X 1 1/2 BLUNT LL (100/BX 4BX/CA)

## (undated) DEVICE — PADDING CAST 4 IN STERILE - 4 X 4 YDS (24/CA)

## (undated) DEVICE — SYRINGE 3 CC 22 GA X 1-1/2 - NDL SAFETY (50/BX 8BX/CA)

## (undated) DEVICE — GOWN SURGEONS LARGE - (32/CA)

## (undated) DEVICE — TRANSDUCER ADULT DISP. SINGLE BONDED STERILE - (20EA/CA)

## (undated) DEVICE — BANDAID SHEER STRIP 3/4 IN (100EA/BX 12BX/CA)

## (undated) DEVICE — HEMOSTAT SURG ABSORBABLE - 4 X 8 IN SURGICEL (24EA/CA)

## (undated) DEVICE — GOWN SURGICAL XX-LARGE - (28EA/CA) SIRUS NON REINFORCED

## (undated) DEVICE — PROTECTOR ULNA NERVE - (36PR/CA)

## (undated) DEVICE — CORDS BIPOLAR COAGULATION - 12FT STERILE DISP. (10EA/BX)

## (undated) DEVICE — SET LEADWIRE 5 LEAD BEDSIDE DISPOSABLE ECG (1SET OF 5/EA)

## (undated) DEVICE — TRAY SURESTEP FOLEY TEMP SENSING 16FR (10EA/CA) ORDER  #18764 FOR TEMP FOLEY ONLY

## (undated) DEVICE — CON SEDATION/>5 YR 1ST 15 MIN

## (undated) DEVICE — DRAPE CLEAR W/ELASTIC BAND RAD CARM 40 X40" (20EA/CA)"

## (undated) DEVICE — DISSECT TOOL MIDAS F2/8TA23

## (undated) DEVICE — KIT GEL-FLOW NT ABORBABLE GELATIN (6EA/BX)

## (undated) DEVICE — SET EXTENSION WITH 2 PORTS (48EA/CA) ***PART #2C8610 IS A SUBSTITUTE*****

## (undated) DEVICE — KIT ANESTHESIA W/CIRCUIT & 3/LT BAG W/FILTER (20EA/CA)

## (undated) DEVICE — GUIDE TRACHE TUBE INTUBATING STYLET 5.0-10.0MM 14FR (20EA/PK)

## (undated) DEVICE — DERMABOND ADVANCED - (12EA/BX)

## (undated) DEVICE — DRAPE STRLE REG TOWEL 18X24 - (10/BX 4BX/CA)"

## (undated) DEVICE — GOWN WARMING STANDARD FLEX - (30/CA)

## (undated) DEVICE — DRESSING TRANSPARENT FILM TEGADERM 4 X 4.75" (50EA/BX)"

## (undated) DEVICE — KIT SURGIFLO W/OUT THROMBIN - (6EA/CA)

## (undated) DEVICE — GLOVE BIOGEL SZ 8.5 SURGICAL PF LTX - (50PR/BX 4BX/CA)

## (undated) DEVICE — TUBE E-T HI-LO CUFF 7.0MM (10EA/PK)

## (undated) DEVICE — TOOL DISSECT MATCH HEAD

## (undated) DEVICE — STAPLER SKIN DISP - (6/BX 10BX/CA) VISISTAT

## (undated) DEVICE — SUTURE GENERAL

## (undated) DEVICE — DRESSING NON ADHERENT 3 X 4 - STERILE (100/BX 12BX/CA)

## (undated) DEVICE — TUBE E-T HI-LO CUFF 7.5MM (10EA/PK)

## (undated) DEVICE — DRAPE LAPAROTOMY T SHEET - (12EA/CA)

## (undated) DEVICE — ELECTRODE 850 FOAM ADHESIVE - HYDROGEL RADIOTRNSPRNT (50/PK)

## (undated) DEVICE — SPONGE GAUZESTER. 2X2 4-PL - (2/PK 50PK/BX 30BX/CS)

## (undated) DEVICE — CANNULA W/ SUPPLY TUBING O2 - (50/CA)

## (undated) DEVICE — MIDAS LUBRICATOR DIFFUSER PACK (4EA/CA)

## (undated) DEVICE — SUTURE 4-0 NUROLON CR/8 TF - (12/BX) ETHICON

## (undated) DEVICE — ARMREST CRADLE FOAM - (2PR/PK 12PR/CA)

## (undated) DEVICE — SYRINGE ASEPTO - (50EA/CA

## (undated) DEVICE — SURGIFOAM (SIZE 100) - (6EA/CA)

## (undated) DEVICE — GLOVE BIOGEL PI INDICATOR SZ 6.5 SURGICAL PF LF - (50/BX 4BX/CA)

## (undated) DEVICE — BANDAGE ROLL STERILE BULKEE 4.5 IN X 4 YD (100EA/CA)

## (undated) DEVICE — SYRINGE SAFETY 5 ML 18 GA X 1-1/2 BLUNT LL (100/BX 4BX/CA)

## (undated) DEVICE — SYRINGE 6 CC 20 GA X 1 1/2 - NDL SAFETY  (50/BX)

## (undated) DEVICE — BATTERY QUICKDRIVE

## (undated) DEVICE — GLOVE BIOGEL PI ORTHO SZ 6 1/2 SURGICAL PF LF (40PR/BX)

## (undated) DEVICE — GLOVE SZ 6.5 BIOGEL PI MICRO - PF LF (50PR/BX)

## (undated) DEVICE — TUBE E-T HI-LO CUFF 8.5MM (10EA/PK)

## (undated) DEVICE — PATTIES SURG X-RAYCOTTONOID - 1 X 3 IN (200/CA)

## (undated) DEVICE — LACTATED RINGERS INJ. 500 ML - (24EA/CA)

## (undated) DEVICE — DRAPE LARGE 3 QUARTER - (20/CA)

## (undated) DEVICE — BALL COTTON NEURO 3/4 INCH - (5/PK50PK/CA)

## (undated) DEVICE — KIT RADIAL ARTERY 20GA W/MAX BARRIER AND BIOPATCH  (5EA/CA) #10740 IS FOR THE SET RADIAL ARTERIAL

## (undated) DEVICE — PERFORATER DISP TIP DGR-0

## (undated) DEVICE — HEADREST PRONEVIEW LARGE - (10/CA)